# Patient Record
Sex: MALE | Race: WHITE | NOT HISPANIC OR LATINO
[De-identification: names, ages, dates, MRNs, and addresses within clinical notes are randomized per-mention and may not be internally consistent; named-entity substitution may affect disease eponyms.]

---

## 2017-11-29 ENCOUNTER — NON-APPOINTMENT (OUTPATIENT)
Age: 57
End: 2017-11-29

## 2017-11-29 ENCOUNTER — LABORATORY RESULT (OUTPATIENT)
Age: 57
End: 2017-11-29

## 2017-11-29 ENCOUNTER — APPOINTMENT (OUTPATIENT)
Dept: FAMILY MEDICINE | Facility: CLINIC | Age: 57
End: 2017-11-29
Payer: COMMERCIAL

## 2017-11-29 VITALS
TEMPERATURE: 98.2 F | HEART RATE: 71 BPM | RESPIRATION RATE: 12 BRPM | WEIGHT: 210 LBS | BODY MASS INDEX: 29.4 KG/M2 | SYSTOLIC BLOOD PRESSURE: 118 MMHG | DIASTOLIC BLOOD PRESSURE: 76 MMHG | OXYGEN SATURATION: 98 % | HEIGHT: 71 IN

## 2017-11-29 DIAGNOSIS — Z78.9 OTHER SPECIFIED HEALTH STATUS: ICD-10-CM

## 2017-11-29 PROCEDURE — 36415 COLL VENOUS BLD VENIPUNCTURE: CPT

## 2017-11-29 PROCEDURE — 93000 ELECTROCARDIOGRAM COMPLETE: CPT | Mod: 59

## 2017-11-29 PROCEDURE — 99396 PREV VISIT EST AGE 40-64: CPT | Mod: 25

## 2017-11-30 LAB
APPEARANCE: CLEAR
BILIRUBIN URINE: NEGATIVE
BLOOD URINE: NEGATIVE
CHOLEST SERPL-MCNC: 188 MG/DL
CHOLEST/HDLC SERPL: 3.3 RATIO
COLOR: YELLOW
ERYTHROCYTE [SEDIMENTATION RATE] IN BLOOD BY WESTERGREN METHOD: 10 MM/HR
GLUCOSE QUALITATIVE U: NEGATIVE MG/DL
HCV AB SER QL: REACTIVE
HCV S/CO RATIO: 16.6 S/CO
HDLC SERPL-MCNC: 57 MG/DL
KETONES URINE: NEGATIVE
LDLC SERPL CALC-MCNC: 119 MG/DL
LEUKOCYTE ESTERASE URINE: NEGATIVE
NITRITE URINE: NEGATIVE
PH URINE: 7
PROTEIN URINE: NEGATIVE MG/DL
SPECIFIC GRAVITY URINE: 1.01
TRIGL SERPL-MCNC: 60 MG/DL
TSH SERPL-ACNC: 2.31 UIU/ML
UROBILINOGEN URINE: NEGATIVE MG/DL

## 2017-12-13 LAB
ALBUMIN SERPL ELPH-MCNC: 4.2 G/DL
ALP BLD-CCNC: 43 U/L
ALT SERPL-CCNC: 15 U/L
ANION GAP SERPL CALC-SCNC: 14 MMOL/L
AST SERPL-CCNC: 18 U/L
BASOPHILS # BLD AUTO: 0.03 K/UL
BASOPHILS NFR BLD AUTO: 0.4 %
BILIRUB SERPL-MCNC: 0.6 MG/DL
BUN SERPL-MCNC: 19 MG/DL
CALCIUM SERPL-MCNC: 9.3 MG/DL
CHLORIDE SERPL-SCNC: 101 MMOL/L
CO2 SERPL-SCNC: 24 MMOL/L
CREAT SERPL-MCNC: 0.85 MG/DL
EOSINOPHIL # BLD AUTO: 0.1 K/UL
EOSINOPHIL NFR BLD AUTO: 1.5 %
GLUCOSE SERPL-MCNC: 82 MG/DL
HCT VFR BLD CALC: 36.6 %
HGB BLD-MCNC: 11.7 G/DL
IMM GRANULOCYTES NFR BLD AUTO: 0 %
LYMPHOCYTES # BLD AUTO: 3.09 K/UL
LYMPHOCYTES NFR BLD AUTO: 46 %
MAN DIFF?: NORMAL
MCHC RBC-ENTMCNC: 23.1 PG
MCHC RBC-ENTMCNC: 32 GM/DL
MCV RBC AUTO: 72.2 FL
MONOCYTES # BLD AUTO: 0.59 K/UL
MONOCYTES NFR BLD AUTO: 8.8 %
NEUTROPHILS # BLD AUTO: 2.91 K/UL
NEUTROPHILS NFR BLD AUTO: 43.3 %
PLATELET # BLD AUTO: 194 K/UL
POTASSIUM SERPL-SCNC: 4.9 MMOL/L
PROT SERPL-MCNC: 6.7 G/DL
RBC # BLD: 5.07 M/UL
RBC # FLD: 16.4 %
SODIUM SERPL-SCNC: 139 MMOL/L
WBC # FLD AUTO: 6.72 K/UL

## 2019-01-27 ENCOUNTER — LABORATORY RESULT (OUTPATIENT)
Age: 59
End: 2019-01-27

## 2019-01-28 ENCOUNTER — LABORATORY RESULT (OUTPATIENT)
Age: 59
End: 2019-01-28

## 2019-01-28 ENCOUNTER — APPOINTMENT (OUTPATIENT)
Dept: FAMILY MEDICINE | Facility: CLINIC | Age: 59
End: 2019-01-28
Payer: MEDICARE

## 2019-01-28 VITALS
OXYGEN SATURATION: 96 % | TEMPERATURE: 97.7 F | WEIGHT: 205 LBS | HEART RATE: 74 BPM | HEIGHT: 71 IN | RESPIRATION RATE: 13 BRPM | DIASTOLIC BLOOD PRESSURE: 70 MMHG | BODY MASS INDEX: 28.7 KG/M2 | SYSTOLIC BLOOD PRESSURE: 110 MMHG

## 2019-01-28 DIAGNOSIS — R31.9 HEMATURIA, UNSPECIFIED: ICD-10-CM

## 2019-01-28 PROCEDURE — 36415 COLL VENOUS BLD VENIPUNCTURE: CPT

## 2019-01-28 PROCEDURE — 99396 PREV VISIT EST AGE 40-64: CPT | Mod: 25

## 2019-01-28 NOTE — HISTORY OF PRESENT ILLNESS
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

## 2019-01-28 NOTE — PLAN
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 RTC 3 month

## 2019-01-28 NOTE — HEALTH RISK ASSESSMENT
[Good] : ~his/her~  mood as  good [No falls in past year] : Patient reported no falls in the past year [0] : 2) Feeling down, depressed, or hopeless: Not at all (0) [Patient declined bone density test] : Patient declined bone density test [Patient reported colonoscopy was normal] : Patient reported colonoscopy was normal [HIV Test offered] : HIV Test offered [Graduate School] : graduate school [] :  [Fully functional (bathing, dressing, toileting, transferring, walking, feeding)] : Fully functional (bathing, dressing, toileting, transferring, walking, feeding) [Fully functional (using the telephone, shopping, preparing meals, housekeeping, doing laundry, using] : Fully functional and needs no help or supervision to perform IADLs (using the telephone, shopping, preparing meals, housekeeping, doing laundry, using transportation, managing medications and managing finances) [Discussed at today's visit] : Advance Directives Discussed at today's visit [Aggressive treatment] : aggressive treatment [] : No [[id number]] : 0 [ColonoscopyDate] : 01/19 [ColonoscopyComments] : fit

## 2019-01-28 NOTE — REVIEW OF SYSTEMS
[Lower Ext Edema] : lower extremity edema [Poor Libido] : poor libido [Fever] : no fever [Chills] : no chills [Fatigue] : no fatigue [Hot Flashes] : no hot flashes [Night Sweats] : no night sweats [Discharge] : no discharge [Pain] : no pain [Redness] : no redness [Dryness] : no dryness [Earache] : no earache [Hearing Loss] : no hearing loss [Nosebleeds] : no nosebleeds [Postnasal Drip] : no postnasal drip [Chest Pain] : no chest pain [Palpitations] : no palpitations [Claudication] : no  leg claudication [Shortness Of Breath] : no shortness of breath [Wheezing] : no wheezing [Abdominal Pain] : no abdominal pain [Nausea] : no nausea [Constipation] : no constipation [Diarrhea] : no diarrhea [Vomiting] : no vomiting [Heartburn] : no heartburn [Melena] : no melena [Dysuria] : no dysuria [Incontinence] : no incontinence [Hesitancy] : no hesitancy [Nocturia] : no nocturia [Hematuria] : no hematuria [Frequency] : no frequency [Impotence] : no impotency [Joint Pain] : no joint pain [Joint Stiffness] : no joint stiffness [Muscle Pain] : no muscle pain [Muscle Weakness] : no muscle weakness

## 2019-01-29 PROBLEM — R31.9 HEMATURIA: Status: ACTIVE | Noted: 2019-01-29

## 2019-01-29 LAB
24R-OH-CALCIDIOL SERPL-MCNC: 48.6 PG/ML
ALBUMIN SERPL ELPH-MCNC: 4 G/DL
ALP BLD-CCNC: 50 U/L
ALT SERPL-CCNC: 13 U/L
ANION GAP SERPL CALC-SCNC: 12 MMOL/L
APPEARANCE: CLEAR
AST SERPL-CCNC: 14 U/L
BASOPHILS # BLD AUTO: 0.03 K/UL
BASOPHILS NFR BLD AUTO: 0.6 %
BILIRUB SERPL-MCNC: 0.7 MG/DL
BILIRUBIN URINE: NEGATIVE
BLOOD URINE: ABNORMAL
BUN SERPL-MCNC: 13 MG/DL
CALCIUM SERPL-MCNC: 8.8 MG/DL
CHLORIDE SERPL-SCNC: 104 MMOL/L
CHOLEST SERPL-MCNC: 152 MG/DL
CHOLEST/HDLC SERPL: 3.2 RATIO
CO2 SERPL-SCNC: 25 MMOL/L
COLOR: ABNORMAL
CREAT SERPL-MCNC: 0.9 MG/DL
EOSINOPHIL # BLD AUTO: 0.1 K/UL
EOSINOPHIL NFR BLD AUTO: 1.9 %
ERYTHROCYTE [SEDIMENTATION RATE] IN BLOOD BY WESTERGREN METHOD: 8 MM/HR
GLUCOSE QUALITATIVE U: NEGATIVE MG/DL
GLUCOSE SERPL-MCNC: 80 MG/DL
HBA1C MFR BLD HPLC: 5 %
HCT VFR BLD CALC: 36.7 %
HDLC SERPL-MCNC: 48 MG/DL
HGB BLD-MCNC: 11.2 G/DL
IMM GRANULOCYTES NFR BLD AUTO: 0 %
KETONES URINE: NEGATIVE
LDLC SERPL CALC-MCNC: 91 MG/DL
LEUKOCYTE ESTERASE URINE: NEGATIVE
LYMPHOCYTES # BLD AUTO: 2.61 K/UL
LYMPHOCYTES NFR BLD AUTO: 48.5 %
MAN DIFF?: NORMAL
MCHC RBC-ENTMCNC: 22.6 PG
MCHC RBC-ENTMCNC: 30.5 GM/DL
MCV RBC AUTO: 74 FL
MONOCYTES # BLD AUTO: 0.39 K/UL
MONOCYTES NFR BLD AUTO: 7.2 %
NEUTROPHILS # BLD AUTO: 2.25 K/UL
NEUTROPHILS NFR BLD AUTO: 41.8 %
NITRITE URINE: NEGATIVE
PH URINE: 7
PLATELET # BLD AUTO: 197 K/UL
POTASSIUM SERPL-SCNC: 4.7 MMOL/L
PROT SERPL-MCNC: 6.2 G/DL
PROTEIN URINE: NEGATIVE MG/DL
PSA SERPL-MCNC: 0.32 NG/ML
RBC # BLD: 4.96 M/UL
RBC # FLD: 16.8 %
RHEUMATOID FACT SER QL: <10 IU/ML
SODIUM SERPL-SCNC: 141 MMOL/L
SPECIFIC GRAVITY URINE: 1.01
TESTOST SERPL-MCNC: 642.4 NG/DL
TRIGL SERPL-MCNC: 63 MG/DL
TSH SERPL-ACNC: 2.94 UIU/ML
URATE SERPL-MCNC: 5.3 MG/DL
UROBILINOGEN URINE: NEGATIVE MG/DL
WBC # FLD AUTO: 5.38 K/UL

## 2019-01-30 LAB
ANA SER IF-ACNC: NEGATIVE
CCP AB SER IA-ACNC: <8 UNITS
ENA SS-A AB SER IA-ACNC: <0.2 AL
ENA SS-B AB SER IA-ACNC: <0.2 AL
ERYTHROCYTE [SEDIMENTATION RATE] IN BLOOD BY WESTERGREN METHOD: 3 MM/HR
RF+CCP IGG SER-IMP: NEGATIVE

## 2019-02-01 LAB
B BURGDOR AB SER-IMP: NEGATIVE
B BURGDOR IGM PATRN SER IB-IMP: NEGATIVE
B BURGDOR18/20KD IGM SER QL IB: NORMAL
B BURGDOR18KD IGG SER QL IB: NORMAL
B BURGDOR23KD IGG SER QL IB: NORMAL
B BURGDOR23KD IGM SER QL IB: NORMAL
B BURGDOR28KD AB SER QL IB: NORMAL
B BURGDOR28KD IGG SER QL IB: NORMAL
B BURGDOR30KD AB SER QL IB: NORMAL
B BURGDOR30KD IGG SER QL IB: NORMAL
B BURGDOR31KD IGG SER QL IB: NORMAL
B BURGDOR31KD IGM SER QL IB: NORMAL
B BURGDOR39KD IGG SER QL IB: NORMAL
B BURGDOR39KD IGM SER QL IB: PRESENT
B BURGDOR41KD IGG SER QL IB: NORMAL
B BURGDOR41KD IGM SER QL IB: NORMAL
B BURGDOR45KD AB SER QL IB: NORMAL
B BURGDOR45KD IGG SER QL IB: NORMAL
B BURGDOR58KD AB SER QL IB: NORMAL
B BURGDOR58KD IGG SER QL IB: NORMAL
B BURGDOR66KD IGG SER QL IB: NORMAL
B BURGDOR66KD IGM SER QL IB: NORMAL
B BURGDOR93KD IGG SER QL IB: NORMAL
B BURGDOR93KD IGM SER QL IB: NORMAL

## 2019-05-15 ENCOUNTER — TRANSCRIPTION ENCOUNTER (OUTPATIENT)
Age: 59
End: 2019-05-15

## 2019-11-05 ENCOUNTER — OUTPATIENT (OUTPATIENT)
Dept: OUTPATIENT SERVICES | Facility: HOSPITAL | Age: 59
LOS: 1 days | Discharge: HOME | End: 2019-11-05
Payer: COMMERCIAL

## 2019-11-05 DIAGNOSIS — E85.9 AMYLOIDOSIS, UNSPECIFIED: ICD-10-CM

## 2019-11-07 ENCOUNTER — LABORATORY RESULT (OUTPATIENT)
Age: 59
End: 2019-11-07

## 2019-11-07 ENCOUNTER — APPOINTMENT (OUTPATIENT)
Dept: HEMATOLOGY ONCOLOGY | Facility: CLINIC | Age: 59
End: 2019-11-07
Payer: COMMERCIAL

## 2019-11-07 ENCOUNTER — RESULT REVIEW (OUTPATIENT)
Age: 59
End: 2019-11-07

## 2019-11-07 LAB
ALBUMIN SERPL ELPH-MCNC: 3.9 G/DL
ALP BLD-CCNC: 50 U/L
ALT SERPL-CCNC: 9 U/L
ANION GAP SERPL CALC-SCNC: 11 MMOL/L
AST SERPL-CCNC: 11 U/L
BASOPHILS # BLD AUTO: 0.1 K/UL — SIGNIFICANT CHANGE UP (ref 0–0.2)
BASOPHILS NFR BLD AUTO: 1.4 % — SIGNIFICANT CHANGE UP (ref 0–2)
BILIRUB SERPL-MCNC: 0.3 MG/DL
BUN SERPL-MCNC: 10 MG/DL
CALCIUM SERPL-MCNC: 8.8 MG/DL
CHLORIDE SERPL-SCNC: 106 MMOL/L
CO2 SERPL-SCNC: 25 MMOL/L
CREAT SERPL-MCNC: 0.86 MG/DL
EOSINOPHIL # BLD AUTO: 0.1 K/UL — SIGNIFICANT CHANGE UP (ref 0–0.5)
EOSINOPHIL NFR BLD AUTO: 1.6 % — SIGNIFICANT CHANGE UP (ref 0–6)
GLUCOSE SERPL-MCNC: 95 MG/DL
HCT VFR BLD CALC: 34.4 % — LOW (ref 39–50)
HGB BLD-MCNC: 10.3 G/DL — LOW (ref 13–17)
LYMPHOCYTES # BLD AUTO: 2.6 K/UL — SIGNIFICANT CHANGE UP (ref 1–3.3)
LYMPHOCYTES # BLD AUTO: 50.4 % — HIGH (ref 13–44)
MCHC RBC-ENTMCNC: 21.5 PG — LOW (ref 27–34)
MCHC RBC-ENTMCNC: 30 G/DL — LOW (ref 32–36)
MCV RBC AUTO: 71.7 FL — LOW (ref 80–100)
MONOCYTES # BLD AUTO: 0.4 K/UL — SIGNIFICANT CHANGE UP (ref 0–0.9)
MONOCYTES NFR BLD AUTO: 8.2 % — SIGNIFICANT CHANGE UP (ref 2–14)
NEUTROPHILS # BLD AUTO: 1.9 K/UL — SIGNIFICANT CHANGE UP (ref 1.8–7.4)
NEUTROPHILS NFR BLD AUTO: 38.4 % — LOW (ref 43–77)
PLATELET # BLD AUTO: 185 K/UL — SIGNIFICANT CHANGE UP (ref 150–400)
POTASSIUM SERPL-SCNC: 4.3 MMOL/L
PROT SERPL-MCNC: 5.9 G/DL
RBC # BLD: 4.79 M/UL — SIGNIFICANT CHANGE UP (ref 4.2–5.8)
RBC # FLD: 13 % — SIGNIFICANT CHANGE UP (ref 10.3–14.5)
SODIUM SERPL-SCNC: 142 MMOL/L
URATE SERPL-MCNC: 3.7 MG/DL
WBC # BLD: 5.1 K/UL — SIGNIFICANT CHANGE UP (ref 3.8–10.5)
WBC # FLD AUTO: 5.1 K/UL — SIGNIFICANT CHANGE UP (ref 3.8–10.5)

## 2019-11-07 PROCEDURE — 99203 OFFICE O/P NEW LOW 30 MIN: CPT

## 2019-11-07 NOTE — PHYSICAL EXAM
[Fully active, able to carry on all pre-disease performance without restriction] : Status 0 - Fully active, able to carry on all pre-disease performance without restriction [Normal] : pharynx is unremarkable, moist mucus membrane, no oral lesions [de-identified] : recent ulceration shave healed [de-identified] : 1= bipedal edema to thigh [de-identified] : faded LE telangiectasias

## 2019-11-07 NOTE — HISTORY OF PRESENT ILLNESS
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 Started getting joint pain 5 years ago, believed it to be psoriatic arthritis due to his fhx (father). Managed sx with advil. In 2016 his LFT was elevated, dx with Hepatitis C. Managed with 3 months of Harvoni, now undetectable. States his LFT is now normal. In Nov 2018, started exercising 6 days a week, began to notice intermittent hematuria, CT Urogram was normal. States he gets the hematuria q 5 weeks, usually after physical activity. Up to date with his cardiologist, Dr. Miguel Diehl, follows up regularly. Denies any heart issues. Around June 30th, noticed crampy abdominal pain and blood in the stool. Estimates to have lost about a pint of blood. GI sent for CT which showed colitis; ulcerations in descending colon, was believed to Crohn’s. The path report from biopsies of the sigmoid colon decribed moderate active chronic colitis and erosion, with granulation tissue and necroinflammatory exudate consistent with ulcer edge.\par  Was put on steroids and Mesalamine and his sx improved. Was having 6 diarrhea movements a day, now improved. Colonoscopy showed colitis only in descending colon. Started on Humira in mid-August. 3 days after first dose, left knee swelled up, improved with Advil. 3 days after second dose, ankles swelled up, managed with 4 days of steroids. States his joints were red/hot. Has not happened with recent doses. In September, he noticed early satiety and dyspepsia. Started on famotidine and cut back on the Mesalamine. Also started on oral iron, but he developed gastritis so he stopped.\par  His stool is now soft and formed, denies any blood. States his renal function is normal, denies protein in the urine. Has had a weight loss of 25 lbs since June. Reports his BP is low; gets lightheaded when getting up too fast. Noticed pitting edema in his legs, voice hoarseness, and accelerated hair loss. Has also has 2 episodes of mouth ulcers. Denies any adenopathy. \par Hx of cutaneous mastocytosis, dx 4 years ago, lesions on both thighs and flanks, have gone away since starting on steroids.  \par Has hx of Raynaud’s phenomenon. Denies any hx of thyroid disease.\par \par Endoscopy 10/30/19:\par -Small hiatal hernia\par -Friable gastric mucosa. Biopsied.\par -Erythematous duodenopathy, Biopsied. \par \par Pathology 10/30/19:\par A. Duodenum, second part, biopsy:\par -Duodenal mucosa with amyloidosis \par B. Stomach, Antrum, rule out maltoma, biopsy:\par -Superficial fragments of gastric antral mucosa with amyloidosis and active chronic gastritis with surface erosions.\par C. Stomach, body, rule out maltoma, biopsy:\par -Superficial fragments of gastric mucosa with amyloidosis, foveolar hyperplasia and increased chronic inflammation. \par D. Stomach, fundus, rule out maltoma, biopsy:\par -Superficial fragments of gastric mucosa with amyloidosis, foveolar hyperplasia and increased chronic inflammation. \par \par Pathology 7/17/19:\par A. Colon, sigmoid, biopsy:\par -Moderate active chronic colitis and erosion.\par -granulation tissue and necroinflammatory exudate consistent with an ulcer edge/bed.\par B. Colon, rectosigmoid, biopsy:\par -Colonic mucosa within normal limits \par \par The progression in dyspepsia led to an upper endoscopy with Dr. Parks on 10/30/19. In the second portion of the duodenum, biopsy revealed amyloidosis, confirmed with Congo red stain.Superficial fragments of gastric antral mucosa showed the same findings. IgM immunostain is positive in areas of amyloid deposition, with kappa LC predominance. The stomach was noted to have impaired distensibility on endoscopy.\par Renal function has been normal, with BUN 9/creatinine 0.9.\par Cystoscopy was performed which showed prominent blood vessels. In retrospect this finding may also be consistent with bladder involvement with amyloid.\par

## 2019-11-07 NOTE — CONSULT LETTER
[Dear  ___] : Dear  [unfilled], [Consult Letter:] : I had the pleasure of evaluating your patient, [unfilled]. [Please see my note below.] : Please see my note below. [Consult Closing:] : Thank you very much for allowing me to participate in the care of this patient.  If you have any questions, please do not hesitate to contact me. [Sincerely,] : Sincerely, [DrLovely  ___] : Dr. SHAFFER [DrLovely ___] : Dr. SHAFFER

## 2019-11-07 NOTE — ASSESSMENT
[FreeTextEntry1] : 58 y/o male presents with GI symptoms including dyspepsia, diarrhea, rectal bleeding and weight loss, evidence of colitis, gastric involvement with mucosal infiltration and impaired distensibility attributable to biopsy-confirmed amyloidosis. The gastric and duodenal mucosa are + for amyloidosis, and re-evaluation of the areas of colitis in the sigmoid colon show the same finding.\par Renal function normal, no evidence of hepatosplenomegaly.\par Echocardiogram in 4/19 showed abnormal LV relaxation. The intraventricular septum was moderately hypertrophied.\par \par We plan to complete the staging for systemic light chain amyloidosis with skeletal survey, uric acid, serum quantitative immunoglobulins free light chain assay, and serum immunofixation.\par Bone marrow aspiration/biopsy with plasma cell FISH is planned for next week.\par Cardiac MRI, pro-BNP, troponin ordered.\par A 24-hour urine for total protein, UPEP and urine immunofixation is ordered.\par \par Jose is considered a candidate for stem cell transplant and consult will be arranged with Dr. Roman from our Transplant service.\par Preferred chemotherapy in this setting is a combination of Cytoxan/Bortezomib (Velcade)/ and dexamethasone.\par The timing of transplant in relation to induction chemotherapy will be discussed with Dr. Roman.

## 2019-11-07 NOTE — REVIEW OF SYSTEMS
[Negative] : Heme/Lymph [Fever] : no fever [Night Sweats] : no night sweats [Fatigue] : fatigue [Recent Change In Weight] : ~T recent weight change [Lower Ext Edema] : lower extremity edema [Joint Pain] : joint pain [FreeTextEntry2] : Mild fatigue, still working full schedule. 25 pound weight loss. [FreeTextEntry7] : As above [FreeTextEntry8] : Intermittent hematuria [de-identified] : orthostatic hypotension

## 2019-11-08 LAB
ALBUMIN MFR SERPL ELPH: 60.7 %
ALBUMIN SERPL-MCNC: 3.6 G/DL
ALBUMIN/GLOB SERPL: 1.6 RATIO
ALPHA1 GLOB MFR SERPL ELPH: 4.4 %
ALPHA1 GLOB SERPL ELPH-MCNC: 0.3 G/DL
ALPHA2 GLOB MFR SERPL ELPH: 11 %
ALPHA2 GLOB SERPL ELPH-MCNC: 0.6 G/DL
APPEARANCE: CLEAR
B-GLOBULIN MFR SERPL ELPH: 9.8 %
B-GLOBULIN SERPL ELPH-MCNC: 0.6 G/DL
B2 MICROGLOB SERPL-MCNC: 2.5 MG/L
BACTERIA: NEGATIVE
BILIRUBIN URINE: NEGATIVE
BLOOD URINE: NEGATIVE
COLOR: NORMAL
DEPRECATED KAPPA LC FREE/LAMBDA SER: 55.08 RATIO
GAMMA GLOB FLD ELPH-MCNC: 0.8 G/DL
GAMMA GLOB MFR SERPL ELPH: 14.1 %
GLUCOSE QUALITATIVE U: NEGATIVE
HYALINE CASTS: 0 /LPF
IGA SER QL IEP: 125 MG/DL
IGG SER QL IEP: 998 MG/DL
IGM SER QL IEP: 73 MG/DL
INTERPRETATION SERPL IEP-IMP: NORMAL
KAPPA LC CSF-MCNC: 0.93 MG/DL
KAPPA LC SERPL-MCNC: 51.22 MG/DL
KETONES URINE: NEGATIVE
LEUKOCYTE ESTERASE URINE: NEGATIVE
M PROTEIN MFR SERPL ELPH: 7.1 %
M PROTEIN SPEC IFE-MCNC: NORMAL
MICROSCOPIC-UA: NORMAL
MONOCLON BAND OBS SERPL: 0.4 G/DL
NITRITE URINE: NEGATIVE
NT-PROBNP SERPL-MCNC: 448 PG/ML
PH URINE: 6.5
PROT SERPL-MCNC: 5.9 G/DL
PROT SERPL-MCNC: 5.9 G/DL
PROTEIN URINE: NEGATIVE
RED BLOOD CELLS URINE: 2 /HPF
SPECIFIC GRAVITY URINE: 1.01
SQUAMOUS EPITHELIAL CELLS: 0 /HPF
TROPONIN I SERPL-MCNC: 0.01 NG/ML
UROBILINOGEN URINE: NORMAL
WHITE BLOOD CELLS URINE: 0 /HPF

## 2019-11-10 ENCOUNTER — TRANSCRIPTION ENCOUNTER (OUTPATIENT)
Age: 59
End: 2019-11-10

## 2019-11-10 LAB — IGA 24H UR QL IFE: NORMAL

## 2019-11-12 ENCOUNTER — FORM ENCOUNTER (OUTPATIENT)
Age: 59
End: 2019-11-12

## 2019-11-12 LAB
CREAT 24H UR-MCNC: 1.9 G/24 H
CREAT ?TM UR-MCNC: 103 MG/DL
PROT 24H UR-MRATE: 14 MG/DL
PROT ?TM UR-MCNC: 24 HR
PROT UR-MCNC: 262 MG/24 H
SPECIMEN VOL 24H UR: 1875 ML

## 2019-11-13 ENCOUNTER — LABORATORY RESULT (OUTPATIENT)
Age: 59
End: 2019-11-13

## 2019-11-13 ENCOUNTER — OUTPATIENT (OUTPATIENT)
Dept: OUTPATIENT SERVICES | Facility: HOSPITAL | Age: 59
LOS: 1 days | End: 2019-11-13

## 2019-11-13 ENCOUNTER — APPOINTMENT (OUTPATIENT)
Dept: CT IMAGING | Facility: CLINIC | Age: 59
End: 2019-11-13
Payer: COMMERCIAL

## 2019-11-13 DIAGNOSIS — E85.9 AMYLOIDOSIS, UNSPECIFIED: ICD-10-CM

## 2019-11-13 PROCEDURE — 77012 CT SCAN FOR NEEDLE BIOPSY: CPT | Mod: 26

## 2019-11-13 PROCEDURE — 77075 RADEX OSSEOUS SURVEY COMPL: CPT | Mod: 26

## 2019-11-13 PROCEDURE — 38222 DX BONE MARROW BX & ASPIR: CPT | Mod: LT

## 2019-11-14 ENCOUNTER — RX RENEWAL (OUTPATIENT)
Age: 59
End: 2019-11-14

## 2019-11-14 ENCOUNTER — LABORATORY RESULT (OUTPATIENT)
Age: 59
End: 2019-11-14

## 2019-11-22 ENCOUNTER — MOBILE ON CALL (OUTPATIENT)
Age: 59
End: 2019-11-22

## 2019-11-22 ENCOUNTER — RESULT REVIEW (OUTPATIENT)
Age: 59
End: 2019-11-22

## 2019-11-22 PROCEDURE — 88321 CONSLTJ&REPRT SLD PREP ELSWR: CPT

## 2019-11-25 ENCOUNTER — APPOINTMENT (OUTPATIENT)
Dept: HEMATOLOGY ONCOLOGY | Facility: CLINIC | Age: 59
End: 2019-11-25

## 2019-11-25 ENCOUNTER — RESULT REVIEW (OUTPATIENT)
Age: 59
End: 2019-11-25

## 2019-11-25 LAB
BASOPHILS # BLD AUTO: 0.1 K/UL — SIGNIFICANT CHANGE UP (ref 0–0.2)
BASOPHILS NFR BLD AUTO: 1.4 % — SIGNIFICANT CHANGE UP (ref 0–2)
EOSINOPHIL # BLD AUTO: 0.1 K/UL — SIGNIFICANT CHANGE UP (ref 0–0.5)
EOSINOPHIL NFR BLD AUTO: 2.2 % — SIGNIFICANT CHANGE UP (ref 0–6)
HCT VFR BLD CALC: 31.9 % — LOW (ref 39–50)
HGB BLD-MCNC: 10.1 G/DL — LOW (ref 13–17)
KAPPA LC 24H UR-MCNC: 5 MG/DL
KAPPA LC 24H UR-MRATE: 93.75 MG/24 H
LAMBDA LC 24H UR-MCNC: <0.4 MG/DL
LAMBDA LC 24H UR-MRATE: NORMAL
LYMPHOCYTES # BLD AUTO: 2.4 K/UL — SIGNIFICANT CHANGE UP (ref 1–3.3)
LYMPHOCYTES # BLD AUTO: 42.3 % — SIGNIFICANT CHANGE UP (ref 13–44)
MCHC RBC-ENTMCNC: 22.2 PG — LOW (ref 27–34)
MCHC RBC-ENTMCNC: 31.8 G/DL — LOW (ref 32–36)
MCV RBC AUTO: 70 FL — LOW (ref 80–100)
MONOCYTES # BLD AUTO: 0.5 K/UL — SIGNIFICANT CHANGE UP (ref 0–0.9)
MONOCYTES NFR BLD AUTO: 9 % — SIGNIFICANT CHANGE UP (ref 2–14)
NEUTROPHILS # BLD AUTO: 2.6 K/UL — SIGNIFICANT CHANGE UP (ref 1.8–7.4)
NEUTROPHILS NFR BLD AUTO: 45 % — SIGNIFICANT CHANGE UP (ref 43–77)
PLATELET # BLD AUTO: 190 K/UL — SIGNIFICANT CHANGE UP (ref 150–400)
RBC # BLD: 4.56 M/UL — SIGNIFICANT CHANGE UP (ref 4.2–5.8)
RBC # FLD: 12.8 % — SIGNIFICANT CHANGE UP (ref 10.3–14.5)
SPECIMEN VOL 24H UR: 1875 ML/24 H
SURGICAL PATHOLOGY STUDY: SIGNIFICANT CHANGE UP
WBC # BLD: 5.7 K/UL — SIGNIFICANT CHANGE UP (ref 3.8–10.5)
WBC # FLD AUTO: 5.7 K/UL — SIGNIFICANT CHANGE UP (ref 3.8–10.5)

## 2019-11-26 ENCOUNTER — APPOINTMENT (OUTPATIENT)
Age: 59
End: 2019-11-26

## 2019-11-26 PROCEDURE — 93010 ELECTROCARDIOGRAM REPORT: CPT

## 2019-11-27 DIAGNOSIS — Z51.11 ENCOUNTER FOR ANTINEOPLASTIC CHEMOTHERAPY: ICD-10-CM

## 2019-11-29 ENCOUNTER — APPOINTMENT (OUTPATIENT)
Age: 59
End: 2019-11-29

## 2019-12-02 ENCOUNTER — APPOINTMENT (OUTPATIENT)
Age: 59
End: 2019-12-02

## 2019-12-02 ENCOUNTER — RESULT REVIEW (OUTPATIENT)
Age: 59
End: 2019-12-02

## 2019-12-02 ENCOUNTER — OUTPATIENT (OUTPATIENT)
Dept: OUTPATIENT SERVICES | Facility: HOSPITAL | Age: 59
LOS: 1 days | End: 2019-12-02
Payer: COMMERCIAL

## 2019-12-02 DIAGNOSIS — E85.9 AMYLOIDOSIS, UNSPECIFIED: ICD-10-CM

## 2019-12-02 LAB
BASOPHILS # BLD AUTO: 0.1 K/UL — SIGNIFICANT CHANGE UP (ref 0–0.2)
BASOPHILS NFR BLD AUTO: 0.8 % — SIGNIFICANT CHANGE UP (ref 0–2)
EOSINOPHIL # BLD AUTO: 0.2 K/UL — SIGNIFICANT CHANGE UP (ref 0–0.5)
EOSINOPHIL NFR BLD AUTO: 2.9 % — SIGNIFICANT CHANGE UP (ref 0–6)
HCT VFR BLD CALC: 35.4 % — LOW (ref 39–50)
HGB BLD-MCNC: 11.4 G/DL — LOW (ref 13–17)
LYMPHOCYTES # BLD AUTO: 2.9 K/UL — SIGNIFICANT CHANGE UP (ref 1–3.3)
LYMPHOCYTES # BLD AUTO: 40 % — SIGNIFICANT CHANGE UP (ref 13–44)
MCHC RBC-ENTMCNC: 22.2 PG — LOW (ref 27–34)
MCHC RBC-ENTMCNC: 32.1 G/DL — SIGNIFICANT CHANGE UP (ref 32–36)
MCV RBC AUTO: 69.3 FL — LOW (ref 80–100)
MONOCYTES # BLD AUTO: 0.7 K/UL — SIGNIFICANT CHANGE UP (ref 0–0.9)
MONOCYTES NFR BLD AUTO: 9.9 % — SIGNIFICANT CHANGE UP (ref 2–14)
NEUTROPHILS # BLD AUTO: 3.4 K/UL — SIGNIFICANT CHANGE UP (ref 1.8–7.4)
NEUTROPHILS NFR BLD AUTO: 46.4 % — SIGNIFICANT CHANGE UP (ref 43–77)
PLATELET # BLD AUTO: 198 K/UL — SIGNIFICANT CHANGE UP (ref 150–400)
RBC # BLD: 5.1 M/UL — SIGNIFICANT CHANGE UP (ref 4.2–5.8)
RBC # FLD: 13.4 % — SIGNIFICANT CHANGE UP (ref 10.3–14.5)
WBC # BLD: 7.2 K/UL — SIGNIFICANT CHANGE UP (ref 3.8–10.5)
WBC # FLD AUTO: 7.2 K/UL — SIGNIFICANT CHANGE UP (ref 3.8–10.5)

## 2019-12-02 PROCEDURE — 86900 BLOOD TYPING SEROLOGIC ABO: CPT

## 2019-12-02 PROCEDURE — 36415 COLL VENOUS BLD VENIPUNCTURE: CPT

## 2019-12-02 PROCEDURE — 86850 RBC ANTIBODY SCREEN: CPT

## 2019-12-03 ENCOUNTER — APPOINTMENT (OUTPATIENT)
Age: 59
End: 2019-12-03

## 2019-12-03 DIAGNOSIS — R11.2 NAUSEA WITH VOMITING, UNSPECIFIED: ICD-10-CM

## 2019-12-04 ENCOUNTER — OUTPATIENT (OUTPATIENT)
Dept: OUTPATIENT SERVICES | Facility: HOSPITAL | Age: 59
LOS: 1 days | Discharge: HOME | End: 2019-12-04

## 2019-12-04 DIAGNOSIS — E85.9 AMYLOIDOSIS, UNSPECIFIED: ICD-10-CM

## 2019-12-05 ENCOUNTER — OUTPATIENT (OUTPATIENT)
Dept: OUTPATIENT SERVICES | Facility: HOSPITAL | Age: 59
LOS: 1 days | Discharge: HOME | End: 2019-12-05

## 2019-12-05 DIAGNOSIS — E85.9 AMYLOIDOSIS, UNSPECIFIED: ICD-10-CM

## 2019-12-09 ENCOUNTER — APPOINTMENT (OUTPATIENT)
Age: 59
End: 2019-12-09

## 2019-12-10 ENCOUNTER — APPOINTMENT (OUTPATIENT)
Age: 59
End: 2019-12-10

## 2019-12-10 ENCOUNTER — RESULT REVIEW (OUTPATIENT)
Age: 59
End: 2019-12-10

## 2019-12-10 LAB
BASOPHILS # BLD AUTO: 0.1 K/UL — SIGNIFICANT CHANGE UP (ref 0–0.2)
BASOPHILS NFR BLD AUTO: 0.8 % — SIGNIFICANT CHANGE UP (ref 0–2)
EOSINOPHIL # BLD AUTO: 0.1 K/UL — SIGNIFICANT CHANGE UP (ref 0–0.5)
EOSINOPHIL NFR BLD AUTO: 1.8 % — SIGNIFICANT CHANGE UP (ref 0–6)
HCT VFR BLD CALC: 34.4 % — LOW (ref 39–50)
HGB BLD-MCNC: 10.7 G/DL — LOW (ref 13–17)
LYMPHOCYTES # BLD AUTO: 1.2 K/UL — SIGNIFICANT CHANGE UP (ref 1–3.3)
LYMPHOCYTES # BLD AUTO: 19.2 % — SIGNIFICANT CHANGE UP (ref 13–44)
MCHC RBC-ENTMCNC: 21.9 PG — LOW (ref 27–34)
MCHC RBC-ENTMCNC: 31.2 G/DL — LOW (ref 32–36)
MCV RBC AUTO: 70.2 FL — LOW (ref 80–100)
MONOCYTES # BLD AUTO: 0.7 K/UL — SIGNIFICANT CHANGE UP (ref 0–0.9)
MONOCYTES NFR BLD AUTO: 11.4 % — SIGNIFICANT CHANGE UP (ref 2–14)
NEUTROPHILS # BLD AUTO: 4.4 K/UL — SIGNIFICANT CHANGE UP (ref 1.8–7.4)
NEUTROPHILS NFR BLD AUTO: 66.8 % — SIGNIFICANT CHANGE UP (ref 43–77)
PLATELET # BLD AUTO: 173 K/UL — SIGNIFICANT CHANGE UP (ref 150–400)
RBC # BLD: 4.9 M/UL — SIGNIFICANT CHANGE UP (ref 4.2–5.8)
RBC # FLD: 14 % — SIGNIFICANT CHANGE UP (ref 10.3–14.5)
WBC # BLD: 6.5 K/UL — SIGNIFICANT CHANGE UP (ref 3.8–10.5)
WBC # FLD AUTO: 6.5 K/UL — SIGNIFICANT CHANGE UP (ref 3.8–10.5)

## 2019-12-11 ENCOUNTER — RESULT REVIEW (OUTPATIENT)
Age: 59
End: 2019-12-11

## 2019-12-11 ENCOUNTER — APPOINTMENT (OUTPATIENT)
Dept: HEMATOLOGY ONCOLOGY | Facility: CLINIC | Age: 59
End: 2019-12-11
Payer: COMMERCIAL

## 2019-12-11 VITALS
WEIGHT: 173.94 LBS | RESPIRATION RATE: 16 BRPM | TEMPERATURE: 98 F | DIASTOLIC BLOOD PRESSURE: 66 MMHG | BODY MASS INDEX: 24.26 KG/M2 | SYSTOLIC BLOOD PRESSURE: 100 MMHG | HEART RATE: 72 BPM | OXYGEN SATURATION: 98 %

## 2019-12-11 DIAGNOSIS — Z51.11 ENCOUNTER FOR ANTINEOPLASTIC CHEMOTHERAPY: ICD-10-CM

## 2019-12-11 DIAGNOSIS — Z82.61 FAMILY HISTORY OF ARTHRITIS: ICD-10-CM

## 2019-12-11 LAB
BASOPHILS # BLD AUTO: 0 K/UL — SIGNIFICANT CHANGE UP (ref 0–0.2)
BASOPHILS NFR BLD AUTO: 0.8 % — SIGNIFICANT CHANGE UP (ref 0–2)
EOSINOPHIL # BLD AUTO: 0.1 K/UL — SIGNIFICANT CHANGE UP (ref 0–0.5)
EOSINOPHIL NFR BLD AUTO: 2.8 % — SIGNIFICANT CHANGE UP (ref 0–6)
HCT VFR BLD CALC: 31.9 % — LOW (ref 39–50)
HGB BLD-MCNC: 10.7 G/DL — LOW (ref 13–17)
LYMPHOCYTES # BLD AUTO: 1.2 K/UL — SIGNIFICANT CHANGE UP (ref 1–3.3)
LYMPHOCYTES # BLD AUTO: 25.4 % — SIGNIFICANT CHANGE UP (ref 13–44)
MCHC RBC-ENTMCNC: 23.7 PG — LOW (ref 27–34)
MCHC RBC-ENTMCNC: 33.5 G/DL — SIGNIFICANT CHANGE UP (ref 32–36)
MCV RBC AUTO: 70.6 FL — LOW (ref 80–100)
MONOCYTES # BLD AUTO: 0.8 K/UL — SIGNIFICANT CHANGE UP (ref 0–0.9)
MONOCYTES NFR BLD AUTO: 15.7 % — HIGH (ref 2–14)
NEUTROPHILS # BLD AUTO: 2.6 K/UL — SIGNIFICANT CHANGE UP (ref 1.8–7.4)
NEUTROPHILS NFR BLD AUTO: 55.3 % — SIGNIFICANT CHANGE UP (ref 43–77)
PLATELET # BLD AUTO: 145 K/UL — LOW (ref 150–400)
RBC # BLD: 4.52 M/UL — SIGNIFICANT CHANGE UP (ref 4.2–5.8)
RBC # FLD: 14.4 % — SIGNIFICANT CHANGE UP (ref 10.3–14.5)
WBC # BLD: 4.8 K/UL — SIGNIFICANT CHANGE UP (ref 3.8–10.5)
WBC # FLD AUTO: 4.8 K/UL — SIGNIFICANT CHANGE UP (ref 3.8–10.5)

## 2019-12-11 PROCEDURE — 99215 OFFICE O/P EST HI 40 MIN: CPT

## 2019-12-16 ENCOUNTER — RESULT REVIEW (OUTPATIENT)
Age: 59
End: 2019-12-16

## 2019-12-16 ENCOUNTER — APPOINTMENT (OUTPATIENT)
Age: 59
End: 2019-12-16

## 2019-12-16 PROBLEM — Z82.61 FAMILY HISTORY OF ARTHRITIS: Status: ACTIVE | Noted: 2017-11-29

## 2019-12-16 LAB
ALBUMIN SERPL ELPH-MCNC: 3.7 G/DL
ALP BLD-CCNC: 52 U/L
ALT SERPL-CCNC: 9 U/L
ANION GAP SERPL CALC-SCNC: 10 MMOL/L
AST SERPL-CCNC: 7 U/L
BASOPHILS # BLD AUTO: 0 K/UL — SIGNIFICANT CHANGE UP (ref 0–0.2)
BASOPHILS NFR BLD AUTO: 0.4 % — SIGNIFICANT CHANGE UP (ref 0–2)
BILIRUB SERPL-MCNC: 0.2 MG/DL
BUN SERPL-MCNC: 19 MG/DL
CALCIUM SERPL-MCNC: 8.8 MG/DL
CHLORIDE SERPL-SCNC: 106 MMOL/L
CO2 SERPL-SCNC: 27 MMOL/L
CREAT SERPL-MCNC: 0.79 MG/DL
EOSINOPHIL # BLD AUTO: 0 K/UL — SIGNIFICANT CHANGE UP (ref 0–0.5)
EOSINOPHIL NFR BLD AUTO: 0 % — SIGNIFICANT CHANGE UP (ref 0–6)
GLUCOSE SERPL-MCNC: 100 MG/DL
HCT VFR BLD CALC: 32.9 % — LOW (ref 39–50)
HGB BLD-MCNC: 10.3 G/DL — LOW (ref 13–17)
LYMPHOCYTES # BLD AUTO: 1.3 K/UL — SIGNIFICANT CHANGE UP (ref 1–3.3)
LYMPHOCYTES # BLD AUTO: 18 % — SIGNIFICANT CHANGE UP (ref 13–44)
MCHC RBC-ENTMCNC: 22.3 PG — LOW (ref 27–34)
MCHC RBC-ENTMCNC: 31.4 G/DL — LOW (ref 32–36)
MCV RBC AUTO: 71.1 FL — LOW (ref 80–100)
MONOCYTES # BLD AUTO: 0.8 K/UL — SIGNIFICANT CHANGE UP (ref 0–0.9)
MONOCYTES NFR BLD AUTO: 10.9 % — SIGNIFICANT CHANGE UP (ref 2–14)
NEUTROPHILS # BLD AUTO: 5 K/UL — SIGNIFICANT CHANGE UP (ref 1.8–7.4)
NEUTROPHILS NFR BLD AUTO: 70.7 % — SIGNIFICANT CHANGE UP (ref 43–77)
PLATELET # BLD AUTO: 160 K/UL — SIGNIFICANT CHANGE UP (ref 150–400)
POTASSIUM SERPL-SCNC: 4.5 MMOL/L
PROT SERPL-MCNC: 5.4 G/DL
RBC # BLD: 4.62 M/UL — SIGNIFICANT CHANGE UP (ref 4.2–5.8)
RBC # FLD: 13.8 % — SIGNIFICANT CHANGE UP (ref 10.3–14.5)
SODIUM SERPL-SCNC: 143 MMOL/L
WBC # BLD: 7.1 K/UL — SIGNIFICANT CHANGE UP (ref 3.8–10.5)
WBC # FLD AUTO: 7.1 K/UL — SIGNIFICANT CHANGE UP (ref 3.8–10.5)

## 2019-12-16 NOTE — HISTORY OF PRESENT ILLNESS
[de-identified] : 58 y/o male with a hx of Amyloidosis,  Hepatitis C,  dyspepsia, hematuria, rectal bleeding, and Raynaud's phenomenon, cutaneous mastocytosis, presents for an initial Auto PSCT consultation. He is referred by Dr. Joel Wells. \par \par He initially experienced joint pain in 2014 and managed sx with Advil. In 2016, LFT was elevated and was dx with Hepatitis C which was treated with Harvoni. Patient has a hx of hematuria and bloody stool. Biopsies of the colon revealed chronic colitis and erosion. He was pleased on steroids and Mesalamine and started Humira in Mid-August. After first dose of Humira, left knee swelled up ---alleviated by Advil. After second dose of Humira, ankles swelled up and steroids were prescribed. Patient was also placed on Famotidine for early satiety and dyspepsia. His renal function is now normal and no longer experiences bloody stools........Progression in dyspepsia led to an upper endoscopy with Dr Parks on 10/30/19. Biopsy of the second portion of the duodenum revealed amyloidosis confirmed with Congo red statin. IgM immunostain is positive in areas of amyloid deposition, with kappa LC predominance. The stomach was noted to have impaired distensibility on endoscopy.Renal function has been normal, with BUN 9/creatinine 0.9.Cystoscopy was performed which showed prominent blood vessels.  [de-identified] : 60 y/o male presents today for an initial Auto PSCT consultation. He is accompanied by his wife. He has two children: one daughter and one son. 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 He states he consistently experiences GI sx, stomach discomfort,  and c/o of early satiety. As a result of this early satiety, he is losing weight. He also experiences intermittent diarrhea and constipation ...... He is currently on Cybor D...His insurance denied the addition of darzalex...Decadron 40mg ...he consumes protein supplement shakes occasionally.......He recently had an MRI performed which revealed recommendation to follow up with a cardiologist in [facility].....He is complaint with all medications...... Denies fever/chills, night sweats, mouth sores, eye dryness, blurred vision, nausea, vomiting. No CP, SOB or LE edema. The patient had a cardiac MRI..he will see a amyloid cardiologist\par \par \par \par

## 2019-12-16 NOTE — ADDENDUM
[FreeTextEntry1] : Documented by Yasmani Nam acting as a scribe for Dr. Lila Roman on 12/11/2019 \par All medical record entries made by the Scribe were at my, Dr. Lila Roman, direction and personally dictated by me on 12/11/2019. I have reviewed the chart and agree that the record accurately reflects my personal performance of the history, physical exam, assessment and plan. I have also personally directed, reviewed, and agree with the discharge instructions.\par \par \par

## 2019-12-16 NOTE — ASSESSMENT
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 He has involvement of his GI tract and probable cardiac involvement.. He is currently being treated with cybor D. He will see a amyloid cardiologist. I have had a long discussion with the patient regarding the risks, benefits, alternatives and logistics to autologous stem cell transplantation. I have reviewed with the patient the success rates with various treatments including chemotherapy only as well as chemotherapy and autologous stem cell transplant. \par \par \par I have also reviewed the pre-transplant testing for vital organ testing including, but not limited to echocardiogram, MUGA, PFTs, urine collection, bone marrow biopsy, sinus x-rays, dental evaluation and liver function.\par \par \par I have reviewed with the patient the role of Neupogen/Mozobil for 5 days vs chemo mobilization prior to collecting stem cells. I have also discussed the process of apheresis as a way to collect the peripheral stem cells. Anticoagulation with Citrate during apheresis was discussed, along with side effects including but not limited to hypocalcemia mild dysesthesias (most common) to tetany, seizures and cardiac arrhythmias. Treatment with oral or intravenous calcium supplementation in the setting of hypocalcemia was also discussed. \par \par \par I have discussed with the patient the pre-administration of Kepivance IV x 3 days, as a GI prophylaxis with the aim to lessen swelling, irritation, sores, and ulcers in the GI tract caused by high dose chemotherapy. Side effects including but not limited to flushing, itching from Kepivance were also discussed. \par \par \par I reviewed with the patient the process of autologous stem cell transplant. I have reviewed the Three to  four week admission in the hospital including initial chemotherapy, lasting for 2 days (anca 200), followed by a 1-2 day rest period, followed by the transplant via a triple lumen catheter. I reviewed post-transplant expectations and possible need for supportive care in the post transplant state as well as post-transplant chemotherapy. I instructed the patient, he may require  transfusions including, , PRBCs, platelets, and  fluids, electrolytes, etc. For patients with amyloid we use a higher cutoff for plt transfusion b/c of increased risk of bleeding with amyloid deposition in the blood vessels. \par \par \par I have also discussed the need for antifungal, antiviral, antibiotics, and other therapies to keep the patient comfortable in the 6-9 months post transplant and discharge. Possible risks were discussed including infection, bleeding, inflammation in bowel, inflammation in the kidneys and liver. \par \par \par Post-discharge instructions were reviewed including diet control, crowd controls for ideally 6 weeks post transplant. I also discussed common complaints in the post-discharge state including fatigue, weakness, and supportive care. The patient has a supportive environment at home. I have also reviewed treatment options if the transplant is not effective. \par \par \par All questions were answered, patient has verbalized understanding. Emotional support provided. Literature and consents were provided for the patient for their review. Patient to consider options.\par \par \par RTC in 6 weeks for f/u appointment with Dr. Roman \melo Will schedule vital organ pre-testing and orientation at that time

## 2019-12-23 ENCOUNTER — APPOINTMENT (OUTPATIENT)
Dept: HEMATOLOGY ONCOLOGY | Facility: CLINIC | Age: 59
End: 2019-12-23
Payer: COMMERCIAL

## 2019-12-23 ENCOUNTER — RESULT REVIEW (OUTPATIENT)
Age: 59
End: 2019-12-23

## 2019-12-23 ENCOUNTER — APPOINTMENT (OUTPATIENT)
Age: 59
End: 2019-12-23

## 2019-12-23 VITALS
SYSTOLIC BLOOD PRESSURE: 94 MMHG | WEIGHT: 178 LBS | OXYGEN SATURATION: 97 % | TEMPERATURE: 97.7 F | DIASTOLIC BLOOD PRESSURE: 61 MMHG | HEIGHT: 71 IN | BODY MASS INDEX: 24.92 KG/M2 | HEART RATE: 82 BPM

## 2019-12-23 LAB
ALBUMIN SERPL ELPH-MCNC: 3.3 G/DL
ALP BLD-CCNC: 54 U/L
ALT SERPL-CCNC: 6 U/L
ANION GAP SERPL CALC-SCNC: 9 MMOL/L
AST SERPL-CCNC: 10 U/L
BASOPHILS # BLD AUTO: 0.1 K/UL — SIGNIFICANT CHANGE UP (ref 0–0.2)
BASOPHILS NFR BLD AUTO: 1.1 % — SIGNIFICANT CHANGE UP (ref 0–2)
BILIRUB SERPL-MCNC: 0.2 MG/DL
BUN SERPL-MCNC: 13 MG/DL
CALCIUM SERPL-MCNC: 8.4 MG/DL
CHLORIDE SERPL-SCNC: 107 MMOL/L
CO2 SERPL-SCNC: 26 MMOL/L
CREAT SERPL-MCNC: 0.89 MG/DL
EOSINOPHIL # BLD AUTO: 0.1 K/UL — SIGNIFICANT CHANGE UP (ref 0–0.5)
EOSINOPHIL NFR BLD AUTO: 1.9 % — SIGNIFICANT CHANGE UP (ref 0–6)
GLUCOSE SERPL-MCNC: 83 MG/DL
HCT VFR BLD CALC: 32.1 % — LOW (ref 39–50)
HGB BLD-MCNC: 10.2 G/DL — LOW (ref 13–17)
LYMPHOCYTES # BLD AUTO: 1.4 K/UL — SIGNIFICANT CHANGE UP (ref 1–3.3)
LYMPHOCYTES # BLD AUTO: 25.8 % — SIGNIFICANT CHANGE UP (ref 13–44)
MCHC RBC-ENTMCNC: 22.3 PG — LOW (ref 27–34)
MCHC RBC-ENTMCNC: 31.8 G/DL — LOW (ref 32–36)
MCV RBC AUTO: 70.1 FL — LOW (ref 80–100)
MONOCYTES # BLD AUTO: 0.8 K/UL — SIGNIFICANT CHANGE UP (ref 0–0.9)
MONOCYTES NFR BLD AUTO: 14 % — SIGNIFICANT CHANGE UP (ref 2–14)
NEUTROPHILS # BLD AUTO: 3.1 K/UL — SIGNIFICANT CHANGE UP (ref 1.8–7.4)
NEUTROPHILS NFR BLD AUTO: 57.2 % — SIGNIFICANT CHANGE UP (ref 43–77)
PLATELET # BLD AUTO: 205 K/UL — SIGNIFICANT CHANGE UP (ref 150–400)
POTASSIUM SERPL-SCNC: 4.7 MMOL/L
PROT SERPL-MCNC: 5.1 G/DL
RBC # BLD: 4.58 M/UL — SIGNIFICANT CHANGE UP (ref 4.2–5.8)
RBC # FLD: 14.7 % — HIGH (ref 10.3–14.5)
SODIUM SERPL-SCNC: 142 MMOL/L
WBC # BLD: 5.5 K/UL — SIGNIFICANT CHANGE UP (ref 3.8–10.5)
WBC # FLD AUTO: 5.5 K/UL — SIGNIFICANT CHANGE UP (ref 3.8–10.5)

## 2019-12-23 PROCEDURE — 99213 OFFICE O/P EST LOW 20 MIN: CPT

## 2019-12-23 NOTE — HISTORY OF PRESENT ILLNESS
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 Started getting joint pain 5 years ago, believed it to be psoriatic arthritis due to his fhx (father). Managed sx with advil. In 2016 his LFT was elevated, dx with Hepatitis C. Managed with 3 months of Harvoni, now undetectable. States his LFT is now normal. In Nov 2018, started exercising 6 days a week, began to notice intermittent hematuria, CT Urogram was normal. States he gets the hematuria q 5 weeks, usually after physical activity. Up to date with his cardiologist, Dr. Miguel Diehl, follows up regularly. Denies any heart issues. Around June 30th, noticed crampy abdominal pain and blood in the stool. Estimates to have lost about a pint of blood. GI sent for CT which showed colitis; ulcerations in descending colon, was believed to Crohn’s. The path report from biopsies of the sigmoid colon described moderate active chronic colitis and erosion, with granulation tissue and necroinflammatory exudate consistent with ulcer edge. \par  Was put on steroids and Mesalamine and his sx improved. Was having 6 diarrhea movements a day, now improved. Colonoscopy showed colitis only in descending colon. Started on Humira in mid-August. 3 days after first dose, left knee swelled up, improved with Advil. 3 days after second dose, ankles swelled up, managed with 4 days of steroids. States his joints were red/hot. Has not happened with recent doses. In September, he noticed early satiety and dyspepsia. Started on famotidine and cut back on the Mesalamine. Also started on oral iron, but he developed gastritis so he stopped.\par  His stool is now soft and formed, denies any blood. States his renal function is normal, denies protein in the urine. Has had a weight loss of 25 lbs since June. Reports his BP is low; gets lightheaded when getting up too fast. Noticed pitting edema in his legs, voice hoarseness, and accelerated hair loss. Has also has 2 episodes of mouth ulcers. Denies any adenopathy. \par Hx of cutaneous mastocytosis, dx 4 years ago, lesions on both thighs and flanks, have gone away since starting on steroids.  \par Has hx of Raynaud’s phenomenon. Denies any hx of thyroid disease.\par \par Endoscopy 10/30/19:\par -Small hiatal hernia\par -Friable gastric mucosa. Biopsied.\par -Erythematous duodenopathy, Biopsied. \par \par Pathology 10/30/19:\par A. Duodenum, second part, biopsy:\par -Duodenal mucosa with amyloidosis \par B. Stomach, Antrum, rule out maltoma, biopsy:\par -Superficial fragments of gastric antral mucosa with amyloidosis and active chronic gastritis with surface erosions.\par C. Stomach, body, rule out maltoma, biopsy:\par -Superficial fragments of gastric mucosa with amyloidosis, foveolar hyperplasia and increased chronic inflammation. \par D. Stomach, fundus, rule out maltoma, biopsy:\par -Superficial fragments of gastric mucosa with amyloidosis, foveolar hyperplasia and increased chronic inflammation. \par \par Pathology 7/17/19:\par A. Colon, sigmoid, biopsy:\par -Moderate active chronic colitis and erosion.\par -granulation tissue and necroinflammatory exudate consistent with an ulcer edge/bed.\par B. Colon, rectosigmoid, biopsy:\par -Colonic mucosa within normal limits \par \par The progression in dyspepsia led to an upper endoscopy with Dr. Parks on 10/30/19. In the second portion of the duodenum, biopsy revealed amyloidosis, confirmed with Congo red stain.Superficial fragments of gastric antral mucosa showed the same findings. IgM immunostain is positive in areas of amyloid deposition, with kappa LC predominance. The stomach was noted to have impaired distensibility on endoscopy.\par Renal function has been normal, with BUN 9/creatinine 0.9.\par Cystoscopy was performed which showed prominent blood vessels. In retrospect this finding may also be consistent with bladder involvement with amyloid.\par  [de-identified] : Appetite improving, gained 8 lbs in the last week. \par Stomach pains have improved. \par C/o neuropathy in both feet, constant numbness in plantar feet. \par Hand dexterity is unchanged. \par Sleep is normal with manageable nocturia. \melo Has met with Dr. Roman. \par Waiting to see Cardiac Amyloid specialists at [facility].

## 2019-12-23 NOTE — PHYSICAL EXAM
[Fully active, able to carry on all pre-disease performance without restriction] : Status 0 - Fully active, able to carry on all pre-disease performance without restriction [Normal] : affect appropriate [de-identified] : Full surgical schedule. Looks stronger. [de-identified] : Mild hoarseness [de-identified] : BP 94/60 [de-identified] : mild numbness plantar feet

## 2019-12-23 NOTE — ASSESSMENT
[FreeTextEntry1] : 60 y/o male presents with GI symptoms including dyspepsia, diarrhea, rectal bleeding and weight loss, evidence of colitis, gastric involvement with mucosal infiltration and impaired distensibility attributable to biopsy-confirmed amyloidosis. The gastric and duodenal mucosa are + for amyloidosis, and re-evaluation of the areas of colitis in the sigmoid colon show the same finding.\par Renal function normal, no evidence of hepatosplenomegaly.\par Echocardiogram in 4/19 showed abnormal LV relaxation. The intraventricular septum was moderately hypertrophied. Cardiac MRI confirms cardiac amyloidosis.\par \par \par \par \par Jose is showing improvement in his original GI symptoms roughly 4 weeks into therapy with Cytoxan/Bortezomib (Velcade)/ and dexamethasone.\par The timing of transplant in relation to induction chemotherapy has  been discussed with Dr. Roman, roughly 6 months following initiation of CyBorD.

## 2019-12-23 NOTE — REVIEW OF SYSTEMS
[Recent Change In Weight] : ~T recent weight change [Negative] : Musculoskeletal [Fever] : no fever [Night Sweats] : no night sweats [FreeTextEntry2] : 8 lb weight gain [FreeTextEntry8] : nocturia  [de-identified] : neuropathy in feet

## 2019-12-24 LAB
DEPRECATED KAPPA LC FREE/LAMBDA SER: 68.73 RATIO
IGA SER QL IEP: 86 MG/DL
IGG SER QL IEP: 529 MG/DL
IGM SER QL IEP: 48 MG/DL
KAPPA LC CSF-MCNC: 0.26 MG/DL
KAPPA LC SERPL-MCNC: 17.87 MG/DL

## 2019-12-27 ENCOUNTER — APPOINTMENT (OUTPATIENT)
Age: 59
End: 2019-12-27

## 2019-12-30 ENCOUNTER — APPOINTMENT (OUTPATIENT)
Age: 59
End: 2019-12-30

## 2019-12-30 ENCOUNTER — RESULT REVIEW (OUTPATIENT)
Age: 59
End: 2019-12-30

## 2019-12-30 LAB
BASOPHILS # BLD AUTO: 0 K/UL — SIGNIFICANT CHANGE UP (ref 0–0.2)
BASOPHILS NFR BLD AUTO: 0.9 % — SIGNIFICANT CHANGE UP (ref 0–2)
EOSINOPHIL # BLD AUTO: 0.2 K/UL — SIGNIFICANT CHANGE UP (ref 0–0.5)
EOSINOPHIL NFR BLD AUTO: 3.1 % — SIGNIFICANT CHANGE UP (ref 0–6)
HCT VFR BLD CALC: 32.7 % — LOW (ref 39–50)
HGB BLD-MCNC: 10.1 G/DL — LOW (ref 13–17)
LYMPHOCYTES # BLD AUTO: 1.2 K/UL — SIGNIFICANT CHANGE UP (ref 1–3.3)
LYMPHOCYTES # BLD AUTO: 23.6 % — SIGNIFICANT CHANGE UP (ref 13–44)
MCHC RBC-ENTMCNC: 21.9 PG — LOW (ref 27–34)
MCHC RBC-ENTMCNC: 31 G/DL — LOW (ref 32–36)
MCV RBC AUTO: 70.7 FL — LOW (ref 80–100)
MONOCYTES # BLD AUTO: 0.4 K/UL — SIGNIFICANT CHANGE UP (ref 0–0.9)
MONOCYTES NFR BLD AUTO: 8.2 % — SIGNIFICANT CHANGE UP (ref 2–14)
NEUTROPHILS # BLD AUTO: 3.3 K/UL — SIGNIFICANT CHANGE UP (ref 1.8–7.4)
NEUTROPHILS NFR BLD AUTO: 64.1 % — SIGNIFICANT CHANGE UP (ref 43–77)
PLATELET # BLD AUTO: 216 K/UL — SIGNIFICANT CHANGE UP (ref 150–400)
RBC # BLD: 4.63 M/UL — SIGNIFICANT CHANGE UP (ref 4.2–5.8)
RBC # FLD: 15.1 % — HIGH (ref 10.3–14.5)
WBC # BLD: 5.2 K/UL — SIGNIFICANT CHANGE UP (ref 3.8–10.5)
WBC # FLD AUTO: 5.2 K/UL — SIGNIFICANT CHANGE UP (ref 3.8–10.5)

## 2020-01-02 ENCOUNTER — APPOINTMENT (OUTPATIENT)
Age: 60
End: 2020-01-02

## 2020-01-02 ENCOUNTER — OUTPATIENT (OUTPATIENT)
Dept: OUTPATIENT SERVICES | Facility: HOSPITAL | Age: 60
LOS: 1 days | Discharge: HOME | End: 2020-01-02

## 2020-01-02 DIAGNOSIS — E85.9 AMYLOIDOSIS, UNSPECIFIED: ICD-10-CM

## 2020-01-06 ENCOUNTER — APPOINTMENT (OUTPATIENT)
Age: 60
End: 2020-01-06

## 2020-01-06 ENCOUNTER — RESULT REVIEW (OUTPATIENT)
Age: 60
End: 2020-01-06

## 2020-01-06 LAB
BASOPHILS # BLD AUTO: 0.1 K/UL — SIGNIFICANT CHANGE UP (ref 0–0.2)
BASOPHILS NFR BLD AUTO: 0.9 % — SIGNIFICANT CHANGE UP (ref 0–2)
EOSINOPHIL # BLD AUTO: 0.2 K/UL — SIGNIFICANT CHANGE UP (ref 0–0.5)
EOSINOPHIL NFR BLD AUTO: 3.8 % — SIGNIFICANT CHANGE UP (ref 0–6)
HCT VFR BLD CALC: 34.3 % — LOW (ref 39–50)
HGB BLD-MCNC: 10.7 G/DL — LOW (ref 13–17)
LYMPHOCYTES # BLD AUTO: 1.1 K/UL — SIGNIFICANT CHANGE UP (ref 1–3.3)
LYMPHOCYTES # BLD AUTO: 17.8 % — SIGNIFICANT CHANGE UP (ref 13–44)
MCHC RBC-ENTMCNC: 22.1 PG — LOW (ref 27–34)
MCHC RBC-ENTMCNC: 31.2 G/DL — LOW (ref 32–36)
MCV RBC AUTO: 71.1 FL — LOW (ref 80–100)
MONOCYTES # BLD AUTO: 0.6 K/UL — SIGNIFICANT CHANGE UP (ref 0–0.9)
MONOCYTES NFR BLD AUTO: 10.5 % — SIGNIFICANT CHANGE UP (ref 2–14)
NEUTROPHILS # BLD AUTO: 4 K/UL — SIGNIFICANT CHANGE UP (ref 1.8–7.4)
NEUTROPHILS NFR BLD AUTO: 67 % — SIGNIFICANT CHANGE UP (ref 43–77)
PLATELET # BLD AUTO: 191 K/UL — SIGNIFICANT CHANGE UP (ref 150–400)
RBC # BLD: 4.82 M/UL — SIGNIFICANT CHANGE UP (ref 4.2–5.8)
RBC # FLD: 15.5 % — HIGH (ref 10.3–14.5)
WBC # BLD: 6 K/UL — SIGNIFICANT CHANGE UP (ref 3.8–10.5)
WBC # FLD AUTO: 6 K/UL — SIGNIFICANT CHANGE UP (ref 3.8–10.5)

## 2020-01-07 DIAGNOSIS — Z51.11 ENCOUNTER FOR ANTINEOPLASTIC CHEMOTHERAPY: ICD-10-CM

## 2020-01-13 ENCOUNTER — RESULT REVIEW (OUTPATIENT)
Age: 60
End: 2020-01-13

## 2020-01-13 ENCOUNTER — APPOINTMENT (OUTPATIENT)
Dept: HEMATOLOGY ONCOLOGY | Facility: CLINIC | Age: 60
End: 2020-01-13
Payer: COMMERCIAL

## 2020-01-13 ENCOUNTER — APPOINTMENT (OUTPATIENT)
Age: 60
End: 2020-01-13

## 2020-01-13 VITALS
HEIGHT: 71 IN | HEART RATE: 66 BPM | SYSTOLIC BLOOD PRESSURE: 89 MMHG | WEIGHT: 180.04 LBS | TEMPERATURE: 97.9 F | BODY MASS INDEX: 25.2 KG/M2 | OXYGEN SATURATION: 97 % | DIASTOLIC BLOOD PRESSURE: 60 MMHG

## 2020-01-13 LAB
BASOPHILS # BLD AUTO: 0 K/UL — SIGNIFICANT CHANGE UP (ref 0–0.2)
BASOPHILS NFR BLD AUTO: 0.6 % — SIGNIFICANT CHANGE UP (ref 0–2)
EOSINOPHIL # BLD AUTO: 0.1 K/UL — SIGNIFICANT CHANGE UP (ref 0–0.5)
EOSINOPHIL NFR BLD AUTO: 2 % — SIGNIFICANT CHANGE UP (ref 0–6)
HCT VFR BLD CALC: 35.7 % — LOW (ref 39–50)
HGB BLD-MCNC: 11.4 G/DL — LOW (ref 13–17)
LYMPHOCYTES # BLD AUTO: 1.3 K/UL — SIGNIFICANT CHANGE UP (ref 1–3.3)
LYMPHOCYTES # BLD AUTO: 19.3 % — SIGNIFICANT CHANGE UP (ref 13–44)
MCHC RBC-ENTMCNC: 23 PG — LOW (ref 27–34)
MCHC RBC-ENTMCNC: 32.1 G/DL — SIGNIFICANT CHANGE UP (ref 32–36)
MCV RBC AUTO: 71.7 FL — LOW (ref 80–100)
MONOCYTES # BLD AUTO: 0.6 K/UL — SIGNIFICANT CHANGE UP (ref 0–0.9)
MONOCYTES NFR BLD AUTO: 9 % — SIGNIFICANT CHANGE UP (ref 2–14)
NEUTROPHILS # BLD AUTO: 4.8 K/UL — SIGNIFICANT CHANGE UP (ref 1.8–7.4)
NEUTROPHILS NFR BLD AUTO: 69.1 % — SIGNIFICANT CHANGE UP (ref 43–77)
PLATELET # BLD AUTO: 190 K/UL — SIGNIFICANT CHANGE UP (ref 150–400)
RBC # BLD: 4.98 M/UL — SIGNIFICANT CHANGE UP (ref 4.2–5.8)
RBC # FLD: 16.2 % — HIGH (ref 10.3–14.5)
WBC # BLD: 6.9 K/UL — SIGNIFICANT CHANGE UP (ref 3.8–10.5)
WBC # FLD AUTO: 6.9 K/UL — SIGNIFICANT CHANGE UP (ref 3.8–10.5)

## 2020-01-13 PROCEDURE — 99213 OFFICE O/P EST LOW 20 MIN: CPT

## 2020-01-14 NOTE — HISTORY OF PRESENT ILLNESS
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 Started getting joint pain 5 years ago, believed it to be psoriatic arthritis due to his fhx (father). Managed sx with advil. In 2016 his LFT was elevated, dx with Hepatitis C. Managed with 3 months of Harvoni, now undetectable. States his LFT is now normal. In Nov 2018, started exercising 6 days a week, began to notice intermittent hematuria, CT Urogram was normal. States he gets the hematuria q 5 weeks, usually after physical activity. Up to date with his cardiologist, Dr. Miguel Diehl, follows up regularly. Denies any heart issues. Around June 30th, noticed crampy abdominal pain and blood in the stool. Estimates to have lost about a pint of blood. GI sent for CT which showed colitis; ulcerations in descending colon, was believed to Crohn’s. The path report from biopsies of the sigmoid colon described moderate active chronic colitis and erosion, with granulation tissue and necroinflammatory exudate consistent with ulcer edge. \par  Was put on steroids and Mesalamine and his sx improved. Was having 6 diarrhea movements a day, now improved. Colonoscopy showed colitis only in descending colon. Started on Humira in mid-August. 3 days after first dose, left knee swelled up, improved with Advil. 3 days after second dose, ankles swelled up, managed with 4 days of steroids. States his joints were red/hot. Has not happened with recent doses. In September, he noticed early satiety and dyspepsia. Started on famotidine and cut back on the Mesalamine. Also started on oral iron, but he developed gastritis so he stopped.\par  His stool is now soft and formed, denies any blood. States his renal function is normal, denies protein in the urine. Has had a weight loss of 25 lbs since June. Reports his BP is low; gets lightheaded when getting up too fast. Noticed pitting edema in his legs, voice hoarseness, and accelerated hair loss. Has also has 2 episodes of mouth ulcers. Denies any adenopathy. \par Hx of cutaneous mastocytosis, dx 4 years ago, lesions on both thighs and flanks, have gone away since starting on steroids.  \par Has hx of Raynaud’s phenomenon. Denies any hx of thyroid disease.\par \par Endoscopy 10/30/19:\par -Small hiatal hernia\par -Friable gastric mucosa. Biopsied.\par -Erythematous duodenopathy, Biopsied. \par \par Pathology 10/30/19:\par A. Duodenum, second part, biopsy:\par -Duodenal mucosa with amyloidosis \par B. Stomach, Antrum, rule out maltoma, biopsy:\par -Superficial fragments of gastric antral mucosa with amyloidosis and active chronic gastritis with surface erosions.\par C. Stomach, body, rule out maltoma, biopsy:\par -Superficial fragments of gastric mucosa with amyloidosis, foveolar hyperplasia and increased chronic inflammation. \par D. Stomach, fundus, rule out maltoma, biopsy:\par -Superficial fragments of gastric mucosa with amyloidosis, foveolar hyperplasia and increased chronic inflammation. \par \par Pathology 7/17/19:\par A. Colon, sigmoid, biopsy:\par -Moderate active chronic colitis and erosion.\par -granulation tissue and necroinflammatory exudate consistent with an ulcer edge/bed.\par B. Colon, rectosigmoid, biopsy:\par -Colonic mucosa within normal limits \par \par The progression in dyspepsia led to an upper endoscopy with Dr. Parks on 10/30/19. In the second portion of the duodenum, biopsy revealed amyloidosis, confirmed with Congo red stain.Superficial fragments of gastric antral mucosa showed the same findings. IgM immunostain is positive in areas of amyloid deposition, with kappa LC predominance. The stomach was noted to have impaired distensibility on endoscopy.\par Renal function has been normal, with BUN 9/creatinine 0.9.\par Cystoscopy was performed which showed prominent blood vessels. In retrospect this finding may also be consistent with bladder involvement with amyloid.\par  [de-identified] : Feeling better, able to maintain weight.\melo GI sx continue to improve.\melo Has some neuropathy (numbness) in both feet, manageable. \melo Denies neuropathy in his hand, still operating with no difficulty. \melo Will be following up with Dr. Roman on Feb. 5th. \melo Saw cardiac amyloid team at [facility], ordered an echo and stress test. \melo Last Monday, felt burning sensation in his bladder, was up every hour through the night urinating. Resolved on it's own, related to cytoxan.

## 2020-01-14 NOTE — PHYSICAL EXAM
[Fully active, able to carry on all pre-disease performance without restriction] : Status 0 - Fully active, able to carry on all pre-disease performance without restriction [Normal] : affect appropriate [de-identified] : Full surgical schedule. Looks stronger. [de-identified] : mild numbness plantar feet [de-identified] : BP 89/60

## 2020-01-14 NOTE — ASSESSMENT
[FreeTextEntry1] : 58 y/o male presents with GI symptoms including dyspepsia, diarrhea, rectal bleeding and weight loss, evidence of colitis, gastric involvement with mucosal infiltration and impaired distensibility attributable to biopsy-confirmed amyloidosis. The gastric and duodenal mucosa are + for amyloidosis, and re-evaluation of the areas of colitis in the sigmoid colon show the same finding.\par Renal function normal, no evidence of hepatosplenomegaly.\par Echocardiogram in 4/19 showed abnormal LV relaxation. The intraventricular septum was moderately hypertrophied. Cardiac MRI confirms cardiac amyloidosis.\par \par \par Jose is showing improvement in his original GI symptoms roughly 7 weeks into therapy with Cytoxan/Bortezomib (Velcade)/ and dexamethasone, and free kappa light chains on 12/24/19 were down to 17.8.\par The timing of transplant in relation to induction chemotherapy has  been discussed with Dr. Roman, roughly 6 months following initiation of CyBorD.

## 2020-01-21 ENCOUNTER — APPOINTMENT (OUTPATIENT)
Dept: HEMATOLOGY ONCOLOGY | Facility: CLINIC | Age: 60
End: 2020-01-21

## 2020-01-21 ENCOUNTER — APPOINTMENT (OUTPATIENT)
Age: 60
End: 2020-01-21

## 2020-01-21 ENCOUNTER — RESULT REVIEW (OUTPATIENT)
Age: 60
End: 2020-01-21

## 2020-01-21 LAB
BASOPHILS # BLD AUTO: 0.1 K/UL — SIGNIFICANT CHANGE UP (ref 0–0.2)
BASOPHILS NFR BLD AUTO: 1.1 % — SIGNIFICANT CHANGE UP (ref 0–2)
EOSINOPHIL # BLD AUTO: 0.2 K/UL — SIGNIFICANT CHANGE UP (ref 0–0.5)
EOSINOPHIL NFR BLD AUTO: 3.9 % — SIGNIFICANT CHANGE UP (ref 0–6)
HCT VFR BLD CALC: 32.4 % — LOW (ref 39–50)
HGB BLD-MCNC: 10 G/DL — LOW (ref 13–17)
LYMPHOCYTES # BLD AUTO: 0.9 K/UL — LOW (ref 1–3.3)
LYMPHOCYTES # BLD AUTO: 15.5 % — SIGNIFICANT CHANGE UP (ref 13–44)
MCHC RBC-ENTMCNC: 22.1 PG — LOW (ref 27–34)
MCHC RBC-ENTMCNC: 30.9 G/DL — LOW (ref 32–36)
MCV RBC AUTO: 71.5 FL — LOW (ref 80–100)
MONOCYTES # BLD AUTO: 0.7 K/UL — SIGNIFICANT CHANGE UP (ref 0–0.9)
MONOCYTES NFR BLD AUTO: 12.1 % — SIGNIFICANT CHANGE UP (ref 2–14)
NEUTROPHILS # BLD AUTO: 3.9 K/UL — SIGNIFICANT CHANGE UP (ref 1.8–7.4)
NEUTROPHILS NFR BLD AUTO: 67.3 % — SIGNIFICANT CHANGE UP (ref 43–77)
PLATELET # BLD AUTO: 165 K/UL — SIGNIFICANT CHANGE UP (ref 150–400)
RBC # BLD: 4.52 M/UL — SIGNIFICANT CHANGE UP (ref 4.2–5.8)
RBC # FLD: 16.3 % — HIGH (ref 10.3–14.5)
WBC # BLD: 5.8 K/UL — SIGNIFICANT CHANGE UP (ref 3.8–10.5)
WBC # FLD AUTO: 5.8 K/UL — SIGNIFICANT CHANGE UP (ref 3.8–10.5)

## 2020-01-22 LAB
ALBUMIN SERPL ELPH-MCNC: 3.7 G/DL
ALP BLD-CCNC: 57 U/L
ALT SERPL-CCNC: 9 U/L
ANION GAP SERPL CALC-SCNC: 11 MMOL/L
AST SERPL-CCNC: 11 U/L
BILIRUB SERPL-MCNC: 0.2 MG/DL
BUN SERPL-MCNC: 16 MG/DL
CALCIUM SERPL-MCNC: 8.4 MG/DL
CHLORIDE SERPL-SCNC: 108 MMOL/L
CO2 SERPL-SCNC: 24 MMOL/L
CREAT SERPL-MCNC: 0.96 MG/DL
DEPRECATED KAPPA LC FREE/LAMBDA SER: 18.79 RATIO
GLUCOSE SERPL-MCNC: 74 MG/DL
IGA SER QL IEP: 89 MG/DL
IGG SER QL IEP: 453 MG/DL
IGM SER QL IEP: 47 MG/DL
KAPPA LC CSF-MCNC: 0.67 MG/DL
KAPPA LC SERPL-MCNC: 12.59 MG/DL
POTASSIUM SERPL-SCNC: 4.4 MMOL/L
PROT SERPL-MCNC: 5.5 G/DL
SODIUM SERPL-SCNC: 143 MMOL/L

## 2020-01-27 ENCOUNTER — APPOINTMENT (OUTPATIENT)
Age: 60
End: 2020-01-27

## 2020-01-27 ENCOUNTER — APPOINTMENT (OUTPATIENT)
Dept: HEMATOLOGY ONCOLOGY | Facility: CLINIC | Age: 60
End: 2020-01-27

## 2020-01-27 ENCOUNTER — RESULT REVIEW (OUTPATIENT)
Age: 60
End: 2020-01-27

## 2020-01-27 LAB
BASOPHILS # BLD AUTO: 0 K/UL — SIGNIFICANT CHANGE UP (ref 0–0.2)
BASOPHILS NFR BLD AUTO: 1 % — SIGNIFICANT CHANGE UP (ref 0–2)
EOSINOPHIL # BLD AUTO: 0.2 K/UL — SIGNIFICANT CHANGE UP (ref 0–0.5)
EOSINOPHIL NFR BLD AUTO: 4.5 % — SIGNIFICANT CHANGE UP (ref 0–6)
HCT VFR BLD CALC: 32.3 % — LOW (ref 39–50)
HGB BLD-MCNC: 10 G/DL — LOW (ref 13–17)
LYMPHOCYTES # BLD AUTO: 1.2 K/UL — SIGNIFICANT CHANGE UP (ref 1–3.3)
LYMPHOCYTES # BLD AUTO: 28.3 % — SIGNIFICANT CHANGE UP (ref 13–44)
MCHC RBC-ENTMCNC: 22.2 PG — LOW (ref 27–34)
MCHC RBC-ENTMCNC: 30.8 G/DL — LOW (ref 32–36)
MCV RBC AUTO: 72.1 FL — LOW (ref 80–100)
MONOCYTES # BLD AUTO: 0.5 K/UL — SIGNIFICANT CHANGE UP (ref 0–0.9)
MONOCYTES NFR BLD AUTO: 12.2 % — SIGNIFICANT CHANGE UP (ref 2–14)
NEUTROPHILS # BLD AUTO: 2.3 K/UL — SIGNIFICANT CHANGE UP (ref 1.8–7.4)
NEUTROPHILS NFR BLD AUTO: 54.1 % — SIGNIFICANT CHANGE UP (ref 43–77)
PLATELET # BLD AUTO: 195 K/UL — SIGNIFICANT CHANGE UP (ref 150–400)
RBC # BLD: 4.48 M/UL — SIGNIFICANT CHANGE UP (ref 4.2–5.8)
RBC # FLD: 16.4 % — HIGH (ref 10.3–14.5)
WBC # BLD: 4.3 K/UL — SIGNIFICANT CHANGE UP (ref 3.8–10.5)
WBC # FLD AUTO: 4.3 K/UL — SIGNIFICANT CHANGE UP (ref 3.8–10.5)

## 2020-01-30 ENCOUNTER — OUTPATIENT (OUTPATIENT)
Dept: OUTPATIENT SERVICES | Facility: HOSPITAL | Age: 60
LOS: 1 days | Discharge: HOME | End: 2020-01-30

## 2020-01-30 DIAGNOSIS — E85.9 AMYLOIDOSIS, UNSPECIFIED: ICD-10-CM

## 2020-02-03 ENCOUNTER — APPOINTMENT (OUTPATIENT)
Dept: FAMILY MEDICINE | Facility: CLINIC | Age: 60
End: 2020-02-03
Payer: COMMERCIAL

## 2020-02-03 ENCOUNTER — RESULT REVIEW (OUTPATIENT)
Age: 60
End: 2020-02-03

## 2020-02-03 ENCOUNTER — APPOINTMENT (OUTPATIENT)
Age: 60
End: 2020-02-03

## 2020-02-03 ENCOUNTER — APPOINTMENT (OUTPATIENT)
Dept: HEMATOLOGY ONCOLOGY | Facility: CLINIC | Age: 60
End: 2020-02-03

## 2020-02-03 VITALS
SYSTOLIC BLOOD PRESSURE: 110 MMHG | RESPIRATION RATE: 14 BRPM | WEIGHT: 181 LBS | DIASTOLIC BLOOD PRESSURE: 60 MMHG | OXYGEN SATURATION: 95 % | BODY MASS INDEX: 25.34 KG/M2 | HEIGHT: 71 IN | HEART RATE: 95 BPM

## 2020-02-03 LAB
BASOPHILS # BLD AUTO: 0 K/UL — SIGNIFICANT CHANGE UP (ref 0–0.2)
BASOPHILS NFR BLD AUTO: 0.8 % — SIGNIFICANT CHANGE UP (ref 0–2)
EOSINOPHIL # BLD AUTO: 0.1 K/UL — SIGNIFICANT CHANGE UP (ref 0–0.5)
EOSINOPHIL NFR BLD AUTO: 1.3 % — SIGNIFICANT CHANGE UP (ref 0–6)
HCT VFR BLD CALC: 31.8 % — LOW (ref 39–50)
HGB BLD-MCNC: 9.8 G/DL — LOW (ref 13–17)
LYMPHOCYTES # BLD AUTO: 0.7 K/UL — LOW (ref 1–3.3)
LYMPHOCYTES # BLD AUTO: 10.2 % — LOW (ref 13–44)
MCHC RBC-ENTMCNC: 22.1 PG — LOW (ref 27–34)
MCHC RBC-ENTMCNC: 30.8 G/DL — LOW (ref 32–36)
MCV RBC AUTO: 71.7 FL — LOW (ref 80–100)
MONOCYTES # BLD AUTO: 0.2 K/UL — SIGNIFICANT CHANGE UP (ref 0–0.9)
MONOCYTES NFR BLD AUTO: 3.3 % — SIGNIFICANT CHANGE UP (ref 2–14)
NEUTROPHILS # BLD AUTO: 5.5 K/UL — SIGNIFICANT CHANGE UP (ref 1.8–7.4)
NEUTROPHILS NFR BLD AUTO: 84.5 % — HIGH (ref 43–77)
PLATELET # BLD AUTO: 201 K/UL — SIGNIFICANT CHANGE UP (ref 150–400)
RBC # BLD: 4.44 M/UL — SIGNIFICANT CHANGE UP (ref 4.2–5.8)
RBC # FLD: 17 % — HIGH (ref 10.3–14.5)
WBC # BLD: 6.5 K/UL — SIGNIFICANT CHANGE UP (ref 3.8–10.5)
WBC # FLD AUTO: 6.5 K/UL — SIGNIFICANT CHANGE UP (ref 3.8–10.5)

## 2020-02-03 PROCEDURE — 99214 OFFICE O/P EST MOD 30 MIN: CPT | Mod: 25

## 2020-02-03 PROCEDURE — 99396 PREV VISIT EST AGE 40-64: CPT | Mod: 25

## 2020-02-03 NOTE — REVIEW OF SYSTEMS
[Lower Ext Edema] : lower extremity edema [Poor Libido] : poor libido [Chills] : no chills [Fever] : no fever [Fatigue] : no fatigue [Night Sweats] : no night sweats [Hot Flashes] : no hot flashes [Pain] : no pain [Redness] : no redness [Discharge] : no discharge [Dryness] : no dryness [Earache] : no earache [Hearing Loss] : no hearing loss [Nosebleeds] : no nosebleeds [Postnasal Drip] : no postnasal drip [Chest Pain] : no chest pain [Palpitations] : no palpitations [Claudication] : no  leg claudication [Shortness Of Breath] : no shortness of breath [Wheezing] : no wheezing [Abdominal Pain] : no abdominal pain [Nausea] : no nausea [Constipation] : no constipation [Diarrhea] : no diarrhea [Vomiting] : no vomiting [Heartburn] : no heartburn [Melena] : no melena [Incontinence] : no incontinence [Dysuria] : no dysuria [Hesitancy] : no hesitancy [Nocturia] : no nocturia [Frequency] : no frequency [Hematuria] : no hematuria [Impotence] : no impotency [Joint Pain] : no joint pain [Joint Stiffness] : no joint stiffness [Muscle Weakness] : no muscle weakness [Muscle Pain] : no muscle pain

## 2020-02-03 NOTE — HISTORY OF PRESENT ILLNESS
[de-identified] : Patient here for CPE and review of chronic hepatitis C cutaneous mastocystosis, idiopathic hematuria, polyradiculopathy, thalassemia trait, amyloidosis. Patient states he has been feeling well. Patient had chemotherapy done today.

## 2020-02-03 NOTE — COUNSELING
[Fall prevention counseling provided] : Fall prevention counseling provided [No throw rugs] : No throw rugs [Adequate lighting] : Adequate lighting [Use proper foot wear] : Use proper foot wear [Sleep ___ hours/day] : Sleep [unfilled] hours/day [Behavioral health counseling provided] : Behavioral health counseling provided [Engage in a relaxing activity] : Engage in a relaxing activity [Plan in advance] : Plan in advance [AUDIT-C Screening administered and reviewed] : AUDIT-C Screening administered and reviewed [Structured Weight Management Program suggested:] : Structured weight management program suggested [Benefits of weight loss discussed] : Benefits of weight loss discussed [Potential consequences of obesity discussed] : Potential consequences of obesity discussed [Encouraged to increase physical activity] : Encouraged to increase physical activity [Weigh Self Weekly] : weigh self weekly [Decrease Portions] : decrease portions [None] : None [Good understanding] : Patient has a good understanding of lifestyle changes and steps needed to achieve self management goal [FreeTextEntry2] : not smoking

## 2020-02-03 NOTE — ASSESSMENT
[FreeTextEntry1] : Patient here for CPE and review of PMH chronic hepatitis C cutaneous mastocystosis, idiopathic hematuria, polyradiculopathy, thalassemia trait, amyloidosis. Patient states he has been feeling well. Patient started 11th week of chemotherapy today. [facility] and is hopefully planning for bone marrow transplant. Had an endoscopy in the fall and the biopsy came back as Kappa amyloid. Amyloid was also seen on colonoscopy in August 2019. He had a repeat echocardiogram 2 weeks ago, EF over 60%, and thickened basal septum. He also had a stress test recently which came back normal. He was diagnosed in the past with cardiac amyloidosis. Patient was having autonomic issue with heart and was unable to hold a systolic BP over 90. Today however his systolic BP was higher than normal. \par \par PMH chronic hepatitis C cutaneous mastocystosis, idiopathic hematuria, polyradiculopathy, thalassemia trait, amyloidosis - labs and EKG done recently outside office. Continue current medication regimen. Care plan reviewed. Lab rx given to patient to be done in next 2 weeks with oncologist.\par \par Amyloidosis - Suggest working on mental health while going through the stress of chemotherapy and dealing with illness. Patient is planning on working on playing guitar and learning Egyptian.\par \par RTC in 3 months

## 2020-02-03 NOTE — PHYSICAL EXAM
[No Acute Distress] : no acute distress [Well Developed] : well developed [Well Nourished] : well nourished [Well-Appearing] : well-appearing [Normal Sclera/Conjunctiva] : normal sclera/conjunctiva [EOMI] : extraocular movements intact [PERRL] : pupils equal round and reactive to light [Normal Outer Ear/Nose] : the outer ears and nose were normal in appearance [Normal Oropharynx] : the oropharynx was normal [No JVD] : no jugular venous distention [Supple] : supple [No Lymphadenopathy] : no lymphadenopathy [No Respiratory Distress] : no respiratory distress  [No Accessory Muscle Use] : no accessory muscle use [Thyroid Normal, No Nodules] : the thyroid was normal and there were no nodules present [Normal Rate] : normal rate  [Clear to Auscultation] : lungs were clear to auscultation bilaterally [No Murmur] : no murmur heard [Regular Rhythm] : with a regular rhythm [Normal S1, S2] : normal S1 and S2 [No Abdominal Bruit] : a ~M bruit was not heard ~T in the abdomen [No Carotid Bruits] : no carotid bruits [Pedal Pulses Present] : the pedal pulses are present [No Varicosities] : no varicosities [No Palpable Aorta] : no palpable aorta [No Edema] : there was no peripheral edema [Soft] : abdomen soft [No Extremity Clubbing/Cyanosis] : no extremity clubbing/cyanosis [Non Tender] : non-tender [Non-distended] : non-distended [No Masses] : no abdominal mass palpated [No HSM] : no HSM [Normal Bowel Sounds] : normal bowel sounds [Normal Anterior Cervical Nodes] : no anterior cervical lymphadenopathy [Normal Posterior Cervical Nodes] : no posterior cervical lymphadenopathy [No CVA Tenderness] : no CVA  tenderness [No Spinal Tenderness] : no spinal tenderness [No Joint Swelling] : no joint swelling [Grossly Normal Strength/Tone] : grossly normal strength/tone [Coordination Grossly Intact] : coordination grossly intact [No Rash] : no rash [No Focal Deficits] : no focal deficits [Normal Gait] : normal gait [Normal Affect] : the affect was normal [Deep Tendon Reflexes (DTR)] : deep tendon reflexes were 2+ and symmetric [Normal Insight/Judgement] : insight and judgment were intact

## 2020-02-03 NOTE — HEALTH RISK ASSESSMENT
[Good] : ~his/her~  mood as  good [No falls in past year] : Patient reported no falls in the past year [0] : 2) Feeling down, depressed, or hopeless: Not at all (0) [Patient declined bone density test] : Patient declined bone density test [Patient reported colonoscopy was normal] : Patient reported colonoscopy was normal [HIV Test offered] : HIV Test offered [Graduate School] : graduate school [] :  [Fully functional (bathing, dressing, toileting, transferring, walking, feeding)] : Fully functional (bathing, dressing, toileting, transferring, walking, feeding) [Fully functional (using the telephone, shopping, preparing meals, housekeeping, doing laundry, using] : Fully functional and needs no help or supervision to perform IADLs (using the telephone, shopping, preparing meals, housekeeping, doing laundry, using transportation, managing medications and managing finances) [No] : No [With Family] : lives with family [None] : None [Employed] : employed [Sexually Active] : sexually active [Reports normal functional visual acuity (ie: able to read med bottle)] : Reports normal functional visual acuity [Reviewed no changes] : Reviewed no changes [] : No [[id number]] : 0 [Change in mental status noted] : No change in mental status noted [Reports changes in hearing] : Reports no changes in hearing [Reports changes in vision] : Reports no changes in vision [High Risk Behavior] : no high risk behavior [Reports changes in dental health] : Reports no changes in dental health [ColonoscopyDate] : 08/19 [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

## 2020-02-04 ENCOUNTER — OUTPATIENT (OUTPATIENT)
Dept: OUTPATIENT SERVICES | Facility: HOSPITAL | Age: 60
LOS: 1 days | Discharge: HOME | End: 2020-02-04

## 2020-02-04 DIAGNOSIS — Z51.11 ENCOUNTER FOR ANTINEOPLASTIC CHEMOTHERAPY: ICD-10-CM

## 2020-02-04 DIAGNOSIS — E85.9 AMYLOIDOSIS, UNSPECIFIED: ICD-10-CM

## 2020-02-05 ENCOUNTER — APPOINTMENT (OUTPATIENT)
Dept: HEMATOLOGY ONCOLOGY | Facility: CLINIC | Age: 60
End: 2020-02-05
Payer: COMMERCIAL

## 2020-02-05 ENCOUNTER — RESULT REVIEW (OUTPATIENT)
Age: 60
End: 2020-02-05

## 2020-02-05 VITALS
WEIGHT: 180.78 LBS | HEART RATE: 61 BPM | DIASTOLIC BLOOD PRESSURE: 64 MMHG | OXYGEN SATURATION: 99 % | TEMPERATURE: 98.2 F | BODY MASS INDEX: 25.21 KG/M2 | RESPIRATION RATE: 16 BRPM | SYSTOLIC BLOOD PRESSURE: 102 MMHG

## 2020-02-05 LAB
BASOPHILS # BLD AUTO: 0 K/UL — SIGNIFICANT CHANGE UP (ref 0–0.2)
BASOPHILS NFR BLD AUTO: 0.6 % — SIGNIFICANT CHANGE UP (ref 0–2)
EOSINOPHIL # BLD AUTO: 0.1 K/UL — SIGNIFICANT CHANGE UP (ref 0–0.5)
EOSINOPHIL NFR BLD AUTO: 2 % — SIGNIFICANT CHANGE UP (ref 0–6)
HCT VFR BLD CALC: 29.8 % — LOW (ref 39–50)
HGB BLD-MCNC: 9.5 G/DL — LOW (ref 13–17)
LYMPHOCYTES # BLD AUTO: 1.4 K/UL — SIGNIFICANT CHANGE UP (ref 1–3.3)
LYMPHOCYTES # BLD AUTO: 23.2 % — SIGNIFICANT CHANGE UP (ref 13–44)
MCHC RBC-ENTMCNC: 23.4 PG — LOW (ref 27–34)
MCHC RBC-ENTMCNC: 32 G/DL — SIGNIFICANT CHANGE UP (ref 32–36)
MCV RBC AUTO: 73 FL — LOW (ref 80–100)
MONOCYTES # BLD AUTO: 0.8 K/UL — SIGNIFICANT CHANGE UP (ref 0–0.9)
MONOCYTES NFR BLD AUTO: 12.1 % — SIGNIFICANT CHANGE UP (ref 2–14)
NEUTROPHILS # BLD AUTO: 3.8 K/UL — SIGNIFICANT CHANGE UP (ref 1.8–7.4)
NEUTROPHILS NFR BLD AUTO: 62 % — SIGNIFICANT CHANGE UP (ref 43–77)
PLATELET # BLD AUTO: 185 K/UL — SIGNIFICANT CHANGE UP (ref 150–400)
RBC # BLD: 4.08 M/UL — LOW (ref 4.2–5.8)
RBC # FLD: 17.3 % — HIGH (ref 10.3–14.5)
WBC # BLD: 6.2 K/UL — SIGNIFICANT CHANGE UP (ref 3.8–10.5)
WBC # FLD AUTO: 6.2 K/UL — SIGNIFICANT CHANGE UP (ref 3.8–10.5)

## 2020-02-05 PROCEDURE — 99215 OFFICE O/P EST HI 40 MIN: CPT

## 2020-02-10 ENCOUNTER — APPOINTMENT (OUTPATIENT)
Age: 60
End: 2020-02-10

## 2020-02-10 ENCOUNTER — RESULT REVIEW (OUTPATIENT)
Age: 60
End: 2020-02-10

## 2020-02-10 ENCOUNTER — APPOINTMENT (OUTPATIENT)
Dept: HEMATOLOGY ONCOLOGY | Facility: CLINIC | Age: 60
End: 2020-02-10
Payer: COMMERCIAL

## 2020-02-10 VITALS
DIASTOLIC BLOOD PRESSURE: 70 MMHG | HEART RATE: 80 BPM | HEIGHT: 71 IN | OXYGEN SATURATION: 99 % | SYSTOLIC BLOOD PRESSURE: 106 MMHG | BODY MASS INDEX: 25.48 KG/M2 | TEMPERATURE: 98 F | WEIGHT: 182 LBS

## 2020-02-10 LAB
BASOPHILS # BLD AUTO: 0.1 K/UL — SIGNIFICANT CHANGE UP (ref 0–0.2)
BASOPHILS NFR BLD AUTO: 1 % — SIGNIFICANT CHANGE UP (ref 0–2)
EOSINOPHIL # BLD AUTO: 0.2 K/UL — SIGNIFICANT CHANGE UP (ref 0–0.5)
EOSINOPHIL NFR BLD AUTO: 2.9 % — SIGNIFICANT CHANGE UP (ref 0–6)
HCT VFR BLD CALC: 32.6 % — LOW (ref 39–50)
HGB BLD-MCNC: 10.3 G/DL — LOW (ref 13–17)
LYMPHOCYTES # BLD AUTO: 1.1 K/UL — SIGNIFICANT CHANGE UP (ref 1–3.3)
LYMPHOCYTES # BLD AUTO: 18.7 % — SIGNIFICANT CHANGE UP (ref 13–44)
MCHC RBC-ENTMCNC: 23 PG — LOW (ref 27–34)
MCHC RBC-ENTMCNC: 31.5 G/DL — LOW (ref 32–36)
MCV RBC AUTO: 73 FL — LOW (ref 80–100)
MONOCYTES # BLD AUTO: 0.4 K/UL — SIGNIFICANT CHANGE UP (ref 0–0.9)
MONOCYTES NFR BLD AUTO: 7.3 % — SIGNIFICANT CHANGE UP (ref 2–14)
NEUTROPHILS # BLD AUTO: 4 K/UL — SIGNIFICANT CHANGE UP (ref 1.8–7.4)
NEUTROPHILS NFR BLD AUTO: 70 % — SIGNIFICANT CHANGE UP (ref 43–77)
PLATELET # BLD AUTO: 173 K/UL — SIGNIFICANT CHANGE UP (ref 150–400)
RBC # BLD: 4.47 M/UL — SIGNIFICANT CHANGE UP (ref 4.2–5.8)
RBC # FLD: 17.6 % — HIGH (ref 10.3–14.5)
WBC # BLD: 5.7 K/UL — SIGNIFICANT CHANGE UP (ref 3.8–10.5)
WBC # FLD AUTO: 5.7 K/UL — SIGNIFICANT CHANGE UP (ref 3.8–10.5)

## 2020-02-10 PROCEDURE — 99213 OFFICE O/P EST LOW 20 MIN: CPT

## 2020-02-10 NOTE — ADDENDUM
[FreeTextEntry1] : Documented by Yasmani Nam acting as a scribe for Dr. Lila Roman on 02/05/2020 \par All medical record entries made by the Scribe were at my, Dr. Lila Roman, direction and personally dictated by me on 02/05/2020. I have reviewed the chart and agree that the record accurately reflects my personal performance of the history, physical exam, assessment and plan. I have also personally directed, reviewed, and agree with the discharge instructions.\par \par \par

## 2020-02-10 NOTE — HISTORY OF PRESENT ILLNESS
[de-identified] : 58 y/o male presents today for a follow-up Auto PSCT consultation. He is accompanied by his wife. He is doing well overall. He is able to continue performing surgeries and maintaining an active lifestyle...He is currently on Cybor D and tolerating it well;  By next week, he will complete 3 cycles of CyborD. Patient c/o of neuropathy in his feet and other side effects of the Velcade medication. Patient expresses desire to move forward with the transplant ASAP....Patient regularly follows up with Dr. Wells as well as his cardiologist at [facility]... He states that he will return to exercising soon.....He is currently on the prophylactic Acyclovir and ASA.  Denies fever/chills, night sweats, mouth sores, eye dryness, blurred vision, nausea, vomiting, diarrhea. No CP, SOB or LE edema.\par \par \par \par \par \par  [de-identified] : 58 y/o male with a hx of Amyloidosis,  Hepatitis C,  dyspepsia, hematuria, rectal bleeding, and Raynaud's phenomenon, cutaneous mastocytosis, presents for an initial Auto PSCT consultation. He is referred by Dr. Joel Wells. \par \par He initially experienced joint pain in 2014 and managed sx with Advil. In 2016, LFT was elevated and was dx with Hepatitis C which was treated with Harvoni. Patient has a hx of hematuria and bloody stool. Biopsies of the colon revealed chronic colitis and erosion. He was pleased on steroids and Mesalamine and started Humira in Mid-August. After first dose of Humira, left knee swelled up ---alleviated by Advil. After second dose of Humira, ankles swelled up and steroids were prescribed. Patient was also placed on Famotidine for early satiety and dyspepsia. His renal function is now normal and no longer experiences bloody stools........Progression in dyspepsia led to an upper endoscopy with Dr Parks on 10/30/19. Biopsy of the second portion of the duodenum revealed amyloidosis confirmed with Congo red statin. IgM immunostain is positive in areas of amyloid deposition, with kappa LC predominance. The stomach was noted to have impaired distensibility on endoscopy.Renal function has been normal, with BUN 9/creatinine 0.9.Cystoscopy was performed which showed prominent blood vessels.

## 2020-02-10 NOTE — REVIEW OF SYSTEMS
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

## 2020-02-10 NOTE — ASSESSMENT
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 He has involvement of his GI tract and probable cardiac involvement.. He is currently being treated with Cybor D. He regularly follows-up with an amyloid cardiologist.\par \par - Informed patient the process of cell mobilization using growth factor alone \par - After 4-6 months with the Cybor tx, will then begin mobilization of the cells. \par - Informed patient of the usage of Zarxio as well as Plerixafor for mobilization\par - Continue to obtain bloodwork monthly\par - Continue f/u appt with Dr. Wells\par - Patient expresses desire to move ahead with the transplant as quickly as possible....I will discuss with Dr. Wells how to move ahead and decide the # of chemotherapy & growth factor tx before collection of cells. \par - All questions and concerns answered. Emotional support provided. \par - Patient has been advised to contact clinic if he has any concerns.\par - I would like to do a full body scan prior to admit....consider f/u egd\par \par I have had a long discussion with the patient regarding the risks, benefits, alternatives and logistics to autologous stem cell transplantation. I have reviewed with the patient the success rates with various treatments including chemotherapy only as well as chemotherapy and autologous stem cell transplant. \par \par \par I have also reviewed the pre-transplant testing for vital organ testing including, but not limited to echocardiogram, MUGA, PFTs, urine collection, bone marrow biopsy, sinus x-rays, dental evaluation and liver function.\par \par \par I have reviewed with the patient the role of Neupogen/Mozobil for 5 days vs chemo mobilization prior to collecting stem cells. I have also discussed the process of apheresis as a way to collect the peripheral stem cells. Anticoagulation with Citrate during apheresis was discussed, along with side effects including but not limited to hypocalcemia mild dysesthesias (most common) to tetany, seizures and cardiac arrhythmias. Treatment with oral or intravenous calcium supplementation in the setting of hypocalcemia was also discussed. \par \par \par I have discussed with the patient the pre-administration of Kepivance IV x 3 days, as a GI prophylaxis with the aim to lessen swelling, irritation, sores, and ulcers in the GI tract caused by high dose chemotherapy. Side effects including but not limited to flushing, itching from Kepivance were also discussed. \par \par \par I reviewed with the patient the process of autologous stem cell transplant. I have reviewed the Three to  four week admission in the hospital including initial chemotherapy, lasting for 2 days (anca 200), followed by a 1-2 day rest period, followed by the transplant via a triple lumen catheter. I reviewed post-transplant expectations and possible need for supportive care in the post transplant state as well as post-transplant chemotherapy. I instructed the patient, he may require  transfusions including, , PRBCs, platelets, and  fluids, electrolytes, etc. For patients with amyloid we use a higher cutoff for plt transfusion b/c of increased risk of bleeding with amyloid deposition in the blood vessels. \par \par \par I have also discussed the need for antifungal, antiviral, antibiotics  post transplant and discharge. Possible risks were discussed including infection, bleeding, inflammation in bowel, inflammation in the kidneys and liver. \par \par \par Post-discharge instructions were reviewed including diet control, crowd controls for ideally 6 weeks post transplant. I also discussed common complaints in the post-discharge state including fatigue, weakness, and supportive care. The patient has a supportive environment at home. I have also reviewed treatment options if the transplant is not effective. \par \par \par All questions were answered, patient has verbalized understanding. Emotional support provided. Literature and consents were provided for the patient for their review. Patient to consider options.\par \par RTC for f/u appt with Dr. Roman in Mid-March \par \par Patient's Cellphone #: [phone]

## 2020-02-10 NOTE — HISTORY OF PRESENT ILLNESS
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 Started getting joint pain 5 years ago, believed it to be psoriatic arthritis due to his fhx (father). Managed sx with advil. In 2016 his LFT was elevated, dx with Hepatitis C. Managed with 3 months of Harvoni, now undetectable. States his LFT is now normal. In Nov 2018, started exercising 6 days a week, began to notice intermittent hematuria, CT Urogram was normal. States he gets the hematuria q 5 weeks, usually after physical activity. Up to date with his cardiologist, Dr. Miguel Diehl, follows up regularly. Denies any heart issues. Around June 30th, noticed crampy abdominal pain and blood in the stool. Estimates to have lost about a pint of blood. GI sent for CT which showed colitis; ulcerations in descending colon, was believed to Crohn’s. The path report from biopsies of the sigmoid colon described moderate active chronic colitis and erosion, with granulation tissue and necroinflammatory exudate consistent with ulcer edge. \par  Was put on steroids and Mesalamine and his sx improved. Was having 6 diarrhea movements a day, now improved. Colonoscopy showed colitis only in descending colon. Started on Humira in mid-August. 3 days after first dose, left knee swelled up, improved with Advil. 3 days after second dose, ankles swelled up, managed with 4 days of steroids. States his joints were red/hot. Has not happened with recent doses. In September, he noticed early satiety and dyspepsia. Started on famotidine and cut back on the Mesalamine. Also started on oral iron, but he developed gastritis so he stopped.\par  His stool is now soft and formed, denies any blood. States his renal function is normal, denies protein in the urine. Has had a weight loss of 25 lbs since June. Reports his BP is low; gets lightheaded when getting up too fast. Noticed pitting edema in his legs, voice hoarseness, and accelerated hair loss. Has also has 2 episodes of mouth ulcers. Denies any adenopathy. \par Hx of cutaneous mastocytosis, dx 4 years ago, lesions on both thighs and flanks, have gone away since starting on steroids.  \par Has hx of Raynaud’s phenomenon. Denies any hx of thyroid disease.\par \par Endoscopy 10/30/19:\par -Small hiatal hernia\par -Friable gastric mucosa. Biopsied.\par -Erythematous duodenopathy, Biopsied. \par \par Pathology 10/30/19:\par A. Duodenum, second part, biopsy:\par -Duodenal mucosa with amyloidosis \par B. Stomach, Antrum, rule out maltoma, biopsy:\par -Superficial fragments of gastric antral mucosa with amyloidosis and active chronic gastritis with surface erosions.\par C. Stomach, body, rule out maltoma, biopsy:\par -Superficial fragments of gastric mucosa with amyloidosis, foveolar hyperplasia and increased chronic inflammation. \par D. Stomach, fundus, rule out maltoma, biopsy:\par -Superficial fragments of gastric mucosa with amyloidosis, foveolar hyperplasia and increased chronic inflammation. \par \par Pathology 7/17/19:\par A. Colon, sigmoid, biopsy:\par -Moderate active chronic colitis and erosion.\par -granulation tissue and necroinflammatory exudate consistent with an ulcer edge/bed.\par B. Colon, rectosigmoid, biopsy:\par -Colonic mucosa within normal limits \par \par The progression in dyspepsia led to an upper endoscopy with Dr. Parks on 10/30/19. In the second portion of the duodenum, biopsy revealed amyloidosis, confirmed with Congo red stain.Superficial fragments of gastric antral mucosa showed the same findings. IgM immunostain is positive in areas of amyloid deposition, with kappa LC predominance. The stomach was noted to have impaired distensibility on endoscopy.\par Renal function has been normal, with BUN 9/creatinine 0.9.\par Cystoscopy was performed which showed prominent blood vessels. In retrospect this finding may also be consistent with bladder involvement with amyloid.\par  [de-identified] : Feeling well today. \par Plantar neuropathy is stable. \par C/o blepharitis and fatigue.\par Has nausea around day 3-4 after treatments.\par States his echo and stress test were normal. \par BP is improving. \par Has gained 12 lbs. with definite improvement in GI symptoms since starting CyBorD regimen.\par Kappa FLC now down to 12.59 mg/dl.

## 2020-02-10 NOTE — ASSESSMENT
[FreeTextEntry1] : 60 y/o male originally presented with GI symptoms including dyspepsia, diarrhea, rectal bleeding and weight loss, evidence of colitis, gastric involvement with mucosal infiltration and impaired distensibility attributable to biopsy-confirmed amyloidosis. The gastric and duodenal mucosa are + for amyloidosis, and re-evaluation of the areas of colitis in the sigmoid colon show the same finding.\par Renal function normal, no evidence of hepatosplenomegaly.\par Echocardiogram in 4/19 showed abnormal LV relaxation. The intraventricular septum was moderately hypertrophied. Cardiac MRI confirms cardiac amyloidosis.\par \par Jose is showing improvement in his original symptoms roughly 3 months into therapy with Cytoxan/Bortezomib (Velcade)/ and dexamethasone, and free kappa light chains on 1/22/20 were down to 12.59.\par The timing of transplant in relation to induction chemotherapy has  been discussed with Dr. Roman, roughly 4 months following initiation of CyBorD. We are in full agreement.

## 2020-02-10 NOTE — PHYSICAL EXAM
[Fully active, able to carry on all pre-disease performance without restriction] : Status 0 - Fully active, able to carry on all pre-disease performance without restriction [Normal] : affect appropriate [de-identified] : Full surgical schedule. Looks stronger. [de-identified] : mild numbness plantar feet

## 2020-02-17 ENCOUNTER — APPOINTMENT (OUTPATIENT)
Dept: PULMONOLOGY | Facility: CLINIC | Age: 60
End: 2020-02-17
Payer: COMMERCIAL

## 2020-02-17 VITALS
HEART RATE: 74 BPM | SYSTOLIC BLOOD PRESSURE: 102 MMHG | OXYGEN SATURATION: 98 % | WEIGHT: 186 LBS | BODY MASS INDEX: 27.55 KG/M2 | DIASTOLIC BLOOD PRESSURE: 64 MMHG | HEIGHT: 69 IN

## 2020-02-17 PROCEDURE — 99204 OFFICE O/P NEW MOD 45 MIN: CPT | Mod: 25

## 2020-02-17 PROCEDURE — 94729 DIFFUSING CAPACITY: CPT

## 2020-02-17 PROCEDURE — 85018 HEMOGLOBIN: CPT | Mod: QW

## 2020-02-17 PROCEDURE — 94727 GAS DIL/WSHOT DETER LNG VOL: CPT

## 2020-02-17 PROCEDURE — 94010 BREATHING CAPACITY TEST: CPT

## 2020-02-17 RX ORDER — TOBRAMYCIN AND DEXAMETHASONE 3; 1 MG/ML; MG/ML
0.3-0.1 SUSPENSION/ DROPS OPHTHALMIC
Qty: 5 | Refills: 0 | Status: DISCONTINUED | COMMUNITY
Start: 2020-02-07

## 2020-02-17 RX ORDER — MESALAMINE 375 MG/1
0.38 CAPSULE, EXTENDED RELEASE ORAL
Qty: 120 | Refills: 0 | Status: DISCONTINUED | COMMUNITY
Start: 2019-07-26

## 2020-02-17 RX ORDER — PREDNISONE 10 MG/1
10 TABLET ORAL
Qty: 30 | Refills: 0 | Status: DISCONTINUED | COMMUNITY
Start: 2019-08-05

## 2020-02-17 NOTE — PHYSICAL EXAM
[Well Nourished] : well nourished [No Acute Distress] : no acute distress [Normal Oropharynx] : normal oropharynx [II] : Mallampati Class: II [No Deformities] : no deformities [Normal Appearance] : normal appearance [Supple] : supple [No Neck Mass] : no neck mass [Normal PMI] : normal pmi [Normal Rate/Rhythm] : normal rate/rhythm [Normal S1, S2] : normal s1, s2 [No Murmurs] : no murmurs [No Resp Distress] : no resp distress [No Acc Muscle Use] : no acc muscle use [Normal Rhythm and Effort] : normal rhythm and effort [Clear to Auscultation Bilaterally] : clear to auscultation bilaterally [Normal to Percussion] : normal to percussion [No Abnormalities] : no abnormalities [Benign] : benign [No Cyanosis] : no cyanosis [Normal Gait] : normal gait [No Clubbing] : no clubbing [Normal Color/ Pigmentation] : normal color/ pigmentation [No Edema] : no edema [No Focal Deficits] : no focal deficits [Oriented x3] : oriented x3 [Normal Affect] : normal affect

## 2020-02-17 NOTE — HISTORY OF PRESENT ILLNESS
[TextBox_4] : never smoker\par here for preoperative evaluation for stem cell transplant\par doing well on Cytoxan/Bortezomib/decadron\par free light chains have sig decreased\par no fever, chill, chest pain\par no sig sputum\par followed by Cardiology for Cardiac amyloid

## 2020-02-17 NOTE — DISCUSSION/SUMMARY
[FreeTextEntry1] : Amyloidosis, Kappa light chains decreasing on chemotherapy\par Cardiac MRI noted, IgGs decreased- discussed with pt, no active infectious process\par No Pulmonary complaints, Never smoker, no hx asthma or lung disease\par Lung exam is normal, normal sp02\par PFts are essentially normal\par Anemia being followed closely by Oncology\par No recent chest imaging, will defer to Oncology team given lack of symptoms\par No pulmonary contraindications to transplant\par incentive spirometry, DVT prophylaxis\par Will follow on Prn basis\par \par

## 2020-02-17 NOTE — CONSULT LETTER
[Dear  ___] : Dear  [unfilled], [Consult Letter:] : I had the pleasure of evaluating your patient, [unfilled]. [FreeTextEntry3] : Len Davidson DO [facility]\par Pulmonary Critical Care\par Director Pulmonary Division\par Medical Director Respiratory Therapy\par [facility]\par \par  [Sincerely,] : Sincerely, [Please see my note below.] : Please see my note below. [DrLovely  ___] : Dr. SHAFFER

## 2020-02-17 NOTE — REVIEW OF SYSTEMS
[Anemia] : anemia [Negative] : Endocrine [TextBox_14] : blepharitis [TextBox_44] : see HPI [TextBox_69] : see HPI [TextBox_57] : immunoglobulins noted

## 2020-02-18 ENCOUNTER — RESULT REVIEW (OUTPATIENT)
Age: 60
End: 2020-02-18

## 2020-02-18 ENCOUNTER — APPOINTMENT (OUTPATIENT)
Dept: HEMATOLOGY ONCOLOGY | Facility: CLINIC | Age: 60
End: 2020-02-18

## 2020-02-18 ENCOUNTER — APPOINTMENT (OUTPATIENT)
Age: 60
End: 2020-02-18

## 2020-02-18 LAB
BASOPHILS # BLD AUTO: 0.1 K/UL — SIGNIFICANT CHANGE UP (ref 0–0.2)
BASOPHILS NFR BLD AUTO: 1.3 % — SIGNIFICANT CHANGE UP (ref 0–2)
EOSINOPHIL # BLD AUTO: 0.2 K/UL — SIGNIFICANT CHANGE UP (ref 0–0.5)
EOSINOPHIL NFR BLD AUTO: 3.4 % — SIGNIFICANT CHANGE UP (ref 0–6)
ERYTHROCYTE [SEDIMENTATION RATE] IN BLOOD: 7 MM/HR — SIGNIFICANT CHANGE UP (ref 0–20)
HCT VFR BLD CALC: 31.2 % — LOW (ref 39–50)
HGB BLD-MCNC: 9.4 G/DL — LOW (ref 13–17)
LYMPHOCYTES # BLD AUTO: 1 K/UL — SIGNIFICANT CHANGE UP (ref 1–3.3)
LYMPHOCYTES # BLD AUTO: 20.4 % — SIGNIFICANT CHANGE UP (ref 13–44)
MCHC RBC-ENTMCNC: 23 PG — LOW (ref 27–34)
MCHC RBC-ENTMCNC: 30.1 G/DL — LOW (ref 32–36)
MCV RBC AUTO: 76.5 FL — LOW (ref 80–100)
MONOCYTES # BLD AUTO: 0.4 K/UL — SIGNIFICANT CHANGE UP (ref 0–0.9)
MONOCYTES NFR BLD AUTO: 7.9 % — SIGNIFICANT CHANGE UP (ref 2–14)
NEUTROPHILS # BLD AUTO: 3.1 K/UL — SIGNIFICANT CHANGE UP (ref 1.8–7.4)
NEUTROPHILS NFR BLD AUTO: 66.9 % — SIGNIFICANT CHANGE UP (ref 43–77)
PLATELET # BLD AUTO: 169 K/UL — SIGNIFICANT CHANGE UP (ref 150–400)
RBC # BLD: 4.07 M/UL — LOW (ref 4.2–5.8)
RBC # FLD: 17 % — HIGH (ref 10.3–14.5)
WBC # BLD: 4.7 K/UL — SIGNIFICANT CHANGE UP (ref 3.8–10.5)
WBC # FLD AUTO: 4.7 K/UL — SIGNIFICANT CHANGE UP (ref 3.8–10.5)

## 2020-02-19 LAB
CHOLEST SERPL-MCNC: 164 MG/DL
CHOLEST/HDLC SERPL: 3.7 RATIO
HDLC SERPL-MCNC: 44 MG/DL
LDLC SERPL CALC-MCNC: 102 MG/DL
NT-PROBNP SERPL-MCNC: 541 PG/ML
PSA SERPL-MCNC: 0.42 NG/ML
TESTOST SERPL-MCNC: 590 NG/DL
TRIGL SERPL-MCNC: 88 MG/DL

## 2020-02-24 ENCOUNTER — RESULT REVIEW (OUTPATIENT)
Age: 60
End: 2020-02-24

## 2020-02-24 ENCOUNTER — OUTPATIENT (OUTPATIENT)
Dept: OUTPATIENT SERVICES | Facility: HOSPITAL | Age: 60
LOS: 1 days | End: 2020-02-24
Payer: COMMERCIAL

## 2020-02-24 ENCOUNTER — APPOINTMENT (OUTPATIENT)
Age: 60
End: 2020-02-24

## 2020-02-24 ENCOUNTER — APPOINTMENT (OUTPATIENT)
Dept: HEMATOLOGY ONCOLOGY | Facility: CLINIC | Age: 60
End: 2020-02-24

## 2020-02-24 DIAGNOSIS — E85.9 AMYLOIDOSIS, UNSPECIFIED: ICD-10-CM

## 2020-02-24 LAB
ALBUMIN SERPL ELPH-MCNC: 3.7 G/DL
ALP BLD-CCNC: 46 U/L
ALT SERPL-CCNC: 10 U/L
ANION GAP SERPL CALC-SCNC: 11 MMOL/L
AST SERPL-CCNC: 11 U/L
BASOPHILS # BLD AUTO: 0.1 K/UL — SIGNIFICANT CHANGE UP (ref 0–0.2)
BASOPHILS NFR BLD AUTO: 1 % — SIGNIFICANT CHANGE UP (ref 0–2)
BILIRUB SERPL-MCNC: 0.2 MG/DL
BUN SERPL-MCNC: 14 MG/DL
CALCIUM SERPL-MCNC: 8.5 MG/DL
CHLORIDE SERPL-SCNC: 108 MMOL/L
CO2 SERPL-SCNC: 24 MMOL/L
CREAT SERPL-MCNC: 0.82 MG/DL
EOSINOPHIL # BLD AUTO: 0.1 K/UL — SIGNIFICANT CHANGE UP (ref 0–0.5)
EOSINOPHIL NFR BLD AUTO: 2.5 % — SIGNIFICANT CHANGE UP (ref 0–6)
GLUCOSE SERPL-MCNC: 83 MG/DL
HCT VFR BLD CALC: 31.8 % — LOW (ref 39–50)
HGB BLD-MCNC: 9.9 G/DL — LOW (ref 13–17)
LYMPHOCYTES # BLD AUTO: 1 K/UL — SIGNIFICANT CHANGE UP (ref 1–3.3)
LYMPHOCYTES # BLD AUTO: 18.5 % — SIGNIFICANT CHANGE UP (ref 13–44)
MCHC RBC-ENTMCNC: 23.6 PG — LOW (ref 27–34)
MCHC RBC-ENTMCNC: 31 G/DL — LOW (ref 32–36)
MCV RBC AUTO: 76.3 FL — LOW (ref 80–100)
MONOCYTES # BLD AUTO: 0.3 K/UL — SIGNIFICANT CHANGE UP (ref 0–0.9)
MONOCYTES NFR BLD AUTO: 6.2 % — SIGNIFICANT CHANGE UP (ref 2–14)
NEUTROPHILS # BLD AUTO: 3.9 K/UL — SIGNIFICANT CHANGE UP (ref 1.8–7.4)
NEUTROPHILS NFR BLD AUTO: 71.8 % — SIGNIFICANT CHANGE UP (ref 43–77)
PLATELET # BLD AUTO: 162 K/UL — SIGNIFICANT CHANGE UP (ref 150–400)
POTASSIUM SERPL-SCNC: 4.6 MMOL/L
PROT SERPL-MCNC: 5.3 G/DL
RBC # BLD: 4.17 M/UL — LOW (ref 4.2–5.8)
RBC # FLD: 16.6 % — HIGH (ref 10.3–14.5)
SODIUM SERPL-SCNC: 143 MMOL/L
WBC # BLD: 5.5 K/UL — SIGNIFICANT CHANGE UP (ref 3.8–10.5)
WBC # FLD AUTO: 5.5 K/UL — SIGNIFICANT CHANGE UP (ref 3.8–10.5)

## 2020-02-24 PROCEDURE — 70220 X-RAY EXAM OF SINUSES: CPT | Mod: 26

## 2020-02-25 ENCOUNTER — OUTPATIENT (OUTPATIENT)
Dept: OUTPATIENT SERVICES | Facility: HOSPITAL | Age: 60
LOS: 1 days | Discharge: HOME | End: 2020-02-25

## 2020-02-25 DIAGNOSIS — E85.9 AMYLOIDOSIS, UNSPECIFIED: ICD-10-CM

## 2020-02-25 DIAGNOSIS — Z51.11 ENCOUNTER FOR ANTINEOPLASTIC CHEMOTHERAPY: ICD-10-CM

## 2020-02-25 LAB
DEPRECATED KAPPA LC FREE/LAMBDA SER: 25.16 RATIO
IGA SER QL IEP: 70 MG/DL
IGG SER QL IEP: 430 MG/DL
IGM SER QL IEP: 48 MG/DL
KAPPA LC CSF-MCNC: 0.37 MG/DL
KAPPA LC SERPL-MCNC: 9.31 MG/DL

## 2020-03-02 ENCOUNTER — APPOINTMENT (OUTPATIENT)
Age: 60
End: 2020-03-02

## 2020-03-02 ENCOUNTER — APPOINTMENT (OUTPATIENT)
Dept: HEMATOLOGY ONCOLOGY | Facility: CLINIC | Age: 60
End: 2020-03-02

## 2020-03-02 ENCOUNTER — RESULT REVIEW (OUTPATIENT)
Age: 60
End: 2020-03-02

## 2020-03-02 DIAGNOSIS — Z51.11 ENCOUNTER FOR ANTINEOPLASTIC CHEMOTHERAPY: ICD-10-CM

## 2020-03-02 LAB
BASOPHILS # BLD AUTO: 0 K/UL — SIGNIFICANT CHANGE UP (ref 0–0.2)
BASOPHILS NFR BLD AUTO: 0.2 % — SIGNIFICANT CHANGE UP (ref 0–2)
EOSINOPHIL # BLD AUTO: 0 K/UL — SIGNIFICANT CHANGE UP (ref 0–0.5)
EOSINOPHIL NFR BLD AUTO: 0.1 % — SIGNIFICANT CHANGE UP (ref 0–6)
HCT VFR BLD CALC: 31.7 % — LOW (ref 39–50)
HGB BLD-MCNC: 9.7 G/DL — LOW (ref 13–17)
LYMPHOCYTES # BLD AUTO: 0.4 K/UL — LOW (ref 1–3.3)
LYMPHOCYTES # BLD AUTO: 5.8 % — LOW (ref 13–44)
MCHC RBC-ENTMCNC: 23.2 PG — LOW (ref 27–34)
MCHC RBC-ENTMCNC: 30.5 G/DL — LOW (ref 32–36)
MCV RBC AUTO: 76 FL — LOW (ref 80–100)
MONOCYTES # BLD AUTO: 0.1 K/UL — SIGNIFICANT CHANGE UP (ref 0–0.9)
MONOCYTES NFR BLD AUTO: 0.8 % — LOW (ref 2–14)
NEUTROPHILS # BLD AUTO: 6.3 K/UL — SIGNIFICANT CHANGE UP (ref 1.8–7.4)
NEUTROPHILS NFR BLD AUTO: 93.1 % — HIGH (ref 43–77)
PLATELET # BLD AUTO: 167 K/UL — SIGNIFICANT CHANGE UP (ref 150–400)
RBC # BLD: 4.18 M/UL — LOW (ref 4.2–5.8)
RBC # FLD: 16.3 % — HIGH (ref 10.3–14.5)
WBC # BLD: 6.8 K/UL — SIGNIFICANT CHANGE UP (ref 3.8–10.5)
WBC # FLD AUTO: 6.8 K/UL — SIGNIFICANT CHANGE UP (ref 3.8–10.5)

## 2020-03-09 ENCOUNTER — LABORATORY RESULT (OUTPATIENT)
Age: 60
End: 2020-03-09

## 2020-03-09 ENCOUNTER — RESULT REVIEW (OUTPATIENT)
Age: 60
End: 2020-03-09

## 2020-03-09 ENCOUNTER — OUTPATIENT (OUTPATIENT)
Dept: OUTPATIENT SERVICES | Facility: HOSPITAL | Age: 60
LOS: 1 days | Discharge: HOME | End: 2020-03-09

## 2020-03-09 ENCOUNTER — APPOINTMENT (OUTPATIENT)
Age: 60
End: 2020-03-09

## 2020-03-09 ENCOUNTER — APPOINTMENT (OUTPATIENT)
Dept: HEMATOLOGY ONCOLOGY | Facility: CLINIC | Age: 60
End: 2020-03-09

## 2020-03-09 DIAGNOSIS — E85.9 AMYLOIDOSIS, UNSPECIFIED: ICD-10-CM

## 2020-03-09 LAB
ALBUMIN MFR SERPL ELPH: 67.7 %
ALBUMIN SERPL ELPH-MCNC: 3.9 G/DL
ALBUMIN SERPL-MCNC: 3.5 G/DL
ALBUMIN/GLOB SERPL: 2.1 RATIO
ALP BLD-CCNC: 47 U/L
ALPHA1 GLOB MFR SERPL ELPH: 4.5 %
ALPHA1 GLOB SERPL ELPH-MCNC: 0.2 G/DL
ALPHA2 GLOB MFR SERPL ELPH: 10.2 %
ALPHA2 GLOB SERPL ELPH-MCNC: 0.5 G/DL
ALT SERPL-CCNC: 8 U/L
ANION GAP SERPL CALC-SCNC: 10 MMOL/L
APTT BLD: 30.1 SEC
AST SERPL-CCNC: 12 U/L
B-GLOBULIN MFR SERPL ELPH: 10 %
B-GLOBULIN SERPL ELPH-MCNC: 0.5 G/DL
BASOPHILS # BLD AUTO: 0.1 K/UL — SIGNIFICANT CHANGE UP (ref 0–0.2)
BASOPHILS NFR BLD AUTO: 1.4 % — SIGNIFICANT CHANGE UP (ref 0–2)
BILIRUB SERPL-MCNC: 0.3 MG/DL
BUN SERPL-MCNC: 12 MG/DL
CALCIUM SERPL-MCNC: 8.5 MG/DL
CHLORIDE SERPL-SCNC: 106 MMOL/L
CO2 SERPL-SCNC: 25 MMOL/L
CREAT SERPL-MCNC: 0.8 MG/DL
DEPRECATED KAPPA LC FREE/LAMBDA SER: 26.87 RATIO
EOSINOPHIL # BLD AUTO: 0.1 K/UL — SIGNIFICANT CHANGE UP (ref 0–0.5)
EOSINOPHIL NFR BLD AUTO: 3.2 % — SIGNIFICANT CHANGE UP (ref 0–6)
GAMMA GLOB FLD ELPH-MCNC: 0.4 G/DL
GAMMA GLOB MFR SERPL ELPH: 7.6 %
GLUCOSE SERPL-MCNC: 81 MG/DL
HCT VFR BLD CALC: 31.3 % — LOW (ref 39–50)
HGB BLD-MCNC: 9.6 G/DL — LOW (ref 13–17)
IGA SER QL IEP: 71 MG/DL
IGG SER QL IEP: 438 MG/DL
IGM SER QL IEP: 47 MG/DL
INR PPP: 1.08 RATIO
INTERPRETATION SERPL IEP-IMP: NORMAL
KAPPA LC CSF-MCNC: 0.31 MG/DL
KAPPA LC SERPL-MCNC: 8.33 MG/DL
LDH SERPL-CCNC: 178 U/L
LYMPHOCYTES # BLD AUTO: 0.8 K/UL — LOW (ref 1–3.3)
LYMPHOCYTES # BLD AUTO: 20.2 % — SIGNIFICANT CHANGE UP (ref 13–44)
M PROTEIN MFR SERPL ELPH: 1.5 %
M PROTEIN SPEC IFE-MCNC: NORMAL
MCHC RBC-ENTMCNC: 23.6 PG — LOW (ref 27–34)
MCHC RBC-ENTMCNC: 30.6 G/DL — LOW (ref 32–36)
MCV RBC AUTO: 77.1 FL — LOW (ref 80–100)
MONOCLON BAND OBS SERPL: 0.1 G/DL
MONOCYTES # BLD AUTO: 0.4 K/UL — SIGNIFICANT CHANGE UP (ref 0–0.9)
MONOCYTES NFR BLD AUTO: 9.5 % — SIGNIFICANT CHANGE UP (ref 2–14)
NEUTROPHILS # BLD AUTO: 2.6 K/UL — SIGNIFICANT CHANGE UP (ref 1.8–7.4)
NEUTROPHILS NFR BLD AUTO: 65.7 % — SIGNIFICANT CHANGE UP (ref 43–77)
PLATELET # BLD AUTO: 154 K/UL — SIGNIFICANT CHANGE UP (ref 150–400)
POTASSIUM SERPL-SCNC: 4.5 MMOL/L
PROT SERPL-MCNC: 5.2 G/DL
PT BLD: 12.3 SEC
RBC # BLD: 4.06 M/UL — LOW (ref 4.2–5.8)
RBC # FLD: 16.2 % — HIGH (ref 10.3–14.5)
SODIUM SERPL-SCNC: 141 MMOL/L
WBC # BLD: 4 K/UL — SIGNIFICANT CHANGE UP (ref 3.8–10.5)
WBC # FLD AUTO: 4 K/UL — SIGNIFICANT CHANGE UP (ref 3.8–10.5)

## 2020-03-10 LAB
ALBUPE: 15.6 %
ALPHA1UPE: 27.4 %
ALPHA2UPE: 20.1 %
BENCE JONES EXCRETION: 0 MG/24HR
BETAUPE: 21.9 %
CREAT 24H UR-MCNC: 1 G/24 H
CREATININE UR (MAYO): 35 MG/DL
GAMMAUPE: 15 %
IGA 24H UR QL IFE: NORMAL
KAPPA LC 24H UR QL: 0 MG/DL
M PROTEIN 24H MFR UR ELPH: 0 %
PROT ?TM UR-MCNC: 24 HR
PROT PATTERN 24H UR ELPH-IMP: NORMAL
PROT UR-MCNC: 8 MG/DL
PROT UR-MCNC: 8 MG/DL
SPECIMEN VOL 24H UR: 2900 ML
U PROTEIN QNT CALCULATION: 232 MG/24 H

## 2020-03-11 ENCOUNTER — OUTPATIENT (OUTPATIENT)
Dept: OUTPATIENT SERVICES | Facility: HOSPITAL | Age: 60
LOS: 1 days | End: 2020-03-11

## 2020-03-11 DIAGNOSIS — E85.9 AMYLOIDOSIS, UNSPECIFIED: ICD-10-CM

## 2020-03-12 ENCOUNTER — LABORATORY RESULT (OUTPATIENT)
Age: 60
End: 2020-03-12

## 2020-03-12 ENCOUNTER — RESULT REVIEW (OUTPATIENT)
Age: 60
End: 2020-03-12

## 2020-03-12 ENCOUNTER — APPOINTMENT (OUTPATIENT)
Dept: HEMATOLOGY ONCOLOGY | Facility: CLINIC | Age: 60
End: 2020-03-12
Payer: COMMERCIAL

## 2020-03-12 VITALS
DIASTOLIC BLOOD PRESSURE: 71 MMHG | BODY MASS INDEX: 27.15 KG/M2 | TEMPERATURE: 98.2 F | OXYGEN SATURATION: 100 % | SYSTOLIC BLOOD PRESSURE: 108 MMHG | WEIGHT: 183.84 LBS | HEART RATE: 74 BPM | RESPIRATION RATE: 16 BRPM

## 2020-03-12 LAB
BASOPHILS # BLD AUTO: 0.02 K/UL — SIGNIFICANT CHANGE UP (ref 0–0.2)
BASOPHILS NFR BLD AUTO: 0.5 % — SIGNIFICANT CHANGE UP (ref 0–2)
EOSINOPHIL # BLD AUTO: 0.07 K/UL — SIGNIFICANT CHANGE UP (ref 0–0.5)
EOSINOPHIL NFR BLD AUTO: 1.6 % — SIGNIFICANT CHANGE UP (ref 0–6)
HCT VFR BLD CALC: 30 % — LOW (ref 39–50)
HGB BLD-MCNC: 9.5 G/DL — LOW (ref 13–17)
IMM GRANULOCYTES NFR BLD AUTO: 0.5 % — SIGNIFICANT CHANGE UP (ref 0–1.5)
LYMPHOCYTES # BLD AUTO: 1.03 K/UL — SIGNIFICANT CHANGE UP (ref 1–3.3)
LYMPHOCYTES # BLD AUTO: 23.8 % — SIGNIFICANT CHANGE UP (ref 13–44)
MCHC RBC-ENTMCNC: 24.2 PG — LOW (ref 27–34)
MCHC RBC-ENTMCNC: 31.7 GM/DL — LOW (ref 32–36)
MCV RBC AUTO: 76.3 FL — LOW (ref 80–100)
MONOCYTES # BLD AUTO: 0.7 K/UL — SIGNIFICANT CHANGE UP (ref 0–0.9)
MONOCYTES NFR BLD AUTO: 16.2 % — HIGH (ref 2–14)
NEUTROPHILS # BLD AUTO: 2.48 K/UL — SIGNIFICANT CHANGE UP (ref 1.8–7.4)
NEUTROPHILS NFR BLD AUTO: 57.4 % — SIGNIFICANT CHANGE UP (ref 43–77)
NRBC # BLD: 0 /100 WBCS — SIGNIFICANT CHANGE UP (ref 0–0)
PLATELET # BLD AUTO: 141 K/UL — LOW (ref 150–400)
RBC # BLD: 3.93 M/UL — LOW (ref 4.2–5.8)
RBC # FLD: 17.2 % — HIGH (ref 10.3–14.5)
WBC # BLD: 4.32 K/UL — SIGNIFICANT CHANGE UP (ref 3.8–10.5)
WBC # FLD AUTO: 4.32 K/UL — SIGNIFICANT CHANGE UP (ref 3.8–10.5)

## 2020-03-12 PROCEDURE — 99215 OFFICE O/P EST HI 40 MIN: CPT

## 2020-03-13 NOTE — ADDENDUM
[FreeTextEntry1] : Documented by Alexa Garcia acting as a scribe for Dr. Lila Roman on 03/12/2020.\par \par All medical record entries made by the Scribe were at my, Dr. Lila Roman, direction and personally dictated by me on 03/12/2020. I have reviewed the chart and agree that  the record accurately reflects my personal performance of the history, physical exam, assessment and plan. I have also personally directed, reviewed, and agree with the discharge instructions. \par

## 2020-03-13 NOTE — REVIEW OF SYSTEMS
[Negative] : Allergic/Immunologic [Recent Change In Weight] : ~T recent weight change [FreeTextEntry2] : 16lb weight gain

## 2020-03-13 NOTE — HISTORY OF PRESENT ILLNESS
[de-identified] : 58 y/o male with a hx of Amyloidosis,  Hepatitis C,  dyspepsia, hematuria, rectal bleeding, and Raynaud's phenomenon, cutaneous mastocytosis, presents for an initial Auto PSCT consultation. He is referred by Dr. Joel Wells. \par \par He initially experienced joint pain in 2014 and managed sx with Advil. In 2016, LFT was elevated and was dx with Hepatitis C which was treated with Harvoni. Patient has a hx of hematuria and bloody stool. Biopsies of the colon revealed chronic colitis and erosion. He was pleased on steroids and Mesalamine and started Humira in Mid-August. After first dose of Humira, left knee swelled up ---alleviated by Advil. After second dose of Humira, ankles swelled up and steroids were prescribed. Patient was also placed on Famotidine for early satiety and dyspepsia. His renal function is now normal and no longer experiences bloody stools........Progression in dyspepsia led to an upper endoscopy with Dr Parks on 10/30/19. Biopsy of the second portion of the duodenum revealed amyloidosis confirmed with Congo red statin. IgM immunostain is positive in areas of amyloid deposition, with kappa LC predominance. The stomach was noted to have impaired distensibility on endoscopy.Renal function has been normal, with BUN 9/creatinine 0.9.Cystoscopy was performed which showed prominent blood vessels.  [de-identified] : 60 y/o male presents today for a follow-up Auto-PSCT consultation. He is accompanied by his wife. He notes he is feeling well overall. He offers no complaints today. He underwent PFTs recently. He finished his last dose of Velcade on 3/9/2020. He has been tolerating Cybor D well. Patient notes that he has gained 16lbs and is closer to his baseline weight, although still not quite at his baseline. He states he exercises frequently, using the Elliptical for about 20 minutes. He notes that he wears compression socks daily. Patient states he has not travelled anywhere domestically or internationally in the past month. He denies having come in contact with another person positive for coronavirus or caring for a person who is positive for coronavirus. He denies any symptoms associated with coronavirus. He denies having been tested positive for coronavirus. Denies fever/chills, night sweats, mouth sores, eye dryness, blurred vision, nausea, vomiting, diarrhea. No CP, SOB or LE edema.

## 2020-03-13 NOTE — ASSESSMENT
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 He has involvement of his GI tract and probable cardiac involvement.. He is currently being treated with Cybor D by Dr. Wells. He regularly follows-up with an amyloid cardiologist. Clinically he is responding. Free light chains are dropping.\par \par I have had a long discussion with the patient regarding the risks, benefits, alternatives and logistics to autologous stem cell transplantation. Increase potential risk with corona virus outbreak also discussed.  I have reviewed with the patient the success rates with various treatments including chemotherapy only as well as chemotherapy and autologous stem cell transplant. \par \par I have also reviewed the pre-transplant testing for vital organ testing including, but not limited to echocardiogram, MUGA, PFTs, urine collection, bone marrow biopsy, sinus x-rays, dental evaluation and liver function.\par \par I have reviewed with the patient the role of Neupogen/Mozobil for 5 days vs chemo mobilization prior to collecting stem cells. Will proceed with growth factor only mobilization.  I have also discussed the process of apheresis as a way to collect the peripheral stem cells. Anticoagulation with Citrate during apheresis was discussed, along with side effects including but not limited to hypocalcemia mild dysesthesias (most common) to tetany, seizures and cardiac arrhythmias. Treatment with oral or intravenous calcium supplementation in the setting of hypocalcemia was also discussed. \par \par I have discussed with the patient the pre-administration of Kepivance IV x 3 days, as a GI prophylaxis with the aim to lessen swelling, irritation, sores, and ulcers in the GI tract caused by high dose chemotherapy. Side effects including but not limited to flushing, itching from Kepivance were also discussed. \par \par I reviewed with the patient the process of autologous stem cell transplant. I have reviewed the three to four week admission in the hospital including initial chemotherapy, lasting for 2 days (anca 200), followed by a 1-2 day rest period, followed by the transplant via a triple lumen catheter. I reviewed post-transplant expectations and possible need for supportive care in the post transplant state as well as post-transplant chemotherapy. I instructed the patient, he may require  transfusions including, , PRBCs, platelets, and  fluids, electrolytes, etc. For patients with amyloid we use a higher cutoff for plt transfusion (about 40) because of increased risk of bleeding with amyloid deposition in the blood vessels. \par \par I have also discussed the need for antifungal, antiviral, antibiotics  post transplant and discharge. Possible risks were discussed including infection, bleeding, inflammation in bowel, inflammation in the kidneys and liver. \par \par Post-discharge instructions were reviewed including diet control, crowd controls for ideally 6 weeks post transplant. I also discussed common complaints in the post-discharge state including fatigue, weakness, and supportive care. The patient has a supportive environment at home. I have also reviewed treatment options if the transplant is not effective. \par \par All questions were answered, patient has verbalized understanding. Emotional support provided. Literature and consents were provided for the patient for their review. Patient is in agreement and desires proceeding with the procedure. \par \par Treatment plan: \par \par -Growth factor will be given starting 3/19 for 5 days. Discussed with patient that it may cause fevers, body aches, etc.\par -Catheter placement and first day of collection planned for 3/23; discussed with patient that multiple days of collection may be necessary. No chemotherapy to be given during stem cell mobilization.\par -Explained to patient that he may receive Mozobil on 3/23. Discussed that it can cause diarrhea and, therefore, Imodium will also be given to patient.\par -Kepivance appointments scheduled for 3/30, 3/31, and 4/1.\par -Possible admission on 4/2/2020.\par -Possible transplant on 4/6/2020.\par -Stressed again that for patients with amyloid we use a higher cutoff for platelet transfusion (about 40) because of increased risk of bleeding with amyloid deposition in the blood vessels. \par -Updated screening for coronavirus performed today and advised patient and designated caretaker (his wife)  to try to self isolate starting now in preparation for collection and admission. \par -All questions and concerns answered. Emotional support provided. \par -Patient has been advised to contact clinic if he has any concerns.\par \par Patient's Cellphone #: [phone] Ears: no ear pain and no hearing problems.Nose: no nasal congestion and no nasal drainage.Mouth/Throat: no dysphagia, no hoarseness and no throat pain.Neck: no lumps, no pain, no stiffness and no swollen glands.

## 2020-03-16 ENCOUNTER — APPOINTMENT (OUTPATIENT)
Dept: HEMATOLOGY ONCOLOGY | Facility: CLINIC | Age: 60
End: 2020-03-16

## 2020-03-16 ENCOUNTER — APPOINTMENT (OUTPATIENT)
Age: 60
End: 2020-03-16

## 2020-03-23 ENCOUNTER — OUTPATIENT (OUTPATIENT)
Dept: OUTPATIENT SERVICES | Facility: HOSPITAL | Age: 60
LOS: 1 days | Discharge: HOME | End: 2020-03-23

## 2020-03-23 ENCOUNTER — RESULT REVIEW (OUTPATIENT)
Age: 60
End: 2020-03-23

## 2020-03-23 ENCOUNTER — OUTPATIENT (OUTPATIENT)
Dept: OUTPATIENT SERVICES | Facility: HOSPITAL | Age: 60
LOS: 1 days | End: 2020-03-23
Payer: COMMERCIAL

## 2020-03-23 DIAGNOSIS — E85.9 AMYLOIDOSIS, UNSPECIFIED: ICD-10-CM

## 2020-03-23 LAB
ANISOCYTOSIS BLD QL: SLIGHT — SIGNIFICANT CHANGE UP
BLD GP AB SCN SERPL QL: NEGATIVE — SIGNIFICANT CHANGE UP
CA-I BLD-SCNC: 1.17 MMOL/L — SIGNIFICANT CHANGE UP (ref 1.12–1.3)
CD34 CELLS # FLD: 72 /UL — SIGNIFICANT CHANGE UP
EOSINOPHIL NFR BLD AUTO: 1 — SIGNIFICANT CHANGE UP
HCT VFR BLD CALC: 28.3 % — LOW (ref 39–50)
HGB BLD-MCNC: 8.9 G/DL — LOW (ref 13–17)
LYMPHOCYTES # BLD AUTO: 4 % — LOW (ref 13–44)
MCHC RBC-ENTMCNC: 23.7 PG — LOW (ref 27–34)
MCHC RBC-ENTMCNC: 31.4 GM/DL — LOW (ref 32–36)
MCV RBC AUTO: 75.5 FL — LOW (ref 80–100)
METAMYELOCYTES # FLD: 2 % — HIGH (ref 0–0)
MICROCYTES BLD QL: SLIGHT — SIGNIFICANT CHANGE UP
MONOCYTES NFR BLD AUTO: 6 % — SIGNIFICANT CHANGE UP (ref 2–14)
MYELOCYTES NFR BLD: 2 % — HIGH (ref 0–0)
NEUTROPHILS NFR BLD AUTO: 73 % — SIGNIFICANT CHANGE UP (ref 43–77)
NEUTS BAND # BLD: 11 % — HIGH (ref 0–8)
NRBC # BLD: 0 /100 WBCS — SIGNIFICANT CHANGE UP (ref 0–0)
NRBC # BLD: 1 /100 — HIGH (ref 0–0)
PLAT MORPH BLD: NORMAL — SIGNIFICANT CHANGE UP
PLATELET # BLD AUTO: 154 K/UL — SIGNIFICANT CHANGE UP (ref 150–400)
POIKILOCYTOSIS BLD QL AUTO: SLIGHT — SIGNIFICANT CHANGE UP
PROMYELOCYTES # FLD: 1 % — HIGH (ref 0–0)
RBC # BLD: 3.75 M/UL — LOW (ref 4.2–5.8)
RBC # FLD: 16.6 % — HIGH (ref 10.3–14.5)
RBC BLD AUTO: ABNORMAL
RH IG SCN BLD-IMP: POSITIVE — SIGNIFICANT CHANGE UP
SCHISTOCYTES BLD QL AUTO: SLIGHT — SIGNIFICANT CHANGE UP
WBC # BLD: 39.71 K/UL — HIGH (ref 3.8–10.5)
WBC # FLD AUTO: 39.71 K/UL — HIGH (ref 3.8–10.5)

## 2020-03-23 PROCEDURE — 76937 US GUIDE VASCULAR ACCESS: CPT

## 2020-03-23 PROCEDURE — 99204 OFFICE O/P NEW MOD 45 MIN: CPT | Mod: 25

## 2020-03-23 PROCEDURE — 38206 HARVEST AUTO STEM CELLS: CPT

## 2020-03-23 PROCEDURE — 77001 FLUOROGUIDE FOR VEIN DEVICE: CPT

## 2020-03-23 PROCEDURE — 86367 STEM CELLS TOTAL COUNT: CPT

## 2020-03-23 PROCEDURE — 86901 BLOOD TYPING SEROLOGIC RH(D): CPT

## 2020-03-23 PROCEDURE — 76937 US GUIDE VASCULAR ACCESS: CPT | Mod: 26

## 2020-03-23 PROCEDURE — 82330 ASSAY OF CALCIUM: CPT

## 2020-03-23 PROCEDURE — C1894: CPT

## 2020-03-23 PROCEDURE — 36556 INSERT NON-TUNNEL CV CATH: CPT

## 2020-03-23 PROCEDURE — C1752: CPT

## 2020-03-23 PROCEDURE — C1769: CPT

## 2020-03-23 PROCEDURE — 77001 FLUOROGUIDE FOR VEIN DEVICE: CPT | Mod: 26

## 2020-03-23 PROCEDURE — 85027 COMPLETE CBC AUTOMATED: CPT

## 2020-03-23 PROCEDURE — 86900 BLOOD TYPING SEROLOGIC ABO: CPT

## 2020-03-23 PROCEDURE — 86850 RBC ANTIBODY SCREEN: CPT

## 2020-03-30 ENCOUNTER — LABORATORY RESULT (OUTPATIENT)
Age: 60
End: 2020-03-30

## 2020-03-30 ENCOUNTER — RESULT REVIEW (OUTPATIENT)
Age: 60
End: 2020-03-30

## 2020-03-30 ENCOUNTER — APPOINTMENT (OUTPATIENT)
Age: 60
End: 2020-03-30

## 2020-03-30 LAB
BASOPHILS # BLD AUTO: 0.1 K/UL — SIGNIFICANT CHANGE UP (ref 0–0.2)
BASOPHILS NFR BLD AUTO: 1.3 % — SIGNIFICANT CHANGE UP (ref 0–2)
EOSINOPHIL # BLD AUTO: 0.1 K/UL — SIGNIFICANT CHANGE UP (ref 0–0.5)
EOSINOPHIL NFR BLD AUTO: 2 % — SIGNIFICANT CHANGE UP (ref 0–6)
HCT VFR BLD CALC: 32 % — LOW (ref 39–50)
HGB BLD-MCNC: 9.6 G/DL — LOW (ref 13–17)
LYMPHOCYTES # BLD AUTO: 1.1 K/UL — SIGNIFICANT CHANGE UP (ref 1–3.3)
LYMPHOCYTES # BLD AUTO: 20.2 % — SIGNIFICANT CHANGE UP (ref 13–44)
MCHC RBC-ENTMCNC: 23.1 PG — LOW (ref 27–34)
MCHC RBC-ENTMCNC: 30 G/DL — LOW (ref 32–36)
MCV RBC AUTO: 76.8 FL — LOW (ref 80–100)
MONOCYTES # BLD AUTO: 0.5 K/UL — SIGNIFICANT CHANGE UP (ref 0–0.9)
MONOCYTES NFR BLD AUTO: 9.2 % — SIGNIFICANT CHANGE UP (ref 2–14)
NEUTROPHILS # BLD AUTO: 3.8 K/UL — SIGNIFICANT CHANGE UP (ref 1.8–7.4)
NEUTROPHILS NFR BLD AUTO: 67.2 % — SIGNIFICANT CHANGE UP (ref 43–77)
PLATELET # BLD AUTO: 92 K/UL — LOW (ref 150–400)
RBC # BLD: 4.16 M/UL — LOW (ref 4.2–5.8)
RBC # FLD: 15 % — HIGH (ref 10.3–14.5)
WBC # BLD: 5.7 K/UL — SIGNIFICANT CHANGE UP (ref 3.8–10.5)
WBC # FLD AUTO: 5.7 K/UL — SIGNIFICANT CHANGE UP (ref 3.8–10.5)

## 2020-03-31 ENCOUNTER — APPOINTMENT (OUTPATIENT)
Age: 60
End: 2020-03-31

## 2020-03-31 DIAGNOSIS — Z51.11 ENCOUNTER FOR ANTINEOPLASTIC CHEMOTHERAPY: ICD-10-CM

## 2020-03-31 DIAGNOSIS — E85.9 AMYLOIDOSIS, UNSPECIFIED: ICD-10-CM

## 2020-03-31 DIAGNOSIS — Z49.01 ENCOUNTER FOR FITTING AND ADJUSTMENT OF EXTRACORPOREAL DIALYSIS CATHETER: ICD-10-CM

## 2020-03-31 LAB
ALBUMIN MFR SERPL ELPH: 64.5 %
ALBUMIN SERPL-MCNC: 3.7 G/DL
ALBUMIN/GLOB SERPL: 1.8 RATIO
ALPHA1 GLOB MFR SERPL ELPH: 5.5 %
ALPHA1 GLOB SERPL ELPH-MCNC: 0.3 G/DL
ALPHA2 GLOB MFR SERPL ELPH: 12.5 %
ALPHA2 GLOB SERPL ELPH-MCNC: 0.7 G/DL
B-GLOBULIN MFR SERPL ELPH: 10.2 %
B-GLOBULIN SERPL ELPH-MCNC: 0.6 G/DL
GAMMA GLOB FLD ELPH-MCNC: 0.4 G/DL
GAMMA GLOB MFR SERPL ELPH: 7.3 %
INR PPP: 1.11 RATIO
INTERPRETATION SERPL IEP-IMP: NORMAL
LDH SERPL-CCNC: 222 U/L
M PROTEIN MFR SERPL ELPH: 1.3 %
M PROTEIN SPEC IFE-MCNC: NORMAL
MAGNESIUM SERPL-MCNC: 2 MG/DL
MONOCLON BAND OBS SERPL: 0.1 G/DL
PROT SERPL-MCNC: 5.7 G/DL
PROT SERPL-MCNC: 5.7 G/DL
PT BLD: 12.6 SEC

## 2020-04-01 ENCOUNTER — APPOINTMENT (OUTPATIENT)
Age: 60
End: 2020-04-01

## 2020-04-01 LAB
DEPRECATED KAPPA LC FREE/LAMBDA SER: 20.38 RATIO
IGA SER QL IEP: 82 MG/DL
IGG SER QL IEP: 431 MG/DL
IGM SER QL IEP: 47 MG/DL
KAPPA LC CSF-MCNC: 0.53 MG/DL
KAPPA LC SERPL-MCNC: 10.8 MG/DL

## 2020-04-06 ENCOUNTER — RESULT REVIEW (OUTPATIENT)
Age: 60
End: 2020-04-06

## 2020-04-06 ENCOUNTER — APPOINTMENT (OUTPATIENT)
Age: 60
End: 2020-04-06

## 2020-04-06 LAB
ANISOCYTOSIS BLD QL: SLIGHT — SIGNIFICANT CHANGE UP
BASOPHILS # BLD AUTO: 0.1 K/UL — SIGNIFICANT CHANGE UP (ref 0–0.2)
DACRYOCYTES BLD QL SMEAR: SLIGHT — SIGNIFICANT CHANGE UP
ELLIPTOCYTES BLD QL SMEAR: SLIGHT — SIGNIFICANT CHANGE UP
EOSINOPHIL # BLD AUTO: 0 K/UL — SIGNIFICANT CHANGE UP (ref 0–0.5)
EOSINOPHIL NFR BLD AUTO: 1 % — SIGNIFICANT CHANGE UP (ref 0–6)
HCT VFR BLD CALC: 31.9 % — LOW (ref 39–50)
HGB BLD-MCNC: 10.1 G/DL — LOW (ref 13–17)
LG PLATELETS BLD QL AUTO: SLIGHT — SIGNIFICANT CHANGE UP
LYMPHOCYTES # BLD AUTO: 0.3 K/UL — LOW (ref 1–3.3)
LYMPHOCYTES # BLD AUTO: 6 % — LOW (ref 13–44)
MACROCYTES BLD QL: SLIGHT — SIGNIFICANT CHANGE UP
MCHC RBC-ENTMCNC: 24.1 PG — LOW (ref 27–34)
MCHC RBC-ENTMCNC: 31.7 G/DL — LOW (ref 32–36)
MCV RBC AUTO: 76.1 FL — LOW (ref 80–100)
MICROCYTES BLD QL: SLIGHT — SIGNIFICANT CHANGE UP
MONOCYTES # BLD AUTO: 0.1 K/UL — SIGNIFICANT CHANGE UP (ref 0–0.9)
NEUTROPHILS # BLD AUTO: 4.5 K/UL — SIGNIFICANT CHANGE UP (ref 1.8–7.4)
NEUTROPHILS NFR BLD AUTO: 93 % — HIGH (ref 43–77)
PLAT MORPH BLD: NORMAL — SIGNIFICANT CHANGE UP
PLATELET # BLD AUTO: 244 K/UL — SIGNIFICANT CHANGE UP (ref 150–400)
POIKILOCYTOSIS BLD QL AUTO: SLIGHT — SIGNIFICANT CHANGE UP
RBC # BLD: 4.19 M/UL — LOW (ref 4.2–5.8)
RBC # FLD: 14.7 % — HIGH (ref 10.3–14.5)
RBC BLD AUTO: ABNORMAL
SCHISTOCYTES BLD QL AUTO: SLIGHT — SIGNIFICANT CHANGE UP
WBC # BLD: 5.1 K/UL — SIGNIFICANT CHANGE UP (ref 3.8–10.5)
WBC # FLD AUTO: 5.1 K/UL — SIGNIFICANT CHANGE UP (ref 3.8–10.5)

## 2020-04-07 LAB
CULTURE RESULTS: SIGNIFICANT CHANGE UP
SPECIMEN SOURCE: SIGNIFICANT CHANGE UP

## 2020-04-13 ENCOUNTER — RESULT REVIEW (OUTPATIENT)
Age: 60
End: 2020-04-13

## 2020-04-13 ENCOUNTER — APPOINTMENT (OUTPATIENT)
Age: 60
End: 2020-04-13

## 2020-04-13 LAB
BASOPHILS # BLD AUTO: 0 K/UL — SIGNIFICANT CHANGE UP (ref 0–0.2)
BASOPHILS NFR BLD AUTO: 0.4 % — SIGNIFICANT CHANGE UP (ref 0–2)
EOSINOPHIL # BLD AUTO: 0 K/UL — SIGNIFICANT CHANGE UP (ref 0–0.5)
EOSINOPHIL NFR BLD AUTO: 0.4 % — SIGNIFICANT CHANGE UP (ref 0–6)
HCT VFR BLD CALC: 32.6 % — LOW (ref 39–50)
HGB BLD-MCNC: 10.2 G/DL — LOW (ref 13–17)
LYMPHOCYTES # BLD AUTO: 0.4 K/UL — LOW (ref 1–3.3)
LYMPHOCYTES # BLD AUTO: 7.6 % — LOW (ref 13–44)
MCHC RBC-ENTMCNC: 24 PG — LOW (ref 27–34)
MCHC RBC-ENTMCNC: 31.2 G/DL — LOW (ref 32–36)
MCV RBC AUTO: 76.9 FL — LOW (ref 80–100)
MONOCYTES # BLD AUTO: 0.1 K/UL — SIGNIFICANT CHANGE UP (ref 0–0.9)
MONOCYTES NFR BLD AUTO: 1.3 % — LOW (ref 2–14)
NEUTROPHILS # BLD AUTO: 4.9 K/UL — SIGNIFICANT CHANGE UP (ref 1.8–7.4)
NEUTROPHILS NFR BLD AUTO: 90.4 % — HIGH (ref 43–77)
PLATELET # BLD AUTO: 169 K/UL — SIGNIFICANT CHANGE UP (ref 150–400)
RBC # BLD: 4.23 M/UL — SIGNIFICANT CHANGE UP (ref 4.2–5.8)
RBC # FLD: 14.6 % — HIGH (ref 10.3–14.5)
WBC # BLD: 5.4 K/UL — SIGNIFICANT CHANGE UP (ref 3.8–10.5)
WBC # FLD AUTO: 5.4 K/UL — SIGNIFICANT CHANGE UP (ref 3.8–10.5)

## 2020-04-20 ENCOUNTER — RESULT REVIEW (OUTPATIENT)
Age: 60
End: 2020-04-20

## 2020-04-20 ENCOUNTER — APPOINTMENT (OUTPATIENT)
Age: 60
End: 2020-04-20

## 2020-04-20 LAB
BASOPHILS # BLD AUTO: 0 K/UL — SIGNIFICANT CHANGE UP (ref 0–0.2)
BASOPHILS NFR BLD AUTO: 0.3 % — SIGNIFICANT CHANGE UP (ref 0–2)
EOSINOPHIL # BLD AUTO: 0 K/UL — SIGNIFICANT CHANGE UP (ref 0–0.5)
EOSINOPHIL NFR BLD AUTO: 0.6 % — SIGNIFICANT CHANGE UP (ref 0–6)
HCT VFR BLD CALC: 33.2 % — LOW (ref 39–50)
HGB BLD-MCNC: 10.3 G/DL — LOW (ref 13–17)
LYMPHOCYTES # BLD AUTO: 0.2 K/UL — LOW (ref 1–3.3)
LYMPHOCYTES # BLD AUTO: 3.9 % — LOW (ref 13–44)
MCHC RBC-ENTMCNC: 24.1 PG — LOW (ref 27–34)
MCHC RBC-ENTMCNC: 30.9 G/DL — LOW (ref 32–36)
MCV RBC AUTO: 77.8 FL — LOW (ref 80–100)
MONOCYTES # BLD AUTO: 0 K/UL — SIGNIFICANT CHANGE UP (ref 0–0.9)
MONOCYTES NFR BLD AUTO: 0.7 % — LOW (ref 2–14)
NEUTROPHILS # BLD AUTO: 5.2 K/UL — SIGNIFICANT CHANGE UP (ref 1.8–7.4)
NEUTROPHILS NFR BLD AUTO: 94.6 % — HIGH (ref 43–77)
PLATELET # BLD AUTO: 159 K/UL — SIGNIFICANT CHANGE UP (ref 150–400)
RBC # BLD: 4.26 M/UL — SIGNIFICANT CHANGE UP (ref 4.2–5.8)
RBC # FLD: 14.8 % — HIGH (ref 10.3–14.5)
WBC # BLD: 5.6 K/UL — SIGNIFICANT CHANGE UP (ref 3.8–10.5)
WBC # FLD AUTO: 5.6 K/UL — SIGNIFICANT CHANGE UP (ref 3.8–10.5)

## 2020-04-22 ENCOUNTER — OUTPATIENT (OUTPATIENT)
Dept: OUTPATIENT SERVICES | Facility: HOSPITAL | Age: 60
LOS: 1 days | Discharge: HOME | End: 2020-04-22

## 2020-04-22 DIAGNOSIS — E85.9 AMYLOIDOSIS, UNSPECIFIED: ICD-10-CM

## 2020-04-27 ENCOUNTER — RESULT REVIEW (OUTPATIENT)
Age: 60
End: 2020-04-27

## 2020-04-27 ENCOUNTER — APPOINTMENT (OUTPATIENT)
Age: 60
End: 2020-04-27

## 2020-04-27 LAB
BASOPHILS # BLD AUTO: 0 K/UL — SIGNIFICANT CHANGE UP (ref 0–0.2)
BASOPHILS NFR BLD AUTO: 0.1 % — SIGNIFICANT CHANGE UP (ref 0–2)
EOSINOPHIL # BLD AUTO: 0 K/UL — SIGNIFICANT CHANGE UP (ref 0–0.5)
EOSINOPHIL NFR BLD AUTO: 0.1 % — SIGNIFICANT CHANGE UP (ref 0–6)
HCT VFR BLD CALC: 32.6 % — LOW (ref 39–50)
HGB BLD-MCNC: 10.2 G/DL — LOW (ref 13–17)
LYMPHOCYTES # BLD AUTO: 0.3 K/UL — LOW (ref 1–3.3)
LYMPHOCYTES # BLD AUTO: 5.7 % — LOW (ref 13–44)
MCHC RBC-ENTMCNC: 23.8 PG — LOW (ref 27–34)
MCHC RBC-ENTMCNC: 31.2 G/DL — LOW (ref 32–36)
MCV RBC AUTO: 76.2 FL — LOW (ref 80–100)
MONOCYTES # BLD AUTO: 0 K/UL — SIGNIFICANT CHANGE UP (ref 0–0.9)
MONOCYTES NFR BLD AUTO: 0.5 % — LOW (ref 2–14)
NEUTROPHILS # BLD AUTO: 4.8 K/UL — SIGNIFICANT CHANGE UP (ref 1.8–7.4)
NEUTROPHILS NFR BLD AUTO: 93.6 % — HIGH (ref 43–77)
PLATELET # BLD AUTO: 156 K/UL — SIGNIFICANT CHANGE UP (ref 150–400)
RBC # BLD: 4.28 M/UL — SIGNIFICANT CHANGE UP (ref 4.2–5.8)
RBC # FLD: 15.1 % — HIGH (ref 10.3–14.5)
WBC # BLD: 5.2 K/UL — SIGNIFICANT CHANGE UP (ref 3.8–10.5)
WBC # FLD AUTO: 5.2 K/UL — SIGNIFICANT CHANGE UP (ref 3.8–10.5)

## 2020-04-28 DIAGNOSIS — Z51.11 ENCOUNTER FOR ANTINEOPLASTIC CHEMOTHERAPY: ICD-10-CM

## 2020-04-28 DIAGNOSIS — R11.2 NAUSEA WITH VOMITING, UNSPECIFIED: ICD-10-CM

## 2020-04-28 LAB
ALBUMIN SERPL ELPH-MCNC: 4.2 G/DL
ALP BLD-CCNC: 66 U/L
ALT SERPL-CCNC: 9 U/L
ANION GAP SERPL CALC-SCNC: 13 MMOL/L
AST SERPL-CCNC: 12 U/L
BILIRUB SERPL-MCNC: 0.3 MG/DL
BUN SERPL-MCNC: 16 MG/DL
CALCIUM SERPL-MCNC: 8.8 MG/DL
CHLORIDE SERPL-SCNC: 106 MMOL/L
CO2 SERPL-SCNC: 23 MMOL/L
CREAT SERPL-MCNC: 0.8 MG/DL
DEPRECATED KAPPA LC FREE/LAMBDA SER: 45.1 RATIO
GLUCOSE SERPL-MCNC: 148 MG/DL
IGA SER QL IEP: 71 MG/DL
IGG SER QL IEP: 428 MG/DL
IGM SER QL IEP: 52 MG/DL
KAPPA LC CSF-MCNC: 0.2 MG/DL
KAPPA LC SERPL-MCNC: 9.02 MG/DL
M PROTEIN SPEC IFE-MCNC: NORMAL
NT-PROBNP SERPL-MCNC: 409 PG/ML
POTASSIUM SERPL-SCNC: 4.3 MMOL/L
PROT SERPL-MCNC: 5.8 G/DL
SODIUM SERPL-SCNC: 142 MMOL/L

## 2020-04-29 ENCOUNTER — APPOINTMENT (OUTPATIENT)
Dept: HEMATOLOGY ONCOLOGY | Facility: CLINIC | Age: 60
End: 2020-04-29
Payer: COMMERCIAL

## 2020-04-29 PROCEDURE — 99212 OFFICE O/P EST SF 10 MIN: CPT | Mod: 95

## 2020-04-30 NOTE — ASSESSMENT
[FreeTextEntry1] : Amyloidosis, with good response to CyBorD, now on maintenance velcade while we await PBSCT at [facility].\par 10 minutes spent reviewing current clinical status and future Rx plan.

## 2020-04-30 NOTE — HISTORY OF PRESENT ILLNESS
[Home] : at home, [unfilled] , at the time of the visit. [Spouse] : spouse [Other Location: e.g. Home (Enter Location, City,State)___] : at [unfilled] [Patient] : the patient [de-identified] : Jose is experiencing blepharitis and stable sensory neuropathy in his feet on Velcade, but overall is doing well as he awaits re-institution of Transplant services at [facility] for his upcoming amyloidosis transplant.\par General strength is good.\par GI symptoms have resolved, and Juan has gained 17 pounds from his km weight.\par  [Self] : self [de-identified] : Feeling well today. \par Plantar neuropathy is stable. \par C/o blepharitis and fatigue.\par Has nausea around day 3-4 after treatments.\par States his echo and stress test were normal. \par BP is improving. \par Has gained 12 lbs. with definite improvement in GI symptoms since starting CyBorD regimen.\par Kappa FLC now down to 12.59 mg/dl.

## 2020-04-30 NOTE — PHYSICAL EXAM
[Restricted in physically strenuous activity but ambulatory and able to carry out work of a light or sedentary nature] : Status 1- Restricted in physically strenuous activity but ambulatory and able to carry out work of a light or sedentary nature, e.g., light house work, office work [de-identified] : Grade 1 blepharitis [Normal] : well developed, well nourished, in no acute distress

## 2020-05-04 ENCOUNTER — APPOINTMENT (OUTPATIENT)
Age: 60
End: 2020-05-04

## 2020-05-04 ENCOUNTER — RESULT REVIEW (OUTPATIENT)
Age: 60
End: 2020-05-04

## 2020-05-04 LAB
BASOPHILS # BLD AUTO: 0 K/UL — SIGNIFICANT CHANGE UP (ref 0–0.2)
BASOPHILS NFR BLD AUTO: 0.3 % — SIGNIFICANT CHANGE UP (ref 0–2)
EOSINOPHIL # BLD AUTO: 0 K/UL — SIGNIFICANT CHANGE UP (ref 0–0.5)
EOSINOPHIL NFR BLD AUTO: 0.4 % — SIGNIFICANT CHANGE UP (ref 0–6)
HCT VFR BLD CALC: 35.9 % — LOW (ref 39–50)
HGB BLD-MCNC: 10.7 G/DL — LOW (ref 13–17)
LYMPHOCYTES # BLD AUTO: 0.3 K/UL — LOW (ref 1–3.3)
LYMPHOCYTES # BLD AUTO: 6.5 % — LOW (ref 13–44)
MCHC RBC-ENTMCNC: 23.2 PG — LOW (ref 27–34)
MCHC RBC-ENTMCNC: 29.9 G/DL — LOW (ref 32–36)
MCV RBC AUTO: 77.6 FL — LOW (ref 80–100)
MONOCYTES # BLD AUTO: 0.1 K/UL — SIGNIFICANT CHANGE UP (ref 0–0.9)
MONOCYTES NFR BLD AUTO: 1.2 % — LOW (ref 2–14)
NEUTROPHILS # BLD AUTO: 3.9 K/UL — SIGNIFICANT CHANGE UP (ref 1.8–7.4)
NEUTROPHILS NFR BLD AUTO: 91.5 % — HIGH (ref 43–77)
PLATELET # BLD AUTO: 161 K/UL — SIGNIFICANT CHANGE UP (ref 150–400)
RBC # BLD: 4.62 M/UL — SIGNIFICANT CHANGE UP (ref 4.2–5.8)
RBC # FLD: 14.1 % — SIGNIFICANT CHANGE UP (ref 10.3–14.5)
WBC # BLD: 4.2 K/UL — SIGNIFICANT CHANGE UP (ref 3.8–10.5)
WBC # FLD AUTO: 4.2 K/UL — SIGNIFICANT CHANGE UP (ref 3.8–10.5)

## 2020-05-11 ENCOUNTER — APPOINTMENT (OUTPATIENT)
Age: 60
End: 2020-05-11

## 2020-05-11 ENCOUNTER — RESULT REVIEW (OUTPATIENT)
Age: 60
End: 2020-05-11

## 2020-05-11 LAB
BASOPHILS # BLD AUTO: 0 K/UL — SIGNIFICANT CHANGE UP (ref 0–0.2)
BASOPHILS NFR BLD AUTO: 0.2 % — SIGNIFICANT CHANGE UP (ref 0–2)
EOSINOPHIL # BLD AUTO: 0 K/UL — SIGNIFICANT CHANGE UP (ref 0–0.5)
EOSINOPHIL NFR BLD AUTO: 0.4 % — SIGNIFICANT CHANGE UP (ref 0–6)
HCT VFR BLD CALC: 33.8 % — LOW (ref 39–50)
HGB BLD-MCNC: 11.1 G/DL — LOW (ref 13–17)
LYMPHOCYTES # BLD AUTO: 0.5 K/UL — LOW (ref 1–3.3)
LYMPHOCYTES # BLD AUTO: 6.6 % — LOW (ref 13–44)
MCHC RBC-ENTMCNC: 24.5 PG — LOW (ref 27–34)
MCHC RBC-ENTMCNC: 32.9 G/DL — SIGNIFICANT CHANGE UP (ref 32–36)
MCV RBC AUTO: 74.7 FL — LOW (ref 80–100)
MONOCYTES # BLD AUTO: 0.1 K/UL — SIGNIFICANT CHANGE UP (ref 0–0.9)
MONOCYTES NFR BLD AUTO: 0.9 % — LOW (ref 2–14)
NEUTROPHILS # BLD AUTO: 6.6 K/UL — SIGNIFICANT CHANGE UP (ref 1.8–7.4)
NEUTROPHILS NFR BLD AUTO: 91.8 % — HIGH (ref 43–77)
PLATELET # BLD AUTO: 153 K/UL — SIGNIFICANT CHANGE UP (ref 150–400)
RBC # BLD: 4.52 M/UL — SIGNIFICANT CHANGE UP (ref 4.2–5.8)
RBC # FLD: 14.4 % — SIGNIFICANT CHANGE UP (ref 10.3–14.5)
WBC # BLD: 7.2 K/UL — SIGNIFICANT CHANGE UP (ref 3.8–10.5)
WBC # FLD AUTO: 7.2 K/UL — SIGNIFICANT CHANGE UP (ref 3.8–10.5)

## 2020-05-18 ENCOUNTER — RESULT REVIEW (OUTPATIENT)
Age: 60
End: 2020-05-18

## 2020-05-18 ENCOUNTER — APPOINTMENT (OUTPATIENT)
Age: 60
End: 2020-05-18

## 2020-05-18 LAB
ANISOCYTOSIS BLD QL: SLIGHT — SIGNIFICANT CHANGE UP
BASO STIPL BLD QL SMEAR: PRESENT — SIGNIFICANT CHANGE UP
BASOPHILS # BLD AUTO: 0 K/UL — SIGNIFICANT CHANGE UP (ref 0–0.2)
BURR CELLS BLD QL SMEAR: PRESENT — SIGNIFICANT CHANGE UP
ELLIPTOCYTES BLD QL SMEAR: SLIGHT — SIGNIFICANT CHANGE UP
EOSINOPHIL # BLD AUTO: 0.2 K/UL — SIGNIFICANT CHANGE UP (ref 0–0.5)
HCT VFR BLD CALC: 34.2 % — LOW (ref 39–50)
HGB BLD-MCNC: 11.1 G/DL — LOW (ref 13–17)
LG PLATELETS BLD QL AUTO: SLIGHT — SIGNIFICANT CHANGE UP
LYMPHOCYTES # BLD AUTO: 0.5 K/UL — LOW (ref 1–3.3)
LYMPHOCYTES # BLD AUTO: 3 % — LOW (ref 13–44)
MACROCYTES BLD QL: SLIGHT — SIGNIFICANT CHANGE UP
MCHC RBC-ENTMCNC: 24.2 PG — LOW (ref 27–34)
MCHC RBC-ENTMCNC: 32.5 G/DL — SIGNIFICANT CHANGE UP (ref 32–36)
MCV RBC AUTO: 74.4 FL — LOW (ref 80–100)
MICROCYTES BLD QL: SLIGHT — SIGNIFICANT CHANGE UP
MONOCYTES # BLD AUTO: 0.1 K/UL — SIGNIFICANT CHANGE UP (ref 0–0.9)
MONOCYTES NFR BLD AUTO: 3 % — SIGNIFICANT CHANGE UP (ref 2–14)
NEUTROPHILS # BLD AUTO: 5.6 K/UL — SIGNIFICANT CHANGE UP (ref 1.8–7.4)
NEUTROPHILS NFR BLD AUTO: 94 % — HIGH (ref 43–77)
PLAT MORPH BLD: NORMAL — SIGNIFICANT CHANGE UP
PLATELET # BLD AUTO: 166 K/UL — SIGNIFICANT CHANGE UP (ref 150–400)
POIKILOCYTOSIS BLD QL AUTO: SIGNIFICANT CHANGE UP
POLYCHROMASIA BLD QL SMEAR: SLIGHT — SIGNIFICANT CHANGE UP
RBC # BLD: 4.59 M/UL — SIGNIFICANT CHANGE UP (ref 4.2–5.8)
RBC # FLD: 14.2 % — SIGNIFICANT CHANGE UP (ref 10.3–14.5)
RBC BLD AUTO: ABNORMAL
SCHISTOCYTES BLD QL AUTO: SLIGHT — SIGNIFICANT CHANGE UP
WBC # BLD: 6.5 K/UL — SIGNIFICANT CHANGE UP (ref 3.8–10.5)
WBC # FLD AUTO: 6.5 K/UL — SIGNIFICANT CHANGE UP (ref 3.8–10.5)

## 2020-05-20 ENCOUNTER — OUTPATIENT (OUTPATIENT)
Dept: OUTPATIENT SERVICES | Facility: HOSPITAL | Age: 60
LOS: 1 days | Discharge: HOME | End: 2020-05-20

## 2020-05-20 DIAGNOSIS — E85.9 AMYLOIDOSIS, UNSPECIFIED: ICD-10-CM

## 2020-05-27 ENCOUNTER — RESULT REVIEW (OUTPATIENT)
Age: 60
End: 2020-05-27

## 2020-05-27 ENCOUNTER — APPOINTMENT (OUTPATIENT)
Age: 60
End: 2020-05-27

## 2020-05-27 LAB
BASOPHILS # BLD AUTO: 0 K/UL — SIGNIFICANT CHANGE UP (ref 0–0.2)
BASOPHILS NFR BLD AUTO: 0.3 % — SIGNIFICANT CHANGE UP (ref 0–2)
EOSINOPHIL # BLD AUTO: 0 K/UL — SIGNIFICANT CHANGE UP (ref 0–0.5)
EOSINOPHIL NFR BLD AUTO: 0.2 % — SIGNIFICANT CHANGE UP (ref 0–6)
HCT VFR BLD CALC: 31.8 % — LOW (ref 39–50)
HGB BLD-MCNC: 10.1 G/DL — LOW (ref 13–17)
LYMPHOCYTES # BLD AUTO: 0.2 K/UL — LOW (ref 1–3.3)
LYMPHOCYTES # BLD AUTO: 4.2 % — LOW (ref 13–44)
MCHC RBC-ENTMCNC: 23.8 PG — LOW (ref 27–34)
MCHC RBC-ENTMCNC: 31.6 G/DL — LOW (ref 32–36)
MCV RBC AUTO: 75.2 FL — LOW (ref 80–100)
MONOCYTES # BLD AUTO: 0.1 K/UL — SIGNIFICANT CHANGE UP (ref 0–0.9)
MONOCYTES NFR BLD AUTO: 1.7 % — LOW (ref 2–14)
NEUTROPHILS # BLD AUTO: 5.1 K/UL — SIGNIFICANT CHANGE UP (ref 1.8–7.4)
NEUTROPHILS NFR BLD AUTO: 93.7 % — HIGH (ref 43–77)
PLATELET # BLD AUTO: 132 K/UL — LOW (ref 150–400)
RBC # BLD: 4.24 M/UL — SIGNIFICANT CHANGE UP (ref 4.2–5.8)
RBC # FLD: 14.3 % — SIGNIFICANT CHANGE UP (ref 10.3–14.5)
WBC # BLD: 5.5 K/UL — SIGNIFICANT CHANGE UP (ref 3.8–10.5)
WBC # FLD AUTO: 5.5 K/UL — SIGNIFICANT CHANGE UP (ref 3.8–10.5)

## 2020-05-28 ENCOUNTER — APPOINTMENT (OUTPATIENT)
Dept: HEMATOLOGY ONCOLOGY | Facility: CLINIC | Age: 60
End: 2020-05-28
Payer: COMMERCIAL

## 2020-05-28 DIAGNOSIS — Z51.11 ENCOUNTER FOR ANTINEOPLASTIC CHEMOTHERAPY: ICD-10-CM

## 2020-05-28 LAB
ALBUMIN SERPL ELPH-MCNC: 4.3 G/DL
ALP BLD-CCNC: 62 U/L
ALT SERPL-CCNC: 10 U/L
ANION GAP SERPL CALC-SCNC: 10 MMOL/L
AST SERPL-CCNC: 16 U/L
BILIRUB SERPL-MCNC: 0.3 MG/DL
BUN SERPL-MCNC: 15 MG/DL
CALCIUM SERPL-MCNC: 8.7 MG/DL
CHLORIDE SERPL-SCNC: 108 MMOL/L
CO2 SERPL-SCNC: 24 MMOL/L
CREAT SERPL-MCNC: 0.79 MG/DL
DEPRECATED KAPPA LC FREE/LAMBDA SER: 33.95 RATIO
GLUCOSE SERPL-MCNC: 144 MG/DL
IGA SER QL IEP: 73 MG/DL
IGG SER QL IEP: 408 MG/DL
IGM SER QL IEP: 53 MG/DL
KAPPA LC CSF-MCNC: 0.21 MG/DL
KAPPA LC SERPL-MCNC: 7.13 MG/DL
POTASSIUM SERPL-SCNC: 4.9 MMOL/L
PROT SERPL-MCNC: 5.9 G/DL
SODIUM SERPL-SCNC: 142 MMOL/L

## 2020-05-28 PROCEDURE — 99213 OFFICE O/P EST LOW 20 MIN: CPT | Mod: 95

## 2020-05-28 NOTE — PHYSICAL EXAM
[Fully active, able to carry on all pre-disease performance without restriction] : Status 0 - Fully active, able to carry on all pre-disease performance without restriction [de-identified] : Looks great.

## 2020-05-28 NOTE — HISTORY OF PRESENT ILLNESS
[Medical Office: ([facility])___] : at the medical office located in  [Home] : at home, [unfilled] , at the time of the visit. 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 Started getting joint pain 5 years ago, believed it to be psoriatic arthritis due to his fhx (father). Managed sx with advil. In 2016 his LFT was elevated, dx with Hepatitis C. Managed with 3 months of Harvoni, now undetectable. States his LFT is now normal. In Nov 2018, started exercising 6 days a week, began to notice intermittent hematuria, CT Urogram was normal. States he gets the hematuria q 5 weeks, usually after physical activity. Up to date with his cardiologist, Dr. Miguel Diehl, follows up regularly. Denies any heart issues. Around June 30th, noticed crampy abdominal pain and blood in the stool. Estimates to have lost about a pint of blood. GI sent for CT which showed colitis; ulcerations in descending colon, was believed to Crohn’s. The path report from biopsies of the sigmoid colon described moderate active chronic colitis and erosion, with granulation tissue and necroinflammatory exudate consistent with ulcer edge. \par  Was put on steroids and Mesalamine and his sx improved. Was having 6 diarrhea movements a day, now improved. Colonoscopy showed colitis only in descending colon. Started on Humira in mid-August. 3 days after first dose, left knee swelled up, improved with Advil. 3 days after second dose, ankles swelled up, managed with 4 days of steroids. States his joints were red/hot. Has not happened with recent doses. In September, he noticed early satiety and dyspepsia. Started on famotidine and cut back on the Mesalamine. Also started on oral iron, but he developed gastritis so he stopped.\par  His stool is now soft and formed, denies any blood. States his renal function is normal, denies protein in the urine. Has had a weight loss of 25 lbs since June. Reports his BP is low; gets lightheaded when getting up too fast. Noticed pitting edema in his legs, voice hoarseness, and accelerated hair loss. Has also has 2 episodes of mouth ulcers. Denies any adenopathy. \par Hx of cutaneous mastocytosis, dx 4 years ago, lesions on both thighs and flanks, have gone away since starting on steroids. \par Has hx of Raynaud’s phenomenon. Denies any hx of thyroid disease.\par \par Endoscopy 10/30/19:\par -Small hiatal hernia\par -Friable gastric mucosa. Biopsied.\par -Erythematous duodenopathy, Biopsied. \par \par Pathology 10/30/19:\par A. Duodenum, second part, biopsy:\par -Duodenal mucosa with amyloidosis \par B. Stomach, Antrum, rule out maltoma, biopsy:\par -Superficial fragments of gastric antral mucosa with amyloidosis and active chronic gastritis with surface erosions.\par C. Stomach, body, rule out maltoma, biopsy:\par -Superficial fragments of gastric mucosa with amyloidosis, foveolar hyperplasia and increased chronic inflammation. \par D. Stomach, fundus, rule out maltoma, biopsy:\par -Superficial fragments of gastric mucosa with amyloidosis, foveolar hyperplasia and increased chronic inflammation. \par \par Pathology 7/17/19:\par A. Colon, sigmoid, biopsy:\par -Moderate active chronic colitis and erosion.\par -granulation tissue and necroinflammatory exudate consistent with an ulcer edge/bed.\par B. Colon, rectosigmoid, biopsy:\par -Colonic mucosa within normal limits \par \par The progression in dyspepsia led to an upper endoscopy with Dr. Parks on 10/30/19. In the second portion of the duodenum, biopsy revealed amyloidosis, confirmed with Congo red stain.Superficial fragments of gastric antral mucosa showed the same findings. IgM immunostain is positive in areas of amyloid deposition, with kappa LC predominance. The stomach was noted to have impaired distensibility on endoscopy.\par Renal function has been normal, with BUN 9/creatinine 0.9.\par Cystoscopy was performed which showed prominent blood vessels. In retrospect this finding may also be consistent with bladder involvement with amyloid.\par  \par \par \par \par Interval History: Feeling well today. \par Plantar neuropathy is stable. \par C/o blepharitis and fatigue.\par Has nausea around day 3-4 after treatments.\par States his echo and stress test were normal. \par BP is improving. \par Has gained 12 lbs. with definite improvement in GI symptoms since starting CyBorD regimen.\par Kappa FLC now down to 7.13 mg/dl. \par \par  \par  [de-identified] : Jose looks great, and has gained muscle mass with exercise.\par His kappa light. chains continue to decline.\par We are now planning to finish the seventh cycle of CyBorD, with transplant tentatively scheduled for June 29.

## 2020-05-28 NOTE — ASSESSMENT
[FreeTextEntry1] : Systemic amyloidosis, with excellent response to CyBorD, now on cycle 7 - stem cell transplant scheduled for 6/29/20.\par \par 15 minutes were spent reviewing past history, discussing current clinical status, and planning future care.

## 2020-06-02 ENCOUNTER — RESULT REVIEW (OUTPATIENT)
Age: 60
End: 2020-06-02

## 2020-06-02 ENCOUNTER — APPOINTMENT (OUTPATIENT)
Age: 60
End: 2020-06-02

## 2020-06-02 LAB
BASOPHILS # BLD AUTO: 0 K/UL — SIGNIFICANT CHANGE UP (ref 0–0.2)
BASOPHILS NFR BLD AUTO: 0.3 % — SIGNIFICANT CHANGE UP (ref 0–2)
EOSINOPHIL # BLD AUTO: 0 K/UL — SIGNIFICANT CHANGE UP (ref 0–0.5)
EOSINOPHIL NFR BLD AUTO: 0.2 % — SIGNIFICANT CHANGE UP (ref 0–6)
HCT VFR BLD CALC: 33 % — LOW (ref 39–50)
HGB BLD-MCNC: 10.2 G/DL — LOW (ref 13–17)
LYMPHOCYTES # BLD AUTO: 0.3 K/UL — LOW (ref 1–3.3)
LYMPHOCYTES # BLD AUTO: 3.4 % — LOW (ref 13–44)
MCHC RBC-ENTMCNC: 23.6 PG — LOW (ref 27–34)
MCHC RBC-ENTMCNC: 31 G/DL — LOW (ref 32–36)
MCV RBC AUTO: 76 FL — LOW (ref 80–100)
MONOCYTES # BLD AUTO: 0.1 K/UL — SIGNIFICANT CHANGE UP (ref 0–0.9)
MONOCYTES NFR BLD AUTO: 0.8 % — LOW (ref 2–14)
NEUTROPHILS # BLD AUTO: 7.9 K/UL — HIGH (ref 1.8–7.4)
NEUTROPHILS NFR BLD AUTO: 95.3 % — HIGH (ref 43–77)
PLATELET # BLD AUTO: 146 K/UL — LOW (ref 150–400)
RBC # BLD: 4.35 M/UL — SIGNIFICANT CHANGE UP (ref 4.2–5.8)
RBC # FLD: 13.8 % — SIGNIFICANT CHANGE UP (ref 10.3–14.5)
WBC # BLD: 8.3 K/UL — SIGNIFICANT CHANGE UP (ref 3.8–10.5)
WBC # FLD AUTO: 8.3 K/UL — SIGNIFICANT CHANGE UP (ref 3.8–10.5)

## 2020-06-08 ENCOUNTER — RESULT REVIEW (OUTPATIENT)
Age: 60
End: 2020-06-08

## 2020-06-08 ENCOUNTER — APPOINTMENT (OUTPATIENT)
Age: 60
End: 2020-06-08

## 2020-06-08 LAB
BASOPHILS # BLD AUTO: 0.1 K/UL — SIGNIFICANT CHANGE UP (ref 0–0.2)
BASOPHILS NFR BLD AUTO: 1.3 % — SIGNIFICANT CHANGE UP (ref 0–2)
EOSINOPHIL # BLD AUTO: 0.2 K/UL — SIGNIFICANT CHANGE UP (ref 0–0.5)
EOSINOPHIL NFR BLD AUTO: 4.1 % — SIGNIFICANT CHANGE UP (ref 0–6)
HCT VFR BLD CALC: 29.8 % — LOW (ref 39–50)
HGB BLD-MCNC: 9.8 G/DL — LOW (ref 13–17)
LYMPHOCYTES # BLD AUTO: 0.7 K/UL — LOW (ref 1–3.3)
LYMPHOCYTES # BLD AUTO: 15.9 % — SIGNIFICANT CHANGE UP (ref 13–44)
MCHC RBC-ENTMCNC: 24.2 PG — LOW (ref 27–34)
MCHC RBC-ENTMCNC: 32.8 G/DL — SIGNIFICANT CHANGE UP (ref 32–36)
MCV RBC AUTO: 73.6 FL — LOW (ref 80–100)
MONOCYTES # BLD AUTO: 0.4 K/UL — SIGNIFICANT CHANGE UP (ref 0–0.9)
MONOCYTES NFR BLD AUTO: 8.8 % — SIGNIFICANT CHANGE UP (ref 2–14)
NEUTROPHILS # BLD AUTO: 2.9 K/UL — SIGNIFICANT CHANGE UP (ref 1.8–7.4)
NEUTROPHILS NFR BLD AUTO: 70 % — SIGNIFICANT CHANGE UP (ref 43–77)
PLATELET # BLD AUTO: 128 K/UL — LOW (ref 150–400)
RBC # BLD: 4.06 M/UL — LOW (ref 4.2–5.8)
RBC # FLD: 14.2 % — SIGNIFICANT CHANGE UP (ref 10.3–14.5)
WBC # BLD: 4.1 K/UL — SIGNIFICANT CHANGE UP (ref 3.8–10.5)
WBC # FLD AUTO: 4.1 K/UL — SIGNIFICANT CHANGE UP (ref 3.8–10.5)

## 2020-06-15 ENCOUNTER — APPOINTMENT (OUTPATIENT)
Age: 60
End: 2020-06-15

## 2020-06-15 ENCOUNTER — RESULT REVIEW (OUTPATIENT)
Age: 60
End: 2020-06-15

## 2020-06-15 LAB
BASOPHILS # BLD AUTO: 0 K/UL — SIGNIFICANT CHANGE UP (ref 0–0.2)
BASOPHILS NFR BLD AUTO: 0.5 % — SIGNIFICANT CHANGE UP (ref 0–2)
EOSINOPHIL # BLD AUTO: 0.1 K/UL — SIGNIFICANT CHANGE UP (ref 0–0.5)
EOSINOPHIL NFR BLD AUTO: 1.2 % — SIGNIFICANT CHANGE UP (ref 0–6)
HCT VFR BLD CALC: 33.6 % — LOW (ref 39–50)
HGB BLD-MCNC: 10.6 G/DL — LOW (ref 13–17)
LYMPHOCYTES # BLD AUTO: 0.5 K/UL — LOW (ref 1–3.3)
LYMPHOCYTES # BLD AUTO: 6.8 % — LOW (ref 13–44)
MCHC RBC-ENTMCNC: 23.6 PG — LOW (ref 27–34)
MCHC RBC-ENTMCNC: 31.3 G/DL — LOW (ref 32–36)
MCV RBC AUTO: 75.2 FL — LOW (ref 80–100)
MONOCYTES # BLD AUTO: 0.2 K/UL — SIGNIFICANT CHANGE UP (ref 0–0.9)
MONOCYTES NFR BLD AUTO: 2.9 % — SIGNIFICANT CHANGE UP (ref 2–14)
NEUTROPHILS # BLD AUTO: 6.2 K/UL — SIGNIFICANT CHANGE UP (ref 1.8–7.4)
NEUTROPHILS NFR BLD AUTO: 88.5 % — HIGH (ref 43–77)
PLATELET # BLD AUTO: 132 K/UL — LOW (ref 150–400)
RBC # BLD: 4.47 M/UL — SIGNIFICANT CHANGE UP (ref 4.2–5.8)
RBC # FLD: 14.5 % — SIGNIFICANT CHANGE UP (ref 10.3–14.5)
WBC # BLD: 7 K/UL — SIGNIFICANT CHANGE UP (ref 3.8–10.5)
WBC # FLD AUTO: 7 K/UL — SIGNIFICANT CHANGE UP (ref 3.8–10.5)

## 2020-06-21 ENCOUNTER — OUTPATIENT (OUTPATIENT)
Dept: OUTPATIENT SERVICES | Facility: HOSPITAL | Age: 60
LOS: 1 days | Discharge: HOME | End: 2020-06-21

## 2020-06-21 DIAGNOSIS — E85.9 AMYLOIDOSIS, UNSPECIFIED: ICD-10-CM

## 2020-06-22 ENCOUNTER — APPOINTMENT (OUTPATIENT)
Age: 60
End: 2020-06-22

## 2020-06-22 ENCOUNTER — APPOINTMENT (OUTPATIENT)
Dept: HEMATOLOGY ONCOLOGY | Facility: CLINIC | Age: 60
End: 2020-06-22
Payer: COMMERCIAL

## 2020-06-22 ENCOUNTER — LABORATORY RESULT (OUTPATIENT)
Age: 60
End: 2020-06-22

## 2020-06-22 PROCEDURE — 99499A: CUSTOM | Mod: NC

## 2020-06-25 ENCOUNTER — LABORATORY RESULT (OUTPATIENT)
Age: 60
End: 2020-06-25

## 2020-06-25 ENCOUNTER — RESULT REVIEW (OUTPATIENT)
Age: 60
End: 2020-06-25

## 2020-06-25 ENCOUNTER — APPOINTMENT (OUTPATIENT)
Age: 60
End: 2020-06-25

## 2020-06-25 LAB
ANISOCYTOSIS BLD QL: SLIGHT — SIGNIFICANT CHANGE UP
BASO STIPL BLD QL SMEAR: PRESENT — SIGNIFICANT CHANGE UP
BASOPHILS # BLD AUTO: 0 K/UL — SIGNIFICANT CHANGE UP (ref 0–0.2)
BURR CELLS BLD QL SMEAR: PRESENT — SIGNIFICANT CHANGE UP
EOSINOPHIL # BLD AUTO: 0.1 K/UL — SIGNIFICANT CHANGE UP (ref 0–0.5)
EOSINOPHIL NFR BLD AUTO: 6 % — SIGNIFICANT CHANGE UP (ref 0–6)
HCT VFR BLD CALC: 29.9 % — LOW (ref 39–50)
HGB BLD-MCNC: 9.4 G/DL — LOW (ref 13–17)
HYPOCHROMIA BLD QL: SLIGHT — SIGNIFICANT CHANGE UP
LG PLATELETS BLD QL AUTO: SLIGHT — SIGNIFICANT CHANGE UP
LYMPHOCYTES # BLD AUTO: 0.6 K/UL — LOW (ref 1–3.3)
LYMPHOCYTES # BLD AUTO: 18 % — SIGNIFICANT CHANGE UP (ref 13–44)
MACROCYTES BLD QL: SLIGHT — SIGNIFICANT CHANGE UP
MCHC RBC-ENTMCNC: 23.6 PG — LOW (ref 27–34)
MCHC RBC-ENTMCNC: 31.5 G/DL — LOW (ref 32–36)
MCV RBC AUTO: 74.9 FL — LOW (ref 80–100)
METAMYELOCYTES # FLD: 1 % — HIGH (ref 0–0)
MICROCYTES BLD QL: SLIGHT — SIGNIFICANT CHANGE UP
MONOCYTES # BLD AUTO: 0.7 K/UL — SIGNIFICANT CHANGE UP (ref 0–0.9)
MONOCYTES NFR BLD AUTO: 10 % — SIGNIFICANT CHANGE UP (ref 2–14)
NEUTROPHILS # BLD AUTO: 2 K/UL — SIGNIFICANT CHANGE UP (ref 1.8–7.4)
NEUTROPHILS NFR BLD AUTO: 64 % — SIGNIFICANT CHANGE UP (ref 43–77)
NEUTS BAND # BLD: 1 % — SIGNIFICANT CHANGE UP (ref 0–8)
PLAT MORPH BLD: NORMAL — SIGNIFICANT CHANGE UP
PLATELET # BLD AUTO: 132 K/UL — LOW (ref 150–400)
POIKILOCYTOSIS BLD QL AUTO: SLIGHT — SIGNIFICANT CHANGE UP
POLYCHROMASIA BLD QL SMEAR: SLIGHT — SIGNIFICANT CHANGE UP
RBC # BLD: 4 M/UL — LOW (ref 4.2–5.8)
RBC # FLD: 14.7 % — HIGH (ref 10.3–14.5)
RBC BLD AUTO: ABNORMAL
SCHISTOCYTES BLD QL AUTO: SLIGHT — SIGNIFICANT CHANGE UP
WBC # BLD: 3.5 K/UL — LOW (ref 3.8–10.5)
WBC # FLD AUTO: 3.5 K/UL — LOW (ref 3.8–10.5)

## 2020-06-26 ENCOUNTER — LABORATORY RESULT (OUTPATIENT)
Age: 60
End: 2020-06-26

## 2020-06-26 ENCOUNTER — APPOINTMENT (OUTPATIENT)
Age: 60
End: 2020-06-26

## 2020-06-26 ENCOUNTER — APPOINTMENT (OUTPATIENT)
Dept: HEMATOLOGY ONCOLOGY | Facility: CLINIC | Age: 60
End: 2020-06-26
Payer: COMMERCIAL

## 2020-06-26 DIAGNOSIS — Z51.11 ENCOUNTER FOR ANTINEOPLASTIC CHEMOTHERAPY: ICD-10-CM

## 2020-06-26 LAB
ALBUMIN MFR SERPL ELPH: 67.5 %
ALBUMIN SERPL-MCNC: 3.5 G/DL
ALBUMIN/GLOB SERPL: 2.1 RATIO
ALPHA1 GLOB MFR SERPL ELPH: 4.9 %
ALPHA1 GLOB SERPL ELPH-MCNC: 0.3 G/DL
ALPHA2 GLOB MFR SERPL ELPH: 10.3 %
ALPHA2 GLOB SERPL ELPH-MCNC: 0.5 G/DL
B-GLOBULIN MFR SERPL ELPH: 10 %
B-GLOBULIN SERPL ELPH-MCNC: 0.5 G/DL
DEPRECATED KAPPA LC FREE/LAMBDA SER: 25.88 RATIO
GAMMA GLOB FLD ELPH-MCNC: 0.4 G/DL
GAMMA GLOB MFR SERPL ELPH: 7.3 %
IGA SER QL IEP: 70 MG/DL
IGG SER QL IEP: 357 MG/DL
IGM SER QL IEP: 44 MG/DL
INTERPRETATION SERPL IEP-IMP: NORMAL
KAPPA LC CSF-MCNC: 0.26 MG/DL
KAPPA LC SERPL-MCNC: 6.73 MG/DL
M PROTEIN MFR SERPL ELPH: NORMAL
M PROTEIN SPEC IFE-MCNC: NORMAL
MONOCLON BAND OBS SERPL: NORMAL
PROT SERPL-MCNC: 5.2 G/DL
PROT SERPL-MCNC: 5.2 G/DL

## 2020-06-26 PROCEDURE — 99499A: CUSTOM | Mod: NC

## 2020-06-29 ENCOUNTER — INPATIENT (INPATIENT)
Facility: HOSPITAL | Age: 60
LOS: 20 days | Discharge: HOME | DRG: 16 | End: 2020-07-20
Attending: INTERNAL MEDICINE | Admitting: INTERNAL MEDICINE
Payer: COMMERCIAL

## 2020-06-29 ENCOUNTER — APPOINTMENT (OUTPATIENT)
Age: 60
End: 2020-06-29

## 2020-06-29 VITALS
WEIGHT: 188.94 LBS | OXYGEN SATURATION: 99 % | SYSTOLIC BLOOD PRESSURE: 130 MMHG | RESPIRATION RATE: 18 BRPM | HEIGHT: 68.9 IN | HEART RATE: 75 BPM | DIASTOLIC BLOOD PRESSURE: 76 MMHG | TEMPERATURE: 99 F

## 2020-06-29 DIAGNOSIS — Z01.818 ENCOUNTER FOR OTHER PREPROCEDURAL EXAMINATION: ICD-10-CM

## 2020-06-29 DIAGNOSIS — E85.9 AMYLOIDOSIS, UNSPECIFIED: ICD-10-CM

## 2020-06-29 DIAGNOSIS — Z29.9 ENCOUNTER FOR PROPHYLACTIC MEASURES, UNSPECIFIED: ICD-10-CM

## 2020-06-29 DIAGNOSIS — Z76.82 AWAITING ORGAN TRANSPLANT STATUS: ICD-10-CM

## 2020-06-29 LAB
ACANTHOCYTES BLD QL SMEAR: SLIGHT — SIGNIFICANT CHANGE UP
ALBUMIN SERPL ELPH-MCNC: 3.8 G/DL — SIGNIFICANT CHANGE UP (ref 3.3–5)
ALP SERPL-CCNC: 51 U/L — SIGNIFICANT CHANGE UP (ref 40–120)
ALT FLD-CCNC: 11 U/L — SIGNIFICANT CHANGE UP (ref 10–45)
ANION GAP SERPL CALC-SCNC: 7 MMOL/L — SIGNIFICANT CHANGE UP (ref 5–17)
ANISOCYTOSIS BLD QL: SLIGHT — SIGNIFICANT CHANGE UP
APPEARANCE UR: CLEAR — SIGNIFICANT CHANGE UP
APTT BLD: 29.7 SEC — SIGNIFICANT CHANGE UP (ref 27.5–35.5)
AST SERPL-CCNC: 15 U/L — SIGNIFICANT CHANGE UP (ref 10–40)
BASOPHILS # BLD AUTO: 0.06 K/UL — SIGNIFICANT CHANGE UP (ref 0–0.2)
BASOPHILS NFR BLD AUTO: 1.8 % — SIGNIFICANT CHANGE UP (ref 0–2)
BILIRUB DIRECT SERPL-MCNC: 0.1 MG/DL — SIGNIFICANT CHANGE UP (ref 0–0.2)
BILIRUB INDIRECT FLD-MCNC: 0.5 MG/DL — SIGNIFICANT CHANGE UP (ref 0.2–1)
BILIRUB SERPL-MCNC: 0.6 MG/DL — SIGNIFICANT CHANGE UP (ref 0.2–1.2)
BILIRUB UR-MCNC: NEGATIVE — SIGNIFICANT CHANGE UP
BLD GP AB SCN SERPL QL: NEGATIVE — SIGNIFICANT CHANGE UP
BUN SERPL-MCNC: 12 MG/DL — SIGNIFICANT CHANGE UP (ref 7–23)
CALCIUM SERPL-MCNC: 8.2 MG/DL — LOW (ref 8.4–10.5)
CHLORIDE SERPL-SCNC: 108 MMOL/L — SIGNIFICANT CHANGE UP (ref 96–108)
CO2 SERPL-SCNC: 27 MMOL/L — SIGNIFICANT CHANGE UP (ref 22–31)
COLOR SPEC: SIGNIFICANT CHANGE UP
CREAT SERPL-MCNC: 0.78 MG/DL — SIGNIFICANT CHANGE UP (ref 0.5–1.3)
DACRYOCYTES BLD QL SMEAR: SLIGHT — SIGNIFICANT CHANGE UP
DIFF PNL FLD: NEGATIVE — SIGNIFICANT CHANGE UP
ELLIPTOCYTES BLD QL SMEAR: SLIGHT — SIGNIFICANT CHANGE UP
EOSINOPHIL # BLD AUTO: 0.12 K/UL — SIGNIFICANT CHANGE UP (ref 0–0.5)
EOSINOPHIL NFR BLD AUTO: 3.6 % — SIGNIFICANT CHANGE UP (ref 0–6)
FIBRINOGEN PPP-MCNC: 365 MG/DL — SIGNIFICANT CHANGE UP (ref 350–510)
GIANT PLATELETS BLD QL SMEAR: PRESENT — SIGNIFICANT CHANGE UP
GLUCOSE SERPL-MCNC: 80 MG/DL — SIGNIFICANT CHANGE UP (ref 70–99)
GLUCOSE UR QL: NEGATIVE — SIGNIFICANT CHANGE UP
HCT VFR BLD CALC: 27.3 % — LOW (ref 39–50)
HGB BLD-MCNC: 8.7 G/DL — LOW (ref 13–17)
INR BLD: 1.14 RATIO — SIGNIFICANT CHANGE UP (ref 0.88–1.16)
KETONES UR-MCNC: NEGATIVE — SIGNIFICANT CHANGE UP
LDH SERPL L TO P-CCNC: 189 U/L — SIGNIFICANT CHANGE UP (ref 50–242)
LEUKOCYTE ESTERASE UR-ACNC: NEGATIVE — SIGNIFICANT CHANGE UP
LYMPHOCYTES # BLD AUTO: 0.58 K/UL — LOW (ref 1–3.3)
LYMPHOCYTES # BLD AUTO: 17.8 % — SIGNIFICANT CHANGE UP (ref 13–44)
MAGNESIUM SERPL-MCNC: 2.1 MG/DL — SIGNIFICANT CHANGE UP (ref 1.6–2.6)
MANUAL SMEAR VERIFICATION: SIGNIFICANT CHANGE UP
MCHC RBC-ENTMCNC: 23.9 PG — LOW (ref 27–34)
MCHC RBC-ENTMCNC: 31.9 GM/DL — LOW (ref 32–36)
MCV RBC AUTO: 75 FL — LOW (ref 80–100)
MICROCYTES BLD QL: SLIGHT — SIGNIFICANT CHANGE UP
MONOCYTES # BLD AUTO: 0.32 K/UL — SIGNIFICANT CHANGE UP (ref 0–0.9)
MONOCYTES NFR BLD AUTO: 9.8 % — SIGNIFICANT CHANGE UP (ref 2–14)
NEUTROPHILS # BLD AUTO: 2.16 K/UL — SIGNIFICANT CHANGE UP (ref 1.8–7.4)
NEUTROPHILS NFR BLD AUTO: 66.1 % — SIGNIFICANT CHANGE UP (ref 43–77)
NITRITE UR-MCNC: NEGATIVE — SIGNIFICANT CHANGE UP
PH UR: 7 — SIGNIFICANT CHANGE UP (ref 5–8)
PHOSPHATE SERPL-MCNC: 3.7 MG/DL — SIGNIFICANT CHANGE UP (ref 2.5–4.5)
PLAT MORPH BLD: NORMAL — SIGNIFICANT CHANGE UP
PLATELET # BLD AUTO: 151 K/UL — SIGNIFICANT CHANGE UP (ref 150–400)
POIKILOCYTOSIS BLD QL AUTO: SLIGHT — SIGNIFICANT CHANGE UP
POLYCHROMASIA BLD QL SMEAR: SLIGHT — SIGNIFICANT CHANGE UP
POTASSIUM SERPL-MCNC: 3.8 MMOL/L — SIGNIFICANT CHANGE UP (ref 3.5–5.3)
POTASSIUM SERPL-SCNC: 3.8 MMOL/L — SIGNIFICANT CHANGE UP (ref 3.5–5.3)
PROT SERPL-MCNC: 5.5 G/DL — LOW (ref 6–8.3)
PROT UR-MCNC: NEGATIVE — SIGNIFICANT CHANGE UP
PROTHROM AB SERPL-ACNC: 12.9 SEC — SIGNIFICANT CHANGE UP (ref 10.6–13.6)
RBC # BLD: 3.64 M/UL — LOW (ref 4.2–5.8)
RBC # BLD: 3.64 M/UL — LOW (ref 4.2–5.8)
RBC # FLD: 15.9 % — HIGH (ref 10.3–14.5)
RBC BLD AUTO: ABNORMAL
RETICS #: 58.2 K/UL — SIGNIFICANT CHANGE UP (ref 25–125)
RETICS/RBC NFR: 1.6 % — SIGNIFICANT CHANGE UP (ref 0.5–2.5)
RH IG SCN BLD-IMP: POSITIVE — SIGNIFICANT CHANGE UP
SCHISTOCYTES BLD QL AUTO: SLIGHT — SIGNIFICANT CHANGE UP
SODIUM SERPL-SCNC: 142 MMOL/L — SIGNIFICANT CHANGE UP (ref 135–145)
SP GR SPEC: 1.01 — SIGNIFICANT CHANGE UP (ref 1.01–1.02)
UROBILINOGEN FLD QL: NEGATIVE — SIGNIFICANT CHANGE UP
VARIANT LYMPHS # BLD: 0.9 % — SIGNIFICANT CHANGE UP (ref 0–6)
WBC # BLD: 3.27 K/UL — LOW (ref 3.8–10.5)
WBC # FLD AUTO: 3.27 K/UL — LOW (ref 3.8–10.5)

## 2020-06-29 PROCEDURE — 76937 US GUIDE VASCULAR ACCESS: CPT | Mod: 26

## 2020-06-29 PROCEDURE — 99291 CRITICAL CARE FIRST HOUR: CPT

## 2020-06-29 PROCEDURE — 36556 INSERT NON-TUNNEL CV CATH: CPT

## 2020-06-29 PROCEDURE — 77001 FLUOROGUIDE FOR VEIN DEVICE: CPT | Mod: 26

## 2020-06-29 PROCEDURE — 93010 ELECTROCARDIOGRAM REPORT: CPT

## 2020-06-29 RX ORDER — SODIUM CHLORIDE 9 MG/ML
10 INJECTION INTRAMUSCULAR; INTRAVENOUS; SUBCUTANEOUS
Refills: 0 | Status: DISCONTINUED | OUTPATIENT
Start: 2020-06-29 | End: 2020-07-20

## 2020-06-29 RX ORDER — LIDOCAINE AND PRILOCAINE CREAM 25; 25 MG/G; MG/G
1 CREAM TOPICAL DAILY
Refills: 0 | Status: DISCONTINUED | OUTPATIENT
Start: 2020-07-02 | End: 2020-07-20

## 2020-06-29 RX ORDER — FOLIC ACID 0.8 MG
1 TABLET ORAL DAILY
Refills: 0 | Status: DISCONTINUED | OUTPATIENT
Start: 2020-06-29 | End: 2020-07-20

## 2020-06-29 RX ORDER — ACETAMINOPHEN 500 MG
650 TABLET ORAL EVERY 6 HOURS
Refills: 0 | Status: DISCONTINUED | OUTPATIENT
Start: 2020-06-29 | End: 2020-07-20

## 2020-06-29 RX ORDER — MELPHALAN HYDROCHLORIDE 50 MG
190 KIT INTRAVENOUS ONCE
Refills: 0 | Status: COMPLETED | OUTPATIENT
Start: 2020-06-29 | End: 2020-06-29

## 2020-06-29 RX ORDER — DIPHENHYDRAMINE HCL 50 MG
25 CAPSULE ORAL EVERY 4 HOURS
Refills: 0 | Status: DISCONTINUED | OUTPATIENT
Start: 2020-06-29 | End: 2020-07-15

## 2020-06-29 RX ORDER — APREPITANT 80 MG/1
125 CAPSULE ORAL ONCE
Refills: 0 | Status: COMPLETED | OUTPATIENT
Start: 2020-06-29 | End: 2020-06-29

## 2020-06-29 RX ORDER — SODIUM CHLORIDE 9 MG/ML
1000 INJECTION, SOLUTION INTRAVENOUS
Refills: 0 | Status: DISCONTINUED | OUTPATIENT
Start: 2020-06-29 | End: 2020-07-03

## 2020-06-29 RX ORDER — FILGRASTIM 480MCG/1.6
480 VIAL (ML) INJECTION EVERY 24 HOURS
Refills: 0 | Status: DISCONTINUED | OUTPATIENT
Start: 2020-07-02 | End: 2020-07-15

## 2020-06-29 RX ORDER — FUROSEMIDE 40 MG
20 TABLET ORAL EVERY 24 HOURS
Refills: 0 | Status: DISCONTINUED | OUTPATIENT
Start: 2020-06-29 | End: 2020-07-20

## 2020-06-29 RX ORDER — SENNA PLUS 8.6 MG/1
1 TABLET ORAL AT BEDTIME
Refills: 0 | Status: DISCONTINUED | OUTPATIENT
Start: 2020-06-29 | End: 2020-07-08

## 2020-06-29 RX ORDER — FLUCONAZOLE 150 MG/1
400 TABLET ORAL DAILY
Refills: 0 | Status: DISCONTINUED | OUTPATIENT
Start: 2020-06-29 | End: 2020-07-20

## 2020-06-29 RX ORDER — HYDROCORTISONE 20 MG
50 TABLET ORAL ONCE
Refills: 0 | Status: COMPLETED | OUTPATIENT
Start: 2020-07-02 | End: 2020-07-02

## 2020-06-29 RX ORDER — ACETAMINOPHEN 500 MG
650 TABLET ORAL ONCE
Refills: 0 | Status: COMPLETED | OUTPATIENT
Start: 2020-07-02 | End: 2020-07-02

## 2020-06-29 RX ORDER — ONDANSETRON 8 MG/1
8 TABLET, FILM COATED ORAL ONCE
Refills: 0 | Status: COMPLETED | OUTPATIENT
Start: 2020-06-29 | End: 2020-06-29

## 2020-06-29 RX ORDER — HEPARIN SODIUM 5000 [USP'U]/ML
357 INJECTION INTRAVENOUS; SUBCUTANEOUS
Qty: 25000 | Refills: 0 | Status: DISCONTINUED | OUTPATIENT
Start: 2020-06-29 | End: 2020-07-20

## 2020-06-29 RX ORDER — SODIUM BICARBONATE 1 MEQ/ML
10 SYRINGE (ML) INTRAVENOUS
Refills: 0 | Status: DISCONTINUED | OUTPATIENT
Start: 2020-06-29 | End: 2020-07-20

## 2020-06-29 RX ORDER — DIPHENHYDRAMINE HCL 50 MG
25 CAPSULE ORAL ONCE
Refills: 0 | Status: COMPLETED | OUTPATIENT
Start: 2020-07-02 | End: 2020-07-02

## 2020-06-29 RX ORDER — MELPHALAN HYDROCHLORIDE 50 MG
190 KIT INTRAVENOUS ONCE
Refills: 0 | Status: COMPLETED | OUTPATIENT
Start: 2020-06-30 | End: 2020-06-30

## 2020-06-29 RX ORDER — PALIFERMIN 6.25 MG
5100 VIAL (EA) INTRAVENOUS EVERY 24 HOURS
Refills: 0 | Status: COMPLETED | OUTPATIENT
Start: 2020-07-02 | End: 2020-07-04

## 2020-06-29 RX ORDER — SODIUM CHLORIDE 9 MG/ML
1000 INJECTION INTRAMUSCULAR; INTRAVENOUS; SUBCUTANEOUS
Refills: 0 | Status: DISCONTINUED | OUTPATIENT
Start: 2020-06-29 | End: 2020-07-09

## 2020-06-29 RX ORDER — MELPHALAN HYDROCHLORIDE 50 MG
190 KIT INTRAVENOUS DAILY
Refills: 0 | Status: DISCONTINUED | OUTPATIENT
Start: 2020-06-29 | End: 2020-06-29

## 2020-06-29 RX ORDER — DEXAMETHASONE 0.5 MG/5ML
20 ELIXIR ORAL EVERY 24 HOURS
Refills: 0 | Status: COMPLETED | OUTPATIENT
Start: 2020-06-29 | End: 2020-06-30

## 2020-06-29 RX ORDER — ONDANSETRON 8 MG/1
8 TABLET, FILM COATED ORAL EVERY 8 HOURS
Refills: 0 | Status: COMPLETED | OUTPATIENT
Start: 2020-06-29 | End: 2020-07-01

## 2020-06-29 RX ORDER — METOCLOPRAMIDE HCL 10 MG
10 TABLET ORAL EVERY 6 HOURS
Refills: 0 | Status: DISCONTINUED | OUTPATIENT
Start: 2020-06-29 | End: 2020-07-20

## 2020-06-29 RX ORDER — ACYCLOVIR SODIUM 500 MG
400 VIAL (EA) INTRAVENOUS EVERY 8 HOURS
Refills: 0 | Status: DISCONTINUED | OUTPATIENT
Start: 2020-06-29 | End: 2020-07-20

## 2020-06-29 RX ORDER — URSODIOL 250 MG/1
300 TABLET, FILM COATED ORAL
Refills: 0 | Status: DISCONTINUED | OUTPATIENT
Start: 2020-06-29 | End: 2020-07-20

## 2020-06-29 RX ORDER — CHLORHEXIDINE GLUCONATE 213 G/1000ML
1 SOLUTION TOPICAL
Refills: 0 | Status: DISCONTINUED | OUTPATIENT
Start: 2020-06-29 | End: 2020-07-20

## 2020-06-29 RX ORDER — SODIUM CHLORIDE 9 MG/ML
1000 INJECTION, SOLUTION INTRAVENOUS
Refills: 0 | Status: DISCONTINUED | OUTPATIENT
Start: 2020-07-01 | End: 2020-07-03

## 2020-06-29 RX ORDER — PANTOPRAZOLE SODIUM 20 MG/1
40 TABLET, DELAYED RELEASE ORAL
Refills: 0 | Status: DISCONTINUED | OUTPATIENT
Start: 2020-06-29 | End: 2020-07-20

## 2020-06-29 RX ORDER — APREPITANT 80 MG/1
80 CAPSULE ORAL EVERY 24 HOURS
Refills: 0 | Status: COMPLETED | OUTPATIENT
Start: 2020-06-30 | End: 2020-07-02

## 2020-06-29 RX ORDER — CLOTRIMAZOLE 10 MG
1 TROCHE MUCOUS MEMBRANE
Refills: 0 | Status: DISCONTINUED | OUTPATIENT
Start: 2020-06-29 | End: 2020-07-20

## 2020-06-29 RX ADMIN — Medication 110 MILLIGRAM(S): at 19:01

## 2020-06-29 RX ADMIN — FLUCONAZOLE 400 MILLIGRAM(S): 150 TABLET ORAL at 15:25

## 2020-06-29 RX ADMIN — Medication 1 LOZENGE: at 19:58

## 2020-06-29 RX ADMIN — Medication 1 TABLET(S): at 17:23

## 2020-06-29 RX ADMIN — URSODIOL 300 MILLIGRAM(S): 250 TABLET, FILM COATED ORAL at 17:23

## 2020-06-29 RX ADMIN — Medication 400 MILLIGRAM(S): at 21:31

## 2020-06-29 RX ADMIN — Medication 10 MILLILITER(S): at 15:30

## 2020-06-29 RX ADMIN — ONDANSETRON 8 MILLIGRAM(S): 8 TABLET, FILM COATED ORAL at 17:06

## 2020-06-29 RX ADMIN — Medication 1 LOZENGE: at 15:30

## 2020-06-29 RX ADMIN — Medication 1 MILLIGRAM(S): at 15:24

## 2020-06-29 RX ADMIN — ONDANSETRON 8 MILLIGRAM(S): 8 TABLET, FILM COATED ORAL at 21:31

## 2020-06-29 RX ADMIN — Medication 10 MILLILITER(S): at 19:58

## 2020-06-29 RX ADMIN — Medication 1 TABLET(S): at 15:25

## 2020-06-29 RX ADMIN — MELPHALAN HYDROCHLORIDE 1076 MILLIGRAM(S): KIT INTRAVENOUS at 19:58

## 2020-06-29 RX ADMIN — APREPITANT 125 MILLIGRAM(S): 80 CAPSULE ORAL at 15:26

## 2020-06-29 NOTE — H&P ADULT - ASSESSMENT
59 year old male with amyloidosis, admitted for autologous pbsct with high dose Melphalan prep regimen

## 2020-06-29 NOTE — PROCEDURE NOTE - ADDITIONAL PROCEDURE DETAILS
S/P RIGHT IJ TLC CV CATHETER PLACEMENT.  PT TOLERATED THE PROCEDURE WELL. HEMOSTASIS SECURE AND VSS.  DRESSING CLEAN, DRY, INTACT.  CATHETER TIP CONFIRMED IN THE SVC UNDER FLUOROSCOPY.  OK TO USE RIJ TLC CV CATHETER IMMEDIATELY.

## 2020-06-29 NOTE — H&P ADULT - PROBLEM SELECTOR PLAN 1
1. Admit to BMTU   2. 3 hours prior to Melphalan start hydration: D5NS at 200ml /hr and continue 24 hours post Melphalan infusion  3. Day – 3 & day -2 - Melphalan 100mg/m2  IV   4. Strict I&O, daily weights, prn diuresis   5. Day -1 rest day. At 2200 on day – 1, start transplant hydration 1/2NS + 50mEq NaHCO3- (may substitute [id number] if bicarb not available)  6. Day 0 – infuse HPC product. 4 hours after infusion of cells start Zarxio 5 micrograms / kg (actual weight) . Continue through engraftment.   7. On day 0, + 1, + 2-give Kepivance 60micrograms / kg (actual weight) 1. Admit to BMTU   2. -3 hours prior to Melphalan start hydration: D5NS at 200ml /hr and continue 24 hours post Melphalan infusion  3. Day – 3 & day -2 - Melphalan 100mg/m2  IV   4. Strict I&O, daily weights, prn diuresis   5. Day -1 rest day. At 2200 on day – 1, start transplant hydration 1/2NS + 50mEq NaHCO3- (may substitute [id number] if bicarb not available)  6. Day 0 – infuse HPC product. 4 hours after infusion of cells start Zarxio 5 micrograms / kg (actual weight) . Continue through engraftment.   7. On day 0, + 1, + 2-give Kepivance 60micrograms / kg (actual weight)

## 2020-06-29 NOTE — PROGRESS NOTE ADULT - SUBJECTIVE AND OBJECTIVE BOX
Interventional Radiology     59y Male with PMH as below with amyloidosis planned for PBSCT for treatment.    PAST MEDICAL & SURGICAL HISTORY:  Cutaneous mastocytosis  Hepatitis C  Amyloidosis    Allergies  No Known Allergies    Labs:    CBC for Oncology (06.25.20 @ 09:37)    WBC Count: 3.5 K/uL    RBC Count: 4.00 M/uL    Hemoglobin: 9.4 g/dL    Hematocrit: 29.9 %    Mean Cell Volume: 74.9 fl    Mean Cell Hemoglobin: 23.6 pg    Mean Cell Hemoglobin Conc: 31.5 g/dL    Red Cell Distrib Width: 14.7 %    Platelet Count - Automated: 132 K/uL    6/25/20  INR 0.99, PT 11.3, PTT 29.1    6/25/20  NA+143, K+ 4.4, Chlor 107, CO2 26, BUN 14, Creat 0.90, Gluc 97 Interventional Radiology     59y Male with PMH as below with amyloidosis planned for PBSCT for treatment.  Pt previously had temporary CV catheter placement done on [facility] on 3/23/20.    PAST MEDICAL & SURGICAL HISTORY:  Cutaneous mastocytosis  Hepatitis C  Amyloidosis    Allergies  No Known Allergies    Labs:    CBC for Oncology (06.25.20 @ 09:37)    WBC Count: 3.5 K/uL    RBC Count: 4.00 M/uL    Hemoglobin: 9.4 g/dL    Hematocrit: 29.9 %    Mean Cell Volume: 74.9 fl    Mean Cell Hemoglobin: 23.6 pg    Mean Cell Hemoglobin Conc: 31.5 g/dL    Red Cell Distrib Width: 14.7 %    Platelet Count - Automated: 132 K/uL    6/25/20  INR 0.99, PT 11.3, PTT 29.1    6/25/20  NA+143, K+ 4.4, Chlor 107, CO2 26, BUN 14, Creat 0.90, Gluc 97

## 2020-06-29 NOTE — H&P ADULT - PROBLEM SELECTOR PLAN 3
1. VOD prophylaxis - low dose heparin gtt (dosed at 100 units / kg / day), glutamine supplementation, Actigall BID   2. PCP prophylaxis - Bactrim DS through day -2    3. Antiviral prophylaxis - Acyclovir 400mg po TID to start day -1   4. Antifungal prophylaxis- Diflucan 400 mg po daily.  5.. GI prophylaxis - Protonix po QD   6. Antibacterial prophylaxis - when ANC < 1000, start Cipro 500mg po BID. If becomes febrile, pan cx, CXR and change Cipro to Cefepime 2g IV q 8 hours. Continue until count recovery  7. Kepivance for prevention of mucositis- days 0, +1, +2  8. Aggressive mouth care and skin care as per protocol 1. VOD prophylaxis - low dose heparin gtt (dosed at 100 units / kg / day), glutamine supplementation, Actigall BID   2. PCP prophylaxis - Bactrim DS through day -2    3. Antiviral prophylaxis - Acyclovir 400mg po TID    4. Antifungal prophylaxis- Diflucan 400 mg po daily.  5.. GI prophylaxis - Protonix po QD   6. Antibacterial prophylaxis - when ANC < 1000, start Cipro 500mg po BID. If becomes febrile, pan cx, CXR and change Cipro to Cefepime 2g IV q 8 hours. Continue until count recovery  7. Kepivance for prevention of mucositis- days 0, +1, +2  8. Aggressive mouth care and skin care as per protocol

## 2020-06-29 NOTE — H&P ADULT - HISTORY OF PRESENT ILLNESS
This is a 59 year old male with a medical history of amyloidosis, hepatitis C, dyspepsia, hematuria, colitis, Raynaud's phenomenon, and cutaneous mastocytosis who is admitted for an autologous peripheral blood stem cell transplant with high dose melphalan preparative regimen. He was initially referred by Dr. Joel Wells. The amyloid history is as follows: he initially experienced joint pain in 2014, and his symptoms were managed with Advil. In 2016, his LFTs were elevated and he was diagnosed with hepatitis C which was treated with Harvoni. He also experienced hematuria and bloody stool. A biopsy of his colon showed chronic colitis with erosions. He was treated with steroids and Mesalamine, and was later started on Humira in mid August 2016. After his first dose of Humira, he experienced left knee swelling, alleviated with Advil. After the second dose of Humira, his ankles swelled, and he was placed on steroids. He also was started on Famotidine for early satiety and dyspepsia. The blood stool subsided. The dyspepsia progressed, and an upper endoscopy was performed by Dr. Parks on 10/30/19. A biopsy of the second portion of the duodenum showed amyloidosis confirmed with congo red stain, IgM immunostain was positive in areas of amyloid deposition, with kappa LC predominance. The stomach was noted to have impaired distensibility on endoscopy. His renal function remained normal. A cystoscopy performed for hematuria showed prominent blood vessels. The amyloid was treated with Velcade and Cybor D. He has no complaints on admission. This is a 59 year old male with a medical history of amyloidosis, hepatitis C, dyspepsia, hematuria, colitis, Raynaud's phenomenon, and cutaneous mastocytosis who is admitted for an autologous peripheral blood stem cell transplant with high dose melphalan preparative regimen. He was initially referred by Dr. Joel Wells. The amyloid history is as follows: he initially experienced joint pain in 2014, and his symptoms were managed with Advil. In 2016, his LFTs were elevated and he was diagnosed with hepatitis C which was treated with Harvoni. He also experienced hematuria and bloody stool. A biopsy of his colon showed chronic colitis with erosions. He was treated with steroids and Mesalamine, and was later started on Humira in mid August 2016. After his first dose of Humira, he experienced left knee swelling, alleviated with Advil. After the second dose of Humira, his ankles swelled, and he was placed on steroids. He also was started on Famotidine for early satiety and dyspepsia. The blood stool subsided. The dyspepsia progressed, and an upper endoscopy was performed by Dr. Parks on 10/30/19. A biopsy of the second portion of the duodenum showed amyloidosis confirmed with congo red stain, IgM immunostain was positive in areas of amyloid deposition, with kappa LC predominance. The stomach was noted to have impaired distensibility on endoscopy. His renal function remained normal. IN 02/20 cystoscopy was performed for hematuria and showed prominent blood vessels. The amyloid was treated with Velcade and Cybor D. He has no complaints on admission. This is a 59 year old male with a medical history of amyloidosis, hepatitis C, dyspepsia, hematuria, colitis, Raynaud's phenomenon, and cutaneous mastocytosis who is admitted for an autologous peripheral blood stem cell transplant with high dose melphalan preparative regimen. He was initially referred by Dr. Joel Wells. He initially experienced joint pain in 2014, and his symptoms were managed with Advil. In 2016, his LFTs were elevated and he was diagnosed with hepatitis C which was treated with Harvoni. He also experienced hematuria and bloody stool. A biopsy of his colon showed chronic colitis with erosions. He was treated with steroids and Mesalamine, and was later started on Humira in mid August 2016. After his first dose of Humira, he experienced left knee swelling, alleviated with Advil. After the second dose of Humira, his ankles swelled, and he was placed on steroids. He also was started on Famotidine for early satiety and dyspepsia. The blood stool subsided. The dyspepsia progressed, and an upper endoscopy was performed by Dr. Parks on 10/30/19. A biopsy of the second portion of the duodenum showed amyloidosis confirmed with congo red stain, IgM immunostain was positive in areas of amyloid deposition, with kappa LC predominance. The stomach was noted to have impaired distensibility on endoscopy. His renal function remained normal. In 02/20 cystoscopy was performed for hematuria and showed prominent blood vessels. The amyloidosis was treated with CyBorD x 7 cycles, completed 2 weeks ago. He has no complaints on admission.

## 2020-06-29 NOTE — PROCEDURE NOTE - NSPROCDETAILS_GEN_ALL_CORE
sterile dressing applied/ultrasound guidance/guidewire recovered/sterile technique, catheter placed/lumen(s) aspirated and flushed

## 2020-06-30 DIAGNOSIS — Z51.5 ENCOUNTER FOR PALLIATIVE CARE: ICD-10-CM

## 2020-06-30 DIAGNOSIS — E85.9 AMYLOIDOSIS, UNSPECIFIED: ICD-10-CM

## 2020-06-30 LAB
ALBUMIN SERPL ELPH-MCNC: 3.7 G/DL — SIGNIFICANT CHANGE UP (ref 3.3–5)
ALP SERPL-CCNC: 51 U/L — SIGNIFICANT CHANGE UP (ref 40–120)
ALT FLD-CCNC: 8 U/L — LOW (ref 10–45)
ANION GAP SERPL CALC-SCNC: 9 MMOL/L — SIGNIFICANT CHANGE UP (ref 5–17)
AST SERPL-CCNC: 15 U/L — SIGNIFICANT CHANGE UP (ref 10–40)
BASOPHILS # BLD AUTO: 0 K/UL — SIGNIFICANT CHANGE UP (ref 0–0.2)
BASOPHILS NFR BLD AUTO: 0 % — SIGNIFICANT CHANGE UP (ref 0–2)
BILIRUB SERPL-MCNC: 0.4 MG/DL — SIGNIFICANT CHANGE UP (ref 0.2–1.2)
BUN SERPL-MCNC: 9 MG/DL — SIGNIFICANT CHANGE UP (ref 7–23)
CALCIUM SERPL-MCNC: 8.3 MG/DL — LOW (ref 8.4–10.5)
CHLORIDE SERPL-SCNC: 111 MMOL/L — HIGH (ref 96–108)
CO2 SERPL-SCNC: 24 MMOL/L — SIGNIFICANT CHANGE UP (ref 22–31)
CREAT SERPL-MCNC: 0.75 MG/DL — SIGNIFICANT CHANGE UP (ref 0.5–1.3)
EOSINOPHIL # BLD AUTO: 0 K/UL — SIGNIFICANT CHANGE UP (ref 0–0.5)
EOSINOPHIL NFR BLD AUTO: 0 % — SIGNIFICANT CHANGE UP (ref 0–6)
GIANT PLATELETS BLD QL SMEAR: PRESENT — SIGNIFICANT CHANGE UP
GLUCOSE SERPL-MCNC: 197 MG/DL — HIGH (ref 70–99)
HCT VFR BLD CALC: 28.1 % — LOW (ref 39–50)
HCV AB S/CO SERPL IA: 10.47 S/CO — HIGH (ref 0–0.99)
HCV AB SERPL-IMP: REACTIVE
HGB BLD-MCNC: 9.1 G/DL — LOW (ref 13–17)
IGA FLD-MCNC: 73 MG/DL — LOW (ref 84–499)
IGD SER-MCNC: <1 MG/DL — SIGNIFICANT CHANGE UP
IGE SERPL-ACNC: 2 KU/L — SIGNIFICANT CHANGE UP
IGG FLD-MCNC: 359 MG/DL — LOW (ref 610–1660)
IGM SERPL-MCNC: 41 MG/DL — SIGNIFICANT CHANGE UP (ref 35–242)
KAPPA LC SER QL IFE: 7.1 MG/DL — HIGH (ref 0.33–1.94)
KAPPA/LAMBDA FREE LIGHT CHAIN RATIO, SERUM: 28.4 RATIO — HIGH (ref 0.26–1.65)
LAMBDA LC SER QL IFE: 0.25 MG/DL — LOW (ref 0.57–2.63)
LDH SERPL L TO P-CCNC: 170 U/L — SIGNIFICANT CHANGE UP (ref 50–242)
LYMPHOCYTES # BLD AUTO: 0.14 K/UL — LOW (ref 1–3.3)
LYMPHOCYTES # BLD AUTO: 6.1 % — LOW (ref 13–44)
MAGNESIUM SERPL-MCNC: 2 MG/DL — SIGNIFICANT CHANGE UP (ref 1.6–2.6)
MANUAL SMEAR VERIFICATION: SIGNIFICANT CHANGE UP
MCHC RBC-ENTMCNC: 24.4 PG — LOW (ref 27–34)
MCHC RBC-ENTMCNC: 32.4 GM/DL — SIGNIFICANT CHANGE UP (ref 32–36)
MCV RBC AUTO: 75.3 FL — LOW (ref 80–100)
MONOCYTES # BLD AUTO: 0.02 K/UL — SIGNIFICANT CHANGE UP (ref 0–0.9)
MONOCYTES NFR BLD AUTO: 0.9 % — LOW (ref 2–14)
NEUTROPHILS # BLD AUTO: 2.19 K/UL — SIGNIFICANT CHANGE UP (ref 1.8–7.4)
NEUTROPHILS NFR BLD AUTO: 93 % — HIGH (ref 43–77)
PHOSPHATE SERPL-MCNC: 3.2 MG/DL — SIGNIFICANT CHANGE UP (ref 2.5–4.5)
PLAT MORPH BLD: ABNORMAL
PLATELET # BLD AUTO: 151 K/UL — SIGNIFICANT CHANGE UP (ref 150–400)
POTASSIUM SERPL-MCNC: 4.3 MMOL/L — SIGNIFICANT CHANGE UP (ref 3.5–5.3)
POTASSIUM SERPL-SCNC: 4.3 MMOL/L — SIGNIFICANT CHANGE UP (ref 3.5–5.3)
PROT SERPL-MCNC: 5 G/DL — LOW (ref 6–8.3)
RBC # BLD: 3.73 M/UL — LOW (ref 4.2–5.8)
RBC # FLD: 16.1 % — HIGH (ref 10.3–14.5)
RBC BLD AUTO: SIGNIFICANT CHANGE UP
SARS-COV-2 IGG SERPL QL IA: NEGATIVE — SIGNIFICANT CHANGE UP
SARS-COV-2 IGM SERPL IA-ACNC: 0.12 INDEX — SIGNIFICANT CHANGE UP
SODIUM SERPL-SCNC: 144 MMOL/L — SIGNIFICANT CHANGE UP (ref 135–145)
WBC # BLD: 2.36 K/UL — LOW (ref 3.8–10.5)
WBC # FLD AUTO: 2.36 K/UL — LOW (ref 3.8–10.5)

## 2020-06-30 PROCEDURE — 99223 1ST HOSP IP/OBS HIGH 75: CPT

## 2020-06-30 PROCEDURE — 99291 CRITICAL CARE FIRST HOUR: CPT

## 2020-06-30 RX ORDER — FUROSEMIDE 40 MG
40 TABLET ORAL ONCE
Refills: 0 | Status: COMPLETED | OUTPATIENT
Start: 2020-06-30 | End: 2020-06-30

## 2020-06-30 RX ORDER — POLYETHYLENE GLYCOL 3350 17 G/17G
17 POWDER, FOR SOLUTION ORAL DAILY
Refills: 0 | Status: DISCONTINUED | OUTPATIENT
Start: 2020-06-30 | End: 2020-07-08

## 2020-06-30 RX ORDER — SALIVA SUBSTITUTE COMB NO.11 351 MG
5 POWDER IN PACKET (EA) MUCOUS MEMBRANE
Refills: 0 | Status: DISCONTINUED | OUTPATIENT
Start: 2020-06-30 | End: 2020-07-20

## 2020-06-30 RX ADMIN — MELPHALAN HYDROCHLORIDE 1076 MILLIGRAM(S): KIT INTRAVENOUS at 18:26

## 2020-06-30 RX ADMIN — Medication 40 MILLIGRAM(S): at 15:41

## 2020-06-30 RX ADMIN — Medication 400 MILLIGRAM(S): at 21:14

## 2020-06-30 RX ADMIN — Medication 1 LOZENGE: at 07:18

## 2020-06-30 RX ADMIN — Medication 1 MILLIGRAM(S): at 12:36

## 2020-06-30 RX ADMIN — Medication 1 LOZENGE: at 16:04

## 2020-06-30 RX ADMIN — Medication 400 MILLIGRAM(S): at 14:52

## 2020-06-30 RX ADMIN — ONDANSETRON 8 MILLIGRAM(S): 8 TABLET, FILM COATED ORAL at 14:52

## 2020-06-30 RX ADMIN — Medication 1 LOZENGE: at 12:42

## 2020-06-30 RX ADMIN — FLUCONAZOLE 400 MILLIGRAM(S): 150 TABLET ORAL at 12:35

## 2020-06-30 RX ADMIN — Medication 110 MILLIGRAM(S): at 18:01

## 2020-06-30 RX ADMIN — Medication 10 MILLILITER(S): at 12:42

## 2020-06-30 RX ADMIN — CHLORHEXIDINE GLUCONATE 1 APPLICATION(S): 213 SOLUTION TOPICAL at 06:42

## 2020-06-30 RX ADMIN — Medication 1 LOZENGE: at 20:40

## 2020-06-30 RX ADMIN — Medication 40 MILLIGRAM(S): at 08:37

## 2020-06-30 RX ADMIN — Medication 10 MILLILITER(S): at 07:05

## 2020-06-30 RX ADMIN — Medication 400 MILLIGRAM(S): at 06:41

## 2020-06-30 RX ADMIN — ONDANSETRON 8 MILLIGRAM(S): 8 TABLET, FILM COATED ORAL at 21:14

## 2020-06-30 RX ADMIN — POLYETHYLENE GLYCOL 3350 17 GRAM(S): 17 POWDER, FOR SOLUTION ORAL at 14:53

## 2020-06-30 RX ADMIN — Medication 1 TABLET(S): at 12:35

## 2020-06-30 RX ADMIN — Medication 1 LOZENGE: at 00:32

## 2020-06-30 RX ADMIN — PANTOPRAZOLE SODIUM 40 MILLIGRAM(S): 20 TABLET, DELAYED RELEASE ORAL at 06:41

## 2020-06-30 RX ADMIN — Medication 1 TABLET(S): at 18:02

## 2020-06-30 RX ADMIN — Medication 5 MILLILITER(S): at 20:40

## 2020-06-30 RX ADMIN — ONDANSETRON 8 MILLIGRAM(S): 8 TABLET, FILM COATED ORAL at 06:41

## 2020-06-30 RX ADMIN — Medication 10 MILLILITER(S): at 00:33

## 2020-06-30 RX ADMIN — URSODIOL 300 MILLIGRAM(S): 250 TABLET, FILM COATED ORAL at 18:02

## 2020-06-30 RX ADMIN — URSODIOL 300 MILLIGRAM(S): 250 TABLET, FILM COATED ORAL at 07:04

## 2020-06-30 RX ADMIN — Medication 10 MILLILITER(S): at 20:40

## 2020-06-30 RX ADMIN — Medication 10 MILLILITER(S): at 16:04

## 2020-06-30 RX ADMIN — APREPITANT 80 MILLIGRAM(S): 80 CAPSULE ORAL at 12:35

## 2020-06-30 RX ADMIN — Medication 1 TABLET(S): at 06:41

## 2020-06-30 NOTE — CONSULT NOTE ADULT - ASSESSMENT
59M with PMH of HCV, amyloidosis, dyspepsia, hematuria, colitis, Raynaud's phenomenon, and cutaneous mastocytosis here for an autologous peripheral blood stem cell transplant with high dose melphalan preparative regimen. Diagnosed in 2016 with HCV, and subsequently in 2019 found to have amyloidosis. Was treated with CyBorD x 7 cycles. Palliative following for symptom management post-transplant.

## 2020-06-30 NOTE — DIETITIAN INITIAL EVALUATION ADULT. - PHYSICAL APPEARANCE
well nourished/other (specify) Skin: no pressure injuries   Edema: +1 christina. ankle     ht: 68.9", wt: 188.9pounds, BMI: 28 kg/m2, IBW: 159 pounds +/- 10%

## 2020-06-30 NOTE — PROGRESS NOTE ADULT - SUBJECTIVE AND OBJECTIVE BOX
[facility] Trans[facility] Team                                                      Critical / Counseling Time Provided: 30 minutes                                                                                                                                                        Chief Complaint: Autologous pbsct with high dose melphalan prep regimen for treatment of amyloidosis    S: Patient seen and examined with [facility]PC Transplant Team:     Denies mouth / tongue / throat pain, dyspnea, cough, nausea, vomiting, diarrhea, abdominal pain     O: Vitals:   Vital Signs Last 24 Hrs  T(C): 36.6 (30 Jun 2020 06:34), Max: 37.1 (29 Jun 2020 10:46)  T(F): 97.9 (30 Jun 2020 06:34), Max: 98.8 (29 Jun 2020 10:46)  HR: 68 (30 Jun 2020 06:34) (62 - 75)  BP: 120/76 (30 Jun 2020 06:34) (103/746 - 131/78)  BP(mean): --  RR: 18 (30 Jun 2020 06:34) (18 - 18)  SpO2: 98% (30 Jun 2020 06:34) (96% - 100%)    Admit weight:   Daily Height in cm: 175 (29 Jun 2020 10:46)    Daily     Intake / Output:   06-29 @ 07:01  -  06-30 @ 07:00  --------------------------------------------------------  IN: 5723.8 mL / OUT: 5450 mL / NET: 273.8 mL    06-30 @ 07:01 - 06-30 @ 08:47  --------------------------------------------------------  IN: 131.1 mL / OUT: 500 mL / NET: -368.9 mL      PE:   Oropharynx: no erythema or ulcerations   Oral Mucositis:               -                                         Grade: n/a  CVS: S1, S2 RRR   Lungs: CTA throughout bilaterally   Abdomen: + BS x 4, soft, NT, ND   Extremities: no edema  Gastric Mucositis:      -                                            Grade: n/a  Intestinal Mucositis:    -                                          Grade: n/a  Skin: no rash  TLC: CDI  Neuro: A&O x 3  Pain: denies     Labs:   CBC Full  -  ( 30 Jun 2020 07:06 )  WBC Count : 2.36 K/uL  Hemoglobin : 9.1 g/dL  Hematocrit : 28.1 %  Platelet Count - Automated : 151 K/uL  Mean Cell Volume : 75.3 fl  Mean Cell Hemoglobin : 24.4 pg  Mean Cell Hemoglobin Concentration : 32.4 gm/dL  Auto Neutrophil # : 2.19 K/uL  Auto Lymphocyte # : 0.14 K/uL  Auto Monocyte # : 0.02 K/uL  Auto Eosinophil # : 0.00 K/uL  Auto Basophil # : 0.00 K/uL  Auto Neutrophil % : 93.0 %  Auto Lymphocyte % : 6.1 %  Auto Monocyte % : 0.9 %  Auto Eosinophil % : 0.0 %  Auto Basophil % : 0.0 %                          9.1    2.36  )-----------( 151      ( 30 Jun 2020 07:06 )             28.1     06-30    144  |  111<H>  |  9   ----------------------------<  197<H>  4.3   |  24  |  0.75    Ca    8.3<L>      30 Jun 2020 07:06  Phos  3.2     06-30  Mg     2.0     06-30    TPro  5.0<L>  /  Alb  3.7  /  TBili  0.4  /  DBili  x   /  AST  15  /  ALT  8<L>  /  AlkPhos  51  06-30    PT/INR - ( 29 Jun 2020 15:26 )   PT: 12.9 sec;   INR: 1.14 ratio         PTT - ( 29 Jun 2020 15:26 )  PTT:29.7 sec  LIVER FUNCTIONS - ( 30 Jun 2020 07:06 )  Alb: 3.7 g/dL / Pro: 5.0 g/dL / ALK PHOS: 51 U/L / ALT: 8 U/L / AST: 15 U/L / GGT: x           Lactate Dehydrogenase, Serum: 170 U/L (06-30 @ 07:06)  Lactate Dehydrogenase, Serum: 189 U/L (06-29 @ 15:26)      Meds:   Antimicrobials:   acyclovir   Oral Tab/Cap 400 milliGRAM(s) Oral every 8 hours  clotrimazole Lozenge 1 Lozenge Oral five times a day  fluconAZOLE   Tablet 400 milliGRAM(s) Oral daily  trimethoprim  160 mG/sulfamethoxazole 800 mG 1 Tablet(s) Oral every 12 hours      Heme / Onc:   heparin  Infusion 357 Unit(s)/Hr IV Continuous <Continuous>  melphalan IVPB (eMAR) 190 milliGRAM(s) IV Intermittent once      GI:  pantoprazole    Tablet 40 milliGRAM(s) Oral before breakfast  senna 1 Tablet(s) Oral at bedtime PRN  sodium bicarbonate Mouth Rinse 10 milliLiter(s) Swish and Spit five times a day  ursodiol Capsule 300 milliGRAM(s) Oral two times a day with meals      Cardiovascular:   furosemide   Injectable 20 milliGRAM(s) IV Push every 24 hours PRN      Immunologic:       Other medications:   aprepitant 80 milliGRAM(s) Oral every 24 hours  chlorhexidine 4% Liquid 1 Application(s) Topical <User Schedule>  dexAMETHasone  IVPB 20 milliGRAM(s) IV Intermittent every 24 hours  dextrose 5% + sodium chloride 0.9%. 1000 milliLiter(s) IV Continuous <Continuous>  folic acid 1 milliGRAM(s) Oral daily  LORazepam   Injectable 1 milliGRAM(s) IV Push every 24 hours  multivitamin 1 Tablet(s) Oral daily  ondansetron Injectable 8 milliGRAM(s) IV Push every 8 hours  sodium chloride 0.9%. 1000 milliLiter(s) IV Continuous <Continuous>      PRN:   acetaminophen   Tablet .. 650 milliGRAM(s) Oral every 6 hours PRN  acetaminophen   Tablet .. 650 milliGRAM(s) Oral every 6 hours PRN  diphenhydrAMINE   Injectable 25 milliGRAM(s) IV Push every 4 hours PRN  furosemide   Injectable 20 milliGRAM(s) IV Push every 24 hours PRN  metoclopramide Injectable 10 milliGRAM(s) IV Push every 6 hours PRN  senna 1 Tablet(s) Oral at bedtime PRN  sodium chloride 0.9% lock flush 10 milliLiter(s) IV Push every 1 hour PRN      A/P:   59 year old male with a history of amyloidosis  Pre :  Autologous PBSCT day - 2  Continue Melphalan hydration for 24 hours post infusion of last dose   Strict I&O, prn diuresis, daily weights - 6/30 Lasix 40mg IV x 1   Keep platelets =/> 40K for history of amyloidosis     1. Infectious Disease:   acyclovir   Oral Tab/Cap 400 milliGRAM(s) Oral every 8 hours  clotrimazole Lozenge 1 Lozenge Oral five times a day  fluconAZOLE   Tablet 400 milliGRAM(s) Oral daily  trimethoprim  160 mG/sulfamethoxazole 800 mG 1 Tablet(s) Oral every 12 hours    2. VOD Prophylaxis: Actigall, Glutamine, Heparin (dosed at 100 units / kg / day)     3. GI Prophylaxis: pantoprazole    Tablet 40 milliGRAM(s) Oral before breakfast    4. Mouthcare - NS / NaHCO3 rinses, Mycelex, Biotene; Skin care     5. GVHD prophylaxis - n/a     6. Transfuse & replete electrolytes prn     7. IV hydration, daily weights, strict I&O, prn diuresis   Lasix 40mg IV x 1   Continue melphalan hydration for 24 post infusion of last dose     8. PO intake as tolerated, nutrition follow up as needed, MVI, folic acid     9. Antiemetics, anti-diarrhea medications:   metoclopramide Injectable 10 milliGRAM(s) IV Push every 6 hours PRN  ondansetron Injectable 8 milliGRAM(s) IV Push every 8 hours  LORazepam   Injectable 1 milliGRAM(s) IV Push every 24 hours  aprepitant 80 milliGRAM(s) Oral every 24 hours    10. OOB as tolerated, physical therapy consult if needed     11. Monitor coags / fibrinogen 2x week, vitamin K as needed     12. Monitor closely for clinical changes, monitor for fevers     13. Emotional support provided, plan of care discussed and questions addressed     14. Patient education done regarding chemotherapy prep, plan of care, restrictions and discharge planning     15. Continue regular social work input     I have written the above note for Dr. Loja who performed service with me in the room.   Brinda Garrett NP-C ([phone])    I have seen and examined patient with NP, I agree with above note as scribed. [facility] Team                                                      Critical / Counseling Time Provided: 30 minutes                                                                                                                                                        Chief Complaint: Autologous pbsct with high dose melphalan prep regimen for treatment of amyloidosis    S: Patient seen and examined with [facility] Transplant Team:     Denies mouth / tongue / throat pain, dyspnea, cough, nausea, vomiting, diarrhea, abdominal pain     O: Vitals:   Vital Signs Last 24 Hrs  T(C): 36.6 (30 Jun 2020 06:34), Max: 37.1 (29 Jun 2020 10:46)  T(F): 97.9 (30 Jun 2020 06:34), Max: 98.8 (29 Jun 2020 10:46)  HR: 68 (30 Jun 2020 06:34) (62 - 75)  BP: 120/76 (30 Jun 2020 06:34) (103/746 - 131/78)  BP(mean): --  RR: 18 (30 Jun 2020 06:34) (18 - 18)  SpO2: 98% (30 Jun 2020 06:34) (96% - 100%)    Admit weight: 85.7 kg   Daily Height in cm: 175 (29 Jun 2020 10:46)    Today' s weight:87.1 kg     Intake / Output:   06-29 @ 07:01  -  06-30 @ 07:00  --------------------------------------------------------  IN: 5723.8 mL / OUT: 5450 mL / NET: 273.8 mL    06-30 @ 07:01 - 06-30 @ 08:47  --------------------------------------------------------  IN: 131.1 mL / OUT: 500 mL / NET: -368.9 mL      PE:   Oropharynx: no erythema or ulcerations   Oral Mucositis:               -                                         Grade: n/a  CVS: S1, S2 RRR   Lungs: CTA throughout bilaterally   Abdomen: + BS x 4, soft, NT, ND   Extremities: no edema  Gastric Mucositis:      -                                            Grade: n/a  Intestinal Mucositis:    -                                          Grade: n/a  Skin: no rash  TLC: CDI  Neuro: A&O x 3  Pain: denies     Labs:   CBC Full  -  ( 30 Jun 2020 07:06 )  WBC Count : 2.36 K/uL  Hemoglobin : 9.1 g/dL  Hematocrit : 28.1 %  Platelet Count - Automated : 151 K/uL  Mean Cell Volume : 75.3 fl  Mean Cell Hemoglobin : 24.4 pg  Mean Cell Hemoglobin Concentration : 32.4 gm/dL  Auto Neutrophil # : 2.19 K/uL  Auto Lymphocyte # : 0.14 K/uL  Auto Monocyte # : 0.02 K/uL  Auto Eosinophil # : 0.00 K/uL  Auto Basophil # : 0.00 K/uL  Auto Neutrophil % : 93.0 %  Auto Lymphocyte % : 6.1 %  Auto Monocyte % : 0.9 %  Auto Eosinophil % : 0.0 %  Auto Basophil % : 0.0 %                          9.1    2.36  )-----------( 151      ( 30 Jun 2020 07:06 )             28.1     06-30    144  |  111<H>  |  9   ----------------------------<  197<H>  4.3   |  24  |  0.75    Ca    8.3<L>      30 Jun 2020 07:06  Phos  3.2     06-30  Mg     2.0     06-30    TPro  5.0<L>  /  Alb  3.7  /  TBili  0.4  /  DBili  x   /  AST  15  /  ALT  8<L>  /  AlkPhos  51  06-30    PT/INR - ( 29 Jun 2020 15:26 )   PT: 12.9 sec;   INR: 1.14 ratio         PTT - ( 29 Jun 2020 15:26 )  PTT:29.7 sec  LIVER FUNCTIONS - ( 30 Jun 2020 07:06 )  Alb: 3.7 g/dL / Pro: 5.0 g/dL / ALK PHOS: 51 U/L / ALT: 8 U/L / AST: 15 U/L / GGT: x           Lactate Dehydrogenase, Serum: 170 U/L (06-30 @ 07:06)  Lactate Dehydrogenase, Serum: 189 U/L (06-29 @ 15:26)      Meds:   Antimicrobials:   acyclovir   Oral Tab/Cap 400 milliGRAM(s) Oral every 8 hours  clotrimazole Lozenge 1 Lozenge Oral five times a day  fluconAZOLE   Tablet 400 milliGRAM(s) Oral daily  trimethoprim  160 mG/sulfamethoxazole 800 mG 1 Tablet(s) Oral every 12 hours      Heme / Onc:   heparin  Infusion 357 Unit(s)/Hr IV Continuous <Continuous>  melphalan IVPB (eMAR) 190 milliGRAM(s) IV Intermittent once      GI:  pantoprazole    Tablet 40 milliGRAM(s) Oral before breakfast  senna 1 Tablet(s) Oral at bedtime PRN  sodium bicarbonate Mouth Rinse 10 milliLiter(s) Swish and Spit five times a day  ursodiol Capsule 300 milliGRAM(s) Oral two times a day with meals      Cardiovascular:   furosemide   Injectable 20 milliGRAM(s) IV Push every 24 hours PRN      Immunologic:       Other medications:   aprepitant 80 milliGRAM(s) Oral every 24 hours  chlorhexidine 4% Liquid 1 Application(s) Topical <User Schedule>  dexAMETHasone  IVPB 20 milliGRAM(s) IV Intermittent every 24 hours  dextrose 5% + sodium chloride 0.9%. 1000 milliLiter(s) IV Continuous <Continuous>  folic acid 1 milliGRAM(s) Oral daily  LORazepam   Injectable 1 milliGRAM(s) IV Push every 24 hours  multivitamin 1 Tablet(s) Oral daily  ondansetron Injectable 8 milliGRAM(s) IV Push every 8 hours  sodium chloride 0.9%. 1000 milliLiter(s) IV Continuous <Continuous>      PRN:   acetaminophen   Tablet .. 650 milliGRAM(s) Oral every 6 hours PRN  acetaminophen   Tablet .. 650 milliGRAM(s) Oral every 6 hours PRN  diphenhydrAMINE   Injectable 25 milliGRAM(s) IV Push every 4 hours PRN  furosemide   Injectable 20 milliGRAM(s) IV Push every 24 hours PRN  metoclopramide Injectable 10 milliGRAM(s) IV Push every 6 hours PRN  senna 1 Tablet(s) Oral at bedtime PRN  sodium chloride 0.9% lock flush 10 milliLiter(s) IV Push every 1 hour PRN      A/P:   59 year old male with a history of amyloidosis  Pre :  Autologous PBSCT day - 2  Continue Melphalan hydration for 24 hours post infusion of last dose   Strict I&O, prn diuresis, daily weights - 6/30 Lasix 40mg IV x 1   Keep platelets =/> 40K for history of amyloidosis     1. Infectious Disease:   acyclovir   Oral Tab/Cap 400 milliGRAM(s) Oral every 8 hours  clotrimazole Lozenge 1 Lozenge Oral five times a day  fluconAZOLE   Tablet 400 milliGRAM(s) Oral daily  trimethoprim  160 mG/sulfamethoxazole 800 mG 1 Tablet(s) Oral every 12 hours    2. VOD Prophylaxis: Actigall, Glutamine, Heparin (dosed at 100 units / kg / day)     3. GI Prophylaxis: pantoprazole    Tablet 40 milliGRAM(s) Oral before breakfast    4. Mouthcare - NS / NaHCO3 rinses, Mycelex, Biotene; Skin care     5. GVHD prophylaxis - n/a     6. Transfuse & replete electrolytes prn     7. IV hydration, daily weights, strict I&O, prn diuresis   Lasix 40mg IV x 1   Continue melphalan hydration for 24 post infusion of last dose     8. PO intake as tolerated, nutrition follow up as needed, MVI, folic acid     9. Antiemetics, anti-diarrhea medications:   metoclopramide Injectable 10 milliGRAM(s) IV Push every 6 hours PRN  ondansetron Injectable 8 milliGRAM(s) IV Push every 8 hours  LORazepam   Injectable 1 milliGRAM(s) IV Push every 24 hours  aprepitant 80 milliGRAM(s) Oral every 24 hours    10. OOB as tolerated, physical therapy consult if needed     11. Monitor coags / fibrinogen 2x week, vitamin K as needed     12. Monitor closely for clinical changes, monitor for fevers     13. Emotional support provided, plan of care discussed and questions addressed     14. Patient education done regarding chemotherapy prep, plan of care, restrictions and discharge planning     15. Continue regular social work input     I have written the above note for Dr. Loja who performed service with me in the room.   Brinda Garrett NP-C ([phone])    I have seen and examined patient with NP, I agree with above note as scribed. [facility] Team                                                      Critical / Counseling Time Provided: 30 minutes                                                                                                                                                        Chief Complaint: Autologous pbsct with high dose melphalan prep regimen for treatment of amyloidosis    S: Patient seen and examined with [facility] Transplant Team:   + intermittent  nausea   + constipation     Denies mouth / tongue / throat pain, dyspnea, cough, nausea, vomiting, diarrhea, abdominal pain     O: Vitals:   Vital Signs Last 24 Hrs  T(C): 36.6 (30 Jun 2020 06:34), Max: 37.1 (29 Jun 2020 10:46)  T(F): 97.9 (30 Jun 2020 06:34), Max: 98.8 (29 Jun 2020 10:46)  HR: 68 (30 Jun 2020 06:34) (62 - 75)  BP: 120/76 (30 Jun 2020 06:34) (103/746 - 131/78)  BP(mean): --  RR: 18 (30 Jun 2020 06:34) (18 - 18)  SpO2: 98% (30 Jun 2020 06:34) (96% - 100%)    Admit weight: 85.7 kg   Daily Height in cm: 175 (29 Jun 2020 10:46)    Today' s weight:87.1 kg     Intake / Output:   06-29 @ 07:01 - 06-30 @ 07:00  --------------------------------------------------------  IN: 5723.8 mL / OUT: 5450 mL / NET: 273.8 mL    06-30 @ 07:01 - 06-30 @ 08:47  --------------------------------------------------------  IN: 131.1 mL / OUT: 500 mL / NET: -368.9 mL      PE:   Oropharynx: no erythema or ulcerations   Oral Mucositis:               -                                         Grade: n/a  CVS: S1, S2 RRR   Lungs: CTA throughout bilaterally   Abdomen: + BS x 4, soft, NT, ND   Extremities: + mild chronic LE edema  Gastric Mucositis:      -                                            Grade: n/a  Intestinal Mucositis:    -                                          Grade: n/a  Skin: no rash  TLC: CDI  Neuro: A&O x 3  Pain: denies     Labs:   CBC Full  -  ( 30 Jun 2020 07:06 )  WBC Count : 2.36 K/uL  Hemoglobin : 9.1 g/dL  Hematocrit : 28.1 %  Platelet Count - Automated : 151 K/uL  Mean Cell Volume : 75.3 fl  Mean Cell Hemoglobin : 24.4 pg  Mean Cell Hemoglobin Concentration : 32.4 gm/dL  Auto Neutrophil # : 2.19 K/uL  Auto Lymphocyte # : 0.14 K/uL  Auto Monocyte # : 0.02 K/uL  Auto Eosinophil # : 0.00 K/uL  Auto Basophil # : 0.00 K/uL  Auto Neutrophil % : 93.0 %  Auto Lymphocyte % : 6.1 %  Auto Monocyte % : 0.9 %  Auto Eosinophil % : 0.0 %  Auto Basophil % : 0.0 %                          9.1    2.36  )-----------( 151      ( 30 Jun 2020 07:06 )             28.1     06-30    144  |  111<H>  |  9   ----------------------------<  197<H>  4.3   |  24  |  0.75    Ca    8.3<L>      30 Jun 2020 07:06  Phos  3.2     06-30  Mg     2.0     06-30    TPro  5.0<L>  /  Alb  3.7  /  TBili  0.4  /  DBili  x   /  AST  15  /  ALT  8<L>  /  AlkPhos  51  06-30    PT/INR - ( 29 Jun 2020 15:26 )   PT: 12.9 sec;   INR: 1.14 ratio         PTT - ( 29 Jun 2020 15:26 )  PTT:29.7 sec  LIVER FUNCTIONS - ( 30 Jun 2020 07:06 )  Alb: 3.7 g/dL / Pro: 5.0 g/dL / ALK PHOS: 51 U/L / ALT: 8 U/L / AST: 15 U/L / GGT: x           Lactate Dehydrogenase, Serum: 170 U/L (06-30 @ 07:06)  Lactate Dehydrogenase, Serum: 189 U/L (06-29 @ 15:26)      Meds:   Antimicrobials:   acyclovir   Oral Tab/Cap 400 milliGRAM(s) Oral every 8 hours  clotrimazole Lozenge 1 Lozenge Oral five times a day  fluconAZOLE   Tablet 400 milliGRAM(s) Oral daily  trimethoprim  160 mG/sulfamethoxazole 800 mG 1 Tablet(s) Oral every 12 hours      Heme / Onc:   heparin  Infusion 357 Unit(s)/Hr IV Continuous <Continuous>  melphalan IVPB (eMAR) 190 milliGRAM(s) IV Intermittent once      GI:  pantoprazole    Tablet 40 milliGRAM(s) Oral before breakfast  senna 1 Tablet(s) Oral at bedtime PRN  sodium bicarbonate Mouth Rinse 10 milliLiter(s) Swish and Spit five times a day  ursodiol Capsule 300 milliGRAM(s) Oral two times a day with meals      Cardiovascular:   furosemide   Injectable 20 milliGRAM(s) IV Push every 24 hours PRN      Immunologic:       Other medications:   aprepitant 80 milliGRAM(s) Oral every 24 hours  chlorhexidine 4% Liquid 1 Application(s) Topical <User Schedule>  dexAMETHasone  IVPB 20 milliGRAM(s) IV Intermittent every 24 hours  dextrose 5% + sodium chloride 0.9%. 1000 milliLiter(s) IV Continuous <Continuous>  folic acid 1 milliGRAM(s) Oral daily  LORazepam   Injectable 1 milliGRAM(s) IV Push every 24 hours  multivitamin 1 Tablet(s) Oral daily  ondansetron Injectable 8 milliGRAM(s) IV Push every 8 hours  sodium chloride 0.9%. 1000 milliLiter(s) IV Continuous <Continuous>      PRN:   acetaminophen   Tablet .. 650 milliGRAM(s) Oral every 6 hours PRN  acetaminophen   Tablet .. 650 milliGRAM(s) Oral every 6 hours PRN  diphenhydrAMINE   Injectable 25 milliGRAM(s) IV Push every 4 hours PRN  furosemide   Injectable 20 milliGRAM(s) IV Push every 24 hours PRN  metoclopramide Injectable 10 milliGRAM(s) IV Push every 6 hours PRN  senna 1 Tablet(s) Oral at bedtime PRN  sodium chloride 0.9% lock flush 10 milliLiter(s) IV Push every 1 hour PRN      A/P:   59 year old male with a history of amyloidosis  Pre :  Autologous PBSCT day - 2  Continue Melphalan hydration for 24 hours post infusion of last dose   Strict I&O, prn diuresis, daily weights - 6/30 Lasix 40mg IV x 1   Keep platelets =/> 40K for history of amyloidosis     1. Infectious Disease:   acyclovir   Oral Tab/Cap 400 milliGRAM(s) Oral every 8 hours  clotrimazole Lozenge 1 Lozenge Oral five times a day  fluconAZOLE   Tablet 400 milliGRAM(s) Oral daily  trimethoprim  160 mG/sulfamethoxazole 800 mG 1 Tablet(s) Oral every 12 hours    2. VOD Prophylaxis: Actigall, Glutamine, Heparin (dosed at 100 units / kg / day)     3. GI Prophylaxis: pantoprazole    Tablet 40 milliGRAM(s) Oral before breakfast    4. Mouthcare - NS / NaHCO3 rinses, Mycelex, Biotene; Skin care     5. GVHD prophylaxis - n/a     6. Transfuse & replete electrolytes prn     7. IV hydration, daily weights, strict I&O, prn diuresis   Lasix 40mg IV x 1   Continue melphalan hydration for 24 post infusion of last dose     8. PO intake as tolerated, nutrition follow up as needed, MVI, folic acid     9. Antiemetics, anti-diarrhea medications:   metoclopramide Injectable 10 milliGRAM(s) IV Push every 6 hours PRN  ondansetron Injectable 8 milliGRAM(s) IV Push every 8 hours  LORazepam   Injectable 1 milliGRAM(s) IV Push every 24 hours  aprepitant 80 milliGRAM(s) Oral every 24 hours    10. OOB as tolerated, physical therapy consult if needed     11. Monitor coags / fibrinogen 2x week, vitamin K as needed     12. Monitor closely for clinical changes, monitor for fevers     13. Emotional support provided, plan of care discussed and questions addressed     14. Patient education done regarding chemotherapy prep, plan of care, restrictions and discharge planning     15. Continue regular social work input     I have written the above note for Dr. Loja who performed service with me in the room.   Brinda Garrtet NP-C ([phone])    I have seen and examined patient with NP, I agree with above note as scribed. [facility] Transplant Team                                                      Critical / Counseling Time Provided: 30 minutes                                                                                                                                                        Chief Complaint: Autologous pbsct with high dose melphalan prep regimen for treatment of amyloidosis    S: Patient seen and examined with [facility] Transplant Team:   + intermittent  nausea   + constipation     Denies mouth / tongue / throat pain, dyspnea, cough, nausea, vomiting, diarrhea, abdominal pain     O: Vitals:   Vital Signs Last 24 Hrs  T(C): 36.6 (30 Jun 2020 06:34), Max: 37.1 (29 Jun 2020 10:46)  T(F): 97.9 (30 Jun 2020 06:34), Max: 98.8 (29 Jun 2020 10:46)  HR: 68 (30 Jun 2020 06:34) (62 - 75)  BP: 120/76 (30 Jun 2020 06:34) (103/746 - 131/78)  BP(mean): --  RR: 18 (30 Jun 2020 06:34) (18 - 18)  SpO2: 98% (30 Jun 2020 06:34) (96% - 100%)    Admit weight: 85.7 kg   Daily Height in cm: 175 (29 Jun 2020 10:46)    Today' s weight:87.1 kg     Intake / Output:   06-29 @ 07:01 - 06-30 @ 07:00  --------------------------------------------------------  IN: 5723.8 mL / OUT: 5450 mL / NET: 273.8 mL    06-30 @ 07:01 - 06-30 @ 08:47  --------------------------------------------------------  IN: 131.1 mL / OUT: 500 mL / NET: -368.9 mL      PE:   Oropharynx: no erythema or ulcerations   Oral Mucositis:               -                                         Grade: n/a  CVS: S1, S2 RRR   Lungs: CTA throughout bilaterally   Abdomen: + BS x 4, soft, NT, ND   Extremities: + mild chronic LE edema  Gastric Mucositis:      -                                            Grade: n/a  Intestinal Mucositis:    -                                          Grade: n/a  Skin: no rash  TLC: CDI  Neuro: A&O x 3  Pain: denies     Labs:   CBC Full  -  ( 30 Jun 2020 07:06 )  WBC Count : 2.36 K/uL  Hemoglobin : 9.1 g/dL  Hematocrit : 28.1 %  Platelet Count - Automated : 151 K/uL  Mean Cell Volume : 75.3 fl  Mean Cell Hemoglobin : 24.4 pg  Mean Cell Hemoglobin Concentration : 32.4 gm/dL  Auto Neutrophil # : 2.19 K/uL  Auto Lymphocyte # : 0.14 K/uL  Auto Monocyte # : 0.02 K/uL  Auto Eosinophil # : 0.00 K/uL  Auto Basophil # : 0.00 K/uL  Auto Neutrophil % : 93.0 %  Auto Lymphocyte % : 6.1 %  Auto Monocyte % : 0.9 %  Auto Eosinophil % : 0.0 %  Auto Basophil % : 0.0 %                          9.1    2.36  )-----------( 151      ( 30 Jun 2020 07:06 )             28.1     06-30    144  |  111<H>  |  9   ----------------------------<  197<H>  4.3   |  24  |  0.75    Ca    8.3<L>      30 Jun 2020 07:06  Phos  3.2     06-30  Mg     2.0     06-30    TPro  5.0<L>  /  Alb  3.7  /  TBili  0.4  /  DBili  x   /  AST  15  /  ALT  8<L>  /  AlkPhos  51  06-30    PT/INR - ( 29 Jun 2020 15:26 )   PT: 12.9 sec;   INR: 1.14 ratio         PTT - ( 29 Jun 2020 15:26 )  PTT:29.7 sec  LIVER FUNCTIONS - ( 30 Jun 2020 07:06 )  Alb: 3.7 g/dL / Pro: 5.0 g/dL / ALK PHOS: 51 U/L / ALT: 8 U/L / AST: 15 U/L / GGT: x           Lactate Dehydrogenase, Serum: 170 U/L (06-30 @ 07:06)  Lactate Dehydrogenase, Serum: 189 U/L (06-29 @ 15:26)      Meds:   Antimicrobials:   acyclovir   Oral Tab/Cap 400 milliGRAM(s) Oral every 8 hours  clotrimazole Lozenge 1 Lozenge Oral five times a day  fluconAZOLE   Tablet 400 milliGRAM(s) Oral daily  trimethoprim  160 mG/sulfamethoxazole 800 mG 1 Tablet(s) Oral every 12 hours      Heme / Onc:   heparin  Infusion 357 Unit(s)/Hr IV Continuous <Continuous>  melphalan IVPB (eMAR) 190 milliGRAM(s) IV Intermittent once      GI:  pantoprazole    Tablet 40 milliGRAM(s) Oral before breakfast  senna 1 Tablet(s) Oral at bedtime PRN  sodium bicarbonate Mouth Rinse 10 milliLiter(s) Swish and Spit five times a day  ursodiol Capsule 300 milliGRAM(s) Oral two times a day with meals      Cardiovascular:   furosemide   Injectable 20 milliGRAM(s) IV Push every 24 hours PRN      Immunologic:       Other medications:   aprepitant 80 milliGRAM(s) Oral every 24 hours  chlorhexidine 4% Liquid 1 Application(s) Topical <User Schedule>  dexAMETHasone  IVPB 20 milliGRAM(s) IV Intermittent every 24 hours  dextrose 5% + sodium chloride 0.9%. 1000 milliLiter(s) IV Continuous <Continuous>  folic acid 1 milliGRAM(s) Oral daily  LORazepam   Injectable 1 milliGRAM(s) IV Push every 24 hours  multivitamin 1 Tablet(s) Oral daily  ondansetron Injectable 8 milliGRAM(s) IV Push every 8 hours  sodium chloride 0.9%. 1000 milliLiter(s) IV Continuous <Continuous>      PRN:   acetaminophen   Tablet .. 650 milliGRAM(s) Oral every 6 hours PRN  acetaminophen   Tablet .. 650 milliGRAM(s) Oral every 6 hours PRN  diphenhydrAMINE   Injectable 25 milliGRAM(s) IV Push every 4 hours PRN  furosemide   Injectable 20 milliGRAM(s) IV Push every 24 hours PRN  metoclopramide Injectable 10 milliGRAM(s) IV Push every 6 hours PRN  senna 1 Tablet(s) Oral at bedtime PRN  sodium chloride 0.9% lock flush 10 milliLiter(s) IV Push every 1 hour PRN      A/P:   59 year old male with a history of amyloidosis  Pre :  Autologous PBSCT day - 2  Continue Melphalan hydration for 24 hours post infusion of last dose   Strict I&O, prn diuresis, daily weights - 6/30 Lasix 40mg IV x 1   Keep platelets =/> 40K for history of amyloidosis   6/30Surveillance COVID nasopharyngeal swab preformed awaiting results     1. Infectious Disease:   acyclovir   Oral Tab/Cap 400 milliGRAM(s) Oral every 8 hours  clotrimazole Lozenge 1 Lozenge Oral five times a day  fluconAZOLE   Tablet 400 milliGRAM(s) Oral daily  trimethoprim  160 mG/sulfamethoxazole 800 mG 1 Tablet(s) Oral every 12 hours    2. VOD Prophylaxis: Actigall, Glutamine, Heparin (dosed at 100 units / kg / day)     3. GI Prophylaxis: pantoprazole    Tablet 40 milliGRAM(s) Oral before breakfast    4. Mouthcare - NS / NaHCO3 rinses, Mycelex, Biotene; Skin care     5. GVHD prophylaxis - n/a     6. Transfuse & replete electrolytes prn     7. IV hydration, daily weights, strict I&O, prn diuresis   Lasix 40mg IV x 1   Continue melphalan hydration for 24 post infusion of last dose     8. PO intake as tolerated, nutrition follow up as needed, MVI, folic acid     9. Antiemetics, anti-diarrhea medications:   metoclopramide Injectable 10 milliGRAM(s) IV Push every 6 hours PRN  ondansetron Injectable 8 milliGRAM(s) IV Push every 8 hours  LORazepam   Injectable 1 milliGRAM(s) IV Push every 24 hours  aprepitant 80 milliGRAM(s) Oral every 24 hours    10. OOB as tolerated, physical therapy consult if needed     11. Monitor coags / fibrinogen 2x week, vitamin K as needed     12. Monitor closely for clinical changes, monitor for fevers     13. Emotional support provided, plan of care discussed and questions addressed     14. Patient education done regarding chemotherapy prep, plan of care, restrictions and discharge planning     15. Continue regular social work input     I have written the above note for Dr. Loja who performed service with me in the room.   Brinda Garrett NP-C  Kandice Woodward PABRANDAN   ([phone])    I have seen and examined patient with NP/PA, I agree with above note as scribed. [facility] Team                                                      Critical / Counseling Time Provided: 30 minutes                                                                                                                                                        Chief Complaint: Autologous pbsct with high dose melphalan prep regimen for treatment of amyloidosis    S: Patient seen and examined with [facility] Transplant Team:   + intermittent  nausea   + constipation     Denies mouth / tongue / throat pain, dyspnea, cough, nausea, vomiting, diarrhea, abdominal pain     O: Vitals:   Vital Signs Last 24 Hrs  T(C): 36.6 (30 Jun 2020 06:34), Max: 37.1 (29 Jun 2020 10:46)  T(F): 97.9 (30 Jun 2020 06:34), Max: 98.8 (29 Jun 2020 10:46)  HR: 68 (30 Jun 2020 06:34) (62 - 75)  BP: 120/76 (30 Jun 2020 06:34) (103/746 - 131/78)  BP(mean): --  RR: 18 (30 Jun 2020 06:34) (18 - 18)  SpO2: 98% (30 Jun 2020 06:34) (96% - 100%)    Admit weight: 85.7 kg   Daily Height in cm: 175 (29 Jun 2020 10:46)    Today' s weight:87.1 kg     Intake / Output:   06-29 @ 07:01 - 06-30 @ 07:00  --------------------------------------------------------  IN: 5723.8 mL / OUT: 5450 mL / NET: 273.8 mL    06-30 @ 07:01 - 06-30 @ 08:47  --------------------------------------------------------  IN: 131.1 mL / OUT: 500 mL / NET: -368.9 mL      PE:   Oropharynx: no erythema or ulcerations   Oral Mucositis:               -                                         Grade: n/a  CVS: S1, S2 RRR   Lungs: CTA throughout bilaterally   Abdomen: + BS x 4, soft, NT, ND   Extremities: + mild chronic LE edema  Gastric Mucositis:      -                                            Grade: n/a  Intestinal Mucositis:    -                                          Grade: n/a  Skin: no rash  TLC: CDI  Neuro: A&O x 3  Pain: denies     Labs:   CBC Full  -  ( 30 Jun 2020 07:06 )  WBC Count : 2.36 K/uL  Hemoglobin : 9.1 g/dL  Hematocrit : 28.1 %  Platelet Count - Automated : 151 K/uL  Mean Cell Volume : 75.3 fl  Mean Cell Hemoglobin : 24.4 pg  Mean Cell Hemoglobin Concentration : 32.4 gm/dL  Auto Neutrophil # : 2.19 K/uL  Auto Lymphocyte # : 0.14 K/uL  Auto Monocyte # : 0.02 K/uL  Auto Eosinophil # : 0.00 K/uL  Auto Basophil # : 0.00 K/uL  Auto Neutrophil % : 93.0 %  Auto Lymphocyte % : 6.1 %  Auto Monocyte % : 0.9 %  Auto Eosinophil % : 0.0 %  Auto Basophil % : 0.0 %                          9.1    2.36  )-----------( 151      ( 30 Jun 2020 07:06 )             28.1     06-30    144  |  111<H>  |  9   ----------------------------<  197<H>  4.3   |  24  |  0.75    Ca    8.3<L>      30 Jun 2020 07:06  Phos  3.2     06-30  Mg     2.0     06-30    TPro  5.0<L>  /  Alb  3.7  /  TBili  0.4  /  DBili  x   /  AST  15  /  ALT  8<L>  /  AlkPhos  51  06-30    PT/INR - ( 29 Jun 2020 15:26 )   PT: 12.9 sec;   INR: 1.14 ratio         PTT - ( 29 Jun 2020 15:26 )  PTT:29.7 sec  LIVER FUNCTIONS - ( 30 Jun 2020 07:06 )  Alb: 3.7 g/dL / Pro: 5.0 g/dL / ALK PHOS: 51 U/L / ALT: 8 U/L / AST: 15 U/L / GGT: x           Lactate Dehydrogenase, Serum: 170 U/L (06-30 @ 07:06)  Lactate Dehydrogenase, Serum: 189 U/L (06-29 @ 15:26)      Meds:   Antimicrobials:   acyclovir   Oral Tab/Cap 400 milliGRAM(s) Oral every 8 hours  clotrimazole Lozenge 1 Lozenge Oral five times a day  fluconAZOLE   Tablet 400 milliGRAM(s) Oral daily  trimethoprim  160 mG/sulfamethoxazole 800 mG 1 Tablet(s) Oral every 12 hours      Heme / Onc:   heparin  Infusion 357 Unit(s)/Hr IV Continuous <Continuous>  melphalan IVPB (eMAR) 190 milliGRAM(s) IV Intermittent once      GI:  pantoprazole    Tablet 40 milliGRAM(s) Oral before breakfast  senna 1 Tablet(s) Oral at bedtime PRN  sodium bicarbonate Mouth Rinse 10 milliLiter(s) Swish and Spit five times a day  ursodiol Capsule 300 milliGRAM(s) Oral two times a day with meals      Cardiovascular:   furosemide   Injectable 20 milliGRAM(s) IV Push every 24 hours PRN      Immunologic:       Other medications:   aprepitant 80 milliGRAM(s) Oral every 24 hours  chlorhexidine 4% Liquid 1 Application(s) Topical <User Schedule>  dexAMETHasone  IVPB 20 milliGRAM(s) IV Intermittent every 24 hours  dextrose 5% + sodium chloride 0.9%. 1000 milliLiter(s) IV Continuous <Continuous>  folic acid 1 milliGRAM(s) Oral daily  LORazepam   Injectable 1 milliGRAM(s) IV Push every 24 hours  multivitamin 1 Tablet(s) Oral daily  ondansetron Injectable 8 milliGRAM(s) IV Push every 8 hours  sodium chloride 0.9%. 1000 milliLiter(s) IV Continuous <Continuous>      PRN:   acetaminophen   Tablet .. 650 milliGRAM(s) Oral every 6 hours PRN  acetaminophen   Tablet .. 650 milliGRAM(s) Oral every 6 hours PRN  diphenhydrAMINE   Injectable 25 milliGRAM(s) IV Push every 4 hours PRN  furosemide   Injectable 20 milliGRAM(s) IV Push every 24 hours PRN  metoclopramide Injectable 10 milliGRAM(s) IV Push every 6 hours PRN  senna 1 Tablet(s) Oral at bedtime PRN  sodium chloride 0.9% lock flush 10 milliLiter(s) IV Push every 1 hour PRN      A/P:   59 year old male with a history of amyloidosis  Pre :  Autologous PBSCT day - 2  Continue Melphalan hydration for 24 hours post infusion of last dose   Strict I&O, prn diuresis, daily weights - 6/30 Lasix 40mg IV x 1   Keep platelets =/> 40K for history of amyloidosis       1. Infectious Disease:   acyclovir   Oral Tab/Cap 400 milliGRAM(s) Oral every 8 hours  clotrimazole Lozenge 1 Lozenge Oral five times a day  fluconAZOLE   Tablet 400 milliGRAM(s) Oral daily  trimethoprim  160 mG/sulfamethoxazole 800 mG 1 Tablet(s) Oral every 12 hours    2. VOD Prophylaxis: Actigall, Glutamine, Heparin (dosed at 100 units / kg / day)     3. GI Prophylaxis: pantoprazole    Tablet 40 milliGRAM(s) Oral before breakfast    4. Mouthcare - NS / NaHCO3 rinses, Mycelex, Biotene; Skin care     5. GVHD prophylaxis - n/a     6. Transfuse & replete electrolytes prn     7. IV hydration, daily weights, strict I&O, prn diuresis   Lasix 40mg IV x 1   Continue melphalan hydration for 24 post infusion of last dose     8. PO intake as tolerated, nutrition follow up as needed, MVI, folic acid     9. Antiemetics, anti-diarrhea medications:   metoclopramide Injectable 10 milliGRAM(s) IV Push every 6 hours PRN  ondansetron Injectable 8 milliGRAM(s) IV Push every 8 hours  LORazepam   Injectable 1 milliGRAM(s) IV Push every 24 hours  aprepitant 80 milliGRAM(s) Oral every 24 hours    10. OOB as tolerated, physical therapy consult if needed     11. Monitor coags / fibrinogen 2x week, vitamin K as needed     12. Monitor closely for clinical changes, monitor for fevers     13. Emotional support provided, plan of care discussed and questions addressed     14. Patient education done regarding chemotherapy prep, plan of care, restrictions and discharge planning     15. Continue regular social work input     I have written the above note for Dr. Loja who performed service with me in the room.   Brinda Garrett NP-C  Kandice Woodward PA-C   ([phone])    I have seen and examined patient with NP/PA, I agree with above note as scribed. [facility] Team                                                      Critical / Counseling Time Provided: 30 minutes                                                                                                                                                        Chief Complaint: Autologous pbsct with high dose melphalan prep regimen for treatment of amyloidosis    S: Patient seen and examined with [facility] Transplant Team:   + intermittent  nausea   + constipation     Denies mouth / tongue / throat pain, dyspnea, cough, nausea, vomiting, diarrhea, abdominal pain     O: Vitals:   Vital Signs Last 24 Hrs  T(C): 36.6 (30 Jun 2020 06:34), Max: 37.1 (29 Jun 2020 10:46)  T(F): 97.9 (30 Jun 2020 06:34), Max: 98.8 (29 Jun 2020 10:46)  HR: 68 (30 Jun 2020 06:34) (62 - 75)  BP: 120/76 (30 Jun 2020 06:34) (103/746 - 131/78)  BP(mean): --  RR: 18 (30 Jun 2020 06:34) (18 - 18)  SpO2: 98% (30 Jun 2020 06:34) (96% - 100%)    Admit weight: 85.7 kg   Daily Height in cm: 175 (29 Jun 2020 10:46)    Today' s weight:87.1 kg     Intake / Output:   06-29 @ 07:01 - 06-30 @ 07:00  --------------------------------------------------------  IN: 5723.8 mL / OUT: 5450 mL / NET: 273.8 mL    06-30 @ 07:01 - 06-30 @ 08:47  --------------------------------------------------------  IN: 131.1 mL / OUT: 500 mL / NET: -368.9 mL      PE:   Oropharynx: no erythema or ulcerations   Oral Mucositis:               -                                         Grade: n/a  CVS: S1, S2 RRR   Lungs: CTA throughout bilaterally   Abdomen: + BS x 4, soft, NT, ND   Extremities: + mild chronic LE edema  Gastric Mucositis:      -                                            Grade: n/a  Intestinal Mucositis:    -                                          Grade: n/a  Skin: no rash  TLC: CDI  Neuro: A&O x 3  Pain: denies     Labs:   CBC Full  -  ( 30 Jun 2020 07:06 )  WBC Count : 2.36 K/uL  Hemoglobin : 9.1 g/dL  Hematocrit : 28.1 %  Platelet Count - Automated : 151 K/uL  Mean Cell Volume : 75.3 fl  Mean Cell Hemoglobin : 24.4 pg  Mean Cell Hemoglobin Concentration : 32.4 gm/dL  Auto Neutrophil # : 2.19 K/uL  Auto Lymphocyte # : 0.14 K/uL  Auto Monocyte # : 0.02 K/uL  Auto Eosinophil # : 0.00 K/uL  Auto Basophil # : 0.00 K/uL  Auto Neutrophil % : 93.0 %  Auto Lymphocyte % : 6.1 %  Auto Monocyte % : 0.9 %  Auto Eosinophil % : 0.0 %  Auto Basophil % : 0.0 %                          9.1    2.36  )-----------( 151      ( 30 Jun 2020 07:06 )             28.1     06-30    144  |  111<H>  |  9   ----------------------------<  197<H>  4.3   |  24  |  0.75    Ca    8.3<L>      30 Jun 2020 07:06  Phos  3.2     06-30  Mg     2.0     06-30    TPro  5.0<L>  /  Alb  3.7  /  TBili  0.4  /  DBili  x   /  AST  15  /  ALT  8<L>  /  AlkPhos  51  06-30    PT/INR - ( 29 Jun 2020 15:26 )   PT: 12.9 sec;   INR: 1.14 ratio         PTT - ( 29 Jun 2020 15:26 )  PTT:29.7 sec  LIVER FUNCTIONS - ( 30 Jun 2020 07:06 )  Alb: 3.7 g/dL / Pro: 5.0 g/dL / ALK PHOS: 51 U/L / ALT: 8 U/L / AST: 15 U/L / GGT: x           Lactate Dehydrogenase, Serum: 170 U/L (06-30 @ 07:06)  Lactate Dehydrogenase, Serum: 189 U/L (06-29 @ 15:26)      Meds:   Antimicrobials:   acyclovir   Oral Tab/Cap 400 milliGRAM(s) Oral every 8 hours  clotrimazole Lozenge 1 Lozenge Oral five times a day  fluconAZOLE   Tablet 400 milliGRAM(s) Oral daily  trimethoprim  160 mG/sulfamethoxazole 800 mG 1 Tablet(s) Oral every 12 hours      Heme / Onc:   heparin  Infusion 357 Unit(s)/Hr IV Continuous <Continuous>  melphalan IVPB (eMAR) 190 milliGRAM(s) IV Intermittent once      GI:  pantoprazole    Tablet 40 milliGRAM(s) Oral before breakfast  senna 1 Tablet(s) Oral at bedtime PRN  sodium bicarbonate Mouth Rinse 10 milliLiter(s) Swish and Spit five times a day  ursodiol Capsule 300 milliGRAM(s) Oral two times a day with meals      Cardiovascular:   furosemide   Injectable 20 milliGRAM(s) IV Push every 24 hours PRN      Immunologic:       Other medications:   aprepitant 80 milliGRAM(s) Oral every 24 hours  chlorhexidine 4% Liquid 1 Application(s) Topical <User Schedule>  dexAMETHasone  IVPB 20 milliGRAM(s) IV Intermittent every 24 hours  dextrose 5% + sodium chloride 0.9%. 1000 milliLiter(s) IV Continuous <Continuous>  folic acid 1 milliGRAM(s) Oral daily  LORazepam   Injectable 1 milliGRAM(s) IV Push every 24 hours  multivitamin 1 Tablet(s) Oral daily  ondansetron Injectable 8 milliGRAM(s) IV Push every 8 hours  sodium chloride 0.9%. 1000 milliLiter(s) IV Continuous <Continuous>      PRN:   acetaminophen   Tablet .. 650 milliGRAM(s) Oral every 6 hours PRN  acetaminophen   Tablet .. 650 milliGRAM(s) Oral every 6 hours PRN  diphenhydrAMINE   Injectable 25 milliGRAM(s) IV Push every 4 hours PRN  furosemide   Injectable 20 milliGRAM(s) IV Push every 24 hours PRN  metoclopramide Injectable 10 milliGRAM(s) IV Push every 6 hours PRN  senna 1 Tablet(s) Oral at bedtime PRN  sodium chloride 0.9% lock flush 10 milliLiter(s) IV Push every 1 hour PRN      A/P:   59 year old male with a history of amyloidosis  Pre :  Autologous PBSCT day - 2  Continue Melphalan hydration for 24 hours post infusion of last dose   Strict I&O, prn diuresis, daily weights - 6/30 Lasix 40mg IV x 2   Keep platelets =/> 40K for history of amyloidosis       1. Infectious Disease:   acyclovir   Oral Tab/Cap 400 milliGRAM(s) Oral every 8 hours  clotrimazole Lozenge 1 Lozenge Oral five times a day  fluconAZOLE   Tablet 400 milliGRAM(s) Oral daily  trimethoprim  160 mG/sulfamethoxazole 800 mG 1 Tablet(s) Oral every 12 hours    2. VOD Prophylaxis: Actigall, Glutamine, Heparin (dosed at 100 units / kg / day)     3. GI Prophylaxis: pantoprazole    Tablet 40 milliGRAM(s) Oral before breakfast    4. Mouthcare - NS / NaHCO3 rinses, Mycelex, Biotene; Skin care     5. GVHD prophylaxis - n/a     6. Transfuse & replete electrolytes prn     7. IV hydration, daily weights, strict I&O, prn diuresis   Lasix 40mg IV x 2  Continue melphalan hydration for 24 post infusion of last dose     8. PO intake as tolerated, nutrition follow up as needed, MVI, folic acid     9. Antiemetics, anti-diarrhea medications:   metoclopramide Injectable 10 milliGRAM(s) IV Push every 6 hours PRN  ondansetron Injectable 8 milliGRAM(s) IV Push every 8 hours  LORazepam   Injectable 1 milliGRAM(s) IV Push every 24 hours  aprepitant 80 milliGRAM(s) Oral every 24 hours    10. OOB as tolerated, physical therapy consult if needed     11. Monitor coags / fibrinogen 2x week, vitamin K as needed     12. Monitor closely for clinical changes, monitor for fevers     13. Emotional support provided, plan of care discussed and questions addressed     14. Patient education done regarding chemotherapy prep, plan of care, restrictions and discharge planning     15. Continue regular social work input     I have written the above note for Dr. Loja who performed service with me in the room.   Brinda Garrett NP-C  Kandice Woodward PA-C   ([phone])    I have seen and examined patient with NP/PA, I agree with above note as scribed. [facility] Team                                                      Critical / Counseling Time Provided: 30 minutes                                                                                                                                                        Chief Complaint: Autologous pbsct with high dose melphalan prep regimen for treatment of amyloidosis    S: Patient seen and examined with [facility] Transplant Team:   + intermittent  nausea   + constipation     Denies mouth / tongue / throat pain, dyspnea, cough, vomiting, diarrhea, abdominal pain     O: Vitals:   Vital Signs Last 24 Hrs  T(C): 36.6 (30 Jun 2020 06:34), Max: 37.1 (29 Jun 2020 10:46)  T(F): 97.9 (30 Jun 2020 06:34), Max: 98.8 (29 Jun 2020 10:46)  HR: 68 (30 Jun 2020 06:34) (62 - 75)  BP: 120/76 (30 Jun 2020 06:34) (103/746 - 131/78)  BP(mean): --  RR: 18 (30 Jun 2020 06:34) (18 - 18)  SpO2: 98% (30 Jun 2020 06:34) (96% - 100%)    Admit weight: 85.7 kg   Daily Height in cm: 175 (29 Jun 2020 10:46)    Today' s weight:87.1 kg     Intake / Output:   06-29 @ 07:01 - 06-30 @ 07:00  --------------------------------------------------------  IN: 5723.8 mL / OUT: 5450 mL / NET: 273.8 mL    06-30 @ 07:01 - 06-30 @ 08:47  --------------------------------------------------------  IN: 131.1 mL / OUT: 500 mL / NET: -368.9 mL      PE:   Oropharynx: no erythema or ulcerations   Oral Mucositis:               -                                         Grade: n/a  CVS: S1, S2 RRR   Lungs: CTA throughout bilaterally   Abdomen: + BS x 4, soft, NT, ND   Extremities: + mild chronic LE edema  Gastric Mucositis:      -                                            Grade: n/a  Intestinal Mucositis:    -                                          Grade: n/a  Skin: no rash  TLC: CDI  Neuro: A&O x 3  Pain: denies     Labs:   CBC Full  -  ( 30 Jun 2020 07:06 )  WBC Count : 2.36 K/uL  Hemoglobin : 9.1 g/dL  Hematocrit : 28.1 %  Platelet Count - Automated : 151 K/uL  Mean Cell Volume : 75.3 fl  Mean Cell Hemoglobin : 24.4 pg  Mean Cell Hemoglobin Concentration : 32.4 gm/dL  Auto Neutrophil # : 2.19 K/uL  Auto Lymphocyte # : 0.14 K/uL  Auto Monocyte # : 0.02 K/uL  Auto Eosinophil # : 0.00 K/uL  Auto Basophil # : 0.00 K/uL  Auto Neutrophil % : 93.0 %  Auto Lymphocyte % : 6.1 %  Auto Monocyte % : 0.9 %  Auto Eosinophil % : 0.0 %  Auto Basophil % : 0.0 %                          9.1    2.36  )-----------( 151      ( 30 Jun 2020 07:06 )             28.1     06-30    144  |  111<H>  |  9   ----------------------------<  197<H>  4.3   |  24  |  0.75    Ca    8.3<L>      30 Jun 2020 07:06  Phos  3.2     06-30  Mg     2.0     06-30    TPro  5.0<L>  /  Alb  3.7  /  TBili  0.4  /  DBili  x   /  AST  15  /  ALT  8<L>  /  AlkPhos  51  06-30    PT/INR - ( 29 Jun 2020 15:26 )   PT: 12.9 sec;   INR: 1.14 ratio         PTT - ( 29 Jun 2020 15:26 )  PTT:29.7 sec  LIVER FUNCTIONS - ( 30 Jun 2020 07:06 )  Alb: 3.7 g/dL / Pro: 5.0 g/dL / ALK PHOS: 51 U/L / ALT: 8 U/L / AST: 15 U/L / GGT: x           Lactate Dehydrogenase, Serum: 170 U/L (06-30 @ 07:06)  Lactate Dehydrogenase, Serum: 189 U/L (06-29 @ 15:26)      Meds:   Antimicrobials:   acyclovir   Oral Tab/Cap 400 milliGRAM(s) Oral every 8 hours  clotrimazole Lozenge 1 Lozenge Oral five times a day  fluconAZOLE   Tablet 400 milliGRAM(s) Oral daily  trimethoprim  160 mG/sulfamethoxazole 800 mG 1 Tablet(s) Oral every 12 hours      Heme / Onc:   heparin  Infusion 357 Unit(s)/Hr IV Continuous <Continuous>  melphalan IVPB (eMAR) 190 milliGRAM(s) IV Intermittent once      GI:  pantoprazole    Tablet 40 milliGRAM(s) Oral before breakfast  senna 1 Tablet(s) Oral at bedtime PRN  sodium bicarbonate Mouth Rinse 10 milliLiter(s) Swish and Spit five times a day  ursodiol Capsule 300 milliGRAM(s) Oral two times a day with meals      Cardiovascular:   furosemide   Injectable 20 milliGRAM(s) IV Push every 24 hours PRN      Immunologic:       Other medications:   aprepitant 80 milliGRAM(s) Oral every 24 hours  chlorhexidine 4% Liquid 1 Application(s) Topical <User Schedule>  dexAMETHasone  IVPB 20 milliGRAM(s) IV Intermittent every 24 hours  dextrose 5% + sodium chloride 0.9%. 1000 milliLiter(s) IV Continuous <Continuous>  folic acid 1 milliGRAM(s) Oral daily  LORazepam   Injectable 1 milliGRAM(s) IV Push every 24 hours  multivitamin 1 Tablet(s) Oral daily  ondansetron Injectable 8 milliGRAM(s) IV Push every 8 hours  sodium chloride 0.9%. 1000 milliLiter(s) IV Continuous <Continuous>      PRN:   acetaminophen   Tablet .. 650 milliGRAM(s) Oral every 6 hours PRN  acetaminophen   Tablet .. 650 milliGRAM(s) Oral every 6 hours PRN  diphenhydrAMINE   Injectable 25 milliGRAM(s) IV Push every 4 hours PRN  furosemide   Injectable 20 milliGRAM(s) IV Push every 24 hours PRN  metoclopramide Injectable 10 milliGRAM(s) IV Push every 6 hours PRN  senna 1 Tablet(s) Oral at bedtime PRN  sodium chloride 0.9% lock flush 10 milliLiter(s) IV Push every 1 hour PRN      A/P:   59 year old male with a history of amyloidosis  Pre :  Autologous PBSCT day - 2  Continue Melphalan hydration for 24 hours post infusion of last dose   Strict I&O, prn diuresis, daily weights - 6/30 Lasix 40mg IV x 2   Keep platelets =/> 40K for history of amyloidosis       1. Infectious Disease:   acyclovir   Oral Tab/Cap 400 milliGRAM(s) Oral every 8 hours  clotrimazole Lozenge 1 Lozenge Oral five times a day  fluconAZOLE   Tablet 400 milliGRAM(s) Oral daily  trimethoprim  160 mG/sulfamethoxazole 800 mG 1 Tablet(s) Oral every 12 hours    2. VOD Prophylaxis: Actigall, Glutamine, Heparin (dosed at 100 units / kg / day)     3. GI Prophylaxis: pantoprazole    Tablet 40 milliGRAM(s) Oral before breakfast    4. Mouthcare - NS / NaHCO3 rinses, Mycelex, Biotene; Skin care     5. GVHD prophylaxis - n/a     6. Transfuse & replete electrolytes prn     7. IV hydration, daily weights, strict I&O, prn diuresis   Lasix 40mg IV x 2  Continue melphalan hydration for 24 post infusion of last dose     8. PO intake as tolerated, nutrition follow up as needed, MVI, folic acid     9. Antiemetics, anti-diarrhea medications:   metoclopramide Injectable 10 milliGRAM(s) IV Push every 6 hours PRN  ondansetron Injectable 8 milliGRAM(s) IV Push every 8 hours  LORazepam   Injectable 1 milliGRAM(s) IV Push every 24 hours  aprepitant 80 milliGRAM(s) Oral every 24 hours    10. OOB as tolerated, physical therapy consult if needed     11. Monitor coags / fibrinogen 2x week, vitamin K as needed     12. Monitor closely for clinical changes, monitor for fevers     13. Emotional support provided, plan of care discussed and questions addressed     14. Patient education done regarding chemotherapy prep, plan of care, restrictions and discharge planning     15. Continue regular social work input     I have written the above note for Dr. Loja who performed service with me in the room.   Brinda Garrett NP-C  Kandice Woodward PABRANDAN   ([phone])    I have seen and examined patient with NP/PA, I agree with above note as scribed.

## 2020-06-30 NOTE — DIETITIAN INITIAL EVALUATION ADULT. - FACTORS AFF FOOD INTAKE
pt reports good appetite and intake thus far; denies any chewing/swallowing issues; reports he usually has a BM 5 out of 7 days of the week, last BM was 2 days ago, did take some prunes and prune juice this morning

## 2020-06-30 NOTE — DIETITIAN INITIAL EVALUATION ADULT. - REASON INDICATOR FOR ASSESSMENT
Pt seen for LOS for PBSCT admission.   Source: pt     Pt c amyloidosis, admitted c autologous PBSCT, day -2.

## 2020-06-30 NOTE — DIETITIAN INITIAL EVALUATION ADULT. - ADD RECOMMEND
1. Discussed importance and how to take Glutasolve.2. Discussed RD remains available as needed to address GI symptoms and work c patient as needed. Discussed importance of adequate PO intake at this time. Encouraged PO intake-small, frequent meals and nutrient dense snacks. In house, encouraged pt to order nutrient dense snacks c meals to consume in between meals to mimic small, frequent meals.

## 2020-06-30 NOTE — DIETITIAN INITIAL EVALUATION ADULT. - OTHER INFO
Pt reports he was eating very well at home the last month or two. Reports he has been eating a variety of foods at home, no specific restrictions.     Pt reports NKFA. States no micronutrient coverage at home.     Pt reports he usually weighed about 194 pounds prior to amyloidosis and was down to about 160 pounds and over the last few months he has been able to gain back his wt, noted admit wt of 188.9 pounds and wt today of about 192 pounds, likely elevated due to fluids, ordered for Lasix. Pt reports he has lost much wt due to colitis and having many GI side effects from amyloidosis.    Pt agreeable to nutrition focused physical exam, pt well nourished, no significant muscle or fat loss at this time.

## 2020-06-30 NOTE — DIETITIAN INITIAL EVALUATION ADULT. - PERTINENT MEDS FT
MEDICATIONS  (STANDING):  acyclovir   Oral Tab/Cap 400 milliGRAM(s) Oral every 8 hours  aprepitant 80 milliGRAM(s) Oral every 24 hours  chlorhexidine 4% Liquid 1 Application(s) Topical <User Schedule>  clotrimazole Lozenge 1 Lozenge Oral five times a day  dexAMETHasone  IVPB 20 milliGRAM(s) IV Intermittent every 24 hours  dextrose 5% + sodium chloride 0.9%. 1000 milliLiter(s) (200 mL/Hr) IV Continuous <Continuous>  fluconAZOLE   Tablet 400 milliGRAM(s) Oral daily  folic acid 1 milliGRAM(s) Oral daily  heparin  Infusion 357 Unit(s)/Hr (3.57 mL/Hr) IV Continuous <Continuous>  LORazepam   Injectable 1 milliGRAM(s) IV Push every 24 hours  melphalan IVPB (eMAR) 190 milliGRAM(s) IV Intermittent once  multivitamin 1 Tablet(s) Oral daily  ondansetron Injectable 8 milliGRAM(s) IV Push every 8 hours  pantoprazole    Tablet 40 milliGRAM(s) Oral before breakfast  sodium bicarbonate Mouth Rinse 10 milliLiter(s) Swish and Spit five times a day  sodium chloride 0.9%. 1000 milliLiter(s) (20 mL/Hr) IV Continuous <Continuous>  trimethoprim  160 mG/sulfamethoxazole 800 mG 1 Tablet(s) Oral every 12 hours  ursodiol Capsule 300 milliGRAM(s) Oral two times a day with meals    MEDICATIONS  (PRN):  acetaminophen   Tablet .. 650 milliGRAM(s) Oral every 6 hours PRN Temp greater or equal to 38C (100.4F), Mild Pain (1 - 3)  acetaminophen   Tablet .. 650 milliGRAM(s) Oral every 6 hours PRN Temp greater or equal to 38C (100.4F), Mild Pain (1 - 3)  diphenhydrAMINE   Injectable 25 milliGRAM(s) IV Push every 4 hours PRN Allergy symptoms  furosemide   Injectable 20 milliGRAM(s) IV Push every 24 hours PRN If urine output is <100mL/hr for 2 hours  metoclopramide Injectable 10 milliGRAM(s) IV Push every 6 hours PRN Nausea and/or Vomiting  senna 1 Tablet(s) Oral at bedtime PRN Constipation  sodium chloride 0.9% lock flush 10 milliLiter(s) IV Push every 1 hour PRN Pre/post blood products, medications, blood draw, and to maintain line patency

## 2020-06-30 NOTE — DIETITIAN INITIAL EVALUATION ADULT. - PROBLEM SELECTOR PLAN 1
1. Admit to BMTU   2. -3 hours prior to Melphalan start hydration: D5NS at 200ml /hr and continue 24 hours post Melphalan infusion  3. Day – 3 & day -2 - Melphalan 100mg/m2  IV   4. Strict I&O, daily weights, prn diuresis   5. Day -1 rest day. At 2200 on day – 1, start transplant hydration 1/2NS + 50mEq NaHCO3- (may substitute [id number] if bicarb not available)  6. Day 0 – infuse HPC product. 4 hours after infusion of cells start Zarxio 5 micrograms / kg (actual weight) . Continue through engraftment.   7. On day 0, + 1, + 2-give Kepivance 60micrograms / kg (actual weight)

## 2020-06-30 NOTE — CONSULT NOTE ADULT - PROBLEM SELECTOR RECOMMENDATION 9
- pre-transplant regimen per heme  - no major symptoms currently  - high risk for side effects given chemo, continue to monitor

## 2020-06-30 NOTE — CONSULT NOTE ADULT - SUBJECTIVE AND OBJECTIVE BOX
HPI: 59M with PMH of HCV, amyloidosis, dyspepsia, hematuria, colitis, Raynaud's phenomenon, and cutaneous mastocytosis here for an autologous peripheral blood stem cell transplant with high dose melphalan preparative regimen. Diagnosed in 2016 with HCV, and subsequently in 2019 found to have amyloidosis. Was treated with CyBorD x 7 cycles. Palliative following for symptom management post-transplant.    PERTINENT PM/SXH:   Cutaneous mastocytosis  Hepatitis C  Amyloidosis      FAMILY HISTORY:    ITEMS NOT CHECKED ARE NOT PRESENT    SOCIAL HISTORY:   Significant other/partner[X ]  Children[X ]  Sabianism/Spirituality:  Substance hx:  [ ]   Tobacco hx:  [ ]   Alcohol hx: [ ]   Home Opioid hx:  [ ] I-Stop Reference No: [id number]  Living Situation: [X ]Home  [ ]Long term care  [ ]Rehab [ ]Other    ADVANCE DIRECTIVES:    DNR  MOLST  [ ]  Living Will  [ ]   DECISION MAKER(s):  [ ] Health Care Proxy(s)  [ ] Surrogate(s)  [ ] Guardian           Name(s): Phone Number(s): spouse [phone]    BASELINE (I)ADL(s) (prior to admission):  [place]: [X ]Total  [ ] Moderate [ ]Dependent    Allergies    No Known Allergies    Intolerances    MEDICATIONS  (STANDING):  acyclovir   Oral Tab/Cap 400 milliGRAM(s) Oral every 8 hours  aprepitant 80 milliGRAM(s) Oral every 24 hours  Biotene Dry Mouth Oral Rinse 5 milliLiter(s) Swish and Spit five times a day  chlorhexidine 4% Liquid 1 Application(s) Topical <User Schedule>  clotrimazole Lozenge 1 Lozenge Oral five times a day  dexAMETHasone  IVPB 20 milliGRAM(s) IV Intermittent every 24 hours  dextrose 5% + sodium chloride 0.9%. 1000 milliLiter(s) (200 mL/Hr) IV Continuous <Continuous>  fluconAZOLE   Tablet 400 milliGRAM(s) Oral daily  folic acid 1 milliGRAM(s) Oral daily  heparin  Infusion 357 Unit(s)/Hr (3.57 mL/Hr) IV Continuous <Continuous>  LORazepam   Injectable 1 milliGRAM(s) IV Push every 24 hours  melphalan IVPB (eMAR) 190 milliGRAM(s) IV Intermittent once  multivitamin 1 Tablet(s) Oral daily  ondansetron Injectable 8 milliGRAM(s) IV Push every 8 hours  pantoprazole    Tablet 40 milliGRAM(s) Oral before breakfast  sodium bicarbonate Mouth Rinse 10 milliLiter(s) Swish and Spit five times a day  sodium chloride 0.9%. 1000 milliLiter(s) (20 mL/Hr) IV Continuous <Continuous>  trimethoprim  160 mG/sulfamethoxazole 800 mG 1 Tablet(s) Oral every 12 hours  ursodiol Capsule 300 milliGRAM(s) Oral two times a day with meals    MEDICATIONS  (PRN):  acetaminophen   Tablet .. 650 milliGRAM(s) Oral every 6 hours PRN Temp greater or equal to 38C (100.4F), Mild Pain (1 - 3)  acetaminophen   Tablet .. 650 milliGRAM(s) Oral every 6 hours PRN Temp greater or equal to 38C (100.4F), Mild Pain (1 - 3)  diphenhydrAMINE   Injectable 25 milliGRAM(s) IV Push every 4 hours PRN Allergy symptoms  furosemide   Injectable 20 milliGRAM(s) IV Push every 24 hours PRN If urine output is <100mL/hr for 2 hours  metoclopramide Injectable 10 milliGRAM(s) IV Push every 6 hours PRN Nausea and/or Vomiting  polyethylene glycol 3350 17 Gram(s) Oral daily PRN Constipation  senna 1 Tablet(s) Oral at bedtime PRN Constipation  sodium chloride 0.9% lock flush 10 milliLiter(s) IV Push every 1 hour PRN Pre/post blood products, medications, blood draw, and to maintain line patency    PRESENT SYMPTOMS: [ ]Unable to obtain due to poor mentation   Source if other than patient:  [ ]Family   [ ]Team     Pain: [ ]yes [ X]no  QOL impact -   Location -                    Aggravating factors -  Quality -  Radiation -  Timing-  Severity (0-10 scale):  Minimal acceptable level (0-10 scale):     CPOT:    https://www.[facility].org/getattachment/wmg76l88-1h4d-4k8e-0i5p-8483i6201x1e/Critical-Care-Pain-Observation-Tool-(CPOT)      PAIN AD Score:     http://geriatrictoolkit.[email or web address]/cog/painad.pdf (press ctrl +  left click to view)    Dyspnea:                           [ ]Mild [ ]Moderate [ ]Severe  Anxiety:                             [ ]Mild [ ]Moderate [ ]Severe  Fatigue:                             [ ]Mild [ ]Moderate [ ]Severe  Nausea:                             [ ]Mild [ ]Moderate [ ]Severe  Loss of appetite:              [ ]Mild [ ]Moderate [ ]Severe  Constipation:                    [ ]Mild [ ]Moderate [ ]Severe    Other Symptoms:  [X ]All other review of systems negative     Palliative Performance Status Version 2:         %    http://[facility].org/files/news/palliative_performance_scale_ppsv2.pdf  PHYSICAL EXAM:  Vital Signs Last 24 Hrs  T(C): 36.7 (2020 14:40), Max: 36.9 (2020 21:08)  T(F): 98.1 (2020 14:40), Max: 98.4 (2020 21:08)  HR: 76 (2020 14:40) (64 - 76)  BP: 111/73 (2020 14:40) (103/746 - 120/76)  BP(mean): --  RR: 18 (2020 14:40) (18 - 18)  SpO2: 97% (2020 14:40) (96% - 99%) I&O's Summary    2020 07:01  -  2020 07:00  --------------------------------------------------------  IN: 5723.8 mL / OUT: 5450 mL / NET: 273.8 mL    2020 07:01  -  2020 16:29  --------------------------------------------------------  IN: 2626.5 mL / OUT: 4425 mL / NET: -1798.5 mL      GENERAL:  [X ]Alert  [ ]Oriented x   [ ]Lethargic  [ ]Cachexia  [ ]Unarousable  [ X]Verbal  [ ]Non-Verbal  Behavioral:   [ ] Anxiety  [ ] Delirium [ ] Agitation [ ] Other  HEENT:  [X ]Normal   [ ]Dry mouth   [ ]ET Tube/Trach  [ ]Oral lesions  PULMONARY:   [ X]Clear [ ]Tachypnea  [ ]Audible excessive secretions   [ ]Rhonchi        [ ]Right [ ]Left [ ]Bilateral  [ ]Crackles        [ ]Right [ ]Left [ ]Bilateral  [ ]Wheezing     [ ]Right [ ]Left [ ]Bilateral  [ ]Diminished breath sounds [ ]right [ ]left [ ]bilateral  CARDIOVASCULAR:    [ X]Regular [ ]Irregular [ ]Tachy  [ ]Rogelio [ ]Murmur [ ]Other  GASTROINTESTINAL:  [X ]Soft  [ ]Distended   [X ]+BS  [ X]Non tender [ ]Tender  [ ]PEG [ ]OGT/ NGT  Last BM:     GENITOURINARY:  [ ]Normal [ ] Incontinent   [ ]Oliguria/Anuria   [ ]Aguilar  MUSCULOSKELETAL:   [X ]Normal   [ ]Weakness  [ ]Bed/Wheelchair bound [ ]Edema  NEUROLOGIC:   [X ]No focal deficits  [ ]Cognitive impairment  [ ]Dysphagia [ ]Dysarthria [ ]Paresis [ ]Other   SKIN:   [ ]Normal    [ ]Rash  [ ]Pressure ulcer(s)       Present on admission [ ]y [ ]n    CRITICAL CARE:  [ ] Shock Present  [ ]Septic [ ]Cardiogenic [ ]Neurologic [ ]Hypovolemic  [ ]  Vasopressors [ ]  Inotropes   [ ]Respiratory failure present [ ]Mechanical ventilation [ ]Non-invasive ventilatory support [ ]High flow  [ ]Acute  [ ]Chronic [ ]Hypoxic  [ ]Hypercarbic [ ]Other  [ ]Other organ failure     LABS: reviewed                        9.1    2.36  )-----------( 151      ( 2020 07:06 )             28.1   06-30    144  |  111<H>  |  9   ----------------------------<  197<H>  4.3   |  24  |  0.75    Ca    8.3<L>      2020 07:06  Phos  3.2     06-30  Mg     2.0     06-30    TPro  5.0<L>  /  Alb  3.7  /  TBili  0.4  /  DBili  x   /  AST  15  /  ALT  8<L>  /  AlkPhos  51  06-30  PT/INR - ( 2020 15:26 )   PT: 12.9 sec;   INR: 1.14 ratio         PTT - ( 2020 15:26 )  PTT:29.7 sec    Urinalysis Basic - ( 2020 12:47 )    Color: Light Yellow / Appearance: Clear / S.012 / pH: x  Gluc: x / Ketone: Negative  / Bili: Negative / Urobili: Negative   Blood: x / Protein: Negative / Nitrite: Negative   Leuk Esterase: Negative / RBC: x / WBC x   Sq Epi: x / Non Sq Epi: x / Bacteria: x      RADIOLOGY & ADDITIONAL STUDIES:    PROTEIN CALORIE MALNUTRITION PRESENT: [ ]mild [ ]moderate [ ]severe [ ]underweight [ ]morbid obesity  https://www.andeal.org/vault/2440/web/files/ONC/Table_Clinical%20Characteristics%20to%20Document%20Malnutrition-White%20JV%20et%20al%206709.pdf    Height (cm): 175 (20 @ 10:46), 175 (20 @ 20:41), 180 (19 @ 16:59)  Weight (kg): 85.7 (20 @ 10:46), 83 (20 @ 20:41), 81 (19 @ 08:18)  BMI (kg/m2): 28 (20 @ 10:46), 27.1 (20 @ 20:41), 26.4 (20 @ 20:41)    [ ]PPSV2 < or = to 30% [ ]significant weight loss  [ ]poor nutritional intake  [ ]anasarca     Albumin, Serum: 3.7 g/dL (20 @ 07:06)   [ ]Artificial Nutrition      REFERRALS:   [ ]Chaplaincy  [ ]Hospice  [ ]Child Life  [ ]Social Work  [ ]Case management [ ]Holistic Therapy     Goals of Care Document:     ______________  Miguel Angel Albrecht MD   of Geriatric and Palliative Medicine  [facility]     Please page the following number for clinical matters between the hours of 9AM and 5PM   from Monday through Friday : [phone]    After 5PM and on weekends, please page: [phone]. The Geriatric and Palliative Medicine consult service has 24/ coverage for medical recommendations, including for symptom management needs.

## 2020-06-30 NOTE — PROGRESS NOTE ADULT - ATTENDING COMMENTS
59 year old male with history of amyloidosis treated with Velcade and Cybor D, hepatitis C treated with Harvoni admitted for autologous pbsct with melphalan preparative regimen   Day - 2 - melphalan 2/2, continue melphalan hydration for 24 hours post infusion of last dose   Strict I&O, daily weights, prn diuresis   Actigall, low dose heparin gtt & glutamine supplementation to prevent VOD while being aggressively hydration   Bactrim DS / Fluconazole / Acyclovir ppx   Mouth care, skin care 59 year old male with history of amyloidosis treated with Cybor D, hepatitis C treated with Harvoni admitted for autologous pbsct with melphalan preparative regimen   Day - 2 - melphalan 2/2, continue melphalan hydration for 24 hours post infusion of last dose   Strict I&O, daily weights, prn diuresis   Actigall, low dose heparin gtt & glutamine supplementation to prevent VOD while being aggressively hydration   Bactrim DS / Fluconazole / Acyclovir ppx   Mouth care, skin care 59 year old male with history of amyloidosis treated with CyBorD, now admitted for autologous pbsct with Melphalan preparative regimen   Day - 2 - Melphalan  day 2/2, continue Melphalan hydration for 24 hours post infusion of last dose   For HPC transplant on 7/2/20  Strict I&O, daily weight, Lasix to keep O > I  VOD prophylaxis- Actigall, low dose heparin gtt & glutamine supplementation  ID- Bactrim DS / Fluconazole / Acyclovir prophylaxis  Antiemetics  Bowel regimen  Mouth care, skin care  OOB/ambulate

## 2020-06-30 NOTE — DIETITIAN INITIAL EVALUATION ADULT. - PROBLEM SELECTOR PLAN 3
1. VOD prophylaxis - low dose heparin gtt (dosed at 100 units / kg / day), glutamine supplementation, Actigall BID   2. PCP prophylaxis - Bactrim DS through day -2    3. Antiviral prophylaxis - Acyclovir 400mg po TID    4. Antifungal prophylaxis- Diflucan 400 mg po daily.  5.. GI prophylaxis - Protonix po QD   6. Antibacterial prophylaxis - when ANC < 1000, start Cipro 500mg po BID. If becomes febrile, pan cx, CXR and change Cipro to Cefepime 2g IV q 8 hours. Continue until count recovery  7. Kepivance for prevention of mucositis- days 0, +1, +2  8. Aggressive mouth care and skin care as per protocol

## 2020-07-01 LAB
ALBUMIN SERPL ELPH-MCNC: 3.6 G/DL — SIGNIFICANT CHANGE UP (ref 3.3–5)
ALP SERPL-CCNC: 46 U/L — SIGNIFICANT CHANGE UP (ref 40–120)
ALT FLD-CCNC: 10 U/L — SIGNIFICANT CHANGE UP (ref 10–45)
ANION GAP SERPL CALC-SCNC: 5 MMOL/L — SIGNIFICANT CHANGE UP (ref 5–17)
AST SERPL-CCNC: 11 U/L — SIGNIFICANT CHANGE UP (ref 10–40)
BASOPHILS # BLD AUTO: 0 K/UL — SIGNIFICANT CHANGE UP (ref 0–0.2)
BASOPHILS NFR BLD AUTO: 0 % — SIGNIFICANT CHANGE UP (ref 0–2)
BILIRUB DIRECT SERPL-MCNC: 0.1 MG/DL — SIGNIFICANT CHANGE UP (ref 0–0.2)
BILIRUB INDIRECT FLD-MCNC: 0.3 MG/DL — SIGNIFICANT CHANGE UP (ref 0.2–1)
BILIRUB SERPL-MCNC: 0.4 MG/DL — SIGNIFICANT CHANGE UP (ref 0.2–1.2)
BLD GP AB SCN SERPL QL: NEGATIVE — SIGNIFICANT CHANGE UP
BUN SERPL-MCNC: 16 MG/DL — SIGNIFICANT CHANGE UP (ref 7–23)
CALCIUM SERPL-MCNC: 7.8 MG/DL — LOW (ref 8.4–10.5)
CHLORIDE SERPL-SCNC: 108 MMOL/L — SIGNIFICANT CHANGE UP (ref 96–108)
CO2 SERPL-SCNC: 29 MMOL/L — SIGNIFICANT CHANGE UP (ref 22–31)
CREAT SERPL-MCNC: 0.93 MG/DL — SIGNIFICANT CHANGE UP (ref 0.5–1.3)
EOSINOPHIL # BLD AUTO: 0 K/UL — SIGNIFICANT CHANGE UP (ref 0–0.5)
EOSINOPHIL NFR BLD AUTO: 0 % — SIGNIFICANT CHANGE UP (ref 0–6)
GLUCOSE SERPL-MCNC: 154 MG/DL — HIGH (ref 70–99)
HCT VFR BLD CALC: 26.9 % — LOW (ref 39–50)
HGB BLD-MCNC: 8.4 G/DL — LOW (ref 13–17)
IMM GRANULOCYTES NFR BLD AUTO: 0.9 % — SIGNIFICANT CHANGE UP (ref 0–1.5)
LDH SERPL L TO P-CCNC: 153 U/L — SIGNIFICANT CHANGE UP (ref 50–242)
LYMPHOCYTES # BLD AUTO: 0.13 K/UL — LOW (ref 1–3.3)
LYMPHOCYTES # BLD AUTO: 2.9 % — LOW (ref 13–44)
MAGNESIUM SERPL-MCNC: 2 MG/DL — SIGNIFICANT CHANGE UP (ref 1.6–2.6)
MCHC RBC-ENTMCNC: 23.3 PG — LOW (ref 27–34)
MCHC RBC-ENTMCNC: 31.2 GM/DL — LOW (ref 32–36)
MCV RBC AUTO: 74.7 FL — LOW (ref 80–100)
MONOCYTES # BLD AUTO: 0.19 K/UL — SIGNIFICANT CHANGE UP (ref 0–0.9)
MONOCYTES NFR BLD AUTO: 4.2 % — SIGNIFICANT CHANGE UP (ref 2–14)
NEUTROPHILS # BLD AUTO: 4.12 K/UL — SIGNIFICANT CHANGE UP (ref 1.8–7.4)
NEUTROPHILS NFR BLD AUTO: 92 % — HIGH (ref 43–77)
NRBC # BLD: 0 /100 WBCS — SIGNIFICANT CHANGE UP (ref 0–0)
PHOSPHATE SERPL-MCNC: 4.4 MG/DL — SIGNIFICANT CHANGE UP (ref 2.5–4.5)
PLATELET # BLD AUTO: 150 K/UL — SIGNIFICANT CHANGE UP (ref 150–400)
POTASSIUM SERPL-MCNC: 4 MMOL/L — SIGNIFICANT CHANGE UP (ref 3.5–5.3)
POTASSIUM SERPL-SCNC: 4 MMOL/L — SIGNIFICANT CHANGE UP (ref 3.5–5.3)
PROT SERPL-MCNC: 5.2 G/DL — LOW (ref 6–8.3)
RBC # BLD: 3.6 M/UL — LOW (ref 4.2–5.8)
RBC # FLD: 15.8 % — HIGH (ref 10.3–14.5)
RH IG SCN BLD-IMP: POSITIVE — SIGNIFICANT CHANGE UP
SODIUM SERPL-SCNC: 142 MMOL/L — SIGNIFICANT CHANGE UP (ref 135–145)
WBC # BLD: 4.48 K/UL — SIGNIFICANT CHANGE UP (ref 3.8–10.5)
WBC # FLD AUTO: 4.48 K/UL — SIGNIFICANT CHANGE UP (ref 3.8–10.5)

## 2020-07-01 PROCEDURE — 99291 CRITICAL CARE FIRST HOUR: CPT

## 2020-07-01 RX ORDER — LACTULOSE 10 G/15ML
20 SOLUTION ORAL EVERY 4 HOURS
Refills: 0 | Status: COMPLETED | OUTPATIENT
Start: 2020-07-01 | End: 2020-07-01

## 2020-07-01 RX ORDER — MAGNESIUM HYDROXIDE 400 MG/1
30 TABLET, CHEWABLE ORAL DAILY
Refills: 0 | Status: DISCONTINUED | OUTPATIENT
Start: 2020-07-01 | End: 2020-07-08

## 2020-07-01 RX ORDER — FUROSEMIDE 40 MG
40 TABLET ORAL ONCE
Refills: 0 | Status: COMPLETED | OUTPATIENT
Start: 2020-07-01 | End: 2020-07-01

## 2020-07-01 RX ORDER — MAGNESIUM HYDROXIDE 400 MG/1
30 TABLET, CHEWABLE ORAL ONCE
Refills: 0 | Status: DISCONTINUED | OUTPATIENT
Start: 2020-07-01 | End: 2020-07-01

## 2020-07-01 RX ADMIN — Medication 400 MILLIGRAM(S): at 13:03

## 2020-07-01 RX ADMIN — URSODIOL 300 MILLIGRAM(S): 250 TABLET, FILM COATED ORAL at 08:20

## 2020-07-01 RX ADMIN — Medication 10 MILLILITER(S): at 08:20

## 2020-07-01 RX ADMIN — URSODIOL 300 MILLIGRAM(S): 250 TABLET, FILM COATED ORAL at 17:01

## 2020-07-01 RX ADMIN — Medication 1 LOZENGE: at 11:47

## 2020-07-01 RX ADMIN — Medication 5 MILLILITER(S): at 08:21

## 2020-07-01 RX ADMIN — Medication 1 LOZENGE: at 00:00

## 2020-07-01 RX ADMIN — ONDANSETRON 8 MILLIGRAM(S): 8 TABLET, FILM COATED ORAL at 06:43

## 2020-07-01 RX ADMIN — FLUCONAZOLE 400 MILLIGRAM(S): 150 TABLET ORAL at 11:47

## 2020-07-01 RX ADMIN — Medication 10 MILLILITER(S): at 11:48

## 2020-07-01 RX ADMIN — Medication 400 MILLIGRAM(S): at 06:43

## 2020-07-01 RX ADMIN — Medication 400 MILLIGRAM(S): at 20:55

## 2020-07-01 RX ADMIN — LACTULOSE 20 GRAM(S): 10 SOLUTION ORAL at 16:53

## 2020-07-01 RX ADMIN — Medication 1 LOZENGE: at 23:12

## 2020-07-01 RX ADMIN — Medication 5 MILLILITER(S): at 11:48

## 2020-07-01 RX ADMIN — Medication 1 LOZENGE: at 15:57

## 2020-07-01 RX ADMIN — Medication 1 TABLET(S): at 11:47

## 2020-07-01 RX ADMIN — Medication 5 MILLILITER(S): at 15:57

## 2020-07-01 RX ADMIN — SODIUM CHLORIDE 150 MILLILITER(S): 9 INJECTION, SOLUTION INTRAVENOUS at 22:01

## 2020-07-01 RX ADMIN — Medication 10 MILLILITER(S): at 19:22

## 2020-07-01 RX ADMIN — ONDANSETRON 8 MILLIGRAM(S): 8 TABLET, FILM COATED ORAL at 13:03

## 2020-07-01 RX ADMIN — HEPARIN SODIUM 3.57 UNIT(S)/HR: 5000 INJECTION INTRAVENOUS; SUBCUTANEOUS at 19:25

## 2020-07-01 RX ADMIN — LACTULOSE 20 GRAM(S): 10 SOLUTION ORAL at 20:56

## 2020-07-01 RX ADMIN — Medication 10 MILLILITER(S): at 00:00

## 2020-07-01 RX ADMIN — Medication 1 LOZENGE: at 19:22

## 2020-07-01 RX ADMIN — SODIUM CHLORIDE 20 MILLILITER(S): 9 INJECTION INTRAMUSCULAR; INTRAVENOUS; SUBCUTANEOUS at 19:21

## 2020-07-01 RX ADMIN — PANTOPRAZOLE SODIUM 40 MILLIGRAM(S): 20 TABLET, DELAYED RELEASE ORAL at 06:44

## 2020-07-01 RX ADMIN — MAGNESIUM HYDROXIDE 30 MILLILITER(S): 400 TABLET, CHEWABLE ORAL at 08:56

## 2020-07-01 RX ADMIN — Medication 1 MILLIGRAM(S): at 11:47

## 2020-07-01 RX ADMIN — Medication 5 MILLILITER(S): at 19:22

## 2020-07-01 RX ADMIN — Medication 40 MILLIGRAM(S): at 08:35

## 2020-07-01 RX ADMIN — Medication 5 MILLILITER(S): at 00:00

## 2020-07-01 RX ADMIN — CHLORHEXIDINE GLUCONATE 1 APPLICATION(S): 213 SOLUTION TOPICAL at 06:44

## 2020-07-01 RX ADMIN — Medication 40 MILLIGRAM(S): at 17:01

## 2020-07-01 RX ADMIN — SODIUM CHLORIDE 200 MILLILITER(S): 9 INJECTION, SOLUTION INTRAVENOUS at 13:03

## 2020-07-01 RX ADMIN — Medication 10 MILLILITER(S): at 23:12

## 2020-07-01 RX ADMIN — Medication 10 MILLILITER(S): at 15:57

## 2020-07-01 RX ADMIN — APREPITANT 80 MILLIGRAM(S): 80 CAPSULE ORAL at 11:47

## 2020-07-01 RX ADMIN — Medication 5 MILLILITER(S): at 23:12

## 2020-07-01 RX ADMIN — Medication 1 LOZENGE: at 08:21

## 2020-07-01 NOTE — PROGRESS NOTE ADULT - ATTENDING COMMENTS
59 year old male with history of amyloidosis treated with CyBorD, now admitted for autologous pbsct with Melphalan preparative regimen   Day - 1 continue Melphalan hydration for 24 hours post infusion of last dose   For HPC transplant on 7/2/20  Strict I&O, daily weight, Lasix to keep O > I  VOD prophylaxis- Actigall, low dose heparin gtt & glutamine supplementation  ID- Bactrim DS / Fluconazole / Acyclovir prophylaxis  Antiemetics  Bowel regimen  Mouth care, skin care  OOB/ambulate 59 year old male with history of amyloidosis treated with CyBorD, now admitted for autologous pbsct with Melphalan preparative regimen   Day - 1(rest day), continue Melphalan hydration for 24 hours post infusion of last dose   For HPC transplant on 7/2/20  Strict I&O, daily weight, Lasix to keep O > I  VOD prophylaxis- Actigall, low dose heparin gtt & glutamine supplementation  ID- continue Fluconazole / Acyclovir prophylaxis  Antiemetics  Bowel regimen  Mouth care, skin care  Steroid induced hyperglycemia- monitor glucose  OOB/ambulate

## 2020-07-01 NOTE — PROGRESS NOTE ADULT - SUBJECTIVE AND OBJECTIVE BOX
[facility] Transplant Team                                                      Critical / Counseling Time Provided: 30 minutes                                                                                                                                                        Chief Complaint: Autologous pbsct with high dose melphalan prep regimen for treatment of amyloidosis    S: Patient seen and examined with [facility]PC Transplant Team:   + intermittent  nausea   + constipation     O: Vitals:   Vital Signs Last 24 Hrs  T(C): 37.8 (2020 08:31), Max: 37.8 (2020 08:31)  T(F): 100 (2020 08:31), Max: 100 (2020 08:31)  HR: 75 (2020 08:31) (70 - 76)  BP: 114/72 (2020 08:31) (101/60 - 117/66)  BP(mean): --  RR: 18 (2020 08:31) (18 - 18)  SpO2: 94% (2020 08:31) (94% - 98%)    Admit weight: 85.7 kg   Daily Weight in k.6 (2020 10:12)    Intake / Output:   30 @ 07:01  -  - @ 07:00  --------------------------------------------------------  IN: 7928.4 mL / OUT: 9125 mL / NET: -1196.6 mL      PE:   Oropharynx: no erythema or ulcerations   Oral Mucositis:               -                                         Grade: n/a  CVS: S1, S2 RRR   Lungs: CTA throughout bilaterally   Abdomen: + BS x 4, soft, NT, ND   Extremities: + mild chronic LE edema  Gastric Mucositis:      -                                            Grade: n/a  Intestinal Mucositis:    -                                          Grade: n/a  Skin: no rash  TLC: CDI  Neuro: A&O x 3  Pain: denies       Labs:   CBC Full  -  ( 2020 07:23 )  WBC Count : 4.48 K/uL  Hemoglobin : 8.4 g/dL  Hematocrit : 26.9 %  Platelet Count - Automated : 150 K/uL  Mean Cell Volume : 74.7 fl  Mean Cell Hemoglobin : 23.3 pg  Mean Cell Hemoglobin Concentration : 31.2 gm/dL  Auto Neutrophil # : 4.12 K/uL  Auto Lymphocyte # : 0.13 K/uL  Auto Monocyte # : 0.19 K/uL  Auto Eosinophil # : 0.00 K/uL  Auto Basophil # : 0.00 K/uL  Auto Neutrophil % : 92.0 %  Auto Lymphocyte % : 2.9 %  Auto Monocyte % : 4.2 %  Auto Eosinophil % : 0.0 %  Auto Basophil % : 0.0 %                          8.4    4.48  )-----------( 150      ( 2020 07:23 )             26.9     07-    142  |  108  |  16  ----------------------------<  154<H>  4.0   |  29  |  0.93    Ca    7.8<L>      2020 07:19  Phos  4.4       Mg     2.0         TPro  5.2<L>  /  Alb  3.6  /  TBili  0.4  /  DBili  0.1  /  AST  11  /  ALT  10  /  AlkPhos  46      PT/INR - ( 2020 15:26 )   PT: 12.9 sec;   INR: 1.14 ratio         PTT - ( 2020 15:26 )  PTT:29.7 sec  LIVER FUNCTIONS - ( 2020 07:19 )  Alb: 3.6 g/dL / Pro: 5.2 g/dL / ALK PHOS: 46 U/L / ALT: 10 U/L / AST: 11 U/L / GGT: x           Lactate Dehydrogenase, Serum: 153 U/L ( @ 07:19)    Meds:   Antimicrobials:   acyclovir   Oral Tab/Cap 400 milliGRAM(s) Oral every 8 hours  clotrimazole Lozenge 1 Lozenge Oral five times a day  fluconAZOLE   Tablet 400 milliGRAM(s) Oral daily      Heme / Onc:   heparin  Infusion 357 Unit(s)/Hr IV Continuous <Continuous>      GI:  magnesium hydroxide Suspension 30 milliLiter(s) Oral once PRN  pantoprazole    Tablet 40 milliGRAM(s) Oral before breakfast  polyethylene glycol 3350 17 Gram(s) Oral daily PRN  senna 1 Tablet(s) Oral at bedtime PRN  sodium bicarbonate Mouth Rinse 10 milliLiter(s) Swish and Spit five times a day  ursodiol Capsule 300 milliGRAM(s) Oral two times a day with meals      Cardiovascular:   furosemide   Injectable 20 milliGRAM(s) IV Push every 24 hours PRN      Immunologic:       Other medications:   aprepitant 80 milliGRAM(s) Oral every 24 hours  Biotene Dry Mouth Oral Rinse 5 milliLiter(s) Swish and Spit five times a day  chlorhexidine 4% Liquid 1 Application(s) Topical <User Schedule>  dextrose 5% + sodium chloride 0.9%. 1000 milliLiter(s) IV Continuous <Continuous>  folic acid 1 milliGRAM(s) Oral daily  LORazepam   Injectable 1 milliGRAM(s) IV Push every 24 hours  multivitamin 1 Tablet(s) Oral daily  ondansetron Injectable 8 milliGRAM(s) IV Push every 8 hours  sodium chloride 0.45% 1000 milliLiter(s) IV Continuous <Continuous>  sodium chloride 0.9%. 1000 milliLiter(s) IV Continuous <Continuous>      PRN:   acetaminophen   Tablet .. 650 milliGRAM(s) Oral every 6 hours PRN  acetaminophen   Tablet .. 650 milliGRAM(s) Oral every 6 hours PRN  diphenhydrAMINE   Injectable 25 milliGRAM(s) IV Push every 4 hours PRN  furosemide   Injectable 20 milliGRAM(s) IV Push every 24 hours PRN  metoclopramide Injectable 10 milliGRAM(s) IV Push every 6 hours PRN  polyethylene glycol 3350 17 Gram(s) Oral daily PRN  senna 1 Tablet(s) Oral at bedtime PRN  sodium chloride 0.9% lock flush 10 milliLiter(s) IV Push every 1 hour PRN    A/P:   59 year old male with a history of amyloidosis  Pre :  Autologous PBSCT day - 1  Continue Melphalan hydration for 24 hours post infusion of last dose   Strict I&O, prn diuresis, daily weights -  Lasix 40mg IV   Keep platelets =/> 40K for history of amyloidosis   Constipation - no BM x 4 days , continue prn bowel regimen     1. Infectious Disease:   acyclovir   Oral Tab/Cap 400 milliGRAM(s) Oral every 8 hours  clotrimazole Lozenge 1 Lozenge Oral five times a day  fluconAZOLE   Tablet 400 milliGRAM(s) Oral daily    2. VOD Prophylaxis: Actigall, Glutamine, Heparin (dosed at 100 units / kg / day)     3. GI Prophylaxis:    pantoprazole    Tablet 40 milliGRAM(s) Oral before breakfast    4. Mouthcare - NS / NaHCO3 rinses, Mycelex, Biotene; Skin care     5. GVHD prophylaxis - n/a    6. Transfuse & replete electrolytes prn     7. IV hydration, daily weights, strict I&O, prn diuresis     8. PO intake as tolerated, nutrition follow up as needed, MVI, folic acid     9. Antiemetics, anti-diarrhea medications:   metoclopramide Injectable 10 milliGRAM(s) IV Push every 6 hours PRN  ondansetron Injectable 8 milliGRAM(s) IV Push every 8 hours  LORazepam   Injectable 1 milliGRAM(s) IV Push every 24 hours  aprepitant 80 milliGRAM(s) Oral every 24 hours    10. OOB as tolerated, physical therapy consult if needed     11. Monitor coags / fibrinogen 2x week, vitamin K as needed     12. Monitor closely for clinical changes, monitor for fevers     13. Emotional support provided, plan of care discussed and questions addressed     14. Patient education done regarding plan of care, restrictions and discharge planning     15. Continue regular social work input     I have written the above note for Dr. Loja who performed service with me in the room.   Brinda Garrett NP-C ([phone])    I have seen and examined patient with NP, I agree with above note as scribed. [facility] Transplant Team                                                      Critical / Counseling Time Provided: 30 minutes                                                                                                                                                        Chief Complaint: Autologous pbsct with high dose melphalan prep regimen for treatment of amyloidosis    S: Patient seen and examined with [facility]PC Transplant Team:   + constipation   + mild intermittent nausea     O: Vitals:   Vital Signs Last 24 Hrs  T(C): 37.8 (2020 08:31), Max: 37.8 (2020 08:31)  T(F): 100 (2020 08:31), Max: 100 (2020 08:31)  HR: 75 (2020 08:31) (70 - 76)  BP: 114/72 (2020 08:31) (101/60 - 117/66)  BP(mean): --  RR: 18 (2020 08:31) (18 - 18)  SpO2: 94% (2020 08:31) (94% - 98%)    Admit weight: 85.7 kg   Daily Weight in k.6 (2020 10:12)  Today's weight: 86.6kg     Intake / Output:   -30 @ 07:01  -  - @ 07:00  --------------------------------------------------------  IN: 7928.4 mL / OUT: 9125 mL / NET: -1196.6 mL      PE:   Oropharynx: no erythema or ulcerations   Oral Mucositis:               -                                         Grade: n/a  CVS: S1, S2 RRR   Lungs: CTA throughout bilaterally   Abdomen: + BS x 4, soft, NT, ND   Extremities: + mild chronic LE edema  Gastric Mucositis:      -                                            Grade: n/a  Intestinal Mucositis:    -                                          Grade: n/a  Skin: no rash  TLC: CDI  Neuro: A&O x 3  Pain: denies       Labs:   CBC Full  -  ( 2020 07:23 )  WBC Count : 4.48 K/uL  Hemoglobin : 8.4 g/dL  Hematocrit : 26.9 %  Platelet Count - Automated : 150 K/uL  Mean Cell Volume : 74.7 fl  Mean Cell Hemoglobin : 23.3 pg  Mean Cell Hemoglobin Concentration : 31.2 gm/dL  Auto Neutrophil # : 4.12 K/uL  Auto Lymphocyte # : 0.13 K/uL  Auto Monocyte # : 0.19 K/uL  Auto Eosinophil # : 0.00 K/uL  Auto Basophil # : 0.00 K/uL  Auto Neutrophil % : 92.0 %  Auto Lymphocyte % : 2.9 %  Auto Monocyte % : 4.2 %  Auto Eosinophil % : 0.0 %  Auto Basophil % : 0.0 %                          8.4    4.48  )-----------( 150      ( 2020 07:23 )             26.9     07-    142  |  108  |  16  ----------------------------<  154<H>  4.0   |  29  |  0.93    Ca    7.8<L>      2020 07:19  Phos  4.4     07  Mg     2.0         TPro  5.2<L>  /  Alb  3.6  /  TBili  0.4  /  DBili  0.1  /  AST  11  /  ALT  10  /  AlkPhos  46      PT/INR - ( 2020 15:26 )   PT: 12.9 sec;   INR: 1.14 ratio         PTT - ( 2020 15:26 )  PTT:29.7 sec  LIVER FUNCTIONS - ( 2020 07:19 )  Alb: 3.6 g/dL / Pro: 5.2 g/dL / ALK PHOS: 46 U/L / ALT: 10 U/L / AST: 11 U/L / GGT: x           Lactate Dehydrogenase, Serum: 153 U/L ( @ 07:19)    Meds:   Antimicrobials:   acyclovir   Oral Tab/Cap 400 milliGRAM(s) Oral every 8 hours  clotrimazole Lozenge 1 Lozenge Oral five times a day  fluconAZOLE   Tablet 400 milliGRAM(s) Oral daily      Heme / Onc:   heparin  Infusion 357 Unit(s)/Hr IV Continuous <Continuous>      GI:  magnesium hydroxide Suspension 30 milliLiter(s) Oral once PRN  pantoprazole    Tablet 40 milliGRAM(s) Oral before breakfast  polyethylene glycol 3350 17 Gram(s) Oral daily PRN  senna 1 Tablet(s) Oral at bedtime PRN  sodium bicarbonate Mouth Rinse 10 milliLiter(s) Swish and Spit five times a day  ursodiol Capsule 300 milliGRAM(s) Oral two times a day with meals      Cardiovascular:   furosemide   Injectable 20 milliGRAM(s) IV Push every 24 hours PRN      Immunologic:       Other medications:   aprepitant 80 milliGRAM(s) Oral every 24 hours  Biotene Dry Mouth Oral Rinse 5 milliLiter(s) Swish and Spit five times a day  chlorhexidine 4% Liquid 1 Application(s) Topical <User Schedule>  dextrose 5% + sodium chloride 0.9%. 1000 milliLiter(s) IV Continuous <Continuous>  folic acid 1 milliGRAM(s) Oral daily  LORazepam   Injectable 1 milliGRAM(s) IV Push every 24 hours  multivitamin 1 Tablet(s) Oral daily  ondansetron Injectable 8 milliGRAM(s) IV Push every 8 hours  sodium chloride 0.45% 1000 milliLiter(s) IV Continuous <Continuous>  sodium chloride 0.9%. 1000 milliLiter(s) IV Continuous <Continuous>      PRN:   acetaminophen   Tablet .. 650 milliGRAM(s) Oral every 6 hours PRN  acetaminophen   Tablet .. 650 milliGRAM(s) Oral every 6 hours PRN  diphenhydrAMINE   Injectable 25 milliGRAM(s) IV Push every 4 hours PRN  furosemide   Injectable 20 milliGRAM(s) IV Push every 24 hours PRN  metoclopramide Injectable 10 milliGRAM(s) IV Push every 6 hours PRN  polyethylene glycol 3350 17 Gram(s) Oral daily PRN  senna 1 Tablet(s) Oral at bedtime PRN  sodium chloride 0.9% lock flush 10 milliLiter(s) IV Push every 1 hour PRN    A/P:   59 year old male with a history of amyloidosis  Pre :  Autologous PBSCT day - 1  Continue Melphalan hydration for 24 hours post infusion of last dose   Strict I&O, prn diuresis, daily weights -  Lasix 40mg IV   Keep platelets =/> 40K for history of amyloidosis   Constipation - no BM x 4 days , continue prn bowel regimen     1. Infectious Disease:   acyclovir   Oral Tab/Cap 400 milliGRAM(s) Oral every 8 hours  clotrimazole Lozenge 1 Lozenge Oral five times a day  fluconAZOLE   Tablet 400 milliGRAM(s) Oral daily    2. VOD Prophylaxis: Actigall, Glutamine, Heparin (dosed at 100 units / kg / day)     3. GI Prophylaxis:    pantoprazole    Tablet 40 milliGRAM(s) Oral before breakfast    4. Mouthcare - NS / NaHCO3 rinses, Mycelex, Biotene; Skin care     5. GVHD prophylaxis - n/a    6. Transfuse & replete electrolytes prn     7. IV hydration, daily weights, strict I&O, prn diuresis     8. PO intake as tolerated, nutrition follow up as needed, MVI, folic acid     9. Antiemetics, anti-diarrhea medications:   metoclopramide Injectable 10 milliGRAM(s) IV Push every 6 hours PRN  ondansetron Injectable 8 milliGRAM(s) IV Push every 8 hours  LORazepam   Injectable 1 milliGRAM(s) IV Push every 24 hours  aprepitant 80 milliGRAM(s) Oral every 24 hours    10. OOB as tolerated, physical therapy consult if needed     11. Monitor coags / fibrinogen 2x week, vitamin K as needed     12. Monitor closely for clinical changes, monitor for fevers     13. Emotional support provided, plan of care discussed and questions addressed     14. Patient education done regarding plan of care, restrictions and discharge planning     15. Continue regular social work input     I have written the above note for Dr. Loja who performed service with me in the room.   Brinda Garrett NP-C ([phone])    I have seen and examined patient with NP, I agree with above note as scribed.

## 2020-07-02 LAB
ALBUMIN SERPL ELPH-MCNC: 3.3 G/DL — SIGNIFICANT CHANGE UP (ref 3.3–5)
ALP SERPL-CCNC: 40 U/L — SIGNIFICANT CHANGE UP (ref 40–120)
ALT FLD-CCNC: 9 U/L — LOW (ref 10–45)
ANION GAP SERPL CALC-SCNC: 12 MMOL/L — SIGNIFICANT CHANGE UP (ref 5–17)
APTT BLD: 24.9 SEC — LOW (ref 27.5–35.5)
AST SERPL-CCNC: 10 U/L — SIGNIFICANT CHANGE UP (ref 10–40)
BASOPHILS # BLD AUTO: 0 K/UL — SIGNIFICANT CHANGE UP (ref 0–0.2)
BASOPHILS NFR BLD AUTO: 0 % — SIGNIFICANT CHANGE UP (ref 0–2)
BILIRUB SERPL-MCNC: 0.5 MG/DL — SIGNIFICANT CHANGE UP (ref 0.2–1.2)
BUN SERPL-MCNC: 21 MG/DL — SIGNIFICANT CHANGE UP (ref 7–23)
CALCIUM SERPL-MCNC: 7.6 MG/DL — LOW (ref 8.4–10.5)
CHLORIDE SERPL-SCNC: 104 MMOL/L — SIGNIFICANT CHANGE UP (ref 96–108)
CO2 SERPL-SCNC: 26 MMOL/L — SIGNIFICANT CHANGE UP (ref 22–31)
CREAT SERPL-MCNC: 0.84 MG/DL — SIGNIFICANT CHANGE UP (ref 0.5–1.3)
EOSINOPHIL # BLD AUTO: 0 K/UL — SIGNIFICANT CHANGE UP (ref 0–0.5)
EOSINOPHIL NFR BLD AUTO: 0 % — SIGNIFICANT CHANGE UP (ref 0–6)
FIBRINOGEN PPP-MCNC: 281 MG/DL — LOW (ref 350–510)
G6PD RBC-CCNC: 23.6 U/G HGB — HIGH (ref 7–20.5)
GIANT PLATELETS BLD QL SMEAR: PRESENT — SIGNIFICANT CHANGE UP
GLUCOSE SERPL-MCNC: 105 MG/DL — HIGH (ref 70–99)
HCT VFR BLD CALC: 25.1 % — LOW (ref 39–50)
HGB BLD-MCNC: 8 G/DL — LOW (ref 13–17)
INR BLD: 1.04 RATIO — SIGNIFICANT CHANGE UP (ref 0.88–1.16)
LDH SERPL L TO P-CCNC: 155 U/L — SIGNIFICANT CHANGE UP (ref 50–242)
LYMPHOCYTES # BLD AUTO: 0 % — LOW (ref 13–44)
LYMPHOCYTES # BLD AUTO: 0 K/UL — LOW (ref 1–3.3)
MAGNESIUM SERPL-MCNC: 2 MG/DL — SIGNIFICANT CHANGE UP (ref 1.6–2.6)
MANUAL SMEAR VERIFICATION: SIGNIFICANT CHANGE UP
MCHC RBC-ENTMCNC: 24 PG — LOW (ref 27–34)
MCHC RBC-ENTMCNC: 31.9 GM/DL — LOW (ref 32–36)
MCV RBC AUTO: 75.1 FL — LOW (ref 80–100)
MONOCYTES # BLD AUTO: 0.04 K/UL — SIGNIFICANT CHANGE UP (ref 0–0.9)
MONOCYTES NFR BLD AUTO: 0.9 % — LOW (ref 2–14)
NEUTROPHILS # BLD AUTO: 4.42 K/UL — SIGNIFICANT CHANGE UP (ref 1.8–7.4)
NEUTROPHILS NFR BLD AUTO: 99.1 % — HIGH (ref 43–77)
PHOSPHATE SERPL-MCNC: 4.3 MG/DL — SIGNIFICANT CHANGE UP (ref 2.5–4.5)
PLAT MORPH BLD: ABNORMAL
PLATELET # BLD AUTO: 147 K/UL — LOW (ref 150–400)
POTASSIUM SERPL-MCNC: 3.7 MMOL/L — SIGNIFICANT CHANGE UP (ref 3.5–5.3)
POTASSIUM SERPL-SCNC: 3.7 MMOL/L — SIGNIFICANT CHANGE UP (ref 3.5–5.3)
PROT SERPL-MCNC: 4.7 G/DL — LOW (ref 6–8.3)
PROTHROM AB SERPL-ACNC: 11.8 SEC — SIGNIFICANT CHANGE UP (ref 10.6–13.6)
RBC # BLD: 3.34 M/UL — LOW (ref 4.2–5.8)
RBC # FLD: 15.8 % — HIGH (ref 10.3–14.5)
RBC BLD AUTO: SIGNIFICANT CHANGE UP
SODIUM SERPL-SCNC: 142 MMOL/L — SIGNIFICANT CHANGE UP (ref 135–145)
WBC # BLD: 4.46 K/UL — SIGNIFICANT CHANGE UP (ref 3.8–10.5)
WBC # FLD AUTO: 4.46 K/UL — SIGNIFICANT CHANGE UP (ref 3.8–10.5)

## 2020-07-02 PROCEDURE — 99291 CRITICAL CARE FIRST HOUR: CPT

## 2020-07-02 PROCEDURE — 38241 TRANSPLT AUTOL HCT/DONOR: CPT

## 2020-07-02 PROCEDURE — 99231 SBSQ HOSP IP/OBS SF/LOW 25: CPT

## 2020-07-02 RX ORDER — FUROSEMIDE 40 MG
40 TABLET ORAL ONCE
Refills: 0 | Status: COMPLETED | OUTPATIENT
Start: 2020-07-02 | End: 2020-07-02

## 2020-07-02 RX ORDER — DIPHENHYDRAMINE HCL 50 MG
25 CAPSULE ORAL ONCE
Refills: 0 | Status: DISCONTINUED | OUTPATIENT
Start: 2020-07-02 | End: 2020-07-15

## 2020-07-02 RX ORDER — FUROSEMIDE 40 MG
40 TABLET ORAL ONCE
Refills: 0 | Status: DISCONTINUED | OUTPATIENT
Start: 2020-07-02 | End: 2020-07-02

## 2020-07-02 RX ADMIN — Medication 40 MILLIGRAM(S): at 09:41

## 2020-07-02 RX ADMIN — Medication 5 MILLILITER(S): at 07:39

## 2020-07-02 RX ADMIN — Medication 5 MILLILITER(S): at 16:14

## 2020-07-02 RX ADMIN — SODIUM CHLORIDE 150 MILLILITER(S): 9 INJECTION, SOLUTION INTRAVENOUS at 21:19

## 2020-07-02 RX ADMIN — CHLORHEXIDINE GLUCONATE 1 APPLICATION(S): 213 SOLUTION TOPICAL at 09:13

## 2020-07-02 RX ADMIN — Medication 40 MILLIGRAM(S): at 16:19

## 2020-07-02 RX ADMIN — PANTOPRAZOLE SODIUM 40 MILLIGRAM(S): 20 TABLET, DELAYED RELEASE ORAL at 05:00

## 2020-07-02 RX ADMIN — Medication 650 MILLIGRAM(S): at 10:49

## 2020-07-02 RX ADMIN — Medication 1 LOZENGE: at 16:15

## 2020-07-02 RX ADMIN — URSODIOL 300 MILLIGRAM(S): 250 TABLET, FILM COATED ORAL at 07:41

## 2020-07-02 RX ADMIN — SODIUM CHLORIDE 150 MILLILITER(S): 9 INJECTION, SOLUTION INTRAVENOUS at 16:15

## 2020-07-02 RX ADMIN — APREPITANT 80 MILLIGRAM(S): 80 CAPSULE ORAL at 13:59

## 2020-07-02 RX ADMIN — FLUCONAZOLE 400 MILLIGRAM(S): 150 TABLET ORAL at 13:02

## 2020-07-02 RX ADMIN — URSODIOL 300 MILLIGRAM(S): 250 TABLET, FILM COATED ORAL at 17:40

## 2020-07-02 RX ADMIN — Medication 5 MILLILITER(S): at 13:01

## 2020-07-02 RX ADMIN — Medication 10 MILLILITER(S): at 13:02

## 2020-07-02 RX ADMIN — Medication 50 MILLIGRAM(S): at 10:49

## 2020-07-02 RX ADMIN — Medication 25 MILLIGRAM(S): at 10:49

## 2020-07-02 RX ADMIN — Medication 400 MILLIGRAM(S): at 13:03

## 2020-07-02 RX ADMIN — Medication 1 MILLIGRAM(S): at 13:02

## 2020-07-02 RX ADMIN — Medication 5100 MICROGRAM(S): at 17:40

## 2020-07-02 RX ADMIN — Medication 1 TABLET(S): at 13:02

## 2020-07-02 RX ADMIN — Medication 480 MICROGRAM(S): at 17:40

## 2020-07-02 RX ADMIN — Medication 10 MILLILITER(S): at 21:19

## 2020-07-02 RX ADMIN — Medication 10 MILLILITER(S): at 07:41

## 2020-07-02 RX ADMIN — Medication 1 LOZENGE: at 21:19

## 2020-07-02 RX ADMIN — Medication 1 LOZENGE: at 07:39

## 2020-07-02 RX ADMIN — SODIUM CHLORIDE 150 MILLILITER(S): 9 INJECTION, SOLUTION INTRAVENOUS at 05:00

## 2020-07-02 RX ADMIN — Medication 10 MILLILITER(S): at 16:14

## 2020-07-02 RX ADMIN — Medication 5 MILLILITER(S): at 21:19

## 2020-07-02 RX ADMIN — Medication 400 MILLIGRAM(S): at 21:18

## 2020-07-02 RX ADMIN — Medication 1 LOZENGE: at 13:01

## 2020-07-02 RX ADMIN — HEPARIN SODIUM 3.57 UNIT(S)/HR: 5000 INJECTION INTRAVENOUS; SUBCUTANEOUS at 21:19

## 2020-07-02 RX ADMIN — Medication 400 MILLIGRAM(S): at 05:00

## 2020-07-02 NOTE — CHART NOTE - NSCHARTNOTEFT_GEN_A_CORE
After pre-medication Mr. Saba received 316 mL of:  Autologous, mobilized, plasma reduced, pooled, thawed, washed, HCP apheresis over approximately 1 hr. Cell counts as follows  Total MNC( x10^8/kg)=8.32  CD34+cells ( x10^6 kg)=9.96  Cell Viability (%)=65   Mr. Saba tolerated the infusion well with no adverse resections noted.    Kandice Woodward PA-C  BMTU  [phone]

## 2020-07-02 NOTE — PROGRESS NOTE ADULT - SUBJECTIVE AND OBJECTIVE BOX
[facility] Transplant Team                                                      Critical / Counseling Time Provided: 30 minutes                                                                                                                                                        Chief Complaint: Autologous pbsct with high dose melphalan prep regimen for treatment of amyloidosis    S: Patient seen and examined with [facility]PC Transplant Team:   + constipation   + mild intermittent nausea     O: Vitals:   Vital Signs Last 24 Hrs  T(C): 36.9 (2020 05:00), Max: 37.5 (2020 13:49)  T(F): 98.4 (2020 05:00), Max: 99.5 (2020 13:49)  HR: 74 (2020 05:00) (58 - 74)  BP: 112/63 (2020 05:00) (107/66 - 117/77)  BP(mean): --  RR: 18 (2020 05:00) (18 - 20)  SpO2: 97% (2020 05:00) (95% - 99%)    Admit weight: 85.7 kg  Daily Weight in k.5 (2020 10:40)  Today's weight:    Intake / Output:    @ 07: @ 07:00  --------------------------------------------------------  IN: 4665 mL / OUT: 5800 mL / NET: -1135 mL     @ 07:01  -  02 @ 08:38  --------------------------------------------------------  IN: 539.8 mL / OUT: 500 mL / NET: 39.8 mL    PE:   Oropharynx: no erythema or ulcerations   Oral Mucositis:               -                                         Grade: n/a  CVS: S1, S2 RRR   Lungs: CTA throughout bilaterally   Abdomen: + BS x 4, soft, NT, ND   Extremities: + mild chronic LE edema  Gastric Mucositis:      -                                            Grade: n/a  Intestinal Mucositis:    -                                          Grade: n/a  Skin: no rash  TLC: CDI  Neuro: A&O x 3  Pain: denies     Labs:   CBC Full  -  ( 2020 07:07 )  WBC Count : 4.46 K/uL  Hemoglobin : 8.0 g/dL  Hematocrit : 25.1 %  Platelet Count - Automated : 147 K/uL  Mean Cell Volume : 75.1 fl  Mean Cell Hemoglobin : 24.0 pg  Mean Cell Hemoglobin Concentration : 31.9 gm/dL  Auto Neutrophil # : x  Auto Lymphocyte # : x  Auto Monocyte # : x  Auto Eosinophil # : x  Auto Basophil # : x  Auto Neutrophil % : x  Auto Lymphocyte % : x  Auto Monocyte % : x  Auto Eosinophil % : x  Auto Basophil % : x                          8.0    4.46  )-----------( 147      ( 2020 07:07 )             25.1     07-02    142  |  104  |  21  ----------------------------<  105<H>  3.7   |  26  |  0.84    Ca    7.6<L>      2020 07:05  Phos  4.3     07-02  Mg     2.0     07-02    TPro  4.7<L>  /  Alb  3.3  /  TBili  0.5  /  DBili  x   /  AST  10  /  ALT  9<L>  /  AlkPhos  40  07-02    PT/INR - ( 2020 07:07 )   PT: 11.8 sec;   INR: 1.04 ratio         PTT - ( 2020 07:07 )  PTT:24.9 sec  LIVER FUNCTIONS - ( 2020 07:05 )  Alb: 3.3 g/dL / Pro: 4.7 g/dL / ALK PHOS: 40 U/L / ALT: 9 U/L / AST: 10 U/L / GGT: x           Lactate Dehydrogenase, Serum: 155 U/L ( @ 07:05)      Meds:   Antimicrobials:   acyclovir   Oral Tab/Cap 400 milliGRAM(s) Oral every 8 hours  clotrimazole Lozenge 1 Lozenge Oral five times a day  fluconAZOLE   Tablet 400 milliGRAM(s) Oral daily      Heme / Onc:   heparin  Infusion 357 Unit(s)/Hr IV Continuous <Continuous>      GI:  magnesium hydroxide Suspension 30 milliLiter(s) Oral daily PRN  pantoprazole    Tablet 40 milliGRAM(s) Oral before breakfast  polyethylene glycol 3350 17 Gram(s) Oral daily PRN  senna 1 Tablet(s) Oral at bedtime PRN  sodium bicarbonate Mouth Rinse 10 milliLiter(s) Swish and Spit five times a day  ursodiol Capsule 300 milliGRAM(s) Oral two times a day with meals      Cardiovascular:   furosemide   Injectable 20 milliGRAM(s) IV Push every 24 hours PRN      Immunologic:   filgrastim-sndz (ZARXIO) Injectable 480 MICROGram(s) SubCutaneous every 24 hours      Other medications:   acetaminophen   Tablet .. 650 milliGRAM(s) Oral once  aprepitant 80 milliGRAM(s) Oral every 24 hours  Biotene Dry Mouth Oral Rinse 5 milliLiter(s) Swish and Spit five times a day  chlorhexidine 4% Liquid 1 Application(s) Topical <User Schedule>  dextrose 5% + sodium chloride 0.9%. 1000 milliLiter(s) IV Continuous <Continuous>  diphenhydrAMINE   Injectable 25 milliGRAM(s) IV Push once  folic acid 1 milliGRAM(s) Oral daily  hydrocortisone sodium succinate Injectable 50 milliGRAM(s) IV Push once  lidocaine/prilocaine Cream 1 Application(s) Topical daily  LORazepam   Injectable 1 milliGRAM(s) IV Push every 24 hours  multivitamin 1 Tablet(s) Oral daily  palifermin Injectable  (eMAR) 5100 MICROGram(s) IV Push every 24 hours  sodium chloride 0.45% 1000 milliLiter(s) IV Continuous <Continuous>  sodium chloride 0.9%. 1000 milliLiter(s) IV Continuous <Continuous>      PRN:   acetaminophen   Tablet .. 650 milliGRAM(s) Oral every 6 hours PRN  acetaminophen   Tablet .. 650 milliGRAM(s) Oral every 6 hours PRN  diphenhydrAMINE   Injectable 25 milliGRAM(s) IV Push every 4 hours PRN  furosemide   Injectable 20 milliGRAM(s) IV Push every 24 hours PRN  magnesium hydroxide Suspension 30 milliLiter(s) Oral daily PRN  metoclopramide Injectable 10 milliGRAM(s) IV Push every 6 hours PRN  polyethylene glycol 3350 17 Gram(s) Oral daily PRN  senna 1 Tablet(s) Oral at bedtime PRN  sodium chloride 0.9% lock flush 10 milliLiter(s) IV Push every 1 hour PRN    A/P:   59 year old male with a history of amyloidosis  Pre :  Autologous PBSCT day 0  Continue ttransplant hydration for 24 hours post infusion of cells   Strict I&O, prn diuresis, daily weights   Keep platelets =/> 40K for history of amyloidosis   Constipation - continue prn bowel regimen     1. Infectious Disease:   acyclovir   Oral Tab/Cap 400 milliGRAM(s) Oral every 8 hours  clotrimazole Lozenge 1 Lozenge Oral five times a day  fluconAZOLE   Tablet 400 milliGRAM(s) Oral daily    2. VOD Prophylaxis: Actigall, Glutamine, Heparin (dosed at 100 units / kg / day)     3. GI Prophylaxis:   pantoprazole    Tablet 40 milliGRAM(s) Oral before breakfast    4. Mouthcare - NS / NaHCO3 rinses, Mycelex, Biotene; Skin care     5. GVHD prophylaxis - n/a     6. Transfuse & replete electrolytes prn     7. IV hydration, daily weights, strict I&O, prn diuresis     8. PO intake as tolerated, nutrition follow up as needed, MVI, folic acid     9. Antiemetics, anti-diarrhea medications:   metoclopramide Injectable 10 milliGRAM(s) IV Push every 6 hours PRN  LORazepam   Injectable 1 milliGRAM(s) IV Push every 24 hours  aprepitant 80 milliGRAM(s) Oral every 24 hours    10. OOB as tolerated, physical therapy consult if needed     11. Monitor coags / fibrinogen 2x week, vitamin K as needed     12. Monitor closely for clinical changes, monitor for fevers     13. Emotional support provided, plan of care discussed and questions addressed     14. Patient education done regarding plan of care, restrictions and discharge planning     15. Continue regular social work input     I have written the above note for Dr. Loja who performed service with me in the room.   Brinda Garrett NP-C ([phone])    I have seen and examined patient with NP, I agree with above note as scribed. [facility] Transplant Team                                                      Critical / Counseling Time Provided: 30 minutes                                                                                                                                                        Chief Complaint: Autologous pbsct with high dose melphalan prep regimen for treatment of amyloidosis    S: Patient seen and examined with [facility]PC Transplant Team:   + fatigue       O: Vitals:   Vital Signs Last 24 Hrs  T(C): 36.9 (2020 05:00), Max: 37.5 (2020 13:49)  T(F): 98.4 (2020 05:00), Max: 99.5 (2020 13:49)  HR: 74 (2020 05:00) (58 - 74)  BP: 112/63 (2020 05:00) (107/66 - 117/77)  BP(mean): --  RR: 18 (2020 05:00) (18 - 20)  SpO2: 97% (2020 05:00) (95% - 99%)    Admit weight: 85.7 kg  Daily Weight in k.5 (2020 10:40)  Today's weight:    Intake / Output:    @ : @ 07:00  --------------------------------------------------------  IN: 4665 mL / OUT: 5800 mL / NET: -1135 mL     @ 07:01  -  02 @ 08:38  --------------------------------------------------------  IN: 539.8 mL / OUT: 500 mL / NET: 39.8 mL    PE:   Oropharynx: no erythema or ulcerations   Oral Mucositis:               -                                         Grade: n/a  CVS: S1, S2 RRR   Lungs: CTA throughout bilaterally   Abdomen: + BS x 4, soft, NT, ND   Extremities: + mild chronic LE edema  Gastric Mucositis:      -                                            Grade: n/a  Intestinal Mucositis:    -                                          Grade: n/a  Skin: no rash  TLC: CDI  Neuro: A&O x 3  Pain: denies     Labs:   CBC Full  -  ( 2020 07:07 )  WBC Count : 4.46 K/uL  Hemoglobin : 8.0 g/dL  Hematocrit : 25.1 %  Platelet Count - Automated : 147 K/uL  Mean Cell Volume : 75.1 fl  Mean Cell Hemoglobin : 24.0 pg  Mean Cell Hemoglobin Concentration : 31.9 gm/dL  Auto Neutrophil # : x  Auto Lymphocyte # : x  Auto Monocyte # : x  Auto Eosinophil # : x  Auto Basophil # : x  Auto Neutrophil % : x  Auto Lymphocyte % : x  Auto Monocyte % : x  Auto Eosinophil % : x  Auto Basophil % : x                          8.0    4.46  )-----------( 147      ( 2020 07:07 )             25.1     07-02    142  |  104  |  21  ----------------------------<  105<H>  3.7   |  26  |  0.84    Ca    7.6<L>      2020 07:05  Phos  4.3     07-02  Mg     2.0     07-02    TPro  4.7<L>  /  Alb  3.3  /  TBili  0.5  /  DBili  x   /  AST  10  /  ALT  9<L>  /  AlkPhos  40  07-02    PT/INR - ( 2020 07:07 )   PT: 11.8 sec;   INR: 1.04 ratio         PTT - ( 2020 07:07 )  PTT:24.9 sec  LIVER FUNCTIONS - ( 2020 07:05 )  Alb: 3.3 g/dL / Pro: 4.7 g/dL / ALK PHOS: 40 U/L / ALT: 9 U/L / AST: 10 U/L / GGT: x           Lactate Dehydrogenase, Serum: 155 U/L ( @ 07:05)      Meds:   Antimicrobials:   acyclovir   Oral Tab/Cap 400 milliGRAM(s) Oral every 8 hours  clotrimazole Lozenge 1 Lozenge Oral five times a day  fluconAZOLE   Tablet 400 milliGRAM(s) Oral daily      Heme / Onc:   heparin  Infusion 357 Unit(s)/Hr IV Continuous <Continuous>      GI:  magnesium hydroxide Suspension 30 milliLiter(s) Oral daily PRN  pantoprazole    Tablet 40 milliGRAM(s) Oral before breakfast  polyethylene glycol 3350 17 Gram(s) Oral daily PRN  senna 1 Tablet(s) Oral at bedtime PRN  sodium bicarbonate Mouth Rinse 10 milliLiter(s) Swish and Spit five times a day  ursodiol Capsule 300 milliGRAM(s) Oral two times a day with meals      Cardiovascular:   furosemide   Injectable 20 milliGRAM(s) IV Push every 24 hours PRN      Immunologic:   filgrastim-sndz (ZARXIO) Injectable 480 MICROGram(s) SubCutaneous every 24 hours      Other medications:   acetaminophen   Tablet .. 650 milliGRAM(s) Oral once  aprepitant 80 milliGRAM(s) Oral every 24 hours  Biotene Dry Mouth Oral Rinse 5 milliLiter(s) Swish and Spit five times a day  chlorhexidine 4% Liquid 1 Application(s) Topical <User Schedule>  dextrose 5% + sodium chloride 0.9%. 1000 milliLiter(s) IV Continuous <Continuous>  diphenhydrAMINE   Injectable 25 milliGRAM(s) IV Push once  folic acid 1 milliGRAM(s) Oral daily  hydrocortisone sodium succinate Injectable 50 milliGRAM(s) IV Push once  lidocaine/prilocaine Cream 1 Application(s) Topical daily  LORazepam   Injectable 1 milliGRAM(s) IV Push every 24 hours  multivitamin 1 Tablet(s) Oral daily  palifermin Injectable  (eMAR) 5100 MICROGram(s) IV Push every 24 hours  sodium chloride 0.45% 1000 milliLiter(s) IV Continuous <Continuous>  sodium chloride 0.9%. 1000 milliLiter(s) IV Continuous <Continuous>      PRN:   acetaminophen   Tablet .. 650 milliGRAM(s) Oral every 6 hours PRN  acetaminophen   Tablet .. 650 milliGRAM(s) Oral every 6 hours PRN  diphenhydrAMINE   Injectable 25 milliGRAM(s) IV Push every 4 hours PRN  furosemide   Injectable 20 milliGRAM(s) IV Push every 24 hours PRN  magnesium hydroxide Suspension 30 milliLiter(s) Oral daily PRN  metoclopramide Injectable 10 milliGRAM(s) IV Push every 6 hours PRN  polyethylene glycol 3350 17 Gram(s) Oral daily PRN  senna 1 Tablet(s) Oral at bedtime PRN  sodium chloride 0.9% lock flush 10 milliLiter(s) IV Push every 1 hour PRN    A/P:   59 year old male with a history of amyloidosis  Pre :  Autologous PBSCT day 0  Continue ttransplant hydration for 24 hours post infusion of cells   Strict I&O, prn diuresis, daily weights   Keep platelets =/> 40K for history of amyloidosis   Constipation - continue prn bowel regimen     1. Infectious Disease:   acyclovir   Oral Tab/Cap 400 milliGRAM(s) Oral every 8 hours  clotrimazole Lozenge 1 Lozenge Oral five times a day  fluconAZOLE   Tablet 400 milliGRAM(s) Oral daily    2. VOD Prophylaxis: Actigall, Glutamine, Heparin (dosed at 100 units / kg / day)     3. GI Prophylaxis:   pantoprazole    Tablet 40 milliGRAM(s) Oral before breakfast    4. Mouthcare - NS / NaHCO3 rinses, Mycelex, Biotene; Skin care     5. GVHD prophylaxis - n/a     6. Transfuse & replete electrolytes prn     7. IV hydration, daily weights, strict I&O, prn diuresis     8. PO intake as tolerated, nutrition follow up as needed, MVI, folic acid     9. Antiemetics, anti-diarrhea medications:   metoclopramide Injectable 10 milliGRAM(s) IV Push every 6 hours PRN  LORazepam   Injectable 1 milliGRAM(s) IV Push every 24 hours  aprepitant 80 milliGRAM(s) Oral every 24 hours    10. OOB as tolerated, physical therapy consult if needed     11. Monitor coags / fibrinogen 2x week, vitamin K as needed     12. Monitor closely for clinical changes, monitor for fevers     13. Emotional support provided, plan of care discussed and questions addressed     14. Patient education done regarding plan of care, restrictions and discharge planning     15. Continue regular social work input     I have written the above note for Dr. Loja who performed service with me in the room.   Brinda Garrett NP-C ([phone])    I have seen and examined patient with NP, I agree with above note as scribed. [facility] Trans[facility] Team                                                      Critical / Counseling Time Provided: 30 minutes                                                                                                                                                        Chief Complaint: Autologous pbsct with high dose melphalan prep regimen for treatment of amyloidosis    S: Patient seen and examined with [facility]PC Transplant Team:   + fatigue     Denies mouth / tongue / throat pain, dyspnea, cough, vomiting, diarrhea, abdominal pain     O: Vitals:   Vital Signs Last 24 Hrs  T(C): 36.9 (2020 05:00), Max: 37.5 (2020 13:49)  T(F): 98.4 (2020 05:00), Max: 99.5 (2020 13:49)  HR: 74 (2020 05:00) (58 - 74)  BP: 112/63 (2020 05:00) (107/66 - 117/77)  BP(mean): --  RR: 18 (2020 05:00) (18 - 20)  SpO2: 97% (2020 05:00) (95% - 99%)    Admit weight: 85.7 kg  Daily Weight in k.5 (2020 10:40)  Today's weight: 87.2 kg     Intake / Output:    @ 07: @ 07:00  --------------------------------------------------------  IN: 4665 mL / OUT: 5800 mL / NET: -1135 mL     @ 07:02 @ 08:38  --------------------------------------------------------  IN: 539.8 mL / OUT: 500 mL / NET: 39.8 mL    PE:   Oropharynx: no erythema or ulcerations   Oral Mucositis:               -                                         Grade: n/a  CVS: S1, S2 RRR   Lungs: CTA throughout bilaterally   Abdomen: + BS x 4, soft, NT, ND   Extremities: + mild chronic LE edema  Gastric Mucositis:      -                                            Grade: n/a  Intestinal Mucositis:    -                                          Grade: n/a  Skin: no rash  TLC: CDI  Neuro: A&O x 3  Pain: denies     Labs:   CBC Full  -  ( 2020 07:07 )  WBC Count : 4.46 K/uL  Hemoglobin : 8.0 g/dL  Hematocrit : 25.1 %  Platelet Count - Automated : 147 K/uL  Mean Cell Volume : 75.1 fl  Mean Cell Hemoglobin : 24.0 pg  Mean Cell Hemoglobin Concentration : 31.9 gm/dL  Auto Neutrophil # : x  Auto Lymphocyte # : x  Auto Monocyte # : x  Auto Eosinophil # : x  Auto Basophil # : x  Auto Neutrophil % : x  Auto Lymphocyte % : x  Auto Monocyte % : x  Auto Eosinophil % : x  Auto Basophil % : x                          8.0    4.46  )-----------( 147      ( 2020 07:07 )             25.1     07-    142  |  104  |  21  ----------------------------<  105<H>  3.7   |  26  |  0.84    Ca    7.6<L>      2020 07:05  Phos  4.3     07-02  Mg     2.0     07-    TPro  4.7<L>  /  Alb  3.3  /  TBili  0.5  /  DBili  x   /  AST  10  /  ALT  9<L>  /  AlkPhos  40  07-02    PT/INR - ( 2020 07:07 )   PT: 11.8 sec;   INR: 1.04 ratio         PTT - ( 2020 07:07 )  PTT:24.9 sec  LIVER FUNCTIONS - ( 2020 07:05 )  Alb: 3.3 g/dL / Pro: 4.7 g/dL / ALK PHOS: 40 U/L / ALT: 9 U/L / AST: 10 U/L / GGT: x           Lactate Dehydrogenase, Serum: 155 U/L ( @ 07:05)      Meds:   Antimicrobials:   acyclovir   Oral Tab/Cap 400 milliGRAM(s) Oral every 8 hours  clotrimazole Lozenge 1 Lozenge Oral five times a day  fluconAZOLE   Tablet 400 milliGRAM(s) Oral daily      Heme / Onc:   heparin  Infusion 357 Unit(s)/Hr IV Continuous <Continuous>      GI:  magnesium hydroxide Suspension 30 milliLiter(s) Oral daily PRN  pantoprazole    Tablet 40 milliGRAM(s) Oral before breakfast  polyethylene glycol 3350 17 Gram(s) Oral daily PRN  senna 1 Tablet(s) Oral at bedtime PRN  sodium bicarbonate Mouth Rinse 10 milliLiter(s) Swish and Spit five times a day  ursodiol Capsule 300 milliGRAM(s) Oral two times a day with meals      Cardiovascular:   furosemide   Injectable 20 milliGRAM(s) IV Push every 24 hours PRN      Immunologic:   filgrastim-sndz (ZARXIO) Injectable 480 MICROGram(s) SubCutaneous every 24 hours      Other medications:   acetaminophen   Tablet .. 650 milliGRAM(s) Oral once  aprepitant 80 milliGRAM(s) Oral every 24 hours  Biotene Dry Mouth Oral Rinse 5 milliLiter(s) Swish and Spit five times a day  chlorhexidine 4% Liquid 1 Application(s) Topical <User Schedule>  dextrose 5% + sodium chloride 0.9%. 1000 milliLiter(s) IV Continuous <Continuous>  diphenhydrAMINE   Injectable 25 milliGRAM(s) IV Push once  folic acid 1 milliGRAM(s) Oral daily  hydrocortisone sodium succinate Injectable 50 milliGRAM(s) IV Push once  lidocaine/prilocaine Cream 1 Application(s) Topical daily  LORazepam   Injectable 1 milliGRAM(s) IV Push every 24 hours  multivitamin 1 Tablet(s) Oral daily  palifermin Injectable  (eMAR) 5100 MICROGram(s) IV Push every 24 hours  sodium chloride 0.45% 1000 milliLiter(s) IV Continuous <Continuous>  sodium chloride 0.9%. 1000 milliLiter(s) IV Continuous <Continuous>      PRN:   acetaminophen   Tablet .. 650 milliGRAM(s) Oral every 6 hours PRN  acetaminophen   Tablet .. 650 milliGRAM(s) Oral every 6 hours PRN  diphenhydrAMINE   Injectable 25 milliGRAM(s) IV Push every 4 hours PRN  furosemide   Injectable 20 milliGRAM(s) IV Push every 24 hours PRN  magnesium hydroxide Suspension 30 milliLiter(s) Oral daily PRN  metoclopramide Injectable 10 milliGRAM(s) IV Push every 6 hours PRN  polyethylene glycol 3350 17 Gram(s) Oral daily PRN  senna 1 Tablet(s) Oral at bedtime PRN  sodium chloride 0.9% lock flush 10 milliLiter(s) IV Push every 1 hour PRN    A/P:   59 year old male with a history of amyloidosis  Pre :  Autologous PBSCT day 0  Continue transplant hydration for 24 hours post infusion of cells   Strict I&O, prn diuresis, daily weights   Keep platelets =/> 40K for history of amyloidosis   Constipation - continue prn bowel regimen     1. Infectious Disease:   acyclovir   Oral Tab/Cap 400 milliGRAM(s) Oral every 8 hours  clotrimazole Lozenge 1 Lozenge Oral five times a day  fluconAZOLE   Tablet 400 milliGRAM(s) Oral daily    2. VOD Prophylaxis: Actigall, Glutamine, Heparin (dosed at 100 units / kg / day)     3. GI Prophylaxis:   pantoprazole    Tablet 40 milliGRAM(s) Oral before breakfast    4. Mouthcare - NS / NaHCO3 rinses, Mycelex, Biotene; Skin care     5. GVHD prophylaxis - n/a     6. Transfuse & replete electrolytes prn     7. IV hydration, daily weights, strict I&O, prn diuresis     8. PO intake as tolerated, nutrition follow up as needed, MVI, folic acid     9. Antiemetics, anti-diarrhea medications:   metoclopramide Injectable 10 milliGRAM(s) IV Push every 6 hours PRN  LORazepam   Injectable 1 milliGRAM(s) IV Push every 24 hours  aprepitant 80 milliGRAM(s) Oral every 24 hours    10. OOB as tolerated, physical therapy consult if needed     11. Monitor coags / fibrinogen 2x week, vitamin K as needed     12. Monitor closely for clinical changes, monitor for fevers     13. Emotional support provided, plan of care discussed and questions addressed     14. Patient education done regarding plan of care, restrictions and discharge planning     15. Continue regular social work input     I have written the above note for Dr. Loja who performed service with me in the room.   Brinda Garrett NP-C   Kandice Woodward PA-c ([phone])    I have seen and examined patient with NP/PA, I agree with above note as scribed. [facility] Transplant Team                                                      Critical / Counseling Time Provided: 30 minutes                                                                                                                                                        Chief Complaint: Autologous pbsct with high dose melphalan prep regimen for treatment of amyloidosis    S: Patient seen and examined with [facility]PC Transplant Team:   + fatigue     Denies mouth / tongue / throat pain, dyspnea, cough, vomiting, diarrhea, abdominal pain     O: Vitals:   Vital Signs Last 24 Hrs  T(C): 36.9 (2020 05:00), Max: 37.5 (2020 13:49)  T(F): 98.4 (2020 05:00), Max: 99.5 (2020 13:49)  HR: 74 (2020 05:00) (58 - 74)  BP: 112/63 (2020 05:00) (107/66 - 117/77)  BP(mean): --  RR: 18 (2020 05:00) (18 - 20)  SpO2: 97% (2020 05:00) (95% - 99%)    Admit weight: 85.7 kg  Daily Weight in k.5 (2020 10:40)  Today's weight: 87.2 kg     Intake / Output:    @ 07: @ 07:00  --------------------------------------------------------  IN: 4665 mL / OUT: 5800 mL / NET: -1135 mL     @ 07:02 @ 08:38  --------------------------------------------------------  IN: 539.8 mL / OUT: 500 mL / NET: 39.8 mL    PE:   Oropharynx: no erythema or ulcerations   Oral Mucositis:               -                                         Grade: n/a  CVS: S1, S2 RRR   Lungs: CTA throughout bilaterally   Abdomen: + BS x 4, soft, NT, ND   Extremities: + mild chronic LE edema  Gastric Mucositis:      -                                            Grade: n/a  Intestinal Mucositis:    -                                          Grade: n/a  Skin: no rash  TLC: CDI  Neuro: A&O x 3  Pain: denies     Labs:   CBC Full  -  ( 2020 07:07 )  WBC Count : 4.46 K/uL  Hemoglobin : 8.0 g/dL  Hematocrit : 25.1 %  Platelet Count - Automated : 147 K/uL  Mean Cell Volume : 75.1 fl  Mean Cell Hemoglobin : 24.0 pg  Mean Cell Hemoglobin Concentration : 31.9 gm/dL  Auto Neutrophil # : x  Auto Lymphocyte # : x  Auto Monocyte # : x  Auto Eosinophil # : x  Auto Basophil # : x  Auto Neutrophil % : x  Auto Lymphocyte % : x  Auto Monocyte % : x  Auto Eosinophil % : x  Auto Basophil % : x                          8.0    4.46  )-----------( 147      ( 2020 07:07 )             25.1     07-    142  |  104  |  21  ----------------------------<  105<H>  3.7   |  26  |  0.84    Ca    7.6<L>      2020 07:05  Phos  4.3     07-02  Mg     2.0     07-    TPro  4.7<L>  /  Alb  3.3  /  TBili  0.5  /  DBili  x   /  AST  10  /  ALT  9<L>  /  AlkPhos  40  07-02    PT/INR - ( 2020 07:07 )   PT: 11.8 sec;   INR: 1.04 ratio         PTT - ( 2020 07:07 )  PTT:24.9 sec  LIVER FUNCTIONS - ( 2020 07:05 )  Alb: 3.3 g/dL / Pro: 4.7 g/dL / ALK PHOS: 40 U/L / ALT: 9 U/L / AST: 10 U/L / GGT: x           Lactate Dehydrogenase, Serum: 155 U/L ( @ 07:05)      Meds:   Antimicrobials:   acyclovir   Oral Tab/Cap 400 milliGRAM(s) Oral every 8 hours  clotrimazole Lozenge 1 Lozenge Oral five times a day  fluconAZOLE   Tablet 400 milliGRAM(s) Oral daily      Heme / Onc:   heparin  Infusion 357 Unit(s)/Hr IV Continuous <Continuous>      GI:  magnesium hydroxide Suspension 30 milliLiter(s) Oral daily PRN  pantoprazole    Tablet 40 milliGRAM(s) Oral before breakfast  polyethylene glycol 3350 17 Gram(s) Oral daily PRN  senna 1 Tablet(s) Oral at bedtime PRN  sodium bicarbonate Mouth Rinse 10 milliLiter(s) Swish and Spit five times a day  ursodiol Capsule 300 milliGRAM(s) Oral two times a day with meals      Cardiovascular:   furosemide   Injectable 20 milliGRAM(s) IV Push every 24 hours PRN      Immunologic:   filgrastim-sndz (ZARXIO) Injectable 480 MICROGram(s) SubCutaneous every 24 hours      Other medications:   acetaminophen   Tablet .. 650 milliGRAM(s) Oral once  aprepitant 80 milliGRAM(s) Oral every 24 hours  Biotene Dry Mouth Oral Rinse 5 milliLiter(s) Swish and Spit five times a day  chlorhexidine 4% Liquid 1 Application(s) Topical <User Schedule>  dextrose 5% + sodium chloride 0.9%. 1000 milliLiter(s) IV Continuous <Continuous>  diphenhydrAMINE   Injectable 25 milliGRAM(s) IV Push once  folic acid 1 milliGRAM(s) Oral daily  hydrocortisone sodium succinate Injectable 50 milliGRAM(s) IV Push once  lidocaine/prilocaine Cream 1 Application(s) Topical daily  LORazepam   Injectable 1 milliGRAM(s) IV Push every 24 hours  multivitamin 1 Tablet(s) Oral daily  palifermin Injectable  (eMAR) 5100 MICROGram(s) IV Push every 24 hours  sodium chloride 0.45% 1000 milliLiter(s) IV Continuous <Continuous>  sodium chloride 0.9%. 1000 milliLiter(s) IV Continuous <Continuous>      PRN:   acetaminophen   Tablet .. 650 milliGRAM(s) Oral every 6 hours PRN  acetaminophen   Tablet .. 650 milliGRAM(s) Oral every 6 hours PRN  diphenhydrAMINE   Injectable 25 milliGRAM(s) IV Push every 4 hours PRN  furosemide   Injectable 20 milliGRAM(s) IV Push every 24 hours PRN  magnesium hydroxide Suspension 30 milliLiter(s) Oral daily PRN  metoclopramide Injectable 10 milliGRAM(s) IV Push every 6 hours PRN  polyethylene glycol 3350 17 Gram(s) Oral daily PRN  senna 1 Tablet(s) Oral at bedtime PRN  sodium chloride 0.9% lock flush 10 milliLiter(s) IV Push every 1 hour PRN    A/P:   59 year old male with a history of amyloidosis  Pre :  Autologous PBSCT day 0  Continue transplant hydration for 24 hours post infusion of cells   Strict I&O, prn diuresis, daily weights   Keep platelets =/> 40K for history of amyloidosis   Constipation - continue prn bowel regimen     1. Infectious Disease:   acyclovir   Oral Tab/Cap 400 milliGRAM(s) Oral every 8 hours  clotrimazole Lozenge 1 Lozenge Oral five times a day  fluconAZOLE   Tablet 400 milliGRAM(s) Oral daily    2. VOD Prophylaxis: Actigall, Glutamine, Heparin (dosed at 100 units / kg / day)     3. GI Prophylaxis:   pantoprazole    Tablet 40 milliGRAM(s) Oral before breakfast    4. Mouthcare - NS / NaHCO3 rinses, Mycelex, Biotene; Skin care     5. GVHD prophylaxis - n/a     6. Transfuse & replete electrolytes prn     7. IV hydration, daily weights, strict I&O, prn diuresis   Lasix 40 mg IV x2     8. PO intake as tolerated, nutrition follow up as needed, MVI, folic acid     9. Antiemetics, anti-diarrhea medications:   metoclopramide Injectable 10 milliGRAM(s) IV Push every 6 hours PRN  LORazepam   Injectable 1 milliGRAM(s) IV Push every 24 hours  aprepitant 80 milliGRAM(s) Oral every 24 hours    10. OOB as tolerated, physical therapy consult if needed     11. Monitor coags / fibrinogen 2x week, vitamin K as needed     12. Monitor closely for clinical changes, monitor for fevers     13. Emotional support provided, plan of care discussed and questions addressed     14. Patient education done regarding plan of care, restrictions and discharge planning     15. Continue regular social work input     I have written the above note for Dr. Loja who performed service with me in the room.   Brinda Garrett NP-C   Kandice Woodward PAbessie ([phone])    I have seen and examined patient with NP/PA, I agree with above note as scribed.

## 2020-07-02 NOTE — CHART NOTE - NSCHARTNOTEFT_GEN_A_CORE
Nutrition Follow Up Note  Patient seen for: LOS follow up     Interim events noted, chart reviewed. Pt undergoing autologous PBSCT today.     Source: pt sleeping at this time, RN     Diet : Diet, Regular:   GlutaSolve(Glutamine) 15gm pkg     Qty per Day:  1 (06-29-20 @ 10:54)    As per RN, pt did not eat well at dinner last night but overall has been eating well and was able to eat 100% of his caloric/nutrient dense breakfast this morning. Noted pt did have a BM earlier today.     Daily Weight: 6/29: 188.9->7/2: 192.2 pounds, would continue to monitor wt, likely elevated due to fluids  Pertinent Medications: MEDICATIONS  (STANDING):  acyclovir   Oral Tab/Cap 400 milliGRAM(s) Oral every 8 hours  aprepitant 80 milliGRAM(s) Oral every 24 hours  Biotene Dry Mouth Oral Rinse 5 milliLiter(s) Swish and Spit five times a day  chlorhexidine 4% Liquid 1 Application(s) Topical <User Schedule>  clotrimazole Lozenge 1 Lozenge Oral five times a day  dextrose 5% + sodium chloride 0.9%. 1000 milliLiter(s) (200 mL/Hr) IV Continuous <Continuous>  filgrastim-sndz (ZARXIO) Injectable 480 MICROGram(s) SubCutaneous every 24 hours  fluconAZOLE   Tablet 400 milliGRAM(s) Oral daily  folic acid 1 milliGRAM(s) Oral daily  furosemide   Injectable 40 milliGRAM(s) IV Push once  furosemide   Injectable 40 milliGRAM(s) IV Push once  heparin  Infusion 357 Unit(s)/Hr (3.57 mL/Hr) IV Continuous <Continuous>  lidocaine/prilocaine Cream 1 Application(s) Topical daily  LORazepam   Injectable 1 milliGRAM(s) IV Push every 24 hours  multivitamin 1 Tablet(s) Oral daily  palifermin Injectable  (eMAR) 5100 MICROGram(s) IV Push every 24 hours  pantoprazole    Tablet 40 milliGRAM(s) Oral before breakfast  sodium bicarbonate Mouth Rinse 10 milliLiter(s) Swish and Spit five times a day  sodium chloride 0.45% 1000 milliLiter(s) (150 mL/Hr) IV Continuous <Continuous>  sodium chloride 0.9%. 1000 milliLiter(s) (20 mL/Hr) IV Continuous <Continuous>  ursodiol Capsule 300 milliGRAM(s) Oral two times a day with meals    MEDICATIONS  (PRN):  acetaminophen   Tablet .. 650 milliGRAM(s) Oral every 6 hours PRN Temp greater or equal to 38C (100.4F), Mild Pain (1 - 3)  acetaminophen   Tablet .. 650 milliGRAM(s) Oral every 6 hours PRN Temp greater or equal to 38C (100.4F), Mild Pain (1 - 3)  diphenhydrAMINE   Injectable 25 milliGRAM(s) IV Push every 4 hours PRN Allergy symptoms  furosemide   Injectable 20 milliGRAM(s) IV Push every 24 hours PRN If urine output is <100mL/hr for 2 hours  magnesium hydroxide Suspension 30 milliLiter(s) Oral daily PRN Constipation  metoclopramide Injectable 10 milliGRAM(s) IV Push every 6 hours PRN Nausea and/or Vomiting  polyethylene glycol 3350 17 Gram(s) Oral daily PRN Constipation  senna 1 Tablet(s) Oral at bedtime PRN Constipation  sodium chloride 0.9% lock flush 10 milliLiter(s) IV Push every 1 hour PRN Pre/post blood products, medications, blood draw, and to maintain line patency    Pertinent Labs: 07-02 @ 07:05: Na 142, BUN 21, Cr 0.84, <H>, K+ 3.7, Phos 4.3, Mg 2.0, Alk Phos 40, ALT/SGPT 9<L>, AST/SGOT 10, HbA1c --    Finger Sticks: None pertinent to address at this time.       Skin per nursing documentation: no pressure injuries   Edema: none at this time     Estimated Needs:   [x] no change since previous assessment      Previous Nutrition Diagnosis: Predicted suboptimal energy intake   Nutrition Diagnosis continues at this time, addressed c good PO intake     New Nutrition Diagnosis: none at this time       Recommend  1) Continue c current diet.   2) Encourage continued good PO intake.     Monitoring and Evaluation:     Continue to monitor Nutritional intake, Tolerance to diet prescription, weights, labs, skin integrity    RD remains available upon request and will follow up per protocol  Darling Barth MS RD CDN [facility],  #[phone]

## 2020-07-02 NOTE — CHART NOTE - NSCHARTNOTEFT_GEN_A_CORE
Jayant Jose  59, M    CC: Rigors     Notified by RN patient with rigors . Seen and examined patient at bedside. Patient is alert, NAD. Denies HA, fever,  CP, SOB, cough, N/V, or abd pain. Reported of feeling rigors and chills which has now resolved. Pt states that he felt like having fever, but with temp of 98.9.     VITAL SIGNS:  T(C): 37.2 (07-02-20 @ 19:45), Max: 37.2 (07-02-20 @ 11:50)  HR: 67 (07-02-20 @ 19:21) (58 - 84)  BP: 111/71 (07-02-20 @ 19:21) (108/65 - 125/67)  RR: 18 (07-02-20 @ 17:41) (18 - 20)  SpO2: 98% (07-02-20 @ 19:21) (95% - 99%)  Wt(kg): --      LABORATORY:                          8.0    4.46  )-----------( 147      ( 02 Jul 2020 07:07 )             25.1       07-02    142  |  104  |  21  ----------------------------<  105<H>  3.7   |  26  |  0.84    Ca    7.6<L>      02 Jul 2020 07:05  Phos  4.3     07-02  Mg     2.0     07-02    TPro  4.7<L>  /  Alb  3.3  /  TBili  0.5  /  DBili  x   /  AST  10  /  ALT  9<L>  /  AlkPhos  40  07-02      PHYSICAL EXAM:    Constitutional: AOx3. NAD.    Respiratory: clear lungs bilaterally. No wheezing, rhonchi, or crackles.    Cardiovascular: S1 S2. No murmurs.    Gastrointestinal: BS X4 active. soft. nontender.    Extremities/Vascular: +2 pulses bilaterally. +  BLE edema.    TLC: Clean, dry and intact.       ASSESSMENT/PLAN:   59 year old male with history of amyloidosis treated with Velcade and Cybor D, hepatitis C treated with Harvoni admitted for autologous pbsct now s/p bone marrow transplant day 0 now being evaluated for rigors.    Rigors    - Now resolved up on evaluation  - Could be related to post transplant, no other reaction noted, no rash, hives or itching. Denies any difficulty breathing.   - Remains afebrile  - Will continue to monitor  - Will give Benadryl 25 po X 1  - If rigors re occurs will treat with Ativan.   D/W heme fellow Dr. Abelardo Espino NP  [phone]

## 2020-07-02 NOTE — PROGRESS NOTE ADULT - ATTENDING COMMENTS
59 year old male with history of amyloidosis treated with CyBorD, now admitted for autologous pbsct with Melphalan preparative regimen   Day - 0, continue transplant hydration for 24 hours post infusion of cells   For [facility]PC transplant on 7/2/20  Strict I&O, daily weight, Lasix to keep O > I  VOD prophylaxis- Actigall, low dose heparin gtt & glutamine supplementation  ID- continue Fluconazole / Acyclovir prophylaxis  Antiemetics  Bowel regimen  Mouth care, skin care  Steroid induced hyperglycemia- monitor glucose  OOB/ambulate 59 year old male with history of amyloidosis treated with CyBorD, now admitted for autologous pbsct with Melphalan preparative regimen   Day - 0, continue transplant hydration for 24 hours post infusion of cells   For HPC transplant on 7/2/20  Begin Zarxio daily on day 0, start 4 hrs post transplant  Kepivance on days 0, +1, +2 for prevention of mucositis.  Strict I&O, daily weight, Lasix to keep O > I  VOD prophylaxis- Actigall, low dose heparin gtt & glutamine supplementation  ID- continue Fluconazole / Acyclovir prophylaxis; begin Cipro when ANC < 1,000  Antiemetics  Bowel regimen  Mouth care, skin care  Steroid induced hyperglycemia- monitor glucose  OOB/ambulate

## 2020-07-03 LAB
ALBUMIN SERPL ELPH-MCNC: 3.3 G/DL — SIGNIFICANT CHANGE UP (ref 3.3–5)
ALP SERPL-CCNC: 42 U/L — SIGNIFICANT CHANGE UP (ref 40–120)
ALT FLD-CCNC: 9 U/L — LOW (ref 10–45)
ANION GAP SERPL CALC-SCNC: 10 MMOL/L — SIGNIFICANT CHANGE UP (ref 5–17)
AST SERPL-CCNC: 15 U/L — SIGNIFICANT CHANGE UP (ref 10–40)
BASOPHILS # BLD AUTO: 0.01 K/UL — SIGNIFICANT CHANGE UP (ref 0–0.2)
BASOPHILS NFR BLD AUTO: 0.1 % — SIGNIFICANT CHANGE UP (ref 0–2)
BILIRUB SERPL-MCNC: 0.4 MG/DL — SIGNIFICANT CHANGE UP (ref 0.2–1.2)
BLD GP AB SCN SERPL QL: NEGATIVE — SIGNIFICANT CHANGE UP
BUN SERPL-MCNC: 20 MG/DL — SIGNIFICANT CHANGE UP (ref 7–23)
CALCIUM SERPL-MCNC: 7.3 MG/DL — LOW (ref 8.4–10.5)
CHLORIDE SERPL-SCNC: 103 MMOL/L — SIGNIFICANT CHANGE UP (ref 96–108)
CO2 SERPL-SCNC: 30 MMOL/L — SIGNIFICANT CHANGE UP (ref 22–31)
CREAT SERPL-MCNC: 0.88 MG/DL — SIGNIFICANT CHANGE UP (ref 0.5–1.3)
EOSINOPHIL # BLD AUTO: 0.01 K/UL — SIGNIFICANT CHANGE UP (ref 0–0.5)
EOSINOPHIL NFR BLD AUTO: 0.1 % — SIGNIFICANT CHANGE UP (ref 0–6)
GLUCOSE SERPL-MCNC: 87 MG/DL — SIGNIFICANT CHANGE UP (ref 70–99)
HCT VFR BLD CALC: 25.6 % — LOW (ref 39–50)
HGB BLD-MCNC: 8.3 G/DL — LOW (ref 13–17)
IMM GRANULOCYTES NFR BLD AUTO: 8.1 % — HIGH (ref 0–1.5)
LDH SERPL L TO P-CCNC: 274 U/L — HIGH (ref 50–242)
LYMPHOCYTES # BLD AUTO: 0.13 K/UL — LOW (ref 1–3.3)
LYMPHOCYTES # BLD AUTO: 1 % — LOW (ref 13–44)
MAGNESIUM SERPL-MCNC: 1.9 MG/DL — SIGNIFICANT CHANGE UP (ref 1.6–2.6)
MANUAL SMEAR VERIFICATION: SIGNIFICANT CHANGE UP
MCHC RBC-ENTMCNC: 23.9 PG — LOW (ref 27–34)
MCHC RBC-ENTMCNC: 32.4 GM/DL — SIGNIFICANT CHANGE UP (ref 32–36)
MCV RBC AUTO: 73.6 FL — LOW (ref 80–100)
MONOCYTES # BLD AUTO: 0.06 K/UL — SIGNIFICANT CHANGE UP (ref 0–0.9)
MONOCYTES NFR BLD AUTO: 0.5 % — LOW (ref 2–14)
NEUTROPHILS # BLD AUTO: 11.99 K/UL — HIGH (ref 1.8–7.4)
NEUTROPHILS NFR BLD AUTO: 90.2 % — HIGH (ref 43–77)
NRBC # BLD: 0 /100 WBCS — SIGNIFICANT CHANGE UP (ref 0–0)
PHOSPHATE SERPL-MCNC: 4.2 MG/DL — SIGNIFICANT CHANGE UP (ref 2.5–4.5)
PLAT MORPH BLD: NORMAL — SIGNIFICANT CHANGE UP
PLATELET # BLD AUTO: 148 K/UL — LOW (ref 150–400)
POTASSIUM SERPL-MCNC: 3.2 MMOL/L — LOW (ref 3.5–5.3)
POTASSIUM SERPL-SCNC: 3.2 MMOL/L — LOW (ref 3.5–5.3)
PROT SERPL-MCNC: 4.6 G/DL — LOW (ref 6–8.3)
RBC # BLD: 3.48 M/UL — LOW (ref 4.2–5.8)
RBC # FLD: 15.7 % — HIGH (ref 10.3–14.5)
RBC BLD AUTO: SIGNIFICANT CHANGE UP
RH IG SCN BLD-IMP: POSITIVE — SIGNIFICANT CHANGE UP
SODIUM SERPL-SCNC: 143 MMOL/L — SIGNIFICANT CHANGE UP (ref 135–145)
WBC # BLD: 13.28 K/UL — HIGH (ref 3.8–10.5)
WBC # FLD AUTO: 13.28 K/UL — HIGH (ref 3.8–10.5)

## 2020-07-03 PROCEDURE — 99291 CRITICAL CARE FIRST HOUR: CPT

## 2020-07-03 RX ORDER — POTASSIUM CHLORIDE 20 MEQ
20 PACKET (EA) ORAL
Refills: 0 | Status: COMPLETED | OUTPATIENT
Start: 2020-07-03 | End: 2020-07-03

## 2020-07-03 RX ORDER — FUROSEMIDE 40 MG
40 TABLET ORAL ONCE
Refills: 0 | Status: COMPLETED | OUTPATIENT
Start: 2020-07-03 | End: 2020-07-03

## 2020-07-03 RX ADMIN — Medication 10 MILLILITER(S): at 21:05

## 2020-07-03 RX ADMIN — Medication 400 MILLIGRAM(S): at 21:05

## 2020-07-03 RX ADMIN — Medication 1 LOZENGE: at 08:19

## 2020-07-03 RX ADMIN — Medication 40 MILLIGRAM(S): at 12:00

## 2020-07-03 RX ADMIN — Medication 1 LOZENGE: at 23:40

## 2020-07-03 RX ADMIN — Medication 5 MILLILITER(S): at 12:18

## 2020-07-03 RX ADMIN — Medication 10 MILLILITER(S): at 00:24

## 2020-07-03 RX ADMIN — Medication 10 MILLILITER(S): at 16:54

## 2020-07-03 RX ADMIN — Medication 400 MILLIGRAM(S): at 14:28

## 2020-07-03 RX ADMIN — Medication 5 MILLILITER(S): at 21:05

## 2020-07-03 RX ADMIN — CHLORHEXIDINE GLUCONATE 1 APPLICATION(S): 213 SOLUTION TOPICAL at 12:17

## 2020-07-03 RX ADMIN — Medication 1 LOZENGE: at 21:05

## 2020-07-03 RX ADMIN — URSODIOL 300 MILLIGRAM(S): 250 TABLET, FILM COATED ORAL at 08:19

## 2020-07-03 RX ADMIN — Medication 5100 MICROGRAM(S): at 17:45

## 2020-07-03 RX ADMIN — Medication 5 MILLILITER(S): at 08:19

## 2020-07-03 RX ADMIN — Medication 1 TABLET(S): at 12:19

## 2020-07-03 RX ADMIN — URSODIOL 300 MILLIGRAM(S): 250 TABLET, FILM COATED ORAL at 17:44

## 2020-07-03 RX ADMIN — Medication 480 MICROGRAM(S): at 17:44

## 2020-07-03 RX ADMIN — Medication 5 MILLILITER(S): at 16:53

## 2020-07-03 RX ADMIN — Medication 10 MILLILITER(S): at 23:40

## 2020-07-03 RX ADMIN — Medication 10 MILLILITER(S): at 12:21

## 2020-07-03 RX ADMIN — Medication 50 MILLIEQUIVALENT(S): at 15:00

## 2020-07-03 RX ADMIN — Medication 1 LOZENGE: at 16:53

## 2020-07-03 RX ADMIN — PANTOPRAZOLE SODIUM 40 MILLIGRAM(S): 20 TABLET, DELAYED RELEASE ORAL at 06:13

## 2020-07-03 RX ADMIN — Medication 1 MILLIGRAM(S): at 12:19

## 2020-07-03 RX ADMIN — FLUCONAZOLE 400 MILLIGRAM(S): 150 TABLET ORAL at 12:19

## 2020-07-03 RX ADMIN — Medication 50 MILLIEQUIVALENT(S): at 13:30

## 2020-07-03 RX ADMIN — Medication 400 MILLIGRAM(S): at 06:13

## 2020-07-03 RX ADMIN — Medication 10 MILLILITER(S): at 08:19

## 2020-07-03 RX ADMIN — Medication 10 MILLIGRAM(S): at 21:17

## 2020-07-03 RX ADMIN — Medication 50 MILLIEQUIVALENT(S): at 12:00

## 2020-07-03 RX ADMIN — Medication 1 LOZENGE: at 12:20

## 2020-07-03 RX ADMIN — Medication 1 LOZENGE: at 00:24

## 2020-07-03 RX ADMIN — Medication 5 MILLILITER(S): at 00:24

## 2020-07-03 RX ADMIN — Medication 5 MILLILITER(S): at 23:41

## 2020-07-03 NOTE — PROGRESS NOTE ADULT - SUBJECTIVE AND OBJECTIVE BOX
HPC Transplant Team                                                      Critical / Counseling Time Provided: 30 minutes                                                                                                                                                        Chief Complaint: Autologous pbsct with high dose melphalan prep regimen for treatment of amyloidosis    S: Patient seen and examined with HPC Transplant Team:   + fatigue     Denies mouth / tongue / throat pain, dyspnea, cough, nausea, vomiting, diarrhea, abdominal pain     O: Vitals:   Vital Signs Last 24 Hrs  T(C): 37.4 (2020 06:00), Max: 37.4 (2020 06:00)  T(F): 99.3 (2020 06:00), Max: 99.3 (2020 06:00)  HR: 72 (2020 06:00) (63 - 84)  BP: 118/70 (2020 06:00) (100/51 - 125/67)  BP(mean): --  RR: 18 (2020 06:00) (18 - 20)  SpO2: 97% (2020 06:00) (95% - 99%)    Admit weight: 85.7 kg   Daily Weight in k.2 (2020 09:00)  Today's weight: pending     Intake / Output:    @ 07:01  -  -03 @ 07:00  --------------------------------------------------------  IN: 4869.3 mL / OUT: 5540 mL / NET: -670.7 mL        PE:   Oropharynx: no erythema or ulcerations   Oral Mucositis:               -                                         Grade: n/a  CVS: S1, S2 RRR   Lungs: CTA throughout bilaterally   Abdomen: + BS x 4, soft, NT, ND   Extremities: + mild chronic LE edema  Gastric Mucositis:      -                                            Grade: n/a  Intestinal Mucositis:    -                                          Grade: n/a  Skin: no rash  TLC: CDI  Neuro: A&O x 3  Pain: denies     Labs:   CBC Full  -  ( 2020 07:15 )  WBC Count : 13.28 K/uL  Hemoglobin : 8.3 g/dL  Hematocrit : 25.6 %  Platelet Count - Automated : 148 K/uL  Mean Cell Volume : 73.6 fl  Mean Cell Hemoglobin : 23.9 pg  Mean Cell Hemoglobin Concentration : 32.4 gm/dL  Auto Neutrophil # : x  Auto Lymphocyte # : x  Auto Monocyte # : x  Auto Eosinophil # : x  Auto Basophil # : x  Auto Neutrophil % : x  Auto Lymphocyte % : x  Auto Monocyte % : x  Auto Eosinophil % : x  Auto Basophil % : x                          8.3    13.28 )-----------( 148      ( 2020 07:15 )             25.6     07-03    143  |  103  |  20  ----------------------------<  87  3.2<L>   |  30  |  0.88    Ca    7.3<L>      2020 07:15  Phos  4.2     07-03  Mg     1.9     07-03    TPro  4.6<L>  /  Alb  3.3  /  TBili  0.4  /  DBili  x   /  AST  15  /  ALT  9<L>  /  AlkPhos  42  07-03    PT/INR - ( 2020 07:07 )   PT: 11.8 sec;   INR: 1.04 ratio         PTT - ( 2020 07:07 )  PTT:24.9 sec  LIVER FUNCTIONS - ( 2020 07:15 )  Alb: 3.3 g/dL / Pro: 4.6 g/dL / ALK PHOS: 42 U/L / ALT: 9 U/L / AST: 15 U/L / GGT: x           Lactate Dehydrogenase, Serum: 274 U/L ( @ 07:15)    Meds:   Antimicrobials:   acyclovir   Oral Tab/Cap 400 milliGRAM(s) Oral every 8 hours  clotrimazole Lozenge 1 Lozenge Oral five times a day  fluconAZOLE   Tablet 400 milliGRAM(s) Oral daily    Heme / Onc:   heparin  Infusion 357 Unit(s)/Hr IV Continuous <Continuous>    GI:  magnesium hydroxide Suspension 30 milliLiter(s) Oral daily PRN  pantoprazole    Tablet 40 milliGRAM(s) Oral before breakfast  polyethylene glycol 3350 17 Gram(s) Oral daily PRN  senna 1 Tablet(s) Oral at bedtime PRN  sodium bicarbonate Mouth Rinse 10 milliLiter(s) Swish and Spit five times a day  ursodiol Capsule 300 milliGRAM(s) Oral two times a day with meals    Cardiovascular:   furosemide   Injectable 20 milliGRAM(s) IV Push every 24 hours PRN    Immunologic:   filgrastim-sndz (ZARXIO) Injectable 480 MICROGram(s) SubCutaneous every 24 hours    Other medications:   Biotene Dry Mouth Oral Rinse 5 milliLiter(s) Swish and Spit five times a day  chlorhexidine 4% Liquid 1 Application(s) Topical <User Schedule>  dextrose 5% + sodium chloride 0.9%. 1000 milliLiter(s) IV Continuous <Continuous>  folic acid 1 milliGRAM(s) Oral daily  lidocaine/prilocaine Cream 1 Application(s) Topical daily  LORazepam   Injectable 1 milliGRAM(s) IV Push every 24 hours  multivitamin 1 Tablet(s) Oral daily  palifermin Injectable  (eMAR) 5100 MICROGram(s) IV Push every 24 hours  sodium chloride 0.45% 1000 milliLiter(s) IV Continuous <Continuous>  sodium chloride 0.9%. 1000 milliLiter(s) IV Continuous <Continuous>      PRN:   acetaminophen   Tablet .. 650 milliGRAM(s) Oral every 6 hours PRN  acetaminophen   Tablet .. 650 milliGRAM(s) Oral every 6 hours PRN  diphenhydrAMINE 25 milliGRAM(s) Oral once PRN  diphenhydrAMINE   Injectable 25 milliGRAM(s) IV Push every 4 hours PRN  furosemide   Injectable 20 milliGRAM(s) IV Push every 24 hours PRN  magnesium hydroxide Suspension 30 milliLiter(s) Oral daily PRN  metoclopramide Injectable 10 milliGRAM(s) IV Push every 6 hours PRN  polyethylene glycol 3350 17 Gram(s) Oral daily PRN  senna 1 Tablet(s) Oral at bedtime PRN  sodium chloride 0.9% lock flush 10 milliLiter(s) IV Push every 1 hour PRN      A/P:   59 year old male with a history of amyloidosis  Post Autologous PBSCT day +1   Continue transplant hydration for 24 hours post infusion of cells   Strict I&O, prn diuresis, daily weights   Keep platelets =/> 40K for history of amyloidosis   Constipation - continue prn bowel regimen    1. Infectious Disease:   acyclovir   Oral Tab/Cap 400 milliGRAM(s) Oral every 8 hours  clotrimazole Lozenge 1 Lozenge Oral five times a day  fluconAZOLE   Tablet 400 milliGRAM(s) Oral daily    2. VOD Prophylaxis: Actigall, Glutamine, Heparin (dosed at 100 units / kg / day)     3. GI Prophylaxis:   pantoprazole    Tablet 40 milliGRAM(s) Oral before breakfast    4. Mouthcare - NS / NaHCO3 rinses, Mycelex, Biotene; Skin care     5. GVHD prophylaxis - n/a     6. Transfuse & replete electrolytes prn     7. IV hydration, daily weights, strict I&O, prn diuresis   Lasix 40 mg IV x2     8. PO intake as tolerated, nutrition follow up as needed, MVI, folic acid     9. Antiemetics, anti-diarrhea medications:   metoclopramide Injectable 10 milliGRAM(s) IV Push every 6 hours PRN  LORazepam   Injectable 1 milliGRAM(s) IV Push every 24 hours  aprepitant 80 milliGRAM(s) Oral every 24 hours    10. OOB as tolerated, physical therapy consult if needed     11. Monitor coags / fibrinogen 2x week, vitamin K as needed     12. Monitor closely for clinical changes, monitor for fevers     13. Emotional support provided, plan of care discussed and questions addressed     14. Patient education done regarding plan of care, restrictions and discharge planning     15. Continue regular social work input     I have written the above note for Dr. Norris who performed service with me in the room.   Larissa Manriquez NP-C ([phone])    I have seen and examined patient with NP, I agree with above note as scribed. HPC Transplant Team                                                      Critical / Counseling Time Provided: 30 minutes                                                                                                                                                        Chief Complaint: Autologous pbsct with high dose melphalan prep regimen for treatment of amyloidosis    S: Patient seen and examined with HPC Transplant Team:   + fatigue   + brief episode of rigors without fever last evening     Denies mouth / tongue / throat pain, dyspnea, cough, nausea, vomiting, diarrhea, abdominal pain     O: Vitals:   Vital Signs Last 24 Hrs  T(C): 37.4 (2020 06:00), Max: 37.4 (2020 06:00)  T(F): 99.3 (2020 06:00), Max: 99.3 (2020 06:00)  HR: 72 (2020 06:00) (63 - 84)  BP: 118/70 (2020 06:00) (100/51 - 125/67)  BP(mean): --  RR: 18 (2020 06:00) (18 - 20)  SpO2: 97% (2020 06:00) (95% - 99%)    Admit weight: 85.7 kg   Daily Weight in k.2 (2020 09:00)  Today's weight: 86.8kg    Intake / Output:   07-02 @ 07:01  -  07-03 @ 07:00  --------------------------------------------------------  IN: 4869.3 mL / OUT: 5540 mL / NET: -670.7 mL    PE:   Oropharynx: no erythema or ulcerations   Oral Mucositis:               -                                         Grade: n/a  CVS: S1, S2 RRR   Lungs: CTA throughout bilaterally   Abdomen: + BS x 4, soft, NT, ND   Extremities: + mild chronic LE edema  Gastric Mucositis:      -                                            Grade: n/a  Intestinal Mucositis:    -                                          Grade: n/a  Skin: no rash  TLC: CDI  Neuro: A&O x 3  Pain: denies     Labs:   CBC Full  -  ( 2020 07:15 )  WBC Count : 13.28 K/uL  Hemoglobin : 8.3 g/dL  Hematocrit : 25.6 %  Platelet Count - Automated : 148 K/uL  Mean Cell Volume : 73.6 fl  Mean Cell Hemoglobin : 23.9 pg  Mean Cell Hemoglobin Concentration : 32.4 gm/dL  Auto Neutrophil # : x  Auto Lymphocyte # : x  Auto Monocyte # : x  Auto Eosinophil # : x  Auto Basophil # : x  Auto Neutrophil % : x  Auto Lymphocyte % : x  Auto Monocyte % : x  Auto Eosinophil % : x  Auto Basophil % : x                          8.3    13.28 )-----------( 148      ( 2020 07:15 )             25.6     07-03    143  |  103  |  20  ----------------------------<  87  3.2<L>   |  30  |  0.88    Ca    7.3<L>      2020 07:15  Phos  4.2     07-  Mg     1.9     07-    TPro  4.6<L>  /  Alb  3.3  /  TBili  0.4  /  DBili  x   /  AST  15  /  ALT  9<L>  /  AlkPhos  42  07-03    PT/INR - ( 2020 07:07 )   PT: 11.8 sec;   INR: 1.04 ratio         PTT - ( 2020 07:07 )  PTT:24.9 sec  LIVER FUNCTIONS - ( 2020 07:15 )  Alb: 3.3 g/dL / Pro: 4.6 g/dL / ALK PHOS: 42 U/L / ALT: 9 U/L / AST: 15 U/L / GGT: x           Lactate Dehydrogenase, Serum: 274 U/L ( @ 07:15)    Meds:   Antimicrobials:   acyclovir   Oral Tab/Cap 400 milliGRAM(s) Oral every 8 hours  clotrimazole Lozenge 1 Lozenge Oral five times a day  fluconAZOLE   Tablet 400 milliGRAM(s) Oral daily    Heme / Onc:   heparin  Infusion 357 Unit(s)/Hr IV Continuous <Continuous>    GI:  magnesium hydroxide Suspension 30 milliLiter(s) Oral daily PRN  pantoprazole    Tablet 40 milliGRAM(s) Oral before breakfast  polyethylene glycol 3350 17 Gram(s) Oral daily PRN  senna 1 Tablet(s) Oral at bedtime PRN  sodium bicarbonate Mouth Rinse 10 milliLiter(s) Swish and Spit five times a day  ursodiol Capsule 300 milliGRAM(s) Oral two times a day with meals    Cardiovascular:   furosemide   Injectable 20 milliGRAM(s) IV Push every 24 hours PRN    Immunologic:   filgrastim-sndz (ZARXIO) Injectable 480 MICROGram(s) SubCutaneous every 24 hours    Other medications:   Biotene Dry Mouth Oral Rinse 5 milliLiter(s) Swish and Spit five times a day  chlorhexidine 4% Liquid 1 Application(s) Topical <User Schedule>  dextrose 5% + sodium chloride 0.9%. 1000 milliLiter(s) IV Continuous <Continuous>  folic acid 1 milliGRAM(s) Oral daily  lidocaine/prilocaine Cream 1 Application(s) Topical daily  LORazepam   Injectable 1 milliGRAM(s) IV Push every 24 hours  multivitamin 1 Tablet(s) Oral daily  palifermin Injectable  (eMAR) 5100 MICROGram(s) IV Push every 24 hours  sodium chloride 0.45% 1000 milliLiter(s) IV Continuous <Continuous>  sodium chloride 0.9%. 1000 milliLiter(s) IV Continuous <Continuous>      PRN:   acetaminophen   Tablet .. 650 milliGRAM(s) Oral every 6 hours PRN  acetaminophen   Tablet .. 650 milliGRAM(s) Oral every 6 hours PRN  diphenhydrAMINE 25 milliGRAM(s) Oral once PRN  diphenhydrAMINE   Injectable 25 milliGRAM(s) IV Push every 4 hours PRN  furosemide   Injectable 20 milliGRAM(s) IV Push every 24 hours PRN  magnesium hydroxide Suspension 30 milliLiter(s) Oral daily PRN  metoclopramide Injectable 10 milliGRAM(s) IV Push every 6 hours PRN  polyethylene glycol 3350 17 Gram(s) Oral daily PRN  senna 1 Tablet(s) Oral at bedtime PRN  sodium chloride 0.9% lock flush 10 milliLiter(s) IV Push every 1 hour PRN      A/P:   59 year old male with a history of amyloidosis  Post Autologous PBSCT day +1   Continue transplant hydration for 24 hours post infusion of cells   Strict I&O, prn diuresis, daily weights   Keep platelets =/> 40K for history of amyloidosis   Constipation - continue prn bowel regimen    1. Infectious Disease:   acyclovir   Oral Tab/Cap 400 milliGRAM(s) Oral every 8 hours  clotrimazole Lozenge 1 Lozenge Oral five times a day  fluconAZOLE   Tablet 400 milliGRAM(s) Oral daily    2. VOD Prophylaxis: Actigall, Glutamine, Heparin (dosed at 100 units / kg / day)     3. GI Prophylaxis:   pantoprazole    Tablet 40 milliGRAM(s) Oral before breakfast    4. Mouthcare - NS / NaHCO3 rinses, Mycelex, Biotene; Skin care     5. GVHD prophylaxis - n/a     6. Transfuse & replete electrolytes prn     7. IV hydration, daily weights, strict I&O, prn diuresis   Lasix 40 mg IV x2     8. PO intake as tolerated, nutrition follow up as needed, MVI, folic acid     9. Antiemetics, anti-diarrhea medications:   metoclopramide Injectable 10 milliGRAM(s) IV Push every 6 hours PRN  LORazepam   Injectable 1 milliGRAM(s) IV Push every 24 hours  aprepitant 80 milliGRAM(s) Oral every 24 hours    10. OOB as tolerated, physical therapy consult if needed     11. Monitor coags / fibrinogen 2x week, vitamin K as needed     12. Monitor closely for clinical changes, monitor for fevers     13. Emotional support provided, plan of care discussed and questions addressed     14. Patient education done regarding plan of care, restrictions and discharge planning     15. Continue regular social work input     I have written the above note for Dr. Norris who performed service with me in the room.   Larissa Manriquez NP-C ([phone])    I have seen and examined patient with NP, I agree with above note as scribed. [facility] Transplant Team                                                      Critical / Counseling Time Provided: 30 minutes                                                                                                                                                        Chief Complaint: Autologous pbsct with high dose melphalan prep regimen for treatment of amyloidosis    S: Patient seen and examined with [facility]PC Transplant Team:   + fatigue   + brief episode of rigors without fever last evening     Denies mouth / tongue / throat pain, dyspnea, cough, nausea, vomiting, diarrhea, abdominal pain     O: Vitals:   Vital Signs Last 24 Hrs  T(C): 37.4 (2020 06:00), Max: 37.4 (2020 06:00)  T(F): 99.3 (2020 06:00), Max: 99.3 (2020 06:00)  HR: 72 (2020 06:00) (63 - 84)  BP: 118/70 (2020 06:00) (100/51 - 125/67)  BP(mean): --  RR: 18 (2020 06:00) (18 - 20)  SpO2: 97% (2020 06:00) (95% - 99%)    Admit weight: 85.7 kg   Daily Weight in k.2 (2020 09:00)  Today's weight: 86.8kg    Intake / Output:   07-02 @ 07:01  -  07-03 @ 07:00  --------------------------------------------------------  IN: 4869.3 mL / OUT: 5540 mL / NET: -670.7 mL    PE:   Oropharynx: no erythema or ulcerations   Oral Mucositis:               -                                         Grade: n/a  CVS: S1, S2 RRR   Lungs: CTA throughout bilaterally   Abdomen: + BS x 4, soft, NT, ND   Extremities: + mild chronic LE edema  Gastric Mucositis:      -                                            Grade: n/a  Intestinal Mucositis:    -                                          Grade: n/a  Skin: no rash  TLC: CDI  Neuro: A&O x 3  Pain: denies     Labs:   CBC Full  -  ( 2020 07:15 )  WBC Count : 13.28 K/uL  Hemoglobin : 8.3 g/dL  Hematocrit : 25.6 %  Platelet Count - Automated : 148 K/uL  Mean Cell Volume : 73.6 fl  Mean Cell Hemoglobin : 23.9 pg  Mean Cell Hemoglobin Concentration : 32.4 gm/dL  Auto Neutrophil # : x  Auto Lymphocyte # : x  Auto Monocyte # : x  Auto Eosinophil # : x  Auto Basophil # : x  Auto Neutrophil % : x  Auto Lymphocyte % : x  Auto Monocyte % : x  Auto Eosinophil % : x  Auto Basophil % : x                          8.3    13.28 )-----------( 148      ( 2020 07:15 )             25.6     07-03    143  |  103  |  20  ----------------------------<  87  3.2<L>   |  30  |  0.88    Ca    7.3<L>      2020 07:15  Phos  4.2     07-  Mg     1.9     07-    TPro  4.6<L>  /  Alb  3.3  /  TBili  0.4  /  DBili  x   /  AST  15  /  ALT  9<L>  /  AlkPhos  42  07-03    PT/INR - ( 2020 07:07 )   PT: 11.8 sec;   INR: 1.04 ratio         PTT - ( 2020 07:07 )  PTT:24.9 sec  LIVER FUNCTIONS - ( 2020 07:15 )  Alb: 3.3 g/dL / Pro: 4.6 g/dL / ALK PHOS: 42 U/L / ALT: 9 U/L / AST: 15 U/L / GGT: x           Lactate Dehydrogenase, Serum: 274 U/L ( @ 07:15)    Meds:   Antimicrobials:   acyclovir   Oral Tab/Cap 400 milliGRAM(s) Oral every 8 hours  clotrimazole Lozenge 1 Lozenge Oral five times a day  fluconAZOLE   Tablet 400 milliGRAM(s) Oral daily    Heme / Onc:   heparin  Infusion 357 Unit(s)/Hr IV Continuous <Continuous>    GI:  magnesium hydroxide Suspension 30 milliLiter(s) Oral daily PRN  pantoprazole    Tablet 40 milliGRAM(s) Oral before breakfast  polyethylene glycol 3350 17 Gram(s) Oral daily PRN  senna 1 Tablet(s) Oral at bedtime PRN  sodium bicarbonate Mouth Rinse 10 milliLiter(s) Swish and Spit five times a day  ursodiol Capsule 300 milliGRAM(s) Oral two times a day with meals    Cardiovascular:   furosemide   Injectable 20 milliGRAM(s) IV Push every 24 hours PRN    Immunologic:   filgrastim-sndz (ZARXIO) Injectable 480 MICROGram(s) SubCutaneous every 24 hours    Other medications:   Biotene Dry Mouth Oral Rinse 5 milliLiter(s) Swish and Spit five times a day  chlorhexidine 4% Liquid 1 Application(s) Topical <User Schedule>  dextrose 5% + sodium chloride 0.9%. 1000 milliLiter(s) IV Continuous <Continuous>  folic acid 1 milliGRAM(s) Oral daily  lidocaine/prilocaine Cream 1 Application(s) Topical daily  LORazepam   Injectable 1 milliGRAM(s) IV Push every 24 hours  multivitamin 1 Tablet(s) Oral daily  palifermin Injectable  (eMAR) 5100 MICROGram(s) IV Push every 24 hours  sodium chloride 0.45% 1000 milliLiter(s) IV Continuous <Continuous>  sodium chloride 0.9%. 1000 milliLiter(s) IV Continuous <Continuous>      PRN:   acetaminophen   Tablet .. 650 milliGRAM(s) Oral every 6 hours PRN  acetaminophen   Tablet .. 650 milliGRAM(s) Oral every 6 hours PRN  diphenhydrAMINE 25 milliGRAM(s) Oral once PRN  diphenhydrAMINE   Injectable 25 milliGRAM(s) IV Push every 4 hours PRN  furosemide   Injectable 20 milliGRAM(s) IV Push every 24 hours PRN  magnesium hydroxide Suspension 30 milliLiter(s) Oral daily PRN  metoclopramide Injectable 10 milliGRAM(s) IV Push every 6 hours PRN  polyethylene glycol 3350 17 Gram(s) Oral daily PRN  senna 1 Tablet(s) Oral at bedtime PRN  sodium chloride 0.9% lock flush 10 milliLiter(s) IV Push every 1 hour PRN      A/P:   59 year old male with a history of amyloidosis  Post Autologous PBSCT day +1   Continue transplant hydration for 24 hours post infusion of cells   Strict I&O, prn diuresis, daily weights   Keep platelets =/> 40K for history of amyloidosis   Constipation - continue prn bowel regimen    1. Infectious Disease:   acyclovir   Oral Tab/Cap 400 milliGRAM(s) Oral every 8 hours  clotrimazole Lozenge 1 Lozenge Oral five times a day  fluconAZOLE   Tablet 400 milliGRAM(s) Oral daily    2. VOD Prophylaxis: Actigall, Glutamine, Heparin (dosed at 100 units / kg / day)     3. GI Prophylaxis:   pantoprazole    Tablet 40 milliGRAM(s) Oral before breakfast    4. Mouthcare - NS / NaHCO3 rinses, Mycelex, Biotene; Skin care     5. GVHD prophylaxis - n/a     6. Transfuse & replete electrolytes prn   Kcl 20 meq IV x 3     7. IV hydration, daily weights, strict I&O, prn diuresis   Lasix 40 mg IV x 1     8. PO intake as tolerated, nutrition follow up as needed, MVI, folic acid     9. Antiemetics, anti-diarrhea medications:   metoclopramide Injectable 10 milliGRAM(s) IV Push every 6 hours PRN  LORazepam   Injectable 1 milliGRAM(s) IV Push every 24 hours  aprepitant 80 milliGRAM(s) Oral every 24 hours    10. OOB as tolerated, physical therapy consult if needed     11. Monitor coags / fibrinogen 2x week, vitamin K as needed     12. Monitor closely for clinical changes, monitor for fevers     13. Emotional support provided, plan of care discussed and questions addressed     14. Patient education done regarding plan of care, restrictions and discharge planning     15. Continue regular social work input     I have written the above note for Dr. Norris who performed service with me in the room.   Larissa Manriquez NP-C ([phone])    I have seen and examined patient with NP, I agree with above note as scribed.

## 2020-07-03 NOTE — PROGRESS NOTE ADULT - ATTENDING COMMENTS
59 year old male with history of amyloidosis treated with CyBorD, now admitted for autologous pbsct with Melphalan preparative regimen   Day - 0, continue transplant hydration for 24 hours post infusion of cells   For HPC transplant on 7/2/20  Begin Zarxio daily on day 0, start 4 hrs post transplant  Kepivance on days 0, +1, +2 for prevention of mucositis.  Strict I&O, daily weight, Lasix to keep O > I  VOD prophylaxis- Actigall, low dose heparin gtt & glutamine supplementation  ID- continue Fluconazole / Acyclovir prophylaxis; begin Cipro when ANC < 1,000  Antiemetics  Bowel regimen  Mouth care, skin care  Steroid induced hyperglycemia- monitor glucose  OOB/ambulate 59 year old male with history of amyloidosis treated with CyBorD, now admitted for autologous pbsct with Melphalan preparative regimen   Day - 0, continue transplant hydration for 24 hours post infusion of cells   For [facility]PC transplant on 7/2/20, now dy +1  Begin Zarxio daily on day 0, start 4 hrs post transplant  Kepivance on days 0, +1, +2 for prevention of mucositis.  Strict I&O, daily weight, Lasix to keep O > I  VOD prophylaxis- Actigall, low dose heparin gtt & glutamine supplementation  ID- continue Fluconazole / Acyclovir prophylaxis; begin Cipro when ANC < 1,000  Antiemetics  Bowel regimen  Mouth care, skin care  Steroid induced hyperglycemia- monitor glucose  OOB/ambulate  cont supportive care, await engraftment

## 2020-07-04 LAB
ALBUMIN SERPL ELPH-MCNC: 3.4 G/DL — SIGNIFICANT CHANGE UP (ref 3.3–5)
ALP SERPL-CCNC: 48 U/L — SIGNIFICANT CHANGE UP (ref 40–120)
ALT FLD-CCNC: 9 U/L — LOW (ref 10–45)
ANION GAP SERPL CALC-SCNC: 1 MMOL/L — LOW (ref 5–17)
AST SERPL-CCNC: 10 U/L — SIGNIFICANT CHANGE UP (ref 10–40)
BASOPHILS # BLD AUTO: 0.01 K/UL — SIGNIFICANT CHANGE UP (ref 0–0.2)
BASOPHILS NFR BLD AUTO: 0.1 % — SIGNIFICANT CHANGE UP (ref 0–2)
BILIRUB SERPL-MCNC: 0.5 MG/DL — SIGNIFICANT CHANGE UP (ref 0.2–1.2)
BUN SERPL-MCNC: 17 MG/DL — SIGNIFICANT CHANGE UP (ref 7–23)
CALCIUM SERPL-MCNC: 7.5 MG/DL — LOW (ref 8.4–10.5)
CHLORIDE SERPL-SCNC: 104 MMOL/L — SIGNIFICANT CHANGE UP (ref 96–108)
CO2 SERPL-SCNC: 35 MMOL/L — HIGH (ref 22–31)
CREAT SERPL-MCNC: 0.79 MG/DL — SIGNIFICANT CHANGE UP (ref 0.5–1.3)
EOSINOPHIL # BLD AUTO: 0.05 K/UL — SIGNIFICANT CHANGE UP (ref 0–0.5)
EOSINOPHIL NFR BLD AUTO: 0.6 % — SIGNIFICANT CHANGE UP (ref 0–6)
GLUCOSE SERPL-MCNC: 89 MG/DL — SIGNIFICANT CHANGE UP (ref 70–99)
HCT VFR BLD CALC: 26.4 % — LOW (ref 39–50)
HGB BLD-MCNC: 8.6 G/DL — LOW (ref 13–17)
IMM GRANULOCYTES NFR BLD AUTO: 7.2 % — HIGH (ref 0–1.5)
LDH SERPL L TO P-CCNC: 194 U/L — SIGNIFICANT CHANGE UP (ref 50–242)
LYMPHOCYTES # BLD AUTO: 0.04 K/UL — LOW (ref 1–3.3)
LYMPHOCYTES # BLD AUTO: 0.4 % — LOW (ref 13–44)
MAGNESIUM SERPL-MCNC: 2.1 MG/DL — SIGNIFICANT CHANGE UP (ref 1.6–2.6)
MANUAL SMEAR VERIFICATION: SIGNIFICANT CHANGE UP
MCHC RBC-ENTMCNC: 24 PG — LOW (ref 27–34)
MCHC RBC-ENTMCNC: 32.6 GM/DL — SIGNIFICANT CHANGE UP (ref 32–36)
MCV RBC AUTO: 73.5 FL — LOW (ref 80–100)
MONOCYTES # BLD AUTO: 0.02 K/UL — SIGNIFICANT CHANGE UP (ref 0–0.9)
MONOCYTES NFR BLD AUTO: 0.2 % — LOW (ref 2–14)
NEUTROPHILS # BLD AUTO: 8.19 K/UL — HIGH (ref 1.8–7.4)
NEUTROPHILS NFR BLD AUTO: 91.5 % — HIGH (ref 43–77)
NRBC # BLD: 0 /100 WBCS — SIGNIFICANT CHANGE UP (ref 0–0)
PHOSPHATE SERPL-MCNC: 4.8 MG/DL — HIGH (ref 2.5–4.5)
PLAT MORPH BLD: NORMAL — SIGNIFICANT CHANGE UP
PLATELET # BLD AUTO: 95 K/UL — LOW (ref 150–400)
POTASSIUM SERPL-MCNC: 3.6 MMOL/L — SIGNIFICANT CHANGE UP (ref 3.5–5.3)
POTASSIUM SERPL-SCNC: 3.6 MMOL/L — SIGNIFICANT CHANGE UP (ref 3.5–5.3)
PROT SERPL-MCNC: 5 G/DL — LOW (ref 6–8.3)
RBC # BLD: 3.59 M/UL — LOW (ref 4.2–5.8)
RBC # FLD: 15.5 % — HIGH (ref 10.3–14.5)
RBC BLD AUTO: SIGNIFICANT CHANGE UP
SODIUM SERPL-SCNC: 140 MMOL/L — SIGNIFICANT CHANGE UP (ref 135–145)
WBC # BLD: 8.95 K/UL — SIGNIFICANT CHANGE UP (ref 3.8–10.5)
WBC # FLD AUTO: 8.95 K/UL — SIGNIFICANT CHANGE UP (ref 3.8–10.5)

## 2020-07-04 PROCEDURE — 99291 CRITICAL CARE FIRST HOUR: CPT

## 2020-07-04 RX ORDER — CALCIUM ACETATE 667 MG
667 TABLET ORAL
Refills: 0 | Status: COMPLETED | OUTPATIENT
Start: 2020-07-04 | End: 2020-07-05

## 2020-07-04 RX ORDER — CALCIUM GLUCONATE 100 MG/ML
1 VIAL (ML) INTRAVENOUS
Refills: 0 | Status: COMPLETED | OUTPATIENT
Start: 2020-07-04 | End: 2020-07-04

## 2020-07-04 RX ADMIN — Medication 667 MILLIGRAM(S): at 12:15

## 2020-07-04 RX ADMIN — Medication 400 MILLIGRAM(S): at 14:50

## 2020-07-04 RX ADMIN — Medication 1 LOZENGE: at 08:58

## 2020-07-04 RX ADMIN — URSODIOL 300 MILLIGRAM(S): 250 TABLET, FILM COATED ORAL at 17:44

## 2020-07-04 RX ADMIN — Medication 50 GRAM(S): at 17:44

## 2020-07-04 RX ADMIN — Medication 5 MILLILITER(S): at 15:41

## 2020-07-04 RX ADMIN — Medication 50 GRAM(S): at 15:41

## 2020-07-04 RX ADMIN — FLUCONAZOLE 400 MILLIGRAM(S): 150 TABLET ORAL at 12:13

## 2020-07-04 RX ADMIN — Medication 5 MILLILITER(S): at 12:12

## 2020-07-04 RX ADMIN — Medication 1 MILLIGRAM(S): at 12:14

## 2020-07-04 RX ADMIN — Medication 1 LOZENGE: at 15:42

## 2020-07-04 RX ADMIN — Medication 1 TABLET(S): at 12:14

## 2020-07-04 RX ADMIN — PANTOPRAZOLE SODIUM 40 MILLIGRAM(S): 20 TABLET, DELAYED RELEASE ORAL at 06:17

## 2020-07-04 RX ADMIN — Medication 5 MILLILITER(S): at 19:58

## 2020-07-04 RX ADMIN — Medication 10 MILLILITER(S): at 08:58

## 2020-07-04 RX ADMIN — Medication 5100 MICROGRAM(S): at 17:44

## 2020-07-04 RX ADMIN — Medication 10 MILLILITER(S): at 15:42

## 2020-07-04 RX ADMIN — Medication 667 MILLIGRAM(S): at 17:43

## 2020-07-04 RX ADMIN — Medication 480 MICROGRAM(S): at 17:44

## 2020-07-04 RX ADMIN — CHLORHEXIDINE GLUCONATE 1 APPLICATION(S): 213 SOLUTION TOPICAL at 08:57

## 2020-07-04 RX ADMIN — URSODIOL 300 MILLIGRAM(S): 250 TABLET, FILM COATED ORAL at 08:59

## 2020-07-04 RX ADMIN — Medication 1 LOZENGE: at 12:12

## 2020-07-04 RX ADMIN — Medication 667 MILLIGRAM(S): at 21:09

## 2020-07-04 RX ADMIN — Medication 10 MILLILITER(S): at 12:14

## 2020-07-04 RX ADMIN — Medication 400 MILLIGRAM(S): at 21:09

## 2020-07-04 RX ADMIN — Medication 1 LOZENGE: at 19:58

## 2020-07-04 RX ADMIN — Medication 400 MILLIGRAM(S): at 06:17

## 2020-07-04 RX ADMIN — Medication 10 MILLILITER(S): at 19:58

## 2020-07-04 RX ADMIN — Medication 5 MILLILITER(S): at 08:57

## 2020-07-04 NOTE — PROGRESS NOTE ADULT - ATTENDING COMMENTS
59 year old male with history of amyloidosis treated with CyBorD, now admitted for autologous pbsct with Melphalan preparative regimen   Day - 0, continue transplant hydration for 24 hours post infusion of cells   For HPC transplant on 7/2/20, now dy +1  Begin Zarxio daily on day 0, start 4 hrs post transplant  Kepivance on days 0, +1, +2 for prevention of mucositis.  Strict I&O, daily weight, Lasix to keep O > I  VOD prophylaxis- Actigall, low dose heparin gtt & glutamine supplementation  ID- continue Fluconazole / Acyclovir prophylaxis; begin Cipro when ANC < 1,000  Antiemetics  Bowel regimen  Mouth care, skin care  Steroid induced hyperglycemia- monitor glucose  OOB/ambulate  cont supportive care, await engraftment 59 year old male with history of amyloidosis treated with CyBorD, now admitted for autologous pbsct with Melphalan preparative regimen   Day - 0, continue transplant hydration for 24 hours post infusion of cells   For [facility]PC transplant on 7/2/20, now dy +2  Begin Zarxio daily on day 0, start 4 hrs post transplant  Kepivance on days 0, +1, +2 for prevention of mucositis.  Strict I&O, daily weight, Lasix to keep O > I  VOD prophylaxis- Actigall, low dose heparin gtt & glutamine supplementation  ID- continue Fluconazole / Acyclovir prophylaxis; begin Cipro when ANC < 1,000  Antiemetics  Bowel regimen  Mouth care, skin care  Steroid induced hyperglycemia- monitor glucose  OOB/ambulate  cont supportive care, await engraftment

## 2020-07-04 NOTE — PROGRESS NOTE ADULT - SUBJECTIVE AND OBJECTIVE BOX
HPC Transplant Team                                                      Critical / Counseling Time Provided: 30 minutes                                                                                                                                                        Chief Complaint:     S: Patient seen and examined with HPC Transplant Team:   Denies mouth / tongue / throat pain, dyspnea, cough, nausea, vomiting, diarrhea, abdominal pain     O: Vitals:   Vital Signs Last 24 Hrs  T(C): 37.5 (2020 06:12), Max: 37.5 (2020 06:12)  T(F): 99.5 (2020 06:12), Max: 99.5 (2020 06:12)  HR: 75 (2020 06:12) (67 - 81)  BP: 108/63 (2020 06:12) (98/59 - 110/69)  BP(mean): --  RR: 18 (2020 06:12) (18 - 18)  SpO2: 98% (2020 06:12) (96% - 98%)    Admit weight:   Daily     Daily Weight in k.8 (2020 10:00)    Intake / Output:   07-03 @ 07:01  -  07-04 @ 07:00  --------------------------------------------------------  IN: 3676 mL / OUT: 6035 mL / NET: -2359 mL          PE:   Oropharynx:   Oral Mucositis:                                                        Grade:   CVS:   Lungs:   Abdomen:  Extremities:   Gastric Mucositis:                                                  Grade:   Intestinal Mucositis:                                              Grade:   Skin:   TLC:   Neuro:   Pain:     Labs:   CBC Full  -  ( 2020 07:12 )  WBC Count : 8.95 K/uL  Hemoglobin : 8.6 g/dL  Hematocrit : 26.4 %  Platelet Count - Automated : 95 K/uL  Mean Cell Volume : 73.5 fl  Mean Cell Hemoglobin : 24.0 pg  Mean Cell Hemoglobin Concentration : 32.6 gm/dL  Auto Neutrophil # : x  Auto Lymphocyte # : x  Auto Monocyte # : x  Auto Eosinophil # : x  Auto Basophil # : x  Auto Neutrophil % : x  Auto Lymphocyte % : x  Auto Monocyte % : x  Auto Eosinophil % : x  Auto Basophil % : x                          8.6    8.95  )-----------( 95       ( 2020 07:12 )             26.4     07-03    143  |  103  |  20  ----------------------------<  87  3.2<L>   |  30  |  0.88    Ca    7.3<L>      2020 07:15  Phos  4.2     07-  Mg     1.9         TPro  4.6<L>  /  Alb  3.3  /  TBili  0.4  /  DBili  x   /  AST  15  /  ALT  9<L>  /  AlkPhos  42  -03      LIVER FUNCTIONS - ( 2020 07:15 )  Alb: 3.3 g/dL / Pro: 4.6 g/dL / ALK PHOS: 42 U/L / ALT: 9 U/L / AST: 15 U/L / GGT: x                   Karnofsky / Lansky Scale:   GVHD:   Skin:   Liver:   Gut:   Overall Grade:       Cultures:         Radiology:       Meds:   Antimicrobials:   acyclovir   Oral Tab/Cap 400 milliGRAM(s) Oral every 8 hours  clotrimazole Lozenge 1 Lozenge Oral five times a day  fluconAZOLE   Tablet 400 milliGRAM(s) Oral daily      Heme / Onc:   heparin  Infusion 357 Unit(s)/Hr IV Continuous <Continuous>      GI:  magnesium hydroxide Suspension 30 milliLiter(s) Oral daily PRN  pantoprazole    Tablet 40 milliGRAM(s) Oral before breakfast  polyethylene glycol 3350 17 Gram(s) Oral daily PRN  senna 1 Tablet(s) Oral at bedtime PRN  sodium bicarbonate Mouth Rinse 10 milliLiter(s) Swish and Spit five times a day  ursodiol Capsule 300 milliGRAM(s) Oral two times a day with meals      Cardiovascular:   furosemide   Injectable 20 milliGRAM(s) IV Push every 24 hours PRN      Immunologic:   filgrastim-sndz (ZARXIO) Injectable 480 MICROGram(s) SubCutaneous every 24 hours      Other medications:   Biotene Dry Mouth Oral Rinse 5 milliLiter(s) Swish and Spit five times a day  chlorhexidine 4% Liquid 1 Application(s) Topical <User Schedule>  folic acid 1 milliGRAM(s) Oral daily  lidocaine/prilocaine Cream 1 Application(s) Topical daily  LORazepam   Injectable 1 milliGRAM(s) IV Push every 24 hours  multivitamin 1 Tablet(s) Oral daily  palifermin Injectable  (eMAR) 5100 MICROGram(s) IV Push every 24 hours  sodium chloride 0.9%. 1000 milliLiter(s) IV Continuous <Continuous>      PRN:   acetaminophen   Tablet .. 650 milliGRAM(s) Oral every 6 hours PRN  acetaminophen   Tablet .. 650 milliGRAM(s) Oral every 6 hours PRN  diphenhydrAMINE 25 milliGRAM(s) Oral once PRN  diphenhydrAMINE   Injectable 25 milliGRAM(s) IV Push every 4 hours PRN  furosemide   Injectable 20 milliGRAM(s) IV Push every 24 hours PRN  magnesium hydroxide Suspension 30 milliLiter(s) Oral daily PRN  metoclopramide Injectable 10 milliGRAM(s) IV Push every 6 hours PRN  polyethylene glycol 3350 17 Gram(s) Oral daily PRN  senna 1 Tablet(s) Oral at bedtime PRN  sodium chloride 0.9% lock flush 10 milliLiter(s) IV Push every 1 hour PRN      A/P:   ___ year old ___  with a history of ______________________  Pre / Status Post :  Autologous / Allogeneic PBSCT / BMT day ____________    1. Infectious Disease:   Fluconazole, Acyclovir     2. VOD Prophylaxis: Actigall, Glutamine, Heparin (dosed at 100 units / kg / day)     3. GI Prophylaxis:  Protonix    4. Mouthcare - NS / NaHCO3 rinses, Mycelex, Caphosol, skin care     5. GVHD prophylaxis     6. Transfuse & replete electrolytes prn     7. IV hydration, daily weights, strict I&O, prn diuresis     8. PO intake as tolerated, nutrition follow up as needed, MVI, folic acid     9. Antiemetics, anti-diarrhea medications:   Reglan, Ativan    10. OOB as tolerated, physical therapy consult if needed     11. Monitor coags / fibrinogen 2x week, vitamin K as needed     12. Monitor closely for clinical changes, monitor for fevers     13. Emotional support provided, plan of care discussed with patient and family, questions addressed     14. Patient education done regarding chemotherapy prep, plan of care, restrictions and discharge planning     15. Continue regular social work input     I have written the above note for Dr. June who performed service with me in the room.   Kasandra Garcia  NP-C ([phone])    I have seen and examined patient with NP, I agree with above note as scribed. HPC Transplant Team                                                      Critical / Counseling Time Provided: 30 minutes    Chief Complaint: Autologous pbsct with high dose melphalan prep regimen for treatment of amyloidosis    S: Patient seen and examined with HPC Transplant Team:     + fatigue     Denies any chest pain, palpitation, SOB, abdominal pain or dysuria.                                                                                                                                                      O: Vitals:   Vital Signs Last 24 Hrs  T(C): 37.5 (04 Jul 2020 06:12), Max: 37.5 (04 Jul 2020 06:12)  T(F): 99.5 (04 Jul 2020 06:12), Max: 99.5 (04 Jul 2020 06:12)  HR: 75 (04 Jul 2020 06:12) (67 - 81)  BP: 108/63 (04 Jul 2020 06:12) (98/59 - 110/69)  BP(mean): --  RR: 18 (04 Jul 2020 06:12) (18 - 18)  SpO2: 98% (04 Jul 2020 06:12) (96% - 98%)    Admit weight: 85.7 kg  Daily weight:   85.3 kg    Intake / Output:   07-03 @ 07:01  -  07-04 @ 07:00  --------------------------------------------------------  IN: 3676 mL / OUT: 6035 mL / NET: -2359 mL    PE:   Oropharynx: no erythema or ulcerations   Oral Mucositis:               -                                         Grade: n/a  CVS: S1, S2 RRR   Lungs: CTA throughout bilaterally   Abdomen: + BS x 4, soft, NT, ND   Extremities: + mild chronic LE edema  Gastric Mucositis:      -                                            Grade: n/a  Intestinal Mucositis:    -                                          Grade: n/a  Skin: no rash  TLC: CDI  Neuro: A&O x 3  Pain: denies     Labs:   CBC Full  -  ( 04 Jul 2020 07:12 )  WBC Count : 8.95 K/uL  Hemoglobin : 8.6 g/dL  Hematocrit : 26.4 %  Platelet Count - Automated : 95 K/uL  Mean Cell Volume : 73.5 fl  Mean Cell Hemoglobin : 24.0 pg  Mean Cell Hemoglobin Concentration : 32.6 gm/dL  Auto Neutrophil # : x  Auto Lymphocyte # : x  Auto Monocyte # : x  Auto Eosinophil # : x  Auto Basophil # : x  Auto Neutrophil % : x  Auto Lymphocyte % : x  Auto Monocyte % : x  Auto Eosinophil % : x  Auto Basophil % : x                          8.6    8.95  )-----------( 95       ( 04 Jul 2020 07:12 )             26.4     07-03    143  |  103  |  20  ----------------------------<  87  3.2<L>   |  30  |  0.88    Ca    7.3<L>      03 Jul 2020 07:15  Phos  4.2     07-03  Mg     1.9     07-03    TPro  4.6<L>  /  Alb  3.3  /  TBili  0.4  /  DBili  x   /  AST  15  /  ALT  9<L>  /  AlkPhos  42  07-03      LIVER FUNCTIONS - ( 03 Jul 2020 07:15 )  Alb: 3.3 g/dL / Pro: 4.6 g/dL / ALK PHOS: 42 U/L / ALT: 9 U/L / AST: 15 U/L / GGT: x           Meds:   Antimicrobials:   acyclovir   Oral Tab/Cap 400 milliGRAM(s) Oral every 8 hours  clotrimazole Lozenge 1 Lozenge Oral five times a day  fluconAZOLE   Tablet 400 milliGRAM(s) Oral daily    Heme / Onc:   heparin  Infusion 357 Unit(s)/Hr IV Continuous <Continuous>    GI:  magnesium hydroxide Suspension 30 milliLiter(s) Oral daily PRN  pantoprazole    Tablet 40 milliGRAM(s) Oral before breakfast  polyethylene glycol 3350 17 Gram(s) Oral daily PRN  senna 1 Tablet(s) Oral at bedtime PRN  sodium bicarbonate Mouth Rinse 10 milliLiter(s) Swish and Spit five times a day  ursodiol Capsule 300 milliGRAM(s) Oral two times a day with meals    Cardiovascular:   furosemide   Injectable 20 milliGRAM(s) IV Push every 24 hours PRN    Immunologic:   filgrastim-sndz (ZARXIO) Injectable 480 MICROGram(s) SubCutaneous every 24 hours    Other medications:   Biotene Dry Mouth Oral Rinse 5 milliLiter(s) Swish and Spit five times a day  chlorhexidine 4% Liquid 1 Application(s) Topical <User Schedule>  folic acid 1 milliGRAM(s) Oral daily  lidocaine/prilocaine Cream 1 Application(s) Topical daily  LORazepam   Injectable 1 milliGRAM(s) IV Push every 24 hours  multivitamin 1 Tablet(s) Oral daily  palifermin Injectable  (eMAR) 5100 MICROGram(s) IV Push every 24 hours  sodium chloride 0.9%. 1000 milliLiter(s) IV Continuous <Continuous>    PRN:   acetaminophen   Tablet .. 650 milliGRAM(s) Oral every 6 hours PRN  acetaminophen   Tablet .. 650 milliGRAM(s) Oral every 6 hours PRN  diphenhydrAMINE 25 milliGRAM(s) Oral once PRN  diphenhydrAMINE   Injectable 25 milliGRAM(s) IV Push every 4 hours PRN  furosemide   Injectable 20 milliGRAM(s) IV Push every 24 hours PRN  magnesium hydroxide Suspension 30 milliLiter(s) Oral daily PRN  metoclopramide Injectable 10 milliGRAM(s) IV Push every 6 hours PRN  polyethylene glycol 3350 17 Gram(s) Oral daily PRN  senna 1 Tablet(s) Oral at bedtime PRN  sodium chloride 0.9% lock flush 10 milliLiter(s) IV Push every 1 hour PRN    A/P:   59 year old male with a history of amyloidosis  Post Autologous PBSCT day +2  Strict I&O, prn diuresis, daily weights   Keep platelets =/> 40K for history of amyloidosis     1. Infectious Disease:   acyclovir   Oral Tab/Cap 400 milliGRAM(s) Oral every 8 hours  clotrimazole Lozenge 1 Lozenge Oral five times a day  fluconAZOLE   Tablet 400 milliGRAM(s) Oral daily    2. VOD Prophylaxis: Actigall, Glutamine, Heparin (dosed at 100 units / kg / day)     3. GI Prophylaxis:   pantoprazole    Tablet 40 milliGRAM(s) Oral before breakfast    4. Mouthcare - NS / NaHCO3 rinses, Mycelex, Biotene; Skin care     5. GVHD prophylaxis - n/a     6. Transfuse & replete electrolytes prn     7. IV hydration, daily weights, strict I&O, prn diuresis     8. PO intake as tolerated, nutrition follow up as needed, MVI, folic acid     9. Antiemetics, anti-diarrhea medications:   metoclopramide Injectable 10 milliGRAM(s) IV Push every 6 hours PRN  LORazepam   Injectable 1 milliGRAM(s) IV Push every 24 hours  aprepitant 80 milliGRAM(s) Oral every 24 hours    10. OOB as tolerated, physical therapy consult if needed     11. Monitor coags / fibrinogen 2x week, vitamin K as needed     12. Monitor closely for clinical changes, monitor for fevers     13. Emotional support provided, plan of care discussed and questions addressed     14. Patient education done regarding plan of care, restrictions and discharge planning     15. Continue regular social work input     I have written the above note for Dr. Yoko June who performed service with me in the room.   Kasandra Garcia  NP-C ([phone])    I have seen and examined patient with NP, I agree with above note as scribed. HPC Transplant Team                                                      Critical / Counseling Time Provided: 30 minutes    Chief Complaint: Autologous pbsct with high dose melphalan prep regimen for treatment of amyloidosis    S: Patient seen and examined with HPC Transplant Team:     + fatigue     Denies any chest pain, palpitation, SOB, abdominal pain or dysuria.                                                                                                                                                      O: Vitals:   Vital Signs Last 24 Hrs  T(C): 37.5 (04 Jul 2020 06:12), Max: 37.5 (04 Jul 2020 06:12)  T(F): 99.5 (04 Jul 2020 06:12), Max: 99.5 (04 Jul 2020 06:12)  HR: 75 (04 Jul 2020 06:12) (67 - 81)  BP: 108/63 (04 Jul 2020 06:12) (98/59 - 110/69)  BP(mean): --  RR: 18 (04 Jul 2020 06:12) (18 - 18)  SpO2: 98% (04 Jul 2020 06:12) (96% - 98%)    Admit weight: 85.7 kg  Daily weight:   85.3 kg    Intake / Output:   07-03 @ 07:01  -  07-04 @ 07:00  --------------------------------------------------------  IN: 3676 mL / OUT: 6035 mL / NET: -2359 mL    PE:   Oropharynx: no erythema or ulcerations   Oral Mucositis:               -                                         Grade: n/a  CVS: S1, S2 RRR   Lungs: CTA throughout bilaterally   Abdomen: + BS x 4, soft, NT, ND   Extremities: + mild chronic LE edema  Gastric Mucositis:      -                                            Grade: n/a  Intestinal Mucositis:    -                                          Grade: n/a  Skin: no rash  TLC: CDI  Neuro: A&O x 3  Pain: denies     Labs:   CBC Full  -  ( 04 Jul 2020 07:12 )  WBC Count : 8.95 K/uL  Hemoglobin : 8.6 g/dL  Hematocrit : 26.4 %  Platelet Count - Automated : 95 K/uL  Mean Cell Volume : 73.5 fl  Mean Cell Hemoglobin : 24.0 pg  Mean Cell Hemoglobin Concentration : 32.6 gm/dL  Auto Neutrophil # : x  Auto Lymphocyte # : x  Auto Monocyte # : x  Auto Eosinophil # : x  Auto Basophil # : x  Auto Neutrophil % : x  Auto Lymphocyte % : x  Auto Monocyte % : x  Auto Eosinophil % : x  Auto Basophil % : x                          8.6    8.95  )-----------( 95       ( 04 Jul 2020 07:12 )             26.4     07-03    143  |  103  |  20  ----------------------------<  87  3.2<L>   |  30  |  0.88    Ca    7.3<L>      03 Jul 2020 07:15  Phos  4.2     07-03  Mg     1.9     07-03    TPro  4.6<L>  /  Alb  3.3  /  TBili  0.4  /  DBili  x   /  AST  15  /  ALT  9<L>  /  AlkPhos  42  07-03      LIVER FUNCTIONS - ( 03 Jul 2020 07:15 )  Alb: 3.3 g/dL / Pro: 4.6 g/dL / ALK PHOS: 42 U/L / ALT: 9 U/L / AST: 15 U/L / GGT: x           Meds:   Antimicrobials:   acyclovir   Oral Tab/Cap 400 milliGRAM(s) Oral every 8 hours  clotrimazole Lozenge 1 Lozenge Oral five times a day  fluconAZOLE   Tablet 400 milliGRAM(s) Oral daily    Heme / Onc:   heparin  Infusion 357 Unit(s)/Hr IV Continuous <Continuous>    GI:  magnesium hydroxide Suspension 30 milliLiter(s) Oral daily PRN  pantoprazole    Tablet 40 milliGRAM(s) Oral before breakfast  polyethylene glycol 3350 17 Gram(s) Oral daily PRN  senna 1 Tablet(s) Oral at bedtime PRN  sodium bicarbonate Mouth Rinse 10 milliLiter(s) Swish and Spit five times a day  ursodiol Capsule 300 milliGRAM(s) Oral two times a day with meals    Cardiovascular:   furosemide   Injectable 20 milliGRAM(s) IV Push every 24 hours PRN    Immunologic:   filgrastim-sndz (ZARXIO) Injectable 480 MICROGram(s) SubCutaneous every 24 hours    Other medications:   Biotene Dry Mouth Oral Rinse 5 milliLiter(s) Swish and Spit five times a day  chlorhexidine 4% Liquid 1 Application(s) Topical <User Schedule>  folic acid 1 milliGRAM(s) Oral daily  lidocaine/prilocaine Cream 1 Application(s) Topical daily  LORazepam   Injectable 1 milliGRAM(s) IV Push every 24 hours  multivitamin 1 Tablet(s) Oral daily  palifermin Injectable  (eMAR) 5100 MICROGram(s) IV Push every 24 hours  sodium chloride 0.9%. 1000 milliLiter(s) IV Continuous <Continuous>    PRN:   acetaminophen   Tablet .. 650 milliGRAM(s) Oral every 6 hours PRN  acetaminophen   Tablet .. 650 milliGRAM(s) Oral every 6 hours PRN  diphenhydrAMINE 25 milliGRAM(s) Oral once PRN  diphenhydrAMINE   Injectable 25 milliGRAM(s) IV Push every 4 hours PRN  furosemide   Injectable 20 milliGRAM(s) IV Push every 24 hours PRN  magnesium hydroxide Suspension 30 milliLiter(s) Oral daily PRN  metoclopramide Injectable 10 milliGRAM(s) IV Push every 6 hours PRN  polyethylene glycol 3350 17 Gram(s) Oral daily PRN  senna 1 Tablet(s) Oral at bedtime PRN  sodium chloride 0.9% lock flush 10 milliLiter(s) IV Push every 1 hour PRN    A/P:   59 year old male with a history of amyloidosis  Post Autologous PBSCT day +2  Strict I&O, prn diuresis, daily weights   Keep platelets =/> 40K for history of amyloidosis     1. Infectious Disease:   acyclovir   Oral Tab/Cap 400 milliGRAM(s) Oral every 8 hours  clotrimazole Lozenge 1 Lozenge Oral five times a day  fluconAZOLE   Tablet 400 milliGRAM(s) Oral daily    2. VOD Prophylaxis: Actigall, Glutamine, Heparin (dosed at 100 units / kg / day)     3. GI Prophylaxis:   pantoprazole    Tablet 40 milliGRAM(s) Oral before breakfast    4. Mouthcare - NS / NaHCO3 rinses, Mycelex, Biotene; Skin care     5. GVHD prophylaxis - n/a     6. Transfuse & replete electrolytes prn.  Hypocalcemia- Supplement calcium.    7. IV hydration, daily weights, strict I&O, prn diuresis     8. PO intake as tolerated, nutrition follow up as needed, MVI, folic acid     9. Antiemetics, anti-diarrhea medications:   metoclopramide Injectable 10 milliGRAM(s) IV Push every 6 hours PRN  LORazepam   Injectable 1 milliGRAM(s) IV Push every 24 hours  aprepitant 80 milliGRAM(s) Oral every 24 hours    10. OOB as tolerated, physical therapy consult if needed     11. Monitor coags / fibrinogen 2x week, vitamin K as needed     12. Monitor closely for clinical changes, monitor for fevers     13. Emotional support provided, plan of care discussed and questions addressed     14. Patient education done regarding plan of care, restrictions and discharge planning     15. Continue regular social work input     I have written the above note for Dr. Yoko June who performed service with me in the room.   Kasandra Garcia  NP-C ([phone])    I have seen and examined patient with NP, I agree with above note as scribed.

## 2020-07-05 LAB
ALBUMIN SERPL ELPH-MCNC: 3.7 G/DL — SIGNIFICANT CHANGE UP (ref 3.3–5)
ALP SERPL-CCNC: 52 U/L — SIGNIFICANT CHANGE UP (ref 40–120)
ALT FLD-CCNC: 10 U/L — SIGNIFICANT CHANGE UP (ref 10–45)
ANION GAP SERPL CALC-SCNC: 9 MMOL/L — SIGNIFICANT CHANGE UP (ref 5–17)
AST SERPL-CCNC: 14 U/L — SIGNIFICANT CHANGE UP (ref 10–40)
BASOPHILS # BLD AUTO: 0 K/UL — SIGNIFICANT CHANGE UP (ref 0–0.2)
BASOPHILS NFR BLD AUTO: 0 % — SIGNIFICANT CHANGE UP (ref 0–2)
BILIRUB SERPL-MCNC: 0.4 MG/DL — SIGNIFICANT CHANGE UP (ref 0.2–1.2)
BUN SERPL-MCNC: 14 MG/DL — SIGNIFICANT CHANGE UP (ref 7–23)
CALCIUM SERPL-MCNC: 8.3 MG/DL — LOW (ref 8.4–10.5)
CHLORIDE SERPL-SCNC: 105 MMOL/L — SIGNIFICANT CHANGE UP (ref 96–108)
CO2 SERPL-SCNC: 27 MMOL/L — SIGNIFICANT CHANGE UP (ref 22–31)
CREAT SERPL-MCNC: 0.7 MG/DL — SIGNIFICANT CHANGE UP (ref 0.5–1.3)
EOSINOPHIL # BLD AUTO: 0.03 K/UL — SIGNIFICANT CHANGE UP (ref 0–0.5)
EOSINOPHIL NFR BLD AUTO: 0.9 % — SIGNIFICANT CHANGE UP (ref 0–6)
GLUCOSE SERPL-MCNC: 94 MG/DL — SIGNIFICANT CHANGE UP (ref 70–99)
HCT VFR BLD CALC: 27.7 % — LOW (ref 39–50)
HGB BLD-MCNC: 8.9 G/DL — LOW (ref 13–17)
LDH SERPL L TO P-CCNC: 177 U/L — SIGNIFICANT CHANGE UP (ref 50–242)
LYMPHOCYTES # BLD AUTO: 0.03 K/UL — LOW (ref 1–3.3)
LYMPHOCYTES # BLD AUTO: 0.9 % — LOW (ref 13–44)
MAGNESIUM SERPL-MCNC: 2.1 MG/DL — SIGNIFICANT CHANGE UP (ref 1.6–2.6)
MCHC RBC-ENTMCNC: 23.9 PG — LOW (ref 27–34)
MCHC RBC-ENTMCNC: 32.1 GM/DL — SIGNIFICANT CHANGE UP (ref 32–36)
MCV RBC AUTO: 74.3 FL — LOW (ref 80–100)
MONOCYTES # BLD AUTO: 0 K/UL — SIGNIFICANT CHANGE UP (ref 0–0.9)
MONOCYTES NFR BLD AUTO: 0 % — LOW (ref 2–14)
NEUTROPHILS # BLD AUTO: 3.51 K/UL — SIGNIFICANT CHANGE UP (ref 1.8–7.4)
NEUTROPHILS NFR BLD AUTO: 98.2 % — HIGH (ref 43–77)
PHOSPHATE SERPL-MCNC: 4.1 MG/DL — SIGNIFICANT CHANGE UP (ref 2.5–4.5)
PLATELET # BLD AUTO: 100 K/UL — LOW (ref 150–400)
POTASSIUM SERPL-MCNC: 3.9 MMOL/L — SIGNIFICANT CHANGE UP (ref 3.5–5.3)
POTASSIUM SERPL-SCNC: 3.9 MMOL/L — SIGNIFICANT CHANGE UP (ref 3.5–5.3)
PROT SERPL-MCNC: 5.3 G/DL — LOW (ref 6–8.3)
RBC # BLD: 3.73 M/UL — LOW (ref 4.2–5.8)
RBC # FLD: 15.3 % — HIGH (ref 10.3–14.5)
SODIUM SERPL-SCNC: 141 MMOL/L — SIGNIFICANT CHANGE UP (ref 135–145)
WBC # BLD: 3.57 K/UL — LOW (ref 3.8–10.5)
WBC # FLD AUTO: 3.57 K/UL — LOW (ref 3.8–10.5)

## 2020-07-05 PROCEDURE — 99291 CRITICAL CARE FIRST HOUR: CPT

## 2020-07-05 RX ADMIN — Medication 10 MILLILITER(S): at 12:24

## 2020-07-05 RX ADMIN — URSODIOL 300 MILLIGRAM(S): 250 TABLET, FILM COATED ORAL at 18:19

## 2020-07-05 RX ADMIN — Medication 10 MILLILITER(S): at 00:37

## 2020-07-05 RX ADMIN — Medication 10 MILLILITER(S): at 08:14

## 2020-07-05 RX ADMIN — CHLORHEXIDINE GLUCONATE 1 APPLICATION(S): 213 SOLUTION TOPICAL at 06:52

## 2020-07-05 RX ADMIN — Medication 400 MILLIGRAM(S): at 21:42

## 2020-07-05 RX ADMIN — FLUCONAZOLE 400 MILLIGRAM(S): 150 TABLET ORAL at 12:24

## 2020-07-05 RX ADMIN — Medication 10 MILLILITER(S): at 16:21

## 2020-07-05 RX ADMIN — Medication 1 LOZENGE: at 16:20

## 2020-07-05 RX ADMIN — Medication 5 MILLILITER(S): at 20:36

## 2020-07-05 RX ADMIN — Medication 1 LOZENGE: at 12:23

## 2020-07-05 RX ADMIN — Medication 1 LOZENGE: at 08:13

## 2020-07-05 RX ADMIN — Medication 10 MILLILITER(S): at 20:37

## 2020-07-05 RX ADMIN — Medication 480 MICROGRAM(S): at 18:19

## 2020-07-05 RX ADMIN — URSODIOL 300 MILLIGRAM(S): 250 TABLET, FILM COATED ORAL at 08:14

## 2020-07-05 RX ADMIN — Medication 667 MILLIGRAM(S): at 08:13

## 2020-07-05 RX ADMIN — Medication 1 MILLIGRAM(S): at 12:23

## 2020-07-05 RX ADMIN — Medication 5 MILLILITER(S): at 00:36

## 2020-07-05 RX ADMIN — Medication 1 TABLET(S): at 12:24

## 2020-07-05 RX ADMIN — Medication 1 LOZENGE: at 00:36

## 2020-07-05 RX ADMIN — Medication 5 MILLILITER(S): at 12:21

## 2020-07-05 RX ADMIN — PANTOPRAZOLE SODIUM 40 MILLIGRAM(S): 20 TABLET, DELAYED RELEASE ORAL at 06:51

## 2020-07-05 RX ADMIN — Medication 5 MILLILITER(S): at 08:13

## 2020-07-05 RX ADMIN — Medication 5 MILLILITER(S): at 16:20

## 2020-07-05 RX ADMIN — Medication 400 MILLIGRAM(S): at 16:21

## 2020-07-05 RX ADMIN — Medication 400 MILLIGRAM(S): at 06:51

## 2020-07-05 RX ADMIN — Medication 1 LOZENGE: at 20:37

## 2020-07-05 NOTE — PROGRESS NOTE ADULT - ATTENDING COMMENTS
59 year old male with history of amyloidosis treated with CyBorD, now admitted for autologous pbsct with Melphalan preparative regimen   Day - 0, continue transplant hydration for 24 hours post infusion of cells   For HPC transplant on 7/2/20, now dy +2  Begin Zarxio daily on day 0, start 4 hrs post transplant  Kepivance on days 0, +1, +2 for prevention of mucositis.  Strict I&O, daily weight, Lasix to keep O > I  VOD prophylaxis- Actigall, low dose heparin gtt & glutamine supplementation  ID- continue Fluconazole / Acyclovir prophylaxis; begin Cipro when ANC < 1,000  Antiemetics  Bowel regimen  Mouth care, skin care  Steroid induced hyperglycemia- monitor glucose  OOB/ambulate  cont supportive care, await engraftment 59 year old male with history of amyloidosis treated with CyBorD, now admitted for autologous pbsct with Melphalan preparative regimen   Day - 0, continue transplant hydration for 24 hours post infusion of cells   For HPC transplant on 7/2/20, now dy +3  Begin Zarxio daily on day 0, start 4 hrs post transplant  Kepivance on days 0, +1, +2 for prevention of mucositis.  Strict I&O, daily weight, Lasix to keep O > I  VOD prophylaxis- Actigall, low dose heparin gtt & glutamine supplementation  ID- continue Fluconazole / Acyclovir prophylaxis; begin Cipro when ANC < 1,000  Antiemetics  Bowel regimen  Mouth care, skin care  Steroid induced hyperglycemia- monitor glucose  OOB/ambulate  cont supportive care, await engraftment

## 2020-07-05 NOTE — PROGRESS NOTE ADULT - SUBJECTIVE AND OBJECTIVE BOX
[facility] Transplant Team                                                      Critical / Counseling Time Provided: 30 minutes                                                                                                                                                        Chief Complaint:     S: Patient seen and examined with [facility] Transplant Team:   Denies mouth / tongue / throat pain, dyspnea, cough, nausea, vomiting, diarrhea, abdominal pain     O: Vitals:   Vital Signs Last 24 Hrs  T(C): 37.1 (2020 06:19), Max: 37.6 (2020 01:36)  T(F): 98.8 (2020 06:19), Max: 99.7 (2020 01:36)  HR: 82 (2020 06:19) (71 - 82)  BP: 120/72 (2020 06:19) (95/49 - 120/72)  BP(mean): --  RR: 18 (2020 06:19) (18 - 18)  SpO2: 98% (2020 21:41) (96% - 98%)    Admit weight:   Daily     Daily Weight in k.3 (2020 09:05)    Intake / Output:   07-04 @ 07:01  -  07-05 @ 07:00  --------------------------------------------------------  IN: 1551 mL / OUT: 3000 mL / NET: -1449 mL          PE:   Oropharynx:   Oral Mucositis:                                                        Grade:   CVS:   Lungs:   Abdomen:  Extremities:   Gastric Mucositis:                                                  Grade:   Intestinal Mucositis:                                              Grade:   Skin:   TLC:   Neuro:   Pain:     Labs:   CBC Full  -  ( 2020 06:47 )  WBC Count : 3.57 K/uL  Hemoglobin : 8.9 g/dL  Hematocrit : 27.7 %  Platelet Count - Automated : 100 K/uL  Mean Cell Volume : 74.3 fl  Mean Cell Hemoglobin : 23.9 pg  Mean Cell Hemoglobin Concentration : 32.1 gm/dL  Auto Neutrophil # : x  Auto Lymphocyte # : x  Auto Monocyte # : x  Auto Eosinophil # : x  Auto Basophil # : x  Auto Neutrophil % : x  Auto Lymphocyte % : x  Auto Monocyte % : x  Auto Eosinophil % : x  Auto Basophil % : x                          8.9    3.57  )-----------( 100      ( 2020 06:47 )             27.7     07-05    141  |  105  |  14  ----------------------------<  94  3.9   |  27  |  0.70    Ca    8.3<L>      2020 06:47  Phos  4.1     -  Mg     2.1         TPro  5.3<L>  /  Alb  3.7  /  TBili  0.4  /  DBili  x   /  AST  14  /  ALT  10  /  AlkPhos  52        LIVER FUNCTIONS - ( 2020 06:47 )  Alb: 3.7 g/dL / Pro: 5.3 g/dL / ALK PHOS: 52 U/L / ALT: 10 U/L / AST: 14 U/L / GGT: x           Lactate Dehydrogenase, Serum: 177 U/L ( @ 06:47)          Karnofsky / Lansky Scale:   GVHD:   Skin:   Liver:   Gut:   Overall Grade:       Cultures:         Radiology:       Meds:   Antimicrobials:   acyclovir   Oral Tab/Cap 400 milliGRAM(s) Oral every 8 hours  clotrimazole Lozenge 1 Lozenge Oral five times a day  fluconAZOLE   Tablet 400 milliGRAM(s) Oral daily      Heme / Onc:   heparin  Infusion 357 Unit(s)/Hr IV Continuous <Continuous>      GI:  magnesium hydroxide Suspension 30 milliLiter(s) Oral daily PRN  pantoprazole    Tablet 40 milliGRAM(s) Oral before breakfast  polyethylene glycol 3350 17 Gram(s) Oral daily PRN  senna 1 Tablet(s) Oral at bedtime PRN  sodium bicarbonate Mouth Rinse 10 milliLiter(s) Swish and Spit five times a day  ursodiol Capsule 300 milliGRAM(s) Oral two times a day with meals      Cardiovascular:   furosemide   Injectable 20 milliGRAM(s) IV Push every 24 hours PRN      Immunologic:   filgrastim-sndz (ZARXIO) Injectable 480 MICROGram(s) SubCutaneous every 24 hours      Other medications:   Biotene Dry Mouth Oral Rinse 5 milliLiter(s) Swish and Spit five times a day  calcium acetate 667 milliGRAM(s) Oral four times a day with meals  chlorhexidine 4% Liquid 1 Application(s) Topical <User Schedule>  folic acid 1 milliGRAM(s) Oral daily  lidocaine/prilocaine Cream 1 Application(s) Topical daily  LORazepam   Injectable 1 milliGRAM(s) IV Push every 24 hours  multivitamin 1 Tablet(s) Oral daily  sodium chloride 0.9%. 1000 milliLiter(s) IV Continuous <Continuous>      PRN:   acetaminophen   Tablet .. 650 milliGRAM(s) Oral every 6 hours PRN  acetaminophen   Tablet .. 650 milliGRAM(s) Oral every 6 hours PRN  diphenhydrAMINE 25 milliGRAM(s) Oral once PRN  diphenhydrAMINE   Injectable 25 milliGRAM(s) IV Push every 4 hours PRN  furosemide   Injectable 20 milliGRAM(s) IV Push every 24 hours PRN  magnesium hydroxide Suspension 30 milliLiter(s) Oral daily PRN  metoclopramide Injectable 10 milliGRAM(s) IV Push every 6 hours PRN  polyethylene glycol 3350 17 Gram(s) Oral daily PRN  senna 1 Tablet(s) Oral at bedtime PRN  sodium chloride 0.9% lock flush 10 milliLiter(s) IV Push every 1 hour PRN      A/P:   ___ year old ___  with a history of ______________________  Pre / Status Post :  Autologous / Allogeneic PBSCT / BMT day ____________    1. Infectious Disease:   Fluconazole, Acyclovir     2. VOD Prophylaxis: Actigall, Glutamine, Heparin (dosed at 100 units / kg / day)     3. GI Prophylaxis:  Protonix    4. Mouthcare - NS / NaHCO3 rinses, Mycelex, Caphosol, skin care     5. GVHD prophylaxis     6. Transfuse & replete electrolytes prn     7. IV hydration, daily weights, strict I&O, prn diuresis     8. PO intake as tolerated, nutrition follow up as needed, MVI, folic acid     9. Antiemetics, anti-diarrhea medications:   Reglan, Ativan    10. OOB as tolerated, physical therapy consult if needed     11. Monitor coags / fibrinogen 2x week, vitamin K as needed     12. Monitor closely for clinical changes, monitor for fevers     13. Emotional support provided, plan of care discussed with patient and family, questions addressed     14. Patient education done regarding chemotherapy prep, plan of care, restrictions and discharge planning     15. Continue regular social work input     I have written the above note for Dr. Loja who performed service with me in the room.   Jiji Jose KELLEY ([phone])    I have seen and examined patient with NP, I agree with above note as scribed. \[facility]\"" Transplant Team                                                      Critical / Counseling Time Provided: 30 minutes                                                                                                                                                        Chief Complaint:     S: Patient seen and examined with \[facility]\"" Transplant Team:     " I feel better than yesterday".    Denies  any chest pain, palpitation, SOB, abdominal pain or dysuria.    O: Vitals:   Vital Signs Last 24 Hrs  T(C): 37.1 (2020 06:19), Max: 37.6 (2020 01:36)  T(F): 98.8 (2020 06:19), Max: 99.7 (2020 01:36)  HR: 82 (2020 06:19) (71 - 82)  BP: 120/72 (2020 06:19) (95/49 - 120/72)  BP(mean): --  RR: 18 (2020 06:19) (18 - 18)  SpO2: 98% (2020 21:41) (96% - 98%)    Admit weight:   Daily     Daily Weight in k.3 (2020 09:05)    Intake / Output:   07-04 @ 07:01  -  -05 @ 07:00  --------------------------------------------------------  IN: 1551 mL / OUT: 3000 mL / NET: -1449 mL          PE:   Oropharynx:   Oral Mucositis:                                                        Grade:   CVS:   Lungs:   Abdomen:  Extremities:   Gastric Mucositis:                                                  Grade:   Intestinal Mucositis:                                              Grade:   Skin:   TLC:   Neuro:   Pain:     Labs:   CBC Full  -  ( 2020 06:47 )  WBC Count : 3.57 K/uL  Hemoglobin : 8.9 g/dL  Hematocrit : 27.7 %  Platelet Count - Automated : 100 K/uL  Mean Cell Volume : 74.3 fl  Mean Cell Hemoglobin : 23.9 pg  Mean Cell Hemoglobin Concentration : 32.1 gm/dL  Auto Neutrophil # : x  Auto Lymphocyte # : x  Auto Monocyte # : x  Auto Eosinophil # : x  Auto Basophil # : x  Auto Neutrophil % : x  Auto Lymphocyte % : x  Auto Monocyte % : x  Auto Eosinophil % : x  Auto Basophil % : x                          8.9    3.57  )-----------( 100      ( 2020 06:47 )             27.7     07-05    141  |  105  |  14  ----------------------------<  94  3.9   |  27  |  0.70    Ca    8.3<L>      2020 06:47  Phos  4.1       Mg     2.1     -    TPro  5.3<L>  /  Alb  3.7  /  TBili  0.4  /  DBili  x   /  AST  14  /  ALT  10  /  AlkPhos  52        LIVER FUNCTIONS - ( 2020 06:47 )  Alb: 3.7 g/dL / Pro: 5.3 g/dL / ALK PHOS: 52 U/L / ALT: 10 U/L / AST: 14 U/L / GGT: x           Lactate Dehydrogenase, Serum: 177 U/L ( @ 06:47)          Karnofsky / Lansky Scale:   GVHD:   Skin:   Liver:   Gut:   Overall Grade:       Cultures:         Radiology:       Meds:   Antimicrobials:   acyclovir   Oral Tab/Cap 400 milliGRAM(s) Oral every 8 hours  clotrimazole Lozenge 1 Lozenge Oral five times a day  fluconAZOLE   Tablet 400 milliGRAM(s) Oral daily      Heme / Onc:   heparin  Infusion 357 Unit(s)/Hr IV Continuous <Continuous>      GI:  magnesium hydroxide Suspension 30 milliLiter(s) Oral daily PRN  pantoprazole    Tablet 40 milliGRAM(s) Oral before breakfast  polyethylene glycol 3350 17 Gram(s) Oral daily PRN  senna 1 Tablet(s) Oral at bedtime PRN  sodium bicarbonate Mouth Rinse 10 milliLiter(s) Swish and Spit five times a day  ursodiol Capsule 300 milliGRAM(s) Oral two times a day with meals      Cardiovascular:   furosemide   Injectable 20 milliGRAM(s) IV Push every 24 hours PRN      Immunologic:   filgrastim-sndz (ZARXIO) Injectable 480 MICROGram(s) SubCutaneous every 24 hours      Other medications:   Biotene Dry Mouth Oral Rinse 5 milliLiter(s) Swish and Spit five times a day  calcium acetate 667 milliGRAM(s) Oral four times a day with meals  chlorhexidine 4% Liquid 1 Application(s) Topical <User Schedule>  folic acid 1 milliGRAM(s) Oral daily  lidocaine/prilocaine Cream 1 Application(s) Topical daily  LORazepam   Injectable 1 milliGRAM(s) IV Push every 24 hours  multivitamin 1 Tablet(s) Oral daily  sodium chloride 0.9%. 1000 milliLiter(s) IV Continuous <Continuous>      PRN:   acetaminophen   Tablet .. 650 milliGRAM(s) Oral every 6 hours PRN  acetaminophen   Tablet .. 650 milliGRAM(s) Oral every 6 hours PRN  diphenhydrAMINE 25 milliGRAM(s) Oral once PRN  diphenhydrAMINE   Injectable 25 milliGRAM(s) IV Push every 4 hours PRN  furosemide   Injectable 20 milliGRAM(s) IV Push every 24 hours PRN  magnesium hydroxide Suspension 30 milliLiter(s) Oral daily PRN  metoclopramide Injectable 10 milliGRAM(s) IV Push every 6 hours PRN  polyethylene glycol 3350 17 Gram(s) Oral daily PRN  senna 1 Tablet(s) Oral at bedtime PRN  sodium chloride 0.9% lock flush 10 milliLiter(s) IV Push every 1 hour PRN      A/P:   ___ year old ___  with a history of ______________________  Pre / Status Post :  Autologous / Allogeneic PBSCT / BMT day ____________    1. Infectious Disease:   Fluconazole, Acyclovir     2. VOD Prophylaxis: Actigall, Glutamine, Heparin (dosed at 100 units / kg / day)     3. GI Prophylaxis:  Protonix    4. Mouthcare - NS / NaHCO3 rinses, Mycelex, Caphosol, skin care     5. GVHD prophylaxis     6. Transfuse & replete electrolytes prn     7. IV hydration, daily weights, strict I&O, prn diuresis     8. PO intake as tolerated, nutrition follow up as needed, MVI, folic acid     9. Antiemetics, anti-diarrhea medications:   Reglan, Ativan    10. OOB as tolerated, physical therapy consult if needed     11. Monitor coags / fibrinogen 2x week, vitamin K as needed     12. Monitor closely for clinical changes, monitor for fevers     13. Emotional support provided, plan of care discussed with patient and family, questions addressed     14. Patient education done regarding chemotherapy prep, plan of care, restrictions and discharge planning     15. Continue regular social work input     I have written the above note for Dr. Loja who performed service with me in the room.   Kasandra KELLEY ([phone])    I have seen and examined patient with NP, I agree with above note as scribed. [facility] Transplant Team                                                      Critical / Counseling Time Provided: 30 minutes                                                                                                                                                        Chief Complaint:     S: Patient seen and examined with [facility] Transplant Team:     " I feel better than yesterday".    Denies  any chest pain, palpitation, SOB, abdominal pain or dysuria.    O: Vitals:   Vital Signs Last 24 Hrs  T(C): 37.1 (2020 06:19), Max: 37.6 (2020 01:36)  T(F): 98.8 (2020 06:19), Max: 99.7 (2020 01:36)  HR: 82 (2020 06:19) (71 - 82)  BP: 120/72 (2020 06:19) (95/49 - 120/72)  BP(mean): --  RR: 18 (2020 06:19) (18 - 18)  SpO2: 98% (2020 21:41) (96% - 98%)    Admit weight: 85.7  Daily Weight in k.2 kg    Intake / Output:   07-04 @ 07:01  -  -05 @ 07:00  --------------------------------------------------------  IN: 1551 mL / OUT: 3000 mL / NET: -1449 mL    PE:   Oropharynx: no erythema or ulcerations   Oral Mucositis:               -                                         Grade: n/a  CVS: S1, S2 RRR   Lungs: CTA throughout bilaterally   Abdomen: + BS x 4, soft, NT, ND   Extremities: + mild chronic LE edema  Gastric Mucositis:      -                                            Grade: n/a  Intestinal Mucositis:    -                                          Grade: n/a  Skin: no rash  TLC: CDI  Neuro: A&O x 3  Pain: denies     Labs:   CBC Full  -  ( 2020 06:47 )  WBC Count : 3.57 K/uL  Hemoglobin : 8.9 g/dL  Hematocrit : 27.7 %  Platelet Count - Automated : 100 K/uL  Mean Cell Volume : 74.3 fl  Mean Cell Hemoglobin : 23.9 pg  Mean Cell Hemoglobin Concentration : 32.1 gm/dL  Auto Neutrophil # : x  Auto Lymphocyte # : x  Auto Monocyte # : x  Auto Eosinophil # : x  Auto Basophil # : x  Auto Neutrophil % : x  Auto Lymphocyte % : x  Auto Monocyte % : x  Auto Eosinophil % : x  Auto Basophil % : x                          8.9    3.57  )-----------( 100      ( 2020 06:47 )             27.7     -    141  |  105  |  14  ----------------------------<  94  3.9   |  27  |  0.70    Ca    8.3<L>      2020 06:47  Phos  4.1     -  Mg     2.1     -    TPro  5.3<L>  /  Alb  3.7  /  TBili  0.4  /  DBili  x   /  AST  14  /  ALT  10  /  AlkPhos  52  -      LIVER FUNCTIONS - ( 2020 06:47 )  Alb: 3.7 g/dL / Pro: 5.3 g/dL / ALK PHOS: 52 U/L / ALT: 10 U/L / AST: 14 U/L / GGT: x           Lactate Dehydrogenase, Serum: 177 U/L ( @ 06:47)    Meds:   Antimicrobials:   acyclovir   Oral Tab/Cap 400 milliGRAM(s) Oral every 8 hours  clotrimazole Lozenge 1 Lozenge Oral five times a day  fluconAZOLE   Tablet 400 milliGRAM(s) Oral daily    Heme / Onc:   heparin  Infusion 357 Unit(s)/Hr IV Continuous <Continuous>    GI:  magnesium hydroxide Suspension 30 milliLiter(s) Oral daily PRN  pantoprazole    Tablet 40 milliGRAM(s) Oral before breakfast  polyethylene glycol 3350 17 Gram(s) Oral daily PRN  senna 1 Tablet(s) Oral at bedtime PRN  sodium bicarbonate Mouth Rinse 10 milliLiter(s) Swish and Spit five times a day  ursodiol Capsule 300 milliGRAM(s) Oral two times a day with meals    Cardiovascular:   furosemide   Injectable 20 milliGRAM(s) IV Push every 24 hours PRN    Immunologic:   filgrastim-sndz (ZARXIO) Injectable 480 MICROGram(s) SubCutaneous every 24 hours    Other medications:   Biotene Dry Mouth Oral Rinse 5 milliLiter(s) Swish and Spit five times a day  calcium acetate 667 milliGRAM(s) Oral four times a day with meals  chlorhexidine 4% Liquid 1 Application(s) Topical <User Schedule>  folic acid 1 milliGRAM(s) Oral daily  lidocaine/prilocaine Cream 1 Application(s) Topical daily  LORazepam   Injectable 1 milliGRAM(s) IV Push every 24 hours  multivitamin 1 Tablet(s) Oral daily  sodium chloride 0.9%. 1000 milliLiter(s) IV Continuous <Continuous>    PRN:   acetaminophen   Tablet .. 650 milliGRAM(s) Oral every 6 hours PRN  acetaminophen   Tablet .. 650 milliGRAM(s) Oral every 6 hours PRN  diphenhydrAMINE 25 milliGRAM(s) Oral once PRN  diphenhydrAMINE   Injectable 25 milliGRAM(s) IV Push every 4 hours PRN  furosemide   Injectable 20 milliGRAM(s) IV Push every 24 hours PRN  magnesium hydroxide Suspension 30 milliLiter(s) Oral daily PRN  metoclopramide Injectable 10 milliGRAM(s) IV Push every 6 hours PRN  polyethylene glycol 3350 17 Gram(s) Oral daily PRN  senna 1 Tablet(s) Oral at bedtime PRN  sodium chloride 0.9% lock flush 10 milliLiter(s) IV Push every 1 hour PRN      A/P:   59 year old male with a history of amyloidosis  Post Autologous PBSCT day +3   Strict I&O, prn diuresis, daily weights   Keep platelets =/> 40K for history of amyloidosis     1. Infectious Disease:   acyclovir   Oral Tab/Cap 400 milliGRAM(s) Oral every 8 hours  clotrimazole Lozenge 1 Lozenge Oral five times a day  fluconAZOLE   Tablet 400 milliGRAM(s) Oral daily    2. VOD Prophylaxis: Actigall, Glutamine, Heparin (dosed at 100 units / kg / day)     3. GI Prophylaxis:   pantoprazole    Tablet 40 milliGRAM(s) Oral before breakfast    4. Mouthcare - NS / NaHCO3 rinses, Mycelex, Biotene; Skin care     5. GVHD prophylaxis - n/a     6. Transfuse & replete electrolytes prn.    7. IV hydration, daily weights, strict I&O, prn diuresis     8. PO intake as tolerated, nutrition follow up as needed, MVI, folic acid     9. Antiemetics, anti-diarrhea medications:   metoclopramide Injectable 10 milliGRAM(s) IV Push every 6 hours PRN  LORazepam   Injectable 1 milliGRAM(s) IV Push every 24 hours  aprepitant 80 milliGRAM(s) Oral every 24 hours    10. OOB as tolerated, physical therapy consult if needed     11. Monitor coags / fibrinogen 2x week, vitamin K as needed     12. Monitor closely for clinical changes, monitor for fevers     13. Emotional support provided, plan of care discussed and questions addressed     14. Patient education done regarding plan of care, restrictions and discharge planning     15. Continue regular social work input     I have written the above note for Dr. Mildred June who performed service with me in the room.   Kasandra Garcia  NP-C ([phone])  I have seen and examined patient with NP, I agree with above note as scribed.

## 2020-07-06 DIAGNOSIS — R63.0 ANOREXIA: ICD-10-CM

## 2020-07-06 DIAGNOSIS — R53.83 OTHER FATIGUE: ICD-10-CM

## 2020-07-06 LAB
ALBUMIN SERPL ELPH-MCNC: 3.5 G/DL — SIGNIFICANT CHANGE UP (ref 3.3–5)
ALP SERPL-CCNC: 50 U/L — SIGNIFICANT CHANGE UP (ref 40–120)
ALT FLD-CCNC: 13 U/L — SIGNIFICANT CHANGE UP (ref 10–45)
ANION GAP SERPL CALC-SCNC: 8 MMOL/L — SIGNIFICANT CHANGE UP (ref 5–17)
APTT BLD: 28 SEC — SIGNIFICANT CHANGE UP (ref 27.5–35.5)
AST SERPL-CCNC: 17 U/L — SIGNIFICANT CHANGE UP (ref 10–40)
BILIRUB DIRECT SERPL-MCNC: <0.1 MG/DL — SIGNIFICANT CHANGE UP (ref 0–0.2)
BILIRUB INDIRECT FLD-MCNC: >0.2 MG/DL — SIGNIFICANT CHANGE UP (ref 0.2–1)
BILIRUB SERPL-MCNC: 0.3 MG/DL — SIGNIFICANT CHANGE UP (ref 0.2–1.2)
BLD GP AB SCN SERPL QL: NEGATIVE — SIGNIFICANT CHANGE UP
BUN SERPL-MCNC: 15 MG/DL — SIGNIFICANT CHANGE UP (ref 7–23)
CALCIUM SERPL-MCNC: 8.2 MG/DL — LOW (ref 8.4–10.5)
CHLORIDE SERPL-SCNC: 106 MMOL/L — SIGNIFICANT CHANGE UP (ref 96–108)
CO2 SERPL-SCNC: 27 MMOL/L — SIGNIFICANT CHANGE UP (ref 22–31)
CREAT SERPL-MCNC: 0.67 MG/DL — SIGNIFICANT CHANGE UP (ref 0.5–1.3)
FIBRINOGEN PPP-MCNC: 469 MG/DL — SIGNIFICANT CHANGE UP (ref 350–510)
GLUCOSE SERPL-MCNC: 94 MG/DL — SIGNIFICANT CHANGE UP (ref 70–99)
HCT VFR BLD CALC: 25.4 % — LOW (ref 39–50)
HGB BLD-MCNC: 8.2 G/DL — LOW (ref 13–17)
INR BLD: 1.05 RATIO — SIGNIFICANT CHANGE UP (ref 0.88–1.16)
LDH SERPL L TO P-CCNC: 159 U/L — SIGNIFICANT CHANGE UP (ref 50–242)
MAGNESIUM SERPL-MCNC: 2 MG/DL — SIGNIFICANT CHANGE UP (ref 1.6–2.6)
MCHC RBC-ENTMCNC: 24 PG — LOW (ref 27–34)
MCHC RBC-ENTMCNC: 32.3 GM/DL — SIGNIFICANT CHANGE UP (ref 32–36)
MCV RBC AUTO: 74.5 FL — LOW (ref 80–100)
NRBC # BLD: 0 /100 WBCS — SIGNIFICANT CHANGE UP (ref 0–0)
PHOSPHATE SERPL-MCNC: 4.2 MG/DL — SIGNIFICANT CHANGE UP (ref 2.5–4.5)
PLATELET # BLD AUTO: 57 K/UL — LOW (ref 150–400)
POTASSIUM SERPL-MCNC: 3.8 MMOL/L — SIGNIFICANT CHANGE UP (ref 3.5–5.3)
POTASSIUM SERPL-SCNC: 3.8 MMOL/L — SIGNIFICANT CHANGE UP (ref 3.5–5.3)
PROT SERPL-MCNC: 5.2 G/DL — LOW (ref 6–8.3)
PROTHROM AB SERPL-ACNC: 12 SEC — SIGNIFICANT CHANGE UP (ref 10.6–13.6)
RBC # BLD: 3.41 M/UL — LOW (ref 4.2–5.8)
RBC # FLD: 15.2 % — HIGH (ref 10.3–14.5)
RH IG SCN BLD-IMP: POSITIVE — SIGNIFICANT CHANGE UP
SODIUM SERPL-SCNC: 141 MMOL/L — SIGNIFICANT CHANGE UP (ref 135–145)
WBC # BLD: 0.28 K/UL — CRITICAL LOW (ref 3.8–10.5)
WBC # FLD AUTO: 0.28 K/UL — CRITICAL LOW (ref 3.8–10.5)

## 2020-07-06 PROCEDURE — 99233 SBSQ HOSP IP/OBS HIGH 50: CPT

## 2020-07-06 PROCEDURE — 99291 CRITICAL CARE FIRST HOUR: CPT

## 2020-07-06 RX ORDER — PETROLATUM,WHITE
1 JELLY (GRAM) TOPICAL
Refills: 0 | Status: DISCONTINUED | OUTPATIENT
Start: 2020-07-06 | End: 2020-07-20

## 2020-07-06 RX ORDER — HYDROCORTISONE 1 %
1 OINTMENT (GRAM) TOPICAL DAILY
Refills: 0 | Status: DISCONTINUED | OUTPATIENT
Start: 2020-07-06 | End: 2020-07-20

## 2020-07-06 RX ORDER — CIPROFLOXACIN LACTATE 400MG/40ML
500 VIAL (ML) INTRAVENOUS EVERY 12 HOURS
Refills: 0 | Status: DISCONTINUED | OUTPATIENT
Start: 2020-07-06 | End: 2020-07-08

## 2020-07-06 RX ADMIN — URSODIOL 300 MILLIGRAM(S): 250 TABLET, FILM COATED ORAL at 08:38

## 2020-07-06 RX ADMIN — Medication 10 MILLILITER(S): at 13:01

## 2020-07-06 RX ADMIN — Medication 1 LOZENGE: at 20:21

## 2020-07-06 RX ADMIN — Medication 5 MILLILITER(S): at 16:28

## 2020-07-06 RX ADMIN — Medication 5 MILLILITER(S): at 00:26

## 2020-07-06 RX ADMIN — Medication 10 MILLILITER(S): at 16:28

## 2020-07-06 RX ADMIN — CHLORHEXIDINE GLUCONATE 1 APPLICATION(S): 213 SOLUTION TOPICAL at 06:43

## 2020-07-06 RX ADMIN — Medication 5 MILLILITER(S): at 08:36

## 2020-07-06 RX ADMIN — Medication 400 MILLIGRAM(S): at 13:01

## 2020-07-06 RX ADMIN — Medication 5 MILLILITER(S): at 20:21

## 2020-07-06 RX ADMIN — Medication 480 MICROGRAM(S): at 16:34

## 2020-07-06 RX ADMIN — Medication 1 APPLICATION(S): at 16:50

## 2020-07-06 RX ADMIN — Medication 10 MILLIGRAM(S): at 16:44

## 2020-07-06 RX ADMIN — Medication 400 MILLIGRAM(S): at 21:45

## 2020-07-06 RX ADMIN — Medication 1 TABLET(S): at 12:58

## 2020-07-06 RX ADMIN — Medication 1 LOZENGE: at 16:28

## 2020-07-06 RX ADMIN — Medication 10 MILLILITER(S): at 08:38

## 2020-07-06 RX ADMIN — Medication 500 MILLIGRAM(S): at 17:52

## 2020-07-06 RX ADMIN — Medication 5 MILLILITER(S): at 12:57

## 2020-07-06 RX ADMIN — Medication 10 MILLILITER(S): at 20:21

## 2020-07-06 RX ADMIN — Medication 400 MILLIGRAM(S): at 06:43

## 2020-07-06 RX ADMIN — Medication 1 LOZENGE: at 08:36

## 2020-07-06 RX ADMIN — Medication 10 MILLILITER(S): at 00:26

## 2020-07-06 RX ADMIN — FLUCONAZOLE 400 MILLIGRAM(S): 150 TABLET ORAL at 12:58

## 2020-07-06 RX ADMIN — Medication 1 LOZENGE: at 12:58

## 2020-07-06 RX ADMIN — Medication 1 MILLIGRAM(S): at 12:58

## 2020-07-06 RX ADMIN — URSODIOL 300 MILLIGRAM(S): 250 TABLET, FILM COATED ORAL at 17:52

## 2020-07-06 RX ADMIN — Medication 1 LOZENGE: at 00:26

## 2020-07-06 RX ADMIN — PANTOPRAZOLE SODIUM 40 MILLIGRAM(S): 20 TABLET, DELAYED RELEASE ORAL at 06:43

## 2020-07-06 NOTE — PROGRESS NOTE ADULT - SUBJECTIVE AND OBJECTIVE BOX
[facility] Transplant Team                                                      Critical / Counseling Time Provided: 30 minutes                                                                                                                                                        Chief Complaint: Autologous pbsct with high dose melphalan prep regimen for treatment of amyloidosis    S: Patient seen and examined with HPC Transplant Team:       O: Vitals:   Vital Signs Last 24 Hrs  T(C): 37.1 (2020 06:25), Max: 37.4 (2020 13:40)  T(F): 98.8 (2020 06:25), Max: 99.3 (2020 13:40)  HR: 74 (2020 06:25) (64 - 83)  BP: 120/76 (2020 06:25) (101/46 - 120/76)  BP(mean): --  RR: 18 (2020 06:25) (18 - 18)  SpO2: 98% (2020 06:25) (97% - 98%)    Admit weight: 85.7kg   Daily Weight in k.2 (2020 08:58)  Today's weight:      Intake / Output:   05 @ 07:01  -   @ 07:00  --------------------------------------------------------  IN: 1459 mL / OUT: 2500 mL / NET: -1041 mL      PE:   Oropharynx: no erythema or ulcerations   Oral Mucositis:               -                                         Grade: n/a  CVS: S1, S2 RRR   Lungs: CTA throughout bilaterally   Abdomen: + BS x 4, soft, NT, ND   Extremities: + mild chronic LE edema  Gastric Mucositis:      -                                            Grade: n/a  Intestinal Mucositis:    -                                          Grade: n/a  Skin: no rash  TLC: CDI  Neuro: A&O x 3  Pain: denies     Labs:           141  |  106  |  15  ----------------------------<  94  3.8   |  27  |  0.67    Ca    8.2<L>      2020 06:50  Phos  4.2     07-06  Mg     2.0     07-06    TPro  5.2<L>  /  Alb  3.5  /  TBili  0.3  /  DBili  <0.1  /  AST  17  /  ALT  13  /  AlkPhos  50  07-    PT/INR - ( 2020 06:50 )   PT: 12.0 sec;   INR: 1.05 ratio         PTT - ( 2020 06:50 )  PTT:28.0 sec  LIVER FUNCTIONS - ( 2020 06:50 )  Alb: 3.5 g/dL / Pro: 5.2 g/dL / ALK PHOS: 50 U/L / ALT: 13 U/L / AST: 17 U/L / GGT: x           Lactate Dehydrogenase, Serum: 159 U/L ( @ 06:50)    Meds:   Antimicrobials:   acyclovir   Oral Tab/Cap 400 milliGRAM(s) Oral every 8 hours  clotrimazole Lozenge 1 Lozenge Oral five times a day  fluconAZOLE   Tablet 400 milliGRAM(s) Oral daily      Heme / Onc:   heparin  Infusion 357 Unit(s)/Hr IV Continuous <Continuous>      GI:  magnesium hydroxide Suspension 30 milliLiter(s) Oral daily PRN  pantoprazole    Tablet 40 milliGRAM(s) Oral before breakfast  polyethylene glycol 3350 17 Gram(s) Oral daily PRN  senna 1 Tablet(s) Oral at bedtime PRN  sodium bicarbonate Mouth Rinse 10 milliLiter(s) Swish and Spit five times a day  ursodiol Capsule 300 milliGRAM(s) Oral two times a day with meals      Cardiovascular:   furosemide   Injectable 20 milliGRAM(s) IV Push every 24 hours PRN      Immunologic:   filgrastim-sndz (ZARXIO) Injectable 480 MICROGram(s) SubCutaneous every 24 hours      Other medications:   Biotene Dry Mouth Oral Rinse 5 milliLiter(s) Swish and Spit five times a day  chlorhexidine 4% Liquid 1 Application(s) Topical <User Schedule>  folic acid 1 milliGRAM(s) Oral daily  lidocaine/prilocaine Cream 1 Application(s) Topical daily  LORazepam   Injectable 1 milliGRAM(s) IV Push every 24 hours  multivitamin 1 Tablet(s) Oral daily  sodium chloride 0.9%. 1000 milliLiter(s) IV Continuous <Continuous>      PRN:   acetaminophen   Tablet .. 650 milliGRAM(s) Oral every 6 hours PRN  acetaminophen   Tablet .. 650 milliGRAM(s) Oral every 6 hours PRN  diphenhydrAMINE 25 milliGRAM(s) Oral once PRN  diphenhydrAMINE   Injectable 25 milliGRAM(s) IV Push every 4 hours PRN  furosemide   Injectable 20 milliGRAM(s) IV Push every 24 hours PRN  magnesium hydroxide Suspension 30 milliLiter(s) Oral daily PRN  metoclopramide Injectable 10 milliGRAM(s) IV Push every 6 hours PRN  polyethylene glycol 3350 17 Gram(s) Oral daily PRN  senna 1 Tablet(s) Oral at bedtime PRN  sodium chloride 0.9% lock flush 10 milliLiter(s) IV Push every 1 hour PRN    A/P:   59 year old male with a history of amyloidosis  Post Autologous PBSCT day + 4  Strict I&O, prn diuresis, daily weights   Keep platelets =/> 40K for history of amyloidosis     1. Infectious Disease:   acyclovir   Oral Tab/Cap 400 milliGRAM(s) Oral every 8 hours  clotrimazole Lozenge 1 Lozenge Oral five times a day  fluconAZOLE   Tablet 400 milliGRAM(s) Oral daily    2. VOD Prophylaxis: Actigall, Glutamine, Heparin (dosed at 100 units / kg / day)     3. GI Prophylaxis:    pantoprazole    Tablet 40 milliGRAM(s) Oral before breakfast    4. Mouthcare - NS / NaHCO3 rinses, Mycelex, Biotene; Skin care     5. GVHD prophylaxis - n/a     6. Transfuse & replete electrolytes prn     7. IV hydration, daily weights, strict I&O, prn diuresis     8. PO intake as tolerated, nutrition follow up as needed, MVI, folic acid     9. Antiemetics, anti-diarrhea medications:   metoclopramide Injectable 10 milliGRAM(s) IV Push every 6 hours PRN  LORazepam   Injectable 1 milliGRAM(s) IV Push every 24 hours    10. OOB as tolerated, physical therapy consult if needed     11. Monitor coags / fibrinogen 2x week, vitamin K as needed     12. Monitor closely for clinical changes, monitor for fevers     13. Emotional support provided, plan of care discussed and questions addressed     14. Patient education done regarding plan of care, restrictions and discharge planning     15. Continue regular social work input     I have written the above note for Dr. Loja who performed service with me in the room.   Brinda Garrett NP-C ([phone])    I have seen and examined patient with NP, I agree with above note as scribed. [facility] Transplant Team                                                      Critical / Counseling Time Provided: 30 minutes                                                                                                                                                        Chief Complaint: Autologous pbsct with high dose melphalan prep regimen for treatment of amyloidosis    S: Patient seen and examined with [facility]PC Transplant Team:       O: Vitals:   Vital Signs Last 24 Hrs  T(C): 37.1 (2020 06:25), Max: 37.4 (2020 13:40)  T(F): 98.8 (2020 06:25), Max: 99.3 (2020 13:40)  HR: 74 (2020 06:25) (64 - 83)  BP: 120/76 (2020 06:25) (101/46 - 120/76)  BP(mean): --  RR: 18 (2020 06:25) (18 - 18)  SpO2: 98% (2020 06:25) (97% - 98%)    Admit weight: 85.7kg   Daily Weight in k.2 (2020 08:58)  Today's weight:      Intake / Output:   07-05 @ 07:01  -  07- @ 07:00  --------------------------------------------------------  IN: 1459 mL / OUT: 2500 mL / NET: -1041 mL      PE:   Oropharynx: no erythema or ulcerations   Oral Mucositis:               -                                         Grade: n/a  CVS: S1, S2 RRR   Lungs: CTA throughout bilaterally   Abdomen: + BS x 4, soft, NT, ND   Extremities: + mild chronic LE edema  Gastric Mucositis:      -                                            Grade: n/a  Intestinal Mucositis:    -                                          Grade: n/a  Skin: no rash  TLC: CDI  Neuro: A&O x 3  Pain: denies     Labs:                         8.2    0.28  )-----------( 57       ( 2020 06:49 )             25.4       07-06    141  |  106  |  15  ----------------------------<  94  3.8   |  27  |  0.67    Ca    8.2<L>      2020 06:50  Phos  4.2     07-06  Mg     2.0     07-06    TPro  5.2<L>  /  Alb  3.5  /  TBili  0.3  /  DBili  <0.1  /  AST  17  /  ALT  13  /  AlkPhos  50  -    PT/INR - ( 2020 06:50 )   PT: 12.0 sec;   INR: 1.05 ratio         PTT - ( 2020 06:50 )  PTT:28.0 sec  LIVER FUNCTIONS - ( 2020 06:50 )  Alb: 3.5 g/dL / Pro: 5.2 g/dL / ALK PHOS: 50 U/L / ALT: 13 U/L / AST: 17 U/L / GGT: x           Lactate Dehydrogenase, Serum: 159 U/L ( @ 06:50)    Meds:   Antimicrobials:   acyclovir   Oral Tab/Cap 400 milliGRAM(s) Oral every 8 hours  clotrimazole Lozenge 1 Lozenge Oral five times a day  fluconAZOLE   Tablet 400 milliGRAM(s) Oral daily      Heme / Onc:   heparin  Infusion 357 Unit(s)/Hr IV Continuous <Continuous>      GI:  magnesium hydroxide Suspension 30 milliLiter(s) Oral daily PRN  pantoprazole    Tablet 40 milliGRAM(s) Oral before breakfast  polyethylene glycol 3350 17 Gram(s) Oral daily PRN  senna 1 Tablet(s) Oral at bedtime PRN  sodium bicarbonate Mouth Rinse 10 milliLiter(s) Swish and Spit five times a day  ursodiol Capsule 300 milliGRAM(s) Oral two times a day with meals      Cardiovascular:   furosemide   Injectable 20 milliGRAM(s) IV Push every 24 hours PRN      Immunologic:   filgrastim-sndz (ZARXIO) Injectable 480 MICROGram(s) SubCutaneous every 24 hours      Other medications:   Biotene Dry Mouth Oral Rinse 5 milliLiter(s) Swish and Spit five times a day  chlorhexidine 4% Liquid 1 Application(s) Topical <User Schedule>  folic acid 1 milliGRAM(s) Oral daily  lidocaine/prilocaine Cream 1 Application(s) Topical daily  LORazepam   Injectable 1 milliGRAM(s) IV Push every 24 hours  multivitamin 1 Tablet(s) Oral daily  sodium chloride 0.9%. 1000 milliLiter(s) IV Continuous <Continuous>      PRN:   acetaminophen   Tablet .. 650 milliGRAM(s) Oral every 6 hours PRN  acetaminophen   Tablet .. 650 milliGRAM(s) Oral every 6 hours PRN  diphenhydrAMINE 25 milliGRAM(s) Oral once PRN  diphenhydrAMINE   Injectable 25 milliGRAM(s) IV Push every 4 hours PRN  furosemide   Injectable 20 milliGRAM(s) IV Push every 24 hours PRN  magnesium hydroxide Suspension 30 milliLiter(s) Oral daily PRN  metoclopramide Injectable 10 milliGRAM(s) IV Push every 6 hours PRN  polyethylene glycol 3350 17 Gram(s) Oral daily PRN  senna 1 Tablet(s) Oral at bedtime PRN  sodium chloride 0.9% lock flush 10 milliLiter(s) IV Push every 1 hour PRN    A/P:   59 year old male with a history of amyloidosis  Post Autologous PBSCT day + 4  Strict I&O, prn diuresis, daily weights   Keep platelets =/> 40K for history of amyloidosis   Neutropenic 20- started on cipro, if T >/= 38C, pan cx, CXR and change cipro to cefepime 2g IV q 8 hours     1. Infectious Disease:   acyclovir   Oral Tab/Cap 400 milliGRAM(s) Oral every 8 hours  clotrimazole Lozenge 1 Lozenge Oral five times a day  fluconAZOLE   Tablet 400 milliGRAM(s) Oral daily  ciprofloxacin 500mg po q 12 hours     2. VOD Prophylaxis: Actigall, Glutamine, Heparin (dosed at 100 units / kg / day)     3. GI Prophylaxis:    pantoprazole    Tablet 40 milliGRAM(s) Oral before breakfast    4. Mouthcare - NS / NaHCO3 rinses, Mycelex, Biotene; Skin care     5. GVHD prophylaxis - n/a     6. Transfuse & replete electrolytes prn     7. IV hydration, daily weights, strict I&O, prn diuresis     8. PO intake as tolerated, nutrition follow up as needed, MVI, folic acid     9. Antiemetics, anti-diarrhea medications:   metoclopramide Injectable 10 milliGRAM(s) IV Push every 6 hours PRN  LORazepam   Injectable 1 milliGRAM(s) IV Push every 24 hours    10. OOB as tolerated, physical therapy consult if needed     11. Monitor coags / fibrinogen 2x week, vitamin K as needed     12. Monitor closely for clinical changes, monitor for fevers     13. Emotional support provided, plan of care discussed and questions addressed     14. Patient education done regarding plan of care, restrictions and discharge planning     15. Continue regular social work input     I have written the above note for Dr. Loja who performed service with me in the room.   Brinda Garrett NP-C ([phone])    I have seen and examined patient with NP, I agree with above note as scribed. [facility] Trans[facility] Team                                                      Critical / Counseling Time Provided: 30 minutes                                                                                                                                                        Chief Complaint: Autologous pbsct with high dose melphalan prep regimen for treatment of amyloidosis    S: Patient seen and examined with [facility]PC Transplant Team:     +generalized weakness   +occasional nausea   +loose stool       O: Vitals:   Vital Signs Last 24 Hrs  T(C): 37.1 (2020 06:25), Max: 37.4 (2020 13:40)  T(F): 98.8 (2020 06:25), Max: 99.3 (2020 13:40)  HR: 74 (2020 06:25) (64 - 83)  BP: 120/76 (2020 06:25) (101/46 - 120/76)  BP(mean): --  RR: 18 (2020 06:25) (18 - 18)  SpO2: 98% (2020 06:25) (97% - 98%)    Admit weight: 85.7kg   Daily Weight in k.2 (2020 08:58)  Today's weight:      Intake / Output:   07-05 @ 07:01  -  07-06 @ 07:00  --------------------------------------------------------  IN: 1459 mL / OUT: 2500 mL / NET: -1041 mL      PE:   Oropharynx: no erythema or ulcerations   Oral Mucositis:               -                                         Grade: n/a  CVS: S1, S2 RRR   Lungs: CTA throughout bilaterally   Abdomen: + BS x 4, soft, NT, ND   Extremities: + mild chronic LE edema  Gastric Mucositis:      -                                            Grade: n/a  Intestinal Mucositis:    -                                          Grade: n/a  Skin: patchy erythematous maculopapular rash posterior lower Left back, anterior neck anterior  lower back 2/2 Kepivance   TLC: CDI  Neuro: A&O x 3  Pain: denies     Labs:                         8.2    0.28  )-----------( 57       ( 2020 06:49 )             25.4       07-06    141  |  106  |  15  ----------------------------<  94  3.8   |  27  |  0.67    Ca    8.2<L>      2020 06:50  Phos  4.2     07-06  Mg     2.0     07-06    TPro  5.2<L>  /  Alb  3.5  /  TBili  0.3  /  DBili  <0.1  /  AST  17  /  ALT  13  /  AlkPhos  50  07-06    PT/INR - ( 2020 06:50 )   PT: 12.0 sec;   INR: 1.05 ratio         PTT - ( 2020 06:50 )  PTT:28.0 sec  LIVER FUNCTIONS - ( 2020 06:50 )  Alb: 3.5 g/dL / Pro: 5.2 g/dL / ALK PHOS: 50 U/L / ALT: 13 U/L / AST: 17 U/L / GGT: x           Lactate Dehydrogenase, Serum: 159 U/L ( @ 06:50)    Meds:   Antimicrobials:   acyclovir   Oral Tab/Cap 400 milliGRAM(s) Oral every 8 hours  clotrimazole Lozenge 1 Lozenge Oral five times a day  fluconAZOLE   Tablet 400 milliGRAM(s) Oral daily      Heme / Onc:   heparin  Infusion 357 Unit(s)/Hr IV Continuous <Continuous>      GI:  magnesium hydroxide Suspension 30 milliLiter(s) Oral daily PRN  pantoprazole    Tablet 40 milliGRAM(s) Oral before breakfast  polyethylene glycol 3350 17 Gram(s) Oral daily PRN  senna 1 Tablet(s) Oral at bedtime PRN  sodium bicarbonate Mouth Rinse 10 milliLiter(s) Swish and Spit five times a day  ursodiol Capsule 300 milliGRAM(s) Oral two times a day with meals      Cardiovascular:   furosemide   Injectable 20 milliGRAM(s) IV Push every 24 hours PRN      Immunologic:   filgrastim-sndz (ZARXIO) Injectable 480 MICROGram(s) SubCutaneous every 24 hours      Other medications:   Biotene Dry Mouth Oral Rinse 5 milliLiter(s) Swish and Spit five times a day  chlorhexidine 4% Liquid 1 Application(s) Topical <User Schedule>  folic acid 1 milliGRAM(s) Oral daily  lidocaine/prilocaine Cream 1 Application(s) Topical daily  LORazepam   Injectable 1 milliGRAM(s) IV Push every 24 hours  multivitamin 1 Tablet(s) Oral daily  sodium chloride 0.9%. 1000 milliLiter(s) IV Continuous <Continuous>      PRN:   acetaminophen   Tablet .. 650 milliGRAM(s) Oral every 6 hours PRN  acetaminophen   Tablet .. 650 milliGRAM(s) Oral every 6 hours PRN  diphenhydrAMINE 25 milliGRAM(s) Oral once PRN  diphenhydrAMINE   Injectable 25 milliGRAM(s) IV Push every 4 hours PRN  furosemide   Injectable 20 milliGRAM(s) IV Push every 24 hours PRN  magnesium hydroxide Suspension 30 milliLiter(s) Oral daily PRN  metoclopramide Injectable 10 milliGRAM(s) IV Push every 6 hours PRN  polyethylene glycol 3350 17 Gram(s) Oral daily PRN  senna 1 Tablet(s) Oral at bedtime PRN  sodium chloride 0.9% lock flush 10 milliLiter(s) IV Push every 1 hour PRN    A/P:   59 year old male with a history of amyloidosis  Post Autologous PBSCT day + 4  Strict I&O, prn diuresis, daily weights   Keep platelets =/> 40K for history of amyloidosis   Neutropenic 20- started on cipro, if T >/= 38C, pan cx, CXR and change cipro to cefepime 2g IV q 8 hours     1. Infectious Disease:   acyclovir   Oral Tab/Cap 400 milliGRAM(s) Oral every 8 hours  clotrimazole Lozenge 1 Lozenge Oral five times a day  fluconAZOLE   Tablet 400 milliGRAM(s) Oral daily  ciprofloxacin 500mg po q 12 hours     2. VOD Prophylaxis: Actigall, Glutamine, Heparin (dosed at 100 units / kg / day)     3. GI Prophylaxis:    pantoprazole    Tablet 40 milliGRAM(s) Oral before breakfast    4. Mouthcare - NS / NaHCO3 rinses, Mycelex, Biotene; Skin care     5. GVHD prophylaxis - n/a     6. Transfuse & replete electrolytes prn     7. IV hydration, daily weights, strict I&O, prn diuresis     8. PO intake as tolerated, nutrition follow up as needed, MVI, folic acid     9. Antiemetics, anti-diarrhea medications:   metoclopramide Injectable 10 milliGRAM(s) IV Push every 6 hours PRN  LORazepam   Injectable 1 milliGRAM(s) IV Push every 24 hours    10. OOB as tolerated, physical therapy consult if needed     11. Monitor coags / fibrinogen 2x week, vitamin K as needed     12. Monitor closely for clinical changes, monitor for fevers     13. Emotional support provided, plan of care discussed and questions addressed     14. Patient education done regarding plan of care, restrictions and discharge planning     15. Continue regular social work input     I have written the above note for Dr. Loja who performed service with me in the room.   Brinda Garrett NP-C ([phone])    I have seen and examined patient with NP, I agree with above note as scribed. [facility] Transplant Team                                                      Critical / Counseling Time Provided: 30 minutes                                                                                                                                                        Chief Complaint: Autologous pbsct with high dose melphalan prep regimen for treatment of amyloidosis    S: Patient seen and examined with [facility]PC Transplant Team:     +generalized weakness   +occasional nausea   +loose stool     Denies: mouth/throat pain, dyspnia, cough, abdominal pain, vomiting, diarrhea, dizziness     O: Vitals:   Vital Signs Last 24 Hrs  T(C): 37.1 (2020 06:25), Max: 37.4 (2020 13:40)  T(F): 98.8 (2020 06:25), Max: 99.3 (2020 13:40)  HR: 74 (2020 06:25) (64 - 83)  BP: 120/76 (2020 06:25) (101/46 - 120/76)  BP(mean): --  RR: 18 (2020 06:25) (18 - 18)  SpO2: 98% (2020 06:25) (97% - 98%)    Admit weight: 85.7kg   Daily Weight in k.2 (2020 08:58)  Today's weight:85 kg       Intake / Output:   05 @ 07:01  -  07-06 @ 07:00  --------------------------------------------------------  IN: 1459 mL / OUT: 2500 mL / NET: -1041 mL      PE:   Oropharynx: no erythema or ulcerations   Oral Mucositis:               -                                         Grade: n/a  CVS: S1, S2 RRR   Lungs: CTA throughout bilaterally   Abdomen: + BS x 4, soft, NT, ND   Extremities: + mild chronic LE edema  Gastric Mucositis:      -                                            Grade: n/a  Intestinal Mucositis:    -                                          Grade: n/a  Skin: patchy erythematous maculopapular rash posterior lower Left back, anterior neck, anterior lower back 2/2 [place]   TLC: CDI  Neuro: A&O x 3  Pain: denies     Labs:                         8.2    0.28  )-----------( 57       ( 2020 06:49 )             25.4       07-06    141  |  106  |  15  ----------------------------<  94  3.8   |  27  |  0.67    Ca    8.2<L>      2020 06:50  Phos  4.2     07-  Mg     2.0     07-    TPro  5.2<L>  /  Alb  3.5  /  TBili  0.3  /  DBili  <0.1  /  AST  17  /  ALT  13  /  AlkPhos  50  07-06    PT/INR - ( 2020 06:50 )   PT: 12.0 sec;   INR: 1.05 ratio         PTT - ( 2020 06:50 )  PTT:28.0 sec  LIVER FUNCTIONS - ( 2020 06:50 )  Alb: 3.5 g/dL / Pro: 5.2 g/dL / ALK PHOS: 50 U/L / ALT: 13 U/L / AST: 17 U/L / GGT: x           Lactate Dehydrogenase, Serum: 159 U/L ( @ 06:50)    Meds:   Antimicrobials:   acyclovir   Oral Tab/Cap 400 milliGRAM(s) Oral every 8 hours  clotrimazole Lozenge 1 Lozenge Oral five times a day  fluconAZOLE   Tablet 400 milliGRAM(s) Oral daily      Heme / Onc:   heparin  Infusion 357 Unit(s)/Hr IV Continuous <Continuous>      GI:  magnesium hydroxide Suspension 30 milliLiter(s) Oral daily PRN  pantoprazole    Tablet 40 milliGRAM(s) Oral before breakfast  polyethylene glycol 3350 17 Gram(s) Oral daily PRN  senna 1 Tablet(s) Oral at bedtime PRN  sodium bicarbonate Mouth Rinse 10 milliLiter(s) Swish and Spit five times a day  ursodiol Capsule 300 milliGRAM(s) Oral two times a day with meals      Cardiovascular:   furosemide   Injectable 20 milliGRAM(s) IV Push every 24 hours PRN      Immunologic:   filgrastim-sndz (ZARXIO) Injectable 480 MICROGram(s) SubCutaneous every 24 hours      Other medications:   Biotene Dry Mouth Oral Rinse 5 milliLiter(s) Swish and Spit five times a day  chlorhexidine 4% Liquid 1 Application(s) Topical <User Schedule>  folic acid 1 milliGRAM(s) Oral daily  lidocaine/prilocaine Cream 1 Application(s) Topical daily  LORazepam   Injectable 1 milliGRAM(s) IV Push every 24 hours  multivitamin 1 Tablet(s) Oral daily  sodium chloride 0.9%. 1000 milliLiter(s) IV Continuous <Continuous>      PRN:   acetaminophen   Tablet .. 650 milliGRAM(s) Oral every 6 hours PRN  acetaminophen   Tablet .. 650 milliGRAM(s) Oral every 6 hours PRN  diphenhydrAMINE 25 milliGRAM(s) Oral once PRN  diphenhydrAMINE   Injectable 25 milliGRAM(s) IV Push every 4 hours PRN  furosemide   Injectable 20 milliGRAM(s) IV Push every 24 hours PRN  magnesium hydroxide Suspension 30 milliLiter(s) Oral daily PRN  metoclopramide Injectable 10 milliGRAM(s) IV Push every 6 hours PRN  polyethylene glycol 3350 17 Gram(s) Oral daily PRN  senna 1 Tablet(s) Oral at bedtime PRN  sodium chloride 0.9% lock flush 10 milliLiter(s) IV Push every 1 hour PRN    A/P:   59 year old male with a history of amyloidosis  Post Autologous PBSCT day + 4  Strict I&O, prn diuresis, daily weights   Keep platelets =/> 40K for history of amyloidosis   Neutropenic 20- started on cipro, if T >/= 38C, pan cx, CXR and change cipro to cefepime 2g IV q 8 hours     1. Infectious Disease:   acyclovir   Oral Tab/Cap 400 milliGRAM(s) Oral every 8 hours  clotrimazole Lozenge 1 Lozenge Oral five times a day  fluconAZOLE   Tablet 400 milliGRAM(s) Oral daily  ciprofloxacin 500mg po q 12 hours     2. VOD Prophylaxis: Actigall, Glutamine, Heparin (dosed at 100 units / kg / day)     3. GI Prophylaxis:    pantoprazole    Tablet 40 milliGRAM(s) Oral before breakfast    4. Mouthcare - NS / NaHCO3 rinses, Mycelex, Biotene; Skin care     5. GVHD prophylaxis - n/a     6. Transfuse & replete electrolytes prn     7. IV hydration, daily weights, strict I&O, prn diuresis     8. PO intake as tolerated, nutrition follow up as needed, MVI, folic acid     9. Antiemetics, anti-diarrhea medications:   metoclopramide Injectable 10 milliGRAM(s) IV Push every 6 hours PRN  LORazepam   Injectable 1 milliGRAM(s) IV Push every 24 hours    10. OOB as tolerated, physical therapy consult if needed     11. Monitor coags / fibrinogen 2x week, vitamin K as needed     12. Monitor closely for clinical changes, monitor for fevers     13. Emotional support provided, plan of care discussed and questions addressed     14. Patient education done regarding plan of care, restrictions and discharge planning     15. Continue regular social work input     I have written the above note for Dr. Loja who performed service with me in the room.   Brinda Garrett NP-C  Kandice Woodward PABRANDAN ([phone])    I have seen and examined patient with NP/PA, I agree with above note as scribed. [facility] Transplant Team                                                      Critical / Counseling Time Provided: 30 minutes                                                                                                                                                        Chief Complaint: Autologous pbsct with high dose melphalan prep regimen for treatment of amyloidosis    S: Patient seen and examined with HPC Transplant Team:     +generalized weakness   +occasional nausea   +loose stool     Denies: mouth/throat pain, dyspnea, cough, abdominal pain, vomiting, diarrhea, dizziness     O: Vitals:   Vital Signs Last 24 Hrs  T(C): 37.1 (2020 06:25), Max: 37.4 (2020 13:40)  T(F): 98.8 (2020 06:25), Max: 99.3 (2020 13:40)  HR: 74 (2020 06:25) (64 - 83)  BP: 120/76 (2020 06:25) (101/46 - 120/76)  BP(mean): --  RR: 18 (2020 06:25) (18 - 18)  SpO2: 98% (2020 06:25) (97% - 98%)    Admit weight: 85.7kg   Daily Weight in k.2 (2020 08:58)  Today's weight:85 kg       Intake / Output:   05 @ 07:01  -  07-06 @ 07:00  --------------------------------------------------------  IN: 1459 mL / OUT: 2500 mL / NET: -1041 mL      PE:   Oropharynx: no erythema or ulcerations   Oral Mucositis:               -                                         Grade: n/a  CVS: S1, S2 RRR   Lungs: CTA throughout bilaterally   Abdomen: + BS x 4, soft, NT, ND   Extremities: + mild chronic LE edema  Gastric Mucositis:      -                                            Grade: n/a  Intestinal Mucositis:    -                                          Grade: n/a  Skin: patchy erythematous maculopapular on rash posterior lower left back, anterior neck, anterior lower abdomen 2/2 Kepivance   TLC: CDI  Neuro: A&O x 3  Pain: denies     Labs:                         8.2    0.28  )-----------( 57       ( 2020 06:49 )             25.4       07-06    141  |  106  |  15  ----------------------------<  94  3.8   |  27  |  0.67    Ca    8.2<L>      2020 06:50  Phos  4.2     07-  Mg     2.0     07-    TPro  5.2<L>  /  Alb  3.5  /  TBili  0.3  /  DBili  <0.1  /  AST  17  /  ALT  13  /  AlkPhos  50  07-06    PT/INR - ( 2020 06:50 )   PT: 12.0 sec;   INR: 1.05 ratio         PTT - ( 2020 06:50 )  PTT:28.0 sec  LIVER FUNCTIONS - ( 2020 06:50 )  Alb: 3.5 g/dL / Pro: 5.2 g/dL / ALK PHOS: 50 U/L / ALT: 13 U/L / AST: 17 U/L / GGT: x           Lactate Dehydrogenase, Serum: 159 U/L ( @ 06:50)    Meds:   Antimicrobials:   acyclovir   Oral Tab/Cap 400 milliGRAM(s) Oral every 8 hours  clotrimazole Lozenge 1 Lozenge Oral five times a day  fluconAZOLE   Tablet 400 milliGRAM(s) Oral daily      Heme / Onc:   heparin  Infusion 357 Unit(s)/Hr IV Continuous <Continuous>      GI:  magnesium hydroxide Suspension 30 milliLiter(s) Oral daily PRN  pantoprazole    Tablet 40 milliGRAM(s) Oral before breakfast  polyethylene glycol 3350 17 Gram(s) Oral daily PRN  senna 1 Tablet(s) Oral at bedtime PRN  sodium bicarbonate Mouth Rinse 10 milliLiter(s) Swish and Spit five times a day  ursodiol Capsule 300 milliGRAM(s) Oral two times a day with meals      Cardiovascular:   furosemide   Injectable 20 milliGRAM(s) IV Push every 24 hours PRN      Immunologic:   filgrastim-sndz (ZARXIO) Injectable 480 MICROGram(s) SubCutaneous every 24 hours      Other medications:   Biotene Dry Mouth Oral Rinse 5 milliLiter(s) Swish and Spit five times a day  chlorhexidine 4% Liquid 1 Application(s) Topical <User Schedule>  folic acid 1 milliGRAM(s) Oral daily  lidocaine/prilocaine Cream 1 Application(s) Topical daily  LORazepam   Injectable 1 milliGRAM(s) IV Push every 24 hours  multivitamin 1 Tablet(s) Oral daily  sodium chloride 0.9%. 1000 milliLiter(s) IV Continuous <Continuous>      PRN:   acetaminophen   Tablet .. 650 milliGRAM(s) Oral every 6 hours PRN  acetaminophen   Tablet .. 650 milliGRAM(s) Oral every 6 hours PRN  diphenhydrAMINE 25 milliGRAM(s) Oral once PRN  diphenhydrAMINE   Injectable 25 milliGRAM(s) IV Push every 4 hours PRN  furosemide   Injectable 20 milliGRAM(s) IV Push every 24 hours PRN  magnesium hydroxide Suspension 30 milliLiter(s) Oral daily PRN  metoclopramide Injectable 10 milliGRAM(s) IV Push every 6 hours PRN  polyethylene glycol 3350 17 Gram(s) Oral daily PRN  senna 1 Tablet(s) Oral at bedtime PRN  sodium chloride 0.9% lock flush 10 milliLiter(s) IV Push every 1 hour PRN    A/P:   59 year old male with a history of amyloidosis  Post Autologous PBSCT day + 4  Strict I&O, prn diuresis, daily weights   Keep platelets =/> 40K for history of amyloidosis   Neutropenic 20- started on cipro, if T >/= 38C, pan cx, CXR and change cipro to cefepime 2g IV q 8 hours     1. Infectious Disease:   acyclovir   Oral Tab/Cap 400 milliGRAM(s) Oral every 8 hours  clotrimazole Lozenge 1 Lozenge Oral five times a day  fluconAZOLE   Tablet 400 milliGRAM(s) Oral daily  ciprofloxacin 500mg po q 12 hours     2. VOD Prophylaxis: Actigall, Glutamine, Heparin (dosed at 100 units / kg / day)     3. GI Prophylaxis:    pantoprazole    Tablet 40 milliGRAM(s) Oral before breakfast    4. Mouthcare - NS / NaHCO3 rinses, Mycelex, Biotene; Skin care     5. GVHD prophylaxis - n/a     6. Transfuse & replete electrolytes prn     7. IV hydration, daily weights, strict I&O, prn diuresis     8. PO intake as tolerated, nutrition follow up as needed, MVI, folic acid     9. Antiemetics, anti-diarrhea medications:   metoclopramide Injectable 10 milliGRAM(s) IV Push every 6 hours PRN  LORazepam   Injectable 1 milliGRAM(s) IV Push every 24 hours    10. OOB as tolerated, physical therapy consult if needed     11. Monitor coags / fibrinogen 2x week, vitamin K as needed     12. Monitor closely for clinical changes, monitor for fevers     13. Emotional support provided, plan of care discussed and questions addressed     14. Patient education done regarding plan of care, restrictions and discharge planning     15. Continue regular social work input     I have written the above note for Dr. Loja who performed service with me in the room.   Brinda Garrett NP-C  Kandice Woodward PABRANDAN ([phone])    I have seen and examined patient with NP/PA, I agree with above note as scribed.

## 2020-07-06 NOTE — CHART NOTE - NSCHARTNOTEFT_GEN_A_CORE
Nutrition Follow Up Note  Patient seen for: LOS follow up     Interim events noted, chart reviewed. Pt c amyloidosis, S/P autologous PBSCT day+4.    Source: pt    Diet : Diet, Regular:   GlutaSolve(Glutamine) 15gm pkg     Qty per Day:  1 (06-29-20 @ 10:54)    Patient reports: fair to good appetite. States he was able to finish his Frisian toast this morning and a piece of sausage. Reports he is saving his fruit for later as right not he is feeling a little nauseous, declined any medication for it at this time, stating the nausea is "bare-able." Reports overall appetite over the weekend was good. States chicken parmesan dinner last night was a large portion and he was not able to finish all of it. Reports he would like to hold off on supplements at this time as he wants to continue to try to eat as much as tolerated. Pt reports BM was loose yesterday but no BM as of today.     Daily Weight: 6/29: 188.9->7/2: 192.2->7/5: 187.8 pounds, wt stable at this time, has come down to closer to admit wt compared to last week when fluid shifts increased wt     Pertinent Medications: MEDICATIONS  (STANDING):  acyclovir   Oral Tab/Cap 400 milliGRAM(s) Oral every 8 hours  Biotene Dry Mouth Oral Rinse 5 milliLiter(s) Swish and Spit five times a day  chlorhexidine 4% Liquid 1 Application(s) Topical <User Schedule>  ciprofloxacin     Tablet 500 milliGRAM(s) Oral every 12 hours  clotrimazole Lozenge 1 Lozenge Oral five times a day  filgrastim-sndz (ZARXIO) Injectable 480 MICROGram(s) SubCutaneous every 24 hours  fluconAZOLE   Tablet 400 milliGRAM(s) Oral daily  folic acid 1 milliGRAM(s) Oral daily  heparin  Infusion 357 Unit(s)/Hr (3.57 mL/Hr) IV Continuous <Continuous>  lidocaine/prilocaine Cream 1 Application(s) Topical daily  LORazepam   Injectable 1 milliGRAM(s) IV Push every 24 hours  multivitamin 1 Tablet(s) Oral daily  pantoprazole    Tablet 40 milliGRAM(s) Oral before breakfast  sodium bicarbonate Mouth Rinse 10 milliLiter(s) Swish and Spit five times a day  sodium chloride 0.9%. 1000 milliLiter(s) (20 mL/Hr) IV Continuous <Continuous>  ursodiol Capsule 300 milliGRAM(s) Oral two times a day with meals    MEDICATIONS  (PRN):  acetaminophen   Tablet .. 650 milliGRAM(s) Oral every 6 hours PRN Temp greater or equal to 38C (100.4F), Mild Pain (1 - 3)  acetaminophen   Tablet .. 650 milliGRAM(s) Oral every 6 hours PRN Temp greater or equal to 38C (100.4F), Mild Pain (1 - 3)  diphenhydrAMINE 25 milliGRAM(s) Oral once PRN Rash and/or Itching  diphenhydrAMINE   Injectable 25 milliGRAM(s) IV Push every 4 hours PRN Allergy symptoms  furosemide   Injectable 20 milliGRAM(s) IV Push every 24 hours PRN If urine output is <100mL/hr for 2 hours  magnesium hydroxide Suspension 30 milliLiter(s) Oral daily PRN Constipation  metoclopramide Injectable 10 milliGRAM(s) IV Push every 6 hours PRN Nausea and/or Vomiting  polyethylene glycol 3350 17 Gram(s) Oral daily PRN Constipation  senna 1 Tablet(s) Oral at bedtime PRN Constipation  sodium chloride 0.9% lock flush 10 milliLiter(s) IV Push every 1 hour PRN Pre/post blood products, medications, blood draw, and to maintain line patency    Pertinent Labs: 07-06 @ 06:50: Na 141, BUN 15, Cr 0.67, BG 94, K+ 3.8, Phos 4.2, Mg 2.0, Alk Phos 50, ALT/SGPT 13, AST/SGOT 17, HbA1c --    Finger Sticks: None pertinent to address at this time.       Skin per nursing documentation: no pressure injuries   Edema: +1 christina. ankle     Estimated Needs:   [x] no change since previous assessment      Previous Nutrition Diagnosis: Predicted suboptimal energy intake   Nutrition Diagnosis continues at this time, to be addressed c fairly good intake at this time     New Nutrition Diagnosis: none at this time       Recommend  1) Continue c current diet.   2) Discussed avoiding high fat foods as it may make nausea worse and discussed consuming cold, bland foods. Discussed consuming binding foods if BMs remain slightly loose. Encouraged PO intake-small, frequent meals and nutrient dense snacks. In house, encouraged pt to order nutrient dense snacks c meals to consume in between meals to mimic small, frequent meals.     Monitoring and Evaluation:     Continue to monitor Nutritional intake, Tolerance to diet prescription, weights, labs, skin integrity    RD remains available upon request and will follow up per protocol  Darling Barth MS RD CDN [facility],  #[phone]

## 2020-07-06 NOTE — PROGRESS NOTE ADULT - ATTENDING COMMENTS
59 year old male with history of amyloidosis treated with CyBorD, now admitted for autologous pbsct with Melphalan preparative regimen   Day - 0, continue transplant hydration for 24 hours post infusion of cells   For HPC transplant on 7/2/20, now dy +4  Begin Zarxio daily on day 0, start 4 hrs post transplant  Kepivance on days 0, +1, +2 for prevention of mucositis.  Strict I&O, daily weight, Lasix to keep O > I  VOD prophylaxis- Actigall, low dose heparin gtt & glutamine supplementation  ID- continue Fluconazole / Acyclovir prophylaxis; begin Cipro when ANC < 1,000  Antiemetics  Bowel regimen  Mouth care, skin care  Steroid induced hyperglycemia- monitor glucose  OOB/ambulate  cont supportive care, await engraftment 59 year old male with history of amyloidosis treated with CyBorD, now admitted for autologous pbsct with Melphalan preparative regimen   s/p HPC transplant on 7/2/20, now day +4  Continue Zarxio daily until engraftment with WBC recovery  Kepivance on days 0, +1, +2 for prevention of mucositis-completed  Strict I&O, daily weight, Lasix to keep O > I  VOD prophylaxis- Actigall, low dose heparin gtt & glutamine supplementation  ID- continue Fluconazole / Acyclovir prophylaxis; begin Cipro for neutropenia on 7/6. If febrile, culture and start Cefepime.  Antiemetics  Mouth care, skin care  OOB/ambulate

## 2020-07-07 LAB
ALBUMIN SERPL ELPH-MCNC: 3.3 G/DL — SIGNIFICANT CHANGE UP (ref 3.3–5)
ALP SERPL-CCNC: 48 U/L — SIGNIFICANT CHANGE UP (ref 40–120)
ALT FLD-CCNC: 14 U/L — SIGNIFICANT CHANGE UP (ref 10–45)
ANION GAP SERPL CALC-SCNC: 9 MMOL/L — SIGNIFICANT CHANGE UP (ref 5–17)
AST SERPL-CCNC: 15 U/L — SIGNIFICANT CHANGE UP (ref 10–40)
BILIRUB SERPL-MCNC: 0.3 MG/DL — SIGNIFICANT CHANGE UP (ref 0.2–1.2)
BUN SERPL-MCNC: 11 MG/DL — SIGNIFICANT CHANGE UP (ref 7–23)
CALCIUM SERPL-MCNC: 8.1 MG/DL — LOW (ref 8.4–10.5)
CHLORIDE SERPL-SCNC: 107 MMOL/L — SIGNIFICANT CHANGE UP (ref 96–108)
CO2 SERPL-SCNC: 25 MMOL/L — SIGNIFICANT CHANGE UP (ref 22–31)
CREAT SERPL-MCNC: 0.64 MG/DL — SIGNIFICANT CHANGE UP (ref 0.5–1.3)
GLUCOSE SERPL-MCNC: 92 MG/DL — SIGNIFICANT CHANGE UP (ref 70–99)
HCT VFR BLD CALC: 23 % — LOW (ref 39–50)
HGB BLD-MCNC: 7.4 G/DL — LOW (ref 13–17)
LDH SERPL L TO P-CCNC: 138 U/L — SIGNIFICANT CHANGE UP (ref 50–242)
MAGNESIUM SERPL-MCNC: 2 MG/DL — SIGNIFICANT CHANGE UP (ref 1.6–2.6)
MCHC RBC-ENTMCNC: 23.8 PG — LOW (ref 27–34)
MCHC RBC-ENTMCNC: 32.2 GM/DL — SIGNIFICANT CHANGE UP (ref 32–36)
MCV RBC AUTO: 74 FL — LOW (ref 80–100)
NRBC # BLD: 0 /100 WBCS — SIGNIFICANT CHANGE UP (ref 0–0)
PHOSPHATE SERPL-MCNC: 4 MG/DL — SIGNIFICANT CHANGE UP (ref 2.5–4.5)
PLATELET # BLD AUTO: 38 K/UL — LOW (ref 150–400)
POTASSIUM SERPL-MCNC: 3.9 MMOL/L — SIGNIFICANT CHANGE UP (ref 3.5–5.3)
POTASSIUM SERPL-SCNC: 3.9 MMOL/L — SIGNIFICANT CHANGE UP (ref 3.5–5.3)
PROT SERPL-MCNC: 4.9 G/DL — LOW (ref 6–8.3)
RBC # BLD: 3.11 M/UL — LOW (ref 4.2–5.8)
RBC # FLD: 15.1 % — HIGH (ref 10.3–14.5)
SODIUM SERPL-SCNC: 141 MMOL/L — SIGNIFICANT CHANGE UP (ref 135–145)
WBC # BLD: <0.1 K/UL — CRITICAL LOW (ref 3.8–10.5)
WBC # FLD AUTO: <0.1 K/UL — CRITICAL LOW (ref 3.8–10.5)

## 2020-07-07 PROCEDURE — 99291 CRITICAL CARE FIRST HOUR: CPT

## 2020-07-07 RX ADMIN — Medication 10 MILLILITER(S): at 23:57

## 2020-07-07 RX ADMIN — Medication 5 MILLILITER(S): at 16:08

## 2020-07-07 RX ADMIN — Medication 1 LOZENGE: at 13:13

## 2020-07-07 RX ADMIN — Medication 10 MILLILITER(S): at 00:44

## 2020-07-07 RX ADMIN — Medication 400 MILLIGRAM(S): at 06:44

## 2020-07-07 RX ADMIN — Medication 400 MILLIGRAM(S): at 13:14

## 2020-07-07 RX ADMIN — CHLORHEXIDINE GLUCONATE 1 APPLICATION(S): 213 SOLUTION TOPICAL at 06:45

## 2020-07-07 RX ADMIN — Medication 400 MILLIGRAM(S): at 21:28

## 2020-07-07 RX ADMIN — Medication 480 MICROGRAM(S): at 16:09

## 2020-07-07 RX ADMIN — Medication 5 MILLILITER(S): at 21:29

## 2020-07-07 RX ADMIN — Medication 1 LOZENGE: at 16:09

## 2020-07-07 RX ADMIN — Medication 5 MILLILITER(S): at 13:14

## 2020-07-07 RX ADMIN — Medication 10 MILLILITER(S): at 21:29

## 2020-07-07 RX ADMIN — Medication 1 LOZENGE: at 00:44

## 2020-07-07 RX ADMIN — Medication 5 MILLILITER(S): at 00:44

## 2020-07-07 RX ADMIN — Medication 500 MILLIGRAM(S): at 06:44

## 2020-07-07 RX ADMIN — URSODIOL 300 MILLIGRAM(S): 250 TABLET, FILM COATED ORAL at 17:57

## 2020-07-07 RX ADMIN — Medication 500 MILLIGRAM(S): at 17:57

## 2020-07-07 RX ADMIN — Medication 1 MILLIGRAM(S): at 13:14

## 2020-07-07 RX ADMIN — Medication 5 MILLILITER(S): at 23:58

## 2020-07-07 RX ADMIN — FLUCONAZOLE 400 MILLIGRAM(S): 150 TABLET ORAL at 13:14

## 2020-07-07 RX ADMIN — Medication 1 LOZENGE: at 21:28

## 2020-07-07 RX ADMIN — Medication 1 LOZENGE: at 08:29

## 2020-07-07 RX ADMIN — URSODIOL 300 MILLIGRAM(S): 250 TABLET, FILM COATED ORAL at 08:29

## 2020-07-07 RX ADMIN — Medication 1 APPLICATION(S): at 13:15

## 2020-07-07 RX ADMIN — Medication 1 LOZENGE: at 23:58

## 2020-07-07 RX ADMIN — Medication 5 MILLILITER(S): at 08:29

## 2020-07-07 RX ADMIN — Medication 10 MILLILITER(S): at 16:09

## 2020-07-07 RX ADMIN — PANTOPRAZOLE SODIUM 40 MILLIGRAM(S): 20 TABLET, DELAYED RELEASE ORAL at 06:44

## 2020-07-07 RX ADMIN — Medication 10 MILLILITER(S): at 13:14

## 2020-07-07 RX ADMIN — Medication 10 MILLILITER(S): at 08:29

## 2020-07-07 RX ADMIN — Medication 1 TABLET(S): at 13:14

## 2020-07-08 DIAGNOSIS — R11.0 NAUSEA: ICD-10-CM

## 2020-07-08 LAB
ALBUMIN SERPL ELPH-MCNC: 3.7 G/DL — SIGNIFICANT CHANGE UP (ref 3.3–5)
ALP SERPL-CCNC: 55 U/L — SIGNIFICANT CHANGE UP (ref 40–120)
ALT FLD-CCNC: 15 U/L — SIGNIFICANT CHANGE UP (ref 10–45)
ANION GAP SERPL CALC-SCNC: 9 MMOL/L — SIGNIFICANT CHANGE UP (ref 5–17)
APPEARANCE UR: CLEAR — SIGNIFICANT CHANGE UP
AST SERPL-CCNC: 12 U/L — SIGNIFICANT CHANGE UP (ref 10–40)
BILIRUB SERPL-MCNC: 0.2 MG/DL — SIGNIFICANT CHANGE UP (ref 0.2–1.2)
BILIRUB UR-MCNC: NEGATIVE — SIGNIFICANT CHANGE UP
BLD GP AB SCN SERPL QL: NEGATIVE — SIGNIFICANT CHANGE UP
BUN SERPL-MCNC: 10 MG/DL — SIGNIFICANT CHANGE UP (ref 7–23)
C DIFF GDH STL QL: NEGATIVE — SIGNIFICANT CHANGE UP
C DIFF GDH STL QL: SIGNIFICANT CHANGE UP
CALCIUM SERPL-MCNC: 8.3 MG/DL — LOW (ref 8.4–10.5)
CHLORIDE SERPL-SCNC: 106 MMOL/L — SIGNIFICANT CHANGE UP (ref 96–108)
CO2 SERPL-SCNC: 24 MMOL/L — SIGNIFICANT CHANGE UP (ref 22–31)
COLOR SPEC: SIGNIFICANT CHANGE UP
CREAT SERPL-MCNC: 0.68 MG/DL — SIGNIFICANT CHANGE UP (ref 0.5–1.3)
DIFF PNL FLD: NEGATIVE — SIGNIFICANT CHANGE UP
GLUCOSE SERPL-MCNC: 96 MG/DL — SIGNIFICANT CHANGE UP (ref 70–99)
GLUCOSE UR QL: NEGATIVE — SIGNIFICANT CHANGE UP
HCT VFR BLD CALC: 25.5 % — LOW (ref 39–50)
HGB BLD-MCNC: 8.2 G/DL — LOW (ref 13–17)
KETONES UR-MCNC: NEGATIVE — SIGNIFICANT CHANGE UP
LDH SERPL L TO P-CCNC: 140 U/L — SIGNIFICANT CHANGE UP (ref 50–242)
LEUKOCYTE ESTERASE UR-ACNC: NEGATIVE — SIGNIFICANT CHANGE UP
MAGNESIUM SERPL-MCNC: 2.1 MG/DL — SIGNIFICANT CHANGE UP (ref 1.6–2.6)
MCHC RBC-ENTMCNC: 23.7 PG — LOW (ref 27–34)
MCHC RBC-ENTMCNC: 32.2 GM/DL — SIGNIFICANT CHANGE UP (ref 32–36)
MCV RBC AUTO: 73.7 FL — LOW (ref 80–100)
NITRITE UR-MCNC: NEGATIVE — SIGNIFICANT CHANGE UP
NRBC # BLD: 0 /100 WBCS — SIGNIFICANT CHANGE UP (ref 0–0)
PH UR: 6 — SIGNIFICANT CHANGE UP (ref 5–8)
PHOSPHATE SERPL-MCNC: 4.1 MG/DL — SIGNIFICANT CHANGE UP (ref 2.5–4.5)
PLATELET # BLD AUTO: 27 K/UL — LOW (ref 150–400)
POTASSIUM SERPL-MCNC: 3.8 MMOL/L — SIGNIFICANT CHANGE UP (ref 3.5–5.3)
POTASSIUM SERPL-SCNC: 3.8 MMOL/L — SIGNIFICANT CHANGE UP (ref 3.5–5.3)
PROT SERPL-MCNC: 5.4 G/DL — LOW (ref 6–8.3)
PROT UR-MCNC: NEGATIVE — SIGNIFICANT CHANGE UP
RBC # BLD: 3.46 M/UL — LOW (ref 4.2–5.8)
RBC # FLD: 14.8 % — HIGH (ref 10.3–14.5)
RH IG SCN BLD-IMP: POSITIVE — SIGNIFICANT CHANGE UP
SODIUM SERPL-SCNC: 139 MMOL/L — SIGNIFICANT CHANGE UP (ref 135–145)
SP GR SPEC: 1.01 — SIGNIFICANT CHANGE UP (ref 1.01–1.02)
UROBILINOGEN FLD QL: NEGATIVE — SIGNIFICANT CHANGE UP
WBC # BLD: <0.1 K/UL — CRITICAL LOW (ref 3.8–10.5)
WBC # FLD AUTO: <0.1 K/UL — CRITICAL LOW (ref 3.8–10.5)

## 2020-07-08 PROCEDURE — 99233 SBSQ HOSP IP/OBS HIGH 50: CPT

## 2020-07-08 PROCEDURE — 71045 X-RAY EXAM CHEST 1 VIEW: CPT | Mod: 26

## 2020-07-08 PROCEDURE — 99291 CRITICAL CARE FIRST HOUR: CPT

## 2020-07-08 RX ORDER — SUCRALFATE 1 G
1 TABLET ORAL
Refills: 0 | Status: DISCONTINUED | OUTPATIENT
Start: 2020-07-08 | End: 2020-07-20

## 2020-07-08 RX ORDER — ONDANSETRON 8 MG/1
8 TABLET, FILM COATED ORAL EVERY 8 HOURS
Refills: 0 | Status: DISCONTINUED | OUTPATIENT
Start: 2020-07-08 | End: 2020-07-20

## 2020-07-08 RX ORDER — CEFEPIME 1 G/1
INJECTION, POWDER, FOR SOLUTION INTRAMUSCULAR; INTRAVENOUS
Refills: 0 | Status: DISCONTINUED | OUTPATIENT
Start: 2020-07-08 | End: 2020-07-18

## 2020-07-08 RX ORDER — CEFEPIME 1 G/1
2000 INJECTION, POWDER, FOR SOLUTION INTRAMUSCULAR; INTRAVENOUS EVERY 8 HOURS
Refills: 0 | Status: DISCONTINUED | OUTPATIENT
Start: 2020-07-09 | End: 2020-07-18

## 2020-07-08 RX ORDER — ONDANSETRON 8 MG/1
8 TABLET, FILM COATED ORAL ONCE
Refills: 0 | Status: COMPLETED | OUTPATIENT
Start: 2020-07-08 | End: 2020-07-08

## 2020-07-08 RX ORDER — CEFEPIME 1 G/1
2000 INJECTION, POWDER, FOR SOLUTION INTRAMUSCULAR; INTRAVENOUS ONCE
Refills: 0 | Status: COMPLETED | OUTPATIENT
Start: 2020-07-08 | End: 2020-07-08

## 2020-07-08 RX ADMIN — Medication 5 MILLILITER(S): at 08:29

## 2020-07-08 RX ADMIN — Medication 1 GRAM(S): at 23:06

## 2020-07-08 RX ADMIN — Medication 5 MILLILITER(S): at 23:07

## 2020-07-08 RX ADMIN — Medication 400 MILLIGRAM(S): at 21:38

## 2020-07-08 RX ADMIN — Medication 1 LOZENGE: at 16:42

## 2020-07-08 RX ADMIN — Medication 500 MILLIGRAM(S): at 17:05

## 2020-07-08 RX ADMIN — Medication 650 MILLIGRAM(S): at 12:45

## 2020-07-08 RX ADMIN — URSODIOL 300 MILLIGRAM(S): 250 TABLET, FILM COATED ORAL at 17:05

## 2020-07-08 RX ADMIN — HEPARIN SODIUM 3.57 UNIT(S)/HR: 5000 INJECTION INTRAVENOUS; SUBCUTANEOUS at 23:32

## 2020-07-08 RX ADMIN — Medication 30 MILLILITER(S): at 12:58

## 2020-07-08 RX ADMIN — Medication 10 MILLILITER(S): at 23:07

## 2020-07-08 RX ADMIN — Medication 30 MILLILITER(S): at 17:17

## 2020-07-08 RX ADMIN — Medication 650 MILLIGRAM(S): at 20:23

## 2020-07-08 RX ADMIN — Medication 1 LOZENGE: at 23:07

## 2020-07-08 RX ADMIN — Medication 10 MILLILITER(S): at 08:28

## 2020-07-08 RX ADMIN — Medication 5 MILLILITER(S): at 12:24

## 2020-07-08 RX ADMIN — Medication 400 MILLIGRAM(S): at 06:19

## 2020-07-08 RX ADMIN — Medication 10 MILLILITER(S): at 16:42

## 2020-07-08 RX ADMIN — Medication 5 MILLILITER(S): at 16:42

## 2020-07-08 RX ADMIN — SODIUM CHLORIDE 50 MILLILITER(S): 9 INJECTION INTRAMUSCULAR; INTRAVENOUS; SUBCUTANEOUS at 23:32

## 2020-07-08 RX ADMIN — Medication 1 GRAM(S): at 12:42

## 2020-07-08 RX ADMIN — CEFEPIME 100 MILLIGRAM(S): 1 INJECTION, POWDER, FOR SOLUTION INTRAMUSCULAR; INTRAVENOUS at 20:23

## 2020-07-08 RX ADMIN — Medication 480 MICROGRAM(S): at 17:05

## 2020-07-08 RX ADMIN — Medication 25 MILLIGRAM(S): at 12:42

## 2020-07-08 RX ADMIN — PANTOPRAZOLE SODIUM 40 MILLIGRAM(S): 20 TABLET, DELAYED RELEASE ORAL at 06:19

## 2020-07-08 RX ADMIN — Medication 10 MILLILITER(S): at 12:23

## 2020-07-08 RX ADMIN — Medication 30 MILLILITER(S): at 21:46

## 2020-07-08 RX ADMIN — Medication 1 APPLICATION(S): at 12:30

## 2020-07-08 RX ADMIN — Medication 1 MILLIGRAM(S): at 23:08

## 2020-07-08 RX ADMIN — Medication 1 LOZENGE: at 08:28

## 2020-07-08 RX ADMIN — Medication 10 MILLILITER(S): at 20:23

## 2020-07-08 RX ADMIN — Medication 500 MILLIGRAM(S): at 06:19

## 2020-07-08 RX ADMIN — FLUCONAZOLE 400 MILLIGRAM(S): 150 TABLET ORAL at 12:23

## 2020-07-08 RX ADMIN — Medication 1 LOZENGE: at 20:22

## 2020-07-08 RX ADMIN — Medication 5 MILLILITER(S): at 20:22

## 2020-07-08 RX ADMIN — CHLORHEXIDINE GLUCONATE 1 APPLICATION(S): 213 SOLUTION TOPICAL at 12:59

## 2020-07-08 RX ADMIN — Medication 1 MILLIGRAM(S): at 12:23

## 2020-07-08 RX ADMIN — Medication 1 GRAM(S): at 17:06

## 2020-07-08 RX ADMIN — URSODIOL 300 MILLIGRAM(S): 250 TABLET, FILM COATED ORAL at 08:29

## 2020-07-08 RX ADMIN — Medication 1 LOZENGE: at 12:24

## 2020-07-08 RX ADMIN — Medication 1 TABLET(S): at 12:23

## 2020-07-08 RX ADMIN — Medication 400 MILLIGRAM(S): at 13:57

## 2020-07-08 RX ADMIN — ONDANSETRON 8 MILLIGRAM(S): 8 TABLET, FILM COATED ORAL at 06:19

## 2020-07-08 NOTE — CHART NOTE - NSCHARTNOTEFT_GEN_A_CORE
MEDICINE PA    Notified by RN patient with temperature 100.6 . Seen and examined patient at bedside. Patient is alert, NAD c/o mild fevers. Denies HA, CP, SOB, cough, N/V, or abd pain.    VITAL SIGNS:  T(C): 38.1 (07-08-20 @ 19:48), Max: 38.1 (07-08-20 @ 19:48)  HR: 77 (07-08-20 @ 17:00) (71 - 85)  BP: 109/66 (07-08-20 @ 17:00) (104/56 - 121/75)  RR: 18 (07-08-20 @ 17:00) (18 - 18)  SpO2: 98% (07-08-20 @ 17:00) (96% - 100%)  Wt(kg): --      LABORATORY:                          8.2    <0.10 )-----------( 27       ( 08 Jul 2020 07:30 )             25.5       07-08    139  |  106  |  10  ----------------------------<  96  3.8   |  24  |  0.68    Ca    8.3<L>      08 Jul 2020 07:30  Phos  4.1     07-08  Mg     2.1     07-08    TPro  5.4<L>  /  Alb  3.7  /  TBili  0.2  /  DBili  x   /  AST  12  /  ALT  15  /  AlkPhos  55  07-08        PHYSICAL EXAM:    Constitutional: AOx3. +fevers    Respiratory: clear lungs bilaterally. No wheezing, rhonchi, or crackles.    Cardiovascular: S1 S2. No murmurs.    Gastrointestinal: BS X4 active. soft. nontender.    Extremities/Vascular: +2 pulses bilaterally. Mild LE edema.      ASSESSMENT/PLAN:     1) Neutropenic Fever  -Tylenol 650 mg and cooling measures prn for pyrexia  -BC x2, UA/UC  -CXR  -Change cipro to cefepime 2g IV q8hrs  -F/U primary team in AM    Higinio Kaur Jr, PA-C   Department of Medicine MEDICINE [facility]    Notified by RN patient with temperature 100.6 . Seen and examined patient at bedside. Patient is alert, NAD c/o mild fevers. Denies HA, CP, SOB, cough, N/V, or abd pain.    VITAL SIGNS:  T(C): 38.1 (07-08-20 @ 19:48), Max: 38.1 (07-08-20 @ 19:48)  HR: 77 (07-08-20 @ 17:00) (71 - 85)  BP: 109/66 (07-08-20 @ 17:00) (104/56 - 121/75)  RR: 18 (07-08-20 @ 17:00) (18 - 18)  SpO2: 98% (07-08-20 @ 17:00) (96% - 100%)  Wt(kg): --      LABORATORY:                          8.2    <0.10 )-----------( 27       ( 08 Jul 2020 07:30 )             25.5       07-08    139  |  106  |  10  ----------------------------<  96  3.8   |  24  |  0.68    Ca    8.3<L>      08 Jul 2020 07:30  Phos  4.1     07-08  Mg     2.1     07-08    TPro  5.4<L>  /  Alb  3.7  /  TBili  0.2  /  DBili  x   /  AST  12  /  ALT  15  /  AlkPhos  55  07-08        PHYSICAL EXAM:    Constitutional: AOx3. +fevers    Respiratory: clear lungs bilaterally. No wheezing, rhonchi, or crackles.    Cardiovascular: S1 S2. No murmurs.    Gastrointestinal: BS X4 active. soft. nontender.    Extremities/Vascular: +2 pulses bilaterally. Mild LE edema.      ASSESSMENT/PLAN: 59 year old male with a history of amyloidosis  Post Autologous PBSCT day + 6    1) Neutropenic Fever  -Tylenol 650 mg and cooling measures prn for pyrexia  -BC x2, UA/UC  -CXR  -Change cipro to cefepime 2g IV q8hrs  -F/U primary team in AM    Higinio Kaur Jr, PA-C   Department of Medicine

## 2020-07-08 NOTE — PROGRESS NOTE ADULT - SUBJECTIVE AND OBJECTIVE BOX
[facility] Transplant Team                                                      Critical / Counseling Time Provided: 30 minutes                                                                                                                                                        Chief Complaint: Autologous pbsct with high dose melphalan prep regimen for treatment of amyloidosis    S: Patient seen and examined with [facility] Transplant Team:   + fatigue   + diarrhea   + nausea     O: Vitals:   Vital Signs Last 24 Hrs  T(C): 37.6 (2020 06:00), Max: 37.8 (2020 19:27)  T(F): 99.7 (2020 06:00), Max: 100 (2020 19:27)  HR: 85 (2020 06:00) (61 - 85)  BP: 120/70 (2020 06:00) (105/63 - 121/69)  BP(mean): --  RR: 18 (2020 06:00) (18 - 18)  SpO2: 98% (2020 06:00) (96% - 99%)    Admit weight: 85.7kg  Daily Weight in k.5 (2020 09:30)  Today's weight:    Intake / Output:   07-07 @ 07:01  -  08 @ 07:00  --------------------------------------------------------  IN: 1930 mL / OUT: 3500 mL / NET: -1570 mL    PE:   Oropharynx: no erythema or ulcerations   Oral Mucositis:               -                                         Grade: n/a  CVS: S1, S2 RRR   Lungs: CTA throughout bilaterally   Abdomen: + BS x 4, soft, NT, ND   Extremities: + mild chronic LE edema  Gastric Mucositis:      -                                            Grade: n/a  Intestinal Mucositis:    +                                          ndGndrndanddndend:nd nd2nd Skin: patchy erythematous maculopapular rash on back, neck, abdomen 2/2 [place]   TLC: CDI  Neuro: A&O x 3  Pain: denies     Labs:       Meds:   Antimicrobials:   acyclovir   Oral Tab/Cap 400 milliGRAM(s) Oral every 8 hours  ciprofloxacin     Tablet 500 milliGRAM(s) Oral every 12 hours  clotrimazole Lozenge 1 Lozenge Oral five times a day  fluconAZOLE   Tablet 400 milliGRAM(s) Oral daily      Heme / Onc:   heparin  Infusion 357 Unit(s)/Hr IV Continuous <Continuous>      GI:  magnesium hydroxide Suspension 30 milliLiter(s) Oral daily PRN  pantoprazole    Tablet 40 milliGRAM(s) Oral before breakfast  polyethylene glycol 3350 17 Gram(s) Oral daily PRN  senna 1 Tablet(s) Oral at bedtime PRN  sodium bicarbonate Mouth Rinse 10 milliLiter(s) Swish and Spit five times a day  ursodiol Capsule 300 milliGRAM(s) Oral two times a day with meals      Cardiovascular:   furosemide   Injectable 20 milliGRAM(s) IV Push every 24 hours PRN      Immunologic:   filgrastim-sndz (ZARXIO) Injectable 480 MICROGram(s) SubCutaneous every 24 hours      Other medications:   Biotene Dry Mouth Oral Rinse 5 milliLiter(s) Swish and Spit five times a day  chlorhexidine 4% Liquid 1 Application(s) Topical <User Schedule>  folic acid 1 milliGRAM(s) Oral daily  hydrocortisone 1% Cream 1 Application(s) Topical daily  lidocaine/prilocaine Cream 1 Application(s) Topical daily  LORazepam   Injectable 1 milliGRAM(s) IV Push every 24 hours  multivitamin 1 Tablet(s) Oral daily  sodium chloride 0.9%. 1000 milliLiter(s) IV Continuous <Continuous>      PRN:   acetaminophen   Tablet .. 650 milliGRAM(s) Oral every 6 hours PRN  acetaminophen   Tablet .. 650 milliGRAM(s) Oral every 6 hours PRN  AQUAPHOR (petrolatum Ointment) 1 Application(s) Topical two times a day PRN  diphenhydrAMINE 25 milliGRAM(s) Oral once PRN  diphenhydrAMINE   Injectable 25 milliGRAM(s) IV Push every 4 hours PRN  furosemide   Injectable 20 milliGRAM(s) IV Push every 24 hours PRN  magnesium hydroxide Suspension 30 milliLiter(s) Oral daily PRN  metoclopramide Injectable 10 milliGRAM(s) IV Push every 6 hours PRN  polyethylene glycol 3350 17 Gram(s) Oral daily PRN  senna 1 Tablet(s) Oral at bedtime PRN  sodium chloride 0.9% lock flush 10 milliLiter(s) IV Push every 1 hour PRN    A/P:   59 year old male with a history of amyloidosis  Post Autologous PBSCT day + 6  Strict I&O, prn diuresis, daily weights   Keep platelets =/> 40K for history of amyloidosis   Neutropenic 20- started on cipro, if T >/= 38C, pan cx, CXR and change cipro to cefepime 2g IV q 8 hours   Chemotherapy induced grade 1 intestinal mucositis (diarrhea) - c. diff sent, f/u results     1. Infectious Disease:   acyclovir   Oral Tab/Cap 400 milliGRAM(s) Oral every 8 hours  ciprofloxacin     Tablet 500 milliGRAM(s) Oral every 12 hours  clotrimazole Lozenge 1 Lozenge Oral five times a day  fluconAZOLE   Tablet 400 milliGRAM(s) Oral daily    2. VOD Prophylaxis: Actigall, Glutamine, Heparin (dosed at 100 units / kg / day)     3. GI Prophylaxis:    pantoprazole    Tablet 40 milliGRAM(s) Oral before breakfast    4. Mouthcare - NS / NaHCO3 rinses, Mycelex, Biotene; Skin care     5. GVHD prophylaxis - n/a     6. Transfuse & replete electrolytes prn     7. IV hydration, daily weights, strict I&O, prn diuresis     8. PO intake as tolerated, nutrition follow up as needed, MVI, folic acid     9. Antiemetics, anti-diarrhea medications:   metoclopramide Injectable 10 milliGRAM(s) IV Push every 6 hours PRN    10. OOB as tolerated, physical therapy consult if needed     11. Monitor coags / fibrinogen 2x week, vitamin K as needed     12. Monitor closely for clinical changes, monitor for fevers     13. Emotional support provided, plan of care discussed and questions addressed     14. Patient education done regarding plan of care, restrictions and discharge planning     15. Continue regular social work input     I have written the above note for Dr. Loja who performed service with me in the room.   Brinda Garrett NP-C ([phone])    I have seen and examined patient with NP, I agree with above note as scribed. [facility] Transplant Team                                                      Critical / Counseling Time Provided: 30 minutes                                                                                                                                                        Chief Complaint: Autologous pbsct with high dose melphalan prep regimen for treatment of amyloidosis    S: Patient seen and examined with [facility] Transplant Team:   + fatigue   + diarrhea   + nausea     O: Vitals:   Vital Signs Last 24 Hrs  T(C): 37.6 (2020 06:00), Max: 37.8 (2020 19:27)  T(F): 99.7 (2020 06:00), Max: 100 (2020 19:27)  HR: 85 (2020 06:00) (61 - 85)  BP: 120/70 (2020 06:00) (105/63 - 121/69)  BP(mean): --  RR: 18 (2020 06:00) (18 - 18)  SpO2: 98% (2020 06:00) (96% - 99%)    Admit weight: 85.7kg  Daily Weight in k.5 (2020 09:30)  Today's weight:    Intake / Output:   07-07 @ 07:01  -  08 @ 07:00  --------------------------------------------------------  IN: 1930 mL / OUT: 3500 mL / NET: -1570 mL    PE:   Oropharynx: no erythema or ulcerations   Oral Mucositis:               -                                         Grade: n/a  CVS: S1, S2 RRR   Lungs: CTA throughout bilaterally   Abdomen: + BS x 4, soft, NT, ND   Extremities: + mild chronic LE edema  Gastric Mucositis:      -                                            Grade: n/a  Intestinal Mucositis:    +                                          ndGndrndanddndend:nd nd2nd Skin: patchy erythematous maculopapular rash on back, neck, abdomen 2/2 [place]   TLC: CDI  Neuro: A&O x 3  Pain: denies     Labs:       Meds:   Antimicrobials:   acyclovir   Oral Tab/Cap 400 milliGRAM(s) Oral every 8 hours  ciprofloxacin     Tablet 500 milliGRAM(s) Oral every 12 hours  clotrimazole Lozenge 1 Lozenge Oral five times a day  fluconAZOLE   Tablet 400 milliGRAM(s) Oral daily      Heme / Onc:   heparin  Infusion 357 Unit(s)/Hr IV Continuous <Continuous>      GI:  magnesium hydroxide Suspension 30 milliLiter(s) Oral daily PRN  pantoprazole    Tablet 40 milliGRAM(s) Oral before breakfast  polyethylene glycol 3350 17 Gram(s) Oral daily PRN  senna 1 Tablet(s) Oral at bedtime PRN  sodium bicarbonate Mouth Rinse 10 milliLiter(s) Swish and Spit five times a day  ursodiol Capsule 300 milliGRAM(s) Oral two times a day with meals      Cardiovascular:   furosemide   Injectable 20 milliGRAM(s) IV Push every 24 hours PRN      Immunologic:   filgrastim-sndz (ZARXIO) Injectable 480 MICROGram(s) SubCutaneous every 24 hours      Other medications:   Biotene Dry Mouth Oral Rinse 5 milliLiter(s) Swish and Spit five times a day  chlorhexidine 4% Liquid 1 Application(s) Topical <User Schedule>  folic acid 1 milliGRAM(s) Oral daily  hydrocortisone 1% Cream 1 Application(s) Topical daily  lidocaine/prilocaine Cream 1 Application(s) Topical daily  LORazepam   Injectable 1 milliGRAM(s) IV Push every 24 hours  multivitamin 1 Tablet(s) Oral daily  sodium chloride 0.9%. 1000 milliLiter(s) IV Continuous <Continuous>      PRN:   acetaminophen   Tablet .. 650 milliGRAM(s) Oral every 6 hours PRN  acetaminophen   Tablet .. 650 milliGRAM(s) Oral every 6 hours PRN  AQUAPHOR (petrolatum Ointment) 1 Application(s) Topical two times a day PRN  diphenhydrAMINE 25 milliGRAM(s) Oral once PRN  diphenhydrAMINE   Injectable 25 milliGRAM(s) IV Push every 4 hours PRN  furosemide   Injectable 20 milliGRAM(s) IV Push every 24 hours PRN  magnesium hydroxide Suspension 30 milliLiter(s) Oral daily PRN  metoclopramide Injectable 10 milliGRAM(s) IV Push every 6 hours PRN  polyethylene glycol 3350 17 Gram(s) Oral daily PRN  senna 1 Tablet(s) Oral at bedtime PRN  sodium chloride 0.9% lock flush 10 milliLiter(s) IV Push every 1 hour PRN    A/P:   59 year old male with a history of amyloidosis  Post Autologous PBSCT day + 6  Strict I&O, prn diuresis, daily weights   Keep platelets =/> 40K for history of amyloidosis   Neutropenic 20- started on cipro, if T >/= 38C, pan cx, CXR and change cipro to cefepime 2g IV q 8 hours   Chemotherapy induced grade 1 intestinal mucositis (diarrhea) - c. diff sent, f/u results - d/c bowel regimen   Chemotherapy induced nausea - added zofran 8mg IV q 8 hours prn     1. Infectious Disease:   acyclovir   Oral Tab/Cap 400 milliGRAM(s) Oral every 8 hours  ciprofloxacin     Tablet 500 milliGRAM(s) Oral every 12 hours  clotrimazole Lozenge 1 Lozenge Oral five times a day  fluconAZOLE   Tablet 400 milliGRAM(s) Oral daily    2. VOD Prophylaxis: Actigall, Glutamine, Heparin (dosed at 100 units / kg / day)     3. GI Prophylaxis:    pantoprazole    Tablet 40 milliGRAM(s) Oral before breakfast    4. Mouthcare - NS / NaHCO3 rinses, Mycelex, Biotene; Skin care     5. GVHD prophylaxis - n/a     6. Transfuse & replete electrolytes prn     7. IV hydration, daily weights, strict I&O, prn diuresis     8. PO intake as tolerated, nutrition follow up as needed, MVI, folic acid     9. Antiemetics, anti-diarrhea medications:   metoclopramide Injectable 10 milliGRAM(s) IV Push every 6 hours PRN    10. OOB as tolerated, physical therapy consult if needed     11. Monitor coags / fibrinogen 2x week, vitamin K as needed     12. Monitor closely for clinical changes, monitor for fevers     13. Emotional support provided, plan of care discussed and questions addressed     14. Patient education done regarding plan of care, restrictions and discharge planning     15. Continue regular social work input     I have written the above note for Dr. Loja who performed service with me in the room.   Brinda Garrett NP-C ([phone])    I have seen and examined patient with NP, I agree with above note as scribed. [facility] Transplant Team                                                      Critical / Counseling Time Provided: 30 minutes                                                                                                                                                        Chief Complaint: Autologous pbsct with high dose melphalan prep regimen for treatment of amyloidosis    S: Patient seen and examined with [facility] Transplant Team:   + fatigue   + diarrhea   + nausea     O: Vitals:   Vital Signs Last 24 Hrs  T(C): 37.6 (2020 06:00), Max: 37.8 (2020 19:27)  T(F): 99.7 (2020 06:00), Max: 100 (2020 19:27)  HR: 85 (2020 06:00) (61 - 85)  BP: 120/70 (2020 06:00) (105/63 - 121/69)  BP(mean): --  RR: 18 (2020 06:00) (18 - 18)  SpO2: 98% (2020 06:00) (96% - 99%)    Admit weight: 85.7kg  Daily Weight in k.5 (2020 09:30)  Today's weight:    Intake / Output:   07-07 @ 07:01  -  08 @ 07:00  --------------------------------------------------------  IN: 1930 mL / OUT: 3500 mL / NET: -1570 mL    PE:   Oropharynx: no erythema or ulcerations   Oral Mucositis:               -                                         Grade: n/a  CVS: S1, S2 RRR   Lungs: CTA throughout bilaterally   Abdomen: + BS x 4, soft, NT, ND   Extremities: + mild chronic LE edema  Gastric Mucositis:      -                                            Grade: n/a  Intestinal Mucositis:    +                                          ndGndrndanddndend:nd nd2nd Skin: patchy erythematous maculopapular rash on back, neck, abdomen 2/2 [place]   TLC: CDI  Neuro: A&O x 3  Pain: denies     Labs:                         8.2    <0.10 )-----------( 27       ( 2020 07:30 )             25.5     07-08    139  |  106  |  10  ----------------------------<  96  3.8   |  24  |  0.68    Ca    8.3<L>      2020 07:30  Phos  4.1     07-08  Mg     2.1     -    TPro  5.4<L>  /  Alb  3.7  /  TBili  0.2  /  DBili  x   /  AST  12  /  ALT  15  /  AlkPhos  55  -      Meds:   Antimicrobials:   acyclovir   Oral Tab/Cap 400 milliGRAM(s) Oral every 8 hours  ciprofloxacin     Tablet 500 milliGRAM(s) Oral every 12 hours  clotrimazole Lozenge 1 Lozenge Oral five times a day  fluconAZOLE   Tablet 400 milliGRAM(s) Oral daily      Heme / Onc:   heparin  Infusion 357 Unit(s)/Hr IV Continuous <Continuous>      GI:  magnesium hydroxide Suspension 30 milliLiter(s) Oral daily PRN  pantoprazole    Tablet 40 milliGRAM(s) Oral before breakfast  polyethylene glycol 3350 17 Gram(s) Oral daily PRN  senna 1 Tablet(s) Oral at bedtime PRN  sodium bicarbonate Mouth Rinse 10 milliLiter(s) Swish and Spit five times a day  ursodiol Capsule 300 milliGRAM(s) Oral two times a day with meals      Cardiovascular:   furosemide   Injectable 20 milliGRAM(s) IV Push every 24 hours PRN      Immunologic:   filgrastim-sndz (ZARXIO) Injectable 480 MICROGram(s) SubCutaneous every 24 hours      Other medications:   Biotene Dry Mouth Oral Rinse 5 milliLiter(s) Swish and Spit five times a day  chlorhexidine 4% Liquid 1 Application(s) Topical <User Schedule>  folic acid 1 milliGRAM(s) Oral daily  hydrocortisone 1% Cream 1 Application(s) Topical daily  lidocaine/prilocaine Cream 1 Application(s) Topical daily  LORazepam   Injectable 1 milliGRAM(s) IV Push every 24 hours  multivitamin 1 Tablet(s) Oral daily  sodium chloride 0.9%. 1000 milliLiter(s) IV Continuous <Continuous>      PRN:   acetaminophen   Tablet .. 650 milliGRAM(s) Oral every 6 hours PRN  acetaminophen   Tablet .. 650 milliGRAM(s) Oral every 6 hours PRN  AQUAPHOR (petrolatum Ointment) 1 Application(s) Topical two times a day PRN  diphenhydrAMINE 25 milliGRAM(s) Oral once PRN  diphenhydrAMINE   Injectable 25 milliGRAM(s) IV Push every 4 hours PRN  furosemide   Injectable 20 milliGRAM(s) IV Push every 24 hours PRN  magnesium hydroxide Suspension 30 milliLiter(s) Oral daily PRN  metoclopramide Injectable 10 milliGRAM(s) IV Push every 6 hours PRN  polyethylene glycol 3350 17 Gram(s) Oral daily PRN  senna 1 Tablet(s) Oral at bedtime PRN  sodium chloride 0.9% lock flush 10 milliLiter(s) IV Push every 1 hour PRN    A/P:   59 year old male with a history of amyloidosis  Post Autologous PBSCT day + 6  Strict I&O, prn diuresis, daily weights   Keep platelets =/> 40K for history of amyloidosis   Neutropenic 20- started on cipro, if T >/= 38C, pan cx, CXR and change cipro to cefepime 2g IV q 8 hours   Chemotherapy induced grade 1 intestinal mucositis (diarrhea) - c. diff sent, f/u results - d/c bowel regimen   Chemotherapy induced nausea - added zofran 8mg IV q 8 hours prn     1. Infectious Disease:   acyclovir   Oral Tab/Cap 400 milliGRAM(s) Oral every 8 hours  ciprofloxacin     Tablet 500 milliGRAM(s) Oral every 12 hours  clotrimazole Lozenge 1 Lozenge Oral five times a day  fluconAZOLE   Tablet 400 milliGRAM(s) Oral daily    2. VOD Prophylaxis: Actigall, Glutamine, Heparin (dosed at 100 units / kg / day)     3. GI Prophylaxis:    pantoprazole    Tablet 40 milliGRAM(s) Oral before breakfast    4. Mouthcare - NS / NaHCO3 rinses, Mycelex, Biotene; Skin care     5. GVHD prophylaxis - n/a     6. Transfuse & replete electrolytes prn     7. IV hydration, daily weights, strict I&O, prn diuresis     8. PO intake as tolerated, nutrition follow up as needed, MVI, folic acid     9. Antiemetics, anti-diarrhea medications:   metoclopramide Injectable 10 milliGRAM(s) IV Push every 6 hours PRN    10. OOB as tolerated, physical therapy consult if needed     11. Monitor coags / fibrinogen 2x week, vitamin K as needed     12. Monitor closely for clinical changes, monitor for fevers     13. Emotional support provided, plan of care discussed and questions addressed     14. Patient education done regarding plan of care, restrictions and discharge planning     15. Continue regular social work input     I have written the above note for Dr. Loja who performed service with me in the room.   Brinda Garrett NP-C ([phone])    I have seen and examined patient with NP, I agree with above note as scribed. [facility] Transplant Team                                                      Critical / Counseling Time Provided: 30 minutes                                                                                                                                                        Chief Complaint: Autologous pbsct with high dose melphalan prep regimen for treatment of amyloidosis    S: Patient seen and examined with [facility] Transplant Team:   + fatigue   + diarrhea   + nausea   + epigastric pain / heartburn  + early satiety     O: Vitals:   Vital Signs Last 24 Hrs  T(C): 37.6 (2020 06:00), Max: 37.8 (2020 19:27)  T(F): 99.7 (2020 06:00), Max: 100 (2020 19:27)  HR: 85 (2020 06:00) (61 - 85)  BP: 120/70 (2020 06:00) (105/63 - 121/69)  BP(mean): --  RR: 18 (2020 06:00) (18 - 18)  SpO2: 98% (2020 06:00) (96% - 99%)    Admit weight: 85.7kg  Daily Weight in k.5 (2020 09:30)  Today's weight: 82kg     Intake / Output:   07-07 @ 07:01  -  07-08 @ 07:00  --------------------------------------------------------  IN: 1930 mL / OUT: 3500 mL / NET: -1570 mL    PE:   Oropharynx: no erythema or ulcerations   Oral Mucositis:               -                                         Grade: n/a  CVS: S1, S2 RRR   Lungs: CTA throughout bilaterally   Abdomen: + BS x 4, soft, NT, ND   Extremities: + mild chronic LE edema  Gastric Mucositis:      +                                            ndGndrndanddndend:nd nd2nd Intestinal Mucositis:    +                                          ndGndrndanddndend:nd nd2nd Skin: patchy erythematous maculopapular rash on back, neck, abdomen 2/2 [place]   TLC: CDI  Neuro: A&O x 3  Pain: denies     Labs:                         8.2    <0.10 )-----------( 27       ( 2020 07:30 )             25.5     07-08    139  |  106  |  10  ----------------------------<  96  3.8   |  24  |  0.68    Ca    8.3<L>      2020 07:30  Phos  4.1     07-08  Mg     2.1     -08    TPro  5.4<L>  /  Alb  3.7  /  TBili  0.2  /  DBili  x   /  AST  12  /  ALT  15  /  AlkPhos  55  -      Meds:   Antimicrobials:   acyclovir   Oral Tab/Cap 400 milliGRAM(s) Oral every 8 hours  ciprofloxacin     Tablet 500 milliGRAM(s) Oral every 12 hours  clotrimazole Lozenge 1 Lozenge Oral five times a day  fluconAZOLE   Tablet 400 milliGRAM(s) Oral daily      Heme / Onc:   heparin  Infusion 357 Unit(s)/Hr IV Continuous <Continuous>      GI:  magnesium hydroxide Suspension 30 milliLiter(s) Oral daily PRN  pantoprazole    Tablet 40 milliGRAM(s) Oral before breakfast  polyethylene glycol 3350 17 Gram(s) Oral daily PRN  senna 1 Tablet(s) Oral at bedtime PRN  sodium bicarbonate Mouth Rinse 10 milliLiter(s) Swish and Spit five times a day  ursodiol Capsule 300 milliGRAM(s) Oral two times a day with meals      Cardiovascular:   furosemide   Injectable 20 milliGRAM(s) IV Push every 24 hours PRN      Immunologic:   filgrastim-sndz (ZARXIO) Injectable 480 MICROGram(s) SubCutaneous every 24 hours      Other medications:   Biotene Dry Mouth Oral Rinse 5 milliLiter(s) Swish and Spit five times a day  chlorhexidine 4% Liquid 1 Application(s) Topical <User Schedule>  folic acid 1 milliGRAM(s) Oral daily  hydrocortisone 1% Cream 1 Application(s) Topical daily  lidocaine/prilocaine Cream 1 Application(s) Topical daily  LORazepam   Injectable 1 milliGRAM(s) IV Push every 24 hours  multivitamin 1 Tablet(s) Oral daily  sodium chloride 0.9%. 1000 milliLiter(s) IV Continuous <Continuous>      PRN:   acetaminophen   Tablet .. 650 milliGRAM(s) Oral every 6 hours PRN  acetaminophen   Tablet .. 650 milliGRAM(s) Oral every 6 hours PRN  AQUAPHOR (petrolatum Ointment) 1 Application(s) Topical two times a day PRN  diphenhydrAMINE 25 milliGRAM(s) Oral once PRN  diphenhydrAMINE   Injectable 25 milliGRAM(s) IV Push every 4 hours PRN  furosemide   Injectable 20 milliGRAM(s) IV Push every 24 hours PRN  magnesium hydroxide Suspension 30 milliLiter(s) Oral daily PRN  metoclopramide Injectable 10 milliGRAM(s) IV Push every 6 hours PRN  polyethylene glycol 3350 17 Gram(s) Oral daily PRN  senna 1 Tablet(s) Oral at bedtime PRN  sodium chloride 0.9% lock flush 10 milliLiter(s) IV Push every 1 hour PRN    A/P:   59 year old male with a history of amyloidosis  Post Autologous PBSCT day + 6  Strict I&O, prn diuresis, daily weights   Keep platelets =/> 40K for history of amyloidosis   Neutropenic 20- started on cipro, if T >/= 38C, pan cx, CXR and change cipro to cefepime 2g IV q 8 hours   Chemotherapy induced grade 1 intestinal mucositis (diarrhea) - c. diff sent, f/u results - d/c bowel regimen   Chemotherapy induced grade 1 GI mucositis  with nausea - added zofran 8mg IV q 8 hours prn, carafate / maalox prn     1. Infectious Disease:   acyclovir   Oral Tab/Cap 400 milliGRAM(s) Oral every 8 hours  ciprofloxacin     Tablet 500 milliGRAM(s) Oral every 12 hours  clotrimazole Lozenge 1 Lozenge Oral five times a day  fluconAZOLE   Tablet 400 milliGRAM(s) Oral daily    2. VOD Prophylaxis: Actigall, Glutamine, Heparin (dosed at 100 units / kg / day)     3. GI Prophylaxis:    pantoprazole    Tablet 40 milliGRAM(s) Oral before breakfast    4. Mouthcare - NS / NaHCO3 rinses, Mycelex, Biotene; Skin care     5. GVHD prophylaxis - n/a     6. Transfuse & replete electrolytes prn     7. IV hydration, daily weights, strict I&O, prn diuresis     8. PO intake as tolerated, nutrition follow up as needed, MVI, folic acid     9. Antiemetics, anti-diarrhea medications:   metoclopramide Injectable 10 milliGRAM(s) IV Push every 6 hours PRN    10. OOB as tolerated, physical therapy consult if needed     11. Monitor coags / fibrinogen 2x week, vitamin K as needed     12. Monitor closely for clinical changes, monitor for fevers     13. Emotional support provided, plan of care discussed and questions addressed     14. Patient education done regarding plan of care, restrictions and discharge planning     15. Continue regular social work input     I have written the above note for Dr. Loja who performed service with me in the room.   Brinda Garrett NP-C ([phone])    I have seen and examined patient with NP, I agree with above note as scribed. [facility] Transplant Team                                                      Critical / Counseling Time Provided: 30 minutes                                                                                                                                                        Chief Complaint: Autologous pbsct with high dose melphalan prep regimen for treatment of amyloidosis    S: Patient seen and examined with [facility] Transplant Team:   + fatigue   + diarrhea   + nausea   + epigastric pain / heartburn  + early satiety     O: Vitals:   Vital Signs Last 24 Hrs  T(C): 37.6 (2020 06:00), Max: 37.8 (2020 19:27)  T(F): 99.7 (2020 06:00), Max: 100 (2020 19:27)  HR: 85 (2020 06:00) (61 - 85)  BP: 120/70 (2020 06:00) (105/63 - 121/69)  BP(mean): --  RR: 18 (2020 06:00) (18 - 18)  SpO2: 98% (2020 06:00) (96% - 99%)    Admit weight: 85.7kg  Daily Weight in k.5 (2020 09:30)  Today's weight: 82kg     Intake / Output:   07-07 @ 07:01  -  07-08 @ 07:00  --------------------------------------------------------  IN: 1930 mL / OUT: 3500 mL / NET: -1570 mL    PE:   Oropharynx: no erythema or ulcerations   Oral Mucositis:               -                                         Grade: n/a  CVS: S1, S2 RRR   Lungs: CTA throughout bilaterally   Abdomen: + BS x 4 - mildly hyperactive, soft, NT, ND   Extremities: + chronic trace LE edema; L>R   Gastric Mucositis:      +                                            ndGndrndanddndend:nd nd2nd Intestinal Mucositis:    +                                          ndGndrndanddndend:nd nd2nd Skin: patchy erythematous macular rash on back, neck, abdomen likely secondary to Kepivance   TLC: CDI  Neuro: A&O x 3  Pain: denies     Labs:                         8.2    <0.10 )-----------( 27       ( 2020 07:30 )             25.5     07-08    139  |  106  |  10  ----------------------------<  96  3.8   |  24  |  0.68    Ca    8.3<L>      2020 07:30  Phos  4.1     07-08  Mg     2.1     07-08    TPro  5.4<L>  /  Alb  3.7  /  TBili  0.2  /  DBili  x   /  AST  12  /  ALT  15  /  AlkPhos  55  07-08      Meds:   Antimicrobials:   acyclovir   Oral Tab/Cap 400 milliGRAM(s) Oral every 8 hours  ciprofloxacin     Tablet 500 milliGRAM(s) Oral every 12 hours  clotrimazole Lozenge 1 Lozenge Oral five times a day  fluconAZOLE   Tablet 400 milliGRAM(s) Oral daily      Heme / Onc:   heparin  Infusion 357 Unit(s)/Hr IV Continuous <Continuous>      GI:  magnesium hydroxide Suspension 30 milliLiter(s) Oral daily PRN  pantoprazole    Tablet 40 milliGRAM(s) Oral before breakfast  polyethylene glycol 3350 17 Gram(s) Oral daily PRN  senna 1 Tablet(s) Oral at bedtime PRN  sodium bicarbonate Mouth Rinse 10 milliLiter(s) Swish and Spit five times a day  ursodiol Capsule 300 milliGRAM(s) Oral two times a day with meals      Cardiovascular:   furosemide   Injectable 20 milliGRAM(s) IV Push every 24 hours PRN      Immunologic:   filgrastim-sndz (ZARXIO) Injectable 480 MICROGram(s) SubCutaneous every 24 hours      Other medications:   Biotene Dry Mouth Oral Rinse 5 milliLiter(s) Swish and Spit five times a day  chlorhexidine 4% Liquid 1 Application(s) Topical <User Schedule>  folic acid 1 milliGRAM(s) Oral daily  hydrocortisone 1% Cream 1 Application(s) Topical daily  lidocaine/prilocaine Cream 1 Application(s) Topical daily  LORazepam   Injectable 1 milliGRAM(s) IV Push every 24 hours  multivitamin 1 Tablet(s) Oral daily  sodium chloride 0.9%. 1000 milliLiter(s) IV Continuous <Continuous>      PRN:   acetaminophen   Tablet .. 650 milliGRAM(s) Oral every 6 hours PRN  acetaminophen   Tablet .. 650 milliGRAM(s) Oral every 6 hours PRN  AQUAPHOR (petrolatum Ointment) 1 Application(s) Topical two times a day PRN  diphenhydrAMINE 25 milliGRAM(s) Oral once PRN  diphenhydrAMINE   Injectable 25 milliGRAM(s) IV Push every 4 hours PRN  furosemide   Injectable 20 milliGRAM(s) IV Push every 24 hours PRN  magnesium hydroxide Suspension 30 milliLiter(s) Oral daily PRN  metoclopramide Injectable 10 milliGRAM(s) IV Push every 6 hours PRN  polyethylene glycol 3350 17 Gram(s) Oral daily PRN  senna 1 Tablet(s) Oral at bedtime PRN  sodium chloride 0.9% lock flush 10 milliLiter(s) IV Push every 1 hour PRN    A/P:   59 year old male with a history of amyloidosis  Post Autologous PBSCT day + 6  Strict I&O, prn diuresis, daily weights   Keep platelets =/> 40K for history of amyloidosis   Neutropenic 20- started on cipro, if T >/= 38C, pan cx, CXR and change cipro to cefepime 2g IV q 8 hours   Chemotherapy induced grade 1 intestinal mucositis (diarrhea) - c. diff sent, f/u results - d/c bowel regimen   Chemotherapy induced grade 1 GI mucositis  with nausea - added zofran 8mg IV q 8 hours prn, carafate / maalox prn. Increased IVF to 50ml / hr     1. Infectious Disease:   acyclovir   Oral Tab/Cap 400 milliGRAM(s) Oral every 8 hours  ciprofloxacin     Tablet 500 milliGRAM(s) Oral every 12 hours  clotrimazole Lozenge 1 Lozenge Oral five times a day  fluconAZOLE   Tablet 400 milliGRAM(s) Oral daily    2. VOD Prophylaxis: Actigall, Glutamine, Heparin (dosed at 100 units / kg / day)     3. GI Prophylaxis:    pantoprazole    Tablet 40 milliGRAM(s) Oral before breakfast    4. Mouthcare - NS / NaHCO3 rinses, Mycelex, Biotene; Skin care     5. GVHD prophylaxis - n/a     6. Transfuse & replete electrolytes prn     7. IV hydration, daily weights, strict I&O, prn diuresis     8. PO intake as tolerated, nutrition follow up as needed, MVI, folic acid     9. Antiemetics, anti-diarrhea medications:   metoclopramide Injectable 10 milliGRAM(s) IV Push every 6 hours PRN    10. OOB as tolerated, physical therapy consult if needed     11. Monitor coags / fibrinogen 2x week, vitamin K as needed     12. Monitor closely for clinical changes, monitor for fevers     13. Emotional support provided, plan of care discussed and questions addressed     14. Patient education done regarding plan of care, restrictions and discharge planning     15. Continue regular social work input     I have written the above note for Dr. Loja who performed service with me in the room.   Brinda Garrett NP-C ([phone])    I have seen and examined patient with NP, I agree with above note as scribed. [facility] Transplant Team                                                      Critical / Counseling Time Provided: 30 minutes                                                                                                                                                        Chief Complaint: Autologous pbsct with high dose melphalan prep regimen for treatment of amyloidosis    S: Patient seen and examined with [facility] Transplant Team:   + fatigue   + diarrhea   + nausea   + epigastric pain / heartburn  + early satiety     O: Vitals:   Vital Signs Last 24 Hrs  T(C): 37.6 (2020 06:00), Max: 37.8 (2020 19:27)  T(F): 99.7 (2020 06:00), Max: 100 (2020 19:27)  HR: 85 (2020 06:00) (61 - 85)  BP: 120/70 (2020 06:00) (105/63 - 121/69)  BP(mean): --  RR: 18 (2020 06:00) (18 - 18)  SpO2: 98% (2020 06:00) (96% - 99%)    Admit weight: 85.7kg  Daily Weight in k.5 (2020 09:30)  Today's weight: 82kg     Intake / Output:   07-07 @ 07:01  -  07-08 @ 07:00  --------------------------------------------------------  IN: 1930 mL / OUT: 3500 mL / NET: -1570 mL    PE:   Oropharynx: no erythema or ulcerations   Oral Mucositis:               -                                         Grade: n/a  CVS: S1, S2 RRR   Lungs: CTA throughout bilaterally   Abdomen: + BS x 4 - mildly hyperactive, soft, NT, ND   Extremities: + chronic trace LE edema; L>R   Gastric Mucositis:      +                                            ndGndrndanddndend:nd nd2nd Intestinal Mucositis:    +                                          ndGndrndanddndend:nd nd2nd Skin: patchy erythematous macular rash on back, neck, abdomen likely secondary to Kepivance   TLC: CDI  Neuro: A&O x 3  Pain: denies     Labs:                         8.2    <0.10 )-----------( 27       ( 2020 07:30 )             25.5     07-08    139  |  106  |  10  ----------------------------<  96  3.8   |  24  |  0.68    Ca    8.3<L>      2020 07:30  Phos  4.1     07-08  Mg     2.1     07-08    TPro  5.4<L>  /  Alb  3.7  /  TBili  0.2  /  DBili  x   /  AST  12  /  ALT  15  /  AlkPhos  55  07-08      Meds:   Antimicrobials:   acyclovir   Oral Tab/Cap 400 milliGRAM(s) Oral every 8 hours  ciprofloxacin     Tablet 500 milliGRAM(s) Oral every 12 hours  clotrimazole Lozenge 1 Lozenge Oral five times a day  fluconAZOLE   Tablet 400 milliGRAM(s) Oral daily      Heme / Onc:   heparin  Infusion 357 Unit(s)/Hr IV Continuous <Continuous>      GI:  magnesium hydroxide Suspension 30 milliLiter(s) Oral daily PRN  pantoprazole    Tablet 40 milliGRAM(s) Oral before breakfast  polyethylene glycol 3350 17 Gram(s) Oral daily PRN  senna 1 Tablet(s) Oral at bedtime PRN  sodium bicarbonate Mouth Rinse 10 milliLiter(s) Swish and Spit five times a day  ursodiol Capsule 300 milliGRAM(s) Oral two times a day with meals      Cardiovascular:   furosemide   Injectable 20 milliGRAM(s) IV Push every 24 hours PRN      Immunologic:   filgrastim-sndz (ZARXIO) Injectable 480 MICROGram(s) SubCutaneous every 24 hours      Other medications:   Biotene Dry Mouth Oral Rinse 5 milliLiter(s) Swish and Spit five times a day  chlorhexidine 4% Liquid 1 Application(s) Topical <User Schedule>  folic acid 1 milliGRAM(s) Oral daily  hydrocortisone 1% Cream 1 Application(s) Topical daily  lidocaine/prilocaine Cream 1 Application(s) Topical daily  LORazepam   Injectable 1 milliGRAM(s) IV Push every 24 hours  multivitamin 1 Tablet(s) Oral daily  sodium chloride 0.9%. 1000 milliLiter(s) IV Continuous <Continuous>      PRN:   acetaminophen   Tablet .. 650 milliGRAM(s) Oral every 6 hours PRN  acetaminophen   Tablet .. 650 milliGRAM(s) Oral every 6 hours PRN  AQUAPHOR (petrolatum Ointment) 1 Application(s) Topical two times a day PRN  diphenhydrAMINE 25 milliGRAM(s) Oral once PRN  diphenhydrAMINE   Injectable 25 milliGRAM(s) IV Push every 4 hours PRN  furosemide   Injectable 20 milliGRAM(s) IV Push every 24 hours PRN  magnesium hydroxide Suspension 30 milliLiter(s) Oral daily PRN  metoclopramide Injectable 10 milliGRAM(s) IV Push every 6 hours PRN  polyethylene glycol 3350 17 Gram(s) Oral daily PRN  senna 1 Tablet(s) Oral at bedtime PRN  sodium chloride 0.9% lock flush 10 milliLiter(s) IV Push every 1 hour PRN    A/P:   59 year old male with a history of amyloidosis  Post Autologous PBSCT day + 6  Strict I&O, prn diuresis, daily weights   Keep platelets =/> 40K for history of amyloidosis   Neutropenic 20- started on cipro, if T >/= 38C, pan cx, CXR and change cipro to cefepime 2g IV q 8 hours   Chemotherapy induced grade 1 intestinal mucositis (diarrhea) - c. diff negative - d/c bowel regimen; no need to add imodium at this time, only reports 2 small volume diarrhea episodes  Chemotherapy induced grade 1 GI mucositis  with nausea - added zofran 8mg IV q 8 hours prn, carafate / maalox prn. Increased IVF to 50ml / hr     1. Infectious Disease:   acyclovir   Oral Tab/Cap 400 milliGRAM(s) Oral every 8 hours  ciprofloxacin     Tablet 500 milliGRAM(s) Oral every 12 hours  clotrimazole Lozenge 1 Lozenge Oral five times a day  fluconAZOLE   Tablet 400 milliGRAM(s) Oral daily    2. VOD Prophylaxis: Actigall, Glutamine, Heparin (dosed at 100 units / kg / day)     3. GI Prophylaxis:    pantoprazole    Tablet 40 milliGRAM(s) Oral before breakfast    4. Mouthcare - NS / NaHCO3 rinses, Mycelex, Biotene; Skin care     5. GVHD prophylaxis - n/a     6. Transfuse & replete electrolytes prn     7. IV hydration, daily weights, strict I&O, prn diuresis     8. PO intake as tolerated, nutrition follow up as needed, MVI, folic acid     9. Antiemetics, anti-diarrhea medications:   metoclopramide Injectable 10 milliGRAM(s) IV Push every 6 hours PRN    10. OOB as tolerated, physical therapy consult if needed     11. Monitor coags / fibrinogen 2x week, vitamin K as needed     12. Monitor closely for clinical changes, monitor for fevers     13. Emotional support provided, plan of care discussed and questions addressed     14. Patient education done regarding plan of care, restrictions and discharge planning     15. Continue regular social work input     I have written the above note for Dr. Loja who performed service with me in the room.   Brinda Garrett NP-C ([phone])    I have seen and examined patient with NP, I agree with above note as scribed. [facility] Transplant Team                                                      Critical / Counseling Time Provided: 30 minutes                                                                                                                                                        Chief Complaint: Autologous pbsct with high dose melphalan prep regimen for treatment of amyloidosis    S: Patient seen and examined with [facility] Transplant Team:   + fatigue   + diarrhea   + nausea   + epigastric pain / heartburn  + early satiety     O: Vitals:   Vital Signs Last 24 Hrs  T(C): 37.6 (2020 06:00), Max: 37.8 (2020 19:27)  T(F): 99.7 (2020 06:00), Max: 100 (2020 19:27)  HR: 85 (2020 06:00) (61 - 85)  BP: 120/70 (2020 06:00) (105/63 - 121/69)  BP(mean): --  RR: 18 (2020 06:00) (18 - 18)  SpO2: 98% (2020 06:00) (96% - 99%)    Admit weight: 85.7kg  Daily Weight in k.5 (2020 09:30)  Today's weight: 82kg     Intake / Output:   07-07 @ 07:01  -  07-08 @ 07:00  --------------------------------------------------------  IN: 1930 mL / OUT: 3500 mL / NET: -1570 mL    PE:   Oropharynx: no erythema or ulcerations   Oral Mucositis:               -                                         Grade: n/a  CVS: S1, S2 RRR   Lungs: CTA throughout bilaterally   Abdomen: + BS x 4 - mildly hyperactive, soft, NT, ND   Extremities: + chronic trace LE edema; L>R   Gastric Mucositis:      +                                            ndGndrndanddndend:nd nd2nd Intestinal Mucositis:    +                                          ndGndrndanddndend:nd nd2nd Skin: patchy erythematous macular rash on back, neck, abdomen likely secondary to Kepivance   TLC: CDI  Neuro: A&O x 3  Pain: denies     Labs:                         8.2    <0.10 )-----------( 27       ( 2020 07:30 )             25.5     07-08    139  |  106  |  10  ----------------------------<  96  3.8   |  24  |  0.68    Ca    8.3<L>      2020 07:30  Phos  4.1     07-08  Mg     2.1     07-08    TPro  5.4<L>  /  Alb  3.7  /  TBili  0.2  /  DBili  x   /  AST  12  /  ALT  15  /  AlkPhos  55  07-08      Meds:   Antimicrobials:   acyclovir   Oral Tab/Cap 400 milliGRAM(s) Oral every 8 hours  ciprofloxacin     Tablet 500 milliGRAM(s) Oral every 12 hours  clotrimazole Lozenge 1 Lozenge Oral five times a day  fluconAZOLE   Tablet 400 milliGRAM(s) Oral daily      Heme / Onc:   heparin  Infusion 357 Unit(s)/Hr IV Continuous <Continuous>      GI:  magnesium hydroxide Suspension 30 milliLiter(s) Oral daily PRN  pantoprazole    Tablet 40 milliGRAM(s) Oral before breakfast  polyethylene glycol 3350 17 Gram(s) Oral daily PRN  senna 1 Tablet(s) Oral at bedtime PRN  sodium bicarbonate Mouth Rinse 10 milliLiter(s) Swish and Spit five times a day  ursodiol Capsule 300 milliGRAM(s) Oral two times a day with meals      Cardiovascular:   furosemide   Injectable 20 milliGRAM(s) IV Push every 24 hours PRN      Immunologic:   filgrastim-sndz (ZARXIO) Injectable 480 MICROGram(s) SubCutaneous every 24 hours      Other medications:   Biotene Dry Mouth Oral Rinse 5 milliLiter(s) Swish and Spit five times a day  chlorhexidine 4% Liquid 1 Application(s) Topical <User Schedule>  folic acid 1 milliGRAM(s) Oral daily  hydrocortisone 1% Cream 1 Application(s) Topical daily  lidocaine/prilocaine Cream 1 Application(s) Topical daily  LORazepam   Injectable 1 milliGRAM(s) IV Push every 24 hours  multivitamin 1 Tablet(s) Oral daily  sodium chloride 0.9%. 1000 milliLiter(s) IV Continuous <Continuous>      PRN:   acetaminophen   Tablet .. 650 milliGRAM(s) Oral every 6 hours PRN  acetaminophen   Tablet .. 650 milliGRAM(s) Oral every 6 hours PRN  AQUAPHOR (petrolatum Ointment) 1 Application(s) Topical two times a day PRN  diphenhydrAMINE 25 milliGRAM(s) Oral once PRN  diphenhydrAMINE   Injectable 25 milliGRAM(s) IV Push every 4 hours PRN  furosemide   Injectable 20 milliGRAM(s) IV Push every 24 hours PRN  magnesium hydroxide Suspension 30 milliLiter(s) Oral daily PRN  metoclopramide Injectable 10 milliGRAM(s) IV Push every 6 hours PRN  polyethylene glycol 3350 17 Gram(s) Oral daily PRN  senna 1 Tablet(s) Oral at bedtime PRN  sodium chloride 0.9% lock flush 10 milliLiter(s) IV Push every 1 hour PRN    A/P:   59 year old male with a history of amyloidosis  Post Autologous PBSCT day + 6  Strict I&O, prn diuresis, daily weights   Keep platelets =/> 40K for history of amyloidosis   Neutropenic 20- started on cipro, if T >/= 38C, pan cx, CXR and change cipro to cefepime 2g IV q 8 hours   Chemotherapy induced grade 1 intestinal mucositis (diarrhea) - c. diff negative - d/c bowel regimen; no need to add imodium at this time, only reports 2 small volume diarrhea episodes  Chemotherapy induced grade 1 GI mucositis  with nausea - added zofran 8mg IV q 8 hours prn, carafate / maalox prn. Increased IVF to 50ml / hr     1. Infectious Disease:   acyclovir   Oral Tab/Cap 400 milliGRAM(s) Oral every 8 hours  ciprofloxacin     Tablet 500 milliGRAM(s) Oral every 12 hours  clotrimazole Lozenge 1 Lozenge Oral five times a day  fluconAZOLE   Tablet 400 milliGRAM(s) Oral daily    2. VOD Prophylaxis: Actigall, Glutamine, Heparin (dosed at 100 units / kg / day)     3. GI Prophylaxis:    pantoprazole    Tablet 40 milliGRAM(s) Oral before breakfast    4. Mouth care - NS / NaHCO3 rinses, Mycelex, Biotene; Skin care     5. GVHD prophylaxis - n/a     6. Transfuse & replete electrolytes prn     7. IV hydration, daily weights, strict I&O, prn diuresis     8. PO intake as tolerated, nutrition follow up as needed, MVI, folic acid     9. Antiemetics, anti-diarrhea medications:   metoclopramide Injectable 10 milliGRAM(s) IV Push every 6 hours PRN    10. OOB as tolerated, physical therapy consult if needed     11. Monitor coags / fibrinogen 2x week, vitamin K as needed     12. Monitor closely for clinical changes, monitor for fevers     13. Emotional support provided, plan of care discussed and questions addressed     14. Patient education done regarding plan of care, restrictions and discharge planning     15. Continue regular social work input     I have written the above note for Dr. Loja who performed service with me in the room.   Brinda Garrett NP-C ([phone])    I have seen and examined patient with NP, I agree with above note as scribed.

## 2020-07-08 NOTE — PROGRESS NOTE ADULT - ATTENDING COMMENTS
59 year old male with history of amyloidosis treated with CyBorD, now admitted for autologous pbsct with Melphalan preparative regimen   s/p [facility] transplant on 7/2/20, now day + 6  Continue Zarxio daily until engraftment with WBC recovery  Kepivance on days 0, +1, +2 for prevention of mucositis-completed  Strict I&O, daily weight, Lasix to keep O > I  VOD prophylaxis- Actigall, low dose heparin gtt & glutamine supplementation  ID- continue Fluconazole / Acyclovir prophylaxis; begin Cipro for neutropenia on 7/6. If febrile, culture and start Cefepime.  Antiemetics  Mouth care, skin care  OOB/ambulate 59 year old male with history of amyloidosis treated with CyBorD, now admitted for autologous pbsct with Melphalan preparative regimen   s/p [facility] transplant on 7/2/20, now day + 6  Continue Zarxio daily until engraftment with WBC recovery  Kepivance on days 0, +1, +2 for prevention of mucositis-completed  IV hydration, strict I&O, daily weight, Lasix to keep O > I  VOD prophylaxis- Actigall, low dose heparin gtt & glutamine supplementation  ID- continue Fluconazole / Acyclovir prophylaxis; begin Cipro for neutropenia on 7/6. If febrile, culture and start Cefepime.  GI mucositis secondary to antineoplastic chemotherapy- continue PPI, add Carafate  Antiemetics  Mouth care, skin care  OOB/ambulate

## 2020-07-08 NOTE — PROGRESS NOTE ADULT - SUBJECTIVE AND OBJECTIVE BOX
HPI: 59M with PMH of HCV, amyloidosis, dyspepsia, hematuria, colitis, Raynaud's phenomenon, and cutaneous mastocytosis here for an autologous peripheral blood stem cell transplant with high dose melphalan preparative regimen. Diagnosed in 2016 with HCV, and subsequently in 2019 found to have amyloidosis. Was treated with CyBorD x 7 cycles. Palliative following for symptom management post-transplant.    INTERVAL EVENTS:  7/6: day #4 post transplant, states having fatigue, taste changes, appetite loss; no pain, minimal nausea  7/8: day #6 post transplant, having diarrhea and continued taste changes, nausea (managed) and some epigastric pain    ADVANCE DIRECTIVES:    DNR  MOLST  [ ]  Living Will  [ ]   DECISION MAKER(s):  [ ] Health Care Proxy(s)  [ ] Surrogate(s)  [ ] Guardian           Name(s): Phone Number(s): spouse [phone]    BASELINE (I)ADL(s) (prior to admission):  [place]: [X ]Total  [ ] Moderate [ ]Dependent    Allergies    No Known Allergies    Intolerances    MEDICATIONS  (STANDING):  acyclovir   Oral Tab/Cap 400 milliGRAM(s) Oral every 8 hours  Biotene Dry Mouth Oral Rinse 5 milliLiter(s) Swish and Spit five times a day  chlorhexidine 4% Liquid 1 Application(s) Topical <User Schedule>  ciprofloxacin     Tablet 500 milliGRAM(s) Oral every 12 hours  clotrimazole Lozenge 1 Lozenge Oral five times a day  filgrastim-sndz (ZARXIO) Injectable 480 MICROGram(s) SubCutaneous every 24 hours  fluconAZOLE   Tablet 400 milliGRAM(s) Oral daily  folic acid 1 milliGRAM(s) Oral daily  heparin  Infusion 357 Unit(s)/Hr (3.57 mL/Hr) IV Continuous <Continuous>  hydrocortisone 1% Cream 1 Application(s) Topical daily  lidocaine/prilocaine Cream 1 Application(s) Topical daily  LORazepam   Injectable 1 milliGRAM(s) IV Push every 24 hours  multivitamin 1 Tablet(s) Oral daily  pantoprazole    Tablet 40 milliGRAM(s) Oral before breakfast  sodium bicarbonate Mouth Rinse 10 milliLiter(s) Swish and Spit five times a day  sodium chloride 0.9%. 1000 milliLiter(s) (50 mL/Hr) IV Continuous <Continuous>  sucralfate suspension 1 Gram(s) Oral four times a day  ursodiol Capsule 300 milliGRAM(s) Oral two times a day with meals    MEDICATIONS  (PRN):  acetaminophen   Tablet .. 650 milliGRAM(s) Oral every 6 hours PRN Temp greater or equal to 38C (100.4F), Mild Pain (1 - 3)  acetaminophen   Tablet .. 650 milliGRAM(s) Oral every 6 hours PRN Temp greater or equal to 38C (100.4F), Mild Pain (1 - 3)  aluminum hydroxide/magnesium hydroxide/simethicone Suspension 30 milliLiter(s) Oral every 4 hours PRN Dyspepsia  AQUAPHOR (petrolatum Ointment) 1 Application(s) Topical two times a day PRN dry/itchy skin  bismuth subsalicylate Liquid 30 milliLiter(s) Oral every 4 hours PRN nausea / diarrhea / upset stomach  diphenhydrAMINE 25 milliGRAM(s) Oral once PRN Rash and/or Itching  diphenhydrAMINE   Injectable 25 milliGRAM(s) IV Push every 4 hours PRN Allergy symptoms  furosemide   Injectable 20 milliGRAM(s) IV Push every 24 hours PRN If urine output is <100mL/hr for 2 hours  metoclopramide Injectable 10 milliGRAM(s) IV Push every 6 hours PRN Nausea and/or Vomiting  ondansetron Injectable 8 milliGRAM(s) IV Push every 8 hours PRN Nausea and/or Vomiting  sodium chloride 0.9% lock flush 10 milliLiter(s) IV Push every 1 hour PRN Pre/post blood products, medications, blood draw, and to maintain line patency    PRESENT SYMPTOMS: [ ]Unable to obtain due to poor mentation   Source if other than patient:  [ ]Family   [ ]Team     Pain: [ ]yes [ X]no  QOL impact -   Location -                    Aggravating factors -  Quality -  Radiation -  Timing-  Severity (0-10 scale):  Minimal acceptable level (0-10 scale):     CPOT:    https://www.[facility].org/getattachment/occ05o80-2r5r-6d5w-7j7l-4321w4284s7l/Critical-Care-Pain-Observation-Tool-(CPOT)      PAIN AD Score:     http://geriatrictoolkit.[facility]/cog/painad.pdf (press ctrl +  left click to view)    Dyspnea:                           [ ]Mild [ ]Moderate [ ]Severe  Anxiety:                             [ ]Mild [ ]Moderate [ ]Severe  Fatigue:                             [ ]Mild [x]Moderate [ ]Severe  Nausea:                             [ xMild [ ]Moderate [ ]Severe  Loss of appetite:              [ ]Mild [x]Moderate [ ]Severe  Constipation:                    [ ]Mild [ ]Moderate [ ]Severe    Other Symptoms: taste changes  [X ]All other review of systems negative     Palliative Performance Status Version 2:         %    http://[facility].org/files/news/palliative_performance_scale_[email or web address].pdf  PHYSICAL EXAM:  Vital Signs Last 24 Hrs  T(C): 37.2 (08 Jul 2020 15:15), Max: 37.8 (07 Jul 2020 19:27)  T(F): 99 (08 Jul 2020 15:15), Max: 100 (07 Jul 2020 19:27)  HR: 73 (08 Jul 2020 15:15) (67 - 85)  BP: 105/67 (08 Jul 2020 15:15) (104/56 - 121/75)  BP(mean): --  RR: 18 (08 Jul 2020 15:15) (18 - 18)  SpO2: 98% (08 Jul 2020 15:15) (96% - 100%)    Limited exam for immunocompromised patient's safety given BMT & to limit infection risk during COVID-19 pandemic. Please refer to primary team's examination for today.  GENERAL:  [X ]Alert  [ ]Oriented x   [ ]Lethargic  [ ]Cachexia  [ ]Unarousable  [ X]Verbal  [ ]Non-Verbal  Behavioral:   [ ] Anxiety  [ ] Delirium [ ] Agitation [ ] Other  HEENT:  [X ]Normal   [ ]Dry mouth   [ ]ET Tube/Trach  [ ]Oral lesions  PULMONARY:   [ ]Clear [ ]Tachypnea  [ ]Audible excessive secretions   [ ]Rhonchi        [ ]Right [ ]Left [ ]Bilateral  [ ]Crackles        [ ]Right [ ]Left [ ]Bilateral  [ ]Wheezing     [ ]Right [ ]Left [ ]Bilateral  [ ]Diminished breath sounds [ ]right [ ]left [ ]bilateral  CARDIOVASCULAR:    [ ]Regular [ ]Irregular [ ]Tachy  [ ]Rogelio [ ]Murmur [ ]Other  GASTROINTESTINAL:  [ ]Soft  [ ]Distended   [ ]+BS  [ ]Non tender [ ]Tender  [ ]PEG [ ]OGT/ NGT  Last BM:     GENITOURINARY:  [ ]Normal [ ] Incontinent   [ ]Oliguria/Anuria   [ ]Aguilar  MUSCULOSKELETAL:   [X ]Normal   [ ]Weakness  [ ]Bed/Wheelchair bound [ ]Edema  NEUROLOGIC:   [X ]No focal deficits  [ ]Cognitive impairment  [ ]Dysphagia [ ]Dysarthria [ ]Paresis [ ]Other   SKIN:   [ ]Normal    [ ]Rash  [ ]Pressure ulcer(s)       Present on admission [ ]y [ ]n    CRITICAL CARE:  [ ] Shock Present  [ ]Septic [ ]Cardiogenic [ ]Neurologic [ ]Hypovolemic  [ ]  Vasopressors [ ]  Inotropes   [ ]Respiratory failure present [ ]Mechanical ventilation [ ]Non-invasive ventilatory support [ ]High flow  [ ]Acute  [ ]Chronic [ ]Hypoxic  [ ]Hypercarbic [ ]Other  [ ]Other organ failure     LABS: reviewed                          8.2    <0.10 )-----------( 27       ( 08 Jul 2020 07:30 )             25.5     07-08    139  |  106  |  10  ----------------------------<  96  3.8   |  24  |  0.68    Ca    8.3<L>      08 Jul 2020 07:30  Phos  4.1     07-08  Mg     2.1     07-08      RADIOLOGY & ADDITIONAL STUDIES:    PROTEIN CALORIE MALNUTRITION PRESENT: [ ]mild [ ]moderate [ ]severe [ ]underweight [ ]morbid obesity  https://www.andeal.org/vault/2440/web/files/ONC/Table_Clinical%20Characteristics%20to%20Document%20Malnutrition-White%20JV%20et%20al%090973.pdf    Height (cm): 175 (06-29-20 @ 10:46), 175 (03-26-20 @ 20:41), 180 (11-27-19 @ 16:59)  Weight (kg): 85.7 (06-29-20 @ 10:46), 83 (03-26-20 @ 20:41), 81 (12-02-19 @ 08:18)  BMI (kg/m2): 28 (06-29-20 @ 10:46), 27.1 (03-26-20 @ 20:41), 26.4 (03-26-20 @ 20:41)    [ ]PPSV2 < or = to 30% [ ]significant weight loss  [ ]poor nutritional intake  [ ]anasarca     Albumin, Serum: 3.7 g/dL (06-30-20 @ 07:06)   [ ]Artificial Nutrition      REFERRALS:   [ ]Chaplaincy  [ ]Hospice  [ ]Child Life  [ ]Social Work  [ ]Case management [ ]Holistic Therapy     Goals of Care Document:     ______________  Miguel Angel Albrecht MD   of Geriatric and Palliative Medicine  [facility]     Please page the following number for clinical matters between the hours of 9AM and 5PM   from Monday through Friday : [phone]    After 5PM and on weekends, please page: [phone]. The Geriatric and Palliative Medicine consult service has 24/7 coverage for medical recommendations, including for symptom management needs.

## 2020-07-08 NOTE — PROVIDER CONTACT NOTE (CHANGE IN STATUS NOTIFICATION) - SITUATION
Pt. has watery stool x2 episodes this shift & during the day
Pt. observed having rigors on rounds.Pt. states he feels flu-like symptoms

## 2020-07-09 DIAGNOSIS — D70.9 NEUTROPENIA, UNSPECIFIED: ICD-10-CM

## 2020-07-09 DIAGNOSIS — R19.7 DIARRHEA, UNSPECIFIED: ICD-10-CM

## 2020-07-09 DIAGNOSIS — K12.30 ORAL MUCOSITIS (ULCERATIVE), UNSPECIFIED: ICD-10-CM

## 2020-07-09 LAB
ALBUMIN SERPL ELPH-MCNC: 3.5 G/DL — SIGNIFICANT CHANGE UP (ref 3.3–5)
ALP SERPL-CCNC: 50 U/L — SIGNIFICANT CHANGE UP (ref 40–120)
ALT FLD-CCNC: 12 U/L — SIGNIFICANT CHANGE UP (ref 10–45)
AMYLASE P1 CFR SERPL: 44 U/L — SIGNIFICANT CHANGE UP (ref 25–125)
ANION GAP SERPL CALC-SCNC: 10 MMOL/L — SIGNIFICANT CHANGE UP (ref 5–17)
APTT BLD: 28.7 SEC — SIGNIFICANT CHANGE UP (ref 27.5–35.5)
AST SERPL-CCNC: 9 U/L — LOW (ref 10–40)
BILIRUB SERPL-MCNC: 0.2 MG/DL — SIGNIFICANT CHANGE UP (ref 0.2–1.2)
BUN SERPL-MCNC: 9 MG/DL — SIGNIFICANT CHANGE UP (ref 7–23)
CALCIUM SERPL-MCNC: 8.1 MG/DL — LOW (ref 8.4–10.5)
CHLORIDE SERPL-SCNC: 108 MMOL/L — SIGNIFICANT CHANGE UP (ref 96–108)
CO2 SERPL-SCNC: 22 MMOL/L — SIGNIFICANT CHANGE UP (ref 22–31)
CREAT SERPL-MCNC: 0.75 MG/DL — SIGNIFICANT CHANGE UP (ref 0.5–1.3)
CULTURE RESULTS: NO GROWTH — SIGNIFICANT CHANGE UP
FIBRINOGEN PPP-MCNC: 588 MG/DL — HIGH (ref 350–510)
GLUCOSE SERPL-MCNC: 98 MG/DL — SIGNIFICANT CHANGE UP (ref 70–99)
HCT VFR BLD CALC: 23.4 % — LOW (ref 39–50)
HGB BLD-MCNC: 7.6 G/DL — LOW (ref 13–17)
INR BLD: 1.11 RATIO — SIGNIFICANT CHANGE UP (ref 0.88–1.16)
LDH SERPL L TO P-CCNC: 127 U/L — SIGNIFICANT CHANGE UP (ref 50–242)
LIDOCAIN IGE QN: 23 U/L — SIGNIFICANT CHANGE UP (ref 7–60)
MAGNESIUM SERPL-MCNC: 2 MG/DL — SIGNIFICANT CHANGE UP (ref 1.6–2.6)
MCHC RBC-ENTMCNC: 24.1 PG — LOW (ref 27–34)
MCHC RBC-ENTMCNC: 32.5 GM/DL — SIGNIFICANT CHANGE UP (ref 32–36)
MCV RBC AUTO: 74.1 FL — LOW (ref 80–100)
NRBC # BLD: 0 /100 WBCS — SIGNIFICANT CHANGE UP (ref 0–0)
PHOSPHATE SERPL-MCNC: 3.9 MG/DL — SIGNIFICANT CHANGE UP (ref 2.5–4.5)
PLATELET # BLD AUTO: 27 K/UL — LOW (ref 150–400)
POTASSIUM SERPL-MCNC: 3.6 MMOL/L — SIGNIFICANT CHANGE UP (ref 3.5–5.3)
POTASSIUM SERPL-SCNC: 3.6 MMOL/L — SIGNIFICANT CHANGE UP (ref 3.5–5.3)
PROT SERPL-MCNC: 5.4 G/DL — LOW (ref 6–8.3)
PROTHROM AB SERPL-ACNC: 12.7 SEC — SIGNIFICANT CHANGE UP (ref 10.6–13.6)
RBC # BLD: 3.16 M/UL — LOW (ref 4.2–5.8)
RBC # FLD: 14.6 % — HIGH (ref 10.3–14.5)
SARS-COV-2 RNA SPEC QL NAA+PROBE: SIGNIFICANT CHANGE UP
SODIUM SERPL-SCNC: 140 MMOL/L — SIGNIFICANT CHANGE UP (ref 135–145)
SPECIMEN SOURCE: SIGNIFICANT CHANGE UP
WBC # BLD: <0.1 K/UL — CRITICAL LOW (ref 3.8–10.5)
WBC # FLD AUTO: <0.1 K/UL — CRITICAL LOW (ref 3.8–10.5)

## 2020-07-09 PROCEDURE — 99291 CRITICAL CARE FIRST HOUR: CPT

## 2020-07-09 RX ORDER — LOPERAMIDE HCL 2 MG
2 TABLET ORAL EVERY 6 HOURS
Refills: 0 | Status: DISCONTINUED | OUTPATIENT
Start: 2020-07-09 | End: 2020-07-20

## 2020-07-09 RX ORDER — SODIUM CHLORIDE 9 MG/ML
1000 INJECTION INTRAMUSCULAR; INTRAVENOUS; SUBCUTANEOUS
Refills: 0 | Status: DISCONTINUED | OUTPATIENT
Start: 2020-07-09 | End: 2020-07-10

## 2020-07-09 RX ADMIN — Medication 400 MILLIGRAM(S): at 21:02

## 2020-07-09 RX ADMIN — Medication 1 LOZENGE: at 12:23

## 2020-07-09 RX ADMIN — Medication 1 APPLICATION(S): at 12:15

## 2020-07-09 RX ADMIN — Medication 10 MILLILITER(S): at 21:03

## 2020-07-09 RX ADMIN — Medication 650 MILLIGRAM(S): at 21:35

## 2020-07-09 RX ADMIN — Medication 1 MILLIGRAM(S): at 12:24

## 2020-07-09 RX ADMIN — URSODIOL 300 MILLIGRAM(S): 250 TABLET, FILM COATED ORAL at 07:57

## 2020-07-09 RX ADMIN — CHLORHEXIDINE GLUCONATE 1 APPLICATION(S): 213 SOLUTION TOPICAL at 05:57

## 2020-07-09 RX ADMIN — Medication 5 MILLILITER(S): at 07:58

## 2020-07-09 RX ADMIN — FLUCONAZOLE 400 MILLIGRAM(S): 150 TABLET ORAL at 12:23

## 2020-07-09 RX ADMIN — Medication 1 GRAM(S): at 17:15

## 2020-07-09 RX ADMIN — Medication 10 MILLILITER(S): at 23:03

## 2020-07-09 RX ADMIN — Medication 0.5 MILLIGRAM(S): at 23:22

## 2020-07-09 RX ADMIN — CEFEPIME 100 MILLIGRAM(S): 1 INJECTION, POWDER, FOR SOLUTION INTRAMUSCULAR; INTRAVENOUS at 21:02

## 2020-07-09 RX ADMIN — Medication 1 LOZENGE: at 07:57

## 2020-07-09 RX ADMIN — Medication 1 GRAM(S): at 23:03

## 2020-07-09 RX ADMIN — Medication 10 MILLILITER(S): at 15:28

## 2020-07-09 RX ADMIN — Medication 10 MILLILITER(S): at 07:57

## 2020-07-09 RX ADMIN — Medication 5 MILLILITER(S): at 21:02

## 2020-07-09 RX ADMIN — Medication 10 MILLILITER(S): at 12:24

## 2020-07-09 RX ADMIN — Medication 1 LOZENGE: at 15:28

## 2020-07-09 RX ADMIN — Medication 5 MILLILITER(S): at 15:27

## 2020-07-09 RX ADMIN — Medication 1 GRAM(S): at 12:25

## 2020-07-09 RX ADMIN — Medication 400 MILLIGRAM(S): at 15:22

## 2020-07-09 RX ADMIN — PANTOPRAZOLE SODIUM 40 MILLIGRAM(S): 20 TABLET, DELAYED RELEASE ORAL at 06:14

## 2020-07-09 RX ADMIN — Medication 2 MILLIGRAM(S): at 23:28

## 2020-07-09 RX ADMIN — Medication 400 MILLIGRAM(S): at 05:54

## 2020-07-09 RX ADMIN — Medication 1 TABLET(S): at 12:24

## 2020-07-09 RX ADMIN — CEFEPIME 100 MILLIGRAM(S): 1 INJECTION, POWDER, FOR SOLUTION INTRAMUSCULAR; INTRAVENOUS at 14:26

## 2020-07-09 RX ADMIN — Medication 480 MICROGRAM(S): at 17:15

## 2020-07-09 RX ADMIN — Medication 1 LOZENGE: at 21:02

## 2020-07-09 RX ADMIN — Medication 1 LOZENGE: at 23:03

## 2020-07-09 RX ADMIN — Medication 25 MILLIGRAM(S): at 11:31

## 2020-07-09 RX ADMIN — URSODIOL 300 MILLIGRAM(S): 250 TABLET, FILM COATED ORAL at 17:14

## 2020-07-09 RX ADMIN — Medication 5 MILLILITER(S): at 23:05

## 2020-07-09 RX ADMIN — Medication 5 MILLILITER(S): at 12:22

## 2020-07-09 RX ADMIN — Medication 1 GRAM(S): at 05:57

## 2020-07-09 RX ADMIN — CEFEPIME 100 MILLIGRAM(S): 1 INJECTION, POWDER, FOR SOLUTION INTRAMUSCULAR; INTRAVENOUS at 05:53

## 2020-07-09 RX ADMIN — Medication 30 MILLILITER(S): at 07:56

## 2020-07-09 NOTE — PROGRESS NOTE ADULT - ATTENDING COMMENTS
59 year old male with history of amyloidosis treated with CyBorD, now admitted for autologous pbsct with Melphalan preparative regimen   s/p HPC transplant on 7/2/20, now day + 7  Continue Zarxio daily until engraftment with WBC recovery  Kepivance on days 0, +1, +2 for prevention of mucositis-completed  IV hydration, strict I&O, daily weight, Lasix to keep O > I  VOD prophylaxis- Actigall, low dose heparin gtt & glutamine supplementation  ID- continue Fluconazole / Acyclovir prophylaxis; begin Cipro for neutropenia on 7/6. If febrile, culture and start Cefepime.  GI mucositis secondary to antineoplastic chemotherapy- continue PPI, add Carafate  Antiemetics  Mouth care, skin care  OOB/ambulate 59 year old male with history of amyloidosis treated with CyBorD, now admitted for autologous pbsct with Melphalan preparative regimen   s/p HPC transplant on 7/2/20, now day + 7  Continue Zarxio daily until engraftment with WBC recovery  Kepivance on days 0, +1, +2 for prevention of mucositis-completed  IV hydration, strict I&O, daily weight, Lasix to keep O > I  VOD prophylaxis- Actigall, low dose heparin gtt & glutamine supplementation  ID- Neutropenic fevers- check cultures; Cefepime started; continue Fluconazole / Acyclovir prophylaxis  GI mucositis secondary to antineoplastic chemotherapy- continue PPI, add Carafate; Imodium for diarrhea  Antiemetics  Supplement lytes prn  Mouth care, skin care  OOB/ambulate

## 2020-07-09 NOTE — PROGRESS NOTE ADULT - SUBJECTIVE AND OBJECTIVE BOX
Chief Complaint: Autologous pbsct with high dose melphalan prep regimen for treatment of amyloidosis    S: Patient seen and examined with HPC Transplant Team:   + fatigue   + diarrhea   + nausea   + epigastric pain / heartburn  + early satiety       O: Vitals:   Vital Signs Last 24 Hrs  T(C): 37.7 (2020 06:15), Max: 38.1 (2020 19:48)  T(F): 99.9 (2020 06:15), Max: 100.6 (2020 19:48)  HR: 82 (2020 06:15) (71 - 87)  BP: 106/68 (2020 06:15) (92/53 - 121/75)  BP(mean): --  RR: 18 (2020 06:15) (18 - 18)  SpO2: 95% (2020 06:15) (95% - 100%)    Admit weight:  85.7kg   Daily Weight in k (2020 09:30)  Today's weight:    Intake / Output:   07-08 @ 07:01  -  09 @ 07:00  --------------------------------------------------------  IN: 2149 mL / OUT: 2800 mL / NET: -651 mL    PE:   Oropharynx: no erythema or ulcerations   Oral Mucositis:               -                                         Grade: n/a  CVS: S1, S2 RRR   Lungs: CTA throughout bilaterally   Abdomen: + BS x 4 - mildly hyperactive, soft, NT, ND   Extremities: + chronic trace LE edema; L>R   Gastric Mucositis:      +                                            ndGndrndanddndend:nd nd2nd Intestinal Mucositis:    +                                          ndGndrndanddndend:nd nd2nd Skin: patchy erythematous macular rash on back, neck, abdomen likely secondary to Kepivance   TLC: CDI  Neuro: A&O x 3  Pain: denies             Labs:       Cultures:         Radiology:       Meds:   Antimicrobials:   acyclovir   Oral Tab/Cap 400 milliGRAM(s) Oral every 8 hours  cefepime   IVPB      cefepime   IVPB 2000 milliGRAM(s) IV Intermittent every 8 hours  clotrimazole Lozenge 1 Lozenge Oral five times a day  fluconAZOLE   Tablet 400 milliGRAM(s) Oral daily      Heme / Onc:   heparin  Infusion 357 Unit(s)/Hr IV Continuous <Continuous>      GI:  aluminum hydroxide/magnesium hydroxide/simethicone Suspension 30 milliLiter(s) Oral every 4 hours PRN  bismuth subsalicylate Liquid 30 milliLiter(s) Oral every 4 hours PRN  pantoprazole    Tablet 40 milliGRAM(s) Oral before breakfast  sodium bicarbonate Mouth Rinse 10 milliLiter(s) Swish and Spit five times a day  sucralfate suspension 1 Gram(s) Oral four times a day  ursodiol Capsule 300 milliGRAM(s) Oral two times a day with meals      Cardiovascular:   furosemide   Injectable 20 milliGRAM(s) IV Push every 24 hours PRN      Immunologic:   filgrastim-sndz (ZARXIO) Injectable 480 MICROGram(s) SubCutaneous every 24 hours      Other medications:   Biotene Dry Mouth Oral Rinse 5 milliLiter(s) Swish and Spit five times a day  chlorhexidine 4% Liquid 1 Application(s) Topical <User Schedule>  folic acid 1 milliGRAM(s) Oral daily  hydrocortisone 1% Cream 1 Application(s) Topical daily  lidocaine/prilocaine Cream 1 Application(s) Topical daily  LORazepam   Injectable 1 milliGRAM(s) IV Push every 24 hours  multivitamin 1 Tablet(s) Oral daily  sodium chloride 0.9%. 1000 milliLiter(s) IV Continuous <Continuous>      PRN:   acetaminophen   Tablet .. 650 milliGRAM(s) Oral every 6 hours PRN  acetaminophen   Tablet .. 650 milliGRAM(s) Oral every 6 hours PRN  aluminum hydroxide/magnesium hydroxide/simethicone Suspension 30 milliLiter(s) Oral every 4 hours PRN  AQUAPHOR (petrolatum Ointment) 1 Application(s) Topical two times a day PRN  bismuth subsalicylate Liquid 30 milliLiter(s) Oral every 4 hours PRN  diphenhydrAMINE 25 milliGRAM(s) Oral once PRN  diphenhydrAMINE   Injectable 25 milliGRAM(s) IV Push every 4 hours PRN  furosemide   Injectable 20 milliGRAM(s) IV Push every 24 hours PRN  metoclopramide Injectable 10 milliGRAM(s) IV Push every 6 hours PRN  ondansetron Injectable 8 milliGRAM(s) IV Push every 8 hours PRN  sodium chloride 0.9% lock flush 10 milliLiter(s) IV Push every 1 hour PRN      A/P:   59 year old male with a history of amyloidosis  Post Autologous PBSCT day + 7  Strict I&O, prn diuresis, daily weights   Keep platelets =/> 40K for history of amyloidosis   Neutropenic Fever: Cipo changed to cefepime, f.u blood/ urine cultures form   Chemotherapy induced grade 1 intestinal mucositis (diarrhea) - c. diff negative - d/c bowel regimen; no need to add imodium at this time, only reports 2 small volume diarrhea episodes  Chemotherapy induced grade 1 GI mucositis  with nausea - added zofran 8mg IV q 8 hours prn, carafate / maalox prn. Increased IVF to 50ml / hr     1. Infectious Disease:   acyclovir   Oral Tab/Cap 400 milliGRAM(s) Oral every 8 hours  cefepime   IVPB      cefepime   IVPB 2000 milliGRAM(s) IV Intermittent every 8 hours  clotrimazole Lozenge 1 Lozenge Oral five times a day  fluconAZOLE   Tablet 400 milliGRAM(s) Oral daily    2. VOD Prophylaxis: Actigall, Glutamine, Heparin (dosed at 100 units / kg / day)     3. GI Prophylaxis:    pantoprazole    Tablet 40 milliGRAM(s) Oral before breakfast    4. Mouth care - NS / NaHCO3 rinses, Mycelex, Biotene; Skin care     5. GVHD prophylaxis - n/a     6. Transfuse & replete electrolytes prn     7. IV hydration, daily weights, strict I&O, prn diuresis     8. PO intake as tolerated, nutrition follow up as needed, MVI, folic acid     9. Antiemetics, anti-diarrhea medications:   metoclopramide Injectable 10 milliGRAM(s) IV Push every 6 hours PRN    10. OOB as tolerated, physical therapy consult if needed     11. Monitor coags / fibrinogen 2x week, vitamin K as needed     12. Monitor closely for clinical changes, monitor for fevers     13. Emotional support provided, plan of care discussed and questions addressed     14. Patient education done regarding plan of care, restrictions and discharge planning     15. Continue regular social work input     I have written the above note for Dr. Loja who performed service with me in the room.   Kandice Woodward PA-C ([phone])    I have seen and examined patient with PA, I agree with above note as scribed. Chief Complaint: Autologous pbsct with high dose melphalan prep regimen for treatment of amyloidosis    S: Patient seen and examined with HPC Transplant Team:   + fatigue   + diarrhea   + nausea   + epigastric pain / heartburn  + early satiety       O: Vitals:   Vital Signs Last 24 Hrs  T(C): 37.7 (2020 06:15), Max: 38.1 (2020 19:48)  T(F): 99.9 (2020 06:15), Max: 100.6 (2020 19:48)  HR: 82 (2020 06:15) (71 - 87)  BP: 106/68 (2020 06:15) (92/53 - 121/75)  BP(mean): --  RR: 18 (2020 06:15) (18 - 18)  SpO2: 95% (2020 06:15) (95% - 100%)    Admit weight:  85.7kg   Daily Weight in k (2020 09:30)  Today's weight:    Intake / Output:   07-08 @ 07:01  -   @ 07:00  --------------------------------------------------------  IN: 2149 mL / OUT: 2800 mL / NET: -651 mL    PE:   Oropharynx: no erythema or ulcerations   Oral Mucositis:               -                                         Grade: n/a  CVS: S1, S2 RRR   Lungs: CTA throughout bilaterally   Abdomen: + BS x 4 - mildly hyperactive, soft, NT, ND   Extremities: + chronic trace LE edema; L>R   Gastric Mucositis:      +                                            ndGndrndanddndend:nd nd2nd Intestinal Mucositis:    +                                          ndGndrndanddndend:nd nd2nd Skin: patchy erythematous macular rash on back, neck, abdomen likely secondary to Kepivance   TLC: CDI  Neuro: A&O x 3  Pain: denies       Labs:                    140  |  108  |  9   ----------------------------<  98  3.6   |  22  |  0.75    Ca    8.1<L>      2020 07:18  Phos  3.9     07-09  Mg     2.0     07-09    TPro  5.4<L>  /  Alb  3.5  /  TBili  0.2  /  DBili  x   /  AST  9<L>  /  ALT  12  /  AlkPhos  50              COVID-19 PCR . (20 @ 13:50)    COVID-19 PCR: NotDetec: Testing is performed using polymerase chain reaction (PCR) or  transcription mediated amplification (TMA). This COVID-19 (SARS-CoV-2)  nucleic acid amplification test was validated by [id number] and is  in use under the FDA Emergency Use Authorization (EUA) for clinical labs  CLIA-certified to perform high complexity testing. Test results should be  correlated with clinical presentation, patient history, and epidemiology.        Radiology:       Meds:   Antimicrobials:   acyclovir   Oral Tab/Cap 400 milliGRAM(s) Oral every 8 hours  cefepime   IVPB      cefepime   IVPB 2000 milliGRAM(s) IV Intermittent every 8 hours  clotrimazole Lozenge 1 Lozenge Oral five times a day  fluconAZOLE   Tablet 400 milliGRAM(s) Oral daily      Heme / Onc:   heparin  Infusion 357 Unit(s)/Hr IV Continuous <Continuous>      GI:  aluminum hydroxide/magnesium hydroxide/simethicone Suspension 30 milliLiter(s) Oral every 4 hours PRN  bismuth subsalicylate Liquid 30 milliLiter(s) Oral every 4 hours PRN  pantoprazole    Tablet 40 milliGRAM(s) Oral before breakfast  sodium bicarbonate Mouth Rinse 10 milliLiter(s) Swish and Spit five times a day  sucralfate suspension 1 Gram(s) Oral four times a day  ursodiol Capsule 300 milliGRAM(s) Oral two times a day with meals      Cardiovascular:   furosemide   Injectable 20 milliGRAM(s) IV Push every 24 hours PRN      Immunologic:   filgrastim-sndz (ZARXIO) Injectable 480 MICROGram(s) SubCutaneous every 24 hours      Other medications:   Biotene Dry Mouth Oral Rinse 5 milliLiter(s) Swish and Spit five times a day  chlorhexidine 4% Liquid 1 Application(s) Topical <User Schedule>  folic acid 1 milliGRAM(s) Oral daily  hydrocortisone 1% Cream 1 Application(s) Topical daily  lidocaine/prilocaine Cream 1 Application(s) Topical daily  LORazepam   Injectable 1 milliGRAM(s) IV Push every 24 hours  multivitamin 1 Tablet(s) Oral daily  sodium chloride 0.9%. 1000 milliLiter(s) IV Continuous <Continuous>      PRN:   acetaminophen   Tablet .. 650 milliGRAM(s) Oral every 6 hours PRN  acetaminophen   Tablet .. 650 milliGRAM(s) Oral every 6 hours PRN  aluminum hydroxide/magnesium hydroxide/simethicone Suspension 30 milliLiter(s) Oral every 4 hours PRN  AQUAPHOR (petrolatum Ointment) 1 Application(s) Topical two times a day PRN  bismuth subsalicylate Liquid 30 milliLiter(s) Oral every 4 hours PRN  diphenhydrAMINE 25 milliGRAM(s) Oral once PRN  diphenhydrAMINE   Injectable 25 milliGRAM(s) IV Push every 4 hours PRN  furosemide   Injectable 20 milliGRAM(s) IV Push every 24 hours PRN  metoclopramide Injectable 10 milliGRAM(s) IV Push every 6 hours PRN  ondansetron Injectable 8 milliGRAM(s) IV Push every 8 hours PRN  sodium chloride 0.9% lock flush 10 milliLiter(s) IV Push every 1 hour PRN      A/P:   59 year old male with a history of amyloidosis  Post Autologous PBSCT day + 7  Strict I&O, prn diuresis, daily weights   Keep platelets =/> 40K for history of amyloidosis   Neutropenic Fever: Cipo changed to cefepime, f.u blood/ urine cultures form   Chemotherapy induced grade 1 intestinal mucositis (diarrhea) - c. diff negative - d/c bowel regimen; no need to add imodium at this time, only reports 2 small volume diarrhea episodes  Chemotherapy induced grade 1 GI mucositis  with nausea - added zofran 8mg IV q 8 hours prn, carafate / maalox prn. Increased IVF to 50ml / hr     1. Infectious Disease:   acyclovir   Oral Tab/Cap 400 milliGRAM(s) Oral every 8 hours  cefepime   IVPB      cefepime   IVPB 2000 milliGRAM(s) IV Intermittent every 8 hours  clotrimazole Lozenge 1 Lozenge Oral five times a day  fluconAZOLE   Tablet 400 milliGRAM(s) Oral daily    2. VOD Prophylaxis: Actigall, Glutamine, Heparin (dosed at 100 units / kg / day)     3. GI Prophylaxis:    pantoprazole    Tablet 40 milliGRAM(s) Oral before breakfast    4. Mouth care - NS / NaHCO3 rinses, Mycelex, Biotene; Skin care     5. GVHD prophylaxis - n/a     6. Transfuse & replete electrolytes prn     7. IV hydration, daily weights, strict I&O, prn diuresis     8. PO intake as tolerated, nutrition follow up as needed, MVI, folic acid     9. Antiemetics, anti-diarrhea medications:   metoclopramide Injectable 10 milliGRAM(s) IV Push every 6 hours PRN    10. OOB as tolerated, physical therapy consult if needed     11. Monitor coags / fibrinogen 2x week, vitamin K as needed     12. Monitor closely for clinical changes, monitor for fevers     13. Emotional support provided, plan of care discussed and questions addressed     14. Patient education done regarding plan of care, restrictions and discharge planning     15. Continue regular social work input     I have written the above note for Dr. Loja who performed service with me in the room.   Kandice Woodward PA-C ([phone])    I have seen and examined patient with PA, I agree with above note as scribed. Chief Complaint: Autologous pbsct with high dose melphalan prep regimen for treatment of amyloidosis    S: Patient seen and examined with HPC Transplant Team:   + general weakness   + diarrhea   + intermittent nausea   + epigastric pain: improving   +fever overnight    Denies: mouth/throat pain, cough, SOB, abdominal pain, dizziness          O: Vitals:   Vital Signs Last 24 Hrs  T(C): 37.7 (2020 06:15), Max: 38.1 (2020 19:48)  T(F): 99.9 (2020 06:15), Max: 100.6 (2020 19:48)  HR: 82 (2020 06:15) (71 - 87)  BP: 106/68 (2020 06:15) (92/53 - 121/75)  BP(mean): --  RR: 18 (2020 06:15) (18 - 18)  SpO2: 95% (2020 06:15) (95% - 100%)    Admit weight:  85.7kg   Daily Weight in k (2020 09:30)  Today's weight:82.3 kg     Intake / Output:    @ 07:01  -   @ 07:00  --------------------------------------------------------  IN: 2149 mL / OUT: 2800 mL / NET: -651 mL    PE:   HEENT: Oropharynx: no erythema or ulcerations  Conjunctiva:  anicteric    Oral Mucositis:               -                                         Grade: n/a  CVS: S1, S2 RRR   Lungs: CTA throughout bilaterally   Abdomen: + BS x 4: normoactive, soft, NT, ND   Extremities: + chronic trace LE edema; L>R   Gastric Mucositis:      +                                            ndGndrndanddndend:nd nd2nd Intestinal Mucositis:    +                                          ndGndrndanddndend:nd nd2nd Skin: hyperpigmentation on back, neck, abdomen likely secondary to Kepivance   TLC: CDI  Neuro: A&O x 3  Pain: denies       Labs:                    140  |  108  |  9   ----------------------------<  98  3.6   |  22  |  0.75    Ca    8.1<L>      2020 07:18  Phos  3.9     07-  Mg     2.0     -    TPro  5.4<L>  /  Alb  3.5  /  TBili  0.2  /  DBili  x   /  AST  9<L>  /  ALT  12  /  AlkPhos  50  -            COVID-19 PCR . (20 @ 13:50)    COVID-19 PCR: NotDetec: Testing is performed using polymerase chain reaction (PCR) or  transcription mediated amplification (TMA). This COVID-19 (SARS-CoV-2)  nucleic acid amplification test was validated by [facility] and is  in use under the FDA Emergency Use Authorization (EUA) for clinical labs  CLIA-certified to perform high complexity testing. Test results should be  correlated with clinical presentation, patient history, and epidemiology.        Radiology:       Meds:   Antimicrobials:   acyclovir   Oral Tab/Cap 400 milliGRAM(s) Oral every 8 hours  cefepime   IVPB      cefepime   IVPB 2000 milliGRAM(s) IV Intermittent every 8 hours  clotrimazole Lozenge 1 Lozenge Oral five times a day  fluconAZOLE   Tablet 400 milliGRAM(s) Oral daily      Heme / Onc:   heparin  Infusion 357 Unit(s)/Hr IV Continuous <Continuous>      GI:  aluminum hydroxide/magnesium hydroxide/simethicone Suspension 30 milliLiter(s) Oral every 4 hours PRN  bismuth subsalicylate Liquid 30 milliLiter(s) Oral every 4 hours PRN  pantoprazole    Tablet 40 milliGRAM(s) Oral before breakfast  sodium bicarbonate Mouth Rinse 10 milliLiter(s) Swish and Spit five times a day  sucralfate suspension 1 Gram(s) Oral four times a day  ursodiol Capsule 300 milliGRAM(s) Oral two times a day with meals      Cardiovascular:   furosemide   Injectable 20 milliGRAM(s) IV Push every 24 hours PRN      Immunologic:   filgrastim-sndz (ZARXIO) Injectable 480 MICROGram(s) SubCutaneous every 24 hours      Other medications:   Biotene Dry Mouth Oral Rinse 5 milliLiter(s) Swish and Spit five times a day  chlorhexidine 4% Liquid 1 Application(s) Topical <User Schedule>  folic acid 1 milliGRAM(s) Oral daily  hydrocortisone 1% Cream 1 Application(s) Topical daily  lidocaine/prilocaine Cream 1 Application(s) Topical daily  LORazepam   Injectable 1 milliGRAM(s) IV Push every 24 hours  multivitamin 1 Tablet(s) Oral daily  sodium chloride 0.9%. 1000 milliLiter(s) IV Continuous <Continuous>      PRN:   acetaminophen   Tablet .. 650 milliGRAM(s) Oral every 6 hours PRN  acetaminophen   Tablet .. 650 milliGRAM(s) Oral every 6 hours PRN  aluminum hydroxide/magnesium hydroxide/simethicone Suspension 30 milliLiter(s) Oral every 4 hours PRN  AQUAPHOR (petrolatum Ointment) 1 Application(s) Topical two times a day PRN  bismuth subsalicylate Liquid 30 milliLiter(s) Oral every 4 hours PRN  diphenhydrAMINE 25 milliGRAM(s) Oral once PRN  diphenhydrAMINE   Injectable 25 milliGRAM(s) IV Push every 4 hours PRN  furosemide   Injectable 20 milliGRAM(s) IV Push every 24 hours PRN  metoclopramide Injectable 10 milliGRAM(s) IV Push every 6 hours PRN  ondansetron Injectable 8 milliGRAM(s) IV Push every 8 hours PRN  sodium chloride 0.9% lock flush 10 milliLiter(s) IV Push every 1 hour PRN      A/P:   59 year old male with a history of amyloidosis  Post Autologous PBSCT day + 7  Strict I&O, prn diuresis, daily weights   Keep platelets =/> 40K for history of amyloidosis   Neutropenic Fever: Cipo changed to cefepime, f.u blood/ urine cultures form   Chemotherapy induced grade 1 intestinal mucositis (diarrhea) - c. diff negative - d/c bowel regimen; no need to add imodium at this time, only reports 2 small volume diarrhea episodes  Chemotherapy induced grade 1 GI mucositis  with nausea - added zofran 8mg IV q 8 hours prn, carafate / maalox prn. Increased IVF to 50ml / hr     1. Infectious Disease:   acyclovir   Oral Tab/Cap 400 milliGRAM(s) Oral every 8 hours  cefepime   IVPB      cefepime   IVPB 2000 milliGRAM(s) IV Intermittent every 8 hours  clotrimazole Lozenge 1 Lozenge Oral five times a day  fluconAZOLE   Tablet 400 milliGRAM(s) Oral daily    2. VOD Prophylaxis: Actigall, Glutamine, Heparin (dosed at 100 units / kg / day)     3. GI Prophylaxis:    pantoprazole    Tablet 40 milliGRAM(s) Oral before breakfast    4. Mouth care - NS / NaHCO3 rinses, Mycelex, Biotene; Skin care     5. GVHD prophylaxis - n/a     6. Transfuse & replete electrolytes prn     7. IV hydration, daily weights, strict I&O, prn diuresis     8. PO intake as tolerated, nutrition follow up as needed, MVI, folic acid     9. Antiemetics, anti-diarrhea medications:   metoclopramide Injectable 10 milliGRAM(s) IV Push every 6 hours PRN    10. OOB as tolerated, physical therapy consult if needed     11. Monitor coags / fibrinogen 2x week, vitamin K as needed     12. Monitor closely for clinical changes, monitor for fevers     13. Emotional support provided, plan of care discussed and questions addressed     14. Patient education done regarding plan of care, restrictions and discharge planning     15. Continue regular social work input     I have written the above note for Dr. Loja who performed service with me in the room.   Kandice Woodward PA-C ([phone])    I have seen and examined patient with PA, I agree with above note as scribed. Chief Complaint: Autologous pbsct with high dose melphalan prep regimen for treatment of amyloidosis    S: Patient seen and examined with HPC Transplant Team:   + general weakness   + diarrhea   + intermittent nausea   + epigastric pain: improving   +fever overnight    Denies: mouth/throat pain, cough, SOB, abdominal pain, dizziness          O: Vitals:   Vital Signs Last 24 Hrs  T(C): 37.7 (2020 06:15), Max: 38.1 (2020 19:48)  T(F): 99.9 (2020 06:15), Max: 100.6 (2020 19:48)  HR: 82 (2020 06:15) (71 - 87)  BP: 106/68 (2020 06:15) (92/53 - 121/75)  BP(mean): --  RR: 18 (2020 06:15) (18 - 18)  SpO2: 95% (2020 06:15) (95% - 100%)    Admit weight:  85.7kg   Daily Weight in k (2020 09:30)  Today's weight:82.3 kg     Intake / Output:   08 @ 07:01  -   @ 07:00  --------------------------------------------------------  IN: 2149 mL / OUT: 2800 mL / NET: -651 mL    PE:   HEENT: Oropharynx: no erythema or ulcerations  Oral Mucositis:               -                                         Grade: n/a  CVS: S1, S2 RRR   Lungs: CTA throughout bilaterally   Abdomen: + BS x 4: normoactive, soft, NT, ND   Extremities: + chronic trace LE edema; L>R   Gastric Mucositis:      +                                            ndGndrndanddndend:nd nd2nd Intestinal Mucositis:    +                                          ndGndrndanddndend:nd nd2nd Skin: hyperpigmentation on back, neck, abdomen likely secondary to Kepivance   TLC: CDI  Neuro: A&O x 3  Pain: denies       Labs:                    140  |  108  |  9   ----------------------------<  98  3.6   |  22  |  0.75    Ca    8.1<L>      2020 07:18  Phos  3.9     07-  Mg     2.0     07-    TPro  5.4<L>  /  Alb  3.5  /  TBili  0.2  /  DBili  x   /  AST  9<L>  /  ALT  12  /  AlkPhos  50  07-      COVID-19 PCR . (20 @ 13:50)    COVID-19 PCR: NotDetec: Testing is performed using polymerase chain reaction (PCR) or  transcription mediated amplification (TMA). This COVID-19 (SARS-CoV-2)  nucleic acid amplification test was validated by [facility] and is  in use under the FDA Emergency Use Authorization (EUA) for clinical labs  CLIA-certified to perform high complexity testing. Test results should be  correlated with clinical presentation, patient history, and epidemiology.    Radiology:    CXR: Lungs clear     Meds:   Antimicrobials:   acyclovir   Oral Tab/Cap 400 milliGRAM(s) Oral every 8 hours  cefepime   IVPB      cefepime   IVPB 2000 milliGRAM(s) IV Intermittent every 8 hours  clotrimazole Lozenge 1 Lozenge Oral five times a day  fluconAZOLE   Tablet 400 milliGRAM(s) Oral daily      Heme / Onc:   heparin  Infusion 357 Unit(s)/Hr IV Continuous <Continuous>      GI:  aluminum hydroxide/magnesium hydroxide/simethicone Suspension 30 milliLiter(s) Oral every 4 hours PRN  bismuth subsalicylate Liquid 30 milliLiter(s) Oral every 4 hours PRN  pantoprazole    Tablet 40 milliGRAM(s) Oral before breakfast  sodium bicarbonate Mouth Rinse 10 milliLiter(s) Swish and Spit five times a day  sucralfate suspension 1 Gram(s) Oral four times a day  ursodiol Capsule 300 milliGRAM(s) Oral two times a day with meals      Cardiovascular:   furosemide   Injectable 20 milliGRAM(s) IV Push every 24 hours PRN      Immunologic:   filgrastim-sndz (ZARXIO) Injectable 480 MICROGram(s) SubCutaneous every 24 hours      Other medications:   Biotene Dry Mouth Oral Rinse 5 milliLiter(s) Swish and Spit five times a day  chlorhexidine 4% Liquid 1 Application(s) Topical <User Schedule>  folic acid 1 milliGRAM(s) Oral daily  hydrocortisone 1% Cream 1 Application(s) Topical daily  lidocaine/prilocaine Cream 1 Application(s) Topical daily  LORazepam   Injectable 1 milliGRAM(s) IV Push every 24 hours  multivitamin 1 Tablet(s) Oral daily  sodium chloride 0.9%. 1000 milliLiter(s) IV Continuous <Continuous>      PRN:   acetaminophen   Tablet .. 650 milliGRAM(s) Oral every 6 hours PRN  acetaminophen   Tablet .. 650 milliGRAM(s) Oral every 6 hours PRN  aluminum hydroxide/magnesium hydroxide/simethicone Suspension 30 milliLiter(s) Oral every 4 hours PRN  AQUAPHOR (petrolatum Ointment) 1 Application(s) Topical two times a day PRN  bismuth subsalicylate Liquid 30 milliLiter(s) Oral every 4 hours PRN  diphenhydrAMINE 25 milliGRAM(s) Oral once PRN  diphenhydrAMINE   Injectable 25 milliGRAM(s) IV Push every 4 hours PRN  furosemide   Injectable 20 milliGRAM(s) IV Push every 24 hours PRN  metoclopramide Injectable 10 milliGRAM(s) IV Push every 6 hours PRN  ondansetron Injectable 8 milliGRAM(s) IV Push every 8 hours PRN  sodium chloride 0.9% lock flush 10 milliLiter(s) IV Push every 1 hour PRN      A/P:   59 year old male with a history of amyloidosis  Post Autologous PBSCT day + 7  Strict I&O, prn diuresis, daily weights   Keep platelets =/> 40K for history of amyloidosis   Neutropenic Fever: Cipo changed to cefepime, f/u blood/ urine cultures form   Chemotherapy induced grade 1 intestinal mucositis (diarrhea) - c. diff negative - added imodium  Chemotherapy induced grade 1 GI mucositis: Improving with carafate / maalox     1. Infectious Disease:   acyclovir   Oral Tab/Cap 400 milliGRAM(s) Oral every 8 hours  cefepime   IVPB 2000 milliGRAM(s) IV Intermittent every 8 hours  clotrimazole Lozenge 1 Lozenge Oral five times a day  fluconAZOLE   Tablet 400 milliGRAM(s) Oral daily    2. VOD Prophylaxis: Actigall, Glutamine, Heparin (dosed at 100 units / kg / day)     3. GI Prophylaxis:    pantoprazole    Tablet 40 milliGRAM(s) Oral before breakfast    4. Mouth care - NS / NaHCO3 rinses, Mycelex, Biotene; Skin care     5. GVHD prophylaxis - n/a     6. Transfuse & replete electrolytes prn   1 U PLT for PLT goal >40     7. IV hydration, daily weights, strict I&O, prn diuresis     8. PO intake as tolerated, nutrition follow up as needed, MVI, folic acid     9. Antiemetics, anti-diarrhea medications:   metoclopramide Injectable 10 milliGRAM(s) IV Push every 6 hours PRN    10. OOB as tolerated, physical therapy consult if needed     11. Monitor coags / fibrinogen 2x week, vitamin K as needed     12. Monitor closely for clinical changes, monitor for fevers     13. Emotional support provided, plan of care discussed and questions addressed     14. Patient education done regarding plan of care, restrictions and discharge planning     15. Continue regular social work input     I have written the above note for Dr. Loja who performed service with me in the room.   Kandice Woodward PA-C ([phone])    I have seen and examined patient with PA, I agree with above note as scribed.

## 2020-07-09 NOTE — PROGRESS NOTE ADULT - PROBLEM SELECTOR PLAN 3
7/8:Chemotherapy induced grade 1 intestinal mucositis (diarrhea) - c. diff negative - d/c bowel regimen; no need to add imodium at this time, only reports 2 small volume diarrhea episodes 7/8:Chemotherapy induced grade 1 intestinal mucositis (diarrhea) -   c. diff negative -   7/9: added imodium

## 2020-07-09 NOTE — CHART NOTE - NSCHARTNOTEFT_GEN_A_CORE
Upon Nutritional Assessment by the Registered Dietitian your patient was determined to meet criteria / has evidence of the following diagnosis/diagnoses:          [x]  Mild Protein Calorie Malnutrition        [ ]  Moderate Protein Calorie Malnutrition        [ ] Severe Protein Calorie Malnutrition        [ ] Unspecified Protein Calorie Malnutrition        [ ] Underweight / BMI <19        [ ] Morbid Obesity / BMI > 40      Findings as based on:  [x] Comprehensive nutrition assessment   [ ] Nutrition Focused Physical Exam  [ ] Other:       Nutrition Plan/Recommendations:      1. Recommend Ensure Enlive x 2 daily (350kcal, 20g protein per 8 ounces).   2. Encouraged PO intake-small, frequent meals and nutrient dense snacks. In house, encouraged pt to order nutrient dense snacks c meals to consume in between meals to mimic small, frequent meals. Discussed protein rich foods available on menu. Discussed consuming protein first at meal times and then eating the other foods.       PROVIDER Section:     By signing this assessment you are acknowledging and agree with the diagnosis/diagnoses assigned by the Registered Dietitian    Comments:

## 2020-07-09 NOTE — CHART NOTE - NSCHARTNOTEFT_GEN_A_CORE
Nutrition Follow Up Note  Patient seen for: LOS follow up     Interim events noted, chart reviewed. Pt S/P autologous PBSCT day+7. Pt c grade 1 gastric and intestinal mucositis.     Source: pt, RN     Diet : Diet, Regular:   GlutaSolve(Glutamine) 15gm pkg     Qty per Day:  1 (06-29-20 @ 10:54)    Patient reports:    Daily Weight: 6/29: 188.9->7/5: 187.8->7/9: 181.4 pounds, wt loss noted, noted wt 7/8: about 180 pounds and 7/7: 184 pounds     Pertinent Medications: MEDICATIONS  (STANDING):  acyclovir   Oral Tab/Cap 400 milliGRAM(s) Oral every 8 hours  Biotene Dry Mouth Oral Rinse 5 milliLiter(s) Swish and Spit five times a day  cefepime   IVPB      cefepime   IVPB 2000 milliGRAM(s) IV Intermittent every 8 hours  chlorhexidine 4% Liquid 1 Application(s) Topical <User Schedule>  clotrimazole Lozenge 1 Lozenge Oral five times a day  filgrastim-sndz (ZARXIO) Injectable 480 MICROGram(s) SubCutaneous every 24 hours  fluconAZOLE   Tablet 400 milliGRAM(s) Oral daily  folic acid 1 milliGRAM(s) Oral daily  heparin  Infusion 357 Unit(s)/Hr (3.57 mL/Hr) IV Continuous <Continuous>  hydrocortisone 1% Cream 1 Application(s) Topical daily  lidocaine/prilocaine Cream 1 Application(s) Topical daily  LORazepam   Injectable 1 milliGRAM(s) IV Push every 24 hours  multivitamin 1 Tablet(s) Oral daily  pantoprazole    Tablet 40 milliGRAM(s) Oral before breakfast  sodium bicarbonate Mouth Rinse 10 milliLiter(s) Swish and Spit five times a day  sodium chloride 0.9%. 1000 milliLiter(s) (75 mL/Hr) IV Continuous <Continuous>  sucralfate suspension 1 Gram(s) Oral four times a day  ursodiol Capsule 300 milliGRAM(s) Oral two times a day with meals    MEDICATIONS  (PRN):  acetaminophen   Tablet .. 650 milliGRAM(s) Oral every 6 hours PRN Temp greater or equal to 38C (100.4F), Mild Pain (1 - 3)  acetaminophen   Tablet .. 650 milliGRAM(s) Oral every 6 hours PRN Temp greater or equal to 38C (100.4F), Mild Pain (1 - 3)  aluminum hydroxide/magnesium hydroxide/simethicone Suspension 30 milliLiter(s) Oral every 4 hours PRN Dyspepsia  AQUAPHOR (petrolatum Ointment) 1 Application(s) Topical two times a day PRN dry/itchy skin  bismuth subsalicylate Liquid 30 milliLiter(s) Oral every 4 hours PRN nausea / diarrhea / upset stomach  diphenhydrAMINE 25 milliGRAM(s) Oral once PRN Rash and/or Itching  diphenhydrAMINE   Injectable 25 milliGRAM(s) IV Push every 4 hours PRN Allergy symptoms  furosemide   Injectable 20 milliGRAM(s) IV Push every 24 hours PRN If urine output is <100mL/hr for 2 hours  loperamide 2 milliGRAM(s) Oral every 6 hours PRN Diarrhea  metoclopramide Injectable 10 milliGRAM(s) IV Push every 6 hours PRN Nausea and/or Vomiting  ondansetron Injectable 8 milliGRAM(s) IV Push every 8 hours PRN Nausea and/or Vomiting  sodium chloride 0.9% lock flush 10 milliLiter(s) IV Push every 1 hour PRN Pre/post blood products, medications, blood draw, and to maintain line patency    Pertinent Labs: 07-09 @ 07:18: Na 140, BUN 9, Cr 0.75, BG 98, K+ 3.6, Phos 3.9, Mg 2.0, Alk Phos 50, ALT/SGPT 12, AST/SGOT 9<L>, HbA1c --    Finger Sticks: None pertinent to address at this time.       Skin per nursing documentation: no pressure injuries   Edema: none at this time     Estimated Needs:   [x] no change since previous assessment      Previous Nutrition Diagnosis: Predicted suboptimal energy intake   Nutrition Diagnosis is now advanced to diagnosis below     New Nutrition Diagnosis: malnutrition  Related to: decreased appetite, increased demand for nutrients, altered GI function    As evidenced by: pt c wt loss, decreased PO intake      Interventions:     Recommend  1) Continue c current diet.   2) Encouraged PO intake-small, frequent meals and nutrient dense snacks. In house, encouraged pt to order nutrient dense snacks c meals to consume in between meals to mimic small, frequent meals. Discussed protein rich foods available on menu. Discussed consuming protein first at meal times and then eating the other foods.     Monitoring and Evaluation:     Continue to monitor Nutritional intake, Tolerance to diet prescription, weights, labs, skin integrity    RD remains available upon request and will follow up per protocol  Darling Barth MS RD CDN [facility],  #[phone] Nutrition Follow Up Note  Patient seen for: LOS follow up     Interim events noted, chart reviewed. Pt S/P autologous PBSCT day+7. Pt c grade 1 gastric and intestinal mucositis.     Source: pt, RN     Diet : Diet, Regular:   GlutaSolve(Glutamine) 15gm pkg     Qty per Day:  1 (06-29-20 @ 10:54)    Patient reports: appetite and intake have decreased, reports yesterday was his most difficult day, reports he was eating yesterday, seemed to be eating nutrient dense foods in smaller amounts. Reports he was able to eat cereal and a bagel this morning as well. Reports BMs are watery and plans on asking for some Lomotil. Reports he is agreeable to taking Ensure Enlive at this time. Reports mouth and throat are without any sores, does not have much taste.     Daily Weight: 6/29: 188.9->7/5: 187.8->7/9: 181.4 pounds, wt loss noted, noted wt 7/8: about 180 pounds and 7/7: 184 pounds ; pt deferred nutrition focused physical exam at this time, visually pt continues to look well nourished, slightly thinner in the face, reports he had "beefed" up prior to coming in since he knew he would lose some wt in house    Pertinent Medications: MEDICATIONS  (STANDING):  acyclovir   Oral Tab/Cap 400 milliGRAM(s) Oral every 8 hours  Biotene Dry Mouth Oral Rinse 5 milliLiter(s) Swish and Spit five times a day  cefepime   IVPB      cefepime   IVPB 2000 milliGRAM(s) IV Intermittent every 8 hours  chlorhexidine 4% Liquid 1 Application(s) Topical <User Schedule>  clotrimazole Lozenge 1 Lozenge Oral five times a day  filgrastim-sndz (ZARXIO) Injectable 480 MICROGram(s) SubCutaneous every 24 hours  fluconAZOLE   Tablet 400 milliGRAM(s) Oral daily  folic acid 1 milliGRAM(s) Oral daily  heparin  Infusion 357 Unit(s)/Hr (3.57 mL/Hr) IV Continuous <Continuous>  hydrocortisone 1% Cream 1 Application(s) Topical daily  lidocaine/prilocaine Cream 1 Application(s) Topical daily  LORazepam   Injectable 1 milliGRAM(s) IV Push every 24 hours  multivitamin 1 Tablet(s) Oral daily  pantoprazole    Tablet 40 milliGRAM(s) Oral before breakfast  sodium bicarbonate Mouth Rinse 10 milliLiter(s) Swish and Spit five times a day  sodium chloride 0.9%. 1000 milliLiter(s) (75 mL/Hr) IV Continuous <Continuous>  sucralfate suspension 1 Gram(s) Oral four times a day  ursodiol Capsule 300 milliGRAM(s) Oral two times a day with meals    MEDICATIONS  (PRN):  acetaminophen   Tablet .. 650 milliGRAM(s) Oral every 6 hours PRN Temp greater or equal to 38C (100.4F), Mild Pain (1 - 3)  acetaminophen   Tablet .. 650 milliGRAM(s) Oral every 6 hours PRN Temp greater or equal to 38C (100.4F), Mild Pain (1 - 3)  aluminum hydroxide/magnesium hydroxide/simethicone Suspension 30 milliLiter(s) Oral every 4 hours PRN Dyspepsia  AQUAPHOR (petrolatum Ointment) 1 Application(s) Topical two times a day PRN dry/itchy skin  bismuth subsalicylate Liquid 30 milliLiter(s) Oral every 4 hours PRN nausea / diarrhea / upset stomach  diphenhydrAMINE 25 milliGRAM(s) Oral once PRN Rash and/or Itching  diphenhydrAMINE   Injectable 25 milliGRAM(s) IV Push every 4 hours PRN Allergy symptoms  furosemide   Injectable 20 milliGRAM(s) IV Push every 24 hours PRN If urine output is <100mL/hr for 2 hours  loperamide 2 milliGRAM(s) Oral every 6 hours PRN Diarrhea  metoclopramide Injectable 10 milliGRAM(s) IV Push every 6 hours PRN Nausea and/or Vomiting  ondansetron Injectable 8 milliGRAM(s) IV Push every 8 hours PRN Nausea and/or Vomiting  sodium chloride 0.9% lock flush 10 milliLiter(s) IV Push every 1 hour PRN Pre/post blood products, medications, blood draw, and to maintain line patency    Pertinent Labs: 07-09 @ 07:18: Na 140, BUN 9, Cr 0.75, BG 98, K+ 3.6, Phos 3.9, Mg 2.0, Alk Phos 50, ALT/SGPT 12, AST/SGOT 9<L>, HbA1c --    Finger Sticks: None pertinent to address at this time.       Skin per nursing documentation: no pressure injuries   Edema: none at this time     Estimated Needs:   [x] no change since previous assessment      Previous Nutrition Diagnosis: Predicted suboptimal energy intake   Nutrition Diagnosis is now advanced to diagnosis below     New Nutrition Diagnosis: malnutrition (acute, mild)  Related to: decreased appetite, increased demand for nutrients, altered GI function    As evidenced by: pt c some wt loss, decreased PO intake     Interventions: education and supplementation    Recommend  1) Continue c current diet.   2) Encouraged PO intake-small, frequent meals and nutrient dense snacks. In house, encouraged pt to order nutrient dense snacks c meals to consume in between meals to mimic small, frequent meals. Discussed protein rich foods available on menu. Discussed consuming protein first at meal times and then eating the other foods.   3) Recommend Ensure Enlive x 2 daily (350kcal, 20g protein per 8 ounces).   4) Provided pt c Alternative Cold Item list to help encourage PO intake.     Monitoring and Evaluation:     Continue to monitor Nutritional intake, Tolerance to diet prescription, weights, labs, skin integrity    RD remains available upon request and will follow up per protocol  Darling Barth MS RD CDN [facility],  #[phone]

## 2020-07-09 NOTE — PROGRESS NOTE ADULT - PROBLEM SELECTOR PLAN 2
7/8: Chemotherapy induced grade 1 GI mucositis  with nausea - added zofran 8mg IV q 8 hours prn, carafate / maalox prn. Increased IVF to 50ml / hr

## 2020-07-10 LAB
ALBUMIN SERPL ELPH-MCNC: 3.1 G/DL — LOW (ref 3.3–5)
ALP SERPL-CCNC: 44 U/L — SIGNIFICANT CHANGE UP (ref 40–120)
ALT FLD-CCNC: 10 U/L — SIGNIFICANT CHANGE UP (ref 10–45)
ANION GAP SERPL CALC-SCNC: 8 MMOL/L — SIGNIFICANT CHANGE UP (ref 5–17)
AST SERPL-CCNC: 7 U/L — LOW (ref 10–40)
BILIRUB DIRECT SERPL-MCNC: <0.1 MG/DL — SIGNIFICANT CHANGE UP (ref 0–0.2)
BILIRUB INDIRECT FLD-MCNC: >0 MG/DL — LOW (ref 0.2–1)
BILIRUB SERPL-MCNC: 0.1 MG/DL — LOW (ref 0.2–1.2)
BLD GP AB SCN SERPL QL: NEGATIVE — SIGNIFICANT CHANGE UP
BUN SERPL-MCNC: 8 MG/DL — SIGNIFICANT CHANGE UP (ref 7–23)
CALCIUM SERPL-MCNC: 8 MG/DL — LOW (ref 8.4–10.5)
CHLORIDE SERPL-SCNC: 110 MMOL/L — HIGH (ref 96–108)
CO2 SERPL-SCNC: 22 MMOL/L — SIGNIFICANT CHANGE UP (ref 22–31)
CREAT SERPL-MCNC: 0.72 MG/DL — SIGNIFICANT CHANGE UP (ref 0.5–1.3)
GLUCOSE SERPL-MCNC: 93 MG/DL — SIGNIFICANT CHANGE UP (ref 70–99)
HCT VFR BLD CALC: 21.1 % — LOW (ref 39–50)
HGB BLD-MCNC: 6.8 G/DL — CRITICAL LOW (ref 13–17)
LDH SERPL L TO P-CCNC: 114 U/L — SIGNIFICANT CHANGE UP (ref 50–242)
MAGNESIUM SERPL-MCNC: 1.9 MG/DL — SIGNIFICANT CHANGE UP (ref 1.6–2.6)
MCHC RBC-ENTMCNC: 23.8 PG — LOW (ref 27–34)
MCHC RBC-ENTMCNC: 32.2 GM/DL — SIGNIFICANT CHANGE UP (ref 32–36)
MCV RBC AUTO: 73.8 FL — LOW (ref 80–100)
NRBC # BLD: 0 /100 WBCS — SIGNIFICANT CHANGE UP (ref 0–0)
PHOSPHATE SERPL-MCNC: 4 MG/DL — SIGNIFICANT CHANGE UP (ref 2.5–4.5)
PLATELET # BLD AUTO: 26 K/UL — LOW (ref 150–400)
POTASSIUM SERPL-MCNC: 3.5 MMOL/L — SIGNIFICANT CHANGE UP (ref 3.5–5.3)
POTASSIUM SERPL-SCNC: 3.5 MMOL/L — SIGNIFICANT CHANGE UP (ref 3.5–5.3)
PROT SERPL-MCNC: 5.2 G/DL — LOW (ref 6–8.3)
RBC # BLD: 2.86 M/UL — LOW (ref 4.2–5.8)
RBC # FLD: 14.5 % — SIGNIFICANT CHANGE UP (ref 10.3–14.5)
RH IG SCN BLD-IMP: POSITIVE — SIGNIFICANT CHANGE UP
SODIUM SERPL-SCNC: 140 MMOL/L — SIGNIFICANT CHANGE UP (ref 135–145)
WBC # BLD: <0.1 K/UL — CRITICAL LOW (ref 3.8–10.5)
WBC # FLD AUTO: <0.1 K/UL — CRITICAL LOW (ref 3.8–10.5)

## 2020-07-10 PROCEDURE — 99291 CRITICAL CARE FIRST HOUR: CPT

## 2020-07-10 PROCEDURE — 99233 SBSQ HOSP IP/OBS HIGH 50: CPT

## 2020-07-10 RX ORDER — SODIUM CHLORIDE 9 MG/ML
1000 INJECTION INTRAMUSCULAR; INTRAVENOUS; SUBCUTANEOUS
Refills: 0 | Status: DISCONTINUED | OUTPATIENT
Start: 2020-07-10 | End: 2020-07-20

## 2020-07-10 RX ADMIN — Medication 1 GRAM(S): at 05:55

## 2020-07-10 RX ADMIN — Medication 1 LOZENGE: at 18:21

## 2020-07-10 RX ADMIN — Medication 10 MILLILITER(S): at 08:23

## 2020-07-10 RX ADMIN — Medication 10 MILLILITER(S): at 12:41

## 2020-07-10 RX ADMIN — Medication 5 MILLILITER(S): at 23:12

## 2020-07-10 RX ADMIN — Medication 400 MILLIGRAM(S): at 13:03

## 2020-07-10 RX ADMIN — Medication 1 APPLICATION(S): at 12:43

## 2020-07-10 RX ADMIN — Medication 10 MILLILITER(S): at 19:33

## 2020-07-10 RX ADMIN — Medication 480 MICROGRAM(S): at 18:23

## 2020-07-10 RX ADMIN — Medication 5 MILLILITER(S): at 19:32

## 2020-07-10 RX ADMIN — Medication 5 MILLILITER(S): at 18:21

## 2020-07-10 RX ADMIN — Medication 1 LOZENGE: at 19:33

## 2020-07-10 RX ADMIN — CEFEPIME 100 MILLIGRAM(S): 1 INJECTION, POWDER, FOR SOLUTION INTRAMUSCULAR; INTRAVENOUS at 05:55

## 2020-07-10 RX ADMIN — CHLORHEXIDINE GLUCONATE 1 APPLICATION(S): 213 SOLUTION TOPICAL at 08:23

## 2020-07-10 RX ADMIN — CEFEPIME 100 MILLIGRAM(S): 1 INJECTION, POWDER, FOR SOLUTION INTRAMUSCULAR; INTRAVENOUS at 21:26

## 2020-07-10 RX ADMIN — Medication 0.5 MILLIGRAM(S): at 23:08

## 2020-07-10 RX ADMIN — Medication 1 MILLIGRAM(S): at 12:41

## 2020-07-10 RX ADMIN — Medication 1 GRAM(S): at 18:24

## 2020-07-10 RX ADMIN — Medication 1 GRAM(S): at 23:07

## 2020-07-10 RX ADMIN — Medication 650 MILLIGRAM(S): at 22:03

## 2020-07-10 RX ADMIN — SODIUM CHLORIDE 50 MILLILITER(S): 9 INJECTION INTRAMUSCULAR; INTRAVENOUS; SUBCUTANEOUS at 19:57

## 2020-07-10 RX ADMIN — Medication 1 LOZENGE: at 08:23

## 2020-07-10 RX ADMIN — Medication 5 MILLILITER(S): at 08:22

## 2020-07-10 RX ADMIN — FLUCONAZOLE 400 MILLIGRAM(S): 150 TABLET ORAL at 12:40

## 2020-07-10 RX ADMIN — PANTOPRAZOLE SODIUM 40 MILLIGRAM(S): 20 TABLET, DELAYED RELEASE ORAL at 06:15

## 2020-07-10 RX ADMIN — HEPARIN SODIUM 3.57 UNIT(S)/HR: 5000 INJECTION INTRAVENOUS; SUBCUTANEOUS at 19:58

## 2020-07-10 RX ADMIN — Medication 1 LOZENGE: at 12:27

## 2020-07-10 RX ADMIN — Medication 5 MILLILITER(S): at 12:26

## 2020-07-10 RX ADMIN — CEFEPIME 100 MILLIGRAM(S): 1 INJECTION, POWDER, FOR SOLUTION INTRAMUSCULAR; INTRAVENOUS at 13:03

## 2020-07-10 RX ADMIN — Medication 400 MILLIGRAM(S): at 05:55

## 2020-07-10 RX ADMIN — Medication 25 MILLIGRAM(S): at 10:00

## 2020-07-10 RX ADMIN — Medication 1 GRAM(S): at 12:33

## 2020-07-10 RX ADMIN — Medication 400 MILLIGRAM(S): at 21:27

## 2020-07-10 RX ADMIN — Medication 10 MILLILITER(S): at 23:13

## 2020-07-10 RX ADMIN — Medication 1 LOZENGE: at 23:13

## 2020-07-10 RX ADMIN — Medication 10 MILLILITER(S): at 18:22

## 2020-07-10 RX ADMIN — URSODIOL 300 MILLIGRAM(S): 250 TABLET, FILM COATED ORAL at 18:22

## 2020-07-10 RX ADMIN — URSODIOL 300 MILLIGRAM(S): 250 TABLET, FILM COATED ORAL at 08:21

## 2020-07-10 RX ADMIN — Medication 1 TABLET(S): at 12:41

## 2020-07-10 RX ADMIN — Medication 2 MILLIGRAM(S): at 08:21

## 2020-07-10 NOTE — PROGRESS NOTE ADULT - PROBLEM SELECTOR PLAN 2
7/8: Chemotherapy induced grade 1 GI mucositis  with nausea - added zofran 8mg IV q 8 hours prn, carafate / maalox prn. Increased IVF to 50ml / hr 7/8: Chemotherapy induced grade 1 GI mucositis  with nausea - added zofran 8mg IV q 8 hours prn, carafate / maalox prn.

## 2020-07-10 NOTE — PROGRESS NOTE ADULT - PROBLEM SELECTOR PLAN 3
7/8:Chemotherapy induced grade 1 intestinal mucositis (diarrhea) -   c. diff negative -   7/9: added imodium

## 2020-07-10 NOTE — PROGRESS NOTE ADULT - SUBJECTIVE AND OBJECTIVE BOX
Chief Complaint: Autologous pbsct with high dose melphalan prep regimen for treatment of amyloidosis    S: Patient seen and examined with HPC Transplant Team:   + general weakness   + diarrhea   + intermittent nausea   + epigastric pain: improving   +fever overnight    Denies: mouth/throat pain, cough, SOB, abdominal pain, dizziness        O: Vitals:   Vital Signs Last 24 Hrs  T(C): 37.9 (10 Jul 2020 06:00), Max: 38 (2020 21:33)  T(F): 100.2 (10 Jul 2020 06:00), Max: 100.4 (2020 21:33)  HR: 81 (10 Jul 2020 06:00) (69 - 85)  BP: 108/64 (10 Jul 2020 06:00) (92/54 - 117/71)  BP(mean): --  RR: 18 (10 Jul 2020 06:00) (18 - 18)  SpO2: 98% (10 Jul 2020 06:00) (96% - 100%)    Admit weight:85.7kg     Daily Weight in k.3 (2020 08:32)  Today's weight:    Intake / Output:    @ 07: @ 07:00  --------------------------------------------------------  IN: 2149 mL / OUT: 2800 mL / NET: -651 mL     @ 07:10 @ 06:59  --------------------------------------------------------  IN: 2739 mL / OUT: 2240 mL / NET: 499 mL      PE:   HEENT: Oropharynx: no erythema or ulcerations  Oral Mucositis:               -                                         Grade: n/a  CVS: S1, S2 RRR   Lungs: CTA throughout bilaterally   Abdomen: + BS x 4: normoactive, soft, NT, ND   Extremities: + chronic trace LE edema; L>R   Gastric Mucositis:      +                                            ndGndrndanddndend:nd nd2nd Intestinal Mucositis:    +                                          ndGndrndanddndend:nd nd2nd Skin: hyperpigmentation on back, neck, abdomen likely secondary to Kepivance   TLC: CDI  Neuro: A&O x 3  Pain: denies           Labs:     Cultures:   Culture - Urine (20 @ 00:23)    Specimen Source: .Urine Clean Catch (Midstream)    Culture Results:   No growth      Culture - Blood (20 @ 23:56)    Specimen Source: .Blood Blood    Culture Results:   No growth to date.    Culture - Blood (20 @ 23:56)    Specimen Source: .Blood Blood    Culture Results:   No growth to date.      Radiology:    CXR: Lungs clear     Meds:   Antimicrobials:   acyclovir   Oral Tab/Cap 400 milliGRAM(s) Oral every 8 hours  cefepime   IVPB      cefepime   IVPB 2000 milliGRAM(s) IV Intermittent every 8 hours  clotrimazole Lozenge 1 Lozenge Oral five times a day  fluconAZOLE   Tablet 400 milliGRAM(s) Oral daily      Heme / Onc:   heparin  Infusion 357 Unit(s)/Hr IV Continuous <Continuous>      GI:  aluminum hydroxide/magnesium hydroxide/simethicone Suspension 30 milliLiter(s) Oral every 4 hours PRN  bismuth subsalicylate Liquid 30 milliLiter(s) Oral every 4 hours PRN  loperamide 2 milliGRAM(s) Oral every 6 hours PRN  pantoprazole    Tablet 40 milliGRAM(s) Oral before breakfast  sodium bicarbonate Mouth Rinse 10 milliLiter(s) Swish and Spit five times a day  sucralfate suspension 1 Gram(s) Oral four times a day  ursodiol Capsule 300 milliGRAM(s) Oral two times a day with meals      Cardiovascular:   furosemide   Injectable 20 milliGRAM(s) IV Push every 24 hours PRN      Immunologic:   filgrastim-sndz (ZARXIO) Injectable 480 MICROGram(s) SubCutaneous every 24 hours      Other medications:   Biotene Dry Mouth Oral Rinse 5 milliLiter(s) Swish and Spit five times a day  chlorhexidine 4% Liquid 1 Application(s) Topical <User Schedule>  folic acid 1 milliGRAM(s) Oral daily  hydrocortisone 1% Cream 1 Application(s) Topical daily  lidocaine/prilocaine Cream 1 Application(s) Topical daily  multivitamin 1 Tablet(s) Oral daily  sodium chloride 0.9%. 1000 milliLiter(s) IV Continuous <Continuous>      PRN:   acetaminophen   Tablet .. 650 milliGRAM(s) Oral every 6 hours PRN  acetaminophen   Tablet .. 650 milliGRAM(s) Oral every 6 hours PRN  aluminum hydroxide/magnesium hydroxide/simethicone Suspension 30 milliLiter(s) Oral every 4 hours PRN  AQUAPHOR (petrolatum Ointment) 1 Application(s) Topical two times a day PRN  bismuth subsalicylate Liquid 30 milliLiter(s) Oral every 4 hours PRN  diphenhydrAMINE 25 milliGRAM(s) Oral once PRN  diphenhydrAMINE   Injectable 25 milliGRAM(s) IV Push every 4 hours PRN  furosemide   Injectable 20 milliGRAM(s) IV Push every 24 hours PRN  loperamide 2 milliGRAM(s) Oral every 6 hours PRN  metoclopramide Injectable 10 milliGRAM(s) IV Push every 6 hours PRN  ondansetron Injectable 8 milliGRAM(s) IV Push every 8 hours PRN  sodium chloride 0.9% lock flush 10 milliLiter(s) IV Push every 1 hour PRN        A/P:   59 year old male with a history of amyloidosis  Post Autologous PBSCT day + 8  Strict I&O, prn diuresis, daily weights   Keep platelets =/> 40K for history of amyloidosis   Neutropenic Fever: continue with cefepime, 7/8 blood and urine cultures NTD, CXR WNL, COVID negative   Chemotherapy induced grade 1 intestinal mucositis (diarrhea) - c. diff negative - added imodium  Chemotherapy induced grade 1 GI mucositis: Improving with carafate / maalox     1. Infectious Disease:   acyclovir   Oral Tab/Cap 400 milliGRAM(s) Oral every 8 hours  cefepime   IVPB 2000 milliGRAM(s) IV Intermittent every 8 hours  clotrimazole Lozenge 1 Lozenge Oral five times a day  fluconAZOLE   Tablet 400 milliGRAM(s) Oral daily    2. VOD Prophylaxis: Actigall, Glutamine, Heparin (dosed at 100 units / kg / day)     3. GI Prophylaxis:    pantoprazole    Tablet 40 milliGRAM(s) Oral before breakfast    4. Mouth care - NS / NaHCO3 rinses, Mycelex, Biotene; Skin care     5. GVHD prophylaxis - n/a     6. Transfuse & replete electrolytes prn       7. IV hydration, daily weights, strict I&O, prn diuresis     8. PO intake as tolerated, nutrition follow up as needed, MVI, folic acid     9. Antiemetics, anti-diarrhea medications:   metoclopramide Injectable 10 milliGRAM(s) IV Push every 6 hours PRN    10. OOB as tolerated, physical therapy consult if needed     11. Monitor coags / fibrinogen 2x week, vitamin K as needed     12. Monitor closely for clinical changes, monitor for fevers     13. Emotional support provided, plan of care discussed and questions addressed     14. Patient education done regarding plan of care, restrictions and discharge planning     15. Continue regular social work input     I have written the above note for Dr. Loja who performed service with me in the room.   Kandice Woodward PA-C ([phone])    I have seen and examined patient with PA, I agree with above note as scribed. Chief Complaint: Autologous pbsct with high dose melphalan prep regimen for treatment of amyloidosis    S: Patient seen and examined with HPC Transplant Team:   + general weakness   + diarrhea   + intermittent nausea   + epigastric pain: improving   +fever overnight    Denies: mouth/throat pain, cough, SOB, abdominal pain, dizziness        O: Vitals:   Vital Signs Last 24 Hrs  T(C): 37.9 (10 Jul 2020 06:00), Max: 38 (2020 21:33)  T(F): 100.2 (10 Jul 2020 06:00), Max: 100.4 (2020 21:33)  HR: 81 (10 Jul 2020 06:00) (69 - 85)  BP: 108/64 (10 Jul 2020 06:00) (92/54 - 117/71)  BP(mean): --  RR: 18 (10 Jul 2020 06:00) (18 - 18)  SpO2: 98% (10 Jul 2020 06:00) (96% - 100%)    Admit weight:85.7kg     Daily Weight in k.3 (2020 08:32)  Today's weight:    Intake / Output:    @ 07: @ 07:00  --------------------------------------------------------  IN: 2149 mL / OUT: 2800 mL / NET: -651 mL     @ 07:10 @ 06:59  --------------------------------------------------------  IN: 2739 mL / OUT: 2240 mL / NET: 499 mL      PE:   HEENT: Oropharynx: no erythema or ulcerations  Oral Mucositis:               -                                         Grade: n/a  CVS: S1, S2 RRR   Lungs: CTA throughout bilaterally   Abdomen: + BS x 4: normoactive, soft, NT, ND   Extremities: + chronic trace LE edema; L>R   Gastric Mucositis:      +                                            ndGndrndanddndend:nd nd2nd Intestinal Mucositis:    +                                          ndGndrndanddndend:nd nd2nd Skin: hyperpigmentation on back, neck, abdomen likely secondary to Kepivance   TLC: CDI  Neuro: A&O x 3  Pain: denies     Labs:                         6.8    <0.10 )-----------( 26       ( 10 Jul 2020 07:03 )             21.1       07-10    140  |  110<H>  |  8   ----------------------------<  93  3.5   |  22  |  0.72    Ca    8.0<L>      10 Jul 2020 07:03 Calculated Corrected Ca 8.7   Phos  4.0     07-10  Mg     1.9     07-10    TPro  5.2<L>  /  Alb  3.1<L>  /  TBili  0.1<L>  /  DBili  <0.1  /  AST  7<L>  /  ALT  10  /  AlkPhos  44  07-10    COVID-19 PCR . (20 @ 13:50)    COVID-19 PCR: NotDetec: Testing is performed using polymerase chain reaction (PCR) or  transcription mediated amplification (TMA). This COVID-19 (SARS-CoV-2)  nucleic acid amplification test was validated by [employer] and is  in use under the FDA Emergency Use Authorization (EUA) for clinical labs  CLIA-certified to perform high complexity testing. Test results should be  correlated with clinical presentation, patient history, and epidemiology.      Cultures:   Culture - Urine (20 @ 00:23)    Specimen Source: .Urine Clean Catch (Midstream)    Culture Results:   No growth      Culture - Blood (20 @ 23:56)    Specimen Source: .Blood Blood    Culture Results:   No growth to date.    Culture - Blood (20 @ 23:56)    Specimen Source: .Blood Blood    Culture Results:   No growth to date.      Radiology:    CXR: Lungs clear     Meds:   Antimicrobials:   acyclovir   Oral Tab/Cap 400 milliGRAM(s) Oral every 8 hours  cefepime   IVPB      cefepime   IVPB 2000 milliGRAM(s) IV Intermittent every 8 hours  clotrimazole Lozenge 1 Lozenge Oral five times a day  fluconAZOLE   Tablet 400 milliGRAM(s) Oral daily      Heme / Onc:   heparin  Infusion 357 Unit(s)/Hr IV Continuous <Continuous>      GI:  aluminum hydroxide/magnesium hydroxide/simethicone Suspension 30 milliLiter(s) Oral every 4 hours PRN  bismuth subsalicylate Liquid 30 milliLiter(s) Oral every 4 hours PRN  loperamide 2 milliGRAM(s) Oral every 6 hours PRN  pantoprazole    Tablet 40 milliGRAM(s) Oral before breakfast  sodium bicarbonate Mouth Rinse 10 milliLiter(s) Swish and Spit five times a day  sucralfate suspension 1 Gram(s) Oral four times a day  ursodiol Capsule 300 milliGRAM(s) Oral two times a day with meals      Cardiovascular:   furosemide   Injectable 20 milliGRAM(s) IV Push every 24 hours PRN      Immunologic:   filgrastim-sndz (ZARXIO) Injectable 480 MICROGram(s) SubCutaneous every 24 hours      Other medications:   Biotene Dry Mouth Oral Rinse 5 milliLiter(s) Swish and Spit five times a day  chlorhexidine 4% Liquid 1 Application(s) Topical <User Schedule>  folic acid 1 milliGRAM(s) Oral daily  hydrocortisone 1% Cream 1 Application(s) Topical daily  lidocaine/prilocaine Cream 1 Application(s) Topical daily  multivitamin 1 Tablet(s) Oral daily  sodium chloride 0.9%. 1000 milliLiter(s) IV Continuous <Continuous>      PRN:   acetaminophen   Tablet .. 650 milliGRAM(s) Oral every 6 hours PRN  acetaminophen   Tablet .. 650 milliGRAM(s) Oral every 6 hours PRN  aluminum hydroxide/magnesium hydroxide/simethicone Suspension 30 milliLiter(s) Oral every 4 hours PRN  AQUAPHOR (petrolatum Ointment) 1 Application(s) Topical two times a day PRN  bismuth subsalicylate Liquid 30 milliLiter(s) Oral every 4 hours PRN  diphenhydrAMINE 25 milliGRAM(s) Oral once PRN  diphenhydrAMINE   Injectable 25 milliGRAM(s) IV Push every 4 hours PRN  furosemide   Injectable 20 milliGRAM(s) IV Push every 24 hours PRN  loperamide 2 milliGRAM(s) Oral every 6 hours PRN  metoclopramide Injectable 10 milliGRAM(s) IV Push every 6 hours PRN  ondansetron Injectable 8 milliGRAM(s) IV Push every 8 hours PRN  sodium chloride 0.9% lock flush 10 milliLiter(s) IV Push every 1 hour PRN        A/P:   59 year old male with a history of amyloidosis  Post Autologous PBSCT day + 8  Strict I&O, prn diuresis, daily weights   Keep platelets =/> 40K for history of amyloidosis   Neutropenic Fever: continue with cefepime, 7/8 blood and urine cultures NTD, CXR WNL, COVID negative   Chemotherapy induced grade 1 intestinal mucositis (diarrhea) - c. diff negative - added imodium  Chemotherapy induced grade 1 GI mucositis: Improving with carafate / maalox     1. Infectious Disease:   acyclovir   Oral Tab/Cap 400 milliGRAM(s) Oral every 8 hours  cefepime   IVPB 2000 milliGRAM(s) IV Intermittent every 8 hours  clotrimazole Lozenge 1 Lozenge Oral five times a day  fluconAZOLE   Tablet 400 milliGRAM(s) Oral daily    2. VOD Prophylaxis: Actigall, Glutamine, Heparin (dosed at 100 units / kg / day)     3. GI Prophylaxis:    pantoprazole    Tablet 40 milliGRAM(s) Oral before breakfast    4. Mouth care - NS / NaHCO3 rinses, Mycelex, Biotene; Skin care     5. GVHD prophylaxis - n/a     6. Transfuse & replete electrolytes prn       7. IV hydration, daily weights, strict I&O, prn diuresis     8. PO intake as tolerated, nutrition follow up as needed, MVI, folic acid     9. Antiemetics, anti-diarrhea medications:   metoclopramide Injectable 10 milliGRAM(s) IV Push every 6 hours PRN    10. OOB as tolerated, physical therapy consult if needed     11. Monitor coags / fibrinogen 2x week, vitamin K as needed     12. Monitor closely for clinical changes, monitor for fevers     13. Emotional support provided, plan of care discussed and questions addressed     14. Patient education done regarding plan of care, restrictions and discharge planning     15. Continue regular social work input     I have written the above note for Dr. Loja who performed service with me in the room.   Kandice Woodward PA-C ([phone])    I have seen and examined patient with PA, I agree with above note as scribed. Chief Complaint: Autologous pbsct with high dose melphalan prep regimen for treatment of amyloidosis    S: Patient seen and examined with HPC Transplant Team:   + general weakness   + diarrhea   + intermittent nausea   + epigastric pain: improving   +fever overnight    Denies: mouth/throat pain, cough, SOB, abdominal pain, dizziness        O: Vitals:   Vital Signs Last 24 Hrs  T(C): 37.9 (10 Jul 2020 06:00), Max: 38 (2020 21:33)  T(F): 100.2 (10 Jul 2020 06:00), Max: 100.4 (2020 21:33)  HR: 81 (10 Jul 2020 06:00) (69 - 85)  BP: 108/64 (10 Jul 2020 06:00) (92/54 - 117/71)  BP(mean): --  RR: 18 (10 Jul 2020 06:00) (18 - 18)  SpO2: 98% (10 Jul 2020 06:00) (96% - 100%)    Admit weight:85.7kg     Daily Weight in k.3 (2020 08:32)  Today's weight:    Intake / Output:    @ 07: @ 07:00  --------------------------------------------------------  IN: 2149 mL / OUT: 2800 mL / NET: -651 mL     @ 07:10 @ 06:59  --------------------------------------------------------  IN: 2739 mL / OUT: 2240 mL / NET: 499 mL      PE:   HEENT: Oropharynx: no erythema or ulcerations  Oral Mucositis:               -                                         Grade: n/a  CVS: S1, S2 RRR   Lungs: CTA throughout bilaterally   Abdomen: + BS x 4: normoactive, soft, NT, ND   Extremities: + chronic trace LE edema; L>R   Gastric Mucositis:      +                                            ndGndrndanddndend:nd nd2nd Intestinal Mucositis:    +                                          ndGndrndanddndend:nd nd2nd Skin: hyperpigmentation on back, neck, abdomen likely secondary to Kepivance   TLC: CDI  Neuro: A&O x 3  Pain: denies     Labs:                         6.8    <0.10 )-----------( 26       ( 10 Jul 2020 07:03 )             21.1       07-10    140  |  110<H>  |  8   ----------------------------<  93  3.5   |  22  |  0.72    Ca    8.0<L>      10 Jul 2020 07:03 Calculated Corrected Ca 8.7   Phos  4.0     07-10  Mg     1.9     07-10    TPro  5.2<L>  /  Alb  3.1<L>  /  TBili  0.1<L>  /  DBili  <0.1  /  AST  7<L>  /  ALT  10  /  AlkPhos  44  07-10    COVID-19 PCR . (20 @ 13:50)    COVID-19 PCR: NotDetec: Testing is performed using polymerase chain reaction (PCR) or  transcription mediated amplification (TMA). This COVID-19 (SARS-CoV-2)  nucleic acid amplification test was validated by [employer] and is  in use under the FDA Emergency Use Authorization (EUA) for clinical labs  CLIA-certified to perform high complexity testing. Test results should be  correlated with clinical presentation, patient history, and epidemiology.      Cultures:   Culture - Urine (20 @ 00:23)    Specimen Source: .Urine Clean Catch (Midstream)    Culture Results:   No growth      Culture - Blood (20 @ 23:56)    Specimen Source: .Blood Blood    Culture Results:   No growth to date.    Culture - Blood (20 @ 23:56)    Specimen Source: .Blood Blood    Culture Results:   No growth to date.      Radiology:    CXR: Lungs clear     Meds:   Antimicrobials:   acyclovir   Oral Tab/Cap 400 milliGRAM(s) Oral every 8 hours  cefepime   IVPB      cefepime   IVPB 2000 milliGRAM(s) IV Intermittent every 8 hours  clotrimazole Lozenge 1 Lozenge Oral five times a day  fluconAZOLE   Tablet 400 milliGRAM(s) Oral daily      Heme / Onc:   heparin  Infusion 357 Unit(s)/Hr IV Continuous <Continuous>      GI:  aluminum hydroxide/magnesium hydroxide/simethicone Suspension 30 milliLiter(s) Oral every 4 hours PRN  bismuth subsalicylate Liquid 30 milliLiter(s) Oral every 4 hours PRN  loperamide 2 milliGRAM(s) Oral every 6 hours PRN  pantoprazole    Tablet 40 milliGRAM(s) Oral before breakfast  sodium bicarbonate Mouth Rinse 10 milliLiter(s) Swish and Spit five times a day  sucralfate suspension 1 Gram(s) Oral four times a day  ursodiol Capsule 300 milliGRAM(s) Oral two times a day with meals      Cardiovascular:   furosemide   Injectable 20 milliGRAM(s) IV Push every 24 hours PRN      Immunologic:   filgrastim-sndz (ZARXIO) Injectable 480 MICROGram(s) SubCutaneous every 24 hours      Other medications:   Biotene Dry Mouth Oral Rinse 5 milliLiter(s) Swish and Spit five times a day  chlorhexidine 4% Liquid 1 Application(s) Topical <User Schedule>  folic acid 1 milliGRAM(s) Oral daily  hydrocortisone 1% Cream 1 Application(s) Topical daily  lidocaine/prilocaine Cream 1 Application(s) Topical daily  multivitamin 1 Tablet(s) Oral daily  sodium chloride 0.9%. 1000 milliLiter(s) IV Continuous <Continuous>      PRN:   acetaminophen   Tablet .. 650 milliGRAM(s) Oral every 6 hours PRN  acetaminophen   Tablet .. 650 milliGRAM(s) Oral every 6 hours PRN  aluminum hydroxide/magnesium hydroxide/simethicone Suspension 30 milliLiter(s) Oral every 4 hours PRN  AQUAPHOR (petrolatum Ointment) 1 Application(s) Topical two times a day PRN  bismuth subsalicylate Liquid 30 milliLiter(s) Oral every 4 hours PRN  diphenhydrAMINE 25 milliGRAM(s) Oral once PRN  diphenhydrAMINE   Injectable 25 milliGRAM(s) IV Push every 4 hours PRN  furosemide   Injectable 20 milliGRAM(s) IV Push every 24 hours PRN  loperamide 2 milliGRAM(s) Oral every 6 hours PRN  metoclopramide Injectable 10 milliGRAM(s) IV Push every 6 hours PRN  ondansetron Injectable 8 milliGRAM(s) IV Push every 8 hours PRN  sodium chloride 0.9% lock flush 10 milliLiter(s) IV Push every 1 hour PRN        A/P:   59 year old male with a history of amyloidosis  Post Autologous PBSCT day + 8  Strict I&O, prn diuresis, daily weights   Keep platelets =/> 40K for history of amyloidosis   Neutropenic Fever: continue with cefepime, 7/8 blood and urine cultures NTD, CXR WNL, COVID negative   Chemotherapy induced grade 1 intestinal mucositis (diarrhea) - c. diff negative - added imodium  Chemotherapy induced grade 1 GI mucositis: Improving with carafate / maalox     1. Infectious Disease:   acyclovir   Oral Tab/Cap 400 milliGRAM(s) Oral every 8 hours  cefepime   IVPB 2000 milliGRAM(s) IV Intermittent every 8 hours  clotrimazole Lozenge 1 Lozenge Oral five times a day  fluconAZOLE   Tablet 400 milliGRAM(s) Oral daily    2. VOD Prophylaxis: Actigall, Glutamine, Heparin (dosed at 100 units / kg / day)     3. GI Prophylaxis:    pantoprazole    Tablet 40 milliGRAM(s) Oral before breakfast    4. Mouth care - NS / NaHCO3 rinses, Mycelex, Biotene; Skin care     5. GVHD prophylaxis - n/a     6. Transfuse & replete electrolytes prn   1 U PLT for PLt goal >40K  1 U P RBC for anemia     7. IV hydration, daily weights, strict I&O, prn diuresis     8. PO intake as tolerated, nutrition follow up as needed, MVI, folic acid     9. Antiemetics, anti-diarrhea medications:   metoclopramide Injectable 10 milliGRAM(s) IV Push every 6 hours PRN    10. OOB as tolerated, physical therapy consult if needed     11. Monitor coags / fibrinogen 2x week, vitamin K as needed     12. Monitor closely for clinical changes, monitor for fevers     13. Emotional support provided, plan of care discussed and questions addressed     14. Patient education done regarding plan of care, restrictions and discharge planning     15. Continue regular social work input     I have written the above note for Dr. Loja who performed service with me in the room.   Kandice Woodward PA-C ([phone])    I have seen and examined patient with PA, I agree with above note as scribed. Chief Complaint: Autologous pbsct with high dose melphalan prep regimen for treatment of amyloidosis    S: Patient seen and examined with HPC Transplant Team:   + general weakness   + diarrhea   + intermittent nausea   +abdominal cramping   +fever overnight    Denies: mouth/throat pain, cough, SOB, abdominal pain, dizziness        O: Vitals:   Vital Signs Last 24 Hrs  T(C): 37.9 (10 Jul 2020 06:00), Max: 38 (2020 21:33)  T(F): 100.2 (10 Jul 2020 06:00), Max: 100.4 (2020 21:33)  HR: 81 (10 Jul 2020 06:00) (69 - 85)  BP: 108/64 (10 Jul 2020 06:00) (92/54 - 117/71)  BP(mean): --  RR: 18 (10 Jul 2020 06:00) (18 - 18)  SpO2: 98% (10 Jul 2020 06:00) (96% - 100%)    Admit weight:85.7kg     Daily Weight in k.3 (2020 08:32)  Today's weight:82.6 kg     Intake / Output:    @ 07:  -   @ 07:00  --------------------------------------------------------  IN: 2149 mL / OUT: 2800 mL / NET: -651 mL     @ 07:10 @ 06:59  --------------------------------------------------------  IN: 2739 mL / OUT: 2240 mL / NET: 499 mL      PE:   HEENT: Oropharynx: no erythema or ulcerations  Oral Mucositis:               -                                         Grade: n/a  CVS: S1, S2 RRR   Lungs: CTA throughout bilaterally   Abdomen: + BS x 4: mildly hyperactive, soft, NT, ND   Extremities: + chronic trace LE edema; L>R   Gastric Mucositis:      +                                            ndGndrndanddndend:nd nd2nd Intestinal Mucositis:    +                                          ndGndrndanddndend:nd nd2nd Skin: patchy erythematous rash with some  hyperpigmentation on back, neck, abdomen likely secondary to Kepivance   TLC: CDI  Neuro: A&O x 3  Pain: denies     Labs:                         6.8    <0.10 )-----------( 26       ( 10 Jul 2020 07:03 )             21.1       07-10    140  |  110<H>  |  8   ----------------------------<  93  3.5   |  22  |  0.72    Ca    8.0<L>      10 Jul 2020 07:03 Calculated Corrected Ca 8.7   Phos  4.0     07-10  Mg     1.9     07-10    TPro  5.2<L>  /  Alb  3.1<L>  /  TBili  0.1<L>  /  DBili  <0.1  /  AST  7<L>  /  ALT  10  /  AlkPhos  44  07-10    COVID-19 PCR . (20 @ 13:50)    COVID-19 PCR: NotDetec: Testing is performed using polymerase chain reaction (PCR) or  transcription mediated amplification (TMA). This COVID-19 (SARS-CoV-2)  nucleic acid amplification test was validated by [employer] and is  in use under the FDA Emergency Use Authorization (EUA) for clinical labs  CLIA-certified to perform high complexity testing. Test results should be  correlated with clinical presentation, patient history, and epidemiology.      Cultures:   Culture - Urine (20 @ 00:23)    Specimen Source: .Urine Clean Catch (Midstream)    Culture Results:   No growth      Culture - Blood (20 @ 23:56)    Specimen Source: .Blood Blood    Culture Results:   No growth to date.    Culture - Blood (20 @ 23:56)    Specimen Source: .Blood Blood    Culture Results:   No growth to date.      Radiology:    CXR: Lungs clear     Meds:   Antimicrobials:   acyclovir   Oral Tab/Cap 400 milliGRAM(s) Oral every 8 hours  cefepime   IVPB      cefepime   IVPB 2000 milliGRAM(s) IV Intermittent every 8 hours  clotrimazole Lozenge 1 Lozenge Oral five times a day  fluconAZOLE   Tablet 400 milliGRAM(s) Oral daily      Heme / Onc:   heparin  Infusion 357 Unit(s)/Hr IV Continuous <Continuous>      GI:  aluminum hydroxide/magnesium hydroxide/simethicone Suspension 30 milliLiter(s) Oral every 4 hours PRN  bismuth subsalicylate Liquid 30 milliLiter(s) Oral every 4 hours PRN  loperamide 2 milliGRAM(s) Oral every 6 hours PRN  pantoprazole    Tablet 40 milliGRAM(s) Oral before breakfast  sodium bicarbonate Mouth Rinse 10 milliLiter(s) Swish and Spit five times a day  sucralfate suspension 1 Gram(s) Oral four times a day  ursodiol Capsule 300 milliGRAM(s) Oral two times a day with meals      Cardiovascular:   furosemide   Injectable 20 milliGRAM(s) IV Push every 24 hours PRN      Immunologic:   filgrastim-sndz (ZARXIO) Injectable 480 MICROGram(s) SubCutaneous every 24 hours      Other medications:   Biotene Dry Mouth Oral Rinse 5 milliLiter(s) Swish and Spit five times a day  chlorhexidine 4% Liquid 1 Application(s) Topical <User Schedule>  folic acid 1 milliGRAM(s) Oral daily  hydrocortisone 1% Cream 1 Application(s) Topical daily  lidocaine/prilocaine Cream 1 Application(s) Topical daily  multivitamin 1 Tablet(s) Oral daily  sodium chloride 0.9%. 1000 milliLiter(s) IV Continuous <Continuous>      PRN:   acetaminophen   Tablet .. 650 milliGRAM(s) Oral every 6 hours PRN  acetaminophen   Tablet .. 650 milliGRAM(s) Oral every 6 hours PRN  aluminum hydroxide/magnesium hydroxide/simethicone Suspension 30 milliLiter(s) Oral every 4 hours PRN  AQUAPHOR (petrolatum Ointment) 1 Application(s) Topical two times a day PRN  bismuth subsalicylate Liquid 30 milliLiter(s) Oral every 4 hours PRN  diphenhydrAMINE 25 milliGRAM(s) Oral once PRN  diphenhydrAMINE   Injectable 25 milliGRAM(s) IV Push every 4 hours PRN  furosemide   Injectable 20 milliGRAM(s) IV Push every 24 hours PRN  loperamide 2 milliGRAM(s) Oral every 6 hours PRN  metoclopramide Injectable 10 milliGRAM(s) IV Push every 6 hours PRN  ondansetron Injectable 8 milliGRAM(s) IV Push every 8 hours PRN  sodium chloride 0.9% lock flush 10 milliLiter(s) IV Push every 1 hour PRN        A/P:   59 year old male with a history of amyloidosis  Post Autologous PBSCT day + 8  Strict I&O, prn diuresis, daily weights   Keep platelets =/> 40K for history of amyloidosis   Neutropenic Fever: continue with cefepime, 7/8 blood and urine cultures NTD, CXR WNL, COVID negative   Chemotherapy induced grade 1 intestinal mucositis (diarrhea) - c. diff negative - added imodium  Chemotherapy induced grade 1 GI mucositis: Improving with carafate / maalox     1. Infectious Disease:   acyclovir   Oral Tab/Cap 400 milliGRAM(s) Oral every 8 hours  cefepime   IVPB 2000 milliGRAM(s) IV Intermittent every 8 hours  clotrimazole Lozenge 1 Lozenge Oral five times a day  fluconAZOLE   Tablet 400 milliGRAM(s) Oral daily    2. VOD Prophylaxis: Actigall, Glutamine, Heparin (dosed at 100 units / kg / day)     3. GI Prophylaxis:    pantoprazole    Tablet 40 milliGRAM(s) Oral before breakfast    4. Mouth care - NS / NaHCO3 rinses, Mycelex, Biotene; Skin care     5. GVHD prophylaxis - n/a     6. Transfuse & replete electrolytes prn   1 U PLT for PLt goal >40K  1 U P RBC for anemia     7. IV hydration, daily weights, strict I&O, prn diuresis     8. PO intake as tolerated, nutrition follow up as needed, MVI, folic acid     9. Antiemetics, anti-diarrhea medications:   metoclopramide Injectable 10 milliGRAM(s) IV Push every 6 hours PRN    10. OOB as tolerated, physical therapy consult if needed     11. Monitor coags / fibrinogen 2x week, vitamin K as needed     12. Monitor closely for clinical changes, monitor for fevers     13. Emotional support provided, plan of care discussed and questions addressed     14. Patient education done regarding plan of care, restrictions and discharge planning     15. Continue regular social work input     I have written the above note for Dr. Loja who performed service with me in the room.   Kandice Woodward PA-C ([phone])    I have seen and examined patient with PA, I agree with above note as scribed. Chief Complaint: Autologous pbsct with high dose melphalan prep regimen for treatment of amyloidosis    S: Patient seen and examined with HPC Transplant Team:   + general weakness   + diarrhea   + intermittent nausea   +abdominal cramping   +fever overnight    Denies: mouth/throat pain, cough, SOB, abdominal pain, dizziness        O: Vitals:   Vital Signs Last 24 Hrs  T(C): 37.9 (10 Jul 2020 06:00), Max: 38 (2020 21:33)  T(F): 100.2 (10 Jul 2020 06:00), Max: 100.4 (2020 21:33)  HR: 81 (10 Jul 2020 06:00) (69 - 85)  BP: 108/64 (10 Jul 2020 06:00) (92/54 - 117/71)  BP(mean): --  RR: 18 (10 Jul 2020 06:00) (18 - 18)  SpO2: 98% (10 Jul 2020 06:00) (96% - 100%)    Admit weight:85.7kg     Daily Weight in k.3 (2020 08:32)  Today's weight:82.6 kg     Intake / Output:    @ 07:  -   @ 07:00  --------------------------------------------------------  IN: 2149 mL / OUT: 2800 mL / NET: -651 mL     @ 07:10 @ 06:59  --------------------------------------------------------  IN: 2739 mL / OUT: 2240 mL / NET: 499 mL      PE:   HEENT: Oropharynx: no erythema or ulcerations  Oral Mucositis:               -                                         Grade: n/a  CVS: S1, S2 RRR   Lungs: CTA throughout bilaterally   Abdomen: + BS x 4: mildly hyperactive, soft, NT, ND   Extremities: + chronic trace LE edema; L>R   Gastric Mucositis:      +                                            ndGndrndanddndend:nd nd2nd Intestinal Mucositis:    +                                          ndGndrndanddndend:nd nd2nd Skin: patchy erythematous rash with some  hyperpigmentation on back, neck, abdomen likely secondary to Kepivance   TLC: CDI  Neuro: A&O x 3  Pain: denies     Labs:                         6.8    <0.10 )-----------( 26       ( 10 Jul 2020 07:03 )             21.1       07-10    140  |  110<H>  |  8   ----------------------------<  93  3.5   |  22  |  0.72    Ca    8.0<L>      10 Jul 2020 07:03 Calculated Corrected Ca 8.7   Phos  4.0     07-10  Mg     1.9     07-10    TPro  5.2<L>  /  Alb  3.1<L>  /  TBili  0.1<L>  /  DBili  <0.1  /  AST  7<L>  /  ALT  10  /  AlkPhos  44  07-10    COVID-19 PCR . (20 @ 13:50)    COVID-19 PCR: NotDetec: Testing is performed using polymerase chain reaction (PCR) or  transcription mediated amplification (TMA). This COVID-19 (SARS-CoV-2)  nucleic acid amplification test was validated by [employer] and is  in use under the FDA Emergency Use Authorization (EUA) for clinical labs  CLIA-certified to perform high complexity testing. Test results should be  correlated with clinical presentation, patient history, and epidemiology.      Cultures:   Culture - Urine (20 @ 00:23)    Specimen Source: .Urine Clean Catch (Midstream)    Culture Results:   No growth      Culture - Blood (20 @ 23:56)    Specimen Source: .Blood Blood    Culture Results:   No growth to date.    Culture - Blood (20 @ 23:56)    Specimen Source: .Blood Blood    Culture Results:   No growth to date.      Radiology:    CXR: Lungs clear     Meds:   Antimicrobials:   acyclovir   Oral Tab/Cap 400 milliGRAM(s) Oral every 8 hours  cefepime   IVPB      cefepime   IVPB 2000 milliGRAM(s) IV Intermittent every 8 hours  clotrimazole Lozenge 1 Lozenge Oral five times a day  fluconAZOLE   Tablet 400 milliGRAM(s) Oral daily      Heme / Onc:   heparin  Infusion 357 Unit(s)/Hr IV Continuous <Continuous>      GI:  aluminum hydroxide/magnesium hydroxide/simethicone Suspension 30 milliLiter(s) Oral every 4 hours PRN  bismuth subsalicylate Liquid 30 milliLiter(s) Oral every 4 hours PRN  loperamide 2 milliGRAM(s) Oral every 6 hours PRN  pantoprazole    Tablet 40 milliGRAM(s) Oral before breakfast  sodium bicarbonate Mouth Rinse 10 milliLiter(s) Swish and Spit five times a day  sucralfate suspension 1 Gram(s) Oral four times a day  ursodiol Capsule 300 milliGRAM(s) Oral two times a day with meals      Cardiovascular:   furosemide   Injectable 20 milliGRAM(s) IV Push every 24 hours PRN      Immunologic:   filgrastim-sndz (ZARXIO) Injectable 480 MICROGram(s) SubCutaneous every 24 hours      Other medications:   Biotene Dry Mouth Oral Rinse 5 milliLiter(s) Swish and Spit five times a day  chlorhexidine 4% Liquid 1 Application(s) Topical <User Schedule>  folic acid 1 milliGRAM(s) Oral daily  hydrocortisone 1% Cream 1 Application(s) Topical daily  lidocaine/prilocaine Cream 1 Application(s) Topical daily  multivitamin 1 Tablet(s) Oral daily  sodium chloride 0.9%. 1000 milliLiter(s) IV Continuous <Continuous>      PRN:   acetaminophen   Tablet .. 650 milliGRAM(s) Oral every 6 hours PRN  acetaminophen   Tablet .. 650 milliGRAM(s) Oral every 6 hours PRN  aluminum hydroxide/magnesium hydroxide/simethicone Suspension 30 milliLiter(s) Oral every 4 hours PRN  AQUAPHOR (petrolatum Ointment) 1 Application(s) Topical two times a day PRN  bismuth subsalicylate Liquid 30 milliLiter(s) Oral every 4 hours PRN  diphenhydrAMINE 25 milliGRAM(s) Oral once PRN  diphenhydrAMINE   Injectable 25 milliGRAM(s) IV Push every 4 hours PRN  furosemide   Injectable 20 milliGRAM(s) IV Push every 24 hours PRN  loperamide 2 milliGRAM(s) Oral every 6 hours PRN  metoclopramide Injectable 10 milliGRAM(s) IV Push every 6 hours PRN  ondansetron Injectable 8 milliGRAM(s) IV Push every 8 hours PRN  sodium chloride 0.9% lock flush 10 milliLiter(s) IV Push every 1 hour PRN      A/P:   59 year old male with a history of amyloidosis  Post Autologous PBSCT day + 8  Keep platelets =/> 40K for history of amyloidosis   Neutropenic Fever: continue with cefepime, 7/8 blood and urine cultures NTD, CXR WNL, COVID negative   Chemotherapy induced grade 1 intestinal mucositis (diarrhea) - c. diff negative - added imodium  Chemotherapy induced grade 1 GI mucositis: Improving with carafate / maalox     1. Infectious Disease:   acyclovir   Oral Tab/Cap 400 milliGRAM(s) Oral every 8 hours  cefepime   IVPB 2000 milliGRAM(s) IV Intermittent every 8 hours  clotrimazole Lozenge 1 Lozenge Oral five times a day  fluconAZOLE   Tablet 400 milliGRAM(s) Oral daily    2. VOD Prophylaxis: Actigall, Glutamine, Heparin (dosed at 100 units / kg / day)     3. GI Prophylaxis:    pantoprazole    Tablet 40 milliGRAM(s) Oral before breakfast    4. Mouth care - NS / NaHCO3 rinses, Mycelex, Biotene; Skin care     5. GVHD prophylaxis - n/a     6. Transfuse & replete electrolytes prn   1 U PLT for PLt goal >40K  1 U P RBC for anemia     7. IV hydration, daily weights, strict I&O, prn diuresis     8. PO intake as tolerated, nutrition follow up as needed, MVI, folic acid     9. Antiemetics, anti-diarrhea medications:   metoclopramide Injectable 10 milliGRAM(s) IV Push every 6 hours PRN    10. OOB as tolerated, physical therapy consult if needed     11. Monitor coags / fibrinogen 2x week, vitamin K as needed     12. Monitor closely for clinical changes, monitor for fevers     13. Emotional support provided, plan of care discussed and questions addressed     14. Patient education done regarding plan of care, restrictions and discharge planning     15. Continue regular social work input     I have written the above note for Dr. Loja who performed service with me in the room.   Kandice Woodward PA-C ([phone])    I have seen and examined patient with PA, I agree with above note as scribed. Chief Complaint: Autologous pbsct with high dose melphalan prep regimen for treatment of amyloidosis    S: Patient seen and examined with HPC Transplant Team:   + general weakness   + diarrhea   + intermittent nausea   +abdominal cramping   +fever overnight    Denies: mouth/throat pain, cough, SOB, abdominal pain, dizziness        O: Vitals:   Vital Signs Last 24 Hrs  T(C): 37.9 (10 Jul 2020 06:00), Max: 38 (2020 21:33)  T(F): 100.2 (10 Jul 2020 06:00), Max: 100.4 (2020 21:33)  HR: 81 (10 Jul 2020 06:00) (69 - 85)  BP: 108/64 (10 Jul 2020 06:00) (92/54 - 117/71)  BP(mean): --  RR: 18 (10 Jul 2020 06:00) (18 - 18)  SpO2: 98% (10 Jul 2020 06:00) (96% - 100%)    Admit weight:85.7kg     Daily Weight in k.3 (2020 08:32)  Today's weight:82.6 kg     Intake / Output:    @ 07:  -   @ 07:00  --------------------------------------------------------  IN: 2149 mL / OUT: 2800 mL / NET: -651 mL     @ 07:10 @ 06:59  --------------------------------------------------------  IN: 2739 mL / OUT: 2240 mL / NET: 499 mL      PE:   HEENT: Oropharynx: no erythema or ulcerations  Oral Mucositis:               -                                         Grade: n/a  CVS: S1, S2 RRR   Lungs: CTA throughout bilaterally   Abdomen: + BS x 4: mildly hyperactive, soft, NT, ND   Extremities: + chronic trace LE edema; L>R   Gastric Mucositis:      +                                            ndGndrndanddndend:nd nd2nd Intestinal Mucositis:    +                                          ndGndrndanddndend:nd nd2nd Skin: patchy erythematous rash with some  hyperpigmentation on back, neck, abdomen likely secondary to Kepivance   TLC: CDI  Neuro: A&O x 3  Pain: denies     Labs:                         6.8    <0.10 )-----------( 26       ( 10 Jul 2020 07:03 )             21.1       07-10    140  |  110<H>  |  8   ----------------------------<  93  3.5   |  22  |  0.72    Ca    8.0<L>      10 Jul 2020 07:03 Calculated Corrected Ca 8.7   Phos  4.0     07-10  Mg     1.9     07-10    TPro  5.2<L>  /  Alb  3.1<L>  /  TBili  0.1<L>  /  DBili  <0.1  /  AST  7<L>  /  ALT  10  /  AlkPhos  44  07-10    COVID-19 PCR . (20 @ 13:50)    COVID-19 PCR: NotDetec: Testing is performed using polymerase chain reaction (PCR) or  transcription mediated amplification (TMA). This COVID-19 (SARS-CoV-2)  nucleic acid amplification test was validated by [employer] and is  in use under the FDA Emergency Use Authorization (EUA) for clinical labs  CLIA-certified to perform high complexity testing. Test results should be  correlated with clinical presentation, patient history, and epidemiology.      Cultures:   Culture - Urine (20 @ 00:23)    Specimen Source: .Urine Clean Catch (Midstream)    Culture Results:   No growth      Culture - Blood (20 @ 23:56)    Specimen Source: .Blood Blood    Culture Results:   No growth to date.    Culture - Blood (20 @ 23:56)    Specimen Source: .Blood Blood    Culture Results:   No growth to date.      Radiology:    CXR: Lungs clear     Meds:   Antimicrobials:   acyclovir   Oral Tab/Cap 400 milliGRAM(s) Oral every 8 hours  cefepime   IVPB      cefepime   IVPB 2000 milliGRAM(s) IV Intermittent every 8 hours  clotrimazole Lozenge 1 Lozenge Oral five times a day  fluconAZOLE   Tablet 400 milliGRAM(s) Oral daily      Heme / Onc:   heparin  Infusion 357 Unit(s)/Hr IV Continuous <Continuous>      GI:  aluminum hydroxide/magnesium hydroxide/simethicone Suspension 30 milliLiter(s) Oral every 4 hours PRN  bismuth subsalicylate Liquid 30 milliLiter(s) Oral every 4 hours PRN  loperamide 2 milliGRAM(s) Oral every 6 hours PRN  pantoprazole    Tablet 40 milliGRAM(s) Oral before breakfast  sodium bicarbonate Mouth Rinse 10 milliLiter(s) Swish and Spit five times a day  sucralfate suspension 1 Gram(s) Oral four times a day  ursodiol Capsule 300 milliGRAM(s) Oral two times a day with meals      Cardiovascular:   furosemide   Injectable 20 milliGRAM(s) IV Push every 24 hours PRN      Immunologic:   filgrastim-sndz (ZARXIO) Injectable 480 MICROGram(s) SubCutaneous every 24 hours      Other medications:   Biotene Dry Mouth Oral Rinse 5 milliLiter(s) Swish and Spit five times a day  chlorhexidine 4% Liquid 1 Application(s) Topical <User Schedule>  folic acid 1 milliGRAM(s) Oral daily  hydrocortisone 1% Cream 1 Application(s) Topical daily  lidocaine/prilocaine Cream 1 Application(s) Topical daily  multivitamin 1 Tablet(s) Oral daily  sodium chloride 0.9%. 1000 milliLiter(s) IV Continuous <Continuous>      PRN:   acetaminophen   Tablet .. 650 milliGRAM(s) Oral every 6 hours PRN  acetaminophen   Tablet .. 650 milliGRAM(s) Oral every 6 hours PRN  aluminum hydroxide/magnesium hydroxide/simethicone Suspension 30 milliLiter(s) Oral every 4 hours PRN  AQUAPHOR (petrolatum Ointment) 1 Application(s) Topical two times a day PRN  bismuth subsalicylate Liquid 30 milliLiter(s) Oral every 4 hours PRN  diphenhydrAMINE 25 milliGRAM(s) Oral once PRN  diphenhydrAMINE   Injectable 25 milliGRAM(s) IV Push every 4 hours PRN  furosemide   Injectable 20 milliGRAM(s) IV Push every 24 hours PRN  loperamide 2 milliGRAM(s) Oral every 6 hours PRN  metoclopramide Injectable 10 milliGRAM(s) IV Push every 6 hours PRN  ondansetron Injectable 8 milliGRAM(s) IV Push every 8 hours PRN  sodium chloride 0.9% lock flush 10 milliLiter(s) IV Push every 1 hour PRN      A/P:   59 year old male with a history of amyloidosis  Post Autologous PBSCT day + 8  Keep platelets =/> 40K for history of amyloidosis   Neutropenic Fever: continue with cefepime, 7/8 blood and urine cultures NTD, CXR WNL, COVID negative   Chemotherapy induced grade 1 intestinal mucositis (diarrhea) - c. diff negative - continue with imodium  Chemotherapy induced grade 1 GI mucositis: Improving with carafate / maalox     1. Infectious Disease:   acyclovir   Oral Tab/Cap 400 milliGRAM(s) Oral every 8 hours  cefepime   IVPB 2000 milliGRAM(s) IV Intermittent every 8 hours  clotrimazole Lozenge 1 Lozenge Oral five times a day  fluconAZOLE   Tablet 400 milliGRAM(s) Oral daily    2. VOD Prophylaxis: Actigall, Glutamine, Heparin (dosed at 100 units / kg / day)     3. GI Prophylaxis:    pantoprazole    Tablet 40 milliGRAM(s) Oral before breakfast    4. Mouth care - NS / NaHCO3 rinses, Mycelex, Biotene; Skin care     5. GVHD prophylaxis - n/a     6. Transfuse & replete electrolytes prn   1 U PLT for PLt goal >40K  1 U P RBC for anemia     7. IV hydration, daily weights, strict I&O, prn diuresis     8. PO intake as tolerated, nutrition follow up as needed, MVI, folic acid     9. Antiemetics, anti-diarrhea medications:   metoclopramide Injectable 10 milliGRAM(s) IV Push every 6 hours PRN    10. OOB as tolerated, physical therapy consult if needed     11. Monitor coags / fibrinogen 2x week, vitamin K as needed     12. Monitor closely for clinical changes, monitor for fevers     13. Emotional support provided, plan of care discussed and questions addressed     14. Patient education done regarding plan of care, restrictions and discharge planning     15. Continue regular social work input     I have written the above note for Dr. Loja who performed service with me in the room.   Kandice Woodward PA-C ([phone])    I have seen and examined patient with PA, I agree with above note as scribed. Chief Complaint: Autologous pbsct with high dose melphalan prep regimen for treatment of amyloidosis    S: Patient seen and examined with HPC Transplant Team:   + general weakness   + diarrhea   + intermittent nausea   +abdominal cramping   +fever overnight    Denies: mouth/throat pain, cough, SOB, abdominal pain, dizziness        O: Vitals:   Vital Signs Last 24 Hrs  T(C): 37.9 (10 Jul 2020 06:00), Max: 38 (2020 21:33)  T(F): 100.2 (10 Jul 2020 06:00), Max: 100.4 (2020 21:33)  HR: 81 (10 Jul 2020 06:00) (69 - 85)  BP: 108/64 (10 Jul 2020 06:00) (92/54 - 117/71)  BP(mean): --  RR: 18 (10 Jul 2020 06:00) (18 - 18)  SpO2: 98% (10 Jul 2020 06:00) (96% - 100%)    Admit weight:85.7kg     Daily Weight in k.3 (2020 08:32)  Today's weight:82.6 kg     Intake / Output:    @ 07:  -   @ 07:00  --------------------------------------------------------  IN: 2149 mL / OUT: 2800 mL / NET: -651 mL     @ 07:10 @ 06:59  --------------------------------------------------------  IN: 2739 mL / OUT: 2240 mL / NET: 499 mL      PE:   HEENT: Oropharynx: no erythema or ulcerations  Oral Mucositis:               -                                         Grade: n/a  CVS: S1, S2 RRR   Lungs: CTA throughout bilaterally   Abdomen: + BS x 4: mildly hyperactive, soft, NT, ND   Extremities: + chronic trace LE edema; L>R   Gastric Mucositis:      +                                            ndGndrndanddndend:nd nd2nd Intestinal Mucositis:    +                                          ndGndrndanddndend:nd nd2nd Skin: patchy erythematous rash with some  hyperpigmentation on back, neck, abdomen likely secondary to Kepivance   TLC: CDI  Neuro: A&O x 3  Pain: denies     Labs:                         6.8    <0.10 )-----------( 26       ( 10 Jul 2020 07:03 )             21.1       07-10    140  |  110<H>  |  8   ----------------------------<  93  3.5   |  22  |  0.72    Ca    8.0<L>      10 Jul 2020 07:03 Calculated Corrected Ca 8.7   Phos  4.0     07-10  Mg     1.9     07-10    TPro  5.2<L>  /  Alb  3.1<L>  /  TBili  0.1<L>  /  DBili  <0.1  /  AST  7<L>  /  ALT  10  /  AlkPhos  44  07-10    COVID-19 PCR . (20 @ 13:50)    COVID-19 PCR: NotDetec: Testing is performed using polymerase chain reaction (PCR) or  transcription mediated amplification (TMA). This COVID-19 (SARS-CoV-2)  nucleic acid amplification test was validated by [employer] and is  in use under the FDA Emergency Use Authorization (EUA) for clinical labs  CLIA-certified to perform high complexity testing. Test results should be  correlated with clinical presentation, patient history, and epidemiology.      Cultures:   Culture - Urine (20 @ 00:23)    Specimen Source: .Urine Clean Catch (Midstream)    Culture Results:   No growth      Culture - Blood (20 @ 23:56)    Specimen Source: .Blood Blood    Culture Results:   No growth to date.    Culture - Blood (20 @ 23:56)    Specimen Source: .Blood Blood    Culture Results:   No growth to date.      Radiology:    CXR: Lungs clear     Meds:   Antimicrobials:   acyclovir   Oral Tab/Cap 400 milliGRAM(s) Oral every 8 hours  cefepime   IVPB      cefepime   IVPB 2000 milliGRAM(s) IV Intermittent every 8 hours  clotrimazole Lozenge 1 Lozenge Oral five times a day  fluconAZOLE   Tablet 400 milliGRAM(s) Oral daily      Heme / Onc:   heparin  Infusion 357 Unit(s)/Hr IV Continuous <Continuous>      GI:  aluminum hydroxide/magnesium hydroxide/simethicone Suspension 30 milliLiter(s) Oral every 4 hours PRN  bismuth subsalicylate Liquid 30 milliLiter(s) Oral every 4 hours PRN  loperamide 2 milliGRAM(s) Oral every 6 hours PRN  pantoprazole    Tablet 40 milliGRAM(s) Oral before breakfast  sodium bicarbonate Mouth Rinse 10 milliLiter(s) Swish and Spit five times a day  sucralfate suspension 1 Gram(s) Oral four times a day  ursodiol Capsule 300 milliGRAM(s) Oral two times a day with meals      Cardiovascular:   furosemide   Injectable 20 milliGRAM(s) IV Push every 24 hours PRN      Immunologic:   filgrastim-sndz (ZARXIO) Injectable 480 MICROGram(s) SubCutaneous every 24 hours      Other medications:   Biotene Dry Mouth Oral Rinse 5 milliLiter(s) Swish and Spit five times a day  chlorhexidine 4% Liquid 1 Application(s) Topical <User Schedule>  folic acid 1 milliGRAM(s) Oral daily  hydrocortisone 1% Cream 1 Application(s) Topical daily  lidocaine/prilocaine Cream 1 Application(s) Topical daily  multivitamin 1 Tablet(s) Oral daily  sodium chloride 0.9%. 1000 milliLiter(s) IV Continuous <Continuous>      PRN:   acetaminophen   Tablet .. 650 milliGRAM(s) Oral every 6 hours PRN  acetaminophen   Tablet .. 650 milliGRAM(s) Oral every 6 hours PRN  aluminum hydroxide/magnesium hydroxide/simethicone Suspension 30 milliLiter(s) Oral every 4 hours PRN  AQUAPHOR (petrolatum Ointment) 1 Application(s) Topical two times a day PRN  bismuth subsalicylate Liquid 30 milliLiter(s) Oral every 4 hours PRN  diphenhydrAMINE 25 milliGRAM(s) Oral once PRN  diphenhydrAMINE   Injectable 25 milliGRAM(s) IV Push every 4 hours PRN  furosemide   Injectable 20 milliGRAM(s) IV Push every 24 hours PRN  loperamide 2 milliGRAM(s) Oral every 6 hours PRN  metoclopramide Injectable 10 milliGRAM(s) IV Push every 6 hours PRN  ondansetron Injectable 8 milliGRAM(s) IV Push every 8 hours PRN  sodium chloride 0.9% lock flush 10 milliLiter(s) IV Push every 1 hour PRN      A/P:   59 year old male with a history of amyloidosis  Post Autologous PBSCT day + 8  Keep platelets =/> 40K for history of amyloidosis   Neutropenic Fever: continue with Cefepime, 7/8 blood and urine cultures NTD, CXR WNL, COVID negative   Chemotherapy induced grade 1 intestinal mucositis (diarrhea) - c. diff negative - continue with imodium  Chemotherapy induced grade 1 GI mucositis: Improving with carafate / maalox     1. Infectious Disease:   acyclovir   Oral Tab/Cap 400 milliGRAM(s) Oral every 8 hours  cefepime   IVPB 2000 milliGRAM(s) IV Intermittent every 8 hours  clotrimazole Lozenge 1 Lozenge Oral five times a day  fluconAZOLE   Tablet 400 milliGRAM(s) Oral daily    2. VOD Prophylaxis: Actigall, Glutamine, Heparin (dosed at 100 units / kg / day)     3. GI Prophylaxis:    pantoprazole    Tablet 40 milliGRAM(s) Oral before breakfast    4. Mouth care - NS / NaHCO3 rinses, Mycelex, Biotene; Skin care     5. GVHD prophylaxis - n/a     6. Transfuse & replete electrolytes prn   1 U PLT for PLt goal >40K  1 U P RBC for anemia     7. IV hydration, daily weights, strict I&O, prn diuresis     8. PO intake as tolerated, nutrition follow up as needed, MVI, folic acid     9. Antiemetics, anti-diarrhea medications:   metoclopramide Injectable 10 milliGRAM(s) IV Push every 6 hours PRN    10. OOB as tolerated, physical therapy consult if needed     11. Monitor coags / fibrinogen 2x week, vitamin K as needed     12. Monitor closely for clinical changes, monitor for fevers     13. Emotional support provided, plan of care discussed and questions addressed     14. Patient education done regarding plan of care, restrictions and discharge planning     15. Continue regular social work input     I have written the above note for Dr. Loja who performed service with me in the room.   Kandice Woodward PA-C ([phone])    I have seen and examined patient with PA, I agree with above note as scribed.

## 2020-07-10 NOTE — PROGRESS NOTE ADULT - ATTENDING COMMENTS
59 year old male with history of amyloidosis treated with CyBorD, now admitted for autologous pbsct with Melphalan preparative regimen   s/p HPC transplant on 7/2/20, now day + 8  Continue Zarxio daily until engraftment with WBC recovery  Kepivance on days 0, +1, +2 for prevention of mucositis-completed  IV hydration, strict I&O, daily weight, Lasix to keep O > I  VOD prophylaxis- Actigall, low dose heparin gtt & glutamine supplementation  ID- Neutropenic fevers- check cultures; Cefepime started; continue Fluconazole / Acyclovir prophylaxis  GI mucositis secondary to antineoplastic chemotherapy- continue PPI, add Carafate; Imodium for diarrhea  Antiemetics  Supplement lytes prn  Mouth care, skin care  OOB/ambulate 59 year old male with history of amyloidosis treated with CyBorD, now admitted for autologous pbsct with Melphalan preparative regimen   s/p HPC transplant on 7/2/20, now day + 8  Continue Zarxio daily until engraftment with WBC recovery  Kepivance on days 0, +1, +2 for prevention of mucositis-completed  IV hydration, strict I&O, daily weight, Lasix to keep O > I  VOD prophylaxis- Actigall, low dose heparin gtt & glutamine supplementation  ID- Neutropenic fevers- on Cefepime; continue Fluconazole / Acyclovir prophylaxis. Culture for temp spikes  GI mucositis secondary to antineoplastic chemotherapy- continue PPI, add Carafate; Imodium for diarrhea  Antiemetics  Supplement lytes prn  Mouth care, skin care  OOB/ambulate

## 2020-07-10 NOTE — PROGRESS NOTE ADULT - SUBJECTIVE AND OBJECTIVE BOX
HPI: 59M with PMH of HCV, amyloidosis, dyspepsia, hematuria, colitis, Raynaud's phenomenon, and cutaneous mastocytosis here for an autologous peripheral blood stem cell transplant with high dose melphalan preparative regimen. Diagnosed in 2016 with HCV, and subsequently in 2019 found to have amyloidosis. Was treated with CyBorD x 7 cycles. Palliative following for symptom management post-transplant.    INTERVAL EVENTS:  7/6: day #4 post transplant, states having fatigue, taste changes, appetite loss; no pain, minimal nausea  7/8: day #6 post transplant, having diarrhea and continued taste changes, nausea (managed) and some epigastric pain  7/10: day #8 post transplant, diarrhea improving, has continued taste changes but trying to eat more    ADVANCE DIRECTIVES:    DNR  MOLST  [ ]  Living Will  [ ]   DECISION MAKER(s):  [ ] Health Care Proxy(s)  [ ] Surrogate(s)  [ ] Guardian           Name(s): Phone Number(s): spouse [phone]    BASELINE (I)ADL(s) (prior to admission):  [place]: [X ]Total  [ ] Moderate [ ]Dependent    Allergies    No Known Allergies    Intolerances    MEDICATIONS  (STANDING):  acyclovir   Oral Tab/Cap 400 milliGRAM(s) Oral every 8 hours  Biotene Dry Mouth Oral Rinse 5 milliLiter(s) Swish and Spit five times a day  cefepime   IVPB      cefepime   IVPB 2000 milliGRAM(s) IV Intermittent every 8 hours  chlorhexidine 4% Liquid 1 Application(s) Topical <User Schedule>  clotrimazole Lozenge 1 Lozenge Oral five times a day  filgrastim-sndz (ZARXIO) Injectable 480 MICROGram(s) SubCutaneous every 24 hours  fluconAZOLE   Tablet 400 milliGRAM(s) Oral daily  folic acid 1 milliGRAM(s) Oral daily  heparin  Infusion 357 Unit(s)/Hr (3.57 mL/Hr) IV Continuous <Continuous>  hydrocortisone 1% Cream 1 Application(s) Topical daily  lidocaine/prilocaine Cream 1 Application(s) Topical daily  multivitamin 1 Tablet(s) Oral daily  pantoprazole    Tablet 40 milliGRAM(s) Oral before breakfast  sodium bicarbonate Mouth Rinse 10 milliLiter(s) Swish and Spit five times a day  sodium chloride 0.9%. 1000 milliLiter(s) (50 mL/Hr) IV Continuous <Continuous>  sucralfate suspension 1 Gram(s) Oral four times a day  ursodiol Capsule 300 milliGRAM(s) Oral two times a day with meals    MEDICATIONS  (PRN):  acetaminophen   Tablet .. 650 milliGRAM(s) Oral every 6 hours PRN Temp greater or equal to 38C (100.4F), Mild Pain (1 - 3)  acetaminophen   Tablet .. 650 milliGRAM(s) Oral every 6 hours PRN Temp greater or equal to 38C (100.4F), Mild Pain (1 - 3)  aluminum hydroxide/magnesium hydroxide/simethicone Suspension 30 milliLiter(s) Oral every 4 hours PRN Dyspepsia  AQUAPHOR (petrolatum Ointment) 1 Application(s) Topical two times a day PRN dry/itchy skin  bismuth subsalicylate Liquid 30 milliLiter(s) Oral every 4 hours PRN nausea / diarrhea / upset stomach  diphenhydrAMINE 25 milliGRAM(s) Oral once PRN Rash and/or Itching  diphenhydrAMINE   Injectable 25 milliGRAM(s) IV Push every 4 hours PRN Allergy symptoms  furosemide   Injectable 20 milliGRAM(s) IV Push every 24 hours PRN If urine output is <100mL/hr for 2 hours  loperamide 2 milliGRAM(s) Oral every 6 hours PRN Diarrhea  LORazepam   Injectable 0.5 milliGRAM(s) IV Push daily PRN Anixiety/ Insomnia  metoclopramide Injectable 10 milliGRAM(s) IV Push every 6 hours PRN Nausea and/or Vomiting  ondansetron Injectable 8 milliGRAM(s) IV Push every 8 hours PRN Nausea and/or Vomiting  sodium chloride 0.9% lock flush 10 milliLiter(s) IV Push every 1 hour PRN Pre/post blood products, medications, blood draw, and to maintain line patency    PRESENT SYMPTOMS: [ ]Unable to obtain due to poor mentation   Source if other than patient:  [ ]Family   [ ]Team     Pain: [ ]yes [ X]no  QOL impact -   Location -                    Aggravating factors -  Quality -  Radiation -  Timing-  Severity (0-10 scale):  Minimal acceptable level (0-10 scale):     CPOT:    https://www.[email or web address]/getattachment/[email or web address]-8626k5958s0h/Critical-Care-Pain-Observation-Tool-(CPOT)      PAIN AD Score:     http://geriatrictoolkit.[email or web address]/painad.pdf (press ctrl +  left click to view)    Dyspnea:                           [ ]Mild [ ]Moderate [ ]Severe  Anxiety:                             [ ]Mild [ ]Moderate [ ]Severe  Fatigue:                             [ ]Mild [x]Moderate [ ]Severe  Nausea:                             [ xMild [ ]Moderate [ ]Severe  Loss of appetite:              [ ]Mild [x]Moderate [ ]Severe  Constipation:                    [ ]Mild [ ]Moderate [ ]Severe    Other Symptoms: taste changes  [X ]All other review of systems negative     Palliative Performance Status Version 2:         %    http://npcrc.org/files/news/palliative_performance_scale_ppsv2.pdf  PHYSICAL EXAM:  Vital Signs Last 24 Hrs  T(C): 37.7 (10 Jul 2020 15:30), Max: 38 (09 Jul 2020 21:33)  T(F): 99.8 (10 Jul 2020 15:30), Max: 100.4 (09 Jul 2020 21:33)  HR: 76 (10 Jul 2020 15:30) (71 - 85)  BP: 97/69 (10 Jul 2020 15:30) (92/54 - 111/65)  BP(mean): --  RR: 18 (10 Jul 2020 15:30) (18 - 18)  SpO2: 97% (10 Jul 2020 15:30) (96% - 100%)    Limited exam for immunocompromised patient's safety given BMT & to limit infection risk during COVID-19 pandemic. Please refer to primary team's examination for today.  GENERAL:  [X ]Alert  [ ]Oriented x   [ ]Lethargic  [ ]Cachexia  [ ]Unarousable  [ X]Verbal  [ ]Non-Verbal  Behavioral:   [ ] Anxiety  [ ] Delirium [ ] Agitation [ ] Other  HEENT:  [X ]Normal   [ ]Dry mouth   [ ]ET Tube/Trach  [ ]Oral lesions  PULMONARY:   [ ]Clear [ ]Tachypnea  [ ]Audible excessive secretions   [ ]Rhonchi        [ ]Right [ ]Left [ ]Bilateral  [ ]Crackles        [ ]Right [ ]Left [ ]Bilateral  [ ]Wheezing     [ ]Right [ ]Left [ ]Bilateral  [ ]Diminished breath sounds [ ]right [ ]left [ ]bilateral  CARDIOVASCULAR:    [ ]Regular [ ]Irregular [ ]Tachy  [ ]Rogelio [ ]Murmur [ ]Other  GASTROINTESTINAL:  [ ]Soft  [ ]Distended   [ ]+BS  [ ]Non tender [ ]Tender  [ ]PEG [ ]OGT/ NGT  Last BM:     GENITOURINARY:  [ ]Normal [ ] Incontinent   [ ]Oliguria/Anuria   [ ]Aguilar  MUSCULOSKELETAL:   [X ]Normal   [ ]Weakness  [ ]Bed/Wheelchair bound [ ]Edema  NEUROLOGIC:   [X ]No focal deficits  [ ]Cognitive impairment  [ ]Dysphagia [ ]Dysarthria [ ]Paresis [ ]Other   SKIN:   [ ]Normal    [ ]Rash  [ ]Pressure ulcer(s)       Present on admission [ ]y [ ]n    CRITICAL CARE:  [ ] Shock Present  [ ]Septic [ ]Cardiogenic [ ]Neurologic [ ]Hypovolemic  [ ]  Vasopressors [ ]  Inotropes   [ ]Respiratory failure present [ ]Mechanical ventilation [ ]Non-invasive ventilatory support [ ]High flow  [ ]Acute  [ ]Chronic [ ]Hypoxic  [ ]Hypercarbic [ ]Other  [ ]Other organ failure     LABS: reviewed                          6.8    <0.10 )-----------( 26       ( 10 Jul 2020 07:03 )             21.1     07-10    140  |  110<H>  |  8   ----------------------------<  93  3.5   |  22  |  0.72    Ca    8.0<L>      10 Jul 2020 07:03  Phos  4.0     07-10  Mg     1.9     07-10      RADIOLOGY & ADDITIONAL STUDIES:    PROTEIN CALORIE MALNUTRITION PRESENT: [ ]mild [ ]moderate [ ]severe [ ]underweight [ ]morbid obesity  https://www.andeal.org/vault/2440/web/files/ONC/Table_Clinical%20Characteristics%20to%20Document%20Malnutrition-White%20JV%20et%20al%202012.pdf    Height (cm): 175 (06-29-20 @ 10:46), 175 (03-26-20 @ 20:41), 180 (11-27-19 @ 16:59)  Weight (kg): 85.7 (06-29-20 @ 10:46), 83 (03-26-20 @ 20:41), 81 (12-02-19 @ 08:18)  BMI (kg/m2): 28 (06-29-20 @ 10:46), 27.1 (03-26-20 @ 20:41), 26.4 (03-26-20 @ 20:41)    [ ]PPSV2 < or = to 30% [ ]significant weight loss  [ ]poor nutritional intake  [ ]anasarca     Albumin, Serum: 3.7 g/dL (06-30-20 @ 07:06)   [ ]Artificial Nutrition      REFERRALS:   [ ]Chaplaincy  [ ]Hospice  [ ]Child Life  [ ]Social Work  [ ]Case management [ ]Holistic Therapy     Goals of Care Document:     ______________  Miguel Angel Albrecht MD   of Geriatric and Palliative Medicine  [facility]     Please page the following number for clinical matters between the hours of 9AM and 5PM   from Monday through Friday : [phone]    After 5PM and on weekends, please page: [phone]. The Geriatric and Palliative Medicine consult service has 24/7 coverage for medical recommendations, including for symptom management needs.

## 2020-07-10 NOTE — CHART NOTE - NSCHARTNOTEFT_GEN_A_CORE
Pt seen for malnutrition follow up. Pt with amyloidosis day +8 S/P autologous peripheral blood stem cell transplant, noted with grade 1 gastric and intestinal mucositis.    Pt reports he was unable to eat any dinner last night but was able to try ensure supplement which he was able to tolerate. Pt stated diarrhea is ongoing and stated he will have a bowel movement practically anytime he eats. Pt with 5 watery BMs documented yesterday, 1 thus far today, started on imodium. Pt plans to try bagel with cream cheese for breakfast today and identified some foods he would be willing to eat on alternative cold menu items list. RD reviewed binding foods in setting of diarrhea and continued to encouraged pt to order nutrient dense foods with meals to consume in between to mimic smaller, more frequent meals in house. Emphasis on foods with protein.     Mild malnutrition is ongoing, addressed with supplements, nutrition care plan in progress    1. Continue regular diet with supplements as ordered, will honor flavor preferences  2. Continue to encourage po intake and obtain/honor food preferences as able     RD to remain available as needed.    Ama Perez R.D., [facility], Pager #[phone]

## 2020-07-11 LAB
ALBUMIN SERPL ELPH-MCNC: 3.2 G/DL — LOW (ref 3.3–5)
ALP SERPL-CCNC: 43 U/L — SIGNIFICANT CHANGE UP (ref 40–120)
ALT FLD-CCNC: 10 U/L — SIGNIFICANT CHANGE UP (ref 10–45)
ANION GAP SERPL CALC-SCNC: 7 MMOL/L — SIGNIFICANT CHANGE UP (ref 5–17)
AST SERPL-CCNC: 9 U/L — LOW (ref 10–40)
BILIRUB SERPL-MCNC: 0.2 MG/DL — SIGNIFICANT CHANGE UP (ref 0.2–1.2)
BUN SERPL-MCNC: 8 MG/DL — SIGNIFICANT CHANGE UP (ref 7–23)
CALCIUM SERPL-MCNC: 8.2 MG/DL — LOW (ref 8.4–10.5)
CHLORIDE SERPL-SCNC: 110 MMOL/L — HIGH (ref 96–108)
CO2 SERPL-SCNC: 26 MMOL/L — SIGNIFICANT CHANGE UP (ref 22–31)
CREAT SERPL-MCNC: 0.68 MG/DL — SIGNIFICANT CHANGE UP (ref 0.5–1.3)
GLUCOSE SERPL-MCNC: 95 MG/DL — SIGNIFICANT CHANGE UP (ref 70–99)
HCT VFR BLD CALC: 22.4 % — LOW (ref 39–50)
HGB BLD-MCNC: 7.4 G/DL — LOW (ref 13–17)
LDH SERPL L TO P-CCNC: 118 U/L — SIGNIFICANT CHANGE UP (ref 50–242)
MAGNESIUM SERPL-MCNC: 2 MG/DL — SIGNIFICANT CHANGE UP (ref 1.6–2.6)
MCHC RBC-ENTMCNC: 24.5 PG — LOW (ref 27–34)
MCHC RBC-ENTMCNC: 33 GM/DL — SIGNIFICANT CHANGE UP (ref 32–36)
MCV RBC AUTO: 74.2 FL — LOW (ref 80–100)
NRBC # BLD: 0 /100 WBCS — SIGNIFICANT CHANGE UP (ref 0–0)
PHOSPHATE SERPL-MCNC: 3.8 MG/DL — SIGNIFICANT CHANGE UP (ref 2.5–4.5)
PLATELET # BLD AUTO: 26 K/UL — LOW (ref 150–400)
POTASSIUM SERPL-MCNC: 3.4 MMOL/L — LOW (ref 3.5–5.3)
POTASSIUM SERPL-SCNC: 3.4 MMOL/L — LOW (ref 3.5–5.3)
PROT SERPL-MCNC: 5.2 G/DL — LOW (ref 6–8.3)
RBC # BLD: 3.02 M/UL — LOW (ref 4.2–5.8)
RBC # FLD: 15 % — HIGH (ref 10.3–14.5)
SODIUM SERPL-SCNC: 143 MMOL/L — SIGNIFICANT CHANGE UP (ref 135–145)
WBC # BLD: <0.1 K/UL — CRITICAL LOW (ref 3.8–10.5)
WBC # FLD AUTO: <0.1 K/UL — CRITICAL LOW (ref 3.8–10.5)

## 2020-07-11 PROCEDURE — 99291 CRITICAL CARE FIRST HOUR: CPT

## 2020-07-11 RX ORDER — POTASSIUM CHLORIDE 20 MEQ
20 PACKET (EA) ORAL
Refills: 0 | Status: COMPLETED | OUTPATIENT
Start: 2020-07-11 | End: 2020-07-11

## 2020-07-11 RX ADMIN — CEFEPIME 100 MILLIGRAM(S): 1 INJECTION, POWDER, FOR SOLUTION INTRAMUSCULAR; INTRAVENOUS at 05:31

## 2020-07-11 RX ADMIN — Medication 1 GRAM(S): at 12:06

## 2020-07-11 RX ADMIN — FLUCONAZOLE 400 MILLIGRAM(S): 150 TABLET ORAL at 12:06

## 2020-07-11 RX ADMIN — Medication 1 LOZENGE: at 19:46

## 2020-07-11 RX ADMIN — Medication 1 TABLET(S): at 12:06

## 2020-07-11 RX ADMIN — Medication 5 MILLILITER(S): at 17:04

## 2020-07-11 RX ADMIN — Medication 10 MILLILITER(S): at 17:04

## 2020-07-11 RX ADMIN — Medication 10 MILLILITER(S): at 07:54

## 2020-07-11 RX ADMIN — CEFEPIME 100 MILLIGRAM(S): 1 INJECTION, POWDER, FOR SOLUTION INTRAMUSCULAR; INTRAVENOUS at 13:02

## 2020-07-11 RX ADMIN — Medication 10 MILLILITER(S): at 23:05

## 2020-07-11 RX ADMIN — Medication 2 MILLIGRAM(S): at 07:53

## 2020-07-11 RX ADMIN — Medication 400 MILLIGRAM(S): at 13:02

## 2020-07-11 RX ADMIN — Medication 10 MILLILITER(S): at 19:47

## 2020-07-11 RX ADMIN — URSODIOL 300 MILLIGRAM(S): 250 TABLET, FILM COATED ORAL at 07:53

## 2020-07-11 RX ADMIN — CHLORHEXIDINE GLUCONATE 1 APPLICATION(S): 213 SOLUTION TOPICAL at 07:55

## 2020-07-11 RX ADMIN — PANTOPRAZOLE SODIUM 40 MILLIGRAM(S): 20 TABLET, DELAYED RELEASE ORAL at 05:31

## 2020-07-11 RX ADMIN — URSODIOL 300 MILLIGRAM(S): 250 TABLET, FILM COATED ORAL at 17:03

## 2020-07-11 RX ADMIN — Medication 1 LOZENGE: at 23:05

## 2020-07-11 RX ADMIN — Medication 480 MICROGRAM(S): at 17:04

## 2020-07-11 RX ADMIN — Medication 1 GRAM(S): at 23:04

## 2020-07-11 RX ADMIN — Medication 50 MILLIEQUIVALENT(S): at 13:56

## 2020-07-11 RX ADMIN — CEFEPIME 100 MILLIGRAM(S): 1 INJECTION, POWDER, FOR SOLUTION INTRAMUSCULAR; INTRAVENOUS at 21:06

## 2020-07-11 RX ADMIN — Medication 1 MILLIGRAM(S): at 12:06

## 2020-07-11 RX ADMIN — Medication 1 LOZENGE: at 12:07

## 2020-07-11 RX ADMIN — Medication 5 MILLILITER(S): at 19:46

## 2020-07-11 RX ADMIN — Medication 1 GRAM(S): at 17:04

## 2020-07-11 RX ADMIN — Medication 25 MILLIGRAM(S): at 10:29

## 2020-07-11 RX ADMIN — Medication 1 LOZENGE: at 17:04

## 2020-07-11 RX ADMIN — Medication 5 MILLILITER(S): at 07:55

## 2020-07-11 RX ADMIN — Medication 1 GRAM(S): at 05:31

## 2020-07-11 RX ADMIN — Medication 1 LOZENGE: at 07:54

## 2020-07-11 RX ADMIN — Medication 5 MILLILITER(S): at 23:05

## 2020-07-11 RX ADMIN — Medication 50 MILLIEQUIVALENT(S): at 12:06

## 2020-07-11 RX ADMIN — Medication 10 MILLILITER(S): at 12:07

## 2020-07-11 RX ADMIN — Medication 650 MILLIGRAM(S): at 10:29

## 2020-07-11 RX ADMIN — Medication 400 MILLIGRAM(S): at 21:08

## 2020-07-11 RX ADMIN — Medication 5 MILLILITER(S): at 12:07

## 2020-07-11 RX ADMIN — Medication 400 MILLIGRAM(S): at 05:30

## 2020-07-11 NOTE — PROGRESS NOTE ADULT - ATTENDING COMMENTS
59 year old male with history of amyloidosis treated with CyBorD, now admitted for autologous pbsct with Melphalan preparative regimen   s/p HPC transplant on 7/2/20, now day + 8  Continue Zarxio daily until engraftment with WBC recovery  Kepivance on days 0, +1, +2 for prevention of mucositis-completed  IV hydration, strict I&O, daily weight, Lasix to keep O > I  VOD prophylaxis- Actigall, low dose heparin gtt & glutamine supplementation  ID- Neutropenic fevers- on Cefepime; continue Fluconazole / Acyclovir prophylaxis. Culture for temp spikes  GI mucositis secondary to antineoplastic chemotherapy- continue PPI, add Carafate; Imodium for diarrhea  Antiemetics  Supplement lytes prn  Mouth care, skin care  OOB/ambulate 59 year old male with history of amyloidosis treated with CyBorD, now admitted for autologous pbsct with Melphalan preparative regimen   s/p HPC transplant on 7/2/20, now day + 9, awaiting count recovery    -Continue Zarxio daily until engraftment with WBC recovery  -IV hydration, strict I&O, daily weight, Lasix to keep O > I  -VOD prophylaxis- Actigall, low dose heparin gtt & glutamine supplementation  -ID- Neutropenic fevers- on Cefepime; continue Fluconazole / Acyclovir prophylaxis. Will do a CT C/A/P to assess site of fevers. Last COVID PCR on 7/8 was negative  -GI mucositis secondary to antineoplastic chemotherapy- continue PPI, add Carafate; Imodium for diarrhea  -Antiemetics  -Supplement lytes prn  -Mouth care, skin care  -OOB/ambulate 59 year old male with history of amyloidosis treated with CyBorD, now admitted for autologous pbsct with Melphalan preparative regimen   s/p HPC transplant on 7/2/20, now day + 9, awaiting count recovery    -Continue Zarxio daily until engraftment with WBC recovery  -IV hydration, strict I&O, daily weight, Lasix to keep O > I  -VOD prophylaxis- Actigall, low dose heparin gtt & glutamine supplementation  -ID- Neutropenic fevers- on Cefepime; continue Fluconazole / Acyclovir prophylaxis. d/w pt doing CT C/A/P to assess site of fevers, he reports improved colitis symptoms and would prefer to reassess tomorrow. Last COVID PCR on 7/8 was negative  -GI mucositis secondary to antineoplastic chemotherapy- continue PPI, add Carafate; Imodium for diarrhea  -Antiemetics  -Supplement lytes prn  -Mouth care, skin care  -OOB/ambulate

## 2020-07-11 NOTE — PROGRESS NOTE ADULT - PROBLEM SELECTOR PLAN 2
7/8: Chemotherapy induced grade 1 GI mucositis  with nausea - added zofran 8mg IV q 8 hours prn, carafate / maalox prn.

## 2020-07-11 NOTE — PROGRESS NOTE ADULT - SUBJECTIVE AND OBJECTIVE BOX
HPC Transplant Team                                                      Critical / Counseling Time Provided: 30 minutes                                                                                                                                                        Chief Complaint: Autologous pbsct with high dose melphalan prep regimen for treatment of amyloidosis    S: Patient seen and examined with HPC Transplant Team:   + general weakness   + diarrhea   + intermittent nausea   +abdominal cramping   +fever overnight    Denies: mouth/throat pain, cough, SOB, abdominal pain, dizziness       O: Vitals:   Vital Signs Last 24 Hrs  T(C): 37.4 (2020 05:52), Max: 38.1 (10 Jul 2020 21:27)  T(F): 99.3 (2020 05:52), Max: 100.5 (10 Jul 2020 21:27)  HR: 82 (2020 05:52) (71 - 83)  BP: 104/69 (2020 05:52) (97/55 - 111/64)  BP(mean): --  RR: 18 (2020 05:52) (18 - 18)  SpO2: 98% (2020 05:52) (95% - 99%)    Admit weight:   Daily     Daily Weight in k.6 (10 Jul 2020 09:30)    Intake / Output:   07-10 @ 07:  -  11 @ 07:00  --------------------------------------------------------  IN: 3197 mL / OUT: 2976 mL / NET: 221 mL          PE:   Oropharynx:   Oral Mucositis:                                                        Grade:   CVS:   Lungs:   Abdomen:  Extremities:   Gastric Mucositis:                                                  Grade:   Intestinal Mucositis:                                              Grade:   Skin:   TLC:   Neuro:   Pain:     Labs:   CBC Full  -  ( 2020 06:50 )  WBC Count : <0.10 K/uL  Hemoglobin : 7.4 g/dL  Hematocrit : 22.4 %  Platelet Count - Automated : 26 K/uL  Mean Cell Volume : 74.2 fl  Mean Cell Hemoglobin : 24.5 pg  Mean Cell Hemoglobin Concentration : 33.0 gm/dL  Auto Neutrophil # : x  Auto Lymphocyte # : x  Auto Monocyte # : x  Auto Eosinophil # : x  Auto Basophil # : x  Auto Neutrophil % : x  Auto Lymphocyte % : x  Auto Monocyte % : x  Auto Eosinophil % : x  Auto Basophil % : x                          7.4    <0.10 )-----------( 26       ( 2020 06:50 )             22.4     07-11    143  |  110<H>  |  8   ----------------------------<  95  3.4<L>   |  26  |  0.68    Ca    8.2<L>      2020 06:51  Phos  3.8     -  Mg     2.0         TPro  5.2<L>  /  Alb  3.2<L>  /  TBili  0.2  /  DBili  x   /  AST  9<L>  /  ALT  10  /  AlkPhos  43  07-11      LIVER FUNCTIONS - ( 2020 06:51 )  Alb: 3.2 g/dL / Pro: 5.2 g/dL / ALK PHOS: 43 U/L / ALT: 10 U/L / AST: 9 U/L / GGT: x           Lactate Dehydrogenase, Serum: 118 U/L ( @ 06:51)        Cultures:         Radiology:       Meds:   Antimicrobials:   acyclovir   Oral Tab/Cap 400 milliGRAM(s) Oral every 8 hours  cefepime   IVPB      cefepime   IVPB 2000 milliGRAM(s) IV Intermittent every 8 hours  clotrimazole Lozenge 1 Lozenge Oral five times a day  fluconAZOLE   Tablet 400 milliGRAM(s) Oral daily      Heme / Onc:   heparin  Infusion 357 Unit(s)/Hr IV Continuous <Continuous>      GI:  aluminum hydroxide/magnesium hydroxide/simethicone Suspension 30 milliLiter(s) Oral every 4 hours PRN  bismuth subsalicylate Liquid 30 milliLiter(s) Oral every 4 hours PRN  loperamide 2 milliGRAM(s) Oral every 6 hours PRN  pantoprazole    Tablet 40 milliGRAM(s) Oral before breakfast  sodium bicarbonate Mouth Rinse 10 milliLiter(s) Swish and Spit five times a day  sucralfate suspension 1 Gram(s) Oral four times a day  ursodiol Capsule 300 milliGRAM(s) Oral two times a day with meals      Cardiovascular:   furosemide   Injectable 20 milliGRAM(s) IV Push every 24 hours PRN      Immunologic:   filgrastim-sndz (ZARXIO) Injectable 480 MICROGram(s) SubCutaneous every 24 hours      Other medications:   Biotene Dry Mouth Oral Rinse 5 milliLiter(s) Swish and Spit five times a day  chlorhexidine 4% Liquid 1 Application(s) Topical <User Schedule>  folic acid 1 milliGRAM(s) Oral daily  hydrocortisone 1% Cream 1 Application(s) Topical daily  lidocaine/prilocaine Cream 1 Application(s) Topical daily  multivitamin 1 Tablet(s) Oral daily  sodium chloride 0.9%. 1000 milliLiter(s) IV Continuous <Continuous>      PRN:   acetaminophen   Tablet .. 650 milliGRAM(s) Oral every 6 hours PRN  acetaminophen   Tablet .. 650 milliGRAM(s) Oral every 6 hours PRN  aluminum hydroxide/magnesium hydroxide/simethicone Suspension 30 milliLiter(s) Oral every 4 hours PRN  AQUAPHOR (petrolatum Ointment) 1 Application(s) Topical two times a day PRN  bismuth subsalicylate Liquid 30 milliLiter(s) Oral every 4 hours PRN  diphenhydrAMINE 25 milliGRAM(s) Oral once PRN  diphenhydrAMINE   Injectable 25 milliGRAM(s) IV Push every 4 hours PRN  furosemide   Injectable 20 milliGRAM(s) IV Push every 24 hours PRN  loperamide 2 milliGRAM(s) Oral every 6 hours PRN  LORazepam   Injectable 0.5 milliGRAM(s) IV Push daily PRN  metoclopramide Injectable 10 milliGRAM(s) IV Push every 6 hours PRN  ondansetron Injectable 8 milliGRAM(s) IV Push every 8 hours PRN  sodium chloride 0.9% lock flush 10 milliLiter(s) IV Push every 1 hour PRN        A/P:   59 year old male with a history of amyloidosis  Post Autologous PBSCT day + 9  Keep platelets =/> 40K for history of amyloidosis   Neutropenic Fever: continue with Cefepime, 7/8 blood and urine cultures NTD, CXR WNL, COVID negative   Chemotherapy induced grade 1 intestinal mucositis (diarrhea) - c. diff negative - continue with imodium  Chemotherapy induced grade 1 GI mucositis: Improving with carafate / maalox     1. Infectious Disease:   acyclovir   Oral Tab/Cap 400 milliGRAM(s) Oral every 8 hours  cefepime   IVPB 2000 milliGRAM(s) IV Intermittent every 8 hours  clotrimazole Lozenge 1 Lozenge Oral five times a day  fluconAZOLE   Tablet 400 milliGRAM(s) Oral daily    2. VOD Prophylaxis: Actigall, Glutamine, Heparin (dosed at 100 units / kg / day)     3. GI Prophylaxis:    pantoprazole    Tablet 40 milliGRAM(s) Oral before breakfast    4. Mouth care - NS / NaHCO3 rinses, Mycelex, Biotene; Skin care     5. GVHD prophylaxis - n/a     6. Transfuse & replete electrolytes prn   1 U PLT for PLt goal >40K  1 U P RBC for anemia     7. IV hydration, daily weights, strict I&O, prn diuresis     8. PO intake as tolerated, nutrition follow up as needed, MVI, folic acid     9. Antiemetics, anti-diarrhea medications:   metoclopramide Injectable 10 milliGRAM(s) IV Push every 6 hours PRN    10. OOB as tolerated, physical therapy consult if needed     11. Monitor coags / fibrinogen 2x week, vitamin K as needed     12. Monitor closely for clinical changes, monitor for fevers     13. Emotional support provided, plan of care discussed and questions addressed     14. Patient education done regarding plan of care, restrictions and discharge planning     15. Continue regular social work input     I have written the above note for Dr. Ponce who performed service with me in the room.   Gael Nielsen PA-C ([phone])    I have seen and examined patient with PA, I agree with above note as scribed. HPC Transplant Team                                                      Critical / Counseling Time Provided: 30 minutes                                                                                                                                                        Chief Complaint: Autologous pbsct with high dose melphalan prep regimen for treatment of amyloidosis    S: Patient seen and examined with HPC Transplant Team:   + general weakness   + diarrhea   + intermittent nausea   +abdominal cramping   +fever overnight    Denies: mouth/throat pain, cough, SOB, abdominal pain, dizziness       O: Vitals:   Vital Signs Last 24 Hrs  T(C): 37.4 (2020 05:52), Max: 38.1 (10 Jul 2020 21:27)  T(F): 99.3 (2020 05:52), Max: 100.5 (10 Jul 2020 21:27)  HR: 82 (2020 05:52) (71 - 83)  BP: 104/69 (2020 05:52) (97/55 - 111/64)  BP(mean): --  RR: 18 (2020 05:52) (18 - 18)  SpO2: 98% (2020 05:52) (95% - 99%)    Admit weight: 85 kg  Daily Weight in k.4 (2020 09:12)    Intake / Output:   07-10 @ 07:01  -  11 @ 07:00  --------------------------------------------------------  IN: 3197 mL / OUT: 2976 mL / NET: 221 mL      PE:   HEENT: Oropharynx: no erythema or ulcerations  Oral Mucositis:               -                                         Grade: n/a  CVS: S1, S2 RRR   Lungs: CTA throughout bilaterally   Abdomen: + BS x 4: mildly hyperactive, soft, NT, ND   Extremities: + chronic trace LE edema; L>R   Gastric Mucositis:      +                                            ndGndrndanddndend:nd nd2nd Intestinal Mucositis:    +                                          ndGndrndanddndend:nd nd2nd Skin: patchy erythematous rash with some  hyperpigmentation on back, neck, abdomen likely secondary to Kepivance   TLC: CDI  Neuro: A&O x 3  Pain: denies     Labs:   CBC Full  -  ( 2020 06:50 )  WBC Count : <0.10 K/uL  Hemoglobin : 7.4 g/dL  Hematocrit : 22.4 %  Platelet Count - Automated : 26 K/uL  Mean Cell Volume : 74.2 fl  Mean Cell Hemoglobin : 24.5 pg  Mean Cell Hemoglobin Concentration : 33.0 gm/dL  Auto Neutrophil # : x  Auto Lymphocyte # : x  Auto Monocyte # : x  Auto Eosinophil # : x  Auto Basophil # : x  Auto Neutrophil % : x  Auto Lymphocyte % : x  Auto Monocyte % : x  Auto Eosinophil % : x  Auto Basophil % : x                          7.4    <0.10 )-----------( 26       ( 2020 06:50 )             22.4     -    143  |  110<H>  |  8   ----------------------------<  95  3.4<L>   |  26  |  0.68    Ca    8.2<L>      2020 06:51  Phos  3.8       Mg     2.0         TPro  5.2<L>  /  Alb  3.2<L>  /  TBili  0.2  /  DBili  x   /  AST  9<L>  /  ALT  10  /  AlkPhos  43  11      LIVER FUNCTIONS - ( 2020 06:51 )  Alb: 3.2 g/dL / Pro: 5.2 g/dL / ALK PHOS: 43 U/L / ALT: 10 U/L / AST: 9 U/L / GGT: x           Lactate Dehydrogenase, Serum: 118 U/L ( @ 06:51)      Cultures:     Culture - Urine (20 @ 00:23)    Specimen Source: .Urine Clean Catch (Midstream)    Culture Results:   No growth    Culture - Blood (20 @ 23:56)    Specimen Source: .Blood Blood    Culture Results:   No growth to date.    Culture - Blood (20 @ 23:56)    Specimen Source: .Blood Blood    Culture Results:   No growth to date.      Radiology:      from: Xray Chest 1 View- PORTABLE-Urgent (20 @ 21:05)   IMPRESSION:  1.  The lungs are clear.      Meds:   Antimicrobials:   acyclovir   Oral Tab/Cap 400 milliGRAM(s) Oral every 8 hours  cefepime   IVPB 2000 milliGRAM(s) IV Intermittent every 8 hours  clotrimazole Lozenge 1 Lozenge Oral five times a day  fluconAZOLE   Tablet 400 milliGRAM(s) Oral daily      Heme / Onc:   heparin  Infusion 357 Unit(s)/Hr IV Continuous <Continuous>      GI:  aluminum hydroxide/magnesium hydroxide/simethicone Suspension 30 milliLiter(s) Oral every 4 hours PRN  bismuth subsalicylate Liquid 30 milliLiter(s) Oral every 4 hours PRN  loperamide 2 milliGRAM(s) Oral every 6 hours PRN  pantoprazole    Tablet 40 milliGRAM(s) Oral before breakfast  sodium bicarbonate Mouth Rinse 10 milliLiter(s) Swish and Spit five times a day  sucralfate suspension 1 Gram(s) Oral four times a day  ursodiol Capsule 300 milliGRAM(s) Oral two times a day with meals      Cardiovascular:   furosemide   Injectable 20 milliGRAM(s) IV Push every 24 hours PRN      Immunologic:   filgrastim-sndz (ZARXIO) Injectable 480 MICROGram(s) SubCutaneous every 24 hours      Other medications:   Biotene Dry Mouth Oral Rinse 5 milliLiter(s) Swish and Spit five times a day  chlorhexidine 4% Liquid 1 Application(s) Topical <User Schedule>  folic acid 1 milliGRAM(s) Oral daily  hydrocortisone 1% Cream 1 Application(s) Topical daily  lidocaine/prilocaine Cream 1 Application(s) Topical daily  multivitamin 1 Tablet(s) Oral daily  sodium chloride 0.9%. 1000 milliLiter(s) IV Continuous <Continuous>      PRN:   acetaminophen   Tablet .. 650 milliGRAM(s) Oral every 6 hours PRN  acetaminophen   Tablet .. 650 milliGRAM(s) Oral every 6 hours PRN  aluminum hydroxide/magnesium hydroxide/simethicone Suspension 30 milliLiter(s) Oral every 4 hours PRN  AQUAPHOR (petrolatum Ointment) 1 Application(s) Topical two times a day PRN  bismuth subsalicylate Liquid 30 milliLiter(s) Oral every 4 hours PRN  diphenhydrAMINE 25 milliGRAM(s) Oral once PRN  diphenhydrAMINE   Injectable 25 milliGRAM(s) IV Push every 4 hours PRN  furosemide   Injectable 20 milliGRAM(s) IV Push every 24 hours PRN  loperamide 2 milliGRAM(s) Oral every 6 hours PRN  LORazepam   Injectable 0.5 milliGRAM(s) IV Push daily PRN  metoclopramide Injectable 10 milliGRAM(s) IV Push every 6 hours PRN  ondansetron Injectable 8 milliGRAM(s) IV Push every 8 hours PRN  sodium chloride 0.9% lock flush 10 milliLiter(s) IV Push every 1 hour PRN      A/P:   59 year old male with a history of amyloidosis  Post Autologous PBSCT day + 9  Keep platelets =/> 40K for history of amyloidosis   Neutropenic Fever: continue with Cefepime,  blood and urine cultures NTD, CXR WNL, COVID negative   Repeat Cultures 7/10 pending  Chemotherapy induced grade 1 intestinal mucositis (diarrhea) - c. diff negative - continue with imodium  Chemotherapy induced grade 1 GI mucositis: Improving with carafate / maalox     1. Infectious Disease:   acyclovir   Oral Tab/Cap 400 milliGRAM(s) Oral every 8 hours  cefepime   IVPB 2000 milliGRAM(s) IV Intermittent every 8 hours  clotrimazole Lozenge 1 Lozenge Oral five times a day  fluconAZOLE   Tablet 400 milliGRAM(s) Oral daily    2. VOD Prophylaxis: Actigall, Glutamine, Heparin (dosed at 100 units / kg / day)     3. GI Prophylaxis:    pantoprazole    Tablet 40 milliGRAM(s) Oral before breakfast    4. Mouth care - NS / NaHCO3 rinses, Mycelex, Biotene; Skin care     5. GVHD prophylaxis - n/a     6. Transfuse & replete electrolytes prn   1 U PLT for PLt goal >40K  1 U P RBC for anemia     7. IV hydration, daily weights, strict I&O, prn diuresis     8. PO intake as tolerated, nutrition follow up as needed, MVI, folic acid     9. Antiemetics, anti-diarrhea medications:   metoclopramide Injectable 10 milliGRAM(s) IV Push every 6 hours PRN    10. OOB as tolerated, physical therapy consult if needed     11. Monitor coags / fibrinogen 2x week, vitamin K as needed     12. Monitor closely for clinical changes, monitor for fevers     13. Emotional support provided, plan of care discussed and questions addressed     14. Patient education done regarding plan of care, restrictions and discharge planning     15. Continue regular social work input     I have written the above note for Dr. Ponce who performed service with me in the room.   Gael Nielsen PA-C ([phone])    I have seen and examined patient with PA, I agree with above note as scribed.

## 2020-07-11 NOTE — PROGRESS NOTE ADULT - PROBLEM SELECTOR PLAN 1
7/8 T max 100.6 Cipro changed to Cefepime blood and urine culture WNL, COVID negative, CXR WNL 7/8 T max 100.6 Cipro changed to Cefepime blood and urine culture WNL, COVID negative, CXR WNL  Follow up repeat cultures 7/10

## 2020-07-12 LAB
ALBUMIN SERPL ELPH-MCNC: 3.3 G/DL — SIGNIFICANT CHANGE UP (ref 3.3–5)
ALP SERPL-CCNC: 42 U/L — SIGNIFICANT CHANGE UP (ref 40–120)
ALT FLD-CCNC: 8 U/L — LOW (ref 10–45)
ANION GAP SERPL CALC-SCNC: 8 MMOL/L — SIGNIFICANT CHANGE UP (ref 5–17)
AST SERPL-CCNC: 12 U/L — SIGNIFICANT CHANGE UP (ref 10–40)
BILIRUB SERPL-MCNC: <0.1 MG/DL — LOW (ref 0.2–1.2)
BUN SERPL-MCNC: 9 MG/DL — SIGNIFICANT CHANGE UP (ref 7–23)
CALCIUM SERPL-MCNC: 8.3 MG/DL — LOW (ref 8.4–10.5)
CHLORIDE SERPL-SCNC: 107 MMOL/L — SIGNIFICANT CHANGE UP (ref 96–108)
CO2 SERPL-SCNC: 26 MMOL/L — SIGNIFICANT CHANGE UP (ref 22–31)
CREAT SERPL-MCNC: 0.64 MG/DL — SIGNIFICANT CHANGE UP (ref 0.5–1.3)
GLUCOSE SERPL-MCNC: 91 MG/DL — SIGNIFICANT CHANGE UP (ref 70–99)
HCT VFR BLD CALC: 21.3 % — LOW (ref 39–50)
HGB BLD-MCNC: 7 G/DL — CRITICAL LOW (ref 13–17)
LDH SERPL L TO P-CCNC: 118 U/L — SIGNIFICANT CHANGE UP (ref 50–242)
MAGNESIUM SERPL-MCNC: 1.9 MG/DL — SIGNIFICANT CHANGE UP (ref 1.6–2.6)
MCHC RBC-ENTMCNC: 24.4 PG — LOW (ref 27–34)
MCHC RBC-ENTMCNC: 32.9 GM/DL — SIGNIFICANT CHANGE UP (ref 32–36)
MCV RBC AUTO: 74.2 FL — LOW (ref 80–100)
NRBC # BLD: 50 /100 WBCS — HIGH (ref 0–0)
PHOSPHATE SERPL-MCNC: 3.4 MG/DL — SIGNIFICANT CHANGE UP (ref 2.5–4.5)
PLATELET # BLD AUTO: 29 K/UL — LOW (ref 150–400)
POTASSIUM SERPL-MCNC: 3.8 MMOL/L — SIGNIFICANT CHANGE UP (ref 3.5–5.3)
POTASSIUM SERPL-SCNC: 3.8 MMOL/L — SIGNIFICANT CHANGE UP (ref 3.5–5.3)
PROT SERPL-MCNC: 5.2 G/DL — LOW (ref 6–8.3)
RBC # BLD: 2.87 M/UL — LOW (ref 4.2–5.8)
RBC # FLD: 14.9 % — HIGH (ref 10.3–14.5)
SODIUM SERPL-SCNC: 141 MMOL/L — SIGNIFICANT CHANGE UP (ref 135–145)
WBC # BLD: <0.1 K/UL — CRITICAL LOW (ref 3.8–10.5)
WBC # FLD AUTO: <0.1 K/UL — CRITICAL LOW (ref 3.8–10.5)

## 2020-07-12 PROCEDURE — 99291 CRITICAL CARE FIRST HOUR: CPT

## 2020-07-12 RX ADMIN — Medication 5 MILLILITER(S): at 17:13

## 2020-07-12 RX ADMIN — Medication 10 MILLILITER(S): at 17:13

## 2020-07-12 RX ADMIN — URSODIOL 300 MILLIGRAM(S): 250 TABLET, FILM COATED ORAL at 17:14

## 2020-07-12 RX ADMIN — Medication 1 LOZENGE: at 12:02

## 2020-07-12 RX ADMIN — PANTOPRAZOLE SODIUM 40 MILLIGRAM(S): 20 TABLET, DELAYED RELEASE ORAL at 05:44

## 2020-07-12 RX ADMIN — Medication 1 LOZENGE: at 17:13

## 2020-07-12 RX ADMIN — Medication 5 MILLILITER(S): at 12:00

## 2020-07-12 RX ADMIN — FLUCONAZOLE 400 MILLIGRAM(S): 150 TABLET ORAL at 12:00

## 2020-07-12 RX ADMIN — Medication 1 TABLET(S): at 12:00

## 2020-07-12 RX ADMIN — Medication 1 LOZENGE: at 19:28

## 2020-07-12 RX ADMIN — Medication 10 MILLILITER(S): at 19:28

## 2020-07-12 RX ADMIN — URSODIOL 300 MILLIGRAM(S): 250 TABLET, FILM COATED ORAL at 12:03

## 2020-07-12 RX ADMIN — Medication 10 MILLILITER(S): at 12:01

## 2020-07-12 RX ADMIN — Medication 400 MILLIGRAM(S): at 13:12

## 2020-07-12 RX ADMIN — Medication 400 MILLIGRAM(S): at 21:40

## 2020-07-12 RX ADMIN — Medication 480 MICROGRAM(S): at 13:12

## 2020-07-12 RX ADMIN — Medication 400 MILLIGRAM(S): at 05:43

## 2020-07-12 RX ADMIN — Medication 5 MILLILITER(S): at 19:27

## 2020-07-12 RX ADMIN — Medication 1 MILLIGRAM(S): at 12:00

## 2020-07-12 NOTE — PROGRESS NOTE ADULT - PROBLEM SELECTOR PLAN 1
7/8 T max 100.6 Cipro changed to Cefepime blood and urine culture WNL, COVID negative, CXR WNL  Follow up repeat cultures 7/10

## 2020-07-12 NOTE — PROGRESS NOTE ADULT - ATTENDING COMMENTS
59 year old male with history of amyloidosis treated with CyBorD, now admitted for autologous pbsct with Melphalan preparative regimen   s/p HPC transplant on 7/2/20, now day + 9, awaiting count recovery    -Continue Zarxio daily until engraftment with WBC recovery  -IV hydration, strict I&O, daily weight, Lasix to keep O > I  -VOD prophylaxis- Actigall, low dose heparin gtt & glutamine supplementation  -ID- Neutropenic fevers- on Cefepime; continue Fluconazole / Acyclovir prophylaxis. d/w pt doing CT C/A/P to assess site of fevers, he reports improved colitis symptoms and would prefer to reassess tomorrow. Last COVID PCR on 7/8 was negative  -GI mucositis secondary to antineoplastic chemotherapy- continue PPI, add Carafate; Imodium for diarrhea  -Antiemetics  -Supplement lytes prn  -Mouth care, skin care  -OOB/ambulate 59 year old male with history of amyloidosis treated with CyBorD, now admitted for autologous pbsct with Melphalan preparative regimen   s/p HPC transplant on 7/2/20, now day + 10, awaiting count recovery    -Continue Zarxio daily until engraftment with WBC recovery  -IV hydration, strict I&O, daily weight, Lasix to keep O > I  -VOD prophylaxis- Actigall, low dose heparin gtt & glutamine supplementation  -ID- Neutropenic fevers- on Cefepime; continue Fluconazole / Acyclovir prophylaxis. Has been afebrile x24 hrs; pt refusing CT scans, will ask team to reassess tomorrow if he has recurrent fevers.  Last COVID PCR on 7/8 was negative  -GI mucositis secondary to antineoplastic chemotherapy- continue PPI, add Carafate; Imodium for diarrhea. Improved, as per pt  -Antiemetics  -Supplement lytes prn  -Mouth care, skin care  -OOB/ambulate

## 2020-07-12 NOTE — PROGRESS NOTE ADULT - SUBJECTIVE AND OBJECTIVE BOX
HPC Transplant Team                                                      Critical / Counseling Time Provided: 30 minutes                                                                                                                                                        Chief Complaint: Autologous pbsct with high dose melphalan prep regimen for treatment of amyloidosis    S: Patient seen and examined with HPC Transplant Team:   + general weakness   + diarrhea   + intermittent nausea   +abdominal cramping   +fever overnight    Denies: mouth/throat pain, cough, SOB, abdominal pain, dizziness       O: Vitals:   Vital Signs Last 24 Hrs  T(C): 37.5 (2020 06:01), Max: 37.7 (2020 21:20)  T(F): 99.5 (2020 06:01), Max: 99.9 (2020 21:20)  HR: 81 (2020 06:01) (73 - 83)  BP: 112/70 (2020 06:01) (104/66 - 116/71)  BP(mean): --  RR: 18 (2020 06:01) (17 - 18)  SpO2: 95% (2020 06:01) (95% - 100%)    Admit weight: 85 kg  Daily     Daily Weight in k.4 (2020 09:12)    Intake / Output:   -11 @ 07:01  -  12 @ 07:00  --------------------------------------------------------  IN: 3152 mL / OUT: 2775 mL / NET: 377 mL      PE:   HEENT: Oropharynx: no erythema or ulcerations  Oral Mucositis:               -                                         Grade: n/a  CVS: S1, S2 RRR   Lungs: CTA throughout bilaterally   Abdomen: + BS x 4: mildly hyperactive, soft, NT, ND   Extremities: + chronic trace LE edema; L>R   Gastric Mucositis:      +                                            ndGndrndanddndend:nd nd2nd Intestinal Mucositis:    +                                          ndGndrndanddndend:nd nd2nd Skin: patchy erythematous rash with some  hyperpigmentation on back, neck, abdomen likely secondary to Kepivance   TLC: CDI  Neuro: A&O x 3  Pain: denies     Labs:   CBC Full  -  ( 2020 07:13 )  WBC Count : <0.10 K/uL  Hemoglobin : 7.0 g/dL  Hematocrit : 21.3 %  Platelet Count - Automated : 29 K/uL  Mean Cell Volume : 74.2 fl  Mean Cell Hemoglobin : 24.4 pg  Mean Cell Hemoglobin Concentration : 32.9 gm/dL  Auto Neutrophil # : x  Auto Lymphocyte # : x  Auto Monocyte # : x  Auto Eosinophil # : x  Auto Basophil # : x  Auto Neutrophil % : x  Auto Lymphocyte % : x  Auto Monocyte % : x  Auto Eosinophil % : x  Auto Basophil % : x                          7.0    <0.10 )-----------( 29       ( 2020 07:13 )             21.3         141  |  107  |  9   ----------------------------<  91  3.8   |  26  |  0.64    Ca    8.3<L>      2020 07:13  Phos  3.4       Mg     1.9         TPro  5.2<L>  /  Alb  3.3  /  TBili  <0.1<L>  /  DBili  x   /  AST  12  /  ALT  8<L>  /  AlkPhos  42  12      LIVER FUNCTIONS - ( 2020 07:13 )  Alb: 3.3 g/dL / Pro: 5.2 g/dL / ALK PHOS: 42 U/L / ALT: 8 U/L / AST: 12 U/L / GGT: x           Lactate Dehydrogenase, Serum: 118 U/L ( @ 07:13)      Cultures:     Culture - Blood (20 @ 00:37)    Specimen Source: .Blood Blood-Catheter    Culture Results:   No growth to date.    Culture - Blood (20 @ 00:37)    Specimen Source: .Blood Blood-Catheter    Culture Results:   No growth to date.      Culture - Urine (20 @ 00:23)    Specimen Source: .Urine Clean Catch (Midstream)    Culture Results:   No growth    Culture - Blood (20 @ 23:56)    Specimen Source: .Blood Blood    Culture Results:   No growth to date.    Culture - Blood (20 @ 23:56)    Specimen Source: .Blood Blood    Culture Results:   No growth to date.      Radiology:      from: Xray Chest 1 View- PORTABLE-Urgent (20 @ 21:05)   IMPRESSION:  1.  The lungs are clear.      Meds:   Antimicrobials:   acyclovir   Oral Tab/Cap 400 milliGRAM(s) Oral every 8 hours  cefepime   IVPB 2000 milliGRAM(s) IV Intermittent every 8 hours  clotrimazole Lozenge 1 Lozenge Oral five times a day  fluconAZOLE   Tablet 400 milliGRAM(s) Oral daily      Heme / Onc:   heparin  Infusion 357 Unit(s)/Hr IV Continuous <Continuous>      GI:  aluminum hydroxide/magnesium hydroxide/simethicone Suspension 30 milliLiter(s) Oral every 4 hours PRN  bismuth subsalicylate Liquid 30 milliLiter(s) Oral every 4 hours PRN  loperamide 2 milliGRAM(s) Oral every 6 hours PRN  pantoprazole    Tablet 40 milliGRAM(s) Oral before breakfast  sodium bicarbonate Mouth Rinse 10 milliLiter(s) Swish and Spit five times a day  sucralfate suspension 1 Gram(s) Oral four times a day  ursodiol Capsule 300 milliGRAM(s) Oral two times a day with meals      Cardiovascular:   furosemide   Injectable 20 milliGRAM(s) IV Push every 24 hours PRN      Immunologic:   filgrastim-sndz (ZARXIO) Injectable 480 MICROGram(s) SubCutaneous every 24 hours      Other medications:   Biotene Dry Mouth Oral Rinse 5 milliLiter(s) Swish and Spit five times a day  chlorhexidine 4% Liquid 1 Application(s) Topical <User Schedule>  folic acid 1 milliGRAM(s) Oral daily  hydrocortisone 1% Cream 1 Application(s) Topical daily  lidocaine/prilocaine Cream 1 Application(s) Topical daily  multivitamin 1 Tablet(s) Oral daily  sodium chloride 0.9%. 1000 milliLiter(s) IV Continuous <Continuous>      PRN:   acetaminophen   Tablet .. 650 milliGRAM(s) Oral every 6 hours PRN  acetaminophen   Tablet .. 650 milliGRAM(s) Oral every 6 hours PRN  aluminum hydroxide/magnesium hydroxide/simethicone Suspension 30 milliLiter(s) Oral every 4 hours PRN  AQUAPHOR (petrolatum Ointment) 1 Application(s) Topical two times a day PRN  bismuth subsalicylate Liquid 30 milliLiter(s) Oral every 4 hours PRN  diphenhydrAMINE 25 milliGRAM(s) Oral once PRN  diphenhydrAMINE   Injectable 25 milliGRAM(s) IV Push every 4 hours PRN  furosemide   Injectable 20 milliGRAM(s) IV Push every 24 hours PRN  loperamide 2 milliGRAM(s) Oral every 6 hours PRN  LORazepam   Injectable 0.5 milliGRAM(s) IV Push daily PRN  metoclopramide Injectable 10 milliGRAM(s) IV Push every 6 hours PRN  ondansetron Injectable 8 milliGRAM(s) IV Push every 8 hours PRN  sodium chloride 0.9% lock flush 10 milliLiter(s) IV Push every 1 hour PRN      A/P:   59 year old male with a history of amyloidosis  Post Autologous PBSCT day + 10  Keep platelets =/> 40K for history of amyloidosis   Neutropenic Fever: continue with Cefepime,  blood and urine cultures NTD, CXR WNL, COVID negative   Repeat Cultures  no growth  Chemotherapy induced grade 1 intestinal mucositis (diarrhea) - c. diff negative - continue with imodium  Chemotherapy induced grade 1 GI mucositis: Improving with carafate / maalox     1. Infectious Disease:   acyclovir   Oral Tab/Cap 400 milliGRAM(s) Oral every 8 hours  cefepime   IVPB 2000 milliGRAM(s) IV Intermittent every 8 hours  clotrimazole Lozenge 1 Lozenge Oral five times a day  fluconAZOLE   Tablet 400 milliGRAM(s) Oral daily    2. VOD Prophylaxis: Actigall, Glutamine, Heparin (dosed at 100 units / kg / day)     3. GI Prophylaxis:    pantoprazole    Tablet 40 milliGRAM(s) Oral before breakfast    4. Mouth care - NS / NaHCO3 rinses, Mycelex, Biotene; Skin care     5. GVHD prophylaxis - n/a     6. Transfuse & replete electrolytes prn   1 U PLT for PLt goal >40K  1 U P RBC for anemia     7. IV hydration, daily weights, strict I&O, prn diuresis     8. PO intake as tolerated, nutrition follow up as needed, MVI, folic acid     9. Antiemetics, anti-diarrhea medications:   metoclopramide Injectable 10 milliGRAM(s) IV Push every 6 hours PRN    10. OOB as tolerated, physical therapy consult if needed     11. Monitor coags / fibrinogen 2x week, vitamin K as needed     12. Monitor closely for clinical changes, monitor for fevers     13. Emotional support provided, plan of care discussed and questions addressed     14. Patient education done regarding plan of care, restrictions and discharge planning     15. Continue regular social work input     I have written the above note for Dr. Ponce who performed service with me in the room.   Gael Nielsen PA-C ([phone])    I have seen and examined patient with PA, I agree with above note as scribed. HPC Transplant Team                                                      Critical / Counseling Time Provided: 30 minutes                                                                                                                                                        Chief Complaint: Autologous pbsct with high dose melphalan prep regimen for treatment of amyloidosis    S: Patient seen and examined with HPC Transplant Team:   + general weakness   + diarrhea improved   + intermittent nausea     Denies: mouth/throat pain, cough, SOB, abdominal pain, dizziness       O: Vitals:   Vital Signs Last 24 Hrs  T(C): 37.5 (2020 06:01), Max: 37.7 (2020 21:20)  T(F): 99.5 (2020 06:01), Max: 99.9 (2020 21:20)  HR: 81 (2020 06:01) (73 - 83)  BP: 112/70 (2020 06:01) (104/66 - 116/71)  RR: 18 (2020 06:01) (17 - 18)  SpO2: 95% (2020 06:01) (95% - 100%)    Admit weight: 85 kg  Daily Weight in k.4 (2020 09:12)  Daily Weight in k (2020 09:00)    Intake / Output:   07-11 @ 07:01  -  12 @ 07:00  --------------------------------------------------------  IN: 3152 mL / OUT: 2775 mL / NET: 377 mL      PE:   HEENT: Oropharynx: no erythema or ulcerations  Oral Mucositis:               -                                         Grade: n/a  CVS: S1, S2 RRR   Lungs: CTA throughout bilaterally   Abdomen: + BS x 4: mildly hyperactive, soft, NT, ND   Extremities: + chronic trace LE edema; L>R   Gastric Mucositis:      +                                            ndGndrndanddndend:nd nd2nd Intestinal Mucositis:    +                                          ndGndrndanddndend:nd nd2nd Skin: patchy erythematous rash with some  hyperpigmentation on back, neck, abdomen likely secondary to Kepivance   TLC: CDI  Neuro: A&O x 3  Pain: denies     Labs:   CBC Full  -  ( 2020 07:13 )  WBC Count : <0.10 K/uL  Hemoglobin : 7.0 g/dL  Hematocrit : 21.3 %  Platelet Count - Automated : 29 K/uL  Mean Cell Volume : 74.2 fl  Mean Cell Hemoglobin : 24.4 pg  Mean Cell Hemoglobin Concentration : 32.9 gm/dL  Auto Neutrophil # : x  Auto Lymphocyte # : x  Auto Monocyte # : x  Auto Eosinophil # : x  Auto Basophil # : x  Auto Neutrophil % : x  Auto Lymphocyte % : x  Auto Monocyte % : x  Auto Eosinophil % : x  Auto Basophil % : x                          7.0    <0.10 )-----------( 29       ( 2020 07:13 )             21.3         141  |  107  |  9   ----------------------------<  91  3.8   |  26  |  0.64    Ca    8.3<L>      2020 07:13  Phos  3.4       Mg     1.9         TPro  5.2<L>  /  Alb  3.3  /  TBili  <0.1<L>  /  DBili  x   /  AST  12  /  ALT  8<L>  /  AlkPhos  42  -12      LIVER FUNCTIONS - ( 2020 07:13 )  Alb: 3.3 g/dL / Pro: 5.2 g/dL / ALK PHOS: 42 U/L / ALT: 8 U/L / AST: 12 U/L / GGT: x           Lactate Dehydrogenase, Serum: 118 U/L ( @ 07:13)      Cultures:     Culture - Blood (20 @ 00:37)    Specimen Source: .Blood Blood-Catheter    Culture Results:   No growth to date.    Culture - Blood (20 @ 00:37)    Specimen Source: .Blood Blood-Catheter    Culture Results:   No growth to date.      Culture - Urine (20 @ 00:23)    Specimen Source: .Urine Clean Catch (Midstream)    Culture Results:   No growth    Culture - Blood (20 @ 23:56)    Specimen Source: .Blood Blood    Culture Results:   No growth to date.    Culture - Blood (20 @ 23:56)    Specimen Source: .Blood Blood    Culture Results:   No growth to date.      Radiology:      from: Xray Chest 1 View- PORTABLE-Urgent (20 @ 21:05)   IMPRESSION:  1.  The lungs are clear.      Meds:   Antimicrobials:   acyclovir   Oral Tab/Cap 400 milliGRAM(s) Oral every 8 hours  cefepime   IVPB 2000 milliGRAM(s) IV Intermittent every 8 hours  clotrimazole Lozenge 1 Lozenge Oral five times a day  fluconAZOLE   Tablet 400 milliGRAM(s) Oral daily      Heme / Onc:   heparin  Infusion 357 Unit(s)/Hr IV Continuous <Continuous>      GI:  aluminum hydroxide/magnesium hydroxide/simethicone Suspension 30 milliLiter(s) Oral every 4 hours PRN  bismuth subsalicylate Liquid 30 milliLiter(s) Oral every 4 hours PRN  loperamide 2 milliGRAM(s) Oral every 6 hours PRN  pantoprazole    Tablet 40 milliGRAM(s) Oral before breakfast  sodium bicarbonate Mouth Rinse 10 milliLiter(s) Swish and Spit five times a day  sucralfate suspension 1 Gram(s) Oral four times a day  ursodiol Capsule 300 milliGRAM(s) Oral two times a day with meals      Cardiovascular:   furosemide   Injectable 20 milliGRAM(s) IV Push every 24 hours PRN      Immunologic:   filgrastim-sndz (ZARXIO) Injectable 480 MICROGram(s) SubCutaneous every 24 hours      Other medications:   Biotene Dry Mouth Oral Rinse 5 milliLiter(s) Swish and Spit five times a day  chlorhexidine 4% Liquid 1 Application(s) Topical <User Schedule>  folic acid 1 milliGRAM(s) Oral daily  hydrocortisone 1% Cream 1 Application(s) Topical daily  lidocaine/prilocaine Cream 1 Application(s) Topical daily  multivitamin 1 Tablet(s) Oral daily  sodium chloride 0.9%. 1000 milliLiter(s) IV Continuous <Continuous>      PRN:   acetaminophen   Tablet .. 650 milliGRAM(s) Oral every 6 hours PRN  acetaminophen   Tablet .. 650 milliGRAM(s) Oral every 6 hours PRN  aluminum hydroxide/magnesium hydroxide/simethicone Suspension 30 milliLiter(s) Oral every 4 hours PRN  AQUAPHOR (petrolatum Ointment) 1 Application(s) Topical two times a day PRN  bismuth subsalicylate Liquid 30 milliLiter(s) Oral every 4 hours PRN  diphenhydrAMINE 25 milliGRAM(s) Oral once PRN  diphenhydrAMINE   Injectable 25 milliGRAM(s) IV Push every 4 hours PRN  furosemide   Injectable 20 milliGRAM(s) IV Push every 24 hours PRN  loperamide 2 milliGRAM(s) Oral every 6 hours PRN  LORazepam   Injectable 0.5 milliGRAM(s) IV Push daily PRN  metoclopramide Injectable 10 milliGRAM(s) IV Push every 6 hours PRN  ondansetron Injectable 8 milliGRAM(s) IV Push every 8 hours PRN  sodium chloride 0.9% lock flush 10 milliLiter(s) IV Push every 1 hour PRN      A/P:   59 year old male with a history of amyloidosis  Post Autologous PBSCT day + 10  Keep platelets =/> 40K for history of amyloidosis   Neutropenic Fever: continue with Cefepime,  blood and urine cultures NTD, CXR WNL, COVID negative   Repeat Cultures  no growth, afebrile > 24 hrs  Chemotherapy induced grade 1 intestinal mucositis (diarrhea) - c. diff negative - continue with imodium  Chemotherapy induced grade 1 GI mucositis: Improving with carafate / maalox     1. Infectious Disease:   acyclovir   Oral Tab/Cap 400 milliGRAM(s) Oral every 8 hours  cefepime   IVPB 2000 milliGRAM(s) IV Intermittent every 8 hours  clotrimazole Lozenge 1 Lozenge Oral five times a day  fluconAZOLE   Tablet 400 milliGRAM(s) Oral daily    2. VOD Prophylaxis: Actigall, Glutamine, Heparin (dosed at 100 units / kg / day)     3. GI Prophylaxis:    pantoprazole    Tablet 40 milliGRAM(s) Oral before breakfast    4. Mouth care - NS / NaHCO3 rinses, Mycelex, Biotene; Skin care     5. GVHD prophylaxis - n/a     6. Transfuse & replete electrolytes prn   s/p 1 bag  PLT for PLt goal >40K       7. IV hydration, daily weights, strict I&O, prn diuresis     8. PO intake as tolerated, nutrition follow up as needed, MVI, folic acid     9. Antiemetics, anti-diarrhea medications:   metoclopramide Injectable 10 milliGRAM(s) IV Push every 6 hours PRN    10. OOB as tolerated, physical therapy consult if needed     11. Monitor coags / fibrinogen 2x week, vitamin K as needed     12. Monitor closely for clinical changes, monitor for fevers     13. Emotional support provided, plan of care discussed and questions addressed     14. Patient education done regarding plan of care, restrictions and discharge planning     15. Continue regular social work input     I have written the above note for Dr. Ponce who performed service with me in the room.   Gael Nielsen PA-C ([phone])    I have seen and examined patient with PA, I agree with above note as scribed.

## 2020-07-13 LAB
ALBUMIN SERPL ELPH-MCNC: 3.5 G/DL — SIGNIFICANT CHANGE UP (ref 3.3–5)
ALP SERPL-CCNC: 48 U/L — SIGNIFICANT CHANGE UP (ref 40–120)
ALT FLD-CCNC: 10 U/L — SIGNIFICANT CHANGE UP (ref 10–45)
ANION GAP SERPL CALC-SCNC: 9 MMOL/L — SIGNIFICANT CHANGE UP (ref 5–17)
APTT BLD: 26.6 SEC — LOW (ref 27.5–35.5)
AST SERPL-CCNC: 15 U/L — SIGNIFICANT CHANGE UP (ref 10–40)
BILIRUB DIRECT SERPL-MCNC: <0.1 MG/DL — SIGNIFICANT CHANGE UP (ref 0–0.2)
BILIRUB INDIRECT FLD-MCNC: >0.1 MG/DL — LOW (ref 0.2–1)
BILIRUB SERPL-MCNC: 0.2 MG/DL — SIGNIFICANT CHANGE UP (ref 0.2–1.2)
BLD GP AB SCN SERPL QL: NEGATIVE — SIGNIFICANT CHANGE UP
BUN SERPL-MCNC: 10 MG/DL — SIGNIFICANT CHANGE UP (ref 7–23)
CALCIUM SERPL-MCNC: 8.8 MG/DL — SIGNIFICANT CHANGE UP (ref 8.4–10.5)
CHLORIDE SERPL-SCNC: 106 MMOL/L — SIGNIFICANT CHANGE UP (ref 96–108)
CO2 SERPL-SCNC: 26 MMOL/L — SIGNIFICANT CHANGE UP (ref 22–31)
CREAT SERPL-MCNC: 0.67 MG/DL — SIGNIFICANT CHANGE UP (ref 0.5–1.3)
FIBRINOGEN PPP-MCNC: 628 MG/DL — HIGH (ref 350–510)
GLUCOSE SERPL-MCNC: 87 MG/DL — SIGNIFICANT CHANGE UP (ref 70–99)
HCT VFR BLD CALC: 23.4 % — LOW (ref 39–50)
HGB BLD-MCNC: 7.7 G/DL — LOW (ref 13–17)
INR BLD: 1.05 RATIO — SIGNIFICANT CHANGE UP (ref 0.88–1.16)
LDH SERPL L TO P-CCNC: 134 U/L — SIGNIFICANT CHANGE UP (ref 50–242)
MAGNESIUM SERPL-MCNC: 2.1 MG/DL — SIGNIFICANT CHANGE UP (ref 1.6–2.6)
MCHC RBC-ENTMCNC: 24.4 PG — LOW (ref 27–34)
MCHC RBC-ENTMCNC: 32.9 GM/DL — SIGNIFICANT CHANGE UP (ref 32–36)
MCV RBC AUTO: 74.3 FL — LOW (ref 80–100)
NRBC # BLD: 0 /100 WBCS — SIGNIFICANT CHANGE UP (ref 0–0)
PHOSPHATE SERPL-MCNC: 3.8 MG/DL — SIGNIFICANT CHANGE UP (ref 2.5–4.5)
PLATELET # BLD AUTO: 37 K/UL — LOW (ref 150–400)
POTASSIUM SERPL-MCNC: 3.8 MMOL/L — SIGNIFICANT CHANGE UP (ref 3.5–5.3)
POTASSIUM SERPL-SCNC: 3.8 MMOL/L — SIGNIFICANT CHANGE UP (ref 3.5–5.3)
PROT SERPL-MCNC: 5.6 G/DL — LOW (ref 6–8.3)
PROTHROM AB SERPL-ACNC: 12 SEC — SIGNIFICANT CHANGE UP (ref 10.6–13.6)
RBC # BLD: 3.15 M/UL — LOW (ref 4.2–5.8)
RBC # FLD: 15.2 % — HIGH (ref 10.3–14.5)
RH IG SCN BLD-IMP: POSITIVE — SIGNIFICANT CHANGE UP
SODIUM SERPL-SCNC: 141 MMOL/L — SIGNIFICANT CHANGE UP (ref 135–145)
WBC # BLD: <0.1 K/UL — CRITICAL LOW (ref 3.8–10.5)
WBC # FLD AUTO: <0.1 K/UL — CRITICAL LOW (ref 3.8–10.5)

## 2020-07-13 PROCEDURE — 99232 SBSQ HOSP IP/OBS MODERATE 35: CPT

## 2020-07-13 RX ADMIN — Medication 1 TABLET(S): at 12:39

## 2020-07-13 RX ADMIN — Medication 1 LOZENGE: at 16:33

## 2020-07-13 RX ADMIN — Medication 1 LOZENGE: at 07:52

## 2020-07-13 RX ADMIN — Medication 1 LOZENGE: at 20:00

## 2020-07-13 RX ADMIN — Medication 480 MICROGRAM(S): at 17:09

## 2020-07-13 RX ADMIN — Medication 1 LOZENGE: at 12:39

## 2020-07-13 RX ADMIN — Medication 5 MILLILITER(S): at 07:52

## 2020-07-13 RX ADMIN — Medication 1 MILLIGRAM(S): at 12:39

## 2020-07-13 RX ADMIN — Medication 10 MILLILITER(S): at 07:52

## 2020-07-13 RX ADMIN — Medication 10 MILLILITER(S): at 20:00

## 2020-07-13 RX ADMIN — Medication 5 MILLILITER(S): at 16:33

## 2020-07-13 RX ADMIN — Medication 400 MILLIGRAM(S): at 06:46

## 2020-07-13 RX ADMIN — Medication 5 MILLILITER(S): at 00:06

## 2020-07-13 RX ADMIN — Medication 10 MILLILITER(S): at 12:39

## 2020-07-13 RX ADMIN — PANTOPRAZOLE SODIUM 40 MILLIGRAM(S): 20 TABLET, DELAYED RELEASE ORAL at 06:46

## 2020-07-13 RX ADMIN — Medication 5 MILLILITER(S): at 12:39

## 2020-07-13 RX ADMIN — URSODIOL 300 MILLIGRAM(S): 250 TABLET, FILM COATED ORAL at 16:33

## 2020-07-13 RX ADMIN — CHLORHEXIDINE GLUCONATE 1 APPLICATION(S): 213 SOLUTION TOPICAL at 06:46

## 2020-07-13 RX ADMIN — Medication 10 MILLILITER(S): at 00:06

## 2020-07-13 RX ADMIN — FLUCONAZOLE 400 MILLIGRAM(S): 150 TABLET ORAL at 12:39

## 2020-07-13 RX ADMIN — Medication 400 MILLIGRAM(S): at 13:15

## 2020-07-13 RX ADMIN — Medication 5 MILLILITER(S): at 20:00

## 2020-07-13 RX ADMIN — URSODIOL 300 MILLIGRAM(S): 250 TABLET, FILM COATED ORAL at 07:52

## 2020-07-13 RX ADMIN — Medication 400 MILLIGRAM(S): at 21:34

## 2020-07-13 RX ADMIN — Medication 1 LOZENGE: at 00:06

## 2020-07-13 RX ADMIN — Medication 10 MILLILITER(S): at 16:34

## 2020-07-13 NOTE — PROGRESS NOTE ADULT - ATTENDING COMMENTS
59 year old male with history of amyloidosis treated with CyBorD, now admitted for autologous pbsct with Melphalan preparative regimen   s/p HPC transplant on 7/2/20, now day + 11    -Continue Zarxio daily until engraftment with WBC recovery  -IV hydration, strict I&O, daily weight, Lasix to keep O > I  -VOD prophylaxis- Actigall, low dose heparin gtt & glutamine supplementation  -ID- Neutropenic fevers- on Cefepime; continue Fluconazole / Acyclovir prophylaxis. Has been afebrile x24 hrs; pt refusing CT scans, will ask team to reassess tomorrow if he has recurrent fevers.  Last COVID PCR on 7/8 was negative  -GI mucositis secondary to antineoplastic chemotherapy- continue PPI, add Carafate; Imodium for diarrhea. Improved, as per pt  -Antiemetics  -Supplement lytes prn  -Mouth care, skin care  -OOB/ambulate 59 year old male with history of amyloidosis treated with CyBorD, now admitted for autologous pbsct with Melphalan preparative regimen   s/p HPC transplant on 7/2/20, now day + 11    -Continue Zarxio daily until engraftment with WBC recovery  -IV hydration, strict I&O, daily weight, Lasix to keep O > I  -VOD prophylaxis- Actigall, low dose heparin gtt & glutamine supplementation  -ID- Neutropenic fevers- on Cefepime; continue Fluconazole / Acyclovir prophylaxis. Has been afebrile x24 hrs;  hold on  CT scans, will reassess tomorrow if he has recurrent fevers.  Last COVID PCR on 7/8 was negative  -GI mucositis secondary to antineoplastic chemotherapy- continue PPI, add Carafate; Imodium for diarrhea. Improved, as per pt  -Antiemetics  -Supplement lytes prn  - transfuse if plts less than 40....will hold today at 37...tx rbc as needed  -Mouth care, skin care  -OOB/ambulate

## 2020-07-13 NOTE — PROGRESS NOTE ADULT - PROBLEM SELECTOR PLAN 3
7/8:Chemotherapy induced grade 1 intestinal mucositis (diarrhea) -   c. diff negative -   7/9: added imodium 7/8:Chemotherapy induced grade 1 intestinal mucositis (diarrhea) -   c. diff negative -   7/9: added imodium  7/13: resolved

## 2020-07-13 NOTE — PROGRESS NOTE ADULT - PROBLEM SELECTOR PLAN 2
7/8: Chemotherapy induced grade 1 GI mucositis  with nausea - added zofran 8mg IV q 8 hours prn, carafate / maalox prn.  Chemotherapy induced grade 1 intestinal mucositis - previous history of IBS, continue supportive care

## 2020-07-13 NOTE — PROGRESS NOTE ADULT - PROBLEM SELECTOR PLAN 1
7/8 T max 100.6 Cipro changed to Cefepime blood and urine culture WNL, COVID negative, CXR WNL  Follow up repeat cultures 7/10  7/13- afebrile > 24 hours

## 2020-07-13 NOTE — PROGRESS NOTE ADULT - SUBJECTIVE AND OBJECTIVE BOX
[facility] Transplant Team                                                      Critical / Counseling Time Provided: 30 minutes                                                                                                                                                        Chief Complaint:     S: Patient seen and examined with [facility] Transplant Team:   + general weakness   + diarrhea improved   + intermittent nausea     O: Vitals:   Vital Signs Last 24 Hrs  T(C): 37.5 (2020 06:13), Max: 37.8 (2020 21:28)  T(F): 99.5 (2020 06:13), Max: 100.1 (2020 21:28)  HR: 72 (2020 06:13) (68 - 79)  BP: 120/72 (2020 06:13) (107/73 - 122/76)  BP(mean): --  RR: 18 (2020 06:13) (17 - 18)  SpO2: 98% (2020 06:13) (98% - 100%)    Admit weight: 85 kg  Daily Weight in k (2020 09:00)  Today's weight:    Intake / Output:   07-12 @ 07:01  -  07-13 @ 07:00  --------------------------------------------------------  IN: 2156 mL / OUT: 4100 mL / NET: -1944 mL    PE:   HEENT: Oropharynx: no erythema or ulcerations  Oral Mucositis:               -                                         Grade: n/a  CVS: S1, S2 RRR   Lungs: CTA throughout bilaterally   Abdomen: + BS x 4: mildly hyperactive, soft, NT, ND   Extremities: + chronic trace LE edema; L>R   Gastric Mucositis:      +                                            ndGndrndanddndend:nd nd2nd Intestinal Mucositis:    +                                          ndGndrndanddndend:nd nd2nd Skin: patchy erythematous rash with some  hyperpigmentation on back, neck, abdomen likely secondary to Kepivance   TLC: CDI  Neuro: A&O x 3  Pain: denies     Labs:   CBC Full  -  ( 2020 07:03 )  WBC Count : <0.10 K/uL  Hemoglobin : 7.7 g/dL  Hematocrit : 23.4 %  Platelet Count - Automated : 37 K/uL  Mean Cell Volume : 74.3 fl  Mean Cell Hemoglobin : 24.4 pg  Mean Cell Hemoglobin Concentration : 32.9 gm/dL  Auto Neutrophil # : x  Auto Lymphocyte # : x  Auto Monocyte # : x  Auto Eosinophil # : x  Auto Basophil # : x  Auto Neutrophil % : x  Auto Lymphocyte % : x  Auto Monocyte % : x  Auto Eosinophil % : x  Auto Basophil % : x                          7.7    <0.10 )-----------( 37       ( 2020 07:03 )             23.4     07-13    141  |  106  |  10  ----------------------------<  87  3.8   |  26  |  0.67    Ca    8.8      2020 07:03  Phos  3.8     07-  Mg     2.1         TPro  5.6<L>  /  Alb  3.5  /  TBili  0.2  /  DBili  <0.1  /  AST  15  /  ALT  10  /  AlkPhos  48  -13    PT/INR - ( 2020 07:03 )   PT: 12.0 sec;   INR: 1.05 ratio         PTT - ( 2020 07:03 )  PTT:26.6 sec  LIVER FUNCTIONS - ( 2020 07:03 )  Alb: 3.5 g/dL / Pro: 5.6 g/dL / ALK PHOS: 48 U/L / ALT: 10 U/L / AST: 15 U/L / GGT: x           Lactate Dehydrogenase, Serum: 134 U/L ( @ 07:03)    Cultures:   Culture Results: blood  No growth to date. (20 @ 00:37)    Culture Results: blood  No growth to date. (20 @ 00:37)    Culture Results: urine  No growth (20 @ 00:23)    Culture Results: blood  No growth to date. (20 @ 23:56)    Culture Results: blood  No growth to date. (20 @ 23:56)    Radiology:   EXAM:  XR CHEST PORTABLE URGENT 1V                        PROCEDURE DATE:  2020    IMPRESSION:  1.  The lungs are clear.      Meds:   Antimicrobials:   acyclovir   Oral Tab/Cap 400 milliGRAM(s) Oral every 8 hours  cefepime   IVPB      cefepime   IVPB 2000 milliGRAM(s) IV Intermittent every 8 hours  clotrimazole Lozenge 1 Lozenge Oral five times a day  fluconAZOLE   Tablet 400 milliGRAM(s) Oral daily      Heme / Onc:   heparin  Infusion 357 Unit(s)/Hr IV Continuous <Continuous>      GI:  aluminum hydroxide/magnesium hydroxide/simethicone Suspension 30 milliLiter(s) Oral every 4 hours PRN  bismuth subsalicylate Liquid 30 milliLiter(s) Oral every 4 hours PRN  loperamide 2 milliGRAM(s) Oral every 6 hours PRN  pantoprazole    Tablet 40 milliGRAM(s) Oral before breakfast  sodium bicarbonate Mouth Rinse 10 milliLiter(s) Swish and Spit five times a day  sucralfate suspension 1 Gram(s) Oral four times a day  ursodiol Capsule 300 milliGRAM(s) Oral two times a day with meals      Cardiovascular:   furosemide   Injectable 20 milliGRAM(s) IV Push every 24 hours PRN      Immunologic:   filgrastim-sndz (ZARXIO) Injectable 480 MICROGram(s) SubCutaneous every 24 hours      Other medications:   Biotene Dry Mouth Oral Rinse 5 milliLiter(s) Swish and Spit five times a day  chlorhexidine 4% Liquid 1 Application(s) Topical <User Schedule>  folic acid 1 milliGRAM(s) Oral daily  hydrocortisone 1% Cream 1 Application(s) Topical daily  lidocaine/prilocaine Cream 1 Application(s) Topical daily  multivitamin 1 Tablet(s) Oral daily  sodium chloride 0.9%. 1000 milliLiter(s) IV Continuous <Continuous>      PRN:   acetaminophen   Tablet .. 650 milliGRAM(s) Oral every 6 hours PRN  acetaminophen   Tablet .. 650 milliGRAM(s) Oral every 6 hours PRN  aluminum hydroxide/magnesium hydroxide/simethicone Suspension 30 milliLiter(s) Oral every 4 hours PRN  AQUAPHOR (petrolatum Ointment) 1 Application(s) Topical two times a day PRN  bismuth subsalicylate Liquid 30 milliLiter(s) Oral every 4 hours PRN  diphenhydrAMINE 25 milliGRAM(s) Oral once PRN  diphenhydrAMINE   Injectable 25 milliGRAM(s) IV Push every 4 hours PRN  furosemide   Injectable 20 milliGRAM(s) IV Push every 24 hours PRN  loperamide 2 milliGRAM(s) Oral every 6 hours PRN  LORazepam   Injectable 0.5 milliGRAM(s) IV Push daily PRN  metoclopramide Injectable 10 milliGRAM(s) IV Push every 6 hours PRN  ondansetron Injectable 8 milliGRAM(s) IV Push every 8 hours PRN  sodium chloride 0.9% lock flush 10 milliLiter(s) IV Push every 1 hour PRN    A/P:   59 year old male with a history of amyloidosis  Post Autologous PBSCT day + 11  Keep platelets =/> 40K for history of amyloidosis   Has remained afebrile for 24 hours - continue Cefepime through engraftment, cultures negative   Chemotherapy induced grade 1 intestinal mucositis (diarrhea) - c. diff negative - continue with imodium  Chemotherapy induced grade 1 GI mucositis: Improving with carafate / maalox     1. Infectious Disease:   acyclovir   Oral Tab/Cap 400 milliGRAM(s) Oral every 8 hours  cefepime   IVPB      cefepime   IVPB 2000 milliGRAM(s) IV Intermittent every 8 hours  clotrimazole Lozenge 1 Lozenge Oral five times a day  fluconAZOLE   Tablet 400 milliGRAM(s) Oral daily    2. VOD Prophylaxis: Actigall, Glutamine, Heparin (dosed at 100 units / kg / day)     3. GI Prophylaxis:   pantoprazole    Tablet 40 milliGRAM(s) Oral before breakfast    4. Mouthcare - NS / NaHCO3 rinses, Mycelex, Biotene; Skin care     5. GVHD prophylaxis - n/a     6. Transfuse & replete electrolytes prn     7. IV hydration, daily weights, strict I&O, prn diuresis     8. PO intake as tolerated, nutrition follow up as needed, MVI, folic acid     9. Antiemetics, anti-diarrhea medications:   loperamide 2 milliGRAM(s) Oral every 6 hours PRN  LORazepam   Injectable 0.5 milliGRAM(s) IV Push daily PRN  metoclopramide Injectable 10 milliGRAM(s) IV Push every 6 hours PRN  ondansetron Injectable 8 milliGRAM(s) IV Push every 8 hours PRN    10. OOB as tolerated, physical therapy consult if needed     11. Monitor coags / fibrinogen 2x week, vitamin K as needed     12. Monitor closely for clinical changes, monitor for fevers     13. Emotional support provided, plan of care discussed and questions addressed     14. Patient education done regarding plan of care, restrictions and discharge planning     15. Continue regular social work input     I have written the above note for Dr. Roman who performed service with me in the room.   Brinda Garrett NP-C ([phone])    I have seen and examined patient with NP, I agree with above note as scribed. [facility] Transplant Team                                                      Critical / Counseling Time Provided: 30 minutes                                                                                                                                                        Chief Complaint:     S: Patient seen and examined with [facility] Transplant Team:   + general weakness   + diarrhea improved   + intermittent nausea     O: Vitals:   Vital Signs Last 24 Hrs  T(C): 37.5 (2020 06:13), Max: 37.8 (2020 21:28)  T(F): 99.5 (2020 06:13), Max: 100.1 (2020 21:28)  HR: 72 (2020 06:13) (68 - 79)  BP: 120/72 (2020 06:13) (107/73 - 122/76)  BP(mean): --  RR: 18 (2020 06:13) (17 - 18)  SpO2: 98% (2020 06:13) (98% - 100%)    Admit weight: 85 kg  Daily Weight in k (2020 09:00)  Today's weight: 82.8kg     Intake / Output:   -12 @ 07:01  -  07-13 @ 07:00  --------------------------------------------------------  IN: 2156 mL / OUT: 4100 mL / NET: -1944 mL    PE:   HEENT: Oropharynx: no erythema or ulcerations  Oral Mucositis:               -                                         Grade: n/a  CVS: S1, S2 RRR   Lungs: CTA throughout bilaterally   Abdomen: + BS x 4: mildly hyperactive, soft, NT, ND   Extremities: + chronic trace LE edema; L>R   Gastric Mucositis:      +                                            ndGndrndanddndend:nd nd2nd Intestinal Mucositis:    +                                          ndGndrndanddndend:nd nd2nd Skin: patchy erythematous rash with some  hyperpigmentation on back, neck, abdomen likely secondary to Kepivance   TLC: CDI  Neuro: A&O x 3  Pain: denies     Labs:   CBC Full  -  ( 2020 07:03 )  WBC Count : <0.10 K/uL  Hemoglobin : 7.7 g/dL  Hematocrit : 23.4 %  Platelet Count - Automated : 37 K/uL  Mean Cell Volume : 74.3 fl  Mean Cell Hemoglobin : 24.4 pg  Mean Cell Hemoglobin Concentration : 32.9 gm/dL  Auto Neutrophil # : x  Auto Lymphocyte # : x  Auto Monocyte # : x  Auto Eosinophil # : x  Auto Basophil # : x  Auto Neutrophil % : x  Auto Lymphocyte % : x  Auto Monocyte % : x  Auto Eosinophil % : x  Auto Basophil % : x                          7.7    <0.10 )-----------( 37       ( 2020 07:03 )             23.4     07-13    141  |  106  |  10  ----------------------------<  87  3.8   |  26  |  0.67    Ca    8.8      2020 07:03  Phos  3.8     07-  Mg     2.1         TPro  5.6<L>  /  Alb  3.5  /  TBili  0.2  /  DBili  <0.1  /  AST  15  /  ALT  10  /  AlkPhos  48  07-13    PT/INR - ( 2020 07:03 )   PT: 12.0 sec;   INR: 1.05 ratio         PTT - ( 2020 07:03 )  PTT:26.6 sec  LIVER FUNCTIONS - ( 2020 07:03 )  Alb: 3.5 g/dL / Pro: 5.6 g/dL / ALK PHOS: 48 U/L / ALT: 10 U/L / AST: 15 U/L / GGT: x           Lactate Dehydrogenase, Serum: 134 U/L ( @ 07:03)    Cultures:   Culture Results: blood  No growth to date. (20 @ 00:37)    Culture Results: blood  No growth to date. (20 @ 00:37)    Culture Results: urine  No growth (20 @ 00:23)    Culture Results: blood  No growth to date. (20 @ 23:56)    Culture Results: blood  No growth to date. (20 @ 23:56)    Radiology:   EXAM:  XR CHEST PORTABLE URGENT 1V                        PROCEDURE DATE:  2020    IMPRESSION:  1.  The lungs are clear.      Meds:   Antimicrobials:   acyclovir   Oral Tab/Cap 400 milliGRAM(s) Oral every 8 hours  cefepime   IVPB      cefepime   IVPB 2000 milliGRAM(s) IV Intermittent every 8 hours  clotrimazole Lozenge 1 Lozenge Oral five times a day  fluconAZOLE   Tablet 400 milliGRAM(s) Oral daily      Heme / Onc:   heparin  Infusion 357 Unit(s)/Hr IV Continuous <Continuous>      GI:  aluminum hydroxide/magnesium hydroxide/simethicone Suspension 30 milliLiter(s) Oral every 4 hours PRN  bismuth subsalicylate Liquid 30 milliLiter(s) Oral every 4 hours PRN  loperamide 2 milliGRAM(s) Oral every 6 hours PRN  pantoprazole    Tablet 40 milliGRAM(s) Oral before breakfast  sodium bicarbonate Mouth Rinse 10 milliLiter(s) Swish and Spit five times a day  sucralfate suspension 1 Gram(s) Oral four times a day  ursodiol Capsule 300 milliGRAM(s) Oral two times a day with meals      Cardiovascular:   furosemide   Injectable 20 milliGRAM(s) IV Push every 24 hours PRN      Immunologic:   filgrastim-sndz (ZARXIO) Injectable 480 MICROGram(s) SubCutaneous every 24 hours      Other medications:   Biotene Dry Mouth Oral Rinse 5 milliLiter(s) Swish and Spit five times a day  chlorhexidine 4% Liquid 1 Application(s) Topical <User Schedule>  folic acid 1 milliGRAM(s) Oral daily  hydrocortisone 1% Cream 1 Application(s) Topical daily  lidocaine/prilocaine Cream 1 Application(s) Topical daily  multivitamin 1 Tablet(s) Oral daily  sodium chloride 0.9%. 1000 milliLiter(s) IV Continuous <Continuous>      PRN:   acetaminophen   Tablet .. 650 milliGRAM(s) Oral every 6 hours PRN  acetaminophen   Tablet .. 650 milliGRAM(s) Oral every 6 hours PRN  aluminum hydroxide/magnesium hydroxide/simethicone Suspension 30 milliLiter(s) Oral every 4 hours PRN  AQUAPHOR (petrolatum Ointment) 1 Application(s) Topical two times a day PRN  bismuth subsalicylate Liquid 30 milliLiter(s) Oral every 4 hours PRN  diphenhydrAMINE 25 milliGRAM(s) Oral once PRN  diphenhydrAMINE   Injectable 25 milliGRAM(s) IV Push every 4 hours PRN  furosemide   Injectable 20 milliGRAM(s) IV Push every 24 hours PRN  loperamide 2 milliGRAM(s) Oral every 6 hours PRN  LORazepam   Injectable 0.5 milliGRAM(s) IV Push daily PRN  metoclopramide Injectable 10 milliGRAM(s) IV Push every 6 hours PRN  ondansetron Injectable 8 milliGRAM(s) IV Push every 8 hours PRN  sodium chloride 0.9% lock flush 10 milliLiter(s) IV Push every 1 hour PRN    A/P:   59 year old male with a history of amyloidosis  Post Autologous PBSCT day + 11  Keep platelets =/> 40K for history of amyloidosis   Has remained afebrile for 24 hours - continue Cefepime through engraftment, cultures negative   Chemotherapy induced grade 1 intestinal mucositis (diarrhea) - c. diff negative - continue with imodium  Chemotherapy induced grade 1 GI mucositis: Improving with carafate / maalox     1. Infectious Disease:   acyclovir   Oral Tab/Cap 400 milliGRAM(s) Oral every 8 hours  cefepime   IVPB      cefepime   IVPB 2000 milliGRAM(s) IV Intermittent every 8 hours  clotrimazole Lozenge 1 Lozenge Oral five times a day  fluconAZOLE   Tablet 400 milliGRAM(s) Oral daily    2. VOD Prophylaxis: Actigall, Glutamine, Heparin (dosed at 100 units / kg / day)     3. GI Prophylaxis:   pantoprazole    Tablet 40 milliGRAM(s) Oral before breakfast    4. Mouthcare - NS / NaHCO3 rinses, Mycelex, Biotene; Skin care     5. GVHD prophylaxis - n/a     6. Transfuse & replete electrolytes prn     7. IV hydration, daily weights, strict I&O, prn diuresis     8. PO intake as tolerated, nutrition follow up as needed, MVI, folic acid     9. Antiemetics, anti-diarrhea medications:   loperamide 2 milliGRAM(s) Oral every 6 hours PRN  LORazepam   Injectable 0.5 milliGRAM(s) IV Push daily PRN  metoclopramide Injectable 10 milliGRAM(s) IV Push every 6 hours PRN  ondansetron Injectable 8 milliGRAM(s) IV Push every 8 hours PRN    10. OOB as tolerated, physical therapy consult if needed     11. Monitor coags / fibrinogen 2x week, vitamin K as needed     12. Monitor closely for clinical changes, monitor for fevers     13. Emotional support provided, plan of care discussed and questions addressed     14. Patient education done regarding plan of care, restrictions and discharge planning     15. Continue regular social work input     I have written the above note for Dr. Roman who performed service with me in the room.   Brinda Garrett NP-C ([phone])    I have seen and examined patient with NP, I agree with above note as scribed. [facility] Transplant Team                                                      Critical / Counseling Time Provided: 30 minutes                                                                                                                                                        Chief Complaint:     S: Patient seen and examined with [facility] Transplant Team:   + fatigue   +Decreased appetite   + mild, occasional, cramping like abdomina pain    Denies: mouth/throat pain, cough, SOB, diarrhea, dizziness         O: Vitals:   Vital Signs Last 24 Hrs  T(C): 37.5 (2020 06:13), Max: 37.8 (2020 21:28)  T(F): 99.5 (2020 06:13), Max: 100.1 (2020 21:28)  HR: 72 (2020 06:13) (68 - 79)  BP: 120/72 (2020 06:13) (107/73 - 122/76)  BP(mean): --  RR: 18 (2020 06:13) (17 - 18)  SpO2: 98% (2020 06:13) (98% - 100%)    Admit weight: 85 kg  Daily Weight in k (2020 09:00)  Today's weight: 82.8kg     Intake / Output:   -12 @ 07:01  -  07-13 @ 07:00  --------------------------------------------------------  IN: 2156 mL / OUT: 4100 mL / NET: -1944 mL    PE:   HEENT: Oropharynx: no erythema or ulcerations  Oral Mucositis:               -                                         Grade: n/a  CVS: S1, S2 RRR   Lungs: CTA throughout bilaterally   Abdomen: + BS x 4: mildly hyperactive, soft, NT, ND   Extremities: + chronic trace LE edema; L>R   Gastric Mucositis:      +                                            ndGndrndanddndend:nd nd2nd Intestinal Mucositis:    -                                        Grade: n/a  Skin: patchy erythematous rash with some  hyperpigmentation on back, neck, abdomen likely secondary to Kepivance   TLC: CDI  Neuro: A&O x 3  Pain: denies     Labs:   CBC Full  -  ( 2020 07:03 )  WBC Count : <0.10 K/uL  Hemoglobin : 7.7 g/dL  Hematocrit : 23.4 %  Platelet Count - Automated : 37 K/uL  Mean Cell Volume : 74.3 fl  Mean Cell Hemoglobin : 24.4 pg  Mean Cell Hemoglobin Concentration : 32.9 gm/dL  Auto Neutrophil # : x  Auto Lymphocyte # : x  Auto Monocyte # : x  Auto Eosinophil # : x  Auto Basophil # : x  Auto Neutrophil % : x  Auto Lymphocyte % : x  Auto Monocyte % : x  Auto Eosinophil % : x  Auto Basophil % : x                          7.7    <0.10 )-----------( 37       ( 2020 07:03 )             23.4     07-13    141  |  106  |  10  ----------------------------<  87  3.8   |  26  |  0.67    Ca    8.8      2020 07:03  Phos  3.8     07-  Mg     2.1         TPro  5.6<L>  /  Alb  3.5  /  TBili  0.2  /  DBili  <0.1  /  AST  15  /  ALT  10  /  AlkPhos  48  07-13    PT/INR - ( 2020 07:03 )   PT: 12.0 sec;   INR: 1.05 ratio         PTT - ( 2020 07:03 )  PTT:26.6 sec  LIVER FUNCTIONS - ( 2020 07:03 )  Alb: 3.5 g/dL / Pro: 5.6 g/dL / ALK PHOS: 48 U/L / ALT: 10 U/L / AST: 15 U/L / GGT: x           Lactate Dehydrogenase, Serum: 134 U/L ( @ 07:03)    Cultures:   Culture Results: blood  No growth to date. (20 @ 00:37)    Culture Results: blood  No growth to date. (20 @ 00:37)    Culture Results: urine  No growth (20 @ 00:23)    Culture Results: blood  No growth to date. (20 @ 23:56)    Culture Results: blood  No growth to date. (20 @ 23:56)    Radiology:   EXAM:  XR CHEST PORTABLE URGENT 1V                        PROCEDURE DATE:  2020    IMPRESSION:  1.  The lungs are clear.      Meds:   Antimicrobials:   acyclovir   Oral Tab/Cap 400 milliGRAM(s) Oral every 8 hours  cefepime   IVPB      cefepime   IVPB 2000 milliGRAM(s) IV Intermittent every 8 hours  clotrimazole Lozenge 1 Lozenge Oral five times a day  fluconAZOLE   Tablet 400 milliGRAM(s) Oral daily      Heme / Onc:   heparin  Infusion 357 Unit(s)/Hr IV Continuous <Continuous>    GI:  aluminum hydroxide/magnesium hydroxide/simethicone Suspension 30 milliLiter(s) Oral every 4 hours PRN  bismuth subsalicylate Liquid 30 milliLiter(s) Oral every 4 hours PRN  loperamide 2 milliGRAM(s) Oral every 6 hours PRN  pantoprazole    Tablet 40 milliGRAM(s) Oral before breakfast  sodium bicarbonate Mouth Rinse 10 milliLiter(s) Swish and Spit five times a day  sucralfate suspension 1 Gram(s) Oral four times a day  ursodiol Capsule 300 milliGRAM(s) Oral two times a day with meals    Cardiovascular:   furosemide   Injectable 20 milliGRAM(s) IV Push every 24 hours PRN    Immunologic:   filgrastim-sndz (ZARXIO) Injectable 480 MICROGram(s) SubCutaneous every 24 hours      Other medications:   Biotene Dry Mouth Oral Rinse 5 milliLiter(s) Swish and Spit five times a day  chlorhexidine 4% Liquid 1 Application(s) Topical <User Schedule>  folic acid 1 milliGRAM(s) Oral daily  hydrocortisone 1% Cream 1 Application(s) Topical daily  lidocaine/prilocaine Cream 1 Application(s) Topical daily  multivitamin 1 Tablet(s) Oral daily  sodium chloride 0.9%. 1000 milliLiter(s) IV Continuous <Continuous>      PRN:   acetaminophen   Tablet .. 650 milliGRAM(s) Oral every 6 hours PRN  acetaminophen   Tablet .. 650 milliGRAM(s) Oral every 6 hours PRN  aluminum hydroxide/magnesium hydroxide/simethicone Suspension 30 milliLiter(s) Oral every 4 hours PRN  AQUAPHOR (petrolatum Ointment) 1 Application(s) Topical two times a day PRN  bismuth subsalicylate Liquid 30 milliLiter(s) Oral every 4 hours PRN  diphenhydrAMINE 25 milliGRAM(s) Oral once PRN  diphenhydrAMINE   Injectable 25 milliGRAM(s) IV Push every 4 hours PRN  furosemide   Injectable 20 milliGRAM(s) IV Push every 24 hours PRN  loperamide 2 milliGRAM(s) Oral every 6 hours PRN  LORazepam   Injectable 0.5 milliGRAM(s) IV Push daily PRN  metoclopramide Injectable 10 milliGRAM(s) IV Push every 6 hours PRN  ondansetron Injectable 8 milliGRAM(s) IV Push every 8 hours PRN  sodium chloride 0.9% lock flush 10 milliLiter(s) IV Push every 1 hour PRN    A/P:   59 year old male with a history of amyloidosis  Post Autologous PBSCT day + 11  Keep platelets =/> 40K for history of amyloidosis   Has remained afebrile for 24 hours - continue Cefepime through engraftment, cultures negative   Chemotherapy induced grade 1 intestinal mucositis (diarrhea) - c. diff negative - now resolved   Chemotherapy induced grade 1 GI mucositis: Improving with carafate / maalox     1. Infectious Disease:   acyclovir   Oral Tab/Cap 400 milliGRAM(s) Oral every 8 hours  cefepime   IVPB      cefepime   IVPB 2000 milliGRAM(s) IV Intermittent every 8 hours  clotrimazole Lozenge 1 Lozenge Oral five times a day  fluconAZOLE   Tablet 400 milliGRAM(s) Oral daily    2. VOD Prophylaxis: Actigall, Glutamine, Heparin (dosed at 100 units / kg / day)     3. GI Prophylaxis:   pantoprazole    Tablet 40 milliGRAM(s) Oral before breakfast    4. Mouthcare - NS / NaHCO3 rinses, Mycelex, Biotene; Skin care     5. GVHD prophylaxis - n/a     6. Transfuse & replete electrolytes prn     7. IV hydration, daily weights, strict I&O, prn diuresis     8. PO intake as tolerated, nutrition follow up as needed, MVI, folic acid     9. Antiemetics, anti-diarrhea medications:   loperamide 2 milliGRAM(s) Oral every 6 hours PRN  LORazepam   Injectable 0.5 milliGRAM(s) IV Push daily PRN  metoclopramide Injectable 10 milliGRAM(s) IV Push every 6 hours PRN  ondansetron Injectable 8 milliGRAM(s) IV Push every 8 hours PRN    10. OOB as tolerated, physical therapy consult if needed     11. Monitor coags / fibrinogen 2x week, vitamin K as needed     12. Monitor closely for clinical changes, monitor for fevers     13. Emotional support provided, plan of care discussed and questions addressed     14. Patient education done regarding plan of care, restrictions and discharge planning     15. Continue regular social work input     I have written the above note for Dr. Roman who performed service with me in the room.   Brinda Garrett NP-C ([phone])    I have seen and examined patient with NP, I agree with above note as scribed.

## 2020-07-14 LAB
ALBUMIN SERPL ELPH-MCNC: 3.5 G/DL — SIGNIFICANT CHANGE UP (ref 3.3–5)
ALP SERPL-CCNC: 50 U/L — SIGNIFICANT CHANGE UP (ref 40–120)
ALT FLD-CCNC: 14 U/L — SIGNIFICANT CHANGE UP (ref 10–45)
ANION GAP SERPL CALC-SCNC: 11 MMOL/L — SIGNIFICANT CHANGE UP (ref 5–17)
AST SERPL-CCNC: 16 U/L — SIGNIFICANT CHANGE UP (ref 10–40)
BILIRUB SERPL-MCNC: 0.1 MG/DL — LOW (ref 0.2–1.2)
BUN SERPL-MCNC: 12 MG/DL — SIGNIFICANT CHANGE UP (ref 7–23)
CALCIUM SERPL-MCNC: 8.6 MG/DL — SIGNIFICANT CHANGE UP (ref 8.4–10.5)
CHLORIDE SERPL-SCNC: 103 MMOL/L — SIGNIFICANT CHANGE UP (ref 96–108)
CO2 SERPL-SCNC: 24 MMOL/L — SIGNIFICANT CHANGE UP (ref 22–31)
CREAT SERPL-MCNC: 0.64 MG/DL — SIGNIFICANT CHANGE UP (ref 0.5–1.3)
CULTURE RESULTS: SIGNIFICANT CHANGE UP
CULTURE RESULTS: SIGNIFICANT CHANGE UP
GLUCOSE SERPL-MCNC: 94 MG/DL — SIGNIFICANT CHANGE UP (ref 70–99)
HCT VFR BLD CALC: 23.9 % — LOW (ref 39–50)
HGB BLD-MCNC: 7.8 G/DL — LOW (ref 13–17)
LDH SERPL L TO P-CCNC: 140 U/L — SIGNIFICANT CHANGE UP (ref 50–242)
MAGNESIUM SERPL-MCNC: 2 MG/DL — SIGNIFICANT CHANGE UP (ref 1.6–2.6)
MCHC RBC-ENTMCNC: 24.5 PG — LOW (ref 27–34)
MCHC RBC-ENTMCNC: 32.6 GM/DL — SIGNIFICANT CHANGE UP (ref 32–36)
MCV RBC AUTO: 74.9 FL — LOW (ref 80–100)
NRBC # BLD: 0 /100 WBCS — SIGNIFICANT CHANGE UP (ref 0–0)
PHOSPHATE SERPL-MCNC: 3.7 MG/DL — SIGNIFICANT CHANGE UP (ref 2.5–4.5)
PLATELET # BLD AUTO: 26 K/UL — LOW (ref 150–400)
POTASSIUM SERPL-MCNC: 3.8 MMOL/L — SIGNIFICANT CHANGE UP (ref 3.5–5.3)
POTASSIUM SERPL-SCNC: 3.8 MMOL/L — SIGNIFICANT CHANGE UP (ref 3.5–5.3)
PROT SERPL-MCNC: 6 G/DL — SIGNIFICANT CHANGE UP (ref 6–8.3)
RBC # BLD: 3.19 M/UL — LOW (ref 4.2–5.8)
RBC # FLD: 15 % — HIGH (ref 10.3–14.5)
SODIUM SERPL-SCNC: 138 MMOL/L — SIGNIFICANT CHANGE UP (ref 135–145)
SPECIMEN SOURCE: SIGNIFICANT CHANGE UP
SPECIMEN SOURCE: SIGNIFICANT CHANGE UP
WBC # BLD: <0.1 K/UL — CRITICAL LOW (ref 3.8–10.5)
WBC # FLD AUTO: <0.1 K/UL — CRITICAL LOW (ref 3.8–10.5)

## 2020-07-14 PROCEDURE — 99291 CRITICAL CARE FIRST HOUR: CPT

## 2020-07-14 RX ORDER — ALTEPLASE 100 MG
2 KIT INTRAVENOUS ONCE
Refills: 0 | Status: COMPLETED | OUTPATIENT
Start: 2020-07-14 | End: 2020-07-15

## 2020-07-14 RX ADMIN — Medication 1 LOZENGE: at 13:44

## 2020-07-14 RX ADMIN — Medication 5 MILLILITER(S): at 13:43

## 2020-07-14 RX ADMIN — Medication 1 LOZENGE: at 20:27

## 2020-07-14 RX ADMIN — Medication 1 TABLET(S): at 13:44

## 2020-07-14 RX ADMIN — Medication 400 MILLIGRAM(S): at 06:50

## 2020-07-14 RX ADMIN — Medication 400 MILLIGRAM(S): at 21:57

## 2020-07-14 RX ADMIN — Medication 1 LOZENGE: at 00:23

## 2020-07-14 RX ADMIN — Medication 400 MILLIGRAM(S): at 13:43

## 2020-07-14 RX ADMIN — FLUCONAZOLE 400 MILLIGRAM(S): 150 TABLET ORAL at 13:44

## 2020-07-14 RX ADMIN — Medication 10 MILLILITER(S): at 00:23

## 2020-07-14 RX ADMIN — Medication 1 LOZENGE: at 08:48

## 2020-07-14 RX ADMIN — Medication 5 MILLILITER(S): at 08:50

## 2020-07-14 RX ADMIN — Medication 5 MILLILITER(S): at 16:57

## 2020-07-14 RX ADMIN — Medication 10 MILLILITER(S): at 20:28

## 2020-07-14 RX ADMIN — Medication 5 MILLILITER(S): at 20:27

## 2020-07-14 RX ADMIN — Medication 1 LOZENGE: at 16:57

## 2020-07-14 RX ADMIN — Medication 25 MILLIGRAM(S): at 11:14

## 2020-07-14 RX ADMIN — Medication 10 MILLILITER(S): at 08:50

## 2020-07-14 RX ADMIN — URSODIOL 300 MILLIGRAM(S): 250 TABLET, FILM COATED ORAL at 08:56

## 2020-07-14 RX ADMIN — Medication 5 MILLILITER(S): at 00:22

## 2020-07-14 RX ADMIN — Medication 10 MILLILITER(S): at 13:43

## 2020-07-14 RX ADMIN — Medication 1 MILLIGRAM(S): at 13:44

## 2020-07-14 RX ADMIN — Medication 1 APPLICATION(S): at 13:45

## 2020-07-14 RX ADMIN — Medication 480 MICROGRAM(S): at 17:53

## 2020-07-14 RX ADMIN — URSODIOL 300 MILLIGRAM(S): 250 TABLET, FILM COATED ORAL at 17:53

## 2020-07-14 RX ADMIN — PANTOPRAZOLE SODIUM 40 MILLIGRAM(S): 20 TABLET, DELAYED RELEASE ORAL at 06:52

## 2020-07-14 RX ADMIN — Medication 10 MILLILITER(S): at 16:57

## 2020-07-14 RX ADMIN — CHLORHEXIDINE GLUCONATE 1 APPLICATION(S): 213 SOLUTION TOPICAL at 06:52

## 2020-07-14 NOTE — PROGRESS NOTE ADULT - PROBLEM SELECTOR PROBLEM 3
Diarrhea, unspecified type
Amyloidosis, unspecified type
Appetite loss
Appetite loss
Diarrhea, unspecified type

## 2020-07-14 NOTE — PROGRESS NOTE ADULT - SUBJECTIVE AND OBJECTIVE BOX
[facility] Transplant Team                                                      Critical / Counseling Time Provided: 30 minutes                                                                                                                                                        Chief Complaint: Autologous pbsct with high dose melphalan prep regimen for treatment of amyloidosis    S: Patient seen and examined with [facility]PC Transplant Team:   + fatigue  + decreased appetite  + occasional abdominal pain / cramping     O: Vitals:   Vital Signs Last 24 Hrs  T(C): 37.1 (2020 06:18), Max: 37.7 (2020 21:41)  T(F): 98.8 (2020 06:18), Max: 99.9 (2020 21:41)  HR: 78 (2020 06:18) (75 - 80)  BP: 110/70 (2020 06:18) (98/60 - 114/73)  BP(mean): --  RR: 18 (2020 06:18) (17 - 18)  SpO2: 98% (2020 06:18) (96% - 99%)    Admit weight: 85kg  Daily Weight in k.8 (2020 10:31)  Today's weight:    Intake / Output:   07-13 @ 07:01  -  -14 @ 07:00  --------------------------------------------------------  IN: 2017 mL / OUT: 2350 mL / NET: -333 mL    PE:   HEENT: Oropharynx: no erythema or ulcerations  Oral Mucositis:               -                                         Grade: n/a  CVS: S1, S2 RRR   Lungs: CTA throughout bilaterally   Abdomen: + BS x 4: mildly hyperactive, soft, NT, ND   Extremities: + chronic trace LE edema; L>R   Gastric Mucositis:      +                                            ndGndrndanddndend:nd nd2nd Intestinal Mucositis:    -                                        Grade: n/a  Skin: patchy erythematous rash with some  hyperpigmentation on back, neck, abdomen likely secondary to Kepivance   TLC: CDI  Neuro: A&O x 3  Pain: denies       Labs:       Cultures:   Culture Results: blood  No growth to date. (20 @ 00:37)    Culture Results: blood  No growth to date. (20 @ 00:37)    Culture Results: urine  No growth (20 @ 00:23)    Culture Results: blood  No growth to date. (20 @ 23:56)    Culture Results: blood  No growth to date. (20 @ 23:56)        Radiology:   EXAM:  XR CHEST PORTABLE URGENT 1V                        PROCEDURE DATE:  2020    IMPRESSION:  1.  The lungs are clear.    Meds:   Antimicrobials:   acyclovir   Oral Tab/Cap 400 milliGRAM(s) Oral every 8 hours  cefepime   IVPB      cefepime   IVPB 2000 milliGRAM(s) IV Intermittent every 8 hours  clotrimazole Lozenge 1 Lozenge Oral five times a day  fluconAZOLE   Tablet 400 milliGRAM(s) Oral daily      Heme / Onc:   heparin  Infusion 357 Unit(s)/Hr IV Continuous <Continuous>      GI:  aluminum hydroxide/magnesium hydroxide/simethicone Suspension 30 milliLiter(s) Oral every 4 hours PRN  bismuth subsalicylate Liquid 30 milliLiter(s) Oral every 4 hours PRN  loperamide 2 milliGRAM(s) Oral every 6 hours PRN  pantoprazole    Tablet 40 milliGRAM(s) Oral before breakfast  sodium bicarbonate Mouth Rinse 10 milliLiter(s) Swish and Spit five times a day  sucralfate suspension 1 Gram(s) Oral four times a day  ursodiol Capsule 300 milliGRAM(s) Oral two times a day with meals      Cardiovascular:   furosemide   Injectable 20 milliGRAM(s) IV Push every 24 hours PRN      Immunologic:   filgrastim-sndz (ZARXIO) Injectable 480 MICROGram(s) SubCutaneous every 24 hours      Other medications:   Biotene Dry Mouth Oral Rinse 5 milliLiter(s) Swish and Spit five times a day  chlorhexidine 4% Liquid 1 Application(s) Topical <User Schedule>  folic acid 1 milliGRAM(s) Oral daily  hydrocortisone 1% Cream 1 Application(s) Topical daily  lidocaine/prilocaine Cream 1 Application(s) Topical daily  multivitamin 1 Tablet(s) Oral daily  sodium chloride 0.9%. 1000 milliLiter(s) IV Continuous <Continuous>      PRN:   acetaminophen   Tablet .. 650 milliGRAM(s) Oral every 6 hours PRN  acetaminophen   Tablet .. 650 milliGRAM(s) Oral every 6 hours PRN  aluminum hydroxide/magnesium hydroxide/simethicone Suspension 30 milliLiter(s) Oral every 4 hours PRN  AQUAPHOR (petrolatum Ointment) 1 Application(s) Topical two times a day PRN  bismuth subsalicylate Liquid 30 milliLiter(s) Oral every 4 hours PRN  diphenhydrAMINE 25 milliGRAM(s) Oral once PRN  diphenhydrAMINE   Injectable 25 milliGRAM(s) IV Push every 4 hours PRN  furosemide   Injectable 20 milliGRAM(s) IV Push every 24 hours PRN  loperamide 2 milliGRAM(s) Oral every 6 hours PRN  LORazepam   Injectable 0.5 milliGRAM(s) IV Push daily PRN  metoclopramide Injectable 10 milliGRAM(s) IV Push every 6 hours PRN  ondansetron Injectable 8 milliGRAM(s) IV Push every 8 hours PRN  sodium chloride 0.9% lock flush 10 milliLiter(s) IV Push every 1 hour PRN    A/P:   59 year old male with a history of amyloidosis  Post Autologous PBSCT day + 12  Keep platelets =/> 40K for history of amyloidosis   Has remained afebrile for 24 hours - continue Cefepime through engraftment, cultures negative   Chemotherapy induced grade 1 intestinal mucositis (diarrhea) - c. diff negative - now resolved   Chemotherapy induced grade 1 GI mucositis: Improving with carafate / maalox     1. Infectious Disease:   acyclovir   Oral Tab/Cap 400 milliGRAM(s) Oral every 8 hours  cefepime   IVPB      cefepime   IVPB 2000 milliGRAM(s) IV Intermittent every 8 hours  clotrimazole Lozenge 1 Lozenge Oral five times a day  fluconAZOLE   Tablet 400 milliGRAM(s) Oral daily    2. VOD Prophylaxis: Actigall, Glutamine, Heparin (dosed at 100 units / kg / day)     3. GI Prophylaxis:    pantoprazole    Tablet 40 milliGRAM(s) Oral before breakfast    4. Mouthcare - NS / NaHCO3 rinses, Mycelex, Biotene; Skin care     5. GVHD prophylaxis - n/a     6. Transfuse & replete electrolytes prn     7. IV hydration, daily weights, strict I&O, prn diuresis     8. PO intake as tolerated, nutrition follow up as needed, MVI, folic acid     9. Antiemetics, anti-diarrhea medications:   loperamide 2 milliGRAM(s) Oral every 6 hours PRN  LORazepam   Injectable 0.5 milliGRAM(s) IV Push daily PRN  metoclopramide Injectable 10 milliGRAM(s) IV Push every 6 hours PRN  ondansetron Injectable 8 milliGRAM(s) IV Push every 8 hours PRN    10. OOB as tolerated, physical therapy consult if needed     11. Monitor coags / fibrinogen 2x week, vitamin K as needed     12. Monitor closely for clinical changes, monitor for fevers     13. Emotional support provided, plan of care discussed and questions addressed     14. Patient education done regarding plan of care, restrictions and discharge planning     15. Continue regular social work input     I have written the above note for Dr. Roman who performed service with me in the room.   Brinda Garrett NP-C ([phone])    I have seen and examined patient with NP, I agree with above note as scribed. [facility] Trans[facility] Team                                                      Critical / Counseling Time Provided: 30 minutes                                                                                                                                                        Chief Complaint: Autologous pbsct with high dose melphalan prep regimen for treatment of amyloidosis    S: Patient seen and examined with [facility]PC Transplant Team:   + fatigue  + decreased appetite  + occasional abdominal pain / cramping     O: Vitals:   Vital Signs Last 24 Hrs  T(C): 37.1 (2020 06:18), Max: 37.7 (2020 21:41)  T(F): 98.8 (2020 06:18), Max: 99.9 (2020 21:41)  HR: 78 (2020 06:18) (75 - 80)  BP: 110/70 (2020 06:18) (98/60 - 114/73)  BP(mean): --  RR: 18 (2020 06:18) (17 - 18)  SpO2: 98% (2020 06:18) (96% - 99%)    Admit weight: 85kg  Daily Weight in k.8 (2020 10:31)  Today's weight:    Intake / Output:   07-13 @ 07:01  -  07-14 @ 07:00  --------------------------------------------------------  IN: 2017 mL / OUT: 2350 mL / NET: -333 mL    PE:   HEENT: Oropharynx: no erythema or ulcerations  Oral Mucositis:               -                                         Grade: n/a  CVS: S1, S2 RRR   Lungs: CTA throughout bilaterally   Abdomen: + BS x 4: mildly hyperactive, soft, NT, ND   Extremities: + chronic trace LE edema; L>R   Gastric Mucositis:      +                                            ndGndrndanddndend:nd nd2nd Intestinal Mucositis:    -                                        Grade: n/a  Skin: patchy erythematous rash with some  hyperpigmentation on back, neck, abdomen likely secondary to Kepivance   TLC: CDI  Neuro: A&O x 3  Pain: denies       Labs:                         7.8    <0.10 )-----------( 26       ( 2020 07:00 )             23.9     07-14    138  |  103  |  12  ----------------------------<  94  3.8   |  24  |  0.64    Ca    8.6      2020 07:00  Phos  3.7     07-  Mg     2.0     -    TPro  6.0  /  Alb  3.5  /  TBili  0.1<L>  /  DBili  x   /  AST  16  /  ALT  14  /  AlkPhos  50  -      Cultures:   Culture Results: blood  No growth to date. (20 @ 00:37)    Culture Results: blood  No growth to date. (20 @ 00:37)    Culture Results: urine  No growth (20 @ 00:23)    Culture Results: blood  No growth to date. (20 @ 23:56)    Culture Results: blood  No growth to date. (20 @ 23:56)        Radiology:   EXAM:  XR CHEST PORTABLE URGENT 1V                        PROCEDURE DATE:  2020    IMPRESSION:  1.  The lungs are clear.    Meds:   Antimicrobials:   acyclovir   Oral Tab/Cap 400 milliGRAM(s) Oral every 8 hours  cefepime   IVPB      cefepime   IVPB 2000 milliGRAM(s) IV Intermittent every 8 hours  clotrimazole Lozenge 1 Lozenge Oral five times a day  fluconAZOLE   Tablet 400 milliGRAM(s) Oral daily      Heme / Onc:   heparin  Infusion 357 Unit(s)/Hr IV Continuous <Continuous>      GI:  aluminum hydroxide/magnesium hydroxide/simethicone Suspension 30 milliLiter(s) Oral every 4 hours PRN  bismuth subsalicylate Liquid 30 milliLiter(s) Oral every 4 hours PRN  loperamide 2 milliGRAM(s) Oral every 6 hours PRN  pantoprazole    Tablet 40 milliGRAM(s) Oral before breakfast  sodium bicarbonate Mouth Rinse 10 milliLiter(s) Swish and Spit five times a day  sucralfate suspension 1 Gram(s) Oral four times a day  ursodiol Capsule 300 milliGRAM(s) Oral two times a day with meals      Cardiovascular:   furosemide   Injectable 20 milliGRAM(s) IV Push every 24 hours PRN      Immunologic:   filgrastim-sndz (ZARXIO) Injectable 480 MICROGram(s) SubCutaneous every 24 hours      Other medications:   Biotene Dry Mouth Oral Rinse 5 milliLiter(s) Swish and Spit five times a day  chlorhexidine 4% Liquid 1 Application(s) Topical <User Schedule>  folic acid 1 milliGRAM(s) Oral daily  hydrocortisone 1% Cream 1 Application(s) Topical daily  lidocaine/prilocaine Cream 1 Application(s) Topical daily  multivitamin 1 Tablet(s) Oral daily  sodium chloride 0.9%. 1000 milliLiter(s) IV Continuous <Continuous>      PRN:   acetaminophen   Tablet .. 650 milliGRAM(s) Oral every 6 hours PRN  acetaminophen   Tablet .. 650 milliGRAM(s) Oral every 6 hours PRN  aluminum hydroxide/magnesium hydroxide/simethicone Suspension 30 milliLiter(s) Oral every 4 hours PRN  AQUAPHOR (petrolatum Ointment) 1 Application(s) Topical two times a day PRN  bismuth subsalicylate Liquid 30 milliLiter(s) Oral every 4 hours PRN  diphenhydrAMINE 25 milliGRAM(s) Oral once PRN  diphenhydrAMINE   Injectable 25 milliGRAM(s) IV Push every 4 hours PRN  furosemide   Injectable 20 milliGRAM(s) IV Push every 24 hours PRN  loperamide 2 milliGRAM(s) Oral every 6 hours PRN  LORazepam   Injectable 0.5 milliGRAM(s) IV Push daily PRN  metoclopramide Injectable 10 milliGRAM(s) IV Push every 6 hours PRN  ondansetron Injectable 8 milliGRAM(s) IV Push every 8 hours PRN  sodium chloride 0.9% lock flush 10 milliLiter(s) IV Push every 1 hour PRN    A/P:   59 year old male with a history of amyloidosis  Post Autologous PBSCT day + 12  Keep platelets =/> 40K for history of amyloidosis   Has remained afebrile for 24 hours - continue Cefepime through engraftment, cultures negative   Chemotherapy induced grade 1 intestinal mucositis (diarrhea) - c. diff negative - now resolved   Chemotherapy induced grade 1 GI mucositis: Improving with carafate / maalox     1. Infectious Disease:   acyclovir   Oral Tab/Cap 400 milliGRAM(s) Oral every 8 hours  cefepime   IVPB      cefepime   IVPB 2000 milliGRAM(s) IV Intermittent every 8 hours  clotrimazole Lozenge 1 Lozenge Oral five times a day  fluconAZOLE   Tablet 400 milliGRAM(s) Oral daily    2. VOD Prophylaxis: Actigall, Glutamine, Heparin (dosed at 100 units / kg / day)     3. GI Prophylaxis:    pantoprazole    Tablet 40 milliGRAM(s) Oral before breakfast    4. Mouthcare - NS / NaHCO3 rinses, Mycelex, Biotene; Skin care     5. GVHD prophylaxis - n/a     6. Transfuse & replete electrolytes prn   1 bag platelets     7. IV hydration, daily weights, strict I&O, prn diuresis     8. PO intake as tolerated, nutrition follow up as needed, MVI, folic acid     9. Antiemetics, anti-diarrhea medications:   loperamide 2 milliGRAM(s) Oral every 6 hours PRN  LORazepam   Injectable 0.5 milliGRAM(s) IV Push daily PRN  metoclopramide Injectable 10 milliGRAM(s) IV Push every 6 hours PRN  ondansetron Injectable 8 milliGRAM(s) IV Push every 8 hours PRN    10. OOB as tolerated, physical therapy consult if needed     11. Monitor coags / fibrinogen 2x week, vitamin K as needed     12. Monitor closely for clinical changes, monitor for fevers     13. Emotional support provided, plan of care discussed and questions addressed     14. Patient education done regarding plan of care, restrictions and discharge planning     15. Continue regular social work input     I have written the above note for Dr. Roman who performed service with me in the room.   Brinda Garrett NP-C ([phone])    I have seen and examined patient with NP, I agree with above note as scribed. [facility] Team                                                      Critical / Counseling Time Provided: 30 minutes                                                                                                                                                        Chief Complaint: Autologous pbsct with high dose melphalan prep regimen for treatment of amyloidosis    S: Patient seen and examined with [facility]PC Transplant Team:   + fatigue  + decreased appetite  + occasional abdominal pain / cramping     O: Vitals:   Vital Signs Last 24 Hrs  T(C): 37.1 (2020 06:18), Max: 37.7 (2020 21:41)  T(F): 98.8 (2020 06:18), Max: 99.9 (2020 21:41)  HR: 78 (2020 06:18) (75 - 80)  BP: 110/70 (2020 06:18) (98/60 - 114/73)  BP(mean): --  RR: 18 (2020 06:18) (17 - 18)  SpO2: 98% (2020 06:18) (96% - 99%)    Admit weight: 85kg  Daily Weight in k.8 (2020 10:31)  Today's weight: 82.1kg     Intake / Output:   13 @ 07:01  -  -14 @ 07:00  --------------------------------------------------------  IN: 2017 mL / OUT: 2350 mL / NET: -333 mL    PE:   HEENT: Oropharynx: no erythema or ulcerations  Oral Mucositis:               -                                         Grade: n/a  CVS: S1, S2 RRR   Lungs: CTA throughout bilaterally   Abdomen: + BS x 4: mildly hyperactive, soft, NT, ND   Extremities: + chronic trace LE edema; L>R   Gastric Mucositis:      +                                            ndGndrndanddndend:nd nd2nd Intestinal Mucositis:    -                                        Grade: n/a  Skin: patchy erythematous rash with some  hyperpigmentation on back, neck, abdomen likely secondary to Kepivance   TLC: CDI  Neuro: A&O x 3  Pain: denies       Labs:                         7.8    <0.10 )-----------( 26       ( 2020 07:00 )             23.9     07-14    138  |  103  |  12  ----------------------------<  94  3.8   |  24  |  0.64    Ca    8.6      2020 07:00  Phos  3.7     07-  Mg     2.0         TPro  6.0  /  Alb  3.5  /  TBili  0.1<L>  /  DBili  x   /  AST  16  /  ALT  14  /  AlkPhos  50  07      Cultures:   Culture Results: blood  No growth to date. (20 @ 00:37)    Culture Results: blood  No growth to date. (20 @ 00:37)    Culture Results: urine  No growth (20 @ 00:23)    Culture Results: blood  No growth to date. (20 @ 23:56)    Culture Results: blood  No growth to date. (20 @ 23:56)        Radiology:   EXAM:  XR CHEST PORTABLE URGENT 1V                        PROCEDURE DATE:  2020    IMPRESSION:  1.  The lungs are clear.    Meds:   Antimicrobials:   acyclovir   Oral Tab/Cap 400 milliGRAM(s) Oral every 8 hours  cefepime   IVPB      cefepime   IVPB 2000 milliGRAM(s) IV Intermittent every 8 hours  clotrimazole Lozenge 1 Lozenge Oral five times a day  fluconAZOLE   Tablet 400 milliGRAM(s) Oral daily      Heme / Onc:   heparin  Infusion 357 Unit(s)/Hr IV Continuous <Continuous>      GI:  aluminum hydroxide/magnesium hydroxide/simethicone Suspension 30 milliLiter(s) Oral every 4 hours PRN  bismuth subsalicylate Liquid 30 milliLiter(s) Oral every 4 hours PRN  loperamide 2 milliGRAM(s) Oral every 6 hours PRN  pantoprazole    Tablet 40 milliGRAM(s) Oral before breakfast  sodium bicarbonate Mouth Rinse 10 milliLiter(s) Swish and Spit five times a day  sucralfate suspension 1 Gram(s) Oral four times a day  ursodiol Capsule 300 milliGRAM(s) Oral two times a day with meals      Cardiovascular:   furosemide   Injectable 20 milliGRAM(s) IV Push every 24 hours PRN      Immunologic:   filgrastim-sndz (ZARXIO) Injectable 480 MICROGram(s) SubCutaneous every 24 hours      Other medications:   Biotene Dry Mouth Oral Rinse 5 milliLiter(s) Swish and Spit five times a day  chlorhexidine 4% Liquid 1 Application(s) Topical <User Schedule>  folic acid 1 milliGRAM(s) Oral daily  hydrocortisone 1% Cream 1 Application(s) Topical daily  lidocaine/prilocaine Cream 1 Application(s) Topical daily  multivitamin 1 Tablet(s) Oral daily  sodium chloride 0.9%. 1000 milliLiter(s) IV Continuous <Continuous>      PRN:   acetaminophen   Tablet .. 650 milliGRAM(s) Oral every 6 hours PRN  acetaminophen   Tablet .. 650 milliGRAM(s) Oral every 6 hours PRN  aluminum hydroxide/magnesium hydroxide/simethicone Suspension 30 milliLiter(s) Oral every 4 hours PRN  AQUAPHOR (petrolatum Ointment) 1 Application(s) Topical two times a day PRN  bismuth subsalicylate Liquid 30 milliLiter(s) Oral every 4 hours PRN  diphenhydrAMINE 25 milliGRAM(s) Oral once PRN  diphenhydrAMINE   Injectable 25 milliGRAM(s) IV Push every 4 hours PRN  furosemide   Injectable 20 milliGRAM(s) IV Push every 24 hours PRN  loperamide 2 milliGRAM(s) Oral every 6 hours PRN  LORazepam   Injectable 0.5 milliGRAM(s) IV Push daily PRN  metoclopramide Injectable 10 milliGRAM(s) IV Push every 6 hours PRN  ondansetron Injectable 8 milliGRAM(s) IV Push every 8 hours PRN  sodium chloride 0.9% lock flush 10 milliLiter(s) IV Push every 1 hour PRN    A/P:   59 year old male with a history of amyloidosis  Post Autologous PBSCT day + 12  Keep platelets =/> 40K for history of amyloidosis   Has remained afebrile for 24 hours - continue Cefepime through engraftment, cultures negative   Chemotherapy induced grade 1 intestinal mucositis (diarrhea) - c. diff negative - now resolved   Chemotherapy induced grade 1 GI mucositis: Improving with carafate / maalox     1. Infectious Disease:   acyclovir   Oral Tab/Cap 400 milliGRAM(s) Oral every 8 hours  cefepime   IVPB      cefepime   IVPB 2000 milliGRAM(s) IV Intermittent every 8 hours  clotrimazole Lozenge 1 Lozenge Oral five times a day  fluconAZOLE   Tablet 400 milliGRAM(s) Oral daily    2. VOD Prophylaxis: Actigall, Glutamine, Heparin (dosed at 100 units / kg / day)     3. GI Prophylaxis:    pantoprazole    Tablet 40 milliGRAM(s) Oral before breakfast    4. Mouthcare - NS / NaHCO3 rinses, Mycelex, Biotene; Skin care     5. GVHD prophylaxis - n/a     6. Transfuse & replete electrolytes prn   1 bag platelets     7. IV hydration, daily weights, strict I&O, prn diuresis     8. PO intake as tolerated, nutrition follow up as needed, MVI, folic acid     9. Antiemetics, anti-diarrhea medications:   loperamide 2 milliGRAM(s) Oral every 6 hours PRN  LORazepam   Injectable 0.5 milliGRAM(s) IV Push daily PRN  metoclopramide Injectable 10 milliGRAM(s) IV Push every 6 hours PRN  ondansetron Injectable 8 milliGRAM(s) IV Push every 8 hours PRN    10. OOB as tolerated, physical therapy consult if needed     11. Monitor coags / fibrinogen 2x week, vitamin K as needed     12. Monitor closely for clinical changes, monitor for fevers     13. Emotional support provided, plan of care discussed and questions addressed     14. Patient education done regarding plan of care, restrictions and discharge planning     15. Continue regular social work input     I have written the above note for Dr. Roman who performed service with me in the room.   Brinda Garrett NP-C ([phone])    I have seen and examined patient with NP, I agree with above note as scribed. [facility] Transplant Team                                                      Critical / Counseling Time Provided: 30 minutes                                                                                                                                                        Chief Complaint: Autologous pbsct with high dose melphalan prep regimen for treatment of amyloidosis    S: Patient seen and examined with [facility]PC Transplant Team:   + fatigue  + decreased appetite  + soft stool    Denies: mouth/throat pain, cough, SOB, diarrhea, abdominal pain,  dizziness       O: Vitals:   Vital Signs Last 24 Hrs  T(C): 37.1 (2020 06:18), Max: 37.7 (2020 21:41)  T(F): 98.8 (2020 06:18), Max: 99.9 (2020 21:41)  HR: 78 (2020 06:18) (75 - 80)  BP: 110/70 (2020 06:18) (98/60 - 114/73)  BP(mean): --  RR: 18 (2020 06:18) (17 - 18)  SpO2: 98% (2020 06:18) (96% - 99%)    Admit weight: 85kg  Daily Weight in k.8 (2020 10:31)  Today's weight: 82.1kg     Intake / Output:   -13 @ 07:01  -  07-14 @ 07:00  --------------------------------------------------------  IN: 2017 mL / OUT: 2350 mL / NET: -333 mL    PE:   HEENT: Oropharynx: no erythema or ulcerations  Oral Mucositis:               -                                         Grade: n/a  CVS: S1, S2 RRR   Lungs: CTA throughout bilaterally   Abdomen: + BS x 4: mildly hyperactive, soft, NT, ND   Extremities: + chronic trace LE edema; L>R   Gastric Mucositis:      +                                            ndGndrndanddndend:nd nd2nd Intestinal Mucositis:    -                                        Grade: n/a  Skin: hyperpigmentation on back, neck, abdomen likely secondary to Kepivance   TLC: CDI  Neuro: A&O x 3  Pain: denies       Labs:                         7.8    <0.10 )-----------( 26       ( 2020 07:00 )             23.9     07-14    138  |  103  |  12  ----------------------------<  94  3.8   |  24  |  0.64    Ca    8.6      2020 07:00  Phos  3.7     -  Mg     2.0         TPro  6.0  /  Alb  3.5  /  TBili  0.1<L>  /  DBili  x   /  AST  16  /  ALT  14  /  AlkPhos  50        Cultures:   Culture Results: blood  No growth to date. (20 @ 00:37)    Culture Results: blood  No growth to date. (20 @ 00:37)    Culture Results: urine  No growth (20 @ 00:23)    Culture Results: blood  No growth to date. (20 @ 23:56)    Culture Results: blood  No growth to date. (20 @ 23:56)        Radiology:   EXAM:  XR CHEST PORTABLE URGENT 1V                        PROCEDURE DATE:  2020    IMPRESSION:  1.  The lungs are clear.    Meds:   Antimicrobials:   acyclovir   Oral Tab/Cap 400 milliGRAM(s) Oral every 8 hours  cefepime   IVPB      cefepime   IVPB 2000 milliGRAM(s) IV Intermittent every 8 hours  clotrimazole Lozenge 1 Lozenge Oral five times a day  fluconAZOLE   Tablet 400 milliGRAM(s) Oral daily      Heme / Onc:   heparin  Infusion 357 Unit(s)/Hr IV Continuous <Continuous>      GI:  aluminum hydroxide/magnesium hydroxide/simethicone Suspension 30 milliLiter(s) Oral every 4 hours PRN  bismuth subsalicylate Liquid 30 milliLiter(s) Oral every 4 hours PRN  loperamide 2 milliGRAM(s) Oral every 6 hours PRN  pantoprazole    Tablet 40 milliGRAM(s) Oral before breakfast  sodium bicarbonate Mouth Rinse 10 milliLiter(s) Swish and Spit five times a day  sucralfate suspension 1 Gram(s) Oral four times a day  ursodiol Capsule 300 milliGRAM(s) Oral two times a day with meals      Cardiovascular:   furosemide   Injectable 20 milliGRAM(s) IV Push every 24 hours PRN      Immunologic:   filgrastim-sndz (ZARXIO) Injectable 480 MICROGram(s) SubCutaneous every 24 hours      Other medications:   Biotene Dry Mouth Oral Rinse 5 milliLiter(s) Swish and Spit five times a day  chlorhexidine 4% Liquid 1 Application(s) Topical <User Schedule>  folic acid 1 milliGRAM(s) Oral daily  hydrocortisone 1% Cream 1 Application(s) Topical daily  lidocaine/prilocaine Cream 1 Application(s) Topical daily  multivitamin 1 Tablet(s) Oral daily  sodium chloride 0.9%. 1000 milliLiter(s) IV Continuous <Continuous>      PRN:   acetaminophen   Tablet .. 650 milliGRAM(s) Oral every 6 hours PRN  acetaminophen   Tablet .. 650 milliGRAM(s) Oral every 6 hours PRN  aluminum hydroxide/magnesium hydroxide/simethicone Suspension 30 milliLiter(s) Oral every 4 hours PRN  AQUAPHOR (petrolatum Ointment) 1 Application(s) Topical two times a day PRN  bismuth subsalicylate Liquid 30 milliLiter(s) Oral every 4 hours PRN  diphenhydrAMINE 25 milliGRAM(s) Oral once PRN  diphenhydrAMINE   Injectable 25 milliGRAM(s) IV Push every 4 hours PRN  furosemide   Injectable 20 milliGRAM(s) IV Push every 24 hours PRN  loperamide 2 milliGRAM(s) Oral every 6 hours PRN  LORazepam   Injectable 0.5 milliGRAM(s) IV Push daily PRN  metoclopramide Injectable 10 milliGRAM(s) IV Push every 6 hours PRN  ondansetron Injectable 8 milliGRAM(s) IV Push every 8 hours PRN  sodium chloride 0.9% lock flush 10 milliLiter(s) IV Push every 1 hour PRN    A/P:   59 year old male with a history of amyloidosis  Post Autologous PBSCT day + 12  Keep platelets =/> 40K for history of amyloidosis   Has remained afebrile for 24 hours - continue Cefepime through engraftment, cultures negative   Chemotherapy induced grade 1 intestinal mucositis (diarrhea) - c. diff negative - now resolved   Chemotherapy induced grade 1 GI mucositis: Improving with carafate / maalox     1. Infectious Disease:   acyclovir   Oral Tab/Cap 400 milliGRAM(s) Oral every 8 hours  cefepime   IVPB      cefepime   IVPB 2000 milliGRAM(s) IV Intermittent every 8 hours  clotrimazole Lozenge 1 Lozenge Oral five times a day  fluconAZOLE   Tablet 400 milliGRAM(s) Oral daily    2. VOD Prophylaxis: Actigall, Glutamine, Heparin (dosed at 100 units / kg / day)     3. GI Prophylaxis:    pantoprazole    Tablet 40 milliGRAM(s) Oral before breakfast    4. Mouthcare - NS / NaHCO3 rinses, Mycelex, Biotene; Skin care     5. GVHD prophylaxis - n/a     6. Transfuse & replete electrolytes prn   1 bag platelets     7. IV hydration, daily weights, strict I&O, prn diuresis     8. PO intake as tolerated, nutrition follow up as needed, MVI, folic acid     9. Antiemetics, anti-diarrhea medications:   loperamide 2 milliGRAM(s) Oral every 6 hours PRN  LORazepam   Injectable 0.5 milliGRAM(s) IV Push daily PRN  metoclopramide Injectable 10 milliGRAM(s) IV Push every 6 hours PRN  ondansetron Injectable 8 milliGRAM(s) IV Push every 8 hours PRN    10. OOB as tolerated, physical therapy consult if needed     11. Monitor coags / fibrinogen 2x week, vitamin K as needed     12. Monitor closely for clinical changes, monitor for fevers     13. Emotional support provided, plan of care discussed and questions addressed     14. Patient education done regarding plan of care, restrictions and discharge planning     15. Continue regular social work input     I have written the above note for Dr. Roman who performed service with me in the room.   Brinda Garrett NP-C ([phone])    I have seen and examined patient with NP, I agree with above note as scribed.

## 2020-07-14 NOTE — PROGRESS NOTE ADULT - ASSESSMENT
59 year old male with history of amyloidosis treated with Velcade and Cybor D, hepatitis C treated with Harvoni admitted for autologous pbsct with melphalan preparative regimen
59M with PMH of HCV, amyloidosis, dyspepsia, hematuria, colitis, Raynaud's phenomenon, and cutaneous mastocytosis here for an autologous peripheral blood stem cell transplant with high dose melphalan preparative regimen. Diagnosed in 2016 with HCV, and subsequently in 2019 found to have amyloidosis. Was treated with CyBorD x 7 cycles. Palliative following for symptom management post-transplant.
After risks, benefits, alternatives discussion the pt verbalized understanding and gave verbal consent.  Will plan for TLC CV catheter placement.    MARITZA Davalos  [facility] [id number]  Ext [phone]
59 year old male with history of amyloidosis treated with Velcade and Cybor D, hepatitis C treated with Harvoni admitted for autologous pbsct with melphalan preparative regimen

## 2020-07-14 NOTE — PROGRESS NOTE ADULT - PROBLEM SELECTOR PLAN 3
7/8:Chemotherapy induced grade 1 intestinal mucositis (diarrhea) -   c. diff negative -   7/9: added imodium  7/13: resolved

## 2020-07-14 NOTE — CHART NOTE - NSCHARTNOTEFT_GEN_A_CORE
Nutrition Follow Up Note  Patient seen for: Malnutrition follow up. Pt with amyloidosis day + 12 S/P autologous peripheral blood stem cell transplant, noted with grade 1 gastric mucositis, previously grade 1 intestinal mucositis which has since resolved.    Source: pt    Diet : Regular diet with ensure 2 daily and glutasolve 1 packet daily     Patient reports: No N+V, last episode of diarrhea 4 days ago, pt with normal BM today.      PO intake : Pt reports appetite has improved over the past few days, stated he is able to eat 50% of meals and will focus on items with protein first, pt will choose items from alternative cold menu list at times such as peanut butter or cheese with crackers and hummus. Pt will supplement with 1 ensure per day-has many at bedside, requesting this to be discontinued.       Weight: 188.9 lbs (6/29), 180.7 lbs (7/8), 182.5 lbs (7/13), weight decreased but trending back up, noted with 1+ christina ankle edema.    Pertinent Medications: MEDICATIONS  (STANDING):  acyclovir   Oral Tab/Cap 400 milliGRAM(s) Oral every 8 hours  Biotene Dry Mouth Oral Rinse 5 milliLiter(s) Swish and Spit five times a day  cefepime   IVPB      cefepime   IVPB 2000 milliGRAM(s) IV Intermittent every 8 hours  chlorhexidine 4% Liquid 1 Application(s) Topical <User Schedule>  clotrimazole Lozenge 1 Lozenge Oral five times a day  filgrastim-sndz (ZARXIO) Injectable 480 MICROGram(s) SubCutaneous every 24 hours  fluconAZOLE   Tablet 400 milliGRAM(s) Oral daily  folic acid 1 milliGRAM(s) Oral daily  heparin  Infusion 357 Unit(s)/Hr (3.57 mL/Hr) IV Continuous <Continuous>  hydrocortisone 1% Cream 1 Application(s) Topical daily  lidocaine/prilocaine Cream 1 Application(s) Topical daily  multivitamin 1 Tablet(s) Oral daily  pantoprazole    Tablet 40 milliGRAM(s) Oral before breakfast  sodium bicarbonate Mouth Rinse 10 milliLiter(s) Swish and Spit five times a day  sodium chloride 0.9%. 1000 milliLiter(s) (20 mL/Hr) IV Continuous <Continuous>  sucralfate suspension 1 Gram(s) Oral four times a day  ursodiol Capsule 300 milliGRAM(s) Oral two times a day with meals    MEDICATIONS  (PRN):  acetaminophen   Tablet .. 650 milliGRAM(s) Oral every 6 hours PRN Temp greater or equal to 38C (100.4F), Mild Pain (1 - 3)  acetaminophen   Tablet .. 650 milliGRAM(s) Oral every 6 hours PRN Temp greater or equal to 38C (100.4F), Mild Pain (1 - 3)  aluminum hydroxide/magnesium hydroxide/simethicone Suspension 30 milliLiter(s) Oral every 4 hours PRN Dyspepsia  AQUAPHOR (petrolatum Ointment) 1 Application(s) Topical two times a day PRN dry/itchy skin  bismuth subsalicylate Liquid 30 milliLiter(s) Oral every 4 hours PRN nausea / diarrhea / upset stomach  diphenhydrAMINE 25 milliGRAM(s) Oral once PRN Rash and/or Itching  diphenhydrAMINE   Injectable 25 milliGRAM(s) IV Push every 4 hours PRN Allergy symptoms  furosemide   Injectable 20 milliGRAM(s) IV Push every 24 hours PRN If urine output is <100mL/hr for 2 hours  loperamide 2 milliGRAM(s) Oral every 6 hours PRN Diarrhea  LORazepam   Injectable 0.5 milliGRAM(s) IV Push daily PRN Anixiety/ Insomnia  metoclopramide Injectable 10 milliGRAM(s) IV Push every 6 hours PRN Nausea and/or Vomiting  ondansetron Injectable 8 milliGRAM(s) IV Push every 8 hours PRN Nausea and/or Vomiting  sodium chloride 0.9% lock flush 10 milliLiter(s) IV Push every 1 hour PRN Pre/post blood products, medications, blood draw, and to maintain line patency    Pertinent Labs: 07-14 @ 07:00: Na 138, BUN 12, Cr 0.64, BG 94, K+ 3.8, Phos 3.7, Mg 2.0, Alk Phos 50, ALT/SGPT 14, AST/SGOT 16, HbA1c --    Finger Sticks: none       Skin per nursing documentation: free of pressure injury   Edema: as above    Estimated Needs:   [x ] no change since previous assessment  [ ] recalculated:     Previous Nutrition Diagnosis: Mild malnutrition   Nutrition Diagnosis is: ongoing, addressed with improving appetite/intake, nutrition care plan achieved     New Nutrition Diagnosis:  Related to:    As evidenced by:      Interventions:     Recommend  1) Continue regular diet with glutasolve 1 packet daily, discontinue ensure shakes per pt request   2) Continue to encourage po intake and obtain/honor food preferences as able, Encouraged pt to order nutrient dense snacks with meals to consume in between to mimic smaller, more frequent meals in house, continue to prioritize protein containing foods first     Monitoring and Evaluation:     Continue to monitor Nutritional intake, Tolerance to diet prescription, weights, labs, skin integrity    RD remains available upon request and will follow up per protocol    Ama Perez R.D., [facility], Pager #[phone]

## 2020-07-14 NOTE — PROGRESS NOTE ADULT - ATTENDING COMMENTS
59 year old male with history of amyloidosis treated with CyBorD, now admitted for autologous pbsct with Melphalan preparative regimen   s/p HPC transplant on 7/2/20, now day + 12    -Continue Zarxio daily until engraftment with WBC recovery  -IV hydration, strict I&O, daily weight, Lasix to keep O > I  -VOD prophylaxis- Actigall, low dose heparin gtt & glutamine supplementation  -ID- Neutropenic fevers- on Cefepime; continue Fluconazole / Acyclovir prophylaxis. Has been afebrile x24 hrs;  hold on  CT scans, will reassess tomorrow if he has recurrent fevers.  Last COVID PCR on 7/8 was negative  -GI mucositis secondary to antineoplastic chemotherapy- continue PPI, add Carafate; Imodium for diarrhea. Improved, as per pt  -Antiemetics  -Supplement lytes prn  - transfuse if plts less than 40....will hold today at 37...tx rbc as needed  -Mouth care, skin care  -OOB/ambulate 59 year old male with history of amyloidosis treated with CyBorD, now admitted for autologous pbsct with Melphalan preparative regimen   s/p HPC transplant on 7/2/20, now day + 12    -Continue Zarxio daily until engraftment with WBC recovery  -IV hydration, strict I&O, daily weight, Lasix to keep O > I  -VOD prophylaxis- Actigall, low dose heparin gtt & glutamine supplementation  -ID- Neutropenic fevers- on Cefepime; continue Fluconazole / Acyclovir prophylaxis. Has been afebrile x48 hrs;  hold on  CT scans, will reassess tomorrow if he has recurrent fevers.  Last COVID PCR on 7/8 was negative  -GI mucositis secondary to antineoplastic chemotherapy- continue PPI, add Carafate; Imodium for diarrhea. Improved, as per pt  -Antiemetics  -Supplement lytes prn  - transfuse if plts less than 40.......tx rbc as needed  -Mouth care, skin care  -OOB/ambulate

## 2020-07-15 LAB
ALBUMIN SERPL ELPH-MCNC: 4.2 G/DL — SIGNIFICANT CHANGE UP (ref 3.3–5)
ALP SERPL-CCNC: 52 U/L — SIGNIFICANT CHANGE UP (ref 40–120)
ALT FLD-CCNC: 24 U/L — SIGNIFICANT CHANGE UP (ref 10–45)
ANION GAP SERPL CALC-SCNC: 10 MMOL/L — SIGNIFICANT CHANGE UP (ref 5–17)
AST SERPL-CCNC: 33 U/L — SIGNIFICANT CHANGE UP (ref 10–40)
BASOPHILS # BLD AUTO: 0 K/UL — SIGNIFICANT CHANGE UP (ref 0–0.2)
BASOPHILS NFR BLD AUTO: 0 % — SIGNIFICANT CHANGE UP (ref 0–2)
BILIRUB DIRECT SERPL-MCNC: <0.1 MG/DL — SIGNIFICANT CHANGE UP (ref 0–0.2)
BILIRUB INDIRECT FLD-MCNC: >0.2 MG/DL — SIGNIFICANT CHANGE UP (ref 0.2–1)
BILIRUB SERPL-MCNC: 0.3 MG/DL — SIGNIFICANT CHANGE UP (ref 0.2–1.2)
BLD GP AB SCN SERPL QL: NEGATIVE — SIGNIFICANT CHANGE UP
BUN SERPL-MCNC: 5 MG/DL — LOW (ref 7–23)
CALCIUM SERPL-MCNC: 9.2 MG/DL — SIGNIFICANT CHANGE UP (ref 8.4–10.5)
CHLORIDE SERPL-SCNC: 101 MMOL/L — SIGNIFICANT CHANGE UP (ref 96–108)
CO2 SERPL-SCNC: 29 MMOL/L — SIGNIFICANT CHANGE UP (ref 22–31)
CREAT SERPL-MCNC: 0.74 MG/DL — SIGNIFICANT CHANGE UP (ref 0.5–1.3)
EOSINOPHIL # BLD AUTO: 0.07 K/UL — SIGNIFICANT CHANGE UP (ref 0–0.5)
EOSINOPHIL NFR BLD AUTO: 10.4 % — HIGH (ref 0–6)
GLUCOSE SERPL-MCNC: 97 MG/DL — SIGNIFICANT CHANGE UP (ref 70–99)
HCT VFR BLD CALC: 23.9 % — LOW (ref 39–50)
HCT VFR BLD CALC: 32.8 % — LOW (ref 39–50)
HGB BLD-MCNC: 10.8 G/DL — LOW (ref 13–17)
HGB BLD-MCNC: 7.7 G/DL — LOW (ref 13–17)
LDH SERPL L TO P-CCNC: 202 U/L — SIGNIFICANT CHANGE UP (ref 50–242)
LYMPHOCYTES # BLD AUTO: 0.02 K/UL — LOW (ref 1–3.3)
LYMPHOCYTES # BLD AUTO: 2.6 % — LOW (ref 13–44)
MAGNESIUM SERPL-MCNC: 2 MG/DL — SIGNIFICANT CHANGE UP (ref 1.6–2.6)
MANUAL SMEAR VERIFICATION: SIGNIFICANT CHANGE UP
MCHC RBC-ENTMCNC: 23.9 PG — LOW (ref 27–34)
MCHC RBC-ENTMCNC: 27.5 PG — SIGNIFICANT CHANGE UP (ref 27–34)
MCHC RBC-ENTMCNC: 32.2 GM/DL — SIGNIFICANT CHANGE UP (ref 32–36)
MCHC RBC-ENTMCNC: 32.9 GM/DL — SIGNIFICANT CHANGE UP (ref 32–36)
MCV RBC AUTO: 74.2 FL — LOW (ref 80–100)
MCV RBC AUTO: 83.5 FL — SIGNIFICANT CHANGE UP (ref 80–100)
MONOCYTES # BLD AUTO: 0 K/UL — SIGNIFICANT CHANGE UP (ref 0–0.9)
MONOCYTES NFR BLD AUTO: 0 % — LOW (ref 2–14)
NEUTROPHILS # BLD AUTO: 0.57 K/UL — LOW (ref 1.8–7.4)
NEUTROPHILS NFR BLD AUTO: 87 % — HIGH (ref 43–77)
NRBC # BLD: 0 /100 WBCS — SIGNIFICANT CHANGE UP (ref 0–0)
PHOSPHATE SERPL-MCNC: 4.1 MG/DL — SIGNIFICANT CHANGE UP (ref 2.5–4.5)
PLAT MORPH BLD: NORMAL — SIGNIFICANT CHANGE UP
PLATELET # BLD AUTO: 131 K/UL — LOW (ref 150–400)
PLATELET # BLD AUTO: 31 K/UL — LOW (ref 150–400)
POTASSIUM SERPL-MCNC: 3.6 MMOL/L — SIGNIFICANT CHANGE UP (ref 3.5–5.3)
POTASSIUM SERPL-SCNC: 3.6 MMOL/L — SIGNIFICANT CHANGE UP (ref 3.5–5.3)
PROT SERPL-MCNC: 6.8 G/DL — SIGNIFICANT CHANGE UP (ref 6–8.3)
RBC # BLD: 3.22 M/UL — LOW (ref 4.2–5.8)
RBC # BLD: 3.93 M/UL — LOW (ref 4.2–5.8)
RBC # FLD: 14 % — SIGNIFICANT CHANGE UP (ref 10.3–14.5)
RBC # FLD: 14.9 % — HIGH (ref 10.3–14.5)
RBC BLD AUTO: SIGNIFICANT CHANGE UP
RH IG SCN BLD-IMP: POSITIVE — SIGNIFICANT CHANGE UP
SODIUM SERPL-SCNC: 140 MMOL/L — SIGNIFICANT CHANGE UP (ref 135–145)
WBC # BLD: 0.14 K/UL — CRITICAL LOW (ref 3.8–10.5)
WBC # BLD: 0.66 K/UL — CRITICAL LOW (ref 3.8–10.5)
WBC # FLD AUTO: 0.14 K/UL — CRITICAL LOW (ref 3.8–10.5)
WBC # FLD AUTO: 0.66 K/UL — CRITICAL LOW (ref 3.8–10.5)

## 2020-07-15 PROCEDURE — 99291 CRITICAL CARE FIRST HOUR: CPT

## 2020-07-15 RX ORDER — FILGRASTIM 480MCG/1.6
600 VIAL (ML) INJECTION EVERY 24 HOURS
Refills: 0 | Status: DISCONTINUED | OUTPATIENT
Start: 2020-07-15 | End: 2020-07-19

## 2020-07-15 RX ORDER — FLUCONAZOLE 150 MG/1
2 TABLET ORAL
Qty: 60 | Refills: 3
Start: 2020-07-15 | End: 2020-11-11

## 2020-07-15 RX ORDER — PANTOPRAZOLE SODIUM 20 MG/1
1 TABLET, DELAYED RELEASE ORAL
Qty: 30 | Refills: 3
Start: 2020-07-15 | End: 2020-11-11

## 2020-07-15 RX ORDER — ATOVAQUONE 750 MG/5ML
5 SUSPENSION ORAL
Qty: 300 | Refills: 3
Start: 2020-07-15 | End: 2020-11-11

## 2020-07-15 RX ORDER — METOCLOPRAMIDE HCL 10 MG
1 TABLET ORAL
Qty: 56 | Refills: 0
Start: 2020-07-15 | End: 2020-07-28

## 2020-07-15 RX ORDER — ACYCLOVIR SODIUM 500 MG
1 VIAL (EA) INTRAVENOUS
Qty: 90 | Refills: 3
Start: 2020-07-15 | End: 2020-11-11

## 2020-07-15 RX ORDER — ONDANSETRON 8 MG/1
1 TABLET, FILM COATED ORAL
Qty: 42 | Refills: 0
Start: 2020-07-15 | End: 2020-07-28

## 2020-07-15 RX ORDER — HYDROCORTISONE 20 MG
50 TABLET ORAL ONCE
Refills: 0 | Status: COMPLETED | OUTPATIENT
Start: 2020-07-15 | End: 2020-07-15

## 2020-07-15 RX ORDER — FOLIC ACID 0.8 MG
1 TABLET ORAL
Qty: 30 | Refills: 3
Start: 2020-07-15 | End: 2020-11-11

## 2020-07-15 RX ADMIN — Medication 10 MILLILITER(S): at 02:43

## 2020-07-15 RX ADMIN — Medication 1 LOZENGE: at 12:00

## 2020-07-15 RX ADMIN — Medication 10 MILLILITER(S): at 09:31

## 2020-07-15 RX ADMIN — Medication 1 LOZENGE: at 02:43

## 2020-07-15 RX ADMIN — Medication 10 MILLILITER(S): at 12:03

## 2020-07-15 RX ADMIN — PANTOPRAZOLE SODIUM 40 MILLIGRAM(S): 20 TABLET, DELAYED RELEASE ORAL at 06:32

## 2020-07-15 RX ADMIN — Medication 400 MILLIGRAM(S): at 14:16

## 2020-07-15 RX ADMIN — Medication 5 MILLILITER(S): at 00:30

## 2020-07-15 RX ADMIN — Medication 600 MICROGRAM(S): at 14:15

## 2020-07-15 RX ADMIN — Medication 400 MILLIGRAM(S): at 06:32

## 2020-07-15 RX ADMIN — Medication 5 MILLILITER(S): at 16:43

## 2020-07-15 RX ADMIN — Medication 1 LOZENGE: at 09:30

## 2020-07-15 RX ADMIN — CHLORHEXIDINE GLUCONATE 1 APPLICATION(S): 213 SOLUTION TOPICAL at 06:32

## 2020-07-15 RX ADMIN — Medication 5 MILLILITER(S): at 21:12

## 2020-07-15 RX ADMIN — URSODIOL 300 MILLIGRAM(S): 250 TABLET, FILM COATED ORAL at 18:11

## 2020-07-15 RX ADMIN — Medication 10 MILLILITER(S): at 21:13

## 2020-07-15 RX ADMIN — URSODIOL 300 MILLIGRAM(S): 250 TABLET, FILM COATED ORAL at 09:31

## 2020-07-15 RX ADMIN — Medication 10 MILLILITER(S): at 16:44

## 2020-07-15 RX ADMIN — Medication 50 MILLIGRAM(S): at 12:15

## 2020-07-15 RX ADMIN — Medication 1 TABLET(S): at 12:02

## 2020-07-15 RX ADMIN — Medication 400 MILLIGRAM(S): at 21:13

## 2020-07-15 RX ADMIN — Medication 1 MILLIGRAM(S): at 12:02

## 2020-07-15 RX ADMIN — Medication 1 LOZENGE: at 23:27

## 2020-07-15 RX ADMIN — Medication 5 MILLILITER(S): at 23:27

## 2020-07-15 RX ADMIN — FLUCONAZOLE 400 MILLIGRAM(S): 150 TABLET ORAL at 12:05

## 2020-07-15 RX ADMIN — Medication 5 MILLILITER(S): at 12:00

## 2020-07-15 RX ADMIN — Medication 1 LOZENGE: at 21:13

## 2020-07-15 RX ADMIN — Medication 5 MILLILITER(S): at 09:30

## 2020-07-15 RX ADMIN — ALTEPLASE 2 MILLIGRAM(S): KIT at 14:15

## 2020-07-15 RX ADMIN — Medication 1 LOZENGE: at 16:44

## 2020-07-15 RX ADMIN — Medication 10 MILLILITER(S): at 23:27

## 2020-07-15 RX ADMIN — Medication 1 APPLICATION(S): at 12:07

## 2020-07-15 NOTE — PROGRESS NOTE ADULT - SUBJECTIVE AND OBJECTIVE BOX
[facility] Team                                                      Critical / Counseling Time Provided: 30 minutes                                                                                                                                                        Chief Complaint: Autologous pbsct with high dose melphalan prep regimen for treatment of amyloidosis    S: Patient seen and examined with [facility] Transplant Team:       O: Vitals:   Vital Signs Last 24 Hrs  T(C): 37.2 (15 Jul 2020 06:27), Max: 37.3 (2020 17:07)  T(F): 99 (15 Jul 2020 06:27), Max: 99.2 (2020 22:15)  HR: 76 (15 Jul 2020 06:27) (71 - 79)  BP: 110/64 (15 Jul 2020 06:27) (103/70 - 115/70)  BP(mean): --  RR: 18 (15 Jul 2020 06:27) (18 - 18)  SpO2: 98% (2020 22:15) (98% - 99%)    Admit weight: 85kg  Daily Weight in k.1 (2020 08:45)  Today's weight:    Intake / Output:   07-14 @ 07:01  -  -15 @ 07:00  --------------------------------------------------------  IN: 2336 mL / OUT: 2725 mL / NET: -389 mL      PE:   HEENT: Oropharynx: no erythema or ulcerations  Oral Mucositis:               -                                         Grade: n/a  CVS: S1, S2 RRR   Lungs: CTA throughout bilaterally   Abdomen: + BS x 4: mildly hyperactive, soft, NT, ND   Extremities: + chronic trace LE edema; L>R   Gastric Mucositis:      +                                            ndGndrndanddndend:nd nd2nd Intestinal Mucositis:    -                                        Grade: n/a  Skin: hyperpigmentation on back, neck, abdomen likely secondary to Kepivance   TLC: CDI  Neuro: A&O x 3  Pain: denies     Labs:       Cultures:   Culture Results: blood  No growth to date. (20 @ 00:37)    Culture Results: blood  No growth to date. (20 @ 00:37)    Culture Results: urine  No growth (20 @ 00:23)    Culture Results: blood  No growth to date. (20 @ 23:56)    Culture Results: blood  No growth to date. (20 @ 23:56)      Radiology:   EXAM:  XR CHEST PORTABLE URGENT 1V                        PROCEDURE DATE:  2020    IMPRESSION:  1.  The lungs are clear.    Meds:   Antimicrobials:   acyclovir   Oral Tab/Cap 400 milliGRAM(s) Oral every 8 hours  cefepime   IVPB      cefepime   IVPB 2000 milliGRAM(s) IV Intermittent every 8 hours  clotrimazole Lozenge 1 Lozenge Oral five times a day  fluconAZOLE   Tablet 400 milliGRAM(s) Oral daily      Heme / Onc:   alteplase for catheter clearance 2 milliGRAM(s) Catheter once  heparin  Infusion 357 Unit(s)/Hr IV Continuous <Continuous>      GI:  aluminum hydroxide/magnesium hydroxide/simethicone Suspension 30 milliLiter(s) Oral every 4 hours PRN  bismuth subsalicylate Liquid 30 milliLiter(s) Oral every 4 hours PRN  loperamide 2 milliGRAM(s) Oral every 6 hours PRN  pantoprazole    Tablet 40 milliGRAM(s) Oral before breakfast  sodium bicarbonate Mouth Rinse 10 milliLiter(s) Swish and Spit five times a day  sucralfate suspension 1 Gram(s) Oral four times a day  ursodiol Capsule 300 milliGRAM(s) Oral two times a day with meals      Cardiovascular:   furosemide   Injectable 20 milliGRAM(s) IV Push every 24 hours PRN      Immunologic:   filgrastim-sndz (ZARXIO) Injectable 480 MICROGram(s) SubCutaneous every 24 hours      Other medications:   Biotene Dry Mouth Oral Rinse 5 milliLiter(s) Swish and Spit five times a day  chlorhexidine 4% Liquid 1 Application(s) Topical <User Schedule>  folic acid 1 milliGRAM(s) Oral daily  hydrocortisone 1% Cream 1 Application(s) Topical daily  lidocaine/prilocaine Cream 1 Application(s) Topical daily  multivitamin 1 Tablet(s) Oral daily  sodium chloride 0.9%. 1000 milliLiter(s) IV Continuous <Continuous>      PRN:   acetaminophen   Tablet .. 650 milliGRAM(s) Oral every 6 hours PRN  acetaminophen   Tablet .. 650 milliGRAM(s) Oral every 6 hours PRN  aluminum hydroxide/magnesium hydroxide/simethicone Suspension 30 milliLiter(s) Oral every 4 hours PRN  AQUAPHOR (petrolatum Ointment) 1 Application(s) Topical two times a day PRN  bismuth subsalicylate Liquid 30 milliLiter(s) Oral every 4 hours PRN  diphenhydrAMINE 25 milliGRAM(s) Oral once PRN  diphenhydrAMINE   Injectable 25 milliGRAM(s) IV Push every 4 hours PRN  furosemide   Injectable 20 milliGRAM(s) IV Push every 24 hours PRN  loperamide 2 milliGRAM(s) Oral every 6 hours PRN  LORazepam   Injectable 0.5 milliGRAM(s) IV Push daily PRN  metoclopramide Injectable 10 milliGRAM(s) IV Push every 6 hours PRN  ondansetron Injectable 8 milliGRAM(s) IV Push every 8 hours PRN  sodium chloride 0.9% lock flush 10 milliLiter(s) IV Push every 1 hour PRN    A/P:   59 year old male with a history of amyloidosis  Post Autologous PBSCT day + 13  Keep platelets =/> 40K for history of amyloidosis   Has remained afebrile for 24 hours - continue Cefepime through engraftment, cultures negative   Chemotherapy induced grade 1 intestinal mucositis (diarrhea) - c. diff negative - now resolved   Chemotherapy induced grade 1 GI mucositis: Improving with carafate / maalox     1. Infectious Disease:   acyclovir   Oral Tab/Cap 400 milliGRAM(s) Oral every 8 hours  cefepime   IVPB      cefepime   IVPB 2000 milliGRAM(s) IV Intermittent every 8 hours  clotrimazole Lozenge 1 Lozenge Oral five times a day  fluconAZOLE   Tablet 400 milliGRAM(s) Oral daily    2. VOD Prophylaxis: Actigall, Glutamine, Heparin (dosed at 100 units / kg / day)     3. GI Prophylaxis:   pantoprazole    Tablet 40 milliGRAM(s) Oral before breakfast  aluminum hydroxide/magnesium hydroxide/simethicone Suspension 30 milliLiter(s) Oral every 4 hours PRN  bismuth subsalicylate Liquid 30 milliLiter(s) Oral every 4 hours PRN  sucralfate suspension 1 Gram(s) Oral four times a day    4. Mouthcare - NS / NaHCO3 rinses, Mycelex, Biotene; Skin care     5. GVHD prophylaxis - n/a     6. Transfuse & replete electrolytes prn     7. IV hydration, daily weights, strict I&O, prn diuresis     8. PO intake as tolerated, nutrition follow up as needed, MVI, folic acid     9. Antiemetics, anti-diarrhea medications:   loperamide 2 milliGRAM(s) Oral every 6 hours PRN  LORazepam   Injectable 0.5 milliGRAM(s) IV Push daily PRN  metoclopramide Injectable 10 milliGRAM(s) IV Push every 6 hours PRN  ondansetron Injectable 8 milliGRAM(s) IV Push every 8 hours PRN    10. OOB as tolerated, physical therapy consult if needed     11. Monitor coags / fibrinogen 2x week, vitamin K as needed     12. Monitor closely for clinical changes, monitor for fevers     13. Emotional support provided, plan of care discussed and questions addressed     14. Patient education done regarding plan of care, restrictions and discharge planning     15. Continue regular social work input     I have written the above note for Dr. Roman who performed service with me in the room.   Brinda Garrett NP-C ([phone])    I have seen and examined patient with NP, I agree with above note as scribed. [facility] Team                                                      Critical / Counseling Time Provided: 30 minutes                                                                                                                                                        Chief Complaint: Autologous pbsct with high dose melphalan prep regimen for treatment of amyloidosis    S: Patient seen and examined with [facility] Transplant Team:       O: Vitals:   Vital Signs Last 24 Hrs  T(C): 37.2 (15 Jul 2020 06:27), Max: 37.3 (2020 17:07)  T(F): 99 (15 Jul 2020 06:27), Max: 99.2 (2020 22:15)  HR: 76 (15 Jul 2020 06:27) (71 - 79)  BP: 110/64 (15 Jul 2020 06:27) (103/70 - 115/70)  BP(mean): --  RR: 18 (15 Jul 2020 06:27) (18 - 18)  SpO2: 98% (2020 22:15) (98% - 99%)    Admit weight: 85kg  Daily Weight in k.1 (2020 08:45)  Today's weight:    Intake / Output:   07-14 @ 07:01  -  -15 @ 07:00  --------------------------------------------------------  IN: 2336 mL / OUT: 2725 mL / NET: -389 mL      PE:   HEENT: Oropharynx: no erythema or ulcerations  Oral Mucositis:               -                                         Grade: n/a  CVS: S1, S2 RRR   Lungs: CTA throughout bilaterally   Abdomen: + BS x 4: mildly hyperactive, soft, NT, ND   Extremities: + chronic trace LE edema; L>R   Gastric Mucositis:      +                                            ndGndrndanddndend:nd nd2nd Intestinal Mucositis:    -                                        Grade: n/a  Skin: hyperpigmentation on back, neck, abdomen likely secondary to Kepivance   TLC: CDI  Neuro: A&O x 3  Pain: denies     Labs:                         10.8   0.66  )-----------( 131      ( 15 Jul 2020 06:54 )             32.8     07-15    140  |  101  |  5<L>  ----------------------------<  97  3.6   |  29  |  0.74    Ca    9.2      15 Jul 2020 06:54  Phos  4.1     -15  Mg     2.0     -15    TPro  6.8  /  Alb  4.2  /  TBili  0.3  /  DBili  <0.1  /  AST  33  /  ALT  24  /  AlkPhos  52  07-15        Cultures:   Culture Results: blood  No growth to date. (20 @ 00:37)    Culture Results: blood  No growth to date. (20 @ 00:37)    Culture Results: urine  No growth (20 @ 00:23)    Culture Results: blood  No growth to date. (20 @ 23:56)    Culture Results: blood  No growth to date. (20 @ 23:56)      Radiology:   EXAM:  XR CHEST PORTABLE URGENT 1V                        PROCEDURE DATE:  2020    IMPRESSION:  1.  The lungs are clear.    Meds:   Antimicrobials:   acyclovir   Oral Tab/Cap 400 milliGRAM(s) Oral every 8 hours  cefepime   IVPB      cefepime   IVPB 2000 milliGRAM(s) IV Intermittent every 8 hours  clotrimazole Lozenge 1 Lozenge Oral five times a day  fluconAZOLE   Tablet 400 milliGRAM(s) Oral daily      Heme / Onc:   alteplase for catheter clearance 2 milliGRAM(s) Catheter once  heparin  Infusion 357 Unit(s)/Hr IV Continuous <Continuous>      GI:  aluminum hydroxide/magnesium hydroxide/simethicone Suspension 30 milliLiter(s) Oral every 4 hours PRN  bismuth subsalicylate Liquid 30 milliLiter(s) Oral every 4 hours PRN  loperamide 2 milliGRAM(s) Oral every 6 hours PRN  pantoprazole    Tablet 40 milliGRAM(s) Oral before breakfast  sodium bicarbonate Mouth Rinse 10 milliLiter(s) Swish and Spit five times a day  sucralfate suspension 1 Gram(s) Oral four times a day  ursodiol Capsule 300 milliGRAM(s) Oral two times a day with meals      Cardiovascular:   furosemide   Injectable 20 milliGRAM(s) IV Push every 24 hours PRN      Immunologic:   filgrastim-sndz (ZARXIO) Injectable 480 MICROGram(s) SubCutaneous every 24 hours      Other medications:   Biotene Dry Mouth Oral Rinse 5 milliLiter(s) Swish and Spit five times a day  chlorhexidine 4% Liquid 1 Application(s) Topical <User Schedule>  folic acid 1 milliGRAM(s) Oral daily  hydrocortisone 1% Cream 1 Application(s) Topical daily  lidocaine/prilocaine Cream 1 Application(s) Topical daily  multivitamin 1 Tablet(s) Oral daily  sodium chloride 0.9%. 1000 milliLiter(s) IV Continuous <Continuous>      PRN:   acetaminophen   Tablet .. 650 milliGRAM(s) Oral every 6 hours PRN  acetaminophen   Tablet .. 650 milliGRAM(s) Oral every 6 hours PRN  aluminum hydroxide/magnesium hydroxide/simethicone Suspension 30 milliLiter(s) Oral every 4 hours PRN  AQUAPHOR (petrolatum Ointment) 1 Application(s) Topical two times a day PRN  bismuth subsalicylate Liquid 30 milliLiter(s) Oral every 4 hours PRN  diphenhydrAMINE 25 milliGRAM(s) Oral once PRN  diphenhydrAMINE   Injectable 25 milliGRAM(s) IV Push every 4 hours PRN  furosemide   Injectable 20 milliGRAM(s) IV Push every 24 hours PRN  loperamide 2 milliGRAM(s) Oral every 6 hours PRN  LORazepam   Injectable 0.5 milliGRAM(s) IV Push daily PRN  metoclopramide Injectable 10 milliGRAM(s) IV Push every 6 hours PRN  ondansetron Injectable 8 milliGRAM(s) IV Push every 8 hours PRN  sodium chloride 0.9% lock flush 10 milliLiter(s) IV Push every 1 hour PRN    A/P:   59 year old male with a history of amyloidosis  Post Autologous PBSCT day + 13  Keep platelets =/> 40K for history of amyloidosis   Has remained afebrile for 24 hours - continue Cefepime through engraftment, cultures negative   Chemotherapy induced grade 1 intestinal mucositis (diarrhea) - c. diff negative - now resolved   Chemotherapy induced grade 1 GI mucositis: Improving with carafate / maalox     1. Infectious Disease:   acyclovir   Oral Tab/Cap 400 milliGRAM(s) Oral every 8 hours  cefepime   IVPB      cefepime   IVPB 2000 milliGRAM(s) IV Intermittent every 8 hours  clotrimazole Lozenge 1 Lozenge Oral five times a day  fluconAZOLE   Tablet 400 milliGRAM(s) Oral daily    2. VOD Prophylaxis: Actigall, Glutamine, Heparin (dosed at 100 units / kg / day)     3. GI Prophylaxis:   pantoprazole    Tablet 40 milliGRAM(s) Oral before breakfast  aluminum hydroxide/magnesium hydroxide/simethicone Suspension 30 milliLiter(s) Oral every 4 hours PRN  bismuth subsalicylate Liquid 30 milliLiter(s) Oral every 4 hours PRN  sucralfate suspension 1 Gram(s) Oral four times a day    4. Mouthcare - NS / NaHCO3 rinses, Mycelex, Biotene; Skin care     5. GVHD prophylaxis - n/a     6. Transfuse & replete electrolytes prn     7. IV hydration, daily weights, strict I&O, prn diuresis     8. PO intake as tolerated, nutrition follow up as needed, MVI, folic acid     9. Antiemetics, anti-diarrhea medications:   loperamide 2 milliGRAM(s) Oral every 6 hours PRN  LORazepam   Injectable 0.5 milliGRAM(s) IV Push daily PRN  metoclopramide Injectable 10 milliGRAM(s) IV Push every 6 hours PRN  ondansetron Injectable 8 milliGRAM(s) IV Push every 8 hours PRN    10. OOB as tolerated, physical therapy consult if needed     11. Monitor coags / fibrinogen 2x week, vitamin K as needed     12. Monitor closely for clinical changes, monitor for fevers     13. Emotional support provided, plan of care discussed and questions addressed     14. Patient education done regarding plan of care, restrictions and discharge planning     15. Continue regular social work input     I have written the above note for Dr. Roman who performed service with me in the room.   Brinda Garrett NP-C ([phone])    I have seen and examined patient with NP, I agree with above note as scribed. [facility] Team                                                      Critical / Counseling Time Provided: 30 minutes                                                                                                                                                        Chief Complaint: Autologous pbsct with high dose melphalan prep regimen for treatment of amyloidosis    S: Patient seen and examined with [facility] Transplant Team:       O: Vitals:   Vital Signs Last 24 Hrs  T(C): 37.2 (15 Jul 2020 06:27), Max: 37.3 (2020 17:07)  T(F): 99 (15 Jul 2020 06:27), Max: 99.2 (2020 22:15)  HR: 76 (15 Jul 2020 06:27) (71 - 79)  BP: 110/64 (15 Jul 2020 06:27) (103/70 - 115/70)  BP(mean): --  RR: 18 (15 Jul 2020 06:27) (18 - 18)  SpO2: 98% (2020 22:15) (98% - 99%)    Admit weight: 85kg  Daily Weight in k.1 (2020 08:45)  Today's weight:    Intake / Output:   07-14 @ 07:01  -  07-15 @ 07:00  --------------------------------------------------------  IN: 2336 mL / OUT: 2725 mL / NET: -389 mL      PE:   HEENT: Oropharynx: no erythema or ulcerations  Oral Mucositis:               -                                         Grade: n/a  CVS: S1, S2 RRR   Lungs: CTA throughout bilaterally   Abdomen: + BS x 4: mildly hyperactive, soft, NT, ND   Extremities: + chronic trace LE edema; L>R   Gastric Mucositis:      +                                            ndGndrndanddndend:nd nd2nd Intestinal Mucositis:    -                                        Grade: n/a  Skin: hyperpigmentation on back, neck, abdomen likely secondary to Kepivance   TLC: CDI  Neuro: A&O x 3  Pain: denies     Labs:                         7.7    0.14  )-----------( 31       ( 15 Jul 2020 10:24 )             23.9            		    07-15    140  |  101  |  5<L>  ----------------------------<  97  3.6   |  29  |  0.74    Ca    9.2      15 Jul 2020 06:54  Phos  4.1     -15  Mg     2.0     -15    TPro  6.8  /  Alb  4.2  /  TBili  0.3  /  DBili  <0.1  /  AST  33  /  ALT  24  /  AlkPhos  52  07-15        Cultures:   Culture Results: blood  No growth to date. (20 @ 00:37)    Culture Results: blood  No growth to date. (20 @ 00:37)    Culture Results: urine  No growth (20 @ 00:23)    Culture Results: blood  No growth to date. (20 @ 23:56)    Culture Results: blood  No growth to date. (20 @ 23:56)      Radiology:   EXAM:  XR CHEST PORTABLE URGENT 1V                        PROCEDURE DATE:  2020    IMPRESSION:  1.  The lungs are clear.    Meds:   Antimicrobials:   acyclovir   Oral Tab/Cap 400 milliGRAM(s) Oral every 8 hours  cefepime   IVPB      cefepime   IVPB 2000 milliGRAM(s) IV Intermittent every 8 hours  clotrimazole Lozenge 1 Lozenge Oral five times a day  fluconAZOLE   Tablet 400 milliGRAM(s) Oral daily      Heme / Onc:   alteplase for catheter clearance 2 milliGRAM(s) Catheter once  heparin  Infusion 357 Unit(s)/Hr IV Continuous <Continuous>      GI:  aluminum hydroxide/magnesium hydroxide/simethicone Suspension 30 milliLiter(s) Oral every 4 hours PRN  bismuth subsalicylate Liquid 30 milliLiter(s) Oral every 4 hours PRN  loperamide 2 milliGRAM(s) Oral every 6 hours PRN  pantoprazole    Tablet 40 milliGRAM(s) Oral before breakfast  sodium bicarbonate Mouth Rinse 10 milliLiter(s) Swish and Spit five times a day  sucralfate suspension 1 Gram(s) Oral four times a day  ursodiol Capsule 300 milliGRAM(s) Oral two times a day with meals      Cardiovascular:   furosemide   Injectable 20 milliGRAM(s) IV Push every 24 hours PRN      Immunologic:   filgrastim-sndz (ZARXIO) Injectable 480 MICROGram(s) SubCutaneous every 24 hours      Other medications:   Biotene Dry Mouth Oral Rinse 5 milliLiter(s) Swish and Spit five times a day  chlorhexidine 4% Liquid 1 Application(s) Topical <User Schedule>  folic acid 1 milliGRAM(s) Oral daily  hydrocortisone 1% Cream 1 Application(s) Topical daily  lidocaine/prilocaine Cream 1 Application(s) Topical daily  multivitamin 1 Tablet(s) Oral daily  sodium chloride 0.9%. 1000 milliLiter(s) IV Continuous <Continuous>      PRN:   acetaminophen   Tablet .. 650 milliGRAM(s) Oral every 6 hours PRN  acetaminophen   Tablet .. 650 milliGRAM(s) Oral every 6 hours PRN  aluminum hydroxide/magnesium hydroxide/simethicone Suspension 30 milliLiter(s) Oral every 4 hours PRN  AQUAPHOR (petrolatum Ointment) 1 Application(s) Topical two times a day PRN  bismuth subsalicylate Liquid 30 milliLiter(s) Oral every 4 hours PRN  diphenhydrAMINE 25 milliGRAM(s) Oral once PRN  diphenhydrAMINE   Injectable 25 milliGRAM(s) IV Push every 4 hours PRN  furosemide   Injectable 20 milliGRAM(s) IV Push every 24 hours PRN  loperamide 2 milliGRAM(s) Oral every 6 hours PRN  LORazepam   Injectable 0.5 milliGRAM(s) IV Push daily PRN  metoclopramide Injectable 10 milliGRAM(s) IV Push every 6 hours PRN  ondansetron Injectable 8 milliGRAM(s) IV Push every 8 hours PRN  sodium chloride 0.9% lock flush 10 milliLiter(s) IV Push every 1 hour PRN    A/P:   59 year old male with a history of amyloidosis  Post Autologous PBSCT day + 13  Keep platelets =/> 40K for history of amyloidosis   Has remained afebrile for 24 hours - continue Cefepime through engraftment, cultures negative   Chemotherapy induced grade 1 intestinal mucositis (diarrhea) - c. diff negative - now resolved   Chemotherapy induced grade 1 GI mucositis: Improving with carafate / maalox     1. Infectious Disease:   acyclovir   Oral Tab/Cap 400 milliGRAM(s) Oral every 8 hours  cefepime   IVPB      cefepime   IVPB 2000 milliGRAM(s) IV Intermittent every 8 hours  clotrimazole Lozenge 1 Lozenge Oral five times a day  fluconAZOLE   Tablet 400 milliGRAM(s) Oral daily    2. VOD Prophylaxis: Actigall, Glutamine, Heparin (dosed at 100 units / kg / day)     3. GI Prophylaxis:   pantoprazole    Tablet 40 milliGRAM(s) Oral before breakfast  aluminum hydroxide/magnesium hydroxide/simethicone Suspension 30 milliLiter(s) Oral every 4 hours PRN  bismuth subsalicylate Liquid 30 milliLiter(s) Oral every 4 hours PRN  sucralfate suspension 1 Gram(s) Oral four times a day    4. Mouthcare - NS / NaHCO3 rinses, Mycelex, Biotene; Skin care     5. GVHD prophylaxis - n/a     6. Transfuse & replete electrolytes prn   1 U Platelet transfusion for PLT goal>40K    7. IV hydration, daily weights, strict I&O, prn diuresis     8. PO intake as tolerated, nutrition follow up as needed, MVI, folic acid     9. Antiemetics, anti-diarrhea medications:   loperamide 2 milliGRAM(s) Oral every 6 hours PRN  LORazepam   Injectable 0.5 milliGRAM(s) IV Push daily PRN  metoclopramide Injectable 10 milliGRAM(s) IV Push every 6 hours PRN  ondansetron Injectable 8 milliGRAM(s) IV Push every 8 hours PRN    10. OOB as tolerated, physical therapy consult if needed     11. Monitor coags / fibrinogen 2x week, vitamin K as needed     12. Monitor closely for clinical changes, monitor for fevers     13. Emotional support provided, plan of care discussed and questions addressed     14. Patient education done regarding plan of care, restrictions and discharge planning     15. Continue regular social work input     I have written the above note for Dr. Roman who performed service with me in the room.   Brinda Garrett NP-C ([phone])    I have seen and examined patient with NP, I agree with above note as scribed. [facility] Team                                                      Critical / Counseling Time Provided: 30 minutes                                                                                                                                                        Chief Complaint: Autologous pbsct with high dose melphalan prep regimen for treatment of amyloidosis    S: Patient seen and examined with [facility] Transplant Team:   +fatigue   patent has no complaints today    Denies: mouth/throat pain, cough, SOB, abdominal pain, nausea, vomiting, abdominal pain, dizziness     O: Vitals:   Vital Signs Last 24 Hrs  T(C): 37.2 (15 Jul 2020 06:27), Max: 37.3 (2020 17:07)  T(F): 99 (15 Jul 2020 06:27), Max: 99.2 (2020 22:15)  HR: 76 (15 Jul 2020 06:27) (71 - 79)  BP: 110/64 (15 Jul 2020 06:27) (103/70 - 115/70)  BP(mean): --  RR: 18 (15 Jul 2020 06:27) (18 - 18)  SpO2: 98% (2020 22:15) (98% - 99%)    Admit weight: 85kg  Daily Weight in k.1 (2020 08:45)  Today's weight:81.9 kg     Intake / Output:   14 @ 07:01  -  07-15 @ 07:00  --------------------------------------------------------  IN: 2336 mL / OUT: 2725 mL / NET: -389 mL      PE:   HEENT: Oropharynx: no erythema or ulcerations  Oral Mucositis:               -                                         Grade: n/a  CVS: S1, S2 RRR   Lungs: CTA throughout bilaterally   Abdomen: + BS x 4: mildly hyperactive, soft, NT, ND   Extremities: + chronic trace LE edema; L>R   Gastric Mucositis:      +                                            ndGndrndanddndend:nd nd2nd Intestinal Mucositis:    -                                        Grade: n/a  Skin: hyperpigmentation on back, neck, abdomen likely secondary to Kepivance   TLC: CDI  Neuro: A&O x 3  Pain: denies     Labs:                         7.7    0.14  )-----------( 31       ( 15 Jul 2020 10:24 )             23.9            		    07-15    140  |  101  |  5<L>  ----------------------------<  97  3.6   |  29  |  0.74    Ca    9.2      15 Jul 2020 06:54  Phos  4.1     -15  Mg     2.0     07-15    TPro  6.8  /  Alb  4.2  /  TBili  0.3  /  DBili  <0.1  /  AST  33  /  ALT  24  /  AlkPhos  52  07-15        Cultures:   Culture Results: blood  No growth to date. (20 @ 00:37)    Culture Results: blood  No growth to date. (20 @ 00:37)    Culture Results: urine  No growth (20 @ 00:23)    Culture Results: blood  No growth to date. (20 @ 23:56)    Culture Results: blood  No growth to date. (20 @ 23:56)      Radiology:   EXAM:  XR CHEST PORTABLE URGENT 1V                        PROCEDURE DATE:  2020    IMPRESSION:  1.  The lungs are clear.    Meds:   Antimicrobials:   acyclovir   Oral Tab/Cap 400 milliGRAM(s) Oral every 8 hours  cefepime   IVPB      cefepime   IVPB 2000 milliGRAM(s) IV Intermittent every 8 hours  clotrimazole Lozenge 1 Lozenge Oral five times a day  fluconAZOLE   Tablet 400 milliGRAM(s) Oral daily      Heme / Onc:   alteplase for catheter clearance 2 milliGRAM(s) Catheter once  heparin  Infusion 357 Unit(s)/Hr IV Continuous <Continuous>      GI:  aluminum hydroxide/magnesium hydroxide/simethicone Suspension 30 milliLiter(s) Oral every 4 hours PRN  bismuth subsalicylate Liquid 30 milliLiter(s) Oral every 4 hours PRN  loperamide 2 milliGRAM(s) Oral every 6 hours PRN  pantoprazole    Tablet 40 milliGRAM(s) Oral before breakfast  sodium bicarbonate Mouth Rinse 10 milliLiter(s) Swish and Spit five times a day  sucralfate suspension 1 Gram(s) Oral four times a day  ursodiol Capsule 300 milliGRAM(s) Oral two times a day with meals      Cardiovascular:   furosemide   Injectable 20 milliGRAM(s) IV Push every 24 hours PRN      Immunologic:   filgrastim-sndz (ZARXIO) Injectable 480 MICROGram(s) SubCutaneous every 24 hours      Other medications:   Biotene Dry Mouth Oral Rinse 5 milliLiter(s) Swish and Spit five times a day  chlorhexidine 4% Liquid 1 Application(s) Topical <User Schedule>  folic acid 1 milliGRAM(s) Oral daily  hydrocortisone 1% Cream 1 Application(s) Topical daily  lidocaine/prilocaine Cream 1 Application(s) Topical daily  multivitamin 1 Tablet(s) Oral daily  sodium chloride 0.9%. 1000 milliLiter(s) IV Continuous <Continuous>      PRN:   acetaminophen   Tablet .. 650 milliGRAM(s) Oral every 6 hours PRN  acetaminophen   Tablet .. 650 milliGRAM(s) Oral every 6 hours PRN  aluminum hydroxide/magnesium hydroxide/simethicone Suspension 30 milliLiter(s) Oral every 4 hours PRN  AQUAPHOR (petrolatum Ointment) 1 Application(s) Topical two times a day PRN  bismuth subsalicylate Liquid 30 milliLiter(s) Oral every 4 hours PRN  diphenhydrAMINE 25 milliGRAM(s) Oral once PRN  diphenhydrAMINE   Injectable 25 milliGRAM(s) IV Push every 4 hours PRN  furosemide   Injectable 20 milliGRAM(s) IV Push every 24 hours PRN  loperamide 2 milliGRAM(s) Oral every 6 hours PRN  LORazepam   Injectable 0.5 milliGRAM(s) IV Push daily PRN  metoclopramide Injectable 10 milliGRAM(s) IV Push every 6 hours PRN  ondansetron Injectable 8 milliGRAM(s) IV Push every 8 hours PRN  sodium chloride 0.9% lock flush 10 milliLiter(s) IV Push every 1 hour PRN    A/P:   59 year old male with a history of amyloidosis  Post Autologous PBSCT day + 13  Keep platelets =/> 40K for history of amyloidosis   Has remained afebrile for 24 hours - continue Cefepime through engraftment, cultures negative   Chemotherapy induced grade 1 intestinal mucositis (diarrhea) - c. diff negative - now resolved   Chemotherapy induced grade 1 GI mucositis - now resolved     1. Infectious Disease:   acyclovir   Oral Tab/Cap 400 milliGRAM(s) Oral every 8 hours  cefepime   IVPB      cefepime   IVPB 2000 milliGRAM(s) IV Intermittent every 8 hours  clotrimazole Lozenge 1 Lozenge Oral five times a day  fluconAZOLE   Tablet 400 milliGRAM(s) Oral daily    2. VOD Prophylaxis: Actigall, Glutamine, Heparin (dosed at 100 units / kg / day)     3. GI Prophylaxis:   pantoprazole    Tablet 40 milliGRAM(s) Oral before breakfast  aluminum hydroxide/magnesium hydroxide/simethicone Suspension 30 milliLiter(s) Oral every 4 hours PRN  bismuth subsalicylate Liquid 30 milliLiter(s) Oral every 4 hours PRN  sucralfate suspension 1 Gram(s) Oral four times a day    4. Mouthcare - NS / NaHCO3 rinses, Mycelex, Biotene; Skin care     5. GVHD prophylaxis - n/a     6. Transfuse & replete electrolytes prn   1 U Platelet transfusion for PLT goal>40K    7. IV hydration, daily weights, strict I&O, prn diuresis     8. PO intake as tolerated, nutrition follow up as needed, MVI, folic acid     9. Antiemetics, anti-diarrhea medications:   loperamide 2 milliGRAM(s) Oral every 6 hours PRN  LORazepam   Injectable 0.5 milliGRAM(s) IV Push daily PRN  metoclopramide Injectable 10 milliGRAM(s) IV Push every 6 hours PRN  ondansetron Injectable 8 milliGRAM(s) IV Push every 8 hours PRN    10. OOB as tolerated, physical therapy consult if needed     11. Monitor coags / fibrinogen 2x week, vitamin K as needed     12. Monitor closely for clinical changes, monitor for fevers     13. Emotional support provided, plan of care discussed and questions addressed     14. Patient education done regarding plan of care, restrictions and discharge planning     15. Continue regular social work input     I have written the above note for Dr. Roman who performed service with me in the room.   Brinda Garrett NP-C ([phone])    I have seen and examined patient with NP, I agree with above note as scribed. [facility]                                                      Critical / Counseling Time Provided: 30 minutes                                                                                                                                                        Chief Complaint: Autologous pbsct with high dose melphalan prep regimen for treatment of amyloidosis    S: Patient seen and examined with [facility] Transplant Team:   +fatigue   patent has no complaints today    Denies: mouth/throat pain, cough, SOB, abdominal pain, nausea, vomiting, abdominal pain, dizziness     O: Vitals:   Vital Signs Last 24 Hrs  T(C): 37.2 (15 Jul 2020 06:27), Max: 37.3 (2020 17:07)  T(F): 99 (15 Jul 2020 06:27), Max: 99.2 (2020 22:15)  HR: 76 (15 Jul 2020 06:27) (71 - 79)  BP: 110/64 (15 Jul 2020 06:27) (103/70 - 115/70)  BP(mean): --  RR: 18 (15 Jul 2020 06:27) (18 - 18)  SpO2: 98% (2020 22:15) (98% - 99%)    Admit weight: 85kg  Daily Weight in k.1 (2020 08:45)  Today's weight:81.9 kg     Intake / Output:   14 @ 07:01  -  07-15 @ 07:00  --------------------------------------------------------  IN: 2336 mL / OUT: 2725 mL / NET: -389 mL      PE:   HEENT: Oropharynx: no erythema or ulcerations  Oral Mucositis:               -                                         Grade: n/a  CVS: S1, S2 RRR   Lungs: CTA throughout bilaterally   Abdomen: + BS x 4: mildly hyperactive, soft, NT, ND   Extremities: + chronic trace LE edema; L>R   Gastric Mucositis:      +                                            ndGndrndanddndend:nd nd2nd Intestinal Mucositis:    -                                        Grade: n/a  Skin: hyperpigmentation on back, neck, abdomen likely secondary to Kepivance   TLC: CDI  Neuro: A&O x 3  Pain: denies     Labs:                         7.7    0.14  )-----------( 31       ( 15 Jul 2020 10:24 )             23.9            		    07-15    140  |  101  |  5<L>  ----------------------------<  97  3.6   |  29  |  0.74    Ca    9.2      15 Jul 2020 06:54  Phos  4.1     07-15  Mg     2.0     07-15    TPro  6.8  /  Alb  4.2  /  TBili  0.3  /  DBili  <0.1  /  AST  33  /  ALT  24  /  AlkPhos  52  07-15        Cultures:   Culture Results: blood  No growth to date. (20 @ 00:37)    Culture Results: blood  No growth to date. (20 @ 00:37)    Culture Results: urine  No growth (20 @ 00:23)    Culture Results: blood  No growth to date. (20 @ 23:56)    Culture Results: blood  No growth to date. (20 @ 23:56)      Radiology:   EXAM:  XR CHEST PORTABLE URGENT 1V                        PROCEDURE DATE:  2020    IMPRESSION:  1.  The lungs are clear.    Meds:   Antimicrobials:   acyclovir   Oral Tab/Cap 400 milliGRAM(s) Oral every 8 hours  cefepime   IVPB      cefepime   IVPB 2000 milliGRAM(s) IV Intermittent every 8 hours  clotrimazole Lozenge 1 Lozenge Oral five times a day  fluconAZOLE   Tablet 400 milliGRAM(s) Oral daily      Heme / Onc:   alteplase for catheter clearance 2 milliGRAM(s) Catheter once  heparin  Infusion 357 Unit(s)/Hr IV Continuous <Continuous>      GI:  aluminum hydroxide/magnesium hydroxide/simethicone Suspension 30 milliLiter(s) Oral every 4 hours PRN  bismuth subsalicylate Liquid 30 milliLiter(s) Oral every 4 hours PRN  loperamide 2 milliGRAM(s) Oral every 6 hours PRN  pantoprazole    Tablet 40 milliGRAM(s) Oral before breakfast  sodium bicarbonate Mouth Rinse 10 milliLiter(s) Swish and Spit five times a day  sucralfate suspension 1 Gram(s) Oral four times a day  ursodiol Capsule 300 milliGRAM(s) Oral two times a day with meals      Cardiovascular:   furosemide   Injectable 20 milliGRAM(s) IV Push every 24 hours PRN      Immunologic:   filgrastim-sndz (ZARXIO) Injectable 480 MICROGram(s) SubCutaneous every 24 hours  7/15: dose increased to 600micogram       Other medications:   Biotene Dry Mouth Oral Rinse 5 milliLiter(s) Swish and Spit five times a day  chlorhexidine 4% Liquid 1 Application(s) Topical <User Schedule>  folic acid 1 milliGRAM(s) Oral daily  hydrocortisone 1% Cream 1 Application(s) Topical daily  lidocaine/prilocaine Cream 1 Application(s) Topical daily  multivitamin 1 Tablet(s) Oral daily  sodium chloride 0.9%. 1000 milliLiter(s) IV Continuous <Continuous>      PRN:   acetaminophen   Tablet .. 650 milliGRAM(s) Oral every 6 hours PRN  acetaminophen   Tablet .. 650 milliGRAM(s) Oral every 6 hours PRN  aluminum hydroxide/magnesium hydroxide/simethicone Suspension 30 milliLiter(s) Oral every 4 hours PRN  AQUAPHOR (petrolatum Ointment) 1 Application(s) Topical two times a day PRN  bismuth subsalicylate Liquid 30 milliLiter(s) Oral every 4 hours PRN  diphenhydrAMINE 25 milliGRAM(s) Oral once PRN  diphenhydrAMINE   Injectable 25 milliGRAM(s) IV Push every 4 hours PRN  furosemide   Injectable 20 milliGRAM(s) IV Push every 24 hours PRN  loperamide 2 milliGRAM(s) Oral every 6 hours PRN  LORazepam   Injectable 0.5 milliGRAM(s) IV Push daily PRN  metoclopramide Injectable 10 milliGRAM(s) IV Push every 6 hours PRN  ondansetron Injectable 8 milliGRAM(s) IV Push every 8 hours PRN  sodium chloride 0.9% lock flush 10 milliLiter(s) IV Push every 1 hour PRN    A/P:   59 year old male with a history of amyloidosis  Post Autologous PBSCT day + 13  Keep platelets =/> 40K for history of amyloidosis   Has remained afebrile for 24 hours - continue Cefepime through engraftment, cultures negative   Chemotherapy induced grade 1 intestinal mucositis (diarrhea) - c. diff negative - now resolved   Chemotherapy induced grade 1 GI mucositis - now resolved   7/15: continue Zarxio daily until post engraftment dose increased to 600 micrograms     1. Infectious Disease:   acyclovir   Oral Tab/Cap 400 milliGRAM(s) Oral every 8 hours  cefepime   IVPB      cefepime   IVPB 2000 milliGRAM(s) IV Intermittent every 8 hours  clotrimazole Lozenge 1 Lozenge Oral five times a day  fluconAZOLE   Tablet 400 milliGRAM(s) Oral daily    2. VOD Prophylaxis: Actigall, Glutamine, Heparin (dosed at 100 units / kg / day)     3. GI Prophylaxis:   pantoprazole    Tablet 40 milliGRAM(s) Oral before breakfast  aluminum hydroxide/magnesium hydroxide/simethicone Suspension 30 milliLiter(s) Oral every 4 hours PRN  bismuth subsalicylate Liquid 30 milliLiter(s) Oral every 4 hours PRN  sucralfate suspension 1 Gram(s) Oral four times a day    4. Mouthcare - NS / NaHCO3 rinses, Mycelex, Biotene; Skin care     5. GVHD prophylaxis - n/a     6. Transfuse & replete electrolytes prn   1 U Platelet transfusion for PLT goal>40K    7. IV hydration, daily weights, strict I&O, prn diuresis     8. PO intake as tolerated, nutrition follow up as needed, MVI, folic acid     9. Antiemetics, anti-diarrhea medications:   loperamide 2 milliGRAM(s) Oral every 6 hours PRN  LORazepam   Injectable 0.5 milliGRAM(s) IV Push daily PRN  metoclopramide Injectable 10 milliGRAM(s) IV Push every 6 hours PRN  ondansetron Injectable 8 milliGRAM(s) IV Push every 8 hours PRN    10. OOB as tolerated, physical therapy consult if needed     11. Monitor coags / fibrinogen 2x week, vitamin K as needed     12. Monitor closely for clinical changes, monitor for fevers     13. Emotional support provided, plan of care discussed and questions addressed     14. Patient education done regarding plan of care, restrictions and discharge planning     15. Continue regular social work input     I have written the above note for Dr. Roman who performed service with me in the room.   Brinda Garrett NP-C ([phone])    I have seen and examined patient with NP, I agree with above note as scribed. [facility] Team                                                      Critical / Counseling Time Provided: 30 minutes                                                                                                                                                        Chief Complaint: Autologous pbsct with high dose melphalan prep regimen for treatment of amyloidosis    S: Patient seen and examined with [facility] Transplant Team:   +fatigue   patent has no complaints today    Denies: mouth/throat pain, cough, SOB, abdominal pain, nausea, vomiting, abdominal pain, dizziness       O: Vitals:   Vital Signs Last 24 Hrs  T(C): 37.2 (15 Jul 2020 06:27), Max: 37.3 (2020 17:07)  T(F): 99 (15 Jul 2020 06:27), Max: 99.2 (2020 22:15)  HR: 76 (15 Jul 2020 06:27) (71 - 79)  BP: 110/64 (15 Jul 2020 06:27) (103/70 - 115/70)  BP(mean): --  RR: 18 (15 Jul 2020 06:27) (18 - 18)  SpO2: 98% (2020 22:15) (98% - 99%)    Admit weight: 85kg  Daily Weight in k.1 (2020 08:45)  Today's weight:81.9 kg     Intake / Output:   14 @ 07:01  -  07-15 @ 07:00  --------------------------------------------------------  IN: 2336 mL / OUT: 2725 mL / NET: -389 mL      PE:   HEENT: Oropharynx: no erythema or ulcerations  Oral Mucositis:               -                                         Grade: n/a  CVS: S1, S2 RRR   Lungs: CTA throughout bilaterally   Abdomen: + BS x 4: mildly hyperactive, soft, NT, ND   Extremities: + chronic trace LE edema; L>R   Gastric Mucositis:      +                                            ndGndrndanddndend:nd nd2nd Intestinal Mucositis:    -                                        Grade: n/a  Skin: hyperpigmentation on back, neck, abdomen likely secondary to Kepivance   TLC: CDI  Neuro: A&O x 3  Pain: denies     Labs:                         7.7    0.14  )-----------( 31       ( 15 Jul 2020 10:24 )             23.9            		    07-15    140  |  101  |  5<L>  ----------------------------<  97  3.6   |  29  |  0.74    Ca    9.2      15 Jul 2020 06:54  Phos  4.1     07-15  Mg     2.0     07-15    TPro  6.8  /  Alb  4.2  /  TBili  0.3  /  DBili  <0.1  /  AST  33  /  ALT  24  /  AlkPhos  52  07-15        Cultures:   Culture Results: blood  No growth to date. (20 @ 00:37)    Culture Results: blood  No growth to date. (20 @ 00:37)    Culture Results: urine  No growth (20 @ 00:23)    Culture Results: blood  No growth to date. (20 @ 23:56)    Culture Results: blood  No growth to date. (20 @ 23:56)      Radiology:   EXAM:  XR CHEST PORTABLE URGENT 1V                        PROCEDURE DATE:  2020    IMPRESSION:  1.  The lungs are clear.    Meds:   Antimicrobials:   acyclovir   Oral Tab/Cap 400 milliGRAM(s) Oral every 8 hours  cefepime   IVPB      cefepime   IVPB 2000 milliGRAM(s) IV Intermittent every 8 hours  clotrimazole Lozenge 1 Lozenge Oral five times a day  fluconAZOLE   Tablet 400 milliGRAM(s) Oral daily      Heme / Onc:   alteplase for catheter clearance 2 milliGRAM(s) Catheter once  heparin  Infusion 357 Unit(s)/Hr IV Continuous <Continuous>      GI:  aluminum hydroxide/magnesium hydroxide/simethicone Suspension 30 milliLiter(s) Oral every 4 hours PRN  bismuth subsalicylate Liquid 30 milliLiter(s) Oral every 4 hours PRN  loperamide 2 milliGRAM(s) Oral every 6 hours PRN  pantoprazole    Tablet 40 milliGRAM(s) Oral before breakfast  sodium bicarbonate Mouth Rinse 10 milliLiter(s) Swish and Spit five times a day  sucralfate suspension 1 Gram(s) Oral four times a day  ursodiol Capsule 300 milliGRAM(s) Oral two times a day with meals      Cardiovascular:   furosemide   Injectable 20 milliGRAM(s) IV Push every 24 hours PRN      Immunologic:   filgrastim-sndz (ZARXIO) Injectable 480 MICROGram(s) SubCutaneous every 24 hours  7/15: dose increased to 600micogram       Other medications:   Biotene Dry Mouth Oral Rinse 5 milliLiter(s) Swish and Spit five times a day  chlorhexidine 4% Liquid 1 Application(s) Topical <User Schedule>  folic acid 1 milliGRAM(s) Oral daily  hydrocortisone 1% Cream 1 Application(s) Topical daily  lidocaine/prilocaine Cream 1 Application(s) Topical daily  multivitamin 1 Tablet(s) Oral daily  sodium chloride 0.9%. 1000 milliLiter(s) IV Continuous <Continuous>      PRN:   acetaminophen   Tablet .. 650 milliGRAM(s) Oral every 6 hours PRN  acetaminophen   Tablet .. 650 milliGRAM(s) Oral every 6 hours PRN  aluminum hydroxide/magnesium hydroxide/simethicone Suspension 30 milliLiter(s) Oral every 4 hours PRN  AQUAPHOR (petrolatum Ointment) 1 Application(s) Topical two times a day PRN  bismuth subsalicylate Liquid 30 milliLiter(s) Oral every 4 hours PRN  diphenhydrAMINE 25 milliGRAM(s) Oral once PRN  diphenhydrAMINE   Injectable 25 milliGRAM(s) IV Push every 4 hours PRN  furosemide   Injectable 20 milliGRAM(s) IV Push every 24 hours PRN  loperamide 2 milliGRAM(s) Oral every 6 hours PRN  LORazepam   Injectable 0.5 milliGRAM(s) IV Push daily PRN  metoclopramide Injectable 10 milliGRAM(s) IV Push every 6 hours PRN  ondansetron Injectable 8 milliGRAM(s) IV Push every 8 hours PRN  sodium chloride 0.9% lock flush 10 milliLiter(s) IV Push every 1 hour PRN    A/P:   59 year old male with a history of amyloidosis  Post Autologous PBSCT day + 13  Keep platelets =/> 40K for history of amyloidosis   Has remained afebrile for 24 hours - continue Cefepime through engraftment, cultures negative   Chemotherapy induced grade 1 intestinal mucositis (diarrhea) - c. diff negative - now resolved   Chemotherapy induced grade 1 GI mucositis - now resolved   7/15: continue Zarxio daily until post engraftment dose increased to 600 micrograms     1. Infectious Disease:   acyclovir   Oral Tab/Cap 400 milliGRAM(s) Oral every 8 hours  cefepime   IVPB      cefepime   IVPB 2000 milliGRAM(s) IV Intermittent every 8 hours  clotrimazole Lozenge 1 Lozenge Oral five times a day  fluconAZOLE   Tablet 400 milliGRAM(s) Oral daily    2. VOD Prophylaxis: Actigall, Glutamine, Heparin (dosed at 100 units / kg / day)     3. GI Prophylaxis:   pantoprazole    Tablet 40 milliGRAM(s) Oral before breakfast  aluminum hydroxide/magnesium hydroxide/simethicone Suspension 30 milliLiter(s) Oral every 4 hours PRN  bismuth subsalicylate Liquid 30 milliLiter(s) Oral every 4 hours PRN  sucralfate suspension 1 Gram(s) Oral four times a day    4. Mouthcare - NS / NaHCO3 rinses, Mycelex, Biotene; Skin care     5. GVHD prophylaxis - n/a     6. Transfuse & replete electrolytes prn   1 U Platelet transfusion for PLT goal>40K    7. IV hydration, daily weights, strict I&O, prn diuresis     8. PO intake as tolerated, nutrition follow up as needed, MVI, folic acid     9. Antiemetics, anti-diarrhea medications:   loperamide 2 milliGRAM(s) Oral every 6 hours PRN  LORazepam   Injectable 0.5 milliGRAM(s) IV Push daily PRN  metoclopramide Injectable 10 milliGRAM(s) IV Push every 6 hours PRN  ondansetron Injectable 8 milliGRAM(s) IV Push every 8 hours PRN    10. OOB as tolerated, physical therapy consult if needed     11. Monitor coags / fibrinogen 2x week, vitamin K as needed     12. Monitor closely for clinical changes, monitor for fevers     13. Emotional support provided, plan of care discussed and questions addressed     14. Patient education done regarding plan of care, restrictions and discharge planning     15. Continue regular social work input     I have written the above note for Dr. Roman who performed service with me in the room.   Brinda Garrett NP-C ([phone])    I have seen and examined patient with NP, I agree with above note as scribed. [facility] Team                                                      Critical / Counseling Time Provided: 30 minutes                                                                                                                                                        Chief Complaint: Autologous pbsct with high dose melphalan prep regimen for treatment of amyloidosis    S: Patient seen and examined with [facility] Transplant Team:   +fatigue   patent has no complaints today    Denies: mouth/throat pain, cough, SOB, abdominal pain, nausea, vomiting, abdominal pain, dizziness       O: Vitals:   Vital Signs Last 24 Hrs  T(C): 37.2 (15 Jul 2020 06:27), Max: 37.3 (2020 17:07)  T(F): 99 (15 Jul 2020 06:27), Max: 99.2 (2020 22:15)  HR: 76 (15 Jul 2020 06:27) (71 - 79)  BP: 110/64 (15 Jul 2020 06:27) (103/70 - 115/70)  BP(mean): --  RR: 18 (15 Jul 2020 06:27) (18 - 18)  SpO2: 98% (2020 22:15) (98% - 99%)    Admit weight: 85kg  Daily Weight in k.1 (2020 08:45)  Today's weight:81.9 kg     Intake / Output:   14 @ 07:01  -  07-15 @ 07:00  --------------------------------------------------------  IN: 2336 mL / OUT: 2725 mL / NET: -389 mL      PE:   HEENT: Oropharynx: no erythema or ulcerations  Oral Mucositis:               -                                         Grade: n/a  CVS: S1, S2 RRR   Lungs: CTA throughout bilaterally   Abdomen: + BS x 4: mildly hyperactive, soft, NT, ND   Extremities: + chronic trace LE edema; L>R   Gastric Mucositis:      +                                            ndGndrndanddndend:nd nd2nd Intestinal Mucositis:    -                                        Grade: n/a  Skin: hyperpigmentation on back, neck, abdomen likely secondary to Kepivance   TLC: CDI  Neuro: A&O x 3  Pain: denies     Labs:                         7.7    0.14  )-----------( 31       ( 15 Jul 2020 10:24 )             23.9            		    07-15    140  |  101  |  5<L>  ----------------------------<  97  3.6   |  29  |  0.74    Ca    9.2      15 Jul 2020 06:54  Phos  4.1     07-15  Mg     2.0     07-15    TPro  6.8  /  Alb  4.2  /  TBili  0.3  /  DBili  <0.1  /  AST  33  /  ALT  24  /  AlkPhos  52  07-15        Cultures:   Culture Results: blood  No growth to date. (20 @ 00:37)    Culture Results: blood  No growth to date. (20 @ 00:37)    Culture Results: urine  No growth (20 @ 00:23)    Culture Results: blood  No growth to date. (20 @ 23:56)    Culture Results: blood  No growth to date. (20 @ 23:56)      Radiology:   EXAM:  XR CHEST PORTABLE URGENT 1V                        PROCEDURE DATE:  2020    IMPRESSION:  1.  The lungs are clear.    Meds:   Antimicrobials:   acyclovir   Oral Tab/Cap 400 milliGRAM(s) Oral every 8 hours  cefepime   IVPB      cefepime   IVPB 2000 milliGRAM(s) IV Intermittent every 8 hours  clotrimazole Lozenge 1 Lozenge Oral five times a day  fluconAZOLE   Tablet 400 milliGRAM(s) Oral daily      Heme / Onc:   alteplase for catheter clearance 2 milliGRAM(s) Catheter once  heparin  Infusion 357 Unit(s)/Hr IV Continuous <Continuous>      GI:  aluminum hydroxide/magnesium hydroxide/simethicone Suspension 30 milliLiter(s) Oral every 4 hours PRN  bismuth subsalicylate Liquid 30 milliLiter(s) Oral every 4 hours PRN  loperamide 2 milliGRAM(s) Oral every 6 hours PRN  pantoprazole    Tablet 40 milliGRAM(s) Oral before breakfast  sodium bicarbonate Mouth Rinse 10 milliLiter(s) Swish and Spit five times a day  sucralfate suspension 1 Gram(s) Oral four times a day  ursodiol Capsule 300 milliGRAM(s) Oral two times a day with meals      Cardiovascular:   furosemide   Injectable 20 milliGRAM(s) IV Push every 24 hours PRN      Immunologic:   filgrastim-sndz (ZARXIO) Injectable 480 MICROGram(s) SubCutaneous every 24 hours  7/15: dose increased to 600micogram       Other medications:   Biotene Dry Mouth Oral Rinse 5 milliLiter(s) Swish and Spit five times a day  chlorhexidine 4% Liquid 1 Application(s) Topical <User Schedule>  folic acid 1 milliGRAM(s) Oral daily  hydrocortisone 1% Cream 1 Application(s) Topical daily  lidocaine/prilocaine Cream 1 Application(s) Topical daily  multivitamin 1 Tablet(s) Oral daily  sodium chloride 0.9%. 1000 milliLiter(s) IV Continuous <Continuous>      PRN:   acetaminophen   Tablet .. 650 milliGRAM(s) Oral every 6 hours PRN  acetaminophen   Tablet .. 650 milliGRAM(s) Oral every 6 hours PRN  aluminum hydroxide/magnesium hydroxide/simethicone Suspension 30 milliLiter(s) Oral every 4 hours PRN  AQUAPHOR (petrolatum Ointment) 1 Application(s) Topical two times a day PRN  bismuth subsalicylate Liquid 30 milliLiter(s) Oral every 4 hours PRN  diphenhydrAMINE 25 milliGRAM(s) Oral once PRN  diphenhydrAMINE   Injectable 25 milliGRAM(s) IV Push every 4 hours PRN  furosemide   Injectable 20 milliGRAM(s) IV Push every 24 hours PRN  loperamide 2 milliGRAM(s) Oral every 6 hours PRN  LORazepam   Injectable 0.5 milliGRAM(s) IV Push daily PRN  metoclopramide Injectable 10 milliGRAM(s) IV Push every 6 hours PRN  ondansetron Injectable 8 milliGRAM(s) IV Push every 8 hours PRN  sodium chloride 0.9% lock flush 10 milliLiter(s) IV Push every 1 hour PRN    A/P:   59 year old male with a history of amyloidosis  Post Autologous PBSCT day + 13  Keep platelets =/> 40K for history of amyloidosis   Has remained afebrile for  over 24 hours - continue Cefepime through engraftment, cultures negative   Chemotherapy induced grade 1 intestinal mucositis (diarrhea) - c. diff negative - now resolved   Chemotherapy induced grade 1 GI mucositis - now resolved   7/15: continue Zarxio daily until post engraftment dose increased to 600 micrograms     1. Infectious Disease:   acyclovir   Oral Tab/Cap 400 milliGRAM(s) Oral every 8 hours  cefepime   IVPB      cefepime   IVPB 2000 milliGRAM(s) IV Intermittent every 8 hours  clotrimazole Lozenge 1 Lozenge Oral five times a day  fluconAZOLE   Tablet 400 milliGRAM(s) Oral daily    2. VOD Prophylaxis: Actigall, Glutamine, Heparin (dosed at 100 units / kg / day)     3. GI Prophylaxis:   pantoprazole    Tablet 40 milliGRAM(s) Oral before breakfast  aluminum hydroxide/magnesium hydroxide/simethicone Suspension 30 milliLiter(s) Oral every 4 hours PRN  bismuth subsalicylate Liquid 30 milliLiter(s) Oral every 4 hours PRN  sucralfate suspension 1 Gram(s) Oral four times a day    4. Mouthcare - NS / NaHCO3 rinses, Mycelex, Biotene; Skin care     5. GVHD prophylaxis - n/a     6. Transfuse & replete electrolytes prn   1 U Platelet transfusion for PLT goal>40K    7. IV hydration, daily weights, strict I&O, prn diuresis     8. PO intake as tolerated, nutrition follow up as needed, MVI, folic acid     9. Antiemetics, anti-diarrhea medications:   loperamide 2 milliGRAM(s) Oral every 6 hours PRN  LORazepam   Injectable 0.5 milliGRAM(s) IV Push daily PRN  metoclopramide Injectable 10 milliGRAM(s) IV Push every 6 hours PRN  ondansetron Injectable 8 milliGRAM(s) IV Push every 8 hours PRN    10. OOB as tolerated, physical therapy consult if needed     11. Monitor coags / fibrinogen 2x week, vitamin K as needed     12. Monitor closely for clinical changes, monitor for fevers     13. Emotional support provided, plan of care discussed and questions addressed     14. Patient education done regarding plan of care, restrictions and discharge planning     15. Continue regular social work input     I have written the above note for Dr. Roman who performed service with me in the room.   Brinda Garrett NP-C ([phone])    I have seen and examined patient with NP, I agree with above note as scribed.

## 2020-07-15 NOTE — PROGRESS NOTE ADULT - ATTENDING COMMENTS
59 year old male with history of amyloidosis treated with CyBorD, now admitted for autologous pbsct with Melphalan preparative regimen   s/p [facility] transplant on 7/2/20, now day + 13    -Continue Zarxio daily until engraftment with WBC recovery  -IV hydration, strict I&O, daily weight, Lasix to keep O > I  -VOD prophylaxis- Actigall, low dose heparin gtt & glutamine supplementation  -ID- Neutropenic fevers- on Cefepime; continue Fluconazole / Acyclovir prophylaxis. Has been afebrile x48 hrs;  hold on  CT scans, will reassess tomorrow if he has recurrent fevers.  Last COVID PCR on 7/8 was negative  -GI mucositis secondary to antineoplastic chemotherapy- continue PPI, add Carafate; Imodium for diarrhea. Improved, as per pt  -Antiemetics  -Supplement lytes prn  - transfuse if plts less than 40.......tx rbc as needed  -Mouth care, skin care  -OOB/ambulate 59 year old male with history of amyloidosis treated with CyBorD, now admitted for autologous pbsct with Melphalan preparative regimen   s/p [facility] transplant on 7/2/20, now day + 13..slow movement upward    -Continue Zarxio daily until engraftment with WBC recovery..increase dose to 600  -IV hydration, strict I&O, daily weight, Lasix to keep O > I  -VOD prophylaxis- Actigall, low dose heparin gtt & glutamine supplementation  -ID- Neutropenic fevers- on Cefepime; continue Fluconazole / Acyclovir prophylaxis. Has been afebrile x48 hrs;  hold on  CT scans, will reassess tomorrow if he has recurrent fevers.  Last COVID PCR on 7/8 was negative  -GI mucositis secondary to antineoplastic chemotherapy- continue PPI, add Carafate; Imodium for diarrhea. Improved, as per pt  -Antiemetics  -Supplement lytes prn  - transfuse if plts less than 40.......tx rbc as needed  -Mouth care, skin care  -OOB/ambulate

## 2020-07-16 ENCOUNTER — TRANSCRIPTION ENCOUNTER (OUTPATIENT)
Age: 60
End: 2020-07-16

## 2020-07-16 LAB
ALBUMIN SERPL ELPH-MCNC: 3.3 G/DL — SIGNIFICANT CHANGE UP (ref 3.3–5)
ALP SERPL-CCNC: 47 U/L — SIGNIFICANT CHANGE UP (ref 40–120)
ALT FLD-CCNC: 11 U/L — SIGNIFICANT CHANGE UP (ref 10–45)
ANION GAP SERPL CALC-SCNC: 10 MMOL/L — SIGNIFICANT CHANGE UP (ref 5–17)
APTT BLD: 28.4 SEC — SIGNIFICANT CHANGE UP (ref 27.5–35.5)
AST SERPL-CCNC: 15 U/L — SIGNIFICANT CHANGE UP (ref 10–40)
BILIRUB SERPL-MCNC: <0.1 MG/DL — LOW (ref 0.2–1.2)
BLD GP AB SCN SERPL QL: NEGATIVE — SIGNIFICANT CHANGE UP
BUN SERPL-MCNC: 10 MG/DL — SIGNIFICANT CHANGE UP (ref 7–23)
CALCIUM SERPL-MCNC: 8.6 MG/DL — SIGNIFICANT CHANGE UP (ref 8.4–10.5)
CHLORIDE SERPL-SCNC: 106 MMOL/L — SIGNIFICANT CHANGE UP (ref 96–108)
CO2 SERPL-SCNC: 26 MMOL/L — SIGNIFICANT CHANGE UP (ref 22–31)
CREAT SERPL-MCNC: 0.65 MG/DL — SIGNIFICANT CHANGE UP (ref 0.5–1.3)
CULTURE RESULTS: NO GROWTH — SIGNIFICANT CHANGE UP
CULTURE RESULTS: SIGNIFICANT CHANGE UP
CULTURE RESULTS: SIGNIFICANT CHANGE UP
FIBRINOGEN PPP-MCNC: 518 MG/DL — HIGH (ref 350–510)
GLUCOSE SERPL-MCNC: 93 MG/DL — SIGNIFICANT CHANGE UP (ref 70–99)
HCT VFR BLD CALC: 20.2 % — CRITICAL LOW (ref 39–50)
HGB BLD-MCNC: 6.6 G/DL — CRITICAL LOW (ref 13–17)
INR BLD: 1.02 RATIO — SIGNIFICANT CHANGE UP (ref 0.88–1.16)
LDH SERPL L TO P-CCNC: 121 U/L — SIGNIFICANT CHANGE UP (ref 50–242)
MAGNESIUM SERPL-MCNC: 2 MG/DL — SIGNIFICANT CHANGE UP (ref 1.6–2.6)
MCHC RBC-ENTMCNC: 24.5 PG — LOW (ref 27–34)
MCHC RBC-ENTMCNC: 32.7 GM/DL — SIGNIFICANT CHANGE UP (ref 32–36)
MCV RBC AUTO: 75.1 FL — LOW (ref 80–100)
NRBC # BLD: 0 /100 WBCS — SIGNIFICANT CHANGE UP (ref 0–0)
PHOSPHATE SERPL-MCNC: 3.1 MG/DL — SIGNIFICANT CHANGE UP (ref 2.5–4.5)
PLATELET # BLD AUTO: 32 K/UL — LOW (ref 150–400)
POTASSIUM SERPL-MCNC: 3.6 MMOL/L — SIGNIFICANT CHANGE UP (ref 3.5–5.3)
POTASSIUM SERPL-SCNC: 3.6 MMOL/L — SIGNIFICANT CHANGE UP (ref 3.5–5.3)
PROT SERPL-MCNC: 5.3 G/DL — LOW (ref 6–8.3)
PROTHROM AB SERPL-ACNC: 12.1 SEC — SIGNIFICANT CHANGE UP (ref 10.6–13.6)
RBC # BLD: 2.69 M/UL — LOW (ref 4.2–5.8)
RBC # FLD: 14.8 % — HIGH (ref 10.3–14.5)
RH IG SCN BLD-IMP: POSITIVE — SIGNIFICANT CHANGE UP
SARS-COV-2 RNA SPEC QL NAA+PROBE: SIGNIFICANT CHANGE UP
SODIUM SERPL-SCNC: 142 MMOL/L — SIGNIFICANT CHANGE UP (ref 135–145)
SPECIMEN SOURCE: SIGNIFICANT CHANGE UP
WBC # BLD: 0.22 K/UL — CRITICAL LOW (ref 3.8–10.5)
WBC # FLD AUTO: 0.22 K/UL — CRITICAL LOW (ref 3.8–10.5)

## 2020-07-16 PROCEDURE — 99291 CRITICAL CARE FIRST HOUR: CPT

## 2020-07-16 RX ORDER — HYDROCORTISONE 20 MG
50 TABLET ORAL DAILY
Refills: 0 | Status: DISCONTINUED | OUTPATIENT
Start: 2020-07-16 | End: 2020-07-20

## 2020-07-16 RX ORDER — HYDROCORTISONE 20 MG
50 TABLET ORAL DAILY
Refills: 0 | Status: DISCONTINUED | OUTPATIENT
Start: 2020-07-16 | End: 2020-07-16

## 2020-07-16 RX ADMIN — Medication 400 MILLIGRAM(S): at 23:28

## 2020-07-16 RX ADMIN — Medication 1 LOZENGE: at 20:41

## 2020-07-16 RX ADMIN — Medication 1 TABLET(S): at 12:12

## 2020-07-16 RX ADMIN — URSODIOL 300 MILLIGRAM(S): 250 TABLET, FILM COATED ORAL at 17:44

## 2020-07-16 RX ADMIN — Medication 1 MILLIGRAM(S): at 12:12

## 2020-07-16 RX ADMIN — Medication 5 MILLILITER(S): at 16:17

## 2020-07-16 RX ADMIN — HEPARIN SODIUM 3.57 UNIT(S)/HR: 5000 INJECTION INTRAVENOUS; SUBCUTANEOUS at 22:15

## 2020-07-16 RX ADMIN — FLUCONAZOLE 400 MILLIGRAM(S): 150 TABLET ORAL at 12:13

## 2020-07-16 RX ADMIN — Medication 5 MILLILITER(S): at 23:31

## 2020-07-16 RX ADMIN — URSODIOL 300 MILLIGRAM(S): 250 TABLET, FILM COATED ORAL at 08:46

## 2020-07-16 RX ADMIN — Medication 400 MILLIGRAM(S): at 05:29

## 2020-07-16 RX ADMIN — Medication 1 LOZENGE: at 12:12

## 2020-07-16 RX ADMIN — Medication 5 MILLILITER(S): at 20:41

## 2020-07-16 RX ADMIN — Medication 1 LOZENGE: at 08:54

## 2020-07-16 RX ADMIN — PANTOPRAZOLE SODIUM 40 MILLIGRAM(S): 20 TABLET, DELAYED RELEASE ORAL at 05:36

## 2020-07-16 RX ADMIN — Medication 1 LOZENGE: at 23:31

## 2020-07-16 RX ADMIN — Medication 600 MICROGRAM(S): at 13:18

## 2020-07-16 RX ADMIN — CHLORHEXIDINE GLUCONATE 1 APPLICATION(S): 213 SOLUTION TOPICAL at 05:30

## 2020-07-16 RX ADMIN — Medication 10 MILLILITER(S): at 08:47

## 2020-07-16 RX ADMIN — Medication 10 MILLILITER(S): at 23:31

## 2020-07-16 RX ADMIN — Medication 10 MILLILITER(S): at 16:18

## 2020-07-16 RX ADMIN — Medication 10 MILLILITER(S): at 20:41

## 2020-07-16 RX ADMIN — Medication 5 MILLILITER(S): at 12:11

## 2020-07-16 RX ADMIN — Medication 50 MILLIGRAM(S): at 11:00

## 2020-07-16 RX ADMIN — Medication 10 MILLILITER(S): at 12:12

## 2020-07-16 RX ADMIN — Medication 5 MILLILITER(S): at 08:47

## 2020-07-16 RX ADMIN — Medication 1 LOZENGE: at 16:18

## 2020-07-16 RX ADMIN — Medication 400 MILLIGRAM(S): at 13:18

## 2020-07-16 NOTE — DISCHARGE NOTE PROVIDER - CARE PROVIDER_API CALL
Lila Roman  MEDICAL ONCOLOGY  [address] [place], [place] [address]  Phone: [phone]  Fax: [phone]  Follow Up Time:

## 2020-07-16 NOTE — DISCHARGE NOTE PROVIDER - INSTRUCTIONS
Diet and activities as per [facility] discharge guidelines and safe food handling guidelines. NO RESTAURANT OR TAKE OUT FOOD AT THIS TIME, ONLY HOME COOKED PREPARED/FROZEN FOODS. You are allowed to have fresh baked pizza right out of the oven. This is the ONLY takeout food at this time. Please have it sliced at home with your own knifes.   Notify your physician if bleeding; swelling; persistent nausea and vomiting; unable to urinate; pain not relieved by medications; fever; numbness, tingling; excessive diarrhea, inability to tolerate liquids or foods; increased irritability or sluggishness, rash  Dr. Roman's phone number at the [facility] is [phone]. Dr. Roman's cell phone number IN CASE OF EMERGENCY is [phone]. Transplant unit phone number if you have questions or concerns is [phone].

## 2020-07-16 NOTE — DISCHARGE NOTE PROVIDER - NSDCFUSCHEDAPPT_GEN_ALL_CORE_FT
MARIA LUISA PHILLIP ; 07/23/2020 ; [facility]  KENJI MARIA LUISA ; 07/23/2020 ; [facility]  KENJI MARIA LUISA ; 07/27/2020 ; [facility]  KENJI MARIA LUISA ; 07/30/2020 ; [facility]  KENJI MARIA LUISA ; 07/30/2020 ; [facility]  KENJI MARIA LUISA ; 08/03/2020 ; [facility]  KENJI MARIA LUISA ; 08/06/2020 ; [facility]  KENJI MARIA LUISA ; 08/06/2020 ; [facility]  KENJI MARIA LUISA ; 08/13/2020 ; [facility]  KENJI MARIA LUISA ; 08/20/2020 ; [facility]

## 2020-07-16 NOTE — DISCHARGE NOTE PROVIDER - HOSPITAL COURSE
59 year old male with a medical history of amyloidosis, hepatitis C, dyspepsia, hematuria, colitis, Raynaud's phenomenon, and cutaneous mastocytosis who was admitted to     for an autologous peripheral blood stem cell transplant with high dose melphalan preparative regimen. He was initially referred by Dr. Joel Wells. He initially experienced joint pain in 2014, and his symptoms were managed with Advil. In 2016, his LFTs were elevated and he was diagnosed with hepatitis C which was treated with Harvoni. He also experienced hematuria and bloody stool. A biopsy of his colon showed chronic colitis with erosions. He was treated with steroids and Mesalamine, and was later started on Humira in mid August 2016. After his first dose of Humira, he experienced left knee swelling, alleviated with Advil. After the second dose of Humira, his ankles swelled, and he was placed on steroids. He also was started on Famotidine for early satiety and dyspepsia. The blood stool subsided. The dyspepsia progressed, and an upper endoscopy was performed by Dr. Parks on 10/30/19. A biopsy of the second portion of the duodenum showed amyloidosis confirmed with congo red stain, IgM immunostain was positive in areas of amyloid deposition, with kappa LC predominance. The stomach was noted to have impaired distensibility on endoscopy. His renal function remained normal. In 02/20 cystoscopy was performed for hematuria and showed prominent blood vessels. The amyloidosis was treated with CyBorD x 7 cycles, completed 2 weeks ago. He has no complaints on admission.         On         Upon admission, a TLC was placed in IR.   received IV hydration, pain management, anxiolysis, antiemetics, nutritional support, and antibacterial / antiviral / antifungal / GI / PCP and VOD (SOS) prophylaxis. When his ANC dropped below 1000, he was started on prophylactic Ciprofloxacin. Labs were monitored on a daily basis, and he received electrolyte repletion and transfusional support as needed.        On      After pre-medication Mr. Saba received 316 mL of:  Autologous, mobilized, plasma reduced, pooled, thawed, washed, HCP apheresis over approximately 1 hr. Cell counts as follows    Total MNC( x10^8/kg)=8.32    CD34+cells ( x10^6 kg)=9.96    Cell Viability (%)=65     Mr. Saba tolerated the infusion well with no adverse resections noted.        While admited, ______ had pancytopenia related to high dose chemotherapy prep regiment. ___ also developed neutropenic fevers. When __ became febrile, blood and urine cultures were sent and CXR completed.  Ciprofloxacin was changed to cefepime for a broadened anti-microbial coverage. Infectious work  up was negative with, negative blood/urine cultures and CXR showing clear lungs.          __ also had a drug -induced rash secondary to Kepivance which resolved with no intervention.         ____admision   was also complicated by  Grade 2 oral mucositis which resolved with supportive care.         __ also developed Methotrexate - induced Amador 2 Transaminitis with hyperbilirubinemia which had resulted with no intervention. Mr. Saba 59 year old male with a medical history of amyloidosis, hepatitis C, dyspepsia, hematuria, colitis, Raynaud's phenomenon, and cutaneous mastocytosis who was admitted to Bone marrow transplant Unit     for an autologous peripheral blood stem cell transplant. He was initially referred by Dr. Joel Wells. He initially experienced joint pain in 2014, and his symptoms were managed with Advil. In 2016, his LFTs were elevated and he was diagnosed with hepatitis C which was treated with Harvoni. He also experienced hematuria and bloody stool. A biopsy of his colon showed chronic colitis with erosions. He was treated with steroids and Mesalamine, and was later started on Humira in mid August 2016. After his first dose of Humira, he experienced left knee swelling, alleviated with Advil. After the second dose of Humira, his ankles swelled, and he was placed on steroids. He also was started on Famotidine for early satiety and dyspepsia. The blood stool subsided. The dyspepsia progressed, and an upper endoscopy was performed by Dr. Parks on 10/30/19. A biopsy of the second portion of the duodenum showed amyloidosis confirmed with congo red stain, IgM immunostain was positive in areas of amyloid deposition, with kappa LC predominance. The stomach was noted to have impaired distensibility on endoscopy. His renal function remained normal. In 02/20 cystoscopy was performed for hematuria and showed prominent blood vessels. The amyloidosis was treated with CyBorD x 7 cycles, completed 2 weeks ago. He has no complaints on admission.         On  for an autologous peripheral blood stem cell transplant with high dose melphalan preparative regimen.         Upon admission, a TLC was placed in IR.   received IV hydration, pain management, anxiolysis, antiemetics, nutritional support, and antibacterial / antiviral / antifungal / GI / PCP and VOD (SOS) prophylaxis. When his ANC dropped below 1000, he was started on prophylactic Ciprofloxacin. Labs were monitored on a daily basis, and he received electrolyte repletion and transfusional support as needed.        On      After pre-medication Mr. Saba received 316 mL of:  Autologous, mobilized, plasma reduced, pooled, thawed, washed, HCP apheresis over approximately 1 hr. Cell counts as follows    Total MNC( x10^8/kg)=8.32    CD34+cells ( x10^6 kg)=9.96    Cell Viability (%)=65     Mr. Saba tolerated the infusion well with no adverse resections noted.        While admited, ______ had pancytopenia related to high dose chemotherapy prep regiment. ___ also developed neutropenic fevers. When __ became febrile, blood and urine cultures were sent and CXR completed.  Ciprofloxacin was changed to cefepime for a broadened anti-microbial coverage. Infectious work  up was negative with, negative blood/urine cultures and CXR showing clear lungs.          __ also had a drug -induced rash secondary to Kepivance which resolved with no intervention.         ____admision   was also complicated by  Grade 2 oral mucositis which resolved with supportive care.         __ also developed Methotrexate - induced Amador 2 Transaminitis with hyperbilirubinemia which had resulted with no intervention. Mr. Saba is a 59 year old male with a medical history of amyloidosis, hepatitis C, dyspepsia, hematuria, colitis, Raynaud's phenomenon, and cutaneous mastocytosis who was admitted to [facility] Trans[facility] Unit    for an autologous peripheral blood stem cell transplant. He was initially referred by Dr. Joel Wells. He initially experienced joint pain in 2014, and his symptoms were managed with Advil. In 2016, his LFTs were elevated and he was diagnosed with hepatitis C which he was treated with Harvoni. He also experienced hematuria and bloody stool. A biopsy of his colon showed chronic colitis with erosions. He was treated with steroids and Mesalamine, and was later started on Humira in mid August 2016. After his first dose of Humira, he experienced left knee swelling, alleviated with Advil. After the second dose of Humira, his ankles swelled, and he was placed on steroids. He also was started on Famotidine for early satiety and dyspepsia. The blood stool subsided. The dyspepsia progressed, and an upper endoscopy was performed by Dr. Parks on 10/30/19. A biopsy of the second portion of the duodenum showed amyloidosis confirmed with congo red stain, IgM immunostain was positive in areas of amyloid deposition, with kappa LC predominance. The stomach was noted to have impaired distensibility on endoscopy. His renal function remained normal. In 02/20 cystoscopy was performed for hematuria and showed prominent blood vessels. The amyloidosis was treated with CyBorD x 7 cycles.       On  for an autologous peripheral blood stem cell transplant with high dose melphalan preparative regimen.         On 06/29/20 Mr. Saba was admitted to [facility] for an  for an autologous peripheral blood stem cell transplant for Amyloidosis. He had no complaint at the time of his admission.        Upon admission, a TLC was placed in IR. Mr. Saba  received IV hydration, pain management, anxiolysis, antiemetics, nutritional support, and antibacterial / antiviral / antifungal / GI / PCP and VOD (SOS) prophylaxis. When his ANC dropped below 1000, he was started on prophylactic Ciprofloxacin. Labs were monitored on a daily basis, and he received electrolyte repletion and transfusional support as needed.        On 07/02/20, after pre-medication Mr. Saba received 316 mL of:  Autologous, mobilized, plasma reduced, pooled, thawed, washed, HCP apheresis over approximately 1 hr. Cell counts as follows    Total MNC( x10^8/kg)=8.32    CD34+cells ( x10^6 kg)=9.96    Cell Viability (%)=65     Mr. Saba tolerated the infusion well with no adverse resections noted.        While admited, he had pancytopenia related to high dose chemotherapy prep regiment. He also developed neutropenic fevers. When he became febrile, blood and urine cultures were sent and CXR completed. Ciprofloxacin was changed to cefepime for a broadened anti-microbial coverage. Infectious work  up was negative with, negative blood/urine cultures and CXR showing clear lungs.          He also had a drug -induced rash secondary to Kepivance which resolved with no intervention.         His hospital stay was also complicated by chemotherapy induced grade 1 intestinal mucositis with diarrhea, c. diff  sample was send off and resulted negative, his symptoms resolved with Imodium. Additionally, he experienced chemotherapy induced grade 1 GI mucositis with nausea  he was treated with anti-emetics as well as prn carafate and maalox, his symptoms then resolved.         Early engraftment was noticed on 7/15, Zarxio was increased from 480 micrograms to 600 micrograms SQ daily. Cefepime was continued till post engraftment. On______, given count recovery no fevers an negative infectious work up, negative blood/urine cultures and CXR with clear lungs, Cefepime and Zarxio was discontinued. Mr. Saba is a 59 year old male with a medical history of amyloidosis, hepatitis C, dyspepsia, hematuria, colitis, Raynaud's phenomenon, and cutaneous mastocytosis who was admitted to [facility] Trans[facility] Unit    for an autologous peripheral blood stem cell transplant. He was initially referred by Dr. Joel Wells. He initially experienced joint pain in 2014, and his symptoms were managed with Advil. In 2016, his LFTs were elevated and he was diagnosed with hepatitis C which he was treated with Harvoni. He also experienced hematuria and bloody stool. A biopsy of his colon showed chronic colitis with erosions. He was treated with steroids and Mesalamine, and was later started on Humira in mid August 2016. After his first dose of Humira, he experienced left knee swelling, alleviated with Advil. After the second dose of Humira, his ankles swelled, and he was placed on steroids. He also was started on Famotidine for early satiety and dyspepsia. The blood stool subsided. The dyspepsia progressed, and an upper endoscopy was performed by Dr. Parks on 10/30/19. A biopsy of the second portion of the duodenum showed amyloidosis confirmed with congo red stain, IgM immunostain was positive in areas of amyloid deposition, with kappa LC predominance. The stomach was noted to have impaired distensibility on endoscopy. His renal function remained normal. In 02/20 cystoscopy was performed for hematuria and showed prominent blood vessels. The amyloidosis was treated with CyBorD x 7 cycles.       On  for an autologous peripheral blood stem cell transplant with high dose melphalan preparative regimen.         On 06/29/20 Mr. Saba was admitted to [facility] for an  for an autologous peripheral blood stem cell transplant for Amyloidosis. He had no complaint at the time of his admission.        Upon admission, a TLC was placed in IR. Mr. Saba  received IV hydration, pain management, anxiolysis, antiemetics, nutritional support, and antibacterial / antiviral / antifungal / GI / PCP and VOD (SOS) prophylaxis. When his ANC dropped below 1000, he was started on prophylactic Ciprofloxacin. Labs were monitored on a daily basis, and he received electrolyte repletion and transfusional support as needed.        On 07/02/20, after pre-medication Mr. Saba received 316 mL of:  Autologous, mobilized, plasma reduced, pooled, thawed, washed, HCP apheresis over approximately 1 hr. Cell counts as follows    Total MNC( x10^8/kg)=8.32    CD34+cells ( x10^6 kg)=9.96    Cell Viability (%)=65     Mr. Saba tolerated the infusion well with no adverse resections noted.        While admited, he had pancytopenia related to high dose chemotherapy prep regiment. He also developed neutropenic fevers. When he became febrile, blood and urine cultures were sent and CXR completed. Ciprofloxacin was changed to cefepime for a broadened anti-microbial coverage. Infectious work  up was negative with, negative blood/urine cultures and CXR showing clear lungs.          He also had a drug -induced rash secondary to Kepivance which resolved with no intervention.         His hospital stay was also complicated by chemotherapy induced grade 1 intestinal mucositis with diarrhea, c. diff  sample was send off and resulted negative, his symptoms resolved with Imodium. Additionally, he experienced chemotherapy induced grade 1 GI mucositis with nausea  he was treated with anti-emetics as well as prn carafate and maalox, his symptoms then resolved.         Early engraftment was noticed on 7/15, Zarxio was increased from 480 micrograms to 600 micrograms SQ daily. Cefepime was continued till post engraftment. On______, given count recovery no fevers an negative infectious work up, negative blood/urine cultures and CXR with clear lungs, Cefepime and Zarxio was discontinued.         Nasopharyngeal  Surveillance COVID swabs were preformed weekly and all resulted negative, with the most recent collected on ______.        Currently, Mr. Saba is ready and stable for discharge with a follow up appointments at [facility]. Mr. Saba is a 59 year old male with a medical history of amyloidosis, hepatitis C, dyspepsia, hematuria, colitis, Raynaud's phenomenon, and cutaneous mastocytosis who was admitted to Bone Marrow Transplant Unit    for an autologous peripheral blood stem cell transplant. He was initially referred by Dr. Joel Wells. He initially experienced joint pain in 2014, and his symptoms were managed with Advil. In 2016, his LFTs were elevated and he was diagnosed with hepatitis C which he was treated with Harvoni. He also experienced hematuria and bloody stool. A biopsy of his colon showed chronic colitis with erosions. He was treated with steroids and Mesalamine, and was later started on Humira in mid August 2016. After his first dose of Humira, he experienced left knee swelling, alleviated with Advil. After the second dose of Humira, his ankles swelled, and he was placed on steroids. He also was started on Famotidine for early satiety and dyspepsia. The blood stool subsided. The dyspepsia progressed, and an upper endoscopy was performed by Dr. Parks on 10/30/19. A biopsy of the second portion of the duodenum showed amyloidosis confirmed with congo red stain, IgM immunostain was positive in areas of amyloid deposition, with kappa LC predominance. The stomach was noted to have impaired distensibility on endoscopy. His renal function remained normal. In 02/20 cystoscopy was performed for hematuria and showed prominent blood vessels. The amyloidosis was treated with CyBorD x 7 cycles.       On  for an autologous peripheral blood stem cell transplant with high dose melphalan preparative regimen.         On 06/29/20 Mr. Saba was admitted to [facility] for an  for an autologous peripheral blood stem cell transplant for Amyloidosis. He had no complaint at the time of his admission.         Upon admission, a TLC was placed in IR. Mr. Saba  received IV hydration, pain management, anxiolysis, antiemetics, nutritional support, and antibacterial / antiviral / antifungal / GI / PCP and VOD (SOS) prophylaxis. When his ANC dropped below 1000, he was started on prophylactic Ciprofloxacin. Labs were monitored on a daily basis, and he received electrolyte repletion and transfusional support as needed. His platelets were kelp as a goal of 40K for his history of amyloidosis.         On 07/02/20, after pre-medication Mr. Saba received 316 mL of:  Autologous, mobilized, plasma reduced, pooled, thawed, washed, HCP apheresis over approximately 1 hr. Cell counts as follows    Total MNC( x10^8/kg)=8.32    CD34+cells ( x10^6 kg)=9.96    Cell Viability (%)=65     Mr. Saba tolerated the infusion well with no adverse resections noted.        While admited, he had pancytopenia related to high dose chemotherapy prep regiment. He also developed neutropenic fevers. When he became febrile, blood and urine cultures were sent and CXR completed. Ciprofloxacin was changed to cefepime for a broadened anti-microbial coverage. Infectious work  up was negative with, negative blood/urine cultures and CXR showing clear lungs.          He also had a drug -induced rash secondary to Kepivance which resolved with no intervention.         His hospital stay was also complicated by chemotherapy induced grade 1 intestinal mucositis with diarrhea, c. diff  sample was send off and resulted negative, his symptoms resolved with Imodium. Additionally, he experienced chemotherapy induced grade 1 GI mucositis with nausea  he was treated with anti-emetics as well as prn carafate and maalox, his symptoms then resolved.         Early engraftment was noticed on 7/15, Zarxio was increased from 480 micrograms to 600 micrograms SQ daily. Cefepime was continued till post engraftment. On______, given count recovery no fevers an negative infectious work up, negative blood/urine cultures and CXR with clear lungs, Cefepime and Zarxio was discontinued.         Nasopharyngeal  Surveillance COVID swabs were preformed weekly and all resulted negative, with the most recent collected on ______.        Currently, Mr. Saba is ready and stable for discharge with a follow up appointments at [facility]. Mr. Saba is a 59 year old male with a medical history of amyloidosis, hepatitis C, dyspepsia, hematuria, colitis, Raynaud's phenomenon, and cutaneous mastocytosis who was admitted to [facility] Mar[facility] Transplant Unit    for an autologous peripheral blood stem cell transplant. He was initially referred by Dr. Joel Wells. He initially experienced joint pain in 2014, and his symptoms were managed with Advil. In 2016, his LFTs were elevated and he was diagnosed with hepatitis C which he was treated with Harvoni. He also experienced hematuria and bloody stool. A biopsy of his colon showed chronic colitis with erosions. He was treated with steroids and Mesalamine, and was later started on Humira in mid August 2016. After his first dose of Humira, he experienced left knee swelling, alleviated with Advil. After the second dose of Humira, his ankles swelled, and he was placed on steroids. He also was started on Famotidine for early satiety and dyspepsia. The blood stool subsided. The dyspepsia progressed, and an upper endoscopy was performed by Dr. Parks on 10/30/19. A biopsy of the second portion of the duodenum showed amyloidosis confirmed with congo red stain, IgM immunostain was positive in areas of amyloid deposition, with kappa LC predominance. The stomach was noted to have impaired distensibility on endoscopy. His renal function remained normal. In 02/20 cystoscopy was performed for hematuria and showed prominent blood vessels. The amyloidosis was treated with CyBorD x 7 cycles.           On 06/29/20 Mr. Saba was admitted to [facility] for an for an autologous peripheral blood stem cell transplant with high dose Melphalan prep regimen for treatment of Amyloidosis. He had no complaint at the time of his admission.         Upon admission, a TLC was placed in IR. Mr. Saba received IV hydration, pain management, anxiolysis, antiemetics, nutritional support, and antibacterial / antiviral / antifungal / GI / PCP and VOD (SOS) prophylaxis. When his ANC dropped below 1000, he was started on prophylactic Ciprofloxacin. Labs were monitored on a daily basis, and he received electrolyte repletion and transfusional support as needed. Initially, his platelets were kelp at a goal of 40K for his history of amyloidosis, however post engraftment the platelets goal was above 15K. As of note Mr. Saba was started on Acyclovir prior to his admission, hence Acyclovir was continued.          On 07/02/20, after pre-medication Mr. Saba received 316 mL of: Autologous, mobilized, plasma reduced, pooled, thawed, washed, HCP apheresis over approximately 1 hr. Cell counts as follows    Total MNC( x10^8/kg)=8.32    CD34+cells ( x10^6 kg)=9.96    Cell Viability (%)=65     Mr. Saba tolerated the infusion well with no adverse resections noted.        While admited, he had pancytopenia related to high dose chemotherapy prep regiment. He also developed neutropenic fevers. When he became febrile, blood and urine cultures were sent and CXR completed. Ciprofloxacin was changed to cefepime for a broadened anti-microbial coverage. Infectious work up was negative with, negative blood/urine cultures and CXR showing clear lungs.          He also had a drug -induced rash secondary to Kepivance which resolved with no intervention.         His hospital stay was also complicated by chemotherapy induced grade 1 intestinal mucositis with diarrhea, c. diff sample was send off and resulted negative, his symptoms resolved with Imodium. Additionally, he experienced chemotherapy induced grade 1 GI mucositis with nausea, he was treated with anti-emetics as well as prn Carafate and Maalox, his symptoms then resolved.         Early engraftment was noticed on 7/15, Zarxio was increased from 480 micrograms to 600 micrograms SQ daily. Cefepime was continued till post engraftment. On 7/18, given count recovery no fevers an negative infectious work up, negative blood/urine cultures and CXR with clear lungs, Cefepime was discontinued and Zarxio was discontinued on 7/19.         Nasopharyngeal  Surveillance COVID swabs were preformed weekly and all resulted negative, with the most recent collected on 7/20.        Currently, Mr. Saba is ready and stable for discharge with a follow up appointments at [facility]. Mr. Saba is a 59 year old male with a medical history of amyloidosis, hepatitis C, dyspepsia, hematuria, colitis, Raynaud's phenomenon, and cutaneous mastocytosis who was admitted to [facility] Transplant Unit    for an autologous peripheral blood stem cell transplant. He was initially referred by Dr. Joel Wells. He initially experienced joint pain in 2014, and his symptoms were managed with Advil. In 2016, his LFTs were elevated and he was diagnosed with hepatitis C which he was treated with Harvoni. He also experienced hematuria and bloody stool. A biopsy of his colon showed chronic colitis with erosions. He was treated with steroids and Mesalamine, and was later started on Humira in mid August 2016. After his first dose of Humira, he experienced left knee swelling, alleviated with Advil. After the second dose of Humira, his ankles swelled, and he was placed on steroids. He also was started on Famotidine for early satiety and dyspepsia. The blood stool subsided. The dyspepsia progressed, and an upper endoscopy was performed by Dr. Parks on 10/30/19. A biopsy of the second portion of the duodenum showed amyloidosis confirmed with congo red stain, IgM immunostain was positive in areas of amyloid deposition, with kappa LC predominance. The stomach was noted to have impaired distensibility on endoscopy. His renal function remained normal. In 02/20 cystoscopy was performed for hematuria and showed prominent blood vessels. The amyloidosis was treated with CyBorD x 7 cycles.           On 06/29/20 Mr. Saba was admitted to [facility] for an for an autologous peripheral blood stem cell transplant with high dose Melphalan prep regimen for treatment of Amyloidosis. He had no complaint at the time of his admission.         Upon admission, a TLC was placed in IR. Mr. Saba received IV hydration, pain management, anxiolysis, antiemetics, nutritional support, and antibacterial / antiviral / antifungal / GI / PCP and VOD (SOS) prophylaxis. When his ANC dropped below 1000, he was started on prophylactic Ciprofloxacin. Labs were monitored on a daily basis, and he received electrolyte repletion and transfusional support as needed. Initially, his platelets were kelp at a goal of 40K for his history of amyloidosis, however post engraftment the platelets goal was kept above 15K. As of note Mr. Saba was started on Acyclovir prior to his admission, hence Acyclovir was continued.          On 07/02/20, after pre-medication Mr. Saba received 316 mL of: Autologous, mobilized, plasma reduced, pooled, thawed, washed, HCP apheresis over approximately 1 hr. Cell counts as follows    Total MNC( x10^8/kg)=8.32    CD34+cells ( x10^6 kg)=9.96    Cell Viability (%)=65     Mr. Saba tolerated the infusion well with no adverse resections noted.        While admited, he had pancytopenia related to high dose chemotherapy prep regiment. He also developed neutropenic fevers. When he became febrile, blood and urine cultures were sent and CXR completed. Ciprofloxacin was changed to cefepime for a broadened anti-microbial coverage. Infectious work up was negative with, negative blood/urine cultures and CXR showing clear lungs.          He also had a drug -induced rash secondary to Kepivance which resolved with no intervention.         His hospital stay was also complicated by chemotherapy induced grade 1 intestinal mucositis with diarrhea, c. diff sample was send off and resulted negative, his symptoms resolved with Imodium. Additionally, he experienced chemotherapy induced grade 1 GI mucositis with nausea, he was treated with anti-emetics as well as prn Carafate and Maalox, his symptoms then resolved.         Early engraftment was noticed on 7/15, Zarxio was increased from 480 micrograms to 600 micrograms SQ daily. Cefepime was continued till post engraftment. On 7/18, given count recovery no fevers an negative infectious work up, negative blood/urine cultures and CXR with clear lungs, Cefepime was discontinued and Zarxio was discontinued on 7/19.         Nasopharyngeal  Surveillance COVID swabs were preformed weekly and all resulted negative, with the most recent collected on 7/20.        Currently, Mr. Saba is ready and stable for discharge with a follow up appointments at [facility]. Mr. Saba is a 59 year old male with a medical history of amyloidosis, hepatitis C, dyspepsia, hematuria, colitis, Raynaud's phenomenon, and cutaneous mastocytosis who was admitted to [facility]    for an autologous peripheral blood stem cell transplant. He was initially referred by Dr. Joel Wells. He initially experienced joint pain in 2014, and his symptoms were managed with Advil. In 2016, his LFTs were elevated and he was diagnosed with hepatitis C which he was treated with Harvoni. He also experienced hematuria and bloody stool. A biopsy of his colon showed chronic colitis with erosions. He was treated with steroids and Mesalamine, and was later started on Humira in mid August 2016. After his first dose of Humira, he experienced left knee swelling, alleviated with Advil. After the second dose of Humira, his ankles swelled, and he was placed on steroids. He also was started on Famotidine for early satiety and dyspepsia. The bloody stool subsided. The dyspepsia progressed, and an upper endoscopy was performed by Dr. Parks on 10/30/19. A biopsy of the second portion of the duodenum showed amyloidosis confirmed with congo red stain, IgM immunostain was positive in areas of amyloid deposition, with kappa LC predominance. The stomach was noted to have impaired distensibility on endoscopy. His renal function remained normal. In 02/20 cystoscopy was performed for hematuria and showed prominent blood vessels. The amyloidosis was treated with CyBorD x 7 cycles.           On 06/29/20 Mr. Saba was admitted to [facility] for an for an autologous peripheral blood stem cell transplant with high dose Melphalan prep regimen for treatment of Amyloidosis. He had no complaint at the time of his admission.         Upon admission, a TLC was placed in IR. Mr. Saba received IV hydration, pain management, anxiolysis, antiemetics, nutritional support, and antibacterial / antiviral / antifungal / GI / PCP and VOD (SOS) prophylaxis. When his ANC dropped below 1000, he was started on prophylactic Ciprofloxacin. Labs were monitored on a daily basis, and he received electrolyte repletion and transfusional support as needed. Initially, his platelets were kelp at a goal of 40K for his history of amyloidosis, however post engraftment the platelets goal was kept above 15K. As of note Mr. Saba was started on Acyclovir prior to his admission, hence Acyclovir was continued.          On 07/02/20, after pre-medication Mr. Saba received 316 mL of: Autologous, mobilized, plasma reduced, pooled, thawed, washed, HCP apheresis over approximately 1 hr. Cell counts as follows    Total MNC( x10^8/kg)=8.32    CD34+cells ( x10^6 kg)=9.96    Cell Viability (%)=65     Mr. Saba tolerated the infusion well with no adverse resections noted.        While admited, he had pancytopenia related to high dose chemotherapy prep regiment. He also developed neutropenic fevers. When he became febrile, blood and urine cultures were sent and CXR completed. Ciprofloxacin was changed to cefepime for a broadened anti-microbial coverage. Infectious work up was negative with, negative blood/urine cultures and CXR showing clear lungs.          He also had a drug induced rash secondary to Kepivance which resolved with no intervention.         His hospital stay was also complicated by chemotherapy induced grade 1 intestinal mucositis with diarrhea, c. diff sample was sent off and resulted negative, his symptoms resolved with Imodium. Additionally, he experienced chemotherapy induced grade 1 GI mucositis with nausea, he was treated with anti-emetics as well as prn Carafate and Maalox, his symptoms then resolved.         Early engraftment was noticed on 7/15, Zarxio was increased from 480 micrograms to 600 micrograms SQ daily. Cefepime was continued until post engraftment. On 7/18, given count recovery no fevers an negative infectious work up, negative blood/urine cultures and CXR with clear lungs, Cefepime was discontinued. Zarxio was discontinued on 7/19.         Nasopharyngeal  Surveillance COVID swabs were preformed weekly and all resulted negative, with the most recent collected on 7/20.        Currently, Mr. Saba is ready and stable for discharge with a follow up appointments at [facility].

## 2020-07-16 NOTE — PROGRESS NOTE ADULT - SUBJECTIVE AND OBJECTIVE BOX
HPC Transplant Team                                                      Critical / Counseling Time Provided: 30 minutes                                                                                                                                                        Chief Complaint: Autologous pbsct with high dose melphalan prep regimen for treatment of amyloidosis    S: Patient seen and examined with HPC Transplant Team:   +fatigue   no complaints today    O: Vitals:   Vital Signs Last 24 Hrs  T(C): 37 (2020 06:17), Max: 37.4 (15 Jul 2020 13:00)  T(F): 98.6 (2020 06:17), Max: 99.3 (15 Jul 2020 13:00)  HR: 76 (2020 06:17) (74 - 80)  BP: 107/63 (2020 06:17) (98/57 - 115/69)  BP(mean): --  RR: 18 (2020 06:17) (18 - 18)  SpO2: 98% (2020 06:17) (98% - 100%)    Admit weight: 85kg  Daily Weight in k.9 (15 Jul 2020 09:23)  Today's weight:     Intake / Output:   07-15 @ 07:01  -  07-16 @ 07:00  --------------------------------------------------------  IN: 1745 mL / OUT: 3400 mL / NET: -1655 mL  PE:   HEENT: Oropharynx: no erythema or ulcerations  Oral Mucositis:               -                                         Grade: n/a  CVS: S1, S2 RRR   Lungs: CTA throughout bilaterally   Abdomen: + BS x 4: mildly hyperactive, soft, NT, ND   Extremities: + chronic trace LE edema; L>R   Gastric Mucositis:      -                                             Grade: n/a - resolved   Intestinal Mucositis:    -                                        Grade: n/a  Skin: hyperpigmentation on back, neck, abdomen likely secondary to Kepivance   TLC: CDI  Neuro: A&O x 3  Pain: denies     Labs:   CBC Full  -  ( 2020 06:41 )  WBC Count : 0.22 K/uL  Hemoglobin : 6.6 g/dL  Hematocrit : 20.2 %  Platelet Count - Automated : 32 K/uL  Mean Cell Volume : 75.1 fl  Mean Cell Hemoglobin : 24.5 pg  Mean Cell Hemoglobin Concentration : 32.7 gm/dL  Auto Neutrophil # : x  Auto Lymphocyte # : x  Auto Monocyte # : x  Auto Eosinophil # : x  Auto Basophil # : x  Auto Neutrophil % : x  Auto Lymphocyte % : x  Auto Monocyte % : x  Auto Eosinophil % : x  Auto Basophil % : x                          6.6    0.22  )-----------( 32       ( 2020 06:41 )             20.2     07-16    142  |  106  |  10  ----------------------------<  93  3.6   |  26  |  0.65    Ca    8.6      2020 06:41  Phos  3.1     07-  Mg     2.0     -    TPro  5.3<L>  /  Alb  3.3  /  TBili  <0.1<L>  /  DBili  x   /  AST  15  /  ALT  11  /  AlkPhos  47  07-    PT/INR - ( 2020 06:41 )   PT: 12.1 sec;   INR: 1.02 ratio         PTT - ( 2020 06:41 )  PTT:28.4 sec  LIVER FUNCTIONS - ( 2020 06:41 )  Alb: 3.3 g/dL / Pro: 5.3 g/dL / ALK PHOS: 47 U/L / ALT: 11 U/L / AST: 15 U/L / GGT: x           Lactate Dehydrogenase, Serum: 121 U/L ( @ 06:41)    Cultures:   Culture Results: blood  No growth to date. (20 @ 00:37)    Culture Results: blood  No growth to date. (20 @ 00:37)    Culture Results: urine  No growth (20 @ 00:23)    Culture Results: blood  No growth to date. (20 @ 23:56)    Culture Results: blood  No growth to date. (20 @ 23:56)      Radiology:   EXAM:  XR CHEST PORTABLE URGENT 1V                        PROCEDURE DATE:  2020    IMPRESSION:  1.  The lungs are clear.    Meds:   Antimicrobials:   acyclovir   Oral Tab/Cap 400 milliGRAM(s) Oral every 8 hours  cefepime   IVPB      cefepime   IVPB 2000 milliGRAM(s) IV Intermittent every 8 hours  clotrimazole Lozenge 1 Lozenge Oral five times a day  fluconAZOLE   Tablet 400 milliGRAM(s) Oral daily      Heme / Onc:   heparin  Infusion 357 Unit(s)/Hr IV Continuous <Continuous>      GI:  aluminum hydroxide/magnesium hydroxide/simethicone Suspension 30 milliLiter(s) Oral every 4 hours PRN  bismuth subsalicylate Liquid 30 milliLiter(s) Oral every 4 hours PRN  loperamide 2 milliGRAM(s) Oral every 6 hours PRN  pantoprazole    Tablet 40 milliGRAM(s) Oral before breakfast  sodium bicarbonate Mouth Rinse 10 milliLiter(s) Swish and Spit five times a day  sucralfate suspension 1 Gram(s) Oral four times a day  ursodiol Capsule 300 milliGRAM(s) Oral two times a day with meals      Cardiovascular:   furosemide   Injectable 20 milliGRAM(s) IV Push every 24 hours PRN      Immunologic:   filgrastim-sndz (ZARXIO) Injectable 600 MICROGram(s) SubCutaneous every 24 hours      Other medications:   Biotene Dry Mouth Oral Rinse 5 milliLiter(s) Swish and Spit five times a day  chlorhexidine 4% Liquid 1 Application(s) Topical <User Schedule>  folic acid 1 milliGRAM(s) Oral daily  hydrocortisone 1% Cream 1 Application(s) Topical daily  lidocaine/prilocaine Cream 1 Application(s) Topical daily  multivitamin 1 Tablet(s) Oral daily  sodium chloride 0.9%. 1000 milliLiter(s) IV Continuous <Continuous>      PRN:   acetaminophen   Tablet .. 650 milliGRAM(s) Oral every 6 hours PRN  acetaminophen   Tablet .. 650 milliGRAM(s) Oral every 6 hours PRN  aluminum hydroxide/magnesium hydroxide/simethicone Suspension 30 milliLiter(s) Oral every 4 hours PRN  AQUAPHOR (petrolatum Ointment) 1 Application(s) Topical two times a day PRN  bismuth subsalicylate Liquid 30 milliLiter(s) Oral every 4 hours PRN  furosemide   Injectable 20 milliGRAM(s) IV Push every 24 hours PRN  loperamide 2 milliGRAM(s) Oral every 6 hours PRN  LORazepam   Injectable 0.5 milliGRAM(s) IV Push daily PRN  metoclopramide Injectable 10 milliGRAM(s) IV Push every 6 hours PRN  ondansetron Injectable 8 milliGRAM(s) IV Push every 8 hours PRN  sodium chloride 0.9% lock flush 10 milliLiter(s) IV Push every 1 hour PRN    A/P:   59 year old male with a history of amyloidosis  Post Autologous PBSCT day + 14   Keep platelets =/> 40K for history of amyloidosis   Continue Cefepime through engraftment, cultures negative   Chemotherapy induced grade 1 intestinal mucositis (diarrhea) - c. diff negative - now resolved   Chemotherapy induced grade 1 GI mucositis - now resolved   7/15: continue Zarxio daily until post engraftment dose increased to 600 micrograms     1. Infectious Disease:   acyclovir   Oral Tab/Cap 400 milliGRAM(s) Oral every 8 hours  cefepime   IVPB      cefepime   IVPB 2000 milliGRAM(s) IV Intermittent every 8 hours  clotrimazole Lozenge 1 Lozenge Oral five times a day  fluconAZOLE   Tablet 400 milliGRAM(s) Oral daily    2. VOD Prophylaxis: Actigall, Glutamine, Heparin (dosed at 100 units / kg / day)     3. GI Prophylaxis:    pantoprazole    Tablet 40 milliGRAM(s) Oral before breakfast  bismuth subsalicylate Liquid 30 milliLiter(s) Oral every 4 hours PRN  sucralfate suspension 1 Gram(s) Oral four times a day    4. Mouthcare - NS / NaHCO3 rinses, Mycelex, Biotene; Skin care     5. GVHD prophylaxis - n/a     6. Transfuse & replete electrolytes prn   1 unit PRBC   1 bag platelets     7. IV hydration, daily weights, strict I&O, prn diuresis     8. PO intake as tolerated, nutrition follow up as needed, MVI, folic acid     9. Antiemetics, anti-diarrhea medications:   loperamide 2 milliGRAM(s) Oral every 6 hours PRN  LORazepam   Injectable 0.5 milliGRAM(s) IV Push daily PRN  metoclopramide Injectable 10 milliGRAM(s) IV Push every 6 hours PRN  ondansetron Injectable 8 milliGRAM(s) IV Push every 8 hours PRN    10. OOB as tolerated, physical therapy consult if needed     11. Monitor coags / fibrinogen 2x week, vitamin K as needed     12. Monitor closely for clinical changes, monitor for fevers     13. Emotional support provided, plan of care discussed and questions addressed     14. Patient education done regarding plan of care, restrictions and discharge planning     15. Continue regular social work input     I have written the above note for Dr. Roman who performed service with me in the room.   Brinda KELLEY ([phone])    I have seen and examined patient with NP, I agree with above note as scribed. HPC Transplant Team                                                      Critical / Counseling Time Provided: 30 minutes                                                                                                                                                        Chief Complaint: Autologous pbsct with high dose melphalan prep regimen for treatment of amyloidosis    S: Patient seen and examined with HPC Transplant Team:   +fatigue   no complaints today    Denies: mouth/throat pain, cough, SOB, abdominal pain, nausea, vomiting, diarrhea     O: Vitals:   Vital Signs Last 24 Hrs  T(C): 37 (2020 06:17), Max: 37.4 (15 Jul 2020 13:00)  T(F): 98.6 (2020 06:17), Max: 99.3 (15 Jul 2020 13:00)  HR: 76 (2020 06:17) (74 - 80)  BP: 107/63 (2020 06:17) (98/57 - 115/69)  BP(mean): --  RR: 18 (2020 06:17) (18 - 18)  SpO2: 98% (2020 06:17) (98% - 100%)    Admit weight: 85kg  Daily Weight in k.9 (15 Jul 2020 09:23)  Today's weight:81.2 kg      Intake / Output:   -15 @ 07:01  -  -16 @ 07:00  --------------------------------------------------------  IN: 1745 mL / OUT: 3400 mL / NET: -1655 mL  PE:   HEENT: Oropharynx: no erythema or ulcerations  Oral Mucositis:               -                                         Grade: n/a  CVS: S1, S2 RRR   Lungs: CTA throughout bilaterally   Abdomen: + BS x 4: mildly hyperactive, soft, NT, ND   Extremities: + chronic trace LE edema; L>R   Gastric Mucositis:      -                                             Grade: n/a - resolved   Intestinal Mucositis:    -                                        Grade: n/a  Skin: hyperpigmentation on back, neck, chest, abdomen likely secondary to Kepivance   TLC: CDI  Neuro: A&O x 3  Pain: denies     Labs:   CBC Full  -  ( 2020 06:41 )  WBC Count : 0.22 K/uL  Hemoglobin : 6.6 g/dL  Hematocrit : 20.2 %  Platelet Count - Automated : 32 K/uL  Mean Cell Volume : 75.1 fl  Mean Cell Hemoglobin : 24.5 pg  Mean Cell Hemoglobin Concentration : 32.7 gm/dL  Auto Neutrophil # : x  Auto Lymphocyte # : x  Auto Monocyte # : x  Auto Eosinophil # : x  Auto Basophil # : x  Auto Neutrophil % : x  Auto Lymphocyte % : x  Auto Monocyte % : x  Auto Eosinophil % : x  Auto Basophil % : x                          6.6    0.22  )-----------( 32       ( 2020 06:41 )             20.2     -    142  |  106  |  10  ----------------------------<  93  3.6   |  26  |  0.65    Ca    8.6      2020 06:41  Phos  3.1     -  Mg     2.0         TPro  5.3<L>  /  Alb  3.3  /  TBili  <0.1<L>  /  DBili  x   /  AST  15  /  ALT  11  /  AlkPhos  47  -    PT/INR - ( 2020 06:41 )   PT: 12.1 sec;   INR: 1.02 ratio         PTT - ( 2020 06:41 )  PTT:28.4 sec  LIVER FUNCTIONS - ( 2020 06:41 )  Alb: 3.3 g/dL / Pro: 5.3 g/dL / ALK PHOS: 47 U/L / ALT: 11 U/L / AST: 15 U/L / GGT: x           Lactate Dehydrogenase, Serum: 121 U/L ( @ 06:41)    Cultures:   Culture Results: blood  No growth to date. (20 @ 00:37)    Culture Results: blood  No growth to date. (20 @ 00:37)    Culture Results: urine  No growth (20 @ 00:23)    Culture Results: blood  No growth to date. (20 @ 23:56)    Culture Results: blood  No growth to date. (20 @ 23:56)      Radiology:   EXAM:  XR CHEST PORTABLE URGENT 1V                        PROCEDURE DATE:  2020    IMPRESSION:  1.  The lungs are clear.    Meds:   Antimicrobials:   acyclovir   Oral Tab/Cap 400 milliGRAM(s) Oral every 8 hours  cefepime   IVPB      cefepime   IVPB 2000 milliGRAM(s) IV Intermittent every 8 hours  clotrimazole Lozenge 1 Lozenge Oral five times a day  fluconAZOLE   Tablet 400 milliGRAM(s) Oral daily      Heme / Onc:   heparin  Infusion 357 Unit(s)/Hr IV Continuous <Continuous>      GI:  aluminum hydroxide/magnesium hydroxide/simethicone Suspension 30 milliLiter(s) Oral every 4 hours PRN  bismuth subsalicylate Liquid 30 milliLiter(s) Oral every 4 hours PRN  loperamide 2 milliGRAM(s) Oral every 6 hours PRN  pantoprazole    Tablet 40 milliGRAM(s) Oral before breakfast  sodium bicarbonate Mouth Rinse 10 milliLiter(s) Swish and Spit five times a day  sucralfate suspension 1 Gram(s) Oral four times a day  ursodiol Capsule 300 milliGRAM(s) Oral two times a day with meals      Cardiovascular:   furosemide   Injectable 20 milliGRAM(s) IV Push every 24 hours PRN      Immunologic:   filgrastim-sndz (ZARXIO) Injectable 600 MICROGram(s) SubCutaneous every 24 hours      Other medications:   Biotene Dry Mouth Oral Rinse 5 milliLiter(s) Swish and Spit five times a day  chlorhexidine 4% Liquid 1 Application(s) Topical <User Schedule>  folic acid 1 milliGRAM(s) Oral daily  hydrocortisone 1% Cream 1 Application(s) Topical daily  lidocaine/prilocaine Cream 1 Application(s) Topical daily  multivitamin 1 Tablet(s) Oral daily  sodium chloride 0.9%. 1000 milliLiter(s) IV Continuous <Continuous>      PRN:   acetaminophen   Tablet .. 650 milliGRAM(s) Oral every 6 hours PRN  acetaminophen   Tablet .. 650 milliGRAM(s) Oral every 6 hours PRN  aluminum hydroxide/magnesium hydroxide/simethicone Suspension 30 milliLiter(s) Oral every 4 hours PRN  AQUAPHOR (petrolatum Ointment) 1 Application(s) Topical two times a day PRN  bismuth subsalicylate Liquid 30 milliLiter(s) Oral every 4 hours PRN  furosemide   Injectable 20 milliGRAM(s) IV Push every 24 hours PRN  loperamide 2 milliGRAM(s) Oral every 6 hours PRN  LORazepam   Injectable 0.5 milliGRAM(s) IV Push daily PRN  metoclopramide Injectable 10 milliGRAM(s) IV Push every 6 hours PRN  ondansetron Injectable 8 milliGRAM(s) IV Push every 8 hours PRN  sodium chloride 0.9% lock flush 10 milliLiter(s) IV Push every 1 hour PRN    A/P:   59 year old male with a history of amyloidosis  Post Autologous PBSCT day + 14   Keep platelets =/> 40K for history of amyloidosis   Continue Cefepime through engraftment, cultures negative   Chemotherapy induced grade 1 intestinal mucositis (diarrhea) - c. diff negative - now resolved   Chemotherapy induced grade 1 GI mucositis - now resolved   Early engraftment, continue Zarxio daily until post engraftment dose increased to 600 micrograms on 7/15      1. Infectious Disease:   acyclovir   Oral Tab/Cap 400 milliGRAM(s) Oral every 8 hours  cefepime   IVPB      cefepime   IVPB 2000 milliGRAM(s) IV Intermittent every 8 hours  clotrimazole Lozenge 1 Lozenge Oral five times a day  fluconAZOLE   Tablet 400 milliGRAM(s) Oral daily    2. VOD Prophylaxis: Actigall, Glutamine, Heparin (dosed at 100 units / kg / day)     3. GI Prophylaxis:    pantoprazole    Tablet 40 milliGRAM(s) Oral before breakfast  bismuth subsalicylate Liquid 30 milliLiter(s) Oral every 4 hours PRN  sucralfate suspension 1 Gram(s) Oral four times a day    4. Mouthcare - NS / NaHCO3 rinses, Mycelex, Biotene; Skin care     5. GVHD prophylaxis - n/a     6. Transfuse & replete electrolytes prn   1 unit PRBC   1 bag platelets     7. IV hydration, daily weights, strict I&O, prn diuresis     8. PO intake as tolerated, nutrition follow up as needed, MVI, folic acid     9. Antiemetics, anti-diarrhea medications:   loperamide 2 milliGRAM(s) Oral every 6 hours PRN  LORazepam   Injectable 0.5 milliGRAM(s) IV Push daily PRN  metoclopramide Injectable 10 milliGRAM(s) IV Push every 6 hours PRN  ondansetron Injectable 8 milliGRAM(s) IV Push every 8 hours PRN    10. OOB as tolerated, physical therapy consult if needed     11. Monitor coags / fibrinogen 2x week, vitamin K as needed     12. Monitor closely for clinical changes, monitor for fevers     13. Emotional support provided, plan of care discussed and questions addressed     14. Patient education done regarding plan of care, restrictions and discharge planning     15. Continue regular social work input     I have written the above note for Dr. Roman who performed service with me in the room.   Brinda Garrett NP-C ([phone])    I have seen and examined patient with NP, I agree with above note as scribed.

## 2020-07-16 NOTE — DISCHARGE NOTE PROVIDER - NSDCMRMEDTOKEN_GEN_ALL_CORE_FT
atovaquone 750 mg/5 mL oral suspension: 5 milliliter(s) orally 2 times a day  fluconazole 200 mg oral tablet: 2 tab(s) orally once a day  folic acid 1 mg oral tablet: 1 tab(s) orally once a day  metoclopramide 10 mg oral tablet: 1 tab(s) orally 4 times a day (before meals and at bedtime), As Needed  Multiple Vitamins oral tablet: 1 tab(s) orally once a day  ondansetron 8 mg oral tablet: 1 tab(s) orally every 8 hours, As Needed nausea  pantoprazole 40 mg oral delayed release tablet: 1 tab(s) orally once a day (before a meal)  Zovirax 400 mg oral tablet: 1 tab(s) orally every 8 hours

## 2020-07-16 NOTE — DISCHARGE NOTE PROVIDER - NSDCCPCAREPLAN_GEN_ALL_CORE_FT
PRINCIPAL DISCHARGE DIAGNOSIS  Diagnosis: Amyloidosis, unspecified type  Assessment and Plan of Treatment: Amyloidosis, unspecified type s/p  Autologous PBSCT      SECONDARY DISCHARGE DIAGNOSES  Diagnosis: Stem cell transplant candidate  Assessment and Plan of Treatment: Stem cell transplant candidate s/p Autologous PBSCT PRINCIPAL DISCHARGE DIAGNOSIS  Diagnosis: Amyloidosis, unspecified type  Assessment and Plan of Treatment: Amyloidosis, unspecified type s/p  Autologous PBSCT      SECONDARY DISCHARGE DIAGNOSES  Diagnosis: Stem cell transplant candidate  Assessment and Plan of Treatment: Stem cell transplant candidate s/p Autologous PBSCT  1. Diet and activities as per [facility] discharge guidelines and safe food handling guidelines. NO RESTAURANT OR TAKE OUT FOOD AT THIS TIME, ONLY HOME COOKED PREPARED/FROZEN FOODS. You are allowed to have fresh baked pizza right out of the oven. This is the ONLY takeout food at this time.  2. Notify your physician if bleeding; swelling; persistent nausea and vomiting; unable to urinate; pain not relieved by medications; fever; numbness, tingling; excessive diarrhea, inability to tolerate liquids or foods; increased irritability or sluggishness, rash  3. Dr. Roman's phone number at the [facility] is [phone]. Dr. Roman's cell phone number IN CASE OF EMERGENCY is [phone]. Transplant unit phone number if you have questions or concerns is [phone].

## 2020-07-16 NOTE — DISCHARGE NOTE PROVIDER - NSDCFUADDAPPT_GEN_ALL_CORE_FT
You have a follow up appointment at the [facility] with Marion Rivera NP on Thursday, 7/23/20 at 2pm. If you need platelets, you have a possible platelet appointment in the treatment room at 4pm    You have a possible platelet appointment in the treatment room of the [facility] at 4pm    You have a follow up appointment at the [facility] with Eli Lai NP on Thursday, 7/30/20 at 2pm. If you need platelets, you have a possible platelet appointment in the treatment room at 2:30pm    You have a possible platelet appointment in the treatment room of the [facility] on Monday, 8/3/20 at 2pm    You have a follow up appointment with Eli Lai NP at the [facility] on Thursday, 8/6/20 at 2pm. If you need platelets, a possible platelet appointment has been made for you at 2:30pm in the treatment room     You have a follow up appointment with Marion Rivera NP at the [facility] on Thursday, 8/13/20 at 1:30pm     You have a follow up appointment with Dr. Roman at the [facility] on Thursday, 8/20/20 at 1:30pm     Please arrive to all appointments 15 minutes early to have your blood drawn

## 2020-07-16 NOTE — PROGRESS NOTE ADULT - ATTENDING COMMENTS
59 year old male with history of amyloidosis treated with CyBorD, now admitted for autologous pbsct with Melphalan preparative regimen   s/p HPC transplant on 7/2/20, now day + 14..slow movement upward    -Continue Zarxio daily until engraftment with WBC recovery..increase dose to 600  -IV hydration, strict I&O, daily weight, Lasix to keep O > I  -VOD prophylaxis- Actigall, low dose heparin gtt & glutamine supplementation  -ID- Neutropenic fevers- on Cefepime; continue Fluconazole / Acyclovir prophylaxis. Has been afebrile x48 hrs;  hold on  CT scans, will reassess tomorrow if he has recurrent fevers.  Last COVID PCR on 7/8 was negative  -GI mucositis secondary to antineoplastic chemotherapy- continue PPI, add Carafate; Imodium for diarrhea. Improved, as per pt  -Antiemetics  -Supplement lytes prn  - transfuse if plts less than 40.......tx rbc as needed  -Mouth care, skin care  -OOB/ambulate 59 year old male with history of amyloidosis treated with CyBorD, now admitted for autologous pbsct with Melphalan preparative regimen   s/p HPC transplant on 7/2/20, now day + 14..slow movement upward    -Continue Zarxio daily until engraftment with WBC recovery..increase dose to 600  -IV hydration, strict I&O, daily weight, Lasix to keep O > I  -VOD prophylaxis- Actigall, low dose heparin gtt & glutamine supplementation  -ID- Neutropenic fevers- on Cefepime; continue Fluconazole / Acyclovir prophylaxis. Has been afebrile x48 hrs;  hold on  CT scans, will reassess tomorrow if he has recurrent fevers.  Last COVID PCR on 7/8 was negative  -GI mucositis secondary to antineoplastic chemotherapy- continue PPI, add Carafate; Imodium for diarrhea. Improved, as per pt  -Antiemetics  -Supplement lytes prn  - transfuse if plts less than 40.......tx rbc as needed  -Mouth care, skin care  -OOB/ambulate  - d/c planning for next week

## 2020-07-17 LAB
ALBUMIN SERPL ELPH-MCNC: 3.3 G/DL — SIGNIFICANT CHANGE UP (ref 3.3–5)
ALP SERPL-CCNC: 47 U/L — SIGNIFICANT CHANGE UP (ref 40–120)
ALT FLD-CCNC: 10 U/L — SIGNIFICANT CHANGE UP (ref 10–45)
ANION GAP SERPL CALC-SCNC: 9 MMOL/L — SIGNIFICANT CHANGE UP (ref 5–17)
ANISOCYTOSIS BLD QL: SLIGHT — SIGNIFICANT CHANGE UP
AST SERPL-CCNC: 16 U/L — SIGNIFICANT CHANGE UP (ref 10–40)
BASOPHILS # BLD AUTO: 0 K/UL — SIGNIFICANT CHANGE UP (ref 0–0.2)
BASOPHILS NFR BLD AUTO: 0 % — SIGNIFICANT CHANGE UP (ref 0–2)
BILIRUB DIRECT SERPL-MCNC: <0.1 MG/DL — SIGNIFICANT CHANGE UP (ref 0–0.2)
BILIRUB INDIRECT FLD-MCNC: >0.1 MG/DL — LOW (ref 0.2–1)
BILIRUB SERPL-MCNC: 0.2 MG/DL — SIGNIFICANT CHANGE UP (ref 0.2–1.2)
BLD GP AB SCN SERPL QL: NEGATIVE — SIGNIFICANT CHANGE UP
BUN SERPL-MCNC: 10 MG/DL — SIGNIFICANT CHANGE UP (ref 7–23)
CALCIUM SERPL-MCNC: 8.5 MG/DL — SIGNIFICANT CHANGE UP (ref 8.4–10.5)
CHLORIDE SERPL-SCNC: 107 MMOL/L — SIGNIFICANT CHANGE UP (ref 96–108)
CO2 SERPL-SCNC: 27 MMOL/L — SIGNIFICANT CHANGE UP (ref 22–31)
CREAT SERPL-MCNC: 0.63 MG/DL — SIGNIFICANT CHANGE UP (ref 0.5–1.3)
DACRYOCYTES BLD QL SMEAR: SLIGHT — SIGNIFICANT CHANGE UP
EOSINOPHIL # BLD AUTO: 0 K/UL — SIGNIFICANT CHANGE UP (ref 0–0.5)
EOSINOPHIL NFR BLD AUTO: 0 % — SIGNIFICANT CHANGE UP (ref 0–6)
GLUCOSE SERPL-MCNC: 95 MG/DL — SIGNIFICANT CHANGE UP (ref 70–99)
HCT VFR BLD CALC: 21.1 % — LOW (ref 39–50)
HGB BLD-MCNC: 7 G/DL — CRITICAL LOW (ref 13–17)
HYPOCHROMIA BLD QL: SLIGHT — SIGNIFICANT CHANGE UP
LDH SERPL L TO P-CCNC: 141 U/L — SIGNIFICANT CHANGE UP (ref 50–242)
LYMPHOCYTES # BLD AUTO: 0.15 K/UL — LOW (ref 1–3.3)
LYMPHOCYTES # BLD AUTO: 28 % — SIGNIFICANT CHANGE UP (ref 13–44)
MAGNESIUM SERPL-MCNC: 1.9 MG/DL — SIGNIFICANT CHANGE UP (ref 1.6–2.6)
MANUAL SMEAR VERIFICATION: SIGNIFICANT CHANGE UP
MCHC RBC-ENTMCNC: 25.2 PG — LOW (ref 27–34)
MCHC RBC-ENTMCNC: 33.2 GM/DL — SIGNIFICANT CHANGE UP (ref 32–36)
MCV RBC AUTO: 75.9 FL — LOW (ref 80–100)
MONOCYTES # BLD AUTO: 0.11 K/UL — SIGNIFICANT CHANGE UP (ref 0–0.9)
MONOCYTES NFR BLD AUTO: 20 % — HIGH (ref 2–14)
NEUTROPHILS # BLD AUTO: 0.29 K/UL — LOW (ref 1.8–7.4)
NEUTROPHILS NFR BLD AUTO: 40 % — LOW (ref 43–77)
NEUTS BAND # BLD: 12 % — HIGH (ref 0–8)
NRBC # BLD: 0 /100 — SIGNIFICANT CHANGE UP (ref 0–0)
OVALOCYTES BLD QL SMEAR: SLIGHT — SIGNIFICANT CHANGE UP
PHOSPHATE SERPL-MCNC: 2.8 MG/DL — SIGNIFICANT CHANGE UP (ref 2.5–4.5)
PLAT MORPH BLD: NORMAL — SIGNIFICANT CHANGE UP
PLATELET # BLD AUTO: 34 K/UL — LOW (ref 150–400)
POIKILOCYTOSIS BLD QL AUTO: SLIGHT — SIGNIFICANT CHANGE UP
POTASSIUM SERPL-MCNC: 3.3 MMOL/L — LOW (ref 3.5–5.3)
POTASSIUM SERPL-SCNC: 3.3 MMOL/L — LOW (ref 3.5–5.3)
PROT SERPL-MCNC: 5.2 G/DL — LOW (ref 6–8.3)
RBC # BLD: 2.78 M/UL — LOW (ref 4.2–5.8)
RBC # FLD: 14.6 % — HIGH (ref 10.3–14.5)
RBC BLD AUTO: ABNORMAL
RH IG SCN BLD-IMP: POSITIVE — SIGNIFICANT CHANGE UP
SODIUM SERPL-SCNC: 143 MMOL/L — SIGNIFICANT CHANGE UP (ref 135–145)
TOXIC GRANULES BLD QL SMEAR: PRESENT — SIGNIFICANT CHANGE UP
WBC # BLD: 0.55 K/UL — CRITICAL LOW (ref 3.8–10.5)
WBC # FLD AUTO: 0.55 K/UL — CRITICAL LOW (ref 3.8–10.5)

## 2020-07-17 PROCEDURE — 99291 CRITICAL CARE FIRST HOUR: CPT

## 2020-07-17 RX ORDER — POTASSIUM CHLORIDE 20 MEQ
20 PACKET (EA) ORAL
Refills: 0 | Status: COMPLETED | OUTPATIENT
Start: 2020-07-17 | End: 2020-07-17

## 2020-07-17 RX ADMIN — Medication 5 MILLILITER(S): at 07:46

## 2020-07-17 RX ADMIN — Medication 50 MILLIGRAM(S): at 10:30

## 2020-07-17 RX ADMIN — FLUCONAZOLE 400 MILLIGRAM(S): 150 TABLET ORAL at 11:27

## 2020-07-17 RX ADMIN — Medication 1 LOZENGE: at 07:45

## 2020-07-17 RX ADMIN — Medication 1 MILLIGRAM(S): at 11:19

## 2020-07-17 RX ADMIN — Medication 50 MILLIEQUIVALENT(S): at 10:55

## 2020-07-17 RX ADMIN — Medication 10 MILLILITER(S): at 11:19

## 2020-07-17 RX ADMIN — Medication 50 MILLIEQUIVALENT(S): at 08:30

## 2020-07-17 RX ADMIN — HEPARIN SODIUM 3.57 UNIT(S)/HR: 5000 INJECTION INTRAVENOUS; SUBCUTANEOUS at 20:32

## 2020-07-17 RX ADMIN — CHLORHEXIDINE GLUCONATE 1 APPLICATION(S): 213 SOLUTION TOPICAL at 05:02

## 2020-07-17 RX ADMIN — Medication 10 MILLILITER(S): at 16:41

## 2020-07-17 RX ADMIN — Medication 1 TABLET(S): at 11:19

## 2020-07-17 RX ADMIN — URSODIOL 300 MILLIGRAM(S): 250 TABLET, FILM COATED ORAL at 17:59

## 2020-07-17 RX ADMIN — Medication 400 MILLIGRAM(S): at 05:02

## 2020-07-17 RX ADMIN — PANTOPRAZOLE SODIUM 40 MILLIGRAM(S): 20 TABLET, DELAYED RELEASE ORAL at 06:07

## 2020-07-17 RX ADMIN — Medication 5 MILLILITER(S): at 11:19

## 2020-07-17 RX ADMIN — Medication 5 MILLILITER(S): at 20:32

## 2020-07-17 RX ADMIN — Medication 1 LOZENGE: at 16:42

## 2020-07-17 RX ADMIN — Medication 5 MILLILITER(S): at 23:04

## 2020-07-17 RX ADMIN — Medication 10 MILLILITER(S): at 23:05

## 2020-07-17 RX ADMIN — Medication 1 LOZENGE: at 11:19

## 2020-07-17 RX ADMIN — Medication 600 MICROGRAM(S): at 14:28

## 2020-07-17 RX ADMIN — Medication 5 MILLILITER(S): at 16:41

## 2020-07-17 RX ADMIN — Medication 1 LOZENGE: at 23:05

## 2020-07-17 RX ADMIN — Medication 10 MILLILITER(S): at 20:31

## 2020-07-17 RX ADMIN — URSODIOL 300 MILLIGRAM(S): 250 TABLET, FILM COATED ORAL at 07:45

## 2020-07-17 RX ADMIN — Medication 50 MILLIEQUIVALENT(S): at 14:28

## 2020-07-17 RX ADMIN — Medication 400 MILLIGRAM(S): at 14:28

## 2020-07-17 RX ADMIN — Medication 400 MILLIGRAM(S): at 21:07

## 2020-07-17 RX ADMIN — Medication 10 MILLILITER(S): at 07:45

## 2020-07-17 RX ADMIN — Medication 1 LOZENGE: at 20:31

## 2020-07-17 NOTE — PROVIDER CONTACT NOTE (CRITICAL VALUE NOTIFICATION) - ACTION/TREATMENT ORDERED:
continue to monitor
NO NEW ORDERS
NP aware and notified, One unit prbc and one unit platelets ordered and transfused. Will monitor for s/s of bleeding
NP aware and notified, One unit prbc ordered and transfusing well

## 2020-07-17 NOTE — PROVIDER CONTACT NOTE (CRITICAL VALUE NOTIFICATION) - BACKGROUND
DAY 0 STEM CELLS  HISTORY HEP C
10 days  post allo
Amloidosis  day + s/p autologous stem cell transplant
Amyloidosis day + 14 s/p autologous stem cell transplant

## 2020-07-17 NOTE — PROGRESS NOTE ADULT - SUBJECTIVE AND OBJECTIVE BOX
HPC Transplant Team                                                      Critical / Counseling Time Provided: 30 minutes                                                                                                                                                        Chief Complaint: Autologous pbsct with high dose melphalan prep regimen for treatment of amyloidosis    S: Patient seen and examined with HPC Transplant Team:   +fatigue   no complaints today      O: Vitals:   Vital Signs Last 24 Hrs  T(C): 37.5 (2020 05:08), Max: 37.7 (2020 18:43)  T(F): 99.5 (2020 05:08), Max: 99.8 (2020 18:43)  HR: 76 (2020 05:08) (74 - 78)  BP: 102/62 (2020 05:08) (102/62 - 125/77)  BP(mean): --  RR: 18 (2020 05:08) (18 - 18)  SpO2: 95% (2020 05:08) (95% - 100%)    Admit weight:   Daily Weight in k.2 (2020 09:30)  Today's weight:    Intake / Output:   07-16 @ 07:01  -  - @ 07:00  --------------------------------------------------------  IN: 3178.1 mL / OUT: 2225 mL / NET: 953.1 mL    PE:   HEENT: Oropharynx: no erythema or ulcerations  Oral Mucositis:               -                                         Grade: n/a  CVS: S1, S2 RRR   Lungs: CTA throughout bilaterally   Abdomen: + BS x 4: mildly hyperactive, soft, NT, ND   Extremities: + chronic trace LE edema; L>R   Gastric Mucositis:      -                                             Grade: n/a - resolved   Intestinal Mucositis:    -                                        Grade: n/a  Skin: hyperpigmentation on back, neck, chest, abdomen likely secondary to Kepivance   TLC: CDI  Neuro: A&O x 3  Pain: denies   Labs:       Cultures:   Culture Results: blood  No growth to date. (20 @ 00:37)    Culture Results: blood  No growth to date. (20 @ 00:37)    Culture Results: urine  No growth (20 @ 00:23)    Culture Results: blood  No growth to date. (20 @ 23:56)    Culture Results: blood  No growth to date. (20 @ 23:56)      Radiology:   EXAM:  XR CHEST PORTABLE URGENT 1V                        PROCEDURE DATE:  2020    IMPRESSION:  1.  The lungs are clear.    Meds:   Antimicrobials:   acyclovir   Oral Tab/Cap 400 milliGRAM(s) Oral every 8 hours  cefepime   IVPB      cefepime   IVPB 2000 milliGRAM(s) IV Intermittent every 8 hours  clotrimazole Lozenge 1 Lozenge Oral five times a day  fluconAZOLE   Tablet 400 milliGRAM(s) Oral daily      Heme / Onc:   heparin  Infusion 357 Unit(s)/Hr IV Continuous <Continuous>      GI:  aluminum hydroxide/magnesium hydroxide/simethicone Suspension 30 milliLiter(s) Oral every 4 hours PRN  bismuth subsalicylate Liquid 30 milliLiter(s) Oral every 4 hours PRN  loperamide 2 milliGRAM(s) Oral every 6 hours PRN  pantoprazole    Tablet 40 milliGRAM(s) Oral before breakfast  sodium bicarbonate Mouth Rinse 10 milliLiter(s) Swish and Spit five times a day  sucralfate suspension 1 Gram(s) Oral four times a day  ursodiol Capsule 300 milliGRAM(s) Oral two times a day with meals      Cardiovascular:   furosemide   Injectable 20 milliGRAM(s) IV Push every 24 hours PRN      Immunologic:   filgrastim-sndz (ZARXIO) Injectable 600 MICROGram(s) SubCutaneous every 24 hours      Other medications:   Biotene Dry Mouth Oral Rinse 5 milliLiter(s) Swish and Spit five times a day  chlorhexidine 4% Liquid 1 Application(s) Topical <User Schedule>  folic acid 1 milliGRAM(s) Oral daily  hydrocortisone 1% Cream 1 Application(s) Topical daily  lidocaine/prilocaine Cream 1 Application(s) Topical daily  multivitamin 1 Tablet(s) Oral daily  sodium chloride 0.9%. 1000 milliLiter(s) IV Continuous <Continuous>      PRN:   acetaminophen   Tablet .. 650 milliGRAM(s) Oral every 6 hours PRN  acetaminophen   Tablet .. 650 milliGRAM(s) Oral every 6 hours PRN  aluminum hydroxide/magnesium hydroxide/simethicone Suspension 30 milliLiter(s) Oral every 4 hours PRN  AQUAPHOR (petrolatum Ointment) 1 Application(s) Topical two times a day PRN  bismuth subsalicylate Liquid 30 milliLiter(s) Oral every 4 hours PRN  furosemide   Injectable 20 milliGRAM(s) IV Push every 24 hours PRN  hydrocortisone sodium succinate Injectable 50 milliGRAM(s) IV Push daily PRN  loperamide 2 milliGRAM(s) Oral every 6 hours PRN  LORazepam   Injectable 0.5 milliGRAM(s) IV Push daily PRN  metoclopramide Injectable 10 milliGRAM(s) IV Push every 6 hours PRN  ondansetron Injectable 8 milliGRAM(s) IV Push every 8 hours PRN  sodium chloride 0.9% lock flush 10 milliLiter(s) IV Push every 1 hour PRN    A/P:   59 year old male with a history of amyloidosis  Post Autologous PBSCT day + 15  Keep platelets =/> 40K for history of amyloidosis   Continue Cefepime through engraftment, cultures negative   Chemotherapy induced grade 1 intestinal mucositis (diarrhea) - c. diff negative - now resolved   Chemotherapy induced grade 1 GI mucositis - now resolved   Early engraftment, continue Zarxio daily until post engraftment dose increased to 600 micrograms on 7/15    1. Infectious Disease:   acyclovir   Oral Tab/Cap 400 milliGRAM(s) Oral every 8 hours  cefepime   IVPB      cefepime   IVPB 2000 milliGRAM(s) IV Intermittent every 8 hours  clotrimazole Lozenge 1 Lozenge Oral five times a day  fluconAZOLE   Tablet 400 milliGRAM(s) Oral daily    2. VOD Prophylaxis: Actigall, Glutamine, Heparin (dosed at 100 units / kg / day)     3. GI Prophylaxis:    pantoprazole    Tablet 40 milliGRAM(s) Oral before breakfast    4. Mouthcare - NS / NaHCO3 rinses, Mycelex, Biotene; Skin care     5. GVHD prophylaxis - n/a     6. Transfuse & replete electrolytes prn     7. IV hydration, daily weights, strict I&O, prn diuresis     8. PO intake as tolerated, nutrition follow up as needed, MVI, folic acid     9. Antiemetics, anti-diarrhea medications:   loperamide 2 milliGRAM(s) Oral every 6 hours PRN  LORazepam   Injectable 0.5 milliGRAM(s) IV Push daily PRN  metoclopramide Injectable 10 milliGRAM(s) IV Push every 6 hours PRN  ondansetron Injectable 8 milliGRAM(s) IV Push every 8 hours PRN  aluminum hydroxide/magnesium hydroxide/simethicone Suspension 30 milliLiter(s) Oral every 4 hours PRN  bismuth subsalicylate Liquid 30 milliLiter(s) Oral every 4 hours PRN    10. OOB as tolerated, physical therapy consult if needed     11. Monitor coags / fibrinogen 2x week, vitamin K as needed     12. Monitor closely for clinical changes, monitor for fevers     13. Emotional support provided, plan of care discussed and questions addressed     14. Patient education done regarding  plan of care, restrictions and discharge planning     15. Continue regular social work input     I have written the above note for Dr. Roman who performed service with me in the room.   Brinda Garrett NP-C ([phone])    I have seen and examined patient with NP, I agree with above note as scribed. [facility] Transplant Team                                                      Critical / Counseling Time Provided: 30 minutes                                                                                                                                                        Chief Complaint: Autologous pbsct with high dose melphalan prep regimen for treatment of amyloidosis    S: Patient seen and examined with HPC Transplant Team:   +fatigue   no complaints today      O: Vitals:   Vital Signs Last 24 Hrs  T(C): 37.5 (2020 05:08), Max: 37.7 (2020 18:43)  T(F): 99.5 (2020 05:08), Max: 99.8 (2020 18:43)  HR: 76 (2020 05:08) (74 - 78)  BP: 102/62 (2020 05:08) (102/62 - 125/77)  BP(mean): --  RR: 18 (2020 05:08) (18 - 18)  SpO2: 95% (2020 05:08) (95% - 100%)    Admit weight: 85.7kg   Daily Weight in k.2 (2020 09:30)  Today's weight:    Intake / Output:   07-16 @ 07:01  -  -17 @ 07:00  --------------------------------------------------------  IN: 3178.1 mL / OUT: 2225 mL / NET: 953.1 mL    PE:   HEENT: Oropharynx: no erythema or ulcerations  Oral Mucositis:               -                                         Grade: n/a  CVS: S1, S2 RRR   Lungs: CTA throughout bilaterally   Abdomen: + BS x 4: mildly hyperactive, soft, NT, ND   Extremities: + chronic trace LE edema; L>R   Gastric Mucositis:      -                                             Grade: n/a - resolved   Intestinal Mucositis:    -                                        Grade: n/a  Skin: hyperpigmentation on back, neck, chest, abdomen likely secondary to Kepiv[place]   TLC: CDI  Neuro: A&O x 3  Pain: denies     Labs:                         7.0    0.55  )-----------( 34       ( 2020 06:50 )             21.1     07-17    143  |  107  |  10  ----------------------------<  95  3.3<L>   |  27  |  0.63    Ca    8.5      2020 06:50  Phos  2.8       Mg     1.9         TPro  5.2<L>  /  Alb  3.3  /  TBili  0.2  /  DBili  <0.1  /  AST  16  /  ALT  10  /  AlkPhos  47        Cultures:   Culture Results: blood  No growth to date. (20 @ 00:37)    Culture Results: blood  No growth to date. (20 @ 00:37)    Culture Results: urine  No growth (20 @ 00:23)    Culture Results: blood  No growth to date. (20 @ 23:56)    Culture Results: blood  No growth to date. (20 @ 23:56)      Radiology:   EXAM:  XR CHEST PORTABLE URGENT 1V                        PROCEDURE DATE:  2020    IMPRESSION:  1.  The lungs are clear.    Meds:   Antimicrobials:   acyclovir   Oral Tab/Cap 400 milliGRAM(s) Oral every 8 hours  cefepime   IVPB      cefepime   IVPB 2000 milliGRAM(s) IV Intermittent every 8 hours  clotrimazole Lozenge 1 Lozenge Oral five times a day  fluconAZOLE   Tablet 400 milliGRAM(s) Oral daily      Heme / Onc:   heparin  Infusion 357 Unit(s)/Hr IV Continuous <Continuous>      GI:  aluminum hydroxide/magnesium hydroxide/simethicone Suspension 30 milliLiter(s) Oral every 4 hours PRN  bismuth subsalicylate Liquid 30 milliLiter(s) Oral every 4 hours PRN  loperamide 2 milliGRAM(s) Oral every 6 hours PRN  pantoprazole    Tablet 40 milliGRAM(s) Oral before breakfast  sodium bicarbonate Mouth Rinse 10 milliLiter(s) Swish and Spit five times a day  sucralfate suspension 1 Gram(s) Oral four times a day  ursodiol Capsule 300 milliGRAM(s) Oral two times a day with meals      Cardiovascular:   furosemide   Injectable 20 milliGRAM(s) IV Push every 24 hours PRN      Immunologic:   filgrastim-sndz (ZARXIO) Injectable 600 MICROGram(s) SubCutaneous every 24 hours      Other medications:   Biotene Dry Mouth Oral Rinse 5 milliLiter(s) Swish and Spit five times a day  chlorhexidine 4% Liquid 1 Application(s) Topical <User Schedule>  folic acid 1 milliGRAM(s) Oral daily  hydrocortisone 1% Cream 1 Application(s) Topical daily  lidocaine/prilocaine Cream 1 Application(s) Topical daily  multivitamin 1 Tablet(s) Oral daily  sodium chloride 0.9%. 1000 milliLiter(s) IV Continuous <Continuous>      PRN:   acetaminophen   Tablet .. 650 milliGRAM(s) Oral every 6 hours PRN  acetaminophen   Tablet .. 650 milliGRAM(s) Oral every 6 hours PRN  aluminum hydroxide/magnesium hydroxide/simethicone Suspension 30 milliLiter(s) Oral every 4 hours PRN  AQUAPHOR (petrolatum Ointment) 1 Application(s) Topical two times a day PRN  bismuth subsalicylate Liquid 30 milliLiter(s) Oral every 4 hours PRN  furosemide   Injectable 20 milliGRAM(s) IV Push every 24 hours PRN  hydrocortisone sodium succinate Injectable 50 milliGRAM(s) IV Push daily PRN  loperamide 2 milliGRAM(s) Oral every 6 hours PRN  LORazepam   Injectable 0.5 milliGRAM(s) IV Push daily PRN  metoclopramide Injectable 10 milliGRAM(s) IV Push every 6 hours PRN  ondansetron Injectable 8 milliGRAM(s) IV Push every 8 hours PRN  sodium chloride 0.9% lock flush 10 milliLiter(s) IV Push every 1 hour PRN    A/P:   59 year old male with a history of amyloidosis  Post Autologous PBSCT day + 15  Keep platelets =/> 40K for history of amyloidosis   Continue Cefepime through engraftment, cultures negative   Chemotherapy induced grade 1 intestinal mucositis (diarrhea) - c. diff negative - now resolved   Chemotherapy induced grade 1 GI mucositis - now resolved   Early engraftment, continue Zarxio daily until post engraftment dose increased to 600 micrograms on 7/15    1. Infectious Disease:   acyclovir   Oral Tab/Cap 400 milliGRAM(s) Oral every 8 hours  cefepime   IVPB      cefepime   IVPB 2000 milliGRAM(s) IV Intermittent every 8 hours  clotrimazole Lozenge 1 Lozenge Oral five times a day  fluconAZOLE   Tablet 400 milliGRAM(s) Oral daily    2. VOD Prophylaxis: Actigall, Glutamine, Heparin (dosed at 100 units / kg / day)     3. GI Prophylaxis:    pantoprazole    Tablet 40 milliGRAM(s) Oral before breakfast    4. Mouthcare - NS / NaHCO3 rinses, Mycelex, Biotene; Skin care     5. GVHD prophylaxis - n/a     6. Transfuse & replete electrolytes prn   1 unit PRBC   1 bag platelets  KCl 20 mEq IV x 3     7. IV hydration, daily weights, strict I&O, prn diuresis     8. PO intake as tolerated, nutrition follow up as needed, MVI, folic acid     9. Antiemetics, anti-diarrhea medications:   loperamide 2 milliGRAM(s) Oral every 6 hours PRN  LORazepam   Injectable 0.5 milliGRAM(s) IV Push daily PRN  metoclopramide Injectable 10 milliGRAM(s) IV Push every 6 hours PRN  ondansetron Injectable 8 milliGRAM(s) IV Push every 8 hours PRN  aluminum hydroxide/magnesium hydroxide/simethicone Suspension 30 milliLiter(s) Oral every 4 hours PRN  bismuth subsalicylate Liquid 30 milliLiter(s) Oral every 4 hours PRN    10. OOB as tolerated, physical therapy consult if needed     11. Monitor coags / fibrinogen 2x week, vitamin K as needed     12. Monitor closely for clinical changes, monitor for fevers     13. Emotional support provided, plan of care discussed and questions addressed     14. Patient education done regarding  plan of care, restrictions and discharge planning     15. Continue regular social work input     I have written the above note for Dr. Roman who performed service with me in the room.   Brinda Garrett NP-C ([phone])    I have seen and examined patient with NP, I agree with above note as scribed. [facility]PC Transplant Team                                                      Critical / Counseling Time Provided: 30 minutes                                                                                                                                                        Chief Complaint: Autologous pbsct with high dose melphalan prep regimen for treatment of amyloidosis    S: Patient seen and examined with HPC Transplant Team:   +fatigue   no complaints today      O: Vitals:   Vital Signs Last 24 Hrs  T(C): 37.5 (2020 05:08), Max: 37.7 (2020 18:43)  T(F): 99.5 (2020 05:08), Max: 99.8 (2020 18:43)  HR: 76 (2020 05:08) (74 - 78)  BP: 102/62 (2020 05:08) (102/62 - 125/77)  BP(mean): --  RR: 18 (2020 05:08) (18 - 18)  SpO2: 95% (2020 05:08) (95% - 100%)    Admit weight: 85.7kg   Daily Weight in k.2 (2020 09:30)  Today's weight: 82.4kg     Intake / Output:   -16 @ 07:01  -  07-17 @ 07:00  --------------------------------------------------------  IN: 3178.1 mL / OUT: 2225 mL / NET: 953.1 mL    PE:   HEENT: Oropharynx: no erythema or ulcerations  Oral Mucositis:               -                                         Grade: n/a  CVS: S1, S2 RRR   Lungs: CTA throughout bilaterally   Abdomen: + BS x 4: mildly hyperactive, soft, NT, ND   Extremities: + chronic trace LE edema; L>R   Gastric Mucositis:      -                                             Grade: n/a - resolved   Intestinal Mucositis:    -                                        Grade: n/a  Skin: hyperpigmentation on back, neck, chest, abdomen likely secondary to Kepiv[place]   TLC: CDI  Neuro: A&O x 3  Pain: denies     Labs:                         7.0    0.55  )-----------( 34       ( 2020 06:50 )             21.1     07-17    143  |  107  |  10  ----------------------------<  95  3.3<L>   |  27  |  0.63    Ca    8.5      2020 06:50  Phos  2.8       Mg     1.9         TPro  5.2<L>  /  Alb  3.3  /  TBili  0.2  /  DBili  <0.1  /  AST  16  /  ALT  10  /  AlkPhos  47        Cultures:   Culture Results: blood  No growth to date. (20 @ 00:37)    Culture Results: blood  No growth to date. (20 @ 00:37)    Culture Results: urine  No growth (20 @ 00:23)    Culture Results: blood  No growth to date. (20 @ 23:56)    Culture Results: blood  No growth to date. (20 @ 23:56)      Radiology:   EXAM:  XR CHEST PORTABLE URGENT 1V                        PROCEDURE DATE:  2020    IMPRESSION:  1.  The lungs are clear.    Meds:   Antimicrobials:   acyclovir   Oral Tab/Cap 400 milliGRAM(s) Oral every 8 hours  cefepime   IVPB      cefepime   IVPB 2000 milliGRAM(s) IV Intermittent every 8 hours  clotrimazole Lozenge 1 Lozenge Oral five times a day  fluconAZOLE   Tablet 400 milliGRAM(s) Oral daily      Heme / Onc:   heparin  Infusion 357 Unit(s)/Hr IV Continuous <Continuous>      GI:  aluminum hydroxide/magnesium hydroxide/simethicone Suspension 30 milliLiter(s) Oral every 4 hours PRN  bismuth subsalicylate Liquid 30 milliLiter(s) Oral every 4 hours PRN  loperamide 2 milliGRAM(s) Oral every 6 hours PRN  pantoprazole    Tablet 40 milliGRAM(s) Oral before breakfast  sodium bicarbonate Mouth Rinse 10 milliLiter(s) Swish and Spit five times a day  sucralfate suspension 1 Gram(s) Oral four times a day  ursodiol Capsule 300 milliGRAM(s) Oral two times a day with meals      Cardiovascular:   furosemide   Injectable 20 milliGRAM(s) IV Push every 24 hours PRN      Immunologic:   filgrastim-sndz (ZARXIO) Injectable 600 MICROGram(s) SubCutaneous every 24 hours      Other medications:   Biotene Dry Mouth Oral Rinse 5 milliLiter(s) Swish and Spit five times a day  chlorhexidine 4% Liquid 1 Application(s) Topical <User Schedule>  folic acid 1 milliGRAM(s) Oral daily  hydrocortisone 1% Cream 1 Application(s) Topical daily  lidocaine/prilocaine Cream 1 Application(s) Topical daily  multivitamin 1 Tablet(s) Oral daily  sodium chloride 0.9%. 1000 milliLiter(s) IV Continuous <Continuous>      PRN:   acetaminophen   Tablet .. 650 milliGRAM(s) Oral every 6 hours PRN  acetaminophen   Tablet .. 650 milliGRAM(s) Oral every 6 hours PRN  aluminum hydroxide/magnesium hydroxide/simethicone Suspension 30 milliLiter(s) Oral every 4 hours PRN  AQUAPHOR (petrolatum Ointment) 1 Application(s) Topical two times a day PRN  bismuth subsalicylate Liquid 30 milliLiter(s) Oral every 4 hours PRN  furosemide   Injectable 20 milliGRAM(s) IV Push every 24 hours PRN  hydrocortisone sodium succinate Injectable 50 milliGRAM(s) IV Push daily PRN  loperamide 2 milliGRAM(s) Oral every 6 hours PRN  LORazepam   Injectable 0.5 milliGRAM(s) IV Push daily PRN  metoclopramide Injectable 10 milliGRAM(s) IV Push every 6 hours PRN  ondansetron Injectable 8 milliGRAM(s) IV Push every 8 hours PRN  sodium chloride 0.9% lock flush 10 milliLiter(s) IV Push every 1 hour PRN    A/P:   59 year old male with a history of amyloidosis  Post Autologous PBSCT day + 15  Keep platelets =/> 40K for history of amyloidosis   Continue Cefepime through engraftment, cultures negative   Chemotherapy induced grade 1 intestinal mucositis (diarrhea) - c. diff negative - now resolved   Chemotherapy induced grade 1 GI mucositis - now resolved   Early engraftment, continue Zarxio daily until post engraftment dose increased to 600 micrograms on 7/15    1. Infectious Disease:   acyclovir   Oral Tab/Cap 400 milliGRAM(s) Oral every 8 hours  cefepime   IVPB      cefepime   IVPB 2000 milliGRAM(s) IV Intermittent every 8 hours  clotrimazole Lozenge 1 Lozenge Oral five times a day  fluconAZOLE   Tablet 400 milliGRAM(s) Oral daily    2. VOD Prophylaxis: Actigall, Glutamine, Heparin (dosed at 100 units / kg / day)     3. GI Prophylaxis:    pantoprazole    Tablet 40 milliGRAM(s) Oral before breakfast    4. Mouthcare - NS / NaHCO3 rinses, Mycelex, Biotene; Skin care     5. GVHD prophylaxis - n/a     6. Transfuse & replete electrolytes prn   1 unit PRBC   1 bag platelets  KCl 20 mEq IV x 3     7. IV hydration, daily weights, strict I&O, prn diuresis     8. PO intake as tolerated, nutrition follow up as needed, MVI, folic acid     9. Antiemetics, anti-diarrhea medications:   loperamide 2 milliGRAM(s) Oral every 6 hours PRN  LORazepam   Injectable 0.5 milliGRAM(s) IV Push daily PRN  metoclopramide Injectable 10 milliGRAM(s) IV Push every 6 hours PRN  ondansetron Injectable 8 milliGRAM(s) IV Push every 8 hours PRN  aluminum hydroxide/magnesium hydroxide/simethicone Suspension 30 milliLiter(s) Oral every 4 hours PRN  bismuth subsalicylate Liquid 30 milliLiter(s) Oral every 4 hours PRN    10. OOB as tolerated, physical therapy consult if needed     11. Monitor coags / fibrinogen 2x week, vitamin K as needed     12. Monitor closely for clinical changes, monitor for fevers     13. Emotional support provided, plan of care discussed and questions addressed     14. Patient education done regarding  plan of care, restrictions and discharge planning     15. Continue regular social work input     I have written the above note for Dr. Roman who performed service with me in the room.   Brinda Garrett NP-C ([phone])    I have seen and examined patient with NP, I agree with above note as scribed.

## 2020-07-17 NOTE — PROGRESS NOTE ADULT - ATTENDING COMMENTS
59 year old male with history of amyloidosis treated with CyBorD, now admitted for autologous pbsct with Melphalan preparative regimen   s/p HPC transplant on 7/2/20, now day + 15..slow movement upward    -Continue Zarxio daily until engraftment with WBC recovery..increase dose to 600  -IV hydration, strict I&O, daily weight, Lasix to keep O > I  -VOD prophylaxis- Actigall, low dose heparin gtt & glutamine supplementation  -ID- Neutropenic fevers- on Cefepime; continue Fluconazole / Acyclovir prophylaxis. Has been afebrile x48 hrs;  hold on  CT scans, will reassess tomorrow if he has recurrent fevers.  Last COVID PCR on 7/8 was negative  -GI mucositis secondary to antineoplastic chemotherapy- continue PPI, add Carafate; Imodium for diarrhea. Improved, as per pt  -Antiemetics  -Supplement lytes prn  - transfuse if plts less than 40.......tx rbc as needed  -Mouth care, skin care  -OOB/ambulate  - d/c planning for next week 59 year old male with history of amyloidosis treated with CyBorD, now admitted for autologous pbsct with Melphalan preparative regimen   s/p HPC transplant on 7/2/20, now day + 15..slow movement upward    -Continue Zarxio daily until engraftment with WBC recovery..increased  dose to 600  -IV hydration, strict I&O, daily weight, Lasix to keep O > I  -VOD prophylaxis- Actigall, low dose heparin gtt & glutamine supplementation  -ID- Neutropenic fevers- on Cefepime; continue Fluconazole / Acyclovir prophylaxis. Has been afebrile x48 hrs;  hold on  CT scans, will reassess tomorrow if he has recurrent fevers.  Last COVID PCR on 7/8 was negative  -GI mucositis secondary to antineoplastic chemotherapy- continue PPI, add Carafate; Imodium for diarrhea. Improved, as per pt  -Antiemetics  -Supplement lytes prn  - for this weekend  transfuse if plts less than 15.....now that counts are recovering and GI tract should be healing.....tx rbc as needed  -Mouth care, skin care  -OOB/ambulate  - d/c planning for next week

## 2020-07-18 LAB
ALBUMIN SERPL ELPH-MCNC: 3.5 G/DL — SIGNIFICANT CHANGE UP (ref 3.3–5)
ALP SERPL-CCNC: 51 U/L — SIGNIFICANT CHANGE UP (ref 40–120)
ALT FLD-CCNC: 13 U/L — SIGNIFICANT CHANGE UP (ref 10–45)
ANION GAP SERPL CALC-SCNC: 11 MMOL/L — SIGNIFICANT CHANGE UP (ref 5–17)
AST SERPL-CCNC: 14 U/L — SIGNIFICANT CHANGE UP (ref 10–40)
BASOPHILS # BLD AUTO: 0 K/UL — SIGNIFICANT CHANGE UP (ref 0–0.2)
BASOPHILS NFR BLD AUTO: 0 % — SIGNIFICANT CHANGE UP (ref 0–2)
BILIRUB SERPL-MCNC: 0.2 MG/DL — SIGNIFICANT CHANGE UP (ref 0.2–1.2)
BUN SERPL-MCNC: 9 MG/DL — SIGNIFICANT CHANGE UP (ref 7–23)
CALCIUM SERPL-MCNC: 8.9 MG/DL — SIGNIFICANT CHANGE UP (ref 8.4–10.5)
CHLORIDE SERPL-SCNC: 107 MMOL/L — SIGNIFICANT CHANGE UP (ref 96–108)
CO2 SERPL-SCNC: 25 MMOL/L — SIGNIFICANT CHANGE UP (ref 22–31)
CREAT SERPL-MCNC: 0.71 MG/DL — SIGNIFICANT CHANGE UP (ref 0.5–1.3)
EOSINOPHIL # BLD AUTO: 0 K/UL — SIGNIFICANT CHANGE UP (ref 0–0.5)
EOSINOPHIL NFR BLD AUTO: 0 % — SIGNIFICANT CHANGE UP (ref 0–6)
GLUCOSE SERPL-MCNC: 91 MG/DL — SIGNIFICANT CHANGE UP (ref 70–99)
HCT VFR BLD CALC: 22.5 % — LOW (ref 39–50)
HGB BLD-MCNC: 7.5 G/DL — LOW (ref 13–17)
LDH SERPL L TO P-CCNC: 225 U/L — SIGNIFICANT CHANGE UP (ref 50–242)
LYMPHOCYTES # BLD AUTO: 0.16 K/UL — LOW (ref 1–3.3)
LYMPHOCYTES # BLD AUTO: 6.4 % — LOW (ref 13–44)
MAGNESIUM SERPL-MCNC: 1.9 MG/DL — SIGNIFICANT CHANGE UP (ref 1.6–2.6)
MANUAL SMEAR VERIFICATION: SIGNIFICANT CHANGE UP
MCHC RBC-ENTMCNC: 25.5 PG — LOW (ref 27–34)
MCHC RBC-ENTMCNC: 33.3 GM/DL — SIGNIFICANT CHANGE UP (ref 32–36)
MCV RBC AUTO: 76.5 FL — LOW (ref 80–100)
METAMYELOCYTES # FLD: 0.9 % — HIGH (ref 0–0)
MONOCYTES # BLD AUTO: 0.23 K/UL — SIGNIFICANT CHANGE UP (ref 0–0.9)
MONOCYTES NFR BLD AUTO: 9.1 % — SIGNIFICANT CHANGE UP (ref 2–14)
MYELOCYTES NFR BLD: 3.6 % — HIGH (ref 0–0)
NEUTROPHILS # BLD AUTO: 1.95 K/UL — SIGNIFICANT CHANGE UP (ref 1.8–7.4)
NEUTROPHILS NFR BLD AUTO: 69.1 % — SIGNIFICANT CHANGE UP (ref 43–77)
NEUTS BAND # BLD: 7.3 % — SIGNIFICANT CHANGE UP (ref 0–8)
NRBC # BLD: 2 /100 — HIGH (ref 0–0)
PHOSPHATE SERPL-MCNC: 2.5 MG/DL — SIGNIFICANT CHANGE UP (ref 2.5–4.5)
PLAT MORPH BLD: NORMAL — SIGNIFICANT CHANGE UP
PLATELET # BLD AUTO: 47 K/UL — LOW (ref 150–400)
POTASSIUM SERPL-MCNC: 3.5 MMOL/L — SIGNIFICANT CHANGE UP (ref 3.5–5.3)
POTASSIUM SERPL-SCNC: 3.5 MMOL/L — SIGNIFICANT CHANGE UP (ref 3.5–5.3)
PROMYELOCYTES # FLD: 2.7 % — HIGH (ref 0–0)
PROT SERPL-MCNC: 5.4 G/DL — LOW (ref 6–8.3)
RBC # BLD: 2.94 M/UL — LOW (ref 4.2–5.8)
RBC # FLD: 15.2 % — HIGH (ref 10.3–14.5)
RBC BLD AUTO: SIGNIFICANT CHANGE UP
SODIUM SERPL-SCNC: 143 MMOL/L — SIGNIFICANT CHANGE UP (ref 135–145)
VARIANT LYMPHS # BLD: 0.9 % — SIGNIFICANT CHANGE UP (ref 0–6)
WBC # BLD: 2.55 K/UL — LOW (ref 3.8–10.5)
WBC # FLD AUTO: 2.55 K/UL — LOW (ref 3.8–10.5)

## 2020-07-18 PROCEDURE — 99291 CRITICAL CARE FIRST HOUR: CPT

## 2020-07-18 RX ADMIN — Medication 5 MILLILITER(S): at 17:12

## 2020-07-18 RX ADMIN — Medication 600 MICROGRAM(S): at 17:13

## 2020-07-18 RX ADMIN — Medication 10 MILLILITER(S): at 13:20

## 2020-07-18 RX ADMIN — Medication 400 MILLIGRAM(S): at 20:54

## 2020-07-18 RX ADMIN — Medication 5 MILLILITER(S): at 13:20

## 2020-07-18 RX ADMIN — Medication 1 LOZENGE: at 08:05

## 2020-07-18 RX ADMIN — Medication 1 MILLIGRAM(S): at 13:18

## 2020-07-18 RX ADMIN — URSODIOL 300 MILLIGRAM(S): 250 TABLET, FILM COATED ORAL at 17:14

## 2020-07-18 RX ADMIN — Medication 400 MILLIGRAM(S): at 13:19

## 2020-07-18 RX ADMIN — Medication 10 MILLILITER(S): at 23:02

## 2020-07-18 RX ADMIN — URSODIOL 300 MILLIGRAM(S): 250 TABLET, FILM COATED ORAL at 08:08

## 2020-07-18 RX ADMIN — Medication 1 LOZENGE: at 13:19

## 2020-07-18 RX ADMIN — HEPARIN SODIUM 3.57 UNIT(S)/HR: 5000 INJECTION INTRAVENOUS; SUBCUTANEOUS at 19:26

## 2020-07-18 RX ADMIN — Medication 10 MILLILITER(S): at 19:28

## 2020-07-18 RX ADMIN — PANTOPRAZOLE SODIUM 40 MILLIGRAM(S): 20 TABLET, DELAYED RELEASE ORAL at 05:52

## 2020-07-18 RX ADMIN — Medication 1 LOZENGE: at 19:27

## 2020-07-18 RX ADMIN — Medication 1 TABLET(S): at 13:19

## 2020-07-18 RX ADMIN — Medication 1 LOZENGE: at 17:13

## 2020-07-18 RX ADMIN — Medication 400 MILLIGRAM(S): at 05:52

## 2020-07-18 RX ADMIN — Medication 5 MILLILITER(S): at 23:02

## 2020-07-18 RX ADMIN — Medication 10 MILLILITER(S): at 17:13

## 2020-07-18 RX ADMIN — Medication 5 MILLILITER(S): at 08:05

## 2020-07-18 RX ADMIN — FLUCONAZOLE 400 MILLIGRAM(S): 150 TABLET ORAL at 13:20

## 2020-07-18 RX ADMIN — Medication 1 LOZENGE: at 23:02

## 2020-07-18 RX ADMIN — Medication 5 MILLILITER(S): at 19:27

## 2020-07-18 RX ADMIN — CHLORHEXIDINE GLUCONATE 1 APPLICATION(S): 213 SOLUTION TOPICAL at 12:00

## 2020-07-18 RX ADMIN — Medication 0.5 MILLIGRAM(S): at 21:35

## 2020-07-18 RX ADMIN — Medication 10 MILLILITER(S): at 08:06

## 2020-07-18 NOTE — PROGRESS NOTE ADULT - SUBJECTIVE AND OBJECTIVE BOX
[facility]PC Transplant Team                                                      Critical / Counseling Time Provided: 30 minutes                                                                                                                                                        Chief Complaint: Autologous pbsct with high dose melphalan prep regimen for treatment of amyloidosis    S: Patient seen and examined with HPC Transplant Team:   +fatigue   no complaints today    Denies mouth / tongue / throat pain, dyspnea, cough, nausea, vomiting, diarrhea, abdominal pain     O: Vitals:   Vital Signs Last 24 Hrs  T(C): 37.3 (2020 06:05), Max: 37.6 (2020 19:18)  T(F): 99.1 (2020 06:05), Max: 99.7 (2020 19:18)  HR: 80 (2020 06:05) (70 - 88)  BP: 102/68 (2020 06:05) (102/68 - 120/79)  BP(mean): --  RR: 18 (2020 06:05) (18 - 20)  SpO2: 95% (2020 06:05) (95% - 100%)    Admit weight: 85.7 kg   Daily Weight in k.4 (2020 09:15)  Today's weight: pending     Intake / Output:   17 @ 07:01  -  -18 @ 07:00  --------------------------------------------------------  IN: 3290 mL / OUT: 3650 mL / NET: -360 mL      PE:   HEENT: Oropharynx: no erythema or ulcerations  Oral Mucositis:               -                                         Grade: n/a  CVS: S1, S2 RRR   Lungs: CTA throughout bilaterally   Abdomen: + BS x 4: mildly hyperactive, soft, NT, ND   Extremities: + chronic trace LE edema; L>R   Gastric Mucositis:      -                                             Grade: n/a - resolved   Intestinal Mucositis:    -                                        Grade: n/a  Skin: hyperpigmentation on back, neck, chest, abdomen likely secondary to Kepivance   TLC: CDI  Neuro: A&O x 3  Pain: denies       Labs:   CBC Full  -  ( 2020 06:49 )  WBC Count : 2.55 K/uL  Hemoglobin : 7.5 g/dL  Hematocrit : 22.5 %  Platelet Count - Automated : 47 K/uL  Mean Cell Volume : 76.5 fl  Mean Cell Hemoglobin : 25.5 pg  Mean Cell Hemoglobin Concentration : 33.3 gm/dL  Auto Neutrophil # : x  Auto Lymphocyte # : x  Auto Monocyte # : x  Auto Eosinophil # : x  Auto Basophil # : x  Auto Neutrophil % : x  Auto Lymphocyte % : x  Auto Monocyte % : x  Auto Eosinophil % : x  Auto Basophil % : x                          7.5    2.55  )-----------( 47       ( 2020 06:49 )             22.5     -18    143  |  107  |  9   ----------------------------<  91  3.5   |  25  |  0.71    Ca    8.9      2020 06:49  Phos  2.5       Mg     1.9         TPro  5.4<L>  /  Alb  3.5  /  TBili  0.2  /  DBili  x   /  AST  14  /  ALT  13  /  AlkPhos  51        LIVER FUNCTIONS - ( 2020 06:49 )  Alb: 3.5 g/dL / Pro: 5.4 g/dL / ALK PHOS: 51 U/L / ALT: 13 U/L / AST: 14 U/L / GGT: x           Lactate Dehydrogenase, Serum: 225 U/L ( @ 06:49)    Cultures:   Culture - Blood (20 @ 00:37)    Specimen Source: .Blood Blood-Catheter    Culture Results:   No Growth Final    Radiology:   < from: Xray Chest 1 View- PORTABLE-Urgent (20 @ 21:05) >  IMPRESSION:  1.  The lungs are clear.    Meds:   Antimicrobials:   acyclovir   Oral Tab/Cap 400 milliGRAM(s) Oral every 8 hours  cefepime   IVPB 2000 milliGRAM(s) IV Intermittent every 8 hours  clotrimazole Lozenge 1 Lozenge Oral five times a day  fluconAZOLE   Tablet 400 milliGRAM(s) Oral daily    Heme / Onc:   heparin  Infusion 357 Unit(s)/Hr IV Continuous <Continuous>    GI:  aluminum hydroxide/magnesium hydroxide/simethicone Suspension 30 milliLiter(s) Oral every 4 hours PRN  bismuth subsalicylate Liquid 30 milliLiter(s) Oral every 4 hours PRN  loperamide 2 milliGRAM(s) Oral every 6 hours PRN  pantoprazole    Tablet 40 milliGRAM(s) Oral before breakfast  sodium bicarbonate Mouth Rinse 10 milliLiter(s) Swish and Spit five times a day  sucralfate suspension 1 Gram(s) Oral four times a day  ursodiol Capsule 300 milliGRAM(s) Oral two times a day with meals    Cardiovascular:   furosemide   Injectable 20 milliGRAM(s) IV Push every 24 hours PRN    Immunologic:   filgrastim-sndz (ZARXIO) Injectable 600 MICROGram(s) SubCutaneous every 24 hours    Other medications:   Biotene Dry Mouth Oral Rinse 5 milliLiter(s) Swish and Spit five times a day  chlorhexidine 4% Liquid 1 Application(s) Topical <User Schedule>  folic acid 1 milliGRAM(s) Oral daily  hydrocortisone 1% Cream 1 Application(s) Topical daily  lidocaine/prilocaine Cream 1 Application(s) Topical daily  multivitamin 1 Tablet(s) Oral daily  sodium chloride 0.9%. 1000 milliLiter(s) IV Continuous <Continuous>      PRN:   acetaminophen   Tablet .. 650 milliGRAM(s) Oral every 6 hours PRN  acetaminophen   Tablet .. 650 milliGRAM(s) Oral every 6 hours PRN  aluminum hydroxide/magnesium hydroxide/simethicone Suspension 30 milliLiter(s) Oral every 4 hours PRN  AQUAPHOR (petrolatum Ointment) 1 Application(s) Topical two times a day PRN  bismuth subsalicylate Liquid 30 milliLiter(s) Oral every 4 hours PRN  furosemide   Injectable 20 milliGRAM(s) IV Push every 24 hours PRN  hydrocortisone sodium succinate Injectable 50 milliGRAM(s) IV Push daily PRN  loperamide 2 milliGRAM(s) Oral every 6 hours PRN  metoclopramide Injectable 10 milliGRAM(s) IV Push every 6 hours PRN  ondansetron Injectable 8 milliGRAM(s) IV Push every 8 hours PRN  sodium chloride 0.9% lock flush 10 milliLiter(s) IV Push every 1 hour PRN      A/P:  59 year old male with a history of amyloidosis  Post Autologous PBSCT day + 16  Keep platelets =/> 40K for history of amyloidosis   Continue Cefepime through engraftment, cultures negative   Chemotherapy induced grade 1 intestinal mucositis (diarrhea) - c. diff negative - now resolved   Chemotherapy induced grade 1 GI mucositis - now resolved   Early engraftment, continue Zarxio daily until post engraftment dose increased to 600 micrograms on 7/15    1. Infectious Disease:   acyclovir   Oral Tab/Cap 400 milliGRAM(s) Oral every 8 hours  cefepime   IVPB 2000 milliGRAM(s) IV Intermittent every 8 hours  clotrimazole Lozenge 1 Lozenge Oral five times a day  fluconAZOLE   Tablet 400 milliGRAM(s) Oral daily    2. VOD Prophylaxis: Actigall, Glutamine, Heparin (dosed at 100 units / kg / day)     3. GI Prophylaxis:    pantoprazole    Tablet 40 milliGRAM(s) Oral before breakfast    4. Mouthcare - NS / NaHCO3 rinses, Mycelex, Biotene; Skin care     5. GVHD prophylaxis - n/a     6. Transfuse & replete electrolytes prn     7. IV hydration, daily weights, strict I&O, prn diuresis     8. PO intake as tolerated, nutrition follow up as needed, MVI, folic acid     9. Antiemetics, anti-diarrhea medications:   loperamide 2 milliGRAM(s) Oral every 6 hours PRN  LORazepam   Injectable 0.5 milliGRAM(s) IV Push daily PRN  metoclopramide Injectable 10 milliGRAM(s) IV Push every 6 hours PRN  ondansetron Injectable 8 milliGRAM(s) IV Push every 8 hours PRN  aluminum hydroxide/magnesium hydroxide/simethicone Suspension 30 milliLiter(s) Oral every 4 hours PRN  bismuth subsalicylate Liquid 30 milliLiter(s) Oral every 4 hours PRN    10. OOB as tolerated, physical therapy consult if needed     11. Monitor coags / fibrinogen 2x week, vitamin K as needed     12. Monitor closely for clinical changes, monitor for fevers     13. Emotional support provided, plan of care discussed and questions addressed     14. Patient education done regarding  plan of care, restrictions and discharge planning     15. Continue regular social work input     I have written the above note for Dr. Goldberg who performed service with me in the room.   Larissa RODRIGUEZC ([phone])    I have seen and examined patient with NP, I agree with above note as scribed. [facility] Transplant Team                                                      Critical / Counseling Time Provided: 30 minutes                                                                                                                                                        Chief Complaint: Autologous pbsct with high dose melphalan prep regimen for treatment of amyloidosis    S: Patient seen and examined with HPC Transplant Team:   no acute complaints     Denies mouth / tongue / throat pain, dyspnea, cough, nausea, vomiting, diarrhea, abdominal pain     O: Vitals:   Vital Signs Last 24 Hrs  T(C): 37.3 (2020 06:05), Max: 37.6 (2020 19:18)  T(F): 99.1 (2020 06:05), Max: 99.7 (2020 19:18)  HR: 80 (2020 06:05) (70 - 88)  BP: 102/68 (2020 06:05) (102/68 - 120/79)  BP(mean): --  RR: 18 (2020 06:05) (18 - 20)  SpO2: 95% (2020 06:05) (95% - 100%)    Admit weight: 85.7 kg   Daily Weight in k.4 (2020 09:15)  Today's weight: 83.2 kg     Intake / Output:   17 @ 07:01  -  18 @ 07:00  --------------------------------------------------------  IN: 3290 mL / OUT: 3650 mL / NET: -360 mL      PE:   HEENT: Oropharynx: no erythema or ulcerations  Oral Mucositis:               -                                         Grade: n/a  CVS: S1, S2 RRR   Lungs: CTA throughout bilaterally   Abdomen: + BS x 4: mildly hyperactive, soft, NT, ND   Extremities: + chronic trace LE edema; L>R   Gastric Mucositis:      -                                             Grade: n/a - resolved   Intestinal Mucositis:    -                                        Grade: n/a  Skin: hyperpigmentation on back, neck, chest, abdomen likely secondary to Kepiv[place]   TLC: CDI  Neuro: A&O x 3  Pain: denies       Labs:   CBC Full  -  ( 2020 06:49 )  WBC Count : 2.55 K/uL  Hemoglobin : 7.5 g/dL  Hematocrit : 22.5 %  Platelet Count - Automated : 47 K/uL  Mean Cell Volume : 76.5 fl  Mean Cell Hemoglobin : 25.5 pg  Mean Cell Hemoglobin Concentration : 33.3 gm/dL  Auto Neutrophil # : x  Auto Lymphocyte # : x  Auto Monocyte # : x  Auto Eosinophil # : x  Auto Basophil # : x  Auto Neutrophil % : x  Auto Lymphocyte % : x  Auto Monocyte % : x  Auto Eosinophil % : x  Auto Basophil % : x                          7.5    2.55  )-----------( 47       ( 2020 06:49 )             22.5     -18    143  |  107  |  9   ----------------------------<  91  3.5   |  25  |  0.71    Ca    8.9      2020 06:49  Phos  2.5       Mg     1.9         TPro  5.4<L>  /  Alb  3.5  /  TBili  0.2  /  DBili  x   /  AST  14  /  ALT  13  /  AlkPhos  51        LIVER FUNCTIONS - ( 2020 06:49 )  Alb: 3.5 g/dL / Pro: 5.4 g/dL / ALK PHOS: 51 U/L / ALT: 13 U/L / AST: 14 U/L / GGT: x           Lactate Dehydrogenase, Serum: 225 U/L ( @ 06:49)    Cultures:   Culture - Blood (20 @ 00:37)    Specimen Source: .Blood Blood-Catheter    Culture Results:   No Growth Final    Radiology:   < from: Xray Chest 1 View- PORTABLE-Urgent (20 @ 21:05) >  IMPRESSION:  1.  The lungs are clear.    Meds:   Antimicrobials:   acyclovir   Oral Tab/Cap 400 milliGRAM(s) Oral every 8 hours  cefepime   IVPB 2000 milliGRAM(s) IV Intermittent every 8 hours  clotrimazole Lozenge 1 Lozenge Oral five times a day  fluconAZOLE   Tablet 400 milliGRAM(s) Oral daily    Heme / Onc:   heparin  Infusion 357 Unit(s)/Hr IV Continuous <Continuous>    GI:  aluminum hydroxide/magnesium hydroxide/simethicone Suspension 30 milliLiter(s) Oral every 4 hours PRN  bismuth subsalicylate Liquid 30 milliLiter(s) Oral every 4 hours PRN  loperamide 2 milliGRAM(s) Oral every 6 hours PRN  pantoprazole    Tablet 40 milliGRAM(s) Oral before breakfast  sodium bicarbonate Mouth Rinse 10 milliLiter(s) Swish and Spit five times a day  sucralfate suspension 1 Gram(s) Oral four times a day  ursodiol Capsule 300 milliGRAM(s) Oral two times a day with meals    Cardiovascular:   furosemide   Injectable 20 milliGRAM(s) IV Push every 24 hours PRN    Immunologic:   filgrastim-sndz (ZARXIO) Injectable 600 MICROGram(s) SubCutaneous every 24 hours    Other medications:   Biotene Dry Mouth Oral Rinse 5 milliLiter(s) Swish and Spit five times a day  chlorhexidine 4% Liquid 1 Application(s) Topical <User Schedule>  folic acid 1 milliGRAM(s) Oral daily  hydrocortisone 1% Cream 1 Application(s) Topical daily  lidocaine/prilocaine Cream 1 Application(s) Topical daily  multivitamin 1 Tablet(s) Oral daily  sodium chloride 0.9%. 1000 milliLiter(s) IV Continuous <Continuous>      PRN:   acetaminophen   Tablet .. 650 milliGRAM(s) Oral every 6 hours PRN  acetaminophen   Tablet .. 650 milliGRAM(s) Oral every 6 hours PRN  aluminum hydroxide/magnesium hydroxide/simethicone Suspension 30 milliLiter(s) Oral every 4 hours PRN  AQUAPHOR (petrolatum Ointment) 1 Application(s) Topical two times a day PRN  bismuth subsalicylate Liquid 30 milliLiter(s) Oral every 4 hours PRN  furosemide   Injectable 20 milliGRAM(s) IV Push every 24 hours PRN  hydrocortisone sodium succinate Injectable 50 milliGRAM(s) IV Push daily PRN  loperamide 2 milliGRAM(s) Oral every 6 hours PRN  metoclopramide Injectable 10 milliGRAM(s) IV Push every 6 hours PRN  ondansetron Injectable 8 milliGRAM(s) IV Push every 8 hours PRN  sodium chloride 0.9% lock flush 10 milliLiter(s) IV Push every 1 hour PRN      A/P:  59 year old male with a history of amyloidosis  Post Autologous PBSCT day + 16  Keep platelets =/> 15K for history of amyloidosis   Continue Cefepime through engraftment, cultures negative   Chemotherapy induced grade 1 intestinal mucositis (diarrhea) - c. diff negative - now resolved   Chemotherapy induced grade 1 GI mucositis - now resolved   Early engraftment, continue Zarxio daily until post engraftment dose increased to 600 micrograms on 7/15    1. Infectious Disease:   acyclovir   Oral Tab/Cap 400 milliGRAM(s) Oral every 8 hours  cefepime   IVPB 2000 milliGRAM(s) IV Intermittent every 8 hours  clotrimazole Lozenge 1 Lozenge Oral five times a day  fluconAZOLE   Tablet 400 milliGRAM(s) Oral daily    2. VOD Prophylaxis: Actigall, Glutamine, Heparin (dosed at 100 units / kg / day)     3. GI Prophylaxis:    pantoprazole    Tablet 40 milliGRAM(s) Oral before breakfast    4. Mouthcare - NS / NaHCO3 rinses, Mycelex, Biotene; Skin care     5. GVHD prophylaxis - n/a     6. Transfuse & replete electrolytes prn     7. IV hydration, daily weights, strict I&O, prn diuresis     8. PO intake as tolerated, nutrition follow up as needed, MVI, folic acid     9. Antiemetics, anti-diarrhea medications:   loperamide 2 milliGRAM(s) Oral every 6 hours PRN  LORazepam   Injectable 0.5 milliGRAM(s) IV Push daily PRN  metoclopramide Injectable 10 milliGRAM(s) IV Push every 6 hours PRN  ondansetron Injectable 8 milliGRAM(s) IV Push every 8 hours PRN  aluminum hydroxide/magnesium hydroxide/simethicone Suspension 30 milliLiter(s) Oral every 4 hours PRN  bismuth subsalicylate Liquid 30 milliLiter(s) Oral every 4 hours PRN    10. OOB as tolerated, physical therapy consult if needed     11. Monitor coags / fibrinogen 2x week, vitamin K as needed     12. Monitor closely for clinical changes, monitor for fevers     13. Emotional support provided, plan of care discussed and questions addressed     14. Patient education done regarding  plan of care, restrictions and discharge planning     15. Continue regular social work input     I have written the above note for Dr. Goldberg who performed service with me in the room.   Larissa RODRIGUEZC ([phone])    I have seen and examined patient with NP, I agree with above note as scribed. [facility] Transplant Team                                                      Critical / Counseling Time Provided: 30 minutes                                                                                                                                                        Chief Complaint: Autologous pbsct with high dose melphalan prep regimen for treatment of amyloidosis    S: Patient seen and examined with HPC Transplant Team:   no acute complaints     Denies mouth / tongue / throat pain, dyspnea, cough, nausea, vomiting, diarrhea, abdominal pain     O: Vitals:   Vital Signs Last 24 Hrs  T(C): 37.3 (2020 06:05), Max: 37.6 (2020 19:18)  T(F): 99.1 (2020 06:05), Max: 99.7 (2020 19:18)  HR: 80 (2020 06:05) (70 - 88)  BP: 102/68 (2020 06:05) (102/68 - 120/79)  BP(mean): --  RR: 18 (2020 06:05) (18 - 20)  SpO2: 95% (2020 06:05) (95% - 100%)    Admit weight: 85.7 kg   Daily Weight in k.4 (2020 09:15)  Today's weight: 83.2 kg     Intake / Output:   17 @ 07:01  -  18 @ 07:00  --------------------------------------------------------  IN: 3290 mL / OUT: 3650 mL / NET: -360 mL      PE:   HEENT: Oropharynx: no erythema or ulcerations  Oral Mucositis:               -                                         Grade: n/a  CVS: S1, S2 RRR   Lungs: CTA throughout bilaterally   Abdomen: + BS x 4: mildly hyperactive, soft, NT, ND   Extremities: + chronic trace LE edema; L>R   Gastric Mucositis:      -                                             Grade: n/a - resolved   Intestinal Mucositis:    -                                        Grade: n/a  Skin: hyperpigmentation on back, neck, chest, abdomen likely secondary to Kepiv[place]   TLC: CDI  Neuro: A&O x 3  Pain: denies       Labs:   CBC Full  -  ( 2020 06:49 )  WBC Count : 2.55 K/uL  Hemoglobin : 7.5 g/dL  Hematocrit : 22.5 %  Platelet Count - Automated : 47 K/uL  Mean Cell Volume : 76.5 fl  Mean Cell Hemoglobin : 25.5 pg  Mean Cell Hemoglobin Concentration : 33.3 gm/dL  Auto Neutrophil # : x  Auto Lymphocyte # : x  Auto Monocyte # : x  Auto Eosinophil # : x  Auto Basophil # : x  Auto Neutrophil % : x  Auto Lymphocyte % : x  Auto Monocyte % : x  Auto Eosinophil % : x  Auto Basophil % : x                          7.5    2.55  )-----------( 47       ( 2020 06:49 )             22.5     -18    143  |  107  |  9   ----------------------------<  91  3.5   |  25  |  0.71    Ca    8.9      2020 06:49  Phos  2.5       Mg     1.9         TPro  5.4<L>  /  Alb  3.5  /  TBili  0.2  /  DBili  x   /  AST  14  /  ALT  13  /  AlkPhos  51        LIVER FUNCTIONS - ( 2020 06:49 )  Alb: 3.5 g/dL / Pro: 5.4 g/dL / ALK PHOS: 51 U/L / ALT: 13 U/L / AST: 14 U/L / GGT: x           Lactate Dehydrogenase, Serum: 225 U/L ( @ 06:49)    Cultures:   Culture - Blood (20 @ 00:37)    Specimen Source: .Blood Blood-Catheter    Culture Results:   No Growth Final    Radiology:   < from: Xray Chest 1 View- PORTABLE-Urgent (20 @ 21:05) >  IMPRESSION:  1.  The lungs are clear.    Meds:   Antimicrobials:   acyclovir   Oral Tab/Cap 400 milliGRAM(s) Oral every 8 hours  cefepime   IVPB 2000 milliGRAM(s) IV Intermittent every 8 hours  clotrimazole Lozenge 1 Lozenge Oral five times a day  fluconAZOLE   Tablet 400 milliGRAM(s) Oral daily    Heme / Onc:   heparin  Infusion 357 Unit(s)/Hr IV Continuous <Continuous>    GI:  aluminum hydroxide/magnesium hydroxide/simethicone Suspension 30 milliLiter(s) Oral every 4 hours PRN  bismuth subsalicylate Liquid 30 milliLiter(s) Oral every 4 hours PRN  loperamide 2 milliGRAM(s) Oral every 6 hours PRN  pantoprazole    Tablet 40 milliGRAM(s) Oral before breakfast  sodium bicarbonate Mouth Rinse 10 milliLiter(s) Swish and Spit five times a day  sucralfate suspension 1 Gram(s) Oral four times a day  ursodiol Capsule 300 milliGRAM(s) Oral two times a day with meals    Cardiovascular:   furosemide   Injectable 20 milliGRAM(s) IV Push every 24 hours PRN    Immunologic:   filgrastim-sndz (ZARXIO) Injectable 600 MICROGram(s) SubCutaneous every 24 hours    Other medications:   Biotene Dry Mouth Oral Rinse 5 milliLiter(s) Swish and Spit five times a day  chlorhexidine 4% Liquid 1 Application(s) Topical <User Schedule>  folic acid 1 milliGRAM(s) Oral daily  hydrocortisone 1% Cream 1 Application(s) Topical daily  lidocaine/prilocaine Cream 1 Application(s) Topical daily  multivitamin 1 Tablet(s) Oral daily  sodium chloride 0.9%. 1000 milliLiter(s) IV Continuous <Continuous>      PRN:   acetaminophen   Tablet .. 650 milliGRAM(s) Oral every 6 hours PRN  acetaminophen   Tablet .. 650 milliGRAM(s) Oral every 6 hours PRN  aluminum hydroxide/magnesium hydroxide/simethicone Suspension 30 milliLiter(s) Oral every 4 hours PRN  AQUAPHOR (petrolatum Ointment) 1 Application(s) Topical two times a day PRN  bismuth subsalicylate Liquid 30 milliLiter(s) Oral every 4 hours PRN  furosemide   Injectable 20 milliGRAM(s) IV Push every 24 hours PRN  hydrocortisone sodium succinate Injectable 50 milliGRAM(s) IV Push daily PRN  loperamide 2 milliGRAM(s) Oral every 6 hours PRN  metoclopramide Injectable 10 milliGRAM(s) IV Push every 6 hours PRN  ondansetron Injectable 8 milliGRAM(s) IV Push every 8 hours PRN  sodium chloride 0.9% lock flush 10 milliLiter(s) IV Push every 1 hour PRN      A/P:  59 year old male with a history of amyloidosis  Post Autologous PBSCT day + 16  Keep platelets =/> 15K for history of amyloidosis   Chemotherapy induced grade 1 intestinal mucositis (diarrhea) - c. diff negative - now resolved   Chemotherapy induced grade 1 GI mucositis - now resolved   Early engraftment, continue Zarxio daily until post engraftment dose increased to 600 micrograms on 7/15  Patient is not neutropenic, Cefepime discontinued      1. Infectious Disease:   acyclovir   Oral Tab/Cap 400 milliGRAM(s) Oral every 8 hours  cefepime   IVPB 2000 milliGRAM(s) IV Intermittent every 8 hours  clotrimazole Lozenge 1 Lozenge Oral five times a day  fluconAZOLE   Tablet 400 milliGRAM(s) Oral daily    2. VOD Prophylaxis: Actigall, Glutamine, Heparin (dosed at 100 units / kg / day)     3. GI Prophylaxis:    pantoprazole    Tablet 40 milliGRAM(s) Oral before breakfast    4. Mouthcare - NS / NaHCO3 rinses, Mycelex, Biotene; Skin care     5. GVHD prophylaxis - n/a     6. Transfuse & replete electrolytes prn     7. IV hydration, daily weights, strict I&O, prn diuresis     8. PO intake as tolerated, nutrition follow up as needed, MVI, folic acid     9. Antiemetics, anti-diarrhea medications:   loperamide 2 milliGRAM(s) Oral every 6 hours PRN  LORazepam   Injectable 0.5 milliGRAM(s) IV Push daily PRN  metoclopramide Injectable 10 milliGRAM(s) IV Push every 6 hours PRN  ondansetron Injectable 8 milliGRAM(s) IV Push every 8 hours PRN  aluminum hydroxide/magnesium hydroxide/simethicone Suspension 30 milliLiter(s) Oral every 4 hours PRN  bismuth subsalicylate Liquid 30 milliLiter(s) Oral every 4 hours PRN    10. OOB as tolerated, physical therapy consult if needed     11. Monitor coags / fibrinogen 2x week, vitamin K as needed     12. Monitor closely for clinical changes, monitor for fevers     13. Emotional support provided, plan of care discussed and questions addressed     14. Patient education done regarding  plan of care, restrictions and discharge planning     15. Continue regular social work input     I have written the above note for Dr. Goldberg who performed service with me in the room.   Larissa Manriquez NP-C ([phone])    I have seen and examined patient with NP, I agree with above note as scribed.

## 2020-07-18 NOTE — PROGRESS NOTE ADULT - ATTENDING COMMENTS
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 Has been afebrile x48 hrs;  hold on  CT scans, will reassess tomorrow if he has recurrent fevers.  Last COVID PCR on 7/8 was negative  -GI mucositis secondary to antineoplastic chemotherapy- continue PPI, add Carafate; Imodium for diarrhea. 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   -IV hydration, strict I&O, daily weight, Lasix to keep O > I  -VOD prophylaxis- Actigall, low dose heparin gtt & glutamine supplementation  -ID- Neutropenic fevers- on Cefepime; continue Fluconazole / Acyclovir prophylaxis. Has been afebrile since 7/12 and without neutropenia at this point, will discontinue cefepime today 7/18  -GI mucositis secondary to antineoplastic chemotherapy- continue PPI, add Carafate; Imodium for diarrhea. Improved, as per pt  -Antiemetics  -Supplement lytes prn  - for this weekend  transfuse if plts less than 15.....now that counts are recovering and GI tract should be healing.....tx rbc as needed  -Mouth care, skin care  -OOB/ambulate  - d/c planning for early next week

## 2020-07-19 LAB
ALBUMIN SERPL ELPH-MCNC: 3.4 G/DL — SIGNIFICANT CHANGE UP (ref 3.3–5)
ALP SERPL-CCNC: 62 U/L — SIGNIFICANT CHANGE UP (ref 40–120)
ALT FLD-CCNC: 14 U/L — SIGNIFICANT CHANGE UP (ref 10–45)
ANION GAP SERPL CALC-SCNC: 11 MMOL/L — SIGNIFICANT CHANGE UP (ref 5–17)
AST SERPL-CCNC: 20 U/L — SIGNIFICANT CHANGE UP (ref 10–40)
BASOPHILS # BLD AUTO: 0 K/UL — SIGNIFICANT CHANGE UP (ref 0–0.2)
BASOPHILS NFR BLD AUTO: 0 % — SIGNIFICANT CHANGE UP (ref 0–2)
BILIRUB SERPL-MCNC: 0.1 MG/DL — LOW (ref 0.2–1.2)
BUN SERPL-MCNC: 7 MG/DL — SIGNIFICANT CHANGE UP (ref 7–23)
CALCIUM SERPL-MCNC: 8.8 MG/DL — SIGNIFICANT CHANGE UP (ref 8.4–10.5)
CHLORIDE SERPL-SCNC: 106 MMOL/L — SIGNIFICANT CHANGE UP (ref 96–108)
CO2 SERPL-SCNC: 24 MMOL/L — SIGNIFICANT CHANGE UP (ref 22–31)
CREAT SERPL-MCNC: 0.76 MG/DL — SIGNIFICANT CHANGE UP (ref 0.5–1.3)
EOSINOPHIL # BLD AUTO: 0 K/UL — SIGNIFICANT CHANGE UP (ref 0–0.5)
EOSINOPHIL NFR BLD AUTO: 0 % — SIGNIFICANT CHANGE UP (ref 0–6)
GLUCOSE SERPL-MCNC: 86 MG/DL — SIGNIFICANT CHANGE UP (ref 70–99)
HCT VFR BLD CALC: 23.5 % — LOW (ref 39–50)
HGB BLD-MCNC: 7.7 G/DL — LOW (ref 13–17)
LDH SERPL L TO P-CCNC: 363 U/L — HIGH (ref 50–242)
LYMPHOCYTES # BLD AUTO: 0.64 K/UL — LOW (ref 1–3.3)
LYMPHOCYTES # BLD AUTO: 7 % — LOW (ref 13–44)
MAGNESIUM SERPL-MCNC: 1.8 MG/DL — SIGNIFICANT CHANGE UP (ref 1.6–2.6)
MANUAL SMEAR VERIFICATION: SIGNIFICANT CHANGE UP
MCHC RBC-ENTMCNC: 25.6 PG — LOW (ref 27–34)
MCHC RBC-ENTMCNC: 32.8 GM/DL — SIGNIFICANT CHANGE UP (ref 32–36)
MCV RBC AUTO: 78.1 FL — LOW (ref 80–100)
MONOCYTES # BLD AUTO: 1.29 K/UL — HIGH (ref 0–0.9)
MONOCYTES NFR BLD AUTO: 14 % — SIGNIFICANT CHANGE UP (ref 2–14)
MYELOCYTES NFR BLD: 2 % — HIGH (ref 0–0)
NEUTROPHILS # BLD AUTO: 6.91 K/UL — SIGNIFICANT CHANGE UP (ref 1.8–7.4)
NEUTROPHILS NFR BLD AUTO: 64 % — SIGNIFICANT CHANGE UP (ref 43–77)
NEUTS BAND # BLD: 11 % — HIGH (ref 0–8)
NRBC # BLD: 0 /100 — SIGNIFICANT CHANGE UP (ref 0–0)
PHOSPHATE SERPL-MCNC: 2 MG/DL — LOW (ref 2.5–4.5)
PLAT MORPH BLD: NORMAL — SIGNIFICANT CHANGE UP
PLATELET # BLD AUTO: 31 K/UL — LOW (ref 150–400)
POTASSIUM SERPL-MCNC: 3.6 MMOL/L — SIGNIFICANT CHANGE UP (ref 3.5–5.3)
POTASSIUM SERPL-SCNC: 3.6 MMOL/L — SIGNIFICANT CHANGE UP (ref 3.5–5.3)
PROMYELOCYTES # FLD: 1 % — HIGH (ref 0–0)
PROT SERPL-MCNC: 5.3 G/DL — LOW (ref 6–8.3)
RBC # BLD: 3.01 M/UL — LOW (ref 4.2–5.8)
RBC # FLD: 15.4 % — HIGH (ref 10.3–14.5)
RBC BLD AUTO: SIGNIFICANT CHANGE UP
SODIUM SERPL-SCNC: 141 MMOL/L — SIGNIFICANT CHANGE UP (ref 135–145)
VARIANT LYMPHS # BLD: 1 % — SIGNIFICANT CHANGE UP (ref 0–6)
WBC # BLD: 9.21 K/UL — SIGNIFICANT CHANGE UP (ref 3.8–10.5)
WBC # FLD AUTO: 9.21 K/UL — SIGNIFICANT CHANGE UP (ref 3.8–10.5)

## 2020-07-19 PROCEDURE — 99291 CRITICAL CARE FIRST HOUR: CPT

## 2020-07-19 RX ORDER — POTASSIUM PHOSPHATE, MONOBASIC POTASSIUM PHOSPHATE, DIBASIC 236; 224 MG/ML; MG/ML
15 INJECTION, SOLUTION INTRAVENOUS ONCE
Refills: 0 | Status: COMPLETED | OUTPATIENT
Start: 2020-07-19 | End: 2020-07-19

## 2020-07-19 RX ADMIN — Medication 400 MILLIGRAM(S): at 05:15

## 2020-07-19 RX ADMIN — Medication 5 MILLILITER(S): at 08:18

## 2020-07-19 RX ADMIN — Medication 5 MILLILITER(S): at 21:21

## 2020-07-19 RX ADMIN — PANTOPRAZOLE SODIUM 40 MILLIGRAM(S): 20 TABLET, DELAYED RELEASE ORAL at 05:15

## 2020-07-19 RX ADMIN — Medication 1 LOZENGE: at 08:18

## 2020-07-19 RX ADMIN — URSODIOL 300 MILLIGRAM(S): 250 TABLET, FILM COATED ORAL at 08:17

## 2020-07-19 RX ADMIN — Medication 5 MILLILITER(S): at 12:27

## 2020-07-19 RX ADMIN — Medication 1 LOZENGE: at 23:52

## 2020-07-19 RX ADMIN — Medication 1 LOZENGE: at 21:20

## 2020-07-19 RX ADMIN — Medication 10 MILLILITER(S): at 23:52

## 2020-07-19 RX ADMIN — CHLORHEXIDINE GLUCONATE 1 APPLICATION(S): 213 SOLUTION TOPICAL at 08:18

## 2020-07-19 RX ADMIN — Medication 10 MILLILITER(S): at 21:21

## 2020-07-19 RX ADMIN — Medication 400 MILLIGRAM(S): at 13:56

## 2020-07-19 RX ADMIN — URSODIOL 300 MILLIGRAM(S): 250 TABLET, FILM COATED ORAL at 17:22

## 2020-07-19 RX ADMIN — Medication 10 MILLILITER(S): at 08:18

## 2020-07-19 RX ADMIN — Medication 5 MILLILITER(S): at 23:52

## 2020-07-19 RX ADMIN — Medication 1 LOZENGE: at 12:28

## 2020-07-19 RX ADMIN — Medication 10 MILLILITER(S): at 17:22

## 2020-07-19 RX ADMIN — FLUCONAZOLE 400 MILLIGRAM(S): 150 TABLET ORAL at 12:28

## 2020-07-19 RX ADMIN — Medication 1 MILLIGRAM(S): at 12:28

## 2020-07-19 RX ADMIN — Medication 5 MILLILITER(S): at 17:22

## 2020-07-19 RX ADMIN — Medication 10 MILLILITER(S): at 12:28

## 2020-07-19 RX ADMIN — Medication 400 MILLIGRAM(S): at 21:20

## 2020-07-19 RX ADMIN — POTASSIUM PHOSPHATE, MONOBASIC POTASSIUM PHOSPHATE, DIBASIC 62.5 MILLIMOLE(S): 236; 224 INJECTION, SOLUTION INTRAVENOUS at 12:04

## 2020-07-19 RX ADMIN — Medication 1 TABLET(S): at 12:28

## 2020-07-19 RX ADMIN — Medication 1 LOZENGE: at 17:22

## 2020-07-19 NOTE — PROGRESS NOTE ADULT - SUBJECTIVE AND OBJECTIVE BOX
[facility] Transplant Team                                                      Critical / Counseling Time Provided: 30 minutes                                                                                                                                                        Chief Complaint: Autologous pbsct with high dose melphalan prep regimen for treatment of amyloidosis    S: Patient seen and examined with HPC Transplant Team:   no acute complaints     Denies mouth / tongue / throat pain, dyspnea, cough, nausea, vomiting, diarrhea, abdominal pain     O: Vitals:   Vital Signs Last 24 Hrs  T(C): 37.5 (2020 05:34), Max: 37.5 (2020 05:34)  T(F): 99.5 (2020 05:34), Max: 99.5 (2020 05:34)  HR: 87 (2020 05:34) (74 - 87)  BP: 105/66 (2020 05:34) (105/66 - 114/76)  BP(mean): --  RR: 18 (2020 05:34) (18 - 19)  SpO2: 96% (2020 05:34) (95% - 99%)    Admit weight: 85.7 kg   Daily Weight in k.2 (2020 09:16)  Today's weight: pending     Intake / Output:    @ 07:01  -  19 @ 07:00  --------------------------------------------------------  IN: 1413.4 mL / OUT: 2450 mL / NET: -1036.6 mL    PE:   HEENT: Oropharynx: no erythema or ulcerations  Oral Mucositis:               -                                         Grade: n/a  CVS: S1, S2 RRR   Lungs: CTA throughout bilaterally   Abdomen: + BS x 4: mildly hyperactive, soft, NT, ND   Extremities: + chronic trace LE edema; L>R   Gastric Mucositis:      -                                             Grade: n/a - resolved   Intestinal Mucositis:    -                                        Grade: n/a  Skin: hyperpigmentation on back, neck, chest, abdomen likely secondary to Kepiv[place]   TLC: CDI  Neuro: A&O x 3  Pain: denies       Labs:   CBC Full  -  ( 2020 07:06 )  WBC Count : x  Hemoglobin : 7.7 g/dL  Hematocrit : 23.5 %  Platelet Count - Automated : x  Mean Cell Volume : 78.1 fl  Mean Cell Hemoglobin : 25.6 pg  Mean Cell Hemoglobin Concentration : 32.8 gm/dL  Auto Neutrophil # : x  Auto Lymphocyte # : x  Auto Monocyte # : x  Auto Eosinophil # : x  Auto Basophil # : x  Auto Neutrophil % : x  Auto Lymphocyte % : x  Auto Monocyte % : x  Auto Eosinophil % : x  Auto Basophil % : x                          7.7    x     )-----------( x        ( 2020 07:06 )             23.5     07-18    143  |  107  |  9   ----------------------------<  91  3.5   |  25  |  0.71    Ca    8.9      2020 06:49  Phos  2.5     -  Mg     1.9         TPro  5.4<L>  /  Alb  3.5  /  TBili  0.2  /  DBili  x   /  AST  14  /  ALT  13  /  AlkPhos  51  -18      LIVER FUNCTIONS - ( 2020 06:49 )  Alb: 3.5 g/dL / Pro: 5.4 g/dL / ALK PHOS: 51 U/L / ALT: 13 U/L / AST: 14 U/L / GGT: x           Cultures:   Culture - Blood (20 @ 00:37)    Specimen Source: .Blood Blood-Catheter    Culture Results:   No Growth Final    Radiology:   < from: Xray Chest 1 View- PORTABLE-Urgent (20 @ 21:05) >  IMPRESSION:  1.  The lungs are clear    Meds:   Antimicrobials:   acyclovir   Oral Tab/Cap 400 milliGRAM(s) Oral every 8 hours  clotrimazole Lozenge 1 Lozenge Oral five times a day  fluconAZOLE   Tablet 400 milliGRAM(s) Oral daily    Heme / Onc:   heparin  Infusion 357 Unit(s)/Hr IV Continuous <Continuous>    GI:  aluminum hydroxide/magnesium hydroxide/simethicone Suspension 30 milliLiter(s) Oral every 4 hours PRN  bismuth subsalicylate Liquid 30 milliLiter(s) Oral every 4 hours PRN  loperamide 2 milliGRAM(s) Oral every 6 hours PRN  pantoprazole    Tablet 40 milliGRAM(s) Oral before breakfast  sodium bicarbonate Mouth Rinse 10 milliLiter(s) Swish and Spit five times a day  sucralfate suspension 1 Gram(s) Oral four times a day  ursodiol Capsule 300 milliGRAM(s) Oral two times a day with meals    Cardiovascular:   furosemide   Injectable 20 milliGRAM(s) IV Push every 24 hours PRN    Immunologic:   filgrastim-sndz (ZARXIO) Injectable 600 MICROGram(s) SubCutaneous every 24 hours    Other medications:   Biotene Dry Mouth Oral Rinse 5 milliLiter(s) Swish and Spit five times a day  chlorhexidine 4% Liquid 1 Application(s) Topical <User Schedule>  folic acid 1 milliGRAM(s) Oral daily  hydrocortisone 1% Cream 1 Application(s) Topical daily  lidocaine/prilocaine Cream 1 Application(s) Topical daily  multivitamin 1 Tablet(s) Oral daily  sodium chloride 0.9%. 1000 milliLiter(s) IV Continuous <Continuous>      PRN:   acetaminophen   Tablet .. 650 milliGRAM(s) Oral every 6 hours PRN  acetaminophen   Tablet .. 650 milliGRAM(s) Oral every 6 hours PRN  aluminum hydroxide/magnesium hydroxide/simethicone Suspension 30 milliLiter(s) Oral every 4 hours PRN  AQUAPHOR (petrolatum Ointment) 1 Application(s) Topical two times a day PRN  bismuth subsalicylate Liquid 30 milliLiter(s) Oral every 4 hours PRN  furosemide   Injectable 20 milliGRAM(s) IV Push every 24 hours PRN  hydrocortisone sodium succinate Injectable 50 milliGRAM(s) IV Push daily PRN  loperamide 2 milliGRAM(s) Oral every 6 hours PRN  LORazepam   Injectable 0.5 milliGRAM(s) IV Push daily PRN  metoclopramide Injectable 10 milliGRAM(s) IV Push every 6 hours PRN  ondansetron Injectable 8 milliGRAM(s) IV Push every 8 hours PRN  sodium chloride 0.9% lock flush 10 milliLiter(s) IV Push every 1 hour PRN      A/P: 59 year old male with a history of amyloidosis  Post Autologous PBSCT day + 17  Keep platelets =/> 15K for history of amyloidosis   Chemotherapy induced grade 1 intestinal mucositis (diarrhea) - c. diff negative - now resolved   Chemotherapy induced grade 1 GI mucositis - now resolved   Early engraftment, continue Zarxio daily until post engraftment dose increased to 600 micrograms on 7/15  Patient is not neutropenic, Cefepime discontinued      1. Infectious Disease:   acyclovir   Oral Tab/Cap 400 milliGRAM(s) Oral every 8 hours  clotrimazole Lozenge 1 Lozenge Oral five times a day  fluconAZOLE   Tablet 400 milliGRAM(s) Oral daily    2. VOD Prophylaxis: Actigall, Glutamine, Heparin (dosed at 100 units / kg / day)     3. GI Prophylaxis:    pantoprazole    Tablet 40 milliGRAM(s) Oral before breakfast    4. Mouthcare - NS / NaHCO3 rinses, Mycelex, Biotene; Skin care     5. GVHD prophylaxis - n/a     6. Transfuse & replete electrolytes prn     7. IV hydration, daily weights, strict I&O, prn diuresis     8. PO intake as tolerated, nutrition follow up as needed, MVI, folic acid     9. Antiemetics, anti-diarrhea medications:   loperamide 2 milliGRAM(s) Oral every 6 hours PRN  LORazepam   Injectable 0.5 milliGRAM(s) IV Push daily PRN  metoclopramide Injectable 10 milliGRAM(s) IV Push every 6 hours PRN  ondansetron Injectable 8 milliGRAM(s) IV Push every 8 hours PRN  aluminum hydroxide/magnesium hydroxide/simethicone Suspension 30 milliLiter(s) Oral every 4 hours PRN  bismuth subsalicylate Liquid 30 milliLiter(s) Oral every 4 hours PRN    10. OOB as tolerated, physical therapy consult if needed     11. Monitor coags / fibrinogen 2x week, vitamin K as needed     12. Monitor closely for clinical changes, monitor for fevers     13. Emotional support provided, plan of care discussed and questions addressed     14. Patient education done regarding  plan of care, restrictions and discharge planning     15. Continue regular social work input     I have written the above note for Dr. Goldberg who performed service with me in the room.   Larissa Manriquez NP-C ([phone])    I have seen and examined patient with NP, I agree with above note as scribed. [facility] Transplant Team                                                      Critical / Counseling Time Provided: 30 minutes                                                                                                                                                        Chief Complaint: Autologous pbsct with high dose melphalan prep regimen for treatment of amyloidosis    S: Patient seen and examined with [facility]PC Transplant Team:   no acute complaints     Denies mouth / tongue / throat pain, dyspnea, cough, nausea, vomiting, diarrhea, abdominal pain     O: Vitals:   Vital Signs Last 24 Hrs  T(C): 37.5 (2020 05:34), Max: 37.5 (2020 05:34)  T(F): 99.5 (2020 05:34), Max: 99.5 (2020 05:34)  HR: 87 (2020 05:34) (74 - 87)  BP: 105/66 (2020 05:34) (105/66 - 114/76)  BP(mean): --  RR: 18 (2020 05:34) (18 - 19)  SpO2: 96% (2020 05:34) (95% - 99%)    Admit weight: 85.7 kg   Daily Weight in k.2 (2020 09:16)  Today's weight: 83.6 kg     Intake / Output:    @ 07:01  -  -19 @ 07:00  --------------------------------------------------------  IN: 1413.4 mL / OUT: 2450 mL / NET: -1036.6 mL    PE:   HEENT: Oropharynx: no erythema or ulcerations  Oral Mucositis:               -                                         Grade: n/a  CVS: S1, S2 RRR   Lungs: CTA throughout bilaterally   Abdomen: + BS x 4: mildly hyperactive, soft, NT, ND   Extremities: + chronic trace LE edema; L>R   Gastric Mucositis:      -                                             Grade: n/a - resolved   Intestinal Mucositis:    -                                        Grade: n/a  Skin: hyperpigmentation on back, neck, chest, abdomen likely secondary to [place]iv[place]   TLC: CDI  Neuro: A&O x 3  Pain: denies       Labs:   CBC Full  -  ( 2020 07:06 )  WBC Count : x  Hemoglobin : 7.7 g/dL  Hematocrit : 23.5 %  Platelet Count - Automated : x  Mean Cell Volume : 78.1 fl  Mean Cell Hemoglobin : 25.6 pg  Mean Cell Hemoglobin Concentration : 32.8 gm/dL  Auto Neutrophil # : x  Auto Lymphocyte # : x  Auto Monocyte # : x  Auto Eosinophil # : x  Auto Basophil # : x  Auto Neutrophil % : x  Auto Lymphocyte % : x  Auto Monocyte % : x  Auto Eosinophil % : x  Auto Basophil % : x                          7.7    x     )-----------( x        ( 2020 07:06 )             23.5     07-18    143  |  107  |  9   ----------------------------<  91  3.5   |  25  |  0.71    Ca    8.9      2020 06:49  Phos  2.5     07-18  Mg     1.9     -18    TPro  5.4<L>  /  Alb  3.5  /  TBili  0.2  /  DBili  x   /  AST  14  /  ALT  13  /  AlkPhos  51  -18      LIVER FUNCTIONS - ( 2020 06:49 )  Alb: 3.5 g/dL / Pro: 5.4 g/dL / ALK PHOS: 51 U/L / ALT: 13 U/L / AST: 14 U/L / GGT: x           Cultures:   Culture - Blood (20 @ 00:37)    Specimen Source: .Blood Blood-Catheter    Culture Results:   No Growth Final    Radiology:   < from: Xray Chest 1 View- PORTABLE-Urgent (20 @ 21:05) >  IMPRESSION:  1.  The lungs are clear    Meds:   Antimicrobials:   acyclovir   Oral Tab/Cap 400 milliGRAM(s) Oral every 8 hours  clotrimazole Lozenge 1 Lozenge Oral five times a day  fluconAZOLE   Tablet 400 milliGRAM(s) Oral daily    Heme / Onc:   heparin  Infusion 357 Unit(s)/Hr IV Continuous <Continuous>    GI:  aluminum hydroxide/magnesium hydroxide/simethicone Suspension 30 milliLiter(s) Oral every 4 hours PRN  bismuth subsalicylate Liquid 30 milliLiter(s) Oral every 4 hours PRN  loperamide 2 milliGRAM(s) Oral every 6 hours PRN  pantoprazole    Tablet 40 milliGRAM(s) Oral before breakfast  sodium bicarbonate Mouth Rinse 10 milliLiter(s) Swish and Spit five times a day  sucralfate suspension 1 Gram(s) Oral four times a day  ursodiol Capsule 300 milliGRAM(s) Oral two times a day with meals    Cardiovascular:   furosemide   Injectable 20 milliGRAM(s) IV Push every 24 hours PRN    Immunologic:   filgrastim-sndz (ZARXIO) Injectable 600 MICROGram(s) SubCutaneous every 24 hours    Other medications:   Biotene Dry Mouth Oral Rinse 5 milliLiter(s) Swish and Spit five times a day  chlorhexidine 4% Liquid 1 Application(s) Topical <User Schedule>  folic acid 1 milliGRAM(s) Oral daily  hydrocortisone 1% Cream 1 Application(s) Topical daily  lidocaine/prilocaine Cream 1 Application(s) Topical daily  multivitamin 1 Tablet(s) Oral daily  sodium chloride 0.9%. 1000 milliLiter(s) IV Continuous <Continuous>      PRN:   acetaminophen   Tablet .. 650 milliGRAM(s) Oral every 6 hours PRN  acetaminophen   Tablet .. 650 milliGRAM(s) Oral every 6 hours PRN  aluminum hydroxide/magnesium hydroxide/simethicone Suspension 30 milliLiter(s) Oral every 4 hours PRN  AQUAPHOR (petrolatum Ointment) 1 Application(s) Topical two times a day PRN  bismuth subsalicylate Liquid 30 milliLiter(s) Oral every 4 hours PRN  furosemide   Injectable 20 milliGRAM(s) IV Push every 24 hours PRN  hydrocortisone sodium succinate Injectable 50 milliGRAM(s) IV Push daily PRN  loperamide 2 milliGRAM(s) Oral every 6 hours PRN  LORazepam   Injectable 0.5 milliGRAM(s) IV Push daily PRN  metoclopramide Injectable 10 milliGRAM(s) IV Push every 6 hours PRN  ondansetron Injectable 8 milliGRAM(s) IV Push every 8 hours PRN  sodium chloride 0.9% lock flush 10 milliLiter(s) IV Push every 1 hour PRN      A/P: 59 year old male with a history of amyloidosis  Post Autologous PBSCT day + 17  Keep platelets =/> 15K for history of amyloidosis   Chemotherapy induced grade 1 intestinal mucositis (diarrhea) - c. diff negative - now resolved   Chemotherapy induced grade 1 GI mucositis - now resolved   Early engraftment, continue Zarxio daily until post engraftment dose increased to 600 micrograms on 7/15  Patient is not neutropenic, Cefepime discontinued      1. Infectious Disease:   acyclovir   Oral Tab/Cap 400 milliGRAM(s) Oral every 8 hours  clotrimazole Lozenge 1 Lozenge Oral five times a day  fluconAZOLE   Tablet 400 milliGRAM(s) Oral daily    2. VOD Prophylaxis: Actigall, Glutamine, Heparin (dosed at 100 units / kg / day)     3. GI Prophylaxis:    pantoprazole    Tablet 40 milliGRAM(s) Oral before breakfast    4. Mouthcare - NS / NaHCO3 rinses, Mycelex, Biotene; Skin care     5. GVHD prophylaxis - n/a     6. Transfuse & replete electrolytes prn     7. IV hydration, daily weights, strict I&O, prn diuresis     8. PO intake as tolerated, nutrition follow up as needed, MVI, folic acid     9. Antiemetics, anti-diarrhea medications:   loperamide 2 milliGRAM(s) Oral every 6 hours PRN  LORazepam   Injectable 0.5 milliGRAM(s) IV Push daily PRN  metoclopramide Injectable 10 milliGRAM(s) IV Push every 6 hours PRN  ondansetron Injectable 8 milliGRAM(s) IV Push every 8 hours PRN  aluminum hydroxide/magnesium hydroxide/simethicone Suspension 30 milliLiter(s) Oral every 4 hours PRN  bismuth subsalicylate Liquid 30 milliLiter(s) Oral every 4 hours PRN    10. OOB as tolerated, physical therapy consult if needed     11. Monitor coags / fibrinogen 2x week, vitamin K as needed     12. Monitor closely for clinical changes, monitor for fevers     13. Emotional support provided, plan of care discussed and questions addressed     14. Patient education done regarding  plan of care, restrictions and discharge planning     15. Continue regular social work input     I have written the above note for Dr. Goldberg who performed service with me in the room.   Larissa Manriquez NP-C ([phone])    I have seen and examined patient with NP, I agree with above note as scribed. [facility] Transplant Team                                                      Critical / Counseling Time Provided: 30 minutes                                                                                                                                                        Chief Complaint: Autologous pbsct with high dose melphalan prep regimen for treatment of amyloidosis    S: Patient seen and examined with [facility]PC Transplant Team:   no acute complaints     Denies mouth / tongue / throat pain, dyspnea, cough, nausea, vomiting, diarrhea, abdominal pain     O: Vitals:   Vital Signs Last 24 Hrs  T(C): 37.5 (2020 05:34), Max: 37.5 (2020 05:34)  T(F): 99.5 (2020 05:34), Max: 99.5 (2020 05:34)  HR: 87 (2020 05:34) (74 - 87)  BP: 105/66 (2020 05:34) (105/66 - 114/76)  BP(mean): --  RR: 18 (2020 05:34) (18 - 19)  SpO2: 96% (2020 05:34) (95% - 99%)    Admit weight: 85.7 kg   Daily Weight in k.2 (2020 09:16)  Today's weight: 83.6 kg     Intake / Output:    @ 07:01  -  -19 @ 07:00  --------------------------------------------------------  IN: 1413.4 mL / OUT: 2450 mL / NET: -1036.6 mL    PE:   HEENT: Oropharynx: no erythema or ulcerations  Oral Mucositis:               -                                         Grade: n/a  CVS: S1, S2 RRR   Lungs: CTA throughout bilaterally   Abdomen: + BS x 4: mildly hyperactive, soft, NT, ND   Extremities: + chronic trace LE edema; L>R   Gastric Mucositis:      -                                             Grade: n/a - resolved   Intestinal Mucositis:    -                                        Grade: n/a  Skin: hyperpigmentation on back, neck, chest, abdomen likely secondary to [place]   TLC: CDI  Neuro: A&O x 3  Pain: denies       Labs:   CBC Full  -  ( 2020 07:06 )  WBC Count : x  Hemoglobin : 7.7 g/dL  Hematocrit : 23.5 %  Platelet Count - Automated : x  Mean Cell Volume : 78.1 fl  Mean Cell Hemoglobin : 25.6 pg  Mean Cell Hemoglobin Concentration : 32.8 gm/dL  Auto Neutrophil # : x  Auto Lymphocyte # : x  Auto Monocyte # : x  Auto Eosinophil # : x  Auto Basophil # : x  Auto Neutrophil % : x  Auto Lymphocyte % : x  Auto Monocyte % : x  Auto Eosinophil % : x  Auto Basophil % : x                          7.7    x     )-----------( x        ( 2020 07:06 )             23.5     07-18    143  |  107  |  9   ----------------------------<  91  3.5   |  25  |  0.71    Ca    8.9      2020 06:49  Phos  2.5     07-18  Mg     1.9     -18    TPro  5.4<L>  /  Alb  3.5  /  TBili  0.2  /  DBili  x   /  AST  14  /  ALT  13  /  AlkPhos  51  -18      LIVER FUNCTIONS - ( 2020 06:49 )  Alb: 3.5 g/dL / Pro: 5.4 g/dL / ALK PHOS: 51 U/L / ALT: 13 U/L / AST: 14 U/L / GGT: x           Cultures:   Culture - Blood (20 @ 00:37)    Specimen Source: .Blood Blood-Catheter    Culture Results:   No Growth Final    Radiology:   < from: Xray Chest 1 View- PORTABLE-Urgent (20 @ 21:05) >  IMPRESSION:  1.  The lungs are clear    Meds:   Antimicrobials:   acyclovir   Oral Tab/Cap 400 milliGRAM(s) Oral every 8 hours  clotrimazole Lozenge 1 Lozenge Oral five times a day  fluconAZOLE   Tablet 400 milliGRAM(s) Oral daily    Heme / Onc:   heparin  Infusion 357 Unit(s)/Hr IV Continuous <Continuous>    GI:  aluminum hydroxide/magnesium hydroxide/simethicone Suspension 30 milliLiter(s) Oral every 4 hours PRN  bismuth subsalicylate Liquid 30 milliLiter(s) Oral every 4 hours PRN  loperamide 2 milliGRAM(s) Oral every 6 hours PRN  pantoprazole    Tablet 40 milliGRAM(s) Oral before breakfast  sodium bicarbonate Mouth Rinse 10 milliLiter(s) Swish and Spit five times a day  sucralfate suspension 1 Gram(s) Oral four times a day  ursodiol Capsule 300 milliGRAM(s) Oral two times a day with meals    Cardiovascular:   furosemide   Injectable 20 milliGRAM(s) IV Push every 24 hours PRN    Immunologic:   filgrastim-sndz (ZARXIO) Injectable 600 MICROGram(s) SubCutaneous every 24 hours    Other medications:   Biotene Dry Mouth Oral Rinse 5 milliLiter(s) Swish and Spit five times a day  chlorhexidine 4% Liquid 1 Application(s) Topical <User Schedule>  folic acid 1 milliGRAM(s) Oral daily  hydrocortisone 1% Cream 1 Application(s) Topical daily  lidocaine/prilocaine Cream 1 Application(s) Topical daily  multivitamin 1 Tablet(s) Oral daily  sodium chloride 0.9%. 1000 milliLiter(s) IV Continuous <Continuous>      PRN:   acetaminophen   Tablet .. 650 milliGRAM(s) Oral every 6 hours PRN  acetaminophen   Tablet .. 650 milliGRAM(s) Oral every 6 hours PRN  aluminum hydroxide/magnesium hydroxide/simethicone Suspension 30 milliLiter(s) Oral every 4 hours PRN  AQUAPHOR (petrolatum Ointment) 1 Application(s) Topical two times a day PRN  bismuth subsalicylate Liquid 30 milliLiter(s) Oral every 4 hours PRN  furosemide   Injectable 20 milliGRAM(s) IV Push every 24 hours PRN  hydrocortisone sodium succinate Injectable 50 milliGRAM(s) IV Push daily PRN  loperamide 2 milliGRAM(s) Oral every 6 hours PRN  LORazepam   Injectable 0.5 milliGRAM(s) IV Push daily PRN  metoclopramide Injectable 10 milliGRAM(s) IV Push every 6 hours PRN  ondansetron Injectable 8 milliGRAM(s) IV Push every 8 hours PRN  sodium chloride 0.9% lock flush 10 milliLiter(s) IV Push every 1 hour PRN      A/P: 59 year old male with a history of amyloidosis  Post Autologous PBSCT day + 17  Keep platelets =/> 15K for history of amyloidosis   Chemotherapy induced grade 1 intestinal mucositis (diarrhea) - c. diff negative - now resolved   Chemotherapy induced grade 1 GI mucositis - now resolved   Early engraftment, continue Zarxio daily until post engraftment dose increased to 600 micrograms on 7/15  Patient is not neutropenic, Cefepime discontinued      1. Infectious Disease:   acyclovir   Oral Tab/Cap 400 milliGRAM(s) Oral every 8 hours  clotrimazole Lozenge 1 Lozenge Oral five times a day  fluconAZOLE   Tablet 400 milliGRAM(s) Oral daily    2. VOD Prophylaxis: Actigall, Glutamine, Heparin (dosed at 100 units / kg / day)     3. GI Prophylaxis:    pantoprazole    Tablet 40 milliGRAM(s) Oral before breakfast    4. Mouthcare - NS / NaHCO3 rinses, Mycelex, Biotene; Skin care     5. GVHD prophylaxis - n/a     6. Transfuse & replete electrolytes prn   Hypophosphatemia: replace kphos 15mmol iv x 1     7. IV hydration, daily weights, strict I&O, prn diuresis     8. PO intake as tolerated, nutrition follow up as needed, MVI, folic acid     9. Antiemetics, anti-diarrhea medications:   loperamide 2 milliGRAM(s) Oral every 6 hours PRN  LORazepam   Injectable 0.5 milliGRAM(s) IV Push daily PRN  metoclopramide Injectable 10 milliGRAM(s) IV Push every 6 hours PRN  ondansetron Injectable 8 milliGRAM(s) IV Push every 8 hours PRN  aluminum hydroxide/magnesium hydroxide/simethicone Suspension 30 milliLiter(s) Oral every 4 hours PRN  bismuth subsalicylate Liquid 30 milliLiter(s) Oral every 4 hours PRN    10. OOB as tolerated, physical therapy consult if needed     11. Monitor coags / fibrinogen 2x week, vitamin K as needed     12. Monitor closely for clinical changes, monitor for fevers     13. Emotional support provided, plan of care discussed and questions addressed     14. Patient education done regarding  plan of care, restrictions and discharge planning     15. Continue regular social work input     I have written the above note for Dr. Goldberg who performed service with me in the room.   Larissa Manriquez NP-C ([phone])    I have seen and examined patient with NP, I agree with above note as scribed. [facility] Transplant Team                                                      Critical / Counseling Time Provided: 30 minutes                                                                                                                                                        Chief Complaint: Autologous pbsct with high dose melphalan prep regimen for treatment of amyloidosis    S: Patient seen and examined with HPC Transplant Team:   no acute complaints     Denies mouth / tongue / throat pain, dyspnea, cough, nausea, vomiting, diarrhea, abdominal pain     O: Vitals:   Vital Signs Last 24 Hrs  T(C): 37.5 (2020 05:34), Max: 37.5 (2020 05:34)  T(F): 99.5 (2020 05:34), Max: 99.5 (2020 05:34)  HR: 87 (2020 05:34) (74 - 87)  BP: 105/66 (2020 05:34) (105/66 - 114/76)  BP(mean): --  RR: 18 (2020 05:34) (18 - 19)  SpO2: 96% (2020 05:34) (95% - 99%)    Admit weight: 85.7 kg   Daily Weight in k.2 (2020 09:16)  Today's weight: 83.6 kg     Intake / Output:    @ 07:01  -  -19 @ 07:00  --------------------------------------------------------  IN: 1413.4 mL / OUT: 2450 mL / NET: -1036.6 mL    PE:   HEENT: Oropharynx: no erythema or ulcerations  Oral Mucositis:               -                                         Grade: n/a  CVS: S1, S2 RRR   Lungs: CTA throughout bilaterally   Abdomen: + BS x 4: mildly hyperactive, soft, NT, ND   Extremities: + chronic trace LE edema; L>R   Gastric Mucositis:      -                                             Grade: n/a - resolved   Intestinal Mucositis:    -                                        Grade: n/a  Skin: hyperpigmentation on back, neck, chest, abdomen likely secondary to Kepivance   TLC: CDI  Neuro: A&O x 3  Pain: denies       Labs:   CBC Full  -  ( 2020 07:06 )  WBC Count : x  Hemoglobin : 7.7 g/dL  Hematocrit : 23.5 %  Platelet Count - Automated : x  Mean Cell Volume : 78.1 fl  Mean Cell Hemoglobin : 25.6 pg  Mean Cell Hemoglobin Concentration : 32.8 gm/dL  Auto Neutrophil # : x  Auto Lymphocyte # : x  Auto Monocyte # : x  Auto Eosinophil # : x  Auto Basophil # : x  Auto Neutrophil % : x  Auto Lymphocyte % : x  Auto Monocyte % : x  Auto Eosinophil % : x  Auto Basophil % : x                          7.7    x     )-----------( x        ( 2020 07:06 )             23.5     07-18    143  |  107  |  9   ----------------------------<  91  3.5   |  25  |  0.71    Ca    8.9      2020 06:49  Phos  2.5     07-18  Mg     1.9     -18    TPro  5.4<L>  /  Alb  3.5  /  TBili  0.2  /  DBili  x   /  AST  14  /  ALT  13  /  AlkPhos  51  -18      LIVER FUNCTIONS - ( 2020 06:49 )  Alb: 3.5 g/dL / Pro: 5.4 g/dL / ALK PHOS: 51 U/L / ALT: 13 U/L / AST: 14 U/L / GGT: x           Cultures:   Culture - Blood (20 @ 00:37)    Specimen Source: .Blood Blood-Catheter    Culture Results:   No Growth Final    Radiology:   < from: Xray Chest 1 View- PORTABLE-Urgent (20 @ 21:05) >  IMPRESSION:  1.  The lungs are clear    Meds:   Antimicrobials:   acyclovir   Oral Tab/Cap 400 milliGRAM(s) Oral every 8 hours  clotrimazole Lozenge 1 Lozenge Oral five times a day  fluconAZOLE   Tablet 400 milliGRAM(s) Oral daily    Heme / Onc:   heparin  Infusion 357 Unit(s)/Hr IV Continuous <Continuous>    GI:  aluminum hydroxide/magnesium hydroxide/simethicone Suspension 30 milliLiter(s) Oral every 4 hours PRN  bismuth subsalicylate Liquid 30 milliLiter(s) Oral every 4 hours PRN  loperamide 2 milliGRAM(s) Oral every 6 hours PRN  pantoprazole    Tablet 40 milliGRAM(s) Oral before breakfast  sodium bicarbonate Mouth Rinse 10 milliLiter(s) Swish and Spit five times a day  sucralfate suspension 1 Gram(s) Oral four times a day  ursodiol Capsule 300 milliGRAM(s) Oral two times a day with meals    Cardiovascular:   furosemide   Injectable 20 milliGRAM(s) IV Push every 24 hours PRN    Immunologic:   filgrastim-sndz (ZARXIO) Injectable 600 MICROGram(s) SubCutaneous every 24 hours    Other medications:   Biotene Dry Mouth Oral Rinse 5 milliLiter(s) Swish and Spit five times a day  chlorhexidine 4% Liquid 1 Application(s) Topical <User Schedule>  folic acid 1 milliGRAM(s) Oral daily  hydrocortisone 1% Cream 1 Application(s) Topical daily  lidocaine/prilocaine Cream 1 Application(s) Topical daily  multivitamin 1 Tablet(s) Oral daily  sodium chloride 0.9%. 1000 milliLiter(s) IV Continuous <Continuous>      PRN:   acetaminophen   Tablet .. 650 milliGRAM(s) Oral every 6 hours PRN  acetaminophen   Tablet .. 650 milliGRAM(s) Oral every 6 hours PRN  aluminum hydroxide/magnesium hydroxide/simethicone Suspension 30 milliLiter(s) Oral every 4 hours PRN  AQUAPHOR (petrolatum Ointment) 1 Application(s) Topical two times a day PRN  bismuth subsalicylate Liquid 30 milliLiter(s) Oral every 4 hours PRN  furosemide   Injectable 20 milliGRAM(s) IV Push every 24 hours PRN  hydrocortisone sodium succinate Injectable 50 milliGRAM(s) IV Push daily PRN  loperamide 2 milliGRAM(s) Oral every 6 hours PRN  LORazepam   Injectable 0.5 milliGRAM(s) IV Push daily PRN  metoclopramide Injectable 10 milliGRAM(s) IV Push every 6 hours PRN  ondansetron Injectable 8 milliGRAM(s) IV Push every 8 hours PRN  sodium chloride 0.9% lock flush 10 milliLiter(s) IV Push every 1 hour PRN      A/P: 59 year old male with a history of amyloidosis  Post Autologous PBSCT day + 17  Keep platelets =/> 15K for history of amyloidosis   Chemotherapy induced grade 1 intestinal mucositis (diarrhea) - c. diff negative - now resolved   Chemotherapy induced grade 1 GI mucositis - now resolved   Early engraftment, continue Zarxio daily until post engraftment dose increased to 600 micrograms on 7/15  Patient is not neutropenic, Cefepime discontinued      1. Infectious Disease:   acyclovir   Oral Tab/Cap 400 milliGRAM(s) Oral every 8 hours  clotrimazole Lozenge 1 Lozenge Oral five times a day  fluconAZOLE   Tablet 400 milliGRAM(s) Oral daily    2. VOD Prophylaxis: Actigall, Glutamine, Heparin (dosed at 100 units / kg / day)     3. GI Prophylaxis:    pantoprazole    Tablet 40 milliGRAM(s) Oral before breakfast     4. Mouthcare - NS / NaHCO3 rinses, Mycelex, Biotene; Skin care     5. GVHD prophylaxis - n/a     6. Transfuse & replete electrolytes prn   Hypophosphatemia: replace kphos 15mmol iv x 1     7. IV hydration, daily weights, strict I&O, prn diuresis     8. PO intake as tolerated, nutrition follow up as needed, MVI, folic acid     9. Antiemetics, anti-diarrhea medications:   loperamide 2 milliGRAM(s) Oral every 6 hours PRN  LORazepam   Injectable 0.5 milliGRAM(s) IV Push daily PRN  metoclopramide Injectable 10 milliGRAM(s) IV Push every 6 hours PRN  ondansetron Injectable 8 milliGRAM(s) IV Push every 8 hours PRN  aluminum hydroxide/magnesium hydroxide/simethicone Suspension 30 milliLiter(s) Oral every 4 hours PRN  bismuth subsalicylate Liquid 30 milliLiter(s) Oral every 4 hours PRN    10. OOB as tolerated, physical therapy consult if needed     11. Monitor coags / fibrinogen 2x week, vitamin K as needed     12. Monitor closely for clinical changes, monitor for fevers     13. Emotional support provided, plan of care discussed and questions addressed     14. Patient education done regarding  plan of care, restrictions and discharge planning     15. Continue regular social work input     I have written the above note for Dr. Goldberg who performed service with me in the room.   Larissa Manriquez NP-C ([phone])    I have seen and examined patient with NP, I agree with above note as scribed. HPC Transplant Team                                                      Critical / Counseling Time Provided: 30 minutes                                                                                                                                                        Chief Complaint: Autologous pbsct with high dose melphalan prep regimen for treatment of amyloidosis    S: Patient seen and examined with HPC Transplant Team:   no acute complaints     Denies mouth / tongue / throat pain, dyspnea, cough, nausea, vomiting, diarrhea, abdominal pain     O: Vitals:   Vital Signs Last 24 Hrs  T(C): 37.5 (2020 05:34), Max: 37.5 (2020 05:34)  T(F): 99.5 (2020 05:34), Max: 99.5 (2020 05:34)  HR: 87 (2020 05:34) (74 - 87)  BP: 105/66 (2020 05:34) (105/66 - 114/76)  BP(mean): --  RR: 18 (2020 05:34) (18 - 19)  SpO2: 96% (2020 05:34) (95% - 99%)    Admit weight: 85.7 kg   Daily Weight in k.2 (2020 09:16)  Today's weight: 83.6 kg     Intake / Output:    @ 07:01  -  -19 @ 07:00  --------------------------------------------------------  IN: 1413.4 mL / OUT: 2450 mL / NET: -1036.6 mL    PE:   HEENT: Oropharynx: no erythema or ulcerations  Oral Mucositis:               -                                         Grade: n/a  CVS: S1, S2 RRR   Lungs: CTA throughout bilaterally   Abdomen: + BS x 4: mildly hyperactive, soft, NT, ND   Extremities: + chronic trace LE edema; L>R   Gastric Mucositis:      -                                             Grade: n/a - resolved   Intestinal Mucositis:    -                                        Grade: n/a  Skin: hyperpigmentation on back, neck, chest, abdomen likely secondary to [place]iv[place]   TLC: CDI  Neuro: A&O x 3  Pain: denies       Labs:   CBC Full  -  ( 2020 07:06 )  WBC Count : x  Hemoglobin : 7.7 g/dL  Hematocrit : 23.5 %  Platelet Count - Automated : x  Mean Cell Volume : 78.1 fl  Mean Cell Hemoglobin : 25.6 pg  Mean Cell Hemoglobin Concentration : 32.8 gm/dL  Auto Neutrophil # : x  Auto Lymphocyte # : x  Auto Monocyte # : x  Auto Eosinophil # : x  Auto Basophil # : x  Auto Neutrophil % : x  Auto Lymphocyte % : x  Auto Monocyte % : x  Auto Eosinophil % : x  Auto Basophil % : x                          7.7    x     )-----------( x        ( 2020 07:06 )             23.5     07-18    143  |  107  |  9   ----------------------------<  91  3.5   |  25  |  0.71    Ca    8.9      2020 06:49  Phos  2.5     07-18  Mg     1.9     -18    TPro  5.4<L>  /  Alb  3.5  /  TBili  0.2  /  DBili  x   /  AST  14  /  ALT  13  /  AlkPhos  51  -18      LIVER FUNCTIONS - ( 2020 06:49 )  Alb: 3.5 g/dL / Pro: 5.4 g/dL / ALK PHOS: 51 U/L / ALT: 13 U/L / AST: 14 U/L / GGT: x           Cultures:   Culture - Blood (20 @ 00:37)    Specimen Source: .Blood Blood-Catheter    Culture Results:   No Growth Final    Radiology:   < from: Xray Chest 1 View- PORTABLE-Urgent (20 @ 21:05) >  IMPRESSION:  1.  The lungs are clear    Meds:   Antimicrobials:   acyclovir   Oral Tab/Cap 400 milliGRAM(s) Oral every 8 hours  clotrimazole Lozenge 1 Lozenge Oral five times a day  fluconAZOLE   Tablet 400 milliGRAM(s) Oral daily    Heme / Onc:   heparin  Infusion 357 Unit(s)/Hr IV Continuous <Continuous>    GI:  aluminum hydroxide/magnesium hydroxide/simethicone Suspension 30 milliLiter(s) Oral every 4 hours PRN  bismuth subsalicylate Liquid 30 milliLiter(s) Oral every 4 hours PRN  loperamide 2 milliGRAM(s) Oral every 6 hours PRN  pantoprazole    Tablet 40 milliGRAM(s) Oral before breakfast  sodium bicarbonate Mouth Rinse 10 milliLiter(s) Swish and Spit five times a day  sucralfate suspension 1 Gram(s) Oral four times a day  ursodiol Capsule 300 milliGRAM(s) Oral two times a day with meals    Cardiovascular:   furosemide   Injectable 20 milliGRAM(s) IV Push every 24 hours PRN    Immunologic:   filgrastim-sndz (ZARXIO) Injectable 600 MICROGram(s) SubCutaneous every 24 hours    Other medications:   Biotene Dry Mouth Oral Rinse 5 milliLiter(s) Swish and Spit five times a day  chlorhexidine 4% Liquid 1 Application(s) Topical <User Schedule>  folic acid 1 milliGRAM(s) Oral daily  hydrocortisone 1% Cream 1 Application(s) Topical daily  lidocaine/prilocaine Cream 1 Application(s) Topical daily  multivitamin 1 Tablet(s) Oral daily  sodium chloride 0.9%. 1000 milliLiter(s) IV Continuous <Continuous>      PRN:   acetaminophen   Tablet .. 650 milliGRAM(s) Oral every 6 hours PRN  acetaminophen   Tablet .. 650 milliGRAM(s) Oral every 6 hours PRN  aluminum hydroxide/magnesium hydroxide/simethicone Suspension 30 milliLiter(s) Oral every 4 hours PRN  AQUAPHOR (petrolatum Ointment) 1 Application(s) Topical two times a day PRN  bismuth subsalicylate Liquid 30 milliLiter(s) Oral every 4 hours PRN  furosemide   Injectable 20 milliGRAM(s) IV Push every 24 hours PRN  hydrocortisone sodium succinate Injectable 50 milliGRAM(s) IV Push daily PRN  loperamide 2 milliGRAM(s) Oral every 6 hours PRN  LORazepam   Injectable 0.5 milliGRAM(s) IV Push daily PRN  metoclopramide Injectable 10 milliGRAM(s) IV Push every 6 hours PRN  ondansetron Injectable 8 milliGRAM(s) IV Push every 8 hours PRN  sodium chloride 0.9% lock flush 10 milliLiter(s) IV Push every 1 hour PRN      A/P: 59 year old male with a history of amyloidosis  Post Autologous PBSCT day + 17  Keep platelets =/> 15K for history of amyloidosis   Chemotherapy induced grade 1 intestinal mucositis (diarrhea) - c. diff negative - now resolved   Chemotherapy induced grade 1 GI mucositis - now resolved   Engraftment noted, Zarxio and Cefepime discontinued     1. Infectious Disease:   acyclovir   Oral Tab/Cap 400 milliGRAM(s) Oral every 8 hours  clotrimazole Lozenge 1 Lozenge Oral five times a day  fluconAZOLE   Tablet 400 milliGRAM(s) Oral daily    2. VOD Prophylaxis: Actigall, Glutamine, Heparin (dosed at 100 units / kg / day)     3. GI Prophylaxis:    pantoprazole    Tablet 40 milliGRAM(s) Oral before breakfast     4. Mouthcare - NS / NaHCO3 rinses, Mycelex, Biotene; Skin care     5. GVHD prophylaxis - n/a     6. Transfuse & replete electrolytes prn   Hypophosphatemia: replace kphos 15mmol iv x 1     7. IV hydration, daily weights, strict I&O, prn diuresis     8. PO intake as tolerated, nutrition follow up as needed, MVI, folic acid     9. Antiemetics, anti-diarrhea medications:   loperamide 2 milliGRAM(s) Oral every 6 hours PRN  LORazepam   Injectable 0.5 milliGRAM(s) IV Push daily PRN  metoclopramide Injectable 10 milliGRAM(s) IV Push every 6 hours PRN  ondansetron Injectable 8 milliGRAM(s) IV Push every 8 hours PRN  aluminum hydroxide/magnesium hydroxide/simethicone Suspension 30 milliLiter(s) Oral every 4 hours PRN  bismuth subsalicylate Liquid 30 milliLiter(s) Oral every 4 hours PRN    10. OOB as tolerated, physical therapy consult if needed     11. Monitor coags / fibrinogen 2x week, vitamin K as needed     12. Monitor closely for clinical changes, monitor for fevers     13. Emotional support provided, plan of care discussed and questions addressed     14. Patient education done regarding  plan of care, restrictions and discharge planning     15. Continue regular social work input     I have written the above note for Dr. Goldberg who performed service with me in the room.   Larissa Manriquez NP-C ([phone])    I have seen and examined patient with NP, I agree with above note as scribed.

## 2020-07-19 NOTE — PROGRESS NOTE ADULT - ATTENDING COMMENTS
59 year old male with history of amyloidosis treated with CyBorD, now admitted for autologous pbsct with Melphalan preparative regimen   s/p HPC transplant on 7/2/20, now day + 16...slow movement upward    -WBC uptrending significantly today, if continues tomorrow will d/c Zarxio.   -IV hydration, strict I&O, daily weight, Lasix to keep O > I  -VOD prophylaxis- Actigall, low dose heparin gtt & glutamine supplementation  -ID- Neutropenic fevers- on Cefepime; continue Fluconazole / Acyclovir prophylaxis. Has been afebrile since 7/12 and without neutropenia at this point, will discontinue cefepime today 7/18  -GI mucositis secondary to antineoplastic chemotherapy- continue PPI, add Carafate; Imodium for diarrhea. Improved, as per pt  -Antiemetics  -Supplement lytes prn  - for this weekend  transfuse if plts less than 15.....now that counts are recovering and GI tract should be healing.....tx rbc as needed  -Mouth care, skin care  -OOB/ambulate  - d/c planning for early next week 59 year old male with history of amyloidosis treated with CyBorD, now admitted for autologous pbsct with Melphalan preparative regimen   s/p HPC transplant on 7/2/20, now day + 17.    -WBC uptrending significantly today, now over 9k. Will d/c Zarxio.   -IV hydration, strict I&O, daily weight, Lasix to keep O > I  -VOD prophylaxis- Actigall, low dose heparin gtt & glutamine supplementation  -ID- Neutropenic fevers- on Cefepime; continue Fluconazole / Acyclovir prophylaxis. Has been afebrile since 7/12 and without neutropenia at this point, will discontinue cefepime today 7/18  -GI mucositis secondary to antineoplastic chemotherapy- continue PPI, add Carafate; Imodium for diarrhea. Improved, as per pt  -Antiemetics  -Supplement lytes prn  - for this weekend  transfuse if plts less than 15.....now that counts are recovering and GI tract should be healing.....tx rbc as needed  -Mouth care, skin care  -OOB/ambulate  - d/c planning for early next week

## 2020-07-20 ENCOUNTER — TRANSCRIPTION ENCOUNTER (OUTPATIENT)
Age: 60
End: 2020-07-20

## 2020-07-20 VITALS — TEMPERATURE: 98 F

## 2020-07-20 PROBLEM — B19.20 UNSPECIFIED VIRAL HEPATITIS C WITHOUT HEPATIC COMA: Chronic | Status: ACTIVE | Noted: 2020-06-29

## 2020-07-20 PROBLEM — D47.01 CUTANEOUS MASTOCYTOSIS: Chronic | Status: ACTIVE | Noted: 2020-06-29

## 2020-07-20 PROBLEM — E85.9 AMYLOIDOSIS, UNSPECIFIED: Chronic | Status: ACTIVE | Noted: 2020-06-29

## 2020-07-20 LAB
ALBUMIN SERPL ELPH-MCNC: 3.5 G/DL — SIGNIFICANT CHANGE UP (ref 3.3–5)
ALP SERPL-CCNC: 73 U/L — SIGNIFICANT CHANGE UP (ref 40–120)
ALT FLD-CCNC: 12 U/L — SIGNIFICANT CHANGE UP (ref 10–45)
ANION GAP SERPL CALC-SCNC: 10 MMOL/L — SIGNIFICANT CHANGE UP (ref 5–17)
APTT BLD: 29.5 SEC — SIGNIFICANT CHANGE UP (ref 27.5–35.5)
AST SERPL-CCNC: 22 U/L — SIGNIFICANT CHANGE UP (ref 10–40)
BASOPHILS # BLD AUTO: 0.14 K/UL — SIGNIFICANT CHANGE UP (ref 0–0.2)
BASOPHILS NFR BLD AUTO: 0.9 % — SIGNIFICANT CHANGE UP (ref 0–2)
BILIRUB SERPL-MCNC: 0.1 MG/DL — LOW (ref 0.2–1.2)
BLASTS # FLD: 1.7 % — HIGH (ref 0–0)
BLD GP AB SCN SERPL QL: NEGATIVE — SIGNIFICANT CHANGE UP
BUN SERPL-MCNC: 7 MG/DL — SIGNIFICANT CHANGE UP (ref 7–23)
CALCIUM SERPL-MCNC: 9 MG/DL — SIGNIFICANT CHANGE UP (ref 8.4–10.5)
CHLORIDE SERPL-SCNC: 105 MMOL/L — SIGNIFICANT CHANGE UP (ref 96–108)
CO2 SERPL-SCNC: 26 MMOL/L — SIGNIFICANT CHANGE UP (ref 22–31)
CREAT SERPL-MCNC: 0.74 MG/DL — SIGNIFICANT CHANGE UP (ref 0.5–1.3)
EOSINOPHIL # BLD AUTO: 0 K/UL — SIGNIFICANT CHANGE UP (ref 0–0.5)
EOSINOPHIL NFR BLD AUTO: 0 % — SIGNIFICANT CHANGE UP (ref 0–6)
FIBRINOGEN PPP-MCNC: 411 MG/DL — SIGNIFICANT CHANGE UP (ref 350–510)
GLUCOSE SERPL-MCNC: 88 MG/DL — SIGNIFICANT CHANGE UP (ref 70–99)
HCT VFR BLD CALC: 23.4 % — LOW (ref 39–50)
HGB BLD-MCNC: 7.8 G/DL — LOW (ref 13–17)
INR BLD: 1.09 RATIO — SIGNIFICANT CHANGE UP (ref 0.88–1.16)
LDH SERPL L TO P-CCNC: 484 U/L — HIGH (ref 50–242)
LYMPHOCYTES # BLD AUTO: 0.27 K/UL — LOW (ref 1–3.3)
LYMPHOCYTES # BLD AUTO: 1.7 % — LOW (ref 13–44)
MAGNESIUM SERPL-MCNC: 1.8 MG/DL — SIGNIFICANT CHANGE UP (ref 1.6–2.6)
MANUAL SMEAR VERIFICATION: SIGNIFICANT CHANGE UP
MCHC RBC-ENTMCNC: 25.8 PG — LOW (ref 27–34)
MCHC RBC-ENTMCNC: 33.3 GM/DL — SIGNIFICANT CHANGE UP (ref 32–36)
MCV RBC AUTO: 77.5 FL — LOW (ref 80–100)
METAMYELOCYTES # FLD: 2.6 % — HIGH (ref 0–0)
MONOCYTES # BLD AUTO: 1.53 K/UL — HIGH (ref 0–0.9)
MONOCYTES NFR BLD AUTO: 9.6 % — SIGNIFICANT CHANGE UP (ref 2–14)
MYELOCYTES NFR BLD: 9.6 % — HIGH (ref 0–0)
NEUTROPHILS # BLD AUTO: 10.82 K/UL — HIGH (ref 1.8–7.4)
NEUTROPHILS NFR BLD AUTO: 57.4 % — SIGNIFICANT CHANGE UP (ref 43–77)
NEUTS BAND # BLD: 10.4 % — HIGH (ref 0–8)
PHOSPHATE SERPL-MCNC: 2.9 MG/DL — SIGNIFICANT CHANGE UP (ref 2.5–4.5)
PLAT MORPH BLD: NORMAL — SIGNIFICANT CHANGE UP
PLATELET # BLD AUTO: 31 K/UL — LOW (ref 150–400)
POTASSIUM SERPL-MCNC: 3.4 MMOL/L — LOW (ref 3.5–5.3)
POTASSIUM SERPL-SCNC: 3.4 MMOL/L — LOW (ref 3.5–5.3)
PROMYELOCYTES # FLD: 6.1 % — HIGH (ref 0–0)
PROT SERPL-MCNC: 5.4 G/DL — LOW (ref 6–8.3)
PROTHROM AB SERPL-ACNC: 12.9 SEC — SIGNIFICANT CHANGE UP (ref 10.6–13.6)
RBC # BLD: 3.02 M/UL — LOW (ref 4.2–5.8)
RBC # FLD: 15.9 % — HIGH (ref 10.3–14.5)
RBC BLD AUTO: SIGNIFICANT CHANGE UP
RH IG SCN BLD-IMP: POSITIVE — SIGNIFICANT CHANGE UP
SODIUM SERPL-SCNC: 141 MMOL/L — SIGNIFICANT CHANGE UP (ref 135–145)
WBC # BLD: 15.96 K/UL — HIGH (ref 3.8–10.5)
WBC # FLD AUTO: 15.96 K/UL — HIGH (ref 3.8–10.5)

## 2020-07-20 PROCEDURE — 86803 HEPATITIS C AB TEST: CPT

## 2020-07-20 PROCEDURE — 36430 TRANSFUSION BLD/BLD COMPNT: CPT

## 2020-07-20 PROCEDURE — 76937 US GUIDE VASCULAR ACCESS: CPT

## 2020-07-20 PROCEDURE — 81003 URINALYSIS AUTO W/O SCOPE: CPT

## 2020-07-20 PROCEDURE — 80053 COMPREHEN METABOLIC PANEL: CPT

## 2020-07-20 PROCEDURE — 85730 THROMBOPLASTIN TIME PARTIAL: CPT

## 2020-07-20 PROCEDURE — 85610 PROTHROMBIN TIME: CPT

## 2020-07-20 PROCEDURE — 82785 ASSAY OF IGE: CPT

## 2020-07-20 PROCEDURE — 83735 ASSAY OF MAGNESIUM: CPT

## 2020-07-20 PROCEDURE — 86900 BLOOD TYPING SEROLOGIC ABO: CPT

## 2020-07-20 PROCEDURE — 87521 HEPATITIS C PROBE&RVRS TRNSC: CPT

## 2020-07-20 PROCEDURE — 86769 SARS-COV-2 COVID-19 ANTIBODY: CPT

## 2020-07-20 PROCEDURE — 99291 CRITICAL CARE FIRST HOUR: CPT

## 2020-07-20 PROCEDURE — 85384 FIBRINOGEN ACTIVITY: CPT

## 2020-07-20 PROCEDURE — 82784 ASSAY IGA/IGD/IGG/IGM EACH: CPT

## 2020-07-20 PROCEDURE — P9037: CPT

## 2020-07-20 PROCEDURE — 86901 BLOOD TYPING SEROLOGIC RH(D): CPT

## 2020-07-20 PROCEDURE — C1769: CPT

## 2020-07-20 PROCEDURE — 38207 CRYOPRESERVE STEM CELLS: CPT

## 2020-07-20 PROCEDURE — 36556 INSERT NON-TUNNEL CV CATH: CPT

## 2020-07-20 PROCEDURE — P9040: CPT

## 2020-07-20 PROCEDURE — 85045 AUTOMATED RETICULOCYTE COUNT: CPT

## 2020-07-20 PROCEDURE — U0003: CPT

## 2020-07-20 PROCEDURE — 38209 WASH HARVEST STEM CELLS: CPT

## 2020-07-20 PROCEDURE — 87324 CLOSTRIDIUM AG IA: CPT

## 2020-07-20 PROCEDURE — 82150 ASSAY OF AMYLASE: CPT

## 2020-07-20 PROCEDURE — 71045 X-RAY EXAM CHEST 1 VIEW: CPT

## 2020-07-20 PROCEDURE — 84100 ASSAY OF PHOSPHORUS: CPT

## 2020-07-20 PROCEDURE — 93005 ELECTROCARDIOGRAM TRACING: CPT

## 2020-07-20 PROCEDURE — 82248 BILIRUBIN DIRECT: CPT

## 2020-07-20 PROCEDURE — C1751: CPT

## 2020-07-20 PROCEDURE — 86850 RBC ANTIBODY SCREEN: CPT

## 2020-07-20 PROCEDURE — 77001 FLUOROGUIDE FOR VEIN DEVICE: CPT

## 2020-07-20 PROCEDURE — 86923 COMPATIBILITY TEST ELECTRIC: CPT

## 2020-07-20 PROCEDURE — 82955 ASSAY OF G6PD ENZYME: CPT

## 2020-07-20 PROCEDURE — 83615 LACTATE (LD) (LDH) ENZYME: CPT

## 2020-07-20 PROCEDURE — 87449 NOS EACH ORGANISM AG IA: CPT

## 2020-07-20 PROCEDURE — 87040 BLOOD CULTURE FOR BACTERIA: CPT

## 2020-07-20 PROCEDURE — 85027 COMPLETE CBC AUTOMATED: CPT

## 2020-07-20 PROCEDURE — 83690 ASSAY OF LIPASE: CPT

## 2020-07-20 PROCEDURE — 87086 URINE CULTURE/COLONY COUNT: CPT

## 2020-07-20 RX ORDER — PROCHLORPERAZINE MALEATE 10 MG/1
10 TABLET ORAL EVERY 6 HOURS
Qty: 28 | Refills: 3 | Status: DISCONTINUED | COMMUNITY
Start: 2019-11-14 | End: 2020-07-20

## 2020-07-20 RX ORDER — FAMOTIDINE 20 MG/1
20 TABLET, FILM COATED ORAL
Qty: 90 | Refills: 1 | Status: DISCONTINUED | COMMUNITY
Start: 2019-11-14 | End: 2020-07-20

## 2020-07-20 RX ORDER — ACYCLOVIR 400 MG/1
400 TABLET ORAL
Qty: 180 | Refills: 1 | Status: DISCONTINUED | COMMUNITY
Start: 2019-11-14 | End: 2020-07-20

## 2020-07-20 RX ORDER — MULTIVITAMIN
TABLET ORAL DAILY
Qty: 30 | Refills: 0 | Status: ACTIVE | COMMUNITY
Start: 2020-07-20

## 2020-07-20 RX ORDER — FILGRASTIM-SNDZ 480 UG/.8ML
480 INJECTION, SOLUTION INTRAVENOUS; SUBCUTANEOUS
Qty: 5 | Refills: 0 | Status: DISCONTINUED | COMMUNITY
Start: 2020-02-28 | End: 2020-07-20

## 2020-07-20 RX ORDER — CYCLOPHOSPHAMIDE 50 MG/1
50 CAPSULE ORAL
Qty: 48 | Refills: 3 | Status: DISCONTINUED | COMMUNITY
Start: 2019-11-14 | End: 2020-07-20

## 2020-07-20 RX ORDER — FILGRASTIM-SNDZ 300 UG/.5ML
300 INJECTION, SOLUTION INTRAVENOUS; SUBCUTANEOUS
Qty: 5 | Refills: 0 | Status: DISCONTINUED | COMMUNITY
Start: 2020-02-28 | End: 2020-07-20

## 2020-07-20 RX ORDER — DEXAMETHASONE 4 MG/1
4 TABLET ORAL
Qty: 120 | Refills: 3 | Status: DISCONTINUED | COMMUNITY
Start: 2019-11-14 | End: 2020-07-20

## 2020-07-20 RX ORDER — POTASSIUM CHLORIDE 20 MEQ
40 PACKET (EA) ORAL ONCE
Refills: 0 | Status: COMPLETED | OUTPATIENT
Start: 2020-07-20 | End: 2020-07-20

## 2020-07-20 RX ADMIN — Medication 10 MILLILITER(S): at 16:17

## 2020-07-20 RX ADMIN — Medication 1 LOZENGE: at 16:17

## 2020-07-20 RX ADMIN — Medication 50 MILLIGRAM(S): at 11:06

## 2020-07-20 RX ADMIN — PANTOPRAZOLE SODIUM 40 MILLIGRAM(S): 20 TABLET, DELAYED RELEASE ORAL at 06:32

## 2020-07-20 RX ADMIN — Medication 5 MILLILITER(S): at 09:00

## 2020-07-20 RX ADMIN — URSODIOL 300 MILLIGRAM(S): 250 TABLET, FILM COATED ORAL at 08:50

## 2020-07-20 RX ADMIN — Medication 1 MILLIGRAM(S): at 12:19

## 2020-07-20 RX ADMIN — Medication 1 TABLET(S): at 12:19

## 2020-07-20 RX ADMIN — Medication 5 MILLILITER(S): at 16:17

## 2020-07-20 RX ADMIN — Medication 5 MILLILITER(S): at 12:19

## 2020-07-20 RX ADMIN — FLUCONAZOLE 400 MILLIGRAM(S): 150 TABLET ORAL at 12:19

## 2020-07-20 RX ADMIN — Medication 40 MILLIEQUIVALENT(S): at 11:05

## 2020-07-20 RX ADMIN — Medication 10 MILLILITER(S): at 12:18

## 2020-07-20 RX ADMIN — Medication 10 MILLILITER(S): at 08:50

## 2020-07-20 RX ADMIN — Medication 1 LOZENGE: at 09:00

## 2020-07-20 RX ADMIN — Medication 1 LOZENGE: at 12:18

## 2020-07-20 RX ADMIN — Medication 1 APPLICATION(S): at 13:36

## 2020-07-20 RX ADMIN — CHLORHEXIDINE GLUCONATE 1 APPLICATION(S): 213 SOLUTION TOPICAL at 10:00

## 2020-07-20 RX ADMIN — Medication 400 MILLIGRAM(S): at 06:33

## 2020-07-20 RX ADMIN — Medication 400 MILLIGRAM(S): at 14:56

## 2020-07-20 NOTE — DISCHARGE NOTE NURSING/CASE MANAGEMENT/SOCIAL WORK - PATIENT PORTAL LINK FT
You can access the FollowMyHealth Patient Portal offered by [facility] by registering at the following website: [email or web address]. By joining [facility]’s FollowMyHealth portal, you will also be able to view your health information using other applications (apps) compatible with our system.

## 2020-07-20 NOTE — CHART NOTE - NSCHARTNOTEFT_GEN_A_CORE
Nutrition Follow Up Note  Patient seen for: LOS follow up     Interim events noted, chart reviewed. Pt c amyloidosis, S/P autologous PBSCT day+18.     Source: pt     Diet : Diet, Regular:   GlutaSolve(Glutamine) 15gm pkg     Qty per Day:  1  Supplement Feeding Modality:  Oral  Ensure Enlive Cans or Servings Per Day:  2       Frequency:  Daily (07-10-20 @ 06:43)    Patient reports: he has been eating better, consuming 50-75% of most meals and taking Ensure Enlive at least once daily. Pt reports he has been choosing Prot dense foods. Reports BMs are improving. Pt received his discharge education booklet, pt has reviewed food safety information, declined any specific questions at this time, agreeable to review of education.       Daily Weight: 6/29: 188.9-> 7/14: 180.9->7/19: 184.3 pounds, wt starting to increase, would continue to monitor     Pertinent Medications: MEDICATIONS  (STANDING):  acyclovir   Oral Tab/Cap 400 milliGRAM(s) Oral every 8 hours  Biotene Dry Mouth Oral Rinse 5 milliLiter(s) Swish and Spit five times a day  chlorhexidine 4% Liquid 1 Application(s) Topical <User Schedule>  clotrimazole Lozenge 1 Lozenge Oral five times a day  fluconAZOLE   Tablet 400 milliGRAM(s) Oral daily  folic acid 1 milliGRAM(s) Oral daily  heparin  Infusion 357 Unit(s)/Hr (3.57 mL/Hr) IV Continuous <Continuous>  hydrocortisone 1% Cream 1 Application(s) Topical daily  lidocaine/prilocaine Cream 1 Application(s) Topical daily  multivitamin 1 Tablet(s) Oral daily  pantoprazole    Tablet 40 milliGRAM(s) Oral before breakfast  potassium chloride    Tablet ER 40 milliEquivalent(s) Oral once  sodium bicarbonate Mouth Rinse 10 milliLiter(s) Swish and Spit five times a day  sodium chloride 0.9%. 1000 milliLiter(s) (20 mL/Hr) IV Continuous <Continuous>  sucralfate suspension 1 Gram(s) Oral four times a day  ursodiol Capsule 300 milliGRAM(s) Oral two times a day with meals    MEDICATIONS  (PRN):  acetaminophen   Tablet .. 650 milliGRAM(s) Oral every 6 hours PRN Temp greater or equal to 38C (100.4F), Mild Pain (1 - 3)  acetaminophen   Tablet .. 650 milliGRAM(s) Oral every 6 hours PRN Temp greater or equal to 38C (100.4F), Mild Pain (1 - 3)  aluminum hydroxide/magnesium hydroxide/simethicone Suspension 30 milliLiter(s) Oral every 4 hours PRN Dyspepsia  AQUAPHOR (petrolatum Ointment) 1 Application(s) Topical two times a day PRN dry/itchy skin  bismuth subsalicylate Liquid 30 milliLiter(s) Oral every 4 hours PRN nausea / diarrhea / upset stomach  furosemide   Injectable 20 milliGRAM(s) IV Push every 24 hours PRN If urine output is <100mL/hr for 2 hours  hydrocortisone sodium succinate Injectable 50 milliGRAM(s) IV Push daily PRN pre transfusion  loperamide 2 milliGRAM(s) Oral every 6 hours PRN Diarrhea  LORazepam   Injectable 0.5 milliGRAM(s) IV Push every 8 hours PRN anxiety, nausea, and/or vomiting  metoclopramide Injectable 10 milliGRAM(s) IV Push every 6 hours PRN Nausea and/or Vomiting  ondansetron Injectable 8 milliGRAM(s) IV Push every 8 hours PRN Nausea and/or Vomiting  sodium chloride 0.9% lock flush 10 milliLiter(s) IV Push every 1 hour PRN Pre/post blood products, medications, blood draw, and to maintain line patency    Pertinent Labs: 07-20 @ 07:08: Na 141, BUN 7, Cr 0.74, BG 88, K+ 3.4<L>, Phos 2.9, Mg 1.8, Alk Phos 73, ALT/SGPT 12, AST/SGOT 22, HbA1c --    Finger Sticks: None pertinent to address at this time.       Skin per nursing documentation: no pressure injuries   Edema: none at this time     Estimated Needs:   [x] no change since previous assessment      Previous Nutrition Diagnosis: mild malnutrition   Nutrition Diagnosis continues at this time, care plan in progress, addressed c improving PO intake and supplements     New Nutrition Diagnosis: none at this time       Recommend  1) Continue c current diet.   2) Continue w/ Ensure Enlive. Pt currently going through what he has in the fridge.   3) Reviewed transplant food safety precautions and answered all questions as able.     Monitoring and Evaluation:     Continue to monitor Nutritional intake, Tolerance to diet prescription, weights, labs, skin integrity    RD remains available upon request and will follow up per protocol  .fa

## 2020-07-20 NOTE — CHART NOTE - NSCHARTNOTESELECT_GEN_ALL_CORE
BMTU/Event Note
Nutrition Services
Event Note
Event Note
Nutrition Services
TLcremoval/Event Note

## 2020-07-20 NOTE — PHARMACOTHERAPY INTERVENTION NOTE - COMMENTS
Allergies    No Known Allergies    Intolerances        MEDICATIONS  (STANDING):  acyclovir   Oral Tab/Cap 400 milliGRAM(s) Oral every 8 hours  Biotene Dry Mouth Oral Rinse 5 milliLiter(s) Swish and Spit five times a day  chlorhexidine 4% Liquid 1 Application(s) Topical <User Schedule>  clotrimazole Lozenge 1 Lozenge Oral five times a day  fluconAZOLE   Tablet 400 milliGRAM(s) Oral daily  folic acid 1 milliGRAM(s) Oral daily  hydrocortisone 1% Cream 1 Application(s) Topical daily  lidocaine/prilocaine Cream 1 Application(s) Topical daily  multivitamin 1 Tablet(s) Oral daily  pantoprazole    Tablet 40 milliGRAM(s) Oral before breakfast  sodium bicarbonate Mouth Rinse 10 milliLiter(s) Swish and Spit five times a day  sodium chloride 0.9%. 1000 milliLiter(s) (20 mL/Hr) IV Continuous <Continuous>  sucralfate suspension 1 Gram(s) Oral four times a day  ursodiol Capsule 300 milliGRAM(s) Oral two times a day with meals    MEDICATIONS  (PRN):  acetaminophen   Tablet .. 650 milliGRAM(s) Oral every 6 hours PRN Temp greater or equal to 38C (100.4F), Mild Pain (1 - 3)  acetaminophen   Tablet .. 650 milliGRAM(s) Oral every 6 hours PRN Temp greater or equal to 38C (100.4F), Mild Pain (1 - 3)  aluminum hydroxide/magnesium hydroxide/simethicone Suspension 30 milliLiter(s) Oral every 4 hours PRN Dyspepsia  AQUAPHOR (petrolatum Ointment) 1 Application(s) Topical two times a day PRN dry/itchy skin  bismuth subsalicylate Liquid 30 milliLiter(s) Oral every 4 hours PRN nausea / diarrhea / upset stomach  furosemide   Injectable 20 milliGRAM(s) IV Push every 24 hours PRN If urine output is <100mL/hr for 2 hours  hydrocortisone sodium succinate Injectable 50 milliGRAM(s) IV Push daily PRN pre transfusion  loperamide 2 milliGRAM(s) Oral every 6 hours PRN Diarrhea  LORazepam   Injectable 0.5 milliGRAM(s) IV Push every 8 hours PRN anxiety, nausea, and/or vomiting  metoclopramide Injectable 10 milliGRAM(s) IV Push every 6 hours PRN Nausea and/or Vomiting  ondansetron Injectable 8 milliGRAM(s) IV Push every 8 hours PRN Nausea and/or Vomiting  sodium chloride 0.9% lock flush 10 milliLiter(s) IV Push every 1 hour PRN Pre/post blood products, medications, blood draw, and to maintain line patency    Recipient of Education:  [ x ]Patient  [  ]Family, please specify ___________  [  ]Other, please specify ____________    Readiness to Learn:  [ x ]Ready  [  ]Not ready: cognitive  [  ]Not ready: physical  [  ]Not ready: emotional  Comment(s):    Barriers to Information Exhange:  [ x ]None identified  [  ]Vision  [  ]Hearing  [  ]Language  [  ]Literacy  [  ]Memory and Learning  [  ]Culture/Confucianism  [  ]Other, please specify ____________  Comment(s):    Patient Education Topics:  [  ]Anticoagulation  [  ]Heart Failure (HF)  [  ]Chronic Obstructive Pulmonary Disease (COPD)  [  ]Diabetes Mellitus (DM)  [  ]Stroke  [ x ]Other, please specify __Autologous stem cell transplant discharge education __________  Comment(s):    Medication Counseling Points:  [ x ]Medications Reviewed  [ x ]Medication Schedule  [  ]Precautions  [ x ]Side Effects  [ x ]Indication for Use  [ x ]Drug/Drug Interactions  [ x ]Drug/Food Interactions  [  ]Other, please specify ____________  Comment(s):    Teaching Method:  [ x ]Verbal Instruction  [ x ]Written Material, please specify __Medication chart, auto discharge booklet ____________  [  ]Skill Demonstration  [  ]Teach Back (Patient repeats in own words)  [  ]Ask Me 3  [  ]Computer/Internet  [  ]Other, please specify ____________  Comment(s):    Educational Evaluation:  [ x ]Meets goals/outcomes  [  ]Partially meets - needs review/practice  [  ]Unable to meet - needs instruction  [  ]Reinforced previously met goal  [  ]Patient declines instruction  [  ]Not applicable  Comment(s):    Time spent: __15___minutes

## 2020-07-20 NOTE — CHART NOTE - NSCHARTNOTEFT_GEN_A_CORE
PROCEDURE NOTE:  Central Line removal    Site:    [R]    IJ Triple Lumen Catheter placement    Last INR, aPTT, Platelets reviewed.Explained the procedure. Placed in Trendelenburg. Catheter removed and tip intact. . Pressure applied for greater than 5 mins, no hematoma or bleeding noted. Patient tolerated procedure well. A dry sterile dressing applied, instructed to keep patient supine for 30-45 mins.

## 2020-07-20 NOTE — PROGRESS NOTE ADULT - ATTENDING COMMENTS
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    -WBC uptrending significantly today, now over 9k. Will d/c Zarxio.   -IV hydration, strict I&O, daily weight, Lasix to keep O > I  -VOD prophylaxis- Actigall, low dose heparin gtt & glutamine supplementation  -ID- Neutropenic fevers- on Cefepime; continue Fluconazole / Acyclovir prophylaxis. Has been afebrile since 7/12 and without neutropenia at this point, will discontinue cefepime today 7/18  -GI mucositis secondary to antineoplastic chemotherapy- continue PPI, add Carafate; Imodium for diarrhea. 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    -WBC uptrending significantly today, now over 9k. d/c Zarxio. plt stable at 31..no transfusions this weekend  -IV hydration, strict I&O, daily weight, Lasix to keep O > I  -VOD prophylaxis- Actigall, low dose heparin gtt & glutamine supplementation  -ID- Neutropenic fevers- on Cefepime; continue Fluconazole / Acyclovir prophylaxis. Has been afebrile since 7/12 and without neutropenia at this point, will discontinue cefepime  7/18  -GI mucositis secondary to antineoplastic chemotherapy- continue PPI, add Carafate; Imodium for diarrhea. Improved, as per pt  -Antiemetics  -Supplement lytes prn  - for this weekend  transfuse if plts less than 15.....now that counts are recovering and GI tract should be healing.....tx rbc as needed  -Mouth care, skin care  -OOB/ambulate  - d/c planning for today..instruction given

## 2020-07-20 NOTE — PROGRESS NOTE ADULT - SUBJECTIVE AND OBJECTIVE BOX
Chief Complaint: Autologous pbsct with high dose melphalan prep regimen for treatment of amyloidosis    S: Patient seen and examined with [facility] Transplant Team:   no acute complaints     Denies mouth / tongue / throat pain, dyspnea, cough, nausea, vomiting, diarrhea, abdominal pain       O: Vitals:   Vital Signs Last 24 Hrs  T(C): 37.3 (2020 06:00), Max: 37.4 (2020 14:03)  T(F): 99.1 (2020 06:00), Max: 99.3 (2020 14:03)  HR: 79 (2020 06:00) (79 - 90)  BP: 115/67 (2020 06:00) (100/62 - 117/75)  BP(mean): --  RR: 18 (2020 06:00) (18 - 18)  SpO2: 97% (2020 06:00) (96% - 98%)    Admit weight: 85.7 kg   Daily Weight in k.6 (2020 09:27)  Today's weight:     Intake / Output:   07-19 @ 07:01  -  07-20 @ 07:00  --------------------------------------------------------  IN: 1682.8 mL / OUT: 2200 mL / NET: -517.2 mL      PE:   HEENT: Oropharynx: no erythema or ulcerations  Oral Mucositis:               -                                         Grade: n/a  CVS: S1, S2 RRR   Lungs: CTA throughout bilaterally   Abdomen: + BS x 4: mildly hyperactive, soft, NT, ND   Extremities: + chronic trace LE edema; L>R   Gastric Mucositis:      -                                             Grade: n/a - resolved   Intestinal Mucositis:    -                                        Grade: n/a  Skin: hyperpigmentation on back, neck, chest, abdomen likely secondary to [place]epiv[place]   TLC: CDI  Neuro: A&O x 3  Pain: denies           Labs:              Cultures:   Culture - Blood (20 @ 00:37)    Specimen Source: .Blood Blood-Catheter    Culture Results:   No Growth Final    Radiology:   < from: Xray Chest 1 View- PORTABLE-Urgent (20 @ 21:05) >  IMPRESSION:  1.  The lungs are clear        Meds:   Antimicrobials:   acyclovir   Oral Tab/Cap 400 milliGRAM(s) Oral every 8 hours  clotrimazole Lozenge 1 Lozenge Oral five times a day  fluconAZOLE   Tablet 400 milliGRAM(s) Oral daily      Heme / Onc:   heparin  Infusion 357 Unit(s)/Hr IV Continuous <Continuous>      GI:  aluminum hydroxide/magnesium hydroxide/simethicone Suspension 30 milliLiter(s) Oral every 4 hours PRN  bismuth subsalicylate Liquid 30 milliLiter(s) Oral every 4 hours PRN  loperamide 2 milliGRAM(s) Oral every 6 hours PRN  pantoprazole    Tablet 40 milliGRAM(s) Oral before breakfast  sodium bicarbonate Mouth Rinse 10 milliLiter(s) Swish and Spit five times a day  sucralfate suspension 1 Gram(s) Oral four times a day  ursodiol Capsule 300 milliGRAM(s) Oral two times a day with meals      Cardiovascular:   furosemide   Injectable 20 milliGRAM(s) IV Push every 24 hours PRN      Other medications:   Biotene Dry Mouth Oral Rinse 5 milliLiter(s) Swish and Spit five times a day  chlorhexidine 4% Liquid 1 Application(s) Topical <User Schedule>  folic acid 1 milliGRAM(s) Oral daily  hydrocortisone 1% Cream 1 Application(s) Topical daily  lidocaine/prilocaine Cream 1 Application(s) Topical daily  multivitamin 1 Tablet(s) Oral daily  sodium chloride 0.9%. 1000 milliLiter(s) IV Continuous <Continuous>      PRN:   acetaminophen   Tablet .. 650 milliGRAM(s) Oral every 6 hours PRN  acetaminophen   Tablet .. 650 milliGRAM(s) Oral every 6 hours PRN  aluminum hydroxide/magnesium hydroxide/simethicone Suspension 30 milliLiter(s) Oral every 4 hours PRN  AQUAPHOR (petrolatum Ointment) 1 Application(s) Topical two times a day PRN  bismuth subsalicylate Liquid 30 milliLiter(s) Oral every 4 hours PRN  furosemide   Injectable 20 milliGRAM(s) IV Push every 24 hours PRN  hydrocortisone sodium succinate Injectable 50 milliGRAM(s) IV Push daily PRN  loperamide 2 milliGRAM(s) Oral every 6 hours PRN  LORazepam   Injectable 0.5 milliGRAM(s) IV Push every 8 hours PRN  metoclopramide Injectable 10 milliGRAM(s) IV Push every 6 hours PRN  ondansetron Injectable 8 milliGRAM(s) IV Push every 8 hours PRN  sodium chloride 0.9% lock flush 10 milliLiter(s) IV Push every 1 hour PRN          A/P: 59 year old male with a history of amyloidosis  Post Autologous PBSCT day + 18  Keep platelets =/> 15K for history of amyloidosis   Chemotherapy induced grade 1 intestinal mucositis (diarrhea) - c. diff negative - now resolved   Chemotherapy induced grade 1 GI mucositis - now resolved   Engraftment noted, Zarxio and Cefepime discontinued on     1. Infectious Disease:   acyclovir   Oral Tab/Cap 400 milliGRAM(s) Oral every 8 hours  clotrimazole Lozenge 1 Lozenge Oral five times a day  fluconAZOLE   Tablet 400 milliGRAM(s) Oral daily    2. VOD Prophylaxis: Actigall, Glutamine, Heparin (dosed at 100 units / kg / day)     3. GI Prophylaxis:    pantoprazole    Tablet 40 milliGRAM(s) Oral before breakfast     4. Mouthcare - NS / NaHCO3 rinses, Mycelex, Biotene; Skin care     5. GVHD prophylaxis - n/a     6. Transfuse & replete electrolytes prn       7. IV hydration, daily weights, strict I&O, prn diuresis     8. PO intake as tolerated, nutrition follow up as needed, MVI, folic acid     9. Antiemetics, anti-diarrhea medications:   loperamide 2 milliGRAM(s) Oral every 6 hours PRN  LORazepam   Injectable 0.5 milliGRAM(s) IV Push daily PRN  metoclopramide Injectable 10 milliGRAM(s) IV Push every 6 hours PRN  ondansetron Injectable 8 milliGRAM(s) IV Push every 8 hours PRN  aluminum hydroxide/magnesium hydroxide/simethicone Suspension 30 milliLiter(s) Oral every 4 hours PRN  bismuth subsalicylate Liquid 30 milliLiter(s) Oral every 4 hours PRN    10. OOB as tolerated, physical therapy consult if needed     11. Monitor coags / fibrinogen 2x week, vitamin K as needed     12. Monitor closely for clinical changes, monitor for fevers     13. Emotional support provided, plan of care discussed and questions addressed     14. Patient education done regarding  plan of care, restrictions and discharge planning     15. Continue regular social work input     I have written the above note for Dr. Roman who performed service with me in the room.   Kandice Woodward PA-C ([phone])    I have seen and examined patient with PA, I agree with above note as scribed. Chief Complaint: Autologous pbsct with high dose melphalan prep regimen for treatment of amyloidosis    S: Patient seen and examined with [facility]PC Transplant Team:   no acute complaints     Denies mouth / tongue / throat pain, dyspnea, cough, nausea, vomiting, diarrhea, abdominal pain       O: Vitals:   Vital Signs Last 24 Hrs  T(C): 37.3 (2020 06:00), Max: 37.4 (2020 14:03)  T(F): 99.1 (2020 06:00), Max: 99.3 (2020 14:03)  HR: 79 (2020 06:00) (79 - 90)  BP: 115/67 (2020 06:00) (100/62 - 117/75)  BP(mean): --  RR: 18 (2020 06:00) (18 - 18)  SpO2: 97% (2020 06:00) (96% - 98%)    Admit weight: 85.7 kg   Daily Weight in k.6 (2020 09:27)  Today's weight:     Intake / Output:   07-19 @ 07:01  -  07-20 @ 07:00  --------------------------------------------------------  IN: 1682.8 mL / OUT: 2200 mL / NET: -517.2 mL      PE:   HEENT: Oropharynx: no erythema or ulcerations  Oral Mucositis:               -                                         Grade: n/a  CVS: S1, S2 RRR   Lungs: CTA throughout bilaterally   Abdomen: + BS x 4: mildly hyperactive, soft, NT, ND   Extremities: + chronic trace LE edema; L>R   Gastric Mucositis:      -                                             Grade: n/a - resolved   Intestinal Mucositis:    -                                        Grade: n/a  Skin: hyperpigmentation on back, neck, chest, abdomen likely secondary to [place]   TLC: CDI  Neuro: A&O x 3  Pain: denies     Labs:                            7.8    15.96 )-----------( 31       ( 2020 07:08 )             23.4       07-20    141  |  105  |  7   ----------------------------<  88  3.4<L>   |  26  |  0.74    Ca    9.0      2020 07:08  Phos  2.9     07-20  Mg     1.8     07-20    TPro  5.4<L>  /  Alb  3.5  /  TBili  0.1<L>  /  DBili  x   /  AST  22  /  ALT  12  /  AlkPhos  73  07-20    PT/INR - ( 2020 07:08 )   PT: 12.9 sec;   INR: 1.09 ratio         PTT - ( 2020 07:08 )  PTT:29.5 sec    Cultures:   Culture - Blood (20 @ 00:37)    Specimen Source: .Blood Blood-Catheter    Culture Results:   No Growth Final    Radiology:   < from: Xray Chest 1 View- PORTABLE-Urgent (20 @ 21:05) >  IMPRESSION:  1.  The lungs are clear        Meds:   Antimicrobials:   acyclovir   Oral Tab/Cap 400 milliGRAM(s) Oral every 8 hours  clotrimazole Lozenge 1 Lozenge Oral five times a day  fluconAZOLE   Tablet 400 milliGRAM(s) Oral daily      Heme / Onc:   heparin  Infusion 357 Unit(s)/Hr IV Continuous <Continuous>      GI:  aluminum hydroxide/magnesium hydroxide/simethicone Suspension 30 milliLiter(s) Oral every 4 hours PRN  bismuth subsalicylate Liquid 30 milliLiter(s) Oral every 4 hours PRN  loperamide 2 milliGRAM(s) Oral every 6 hours PRN  pantoprazole    Tablet 40 milliGRAM(s) Oral before breakfast  sodium bicarbonate Mouth Rinse 10 milliLiter(s) Swish and Spit five times a day  sucralfate suspension 1 Gram(s) Oral four times a day  ursodiol Capsule 300 milliGRAM(s) Oral two times a day with meals      Cardiovascular:   furosemide   Injectable 20 milliGRAM(s) IV Push every 24 hours PRN      Other medications:   Biotene Dry Mouth Oral Rinse 5 milliLiter(s) Swish and Spit five times a day  chlorhexidine 4% Liquid 1 Application(s) Topical <User Schedule>  folic acid 1 milliGRAM(s) Oral daily  hydrocortisone 1% Cream 1 Application(s) Topical daily  lidocaine/prilocaine Cream 1 Application(s) Topical daily  multivitamin 1 Tablet(s) Oral daily  sodium chloride 0.9%. 1000 milliLiter(s) IV Continuous <Continuous>      PRN:   acetaminophen   Tablet .. 650 milliGRAM(s) Oral every 6 hours PRN  acetaminophen   Tablet .. 650 milliGRAM(s) Oral every 6 hours PRN  aluminum hydroxide/magnesium hydroxide/simethicone Suspension 30 milliLiter(s) Oral every 4 hours PRN  AQUAPHOR (petrolatum Ointment) 1 Application(s) Topical two times a day PRN  bismuth subsalicylate Liquid 30 milliLiter(s) Oral every 4 hours PRN  furosemide   Injectable 20 milliGRAM(s) IV Push every 24 hours PRN  hydrocortisone sodium succinate Injectable 50 milliGRAM(s) IV Push daily PRN  loperamide 2 milliGRAM(s) Oral every 6 hours PRN  LORazepam   Injectable 0.5 milliGRAM(s) IV Push every 8 hours PRN  metoclopramide Injectable 10 milliGRAM(s) IV Push every 6 hours PRN  ondansetron Injectable 8 milliGRAM(s) IV Push every 8 hours PRN  sodium chloride 0.9% lock flush 10 milliLiter(s) IV Push every 1 hour PRN          A/P: 59 year old male with a history of amyloidosis  Post Autologous PBSCT day + 18  Keep platelets =/> 15K for history of amyloidosis   Chemotherapy induced grade 1 intestinal mucositis (diarrhea) - c. diff negative - now resolved   Chemotherapy induced grade 1 GI mucositis - now resolved   Engraftment noted, Zarxio and Cefepime discontinued on     1. Infectious Disease:   acyclovir   Oral Tab/Cap 400 milliGRAM(s) Oral every 8 hours  clotrimazole Lozenge 1 Lozenge Oral five times a day  fluconAZOLE   Tablet 400 milliGRAM(s) Oral daily    2. VOD Prophylaxis: Actigall, Glutamine, Heparin (dosed at 100 units / kg / day)     3. GI Prophylaxis:    pantoprazole    Tablet 40 milliGRAM(s) Oral before breakfast     4. Mouthcare - NS / NaHCO3 rinses, Mycelex, Biotene; Skin care     5. GVHD prophylaxis - n/a     6. Transfuse & replete electrolytes prn   KCL 40 mg PO x1  1 U P RBC in preparation for discharge     7. IV hydration, daily weights, strict I&O, prn diuresis     8. PO intake as tolerated, nutrition follow up as needed, MVI, folic acid     9. Antiemetics, anti-diarrhea medications:   loperamide 2 milliGRAM(s) Oral every 6 hours PRN  LORazepam   Injectable 0.5 milliGRAM(s) IV Push daily PRN  metoclopramide Injectable 10 milliGRAM(s) IV Push every 6 hours PRN  ondansetron Injectable 8 milliGRAM(s) IV Push every 8 hours PRN  aluminum hydroxide/magnesium hydroxide/simethicone Suspension 30 milliLiter(s) Oral every 4 hours PRN  bismuth subsalicylate Liquid 30 milliLiter(s) Oral every 4 hours PRN    10. OOB as tolerated, physical therapy consult if needed     11. Monitor coags / fibrinogen 2x week, vitamin K as needed     12. Monitor closely for clinical changes, monitor for fevers     13. Emotional support provided, plan of care discussed and questions addressed     14. Patient education done regarding  restrictions and discharge planning     15. Continue regular social work input     I have written the above note for Dr. Roman who performed service with me in the room.   Kandice Woodward PA-C ([phone])    I have seen and examined patient with PA, I agree with above note as scribed. Chief Complaint: Autologous pbsct with high dose melphalan prep regimen for treatment of amyloidosis    S: Patient seen and examined with [facility]PC Transplant Team:   no acute complaints   patient states he feels well.     Denies mouth / tongue / throat pain, dyspnea, cough, nausea, vomiting, diarrhea, abdominal pain       O: Vitals:   Vital Signs Last 24 Hrs  T(C): 37.3 (2020 06:00), Max: 37.4 (2020 14:03)  T(F): 99.1 (2020 06:00), Max: 99.3 (2020 14:03)  HR: 79 (2020 06:00) (79 - 90)  BP: 115/67 (2020 06:00) (100/62 - 117/75)  BP(mean): --  RR: 18 (2020 06:00) (18 - 18)  SpO2: 97% (2020 06:00) (96% - 98%)    Admit weight: 85.7 kg   Daily Weight in k.6 (2020 09:27)  Today's weight:     Intake / Output:   07-19 @ 07:01  -  07-20 @ 07:00  --------------------------------------------------------  IN: 1682.8 mL / OUT: 2200 mL / NET: -517.2 mL      PE:   HEENT: Oropharynx: no erythema or ulcerations  Oral Mucositis:               -                                         Grade: n/a  CVS: S1, S2 RRR   Lungs: CTA throughout bilaterally   Abdomen: + BS x 4: mildly hyperactive, soft, NT, ND   Extremities: + chronic trace LE edema; L>R   Gastric Mucositis:      -                                             Grade: n/a - resolved   Intestinal Mucositis:    -                                        Grade: n/a  Skin: hyperpigmentation on back, neck, chest, abdomen likely secondary to [place]   TLC: CDI  Neuro: A&O x 3  Pain: denies     Labs:                            7.8    15.96 )-----------( 31       ( 2020 07:08 )             23.4       07-20    141  |  105  |  7   ----------------------------<  88  3.4<L>   |  26  |  0.74    Ca    9.0      2020 07:08  Phos  2.9     07-20  Mg     1.8     07-20    TPro  5.4<L>  /  Alb  3.5  /  TBili  0.1<L>  /  DBili  x   /  AST  22  /  ALT  12  /  AlkPhos  73  07-20    PT/INR - ( 2020 07:08 )   PT: 12.9 sec;   INR: 1.09 ratio         PTT - ( 2020 07:08 )  PTT:29.5 sec    Cultures:   Culture - Blood (20 @ 00:37)    Specimen Source: .Blood Blood-Catheter    Culture Results:   No Growth Final    Radiology:   < from: Xray Chest 1 View- PORTABLE-Urgent (20 @ 21:05) >  IMPRESSION:  1.  The lungs are clear      Meds:   Antimicrobials:   acyclovir   Oral Tab/Cap 400 milliGRAM(s) Oral every 8 hours  clotrimazole Lozenge 1 Lozenge Oral five times a day  fluconAZOLE   Tablet 400 milliGRAM(s) Oral daily      Heme / Onc:   heparin  Infusion 357 Unit(s)/Hr IV Continuous <Continuous>      GI:  aluminum hydroxide/magnesium hydroxide/simethicone Suspension 30 milliLiter(s) Oral every 4 hours PRN  bismuth subsalicylate Liquid 30 milliLiter(s) Oral every 4 hours PRN  loperamide 2 milliGRAM(s) Oral every 6 hours PRN  pantoprazole    Tablet 40 milliGRAM(s) Oral before breakfast  sodium bicarbonate Mouth Rinse 10 milliLiter(s) Swish and Spit five times a day  sucralfate suspension 1 Gram(s) Oral four times a day  ursodiol Capsule 300 milliGRAM(s) Oral two times a day with meals      Cardiovascular:   furosemide   Injectable 20 milliGRAM(s) IV Push every 24 hours PRN      Other medications:   Biotene Dry Mouth Oral Rinse 5 milliLiter(s) Swish and Spit five times a day  chlorhexidine 4% Liquid 1 Application(s) Topical <User Schedule>  folic acid 1 milliGRAM(s) Oral daily  hydrocortisone 1% Cream 1 Application(s) Topical daily  lidocaine/prilocaine Cream 1 Application(s) Topical daily  multivitamin 1 Tablet(s) Oral daily  sodium chloride 0.9%. 1000 milliLiter(s) IV Continuous <Continuous>      PRN:   acetaminophen   Tablet .. 650 milliGRAM(s) Oral every 6 hours PRN  acetaminophen   Tablet .. 650 milliGRAM(s) Oral every 6 hours PRN  aluminum hydroxide/magnesium hydroxide/simethicone Suspension 30 milliLiter(s) Oral every 4 hours PRN  AQUAPHOR (petrolatum Ointment) 1 Application(s) Topical two times a day PRN  bismuth subsalicylate Liquid 30 milliLiter(s) Oral every 4 hours PRN  furosemide   Injectable 20 milliGRAM(s) IV Push every 24 hours PRN  hydrocortisone sodium succinate Injectable 50 milliGRAM(s) IV Push daily PRN  loperamide 2 milliGRAM(s) Oral every 6 hours PRN  LORazepam   Injectable 0.5 milliGRAM(s) IV Push every 8 hours PRN  metoclopramide Injectable 10 milliGRAM(s) IV Push every 6 hours PRN  ondansetron Injectable 8 milliGRAM(s) IV Push every 8 hours PRN  sodium chloride 0.9% lock flush 10 milliLiter(s) IV Push every 1 hour PRN          A/P: 59 year old male with a history of amyloidosis  Post Autologous PBSCT day + 18  Keep platelets =/> 15K for history of amyloidosis   Chemotherapy induced grade 1 intestinal mucositis (diarrhea) - c. diff negative - now resolved   Chemotherapy induced grade 1 GI mucositis - now resolved   Engraftment noted, Zarxio and Cefepime discontinued on     1. Infectious Disease:   acyclovir   Oral Tab/Cap 400 milliGRAM(s) Oral every 8 hours  clotrimazole Lozenge 1 Lozenge Oral five times a day  fluconAZOLE   Tablet 400 milliGRAM(s) Oral daily    2. VOD Prophylaxis: Actigall, Glutamine, Heparin (dosed at 100 units / kg / day)     3. GI Prophylaxis:    pantoprazole    Tablet 40 milliGRAM(s) Oral before breakfast     4. Mouthcare - NS / NaHCO3 rinses, Mycelex, Biotene; Skin care     5. GVHD prophylaxis - n/a     6. Transfuse & replete electrolytes prn   KCL 40 mg PO x1  1 U P RBC in preparation for discharge     7. IV hydration, daily weights, strict I&O, prn diuresis     8. PO intake as tolerated, nutrition follow up as needed, MVI, folic acid     9. Antiemetics, anti-diarrhea medications:   loperamide 2 milliGRAM(s) Oral every 6 hours PRN  LORazepam   Injectable 0.5 milliGRAM(s) IV Push daily PRN  metoclopramide Injectable 10 milliGRAM(s) IV Push every 6 hours PRN  ondansetron Injectable 8 milliGRAM(s) IV Push every 8 hours PRN  aluminum hydroxide/magnesium hydroxide/simethicone Suspension 30 milliLiter(s) Oral every 4 hours PRN  bismuth subsalicylate Liquid 30 milliLiter(s) Oral every 4 hours PRN    10. OOB as tolerated, physical therapy consult if needed     11. Monitor coags / fibrinogen 2x week, vitamin K as needed     12. Monitor closely for clinical changes, monitor for fevers     13. Emotional support provided, plan of care discussed and questions addressed     14. Patient education done regarding  restrictions and discharge planning     15. Continue regular social work input     I have written the above note for Dr. Roman who performed service with me in the room.   Kandice Woodward PA-C ([phone])    I have seen and examined patient with PA, I agree with above note as scribed. Chief Complaint: Autologous pbsct with high dose melphalan prep regimen for treatment of amyloidosis    S: Patient seen and examined with HPC Transplant Team:   no acute complaints   patient states he feels well.     Denies mouth / tongue / throat pain, dyspnea, cough, nausea, vomiting, diarrhea, abdominal pain       O: Vitals:   Vital Signs Last 24 Hrs  T(C): 37.3 (2020 06:00), Max: 37.4 (2020 14:03)  T(F): 99.1 (2020 06:00), Max: 99.3 (2020 14:03)  HR: 79 (2020 06:00) (79 - 90)  BP: 115/67 (2020 06:00) (100/62 - 117/75)  BP(mean): --  RR: 18 (2020 06:00) (18 - 18)  SpO2: 97% (2020 06:00) (96% - 98%)    Admit weight: 85.7 kg   Daily Weight in k.6 (2020 09:27)  Today's weight: 82.6 kg     Intake / Output:   -19 @ 07:01  -  07-20 @ 07:00  --------------------------------------------------------  IN: 1682.8 mL / OUT: 2200 mL / NET: -517.2 mL      PE:   HEENT: Oropharynx: no erythema or ulcerations  Oral Mucositis:               -                                         Grade: n/a  CVS: S1, S2 RRR   Lungs: CTA throughout bilaterally   Abdomen: + BS x 4: mildly hyperactive, soft, NT, ND   Extremities: + chronic trace LE edema; L>R   Gastric Mucositis:      -                                             Grade: n/a - resolved   Intestinal Mucositis:    -                                        Grade: n/a  Skin: hyperpigmentation on back, neck, chest, abdomen likely secondary to Kepiv[place]   TLC: CDI  Neuro: A&O x 3  Pain: denies     Labs:                            7.8    15.96 )-----------( 31       ( 2020 07:08 )             23.4       07-20    141  |  105  |  7   ----------------------------<  88  3.4<L>   |  26  |  0.74    Ca    9.0      2020 07:08  Phos  2.9     07-20  Mg     1.8     07-20    TPro  5.4<L>  /  Alb  3.5  /  TBili  0.1<L>  /  DBili  x   /  AST  22  /  ALT  12  /  AlkPhos  73  07-20    PT/INR - ( 2020 07:08 )   PT: 12.9 sec;   INR: 1.09 ratio         PTT - ( 2020 07:08 )  PTT:29.5 sec    Cultures:   Culture - Blood (20 @ 00:37)    Specimen Source: .Blood Blood-Catheter    Culture Results:   No Growth Final    Radiology:   < from: Xray Chest 1 View- PORTABLE-Urgent (20 @ 21:05) >  IMPRESSION:  1.  The lungs are clear      Meds:   Antimicrobials:   acyclovir   Oral Tab/Cap 400 milliGRAM(s) Oral every 8 hours  clotrimazole Lozenge 1 Lozenge Oral five times a day  fluconAZOLE   Tablet 400 milliGRAM(s) Oral daily      Heme / Onc:   heparin  Infusion 357 Unit(s)/Hr IV Continuous <Continuous>      GI:  aluminum hydroxide/magnesium hydroxide/simethicone Suspension 30 milliLiter(s) Oral every 4 hours PRN  bismuth subsalicylate Liquid 30 milliLiter(s) Oral every 4 hours PRN  loperamide 2 milliGRAM(s) Oral every 6 hours PRN  pantoprazole    Tablet 40 milliGRAM(s) Oral before breakfast  sodium bicarbonate Mouth Rinse 10 milliLiter(s) Swish and Spit five times a day  sucralfate suspension 1 Gram(s) Oral four times a day  ursodiol Capsule 300 milliGRAM(s) Oral two times a day with meals      Cardiovascular:   furosemide   Injectable 20 milliGRAM(s) IV Push every 24 hours PRN      Other medications:   Biotene Dry Mouth Oral Rinse 5 milliLiter(s) Swish and Spit five times a day  chlorhexidine 4% Liquid 1 Application(s) Topical <User Schedule>  folic acid 1 milliGRAM(s) Oral daily  hydrocortisone 1% Cream 1 Application(s) Topical daily  lidocaine/prilocaine Cream 1 Application(s) Topical daily  multivitamin 1 Tablet(s) Oral daily  sodium chloride 0.9%. 1000 milliLiter(s) IV Continuous <Continuous>      PRN:   acetaminophen   Tablet .. 650 milliGRAM(s) Oral every 6 hours PRN  acetaminophen   Tablet .. 650 milliGRAM(s) Oral every 6 hours PRN  aluminum hydroxide/magnesium hydroxide/simethicone Suspension 30 milliLiter(s) Oral every 4 hours PRN  AQUAPHOR (petrolatum Ointment) 1 Application(s) Topical two times a day PRN  bismuth subsalicylate Liquid 30 milliLiter(s) Oral every 4 hours PRN  furosemide   Injectable 20 milliGRAM(s) IV Push every 24 hours PRN  hydrocortisone sodium succinate Injectable 50 milliGRAM(s) IV Push daily PRN  loperamide 2 milliGRAM(s) Oral every 6 hours PRN  LORazepam   Injectable 0.5 milliGRAM(s) IV Push every 8 hours PRN  metoclopramide Injectable 10 milliGRAM(s) IV Push every 6 hours PRN  ondansetron Injectable 8 milliGRAM(s) IV Push every 8 hours PRN  sodium chloride 0.9% lock flush 10 milliLiter(s) IV Push every 1 hour PRN          A/P: 59 year old male with a history of amyloidosis  Post Autologous PBSCT day + 18  Keep platelets =/> 15K for history of amyloidosis   Chemotherapy induced grade 1 intestinal mucositis (diarrhea) - c. diff negative - now resolved   Chemotherapy induced grade 1 GI mucositis - now resolved   Engraftment noted, Zarxio and Cefepime discontinued on : discharge planing     1. Infectious Disease:   acyclovir   Oral Tab/Cap 400 milliGRAM(s) Oral every 8 hours  clotrimazole Lozenge 1 Lozenge Oral five times a day  fluconAZOLE   Tablet 400 milliGRAM(s) Oral daily    2. VOD Prophylaxis: Actigall, Glutamine, Heparin (dosed at 100 units / kg / day)     3. GI Prophylaxis:    pantoprazole    Tablet 40 milliGRAM(s) Oral before breakfast     4. Mouthcare - NS / NaHCO3 rinses, Mycelex, Biotene; Skin care     5. GVHD prophylaxis - n/a     6. Transfuse & replete electrolytes prn   KCL 40 mg PO x1  1 U P RBC in preparation for discharge     7. IV hydration, daily weights, strict I&O, prn diuresis     8. PO intake as tolerated, nutrition follow up as needed, MVI, folic acid     9. Antiemetics, anti-diarrhea medications:   loperamide 2 milliGRAM(s) Oral every 6 hours PRN  LORazepam   Injectable 0.5 milliGRAM(s) IV Push daily PRN  metoclopramide Injectable 10 milliGRAM(s) IV Push every 6 hours PRN  ondansetron Injectable 8 milliGRAM(s) IV Push every 8 hours PRN  aluminum hydroxide/magnesium hydroxide/simethicone Suspension 30 milliLiter(s) Oral every 4 hours PRN  bismuth subsalicylate Liquid 30 milliLiter(s) Oral every 4 hours PRN    10. OOB as tolerated, physical therapy consult if needed     11. Monitor coags / fibrinogen 2x week, vitamin K as needed     12. Monitor closely for clinical changes, monitor for fevers     13. Emotional support provided, plan of care discussed and questions addressed     14. Patient education done regarding  restrictions and discharge planning     15. Continue regular social work input     I have written the above note for Dr. Roman who performed service with me in the room.   Kandice Woodward PA-C ([phone])    I have seen and examined patient with PA, I agree with above note as scribed.

## 2020-07-20 NOTE — PROGRESS NOTE ADULT - REASON FOR ADMISSION
Autologous pbsct with high dose melphalan prep regimen for treatment of amyloidosis

## 2020-07-21 ENCOUNTER — OUTPATIENT (OUTPATIENT)
Dept: OUTPATIENT SERVICES | Facility: HOSPITAL | Age: 60
LOS: 1 days | End: 2020-07-21

## 2020-07-21 ENCOUNTER — OUTPATIENT (OUTPATIENT)
Dept: OUTPATIENT SERVICES | Facility: HOSPITAL | Age: 60
LOS: 1 days | Discharge: HOME | End: 2020-07-21

## 2020-07-21 DIAGNOSIS — E85.9 AMYLOIDOSIS, UNSPECIFIED: ICD-10-CM

## 2020-07-21 LAB — SARS-COV-2 RNA SPEC QL NAA+PROBE: SIGNIFICANT CHANGE UP

## 2020-07-23 ENCOUNTER — RESULT REVIEW (OUTPATIENT)
Age: 60
End: 2020-07-23

## 2020-07-23 ENCOUNTER — APPOINTMENT (OUTPATIENT)
Dept: HEMATOLOGY ONCOLOGY | Facility: CLINIC | Age: 60
End: 2020-07-23
Payer: COMMERCIAL

## 2020-07-23 ENCOUNTER — APPOINTMENT (OUTPATIENT)
Dept: INFUSION THERAPY | Facility: HOSPITAL | Age: 60
End: 2020-07-23

## 2020-07-23 VITALS
OXYGEN SATURATION: 99 % | SYSTOLIC BLOOD PRESSURE: 120 MMHG | HEART RATE: 69 BPM | RESPIRATION RATE: 16 BRPM | BODY MASS INDEX: 26.79 KG/M2 | TEMPERATURE: 99.7 F | WEIGHT: 181.44 LBS | DIASTOLIC BLOOD PRESSURE: 81 MMHG

## 2020-07-23 LAB
ACANTHOCYTES BLD QL SMEAR: SLIGHT — SIGNIFICANT CHANGE UP
ANISOCYTOSIS BLD QL: SLIGHT — SIGNIFICANT CHANGE UP
BASOPHILS # BLD AUTO: 0 K/UL — SIGNIFICANT CHANGE UP (ref 0–0.2)
BASOPHILS NFR BLD AUTO: 0 % — SIGNIFICANT CHANGE UP (ref 0–2)
DACRYOCYTES BLD QL SMEAR: SLIGHT — SIGNIFICANT CHANGE UP
EOSINOPHIL # BLD AUTO: 0 K/UL — SIGNIFICANT CHANGE UP (ref 0–0.5)
EOSINOPHIL NFR BLD AUTO: 0 % — SIGNIFICANT CHANGE UP (ref 0–6)
HCT VFR BLD CALC: 27.9 % — LOW (ref 39–50)
HGB BLD-MCNC: 9.1 G/DL — LOW (ref 13–17)
HYPOCHROMIA BLD QL: SLIGHT — SIGNIFICANT CHANGE UP
LYMPHOCYTES # BLD AUTO: 1.34 K/UL — SIGNIFICANT CHANGE UP (ref 1–3.3)
LYMPHOCYTES # BLD AUTO: 8 % — LOW (ref 13–44)
MACROCYTES BLD QL: SLIGHT — SIGNIFICANT CHANGE UP
MCHC RBC-ENTMCNC: 26.1 PG — LOW (ref 27–34)
MCHC RBC-ENTMCNC: 32.6 GM/DL — SIGNIFICANT CHANGE UP (ref 32–36)
MCV RBC AUTO: 79.9 FL — LOW (ref 80–100)
MICROCYTES BLD QL: SLIGHT — SIGNIFICANT CHANGE UP
MONOCYTES # BLD AUTO: 2.17 K/UL — HIGH (ref 0–0.9)
MONOCYTES NFR BLD AUTO: 13 % — SIGNIFICANT CHANGE UP (ref 2–14)
MYELOCYTES NFR BLD: 5 % — HIGH (ref 0–0)
NEUTROPHILS # BLD AUTO: 12.2 K/UL — HIGH (ref 1.8–7.4)
NEUTROPHILS NFR BLD AUTO: 70 % — SIGNIFICANT CHANGE UP (ref 43–77)
NEUTS BAND # BLD: 3 % — SIGNIFICANT CHANGE UP (ref 0–8)
NRBC # BLD: 0 /100 — SIGNIFICANT CHANGE UP (ref 0–0)
NRBC # BLD: SIGNIFICANT CHANGE UP /100 WBCS (ref 0–0)
OVALOCYTES BLD QL SMEAR: SLIGHT — SIGNIFICANT CHANGE UP
PLAT MORPH BLD: NORMAL — SIGNIFICANT CHANGE UP
PLATELET # BLD AUTO: 58 K/UL — LOW (ref 150–400)
POIKILOCYTOSIS BLD QL AUTO: SLIGHT — SIGNIFICANT CHANGE UP
POLYCHROMASIA BLD QL SMEAR: SLIGHT — SIGNIFICANT CHANGE UP
RBC # BLD: 3.49 M/UL — LOW (ref 4.2–5.8)
RBC # FLD: 17.5 % — HIGH (ref 10.3–14.5)
RBC BLD AUTO: ABNORMAL
VARIANT LYMPHS # BLD: 1 % — SIGNIFICANT CHANGE UP (ref 0–6)
WBC # BLD: 16.71 K/UL — HIGH (ref 3.8–10.5)
WBC # FLD AUTO: 16.71 K/UL — HIGH (ref 3.8–10.5)

## 2020-07-23 PROCEDURE — 99213 OFFICE O/P EST LOW 20 MIN: CPT

## 2020-07-24 LAB
ALBUMIN SERPL ELPH-MCNC: 4.2 G/DL
ALP BLD-CCNC: 79 U/L
ALT SERPL-CCNC: 14 U/L
ANION GAP SERPL CALC-SCNC: 12 MMOL/L
AST SERPL-CCNC: 23 U/L
BILIRUB SERPL-MCNC: 0.2 MG/DL
BUN SERPL-MCNC: 10 MG/DL
CALCIUM SERPL-MCNC: 8.8 MG/DL
CHLORIDE SERPL-SCNC: 105 MMOL/L
CO2 SERPL-SCNC: 28 MMOL/L
CREAT SERPL-MCNC: 0.85 MG/DL
GLUCOSE SERPL-MCNC: 102 MG/DL
LDH SERPL-CCNC: 526 U/L
MAGNESIUM SERPL-MCNC: 1.9 MG/DL
POTASSIUM SERPL-SCNC: 4.7 MMOL/L
PROT SERPL-MCNC: 5.8 G/DL
SODIUM SERPL-SCNC: 146 MMOL/L

## 2020-07-25 NOTE — ASSESSMENT
[FreeTextEntry1] : Mr. Saba is a 59 year old male with a medical history of amyloidosis, hepatitis C, dyspepsia, hematuria, colitis, Raynaud's phenomenon, and cutaneous mastocytosis. Status post autologous peripheral blood stem cell transplant on 7/2/20.\par \par 1) Amyloidosis\par - Status post AUTO PBSCT on 7/2/20\par \par 2) Heme\par -Counts stable. No indication for transfusions. Cancel possible platelet appointment for today and the next two weeks.\par  7/23/20: WBC  16.71  H/H 9.1/27.9  PLT 58  ANC 12.20\par -Continue MV and folic acid daily\par \par 3) ID\par Continue ppx:\par Atovaquone 750 mg/5ml BID\par Acyclovir 400 mg TID\par Fluconazole 200 mg - take two tablets daily\par \par 4) GI\par Continue ppx:\par Protonix 40 mg daily\par Zofran/Reglan PRN\par \par 5) Other\par Continue post transplant diet and crowd control restrictions. \par Patient is aware to call office immediately if develops fever, chills, severe nausea, vomiting, diarrhea or rash.\par Questions and concerns addressed. Reassurance provided.\par \par 6) Plan\par Continue weekly appointments at this time\par Follow up appt with NP in one week

## 2020-07-25 NOTE — HISTORY OF PRESENT ILLNESS
[de-identified] : 60 y/o male with a hx of Amyloidosis,  Hepatitis C,  dyspepsia, hematuria, rectal bleeding, and Raynaud's phenomenon, cutaneous mastocytosis, presents for an initial Auto PSCT consultation. He is referred by Dr. Joel Wells. \par \par He initially experienced joint pain in 2014 and managed sx with Advil. In 2016, LFT was elevated and was dx with Hepatitis C which was treated with Harvoni. Patient has a hx of hematuria and bloody stool. Biopsies of the colon revealed chronic colitis and erosion. He was pleased on steroids and Mesalamine and started Humira in Mid-August. After first dose of Humira, left knee swelled up ---alleviated by Advil. After second dose of Humira, ankles swelled up and steroids were prescribed. Patient was also placed on Famotidine for early satiety and dyspepsia. His renal function is now normal and no longer experiences bloody stools........Progression in dyspepsia led to an upper endoscopy with Dr Parks on 10/30/19. Biopsy of the second portion of the duodenum revealed amyloidosis confirmed with Congo red statin. IgM immunostain is positive in areas of amyloid deposition, with kappa LC predominance. The stomach was noted to have impaired distensibility on endoscopy.Renal function has been normal, with BUN 9/creatinine 0.9.Cystoscopy was performed which showed prominent blood vessels.  [de-identified] : Mr. Saba is a 59 year old male with a medical history of amyloidosis, hepatitis C, dyspepsia, hematuria, colitis, Raynaud's phenomenon, and cutaneous mastocytosis who was admitted [facility] for an autologous peripheral blood stem cell transplant. He was initially referred by Dr. Joel Wells. He initially experienced joint pain in 2014, and his symptoms were managed with Advil. In 2016, his LFTs were elevated and he was diagnosed with hepatitis C which he was treated with Harvoni. He also \par experienced hematuria and bloody stool. A biopsy of his colon showed chronic colitis with erosions. He was treated with steroids and Mesalamine, and was later started on Humira in mid August 2016. After his first dose of Humira, he experienced left knee swelling, alleviated with Advil. After the second dose of Humira, his ankles swelled, and he was placed on steroids. He also was started on Famotidine for early satiety and dyspepsia. The bloody stool subsided. The dyspepsia progressed, and an upper endoscopy was performed by Dr. Parks on 10/30/19. A biopsy of the second portion of the duodenum showed amyloidosis confirmed with congo red stain, IgM immunostain was positive in areas of amyloid deposition, with kappa LC predominance. The stomach was noted to have impaired distensibility on endoscopy. His renal function remained normal. In \par 02/20 cystoscopy was performed for hematuria and showed prominent blood vessels. The amyloidosis was treated with CyBorD x 7 cycles. \par \par On 06/29/20 Mr. Saba was admitted to [facility] for an for an autologous peripheral blood stem cell transplant with high dose Melphalan prep regimen for treatment of Amyloidosis. He had no complaint at the time of his admission. \par \par Upon admission, a TLC was placed in IR. Mr. Saba received IV hydration, pain management, anxiolysis, antiemetics, nutritional support, and antibacterial / antiviral / antifungal / GI / PCP and VOD (SOS) prophylaxis. When his ANC dropped below 1000, he was started on prophylactic Ciprofloxacin. Labs were monitored on a daily basis, and he received electrolyte repletion and transfusional support as needed. Initially, his platelets were kelp at a goal of 40K for his history of amyloidosis, however post engraftment the platelets goal was kept above 15K. As of note Mr. Saba was started on Acyclovir prior \par to his admission, hence Acyclovir was continued. \par \par On 07/02/20, after pre-medication Mr. Saba received 316 mL of: Autologous, mobilized, plasma reduced, pooled, thawed, washed, HCP apheresis over \par approximately 1 hr. Cell counts as follows \par Total MNC( x10^8/kg)=8.32 \par CD34+cells ( x10^6 kg)=9.96 \par Cell Viability (%)=65 \par Mr. Saba tolerated the infusion well with no adverse resections noted. \par \par While admitted, he had pancytopenia related to high dose chemotherapy prep regiment. He also developed neutropenic fevers. When he became febrile, blood and urine cultures were sent and CXR completed. Ciprofloxacin was changed to cefepime for a broadened anti-microbial coverage. Infectious work up was negative with, negative blood/urine cultures and CXR showing clear lungs. \par \par He also had a drug induced rash secondary to Kepivance which resolved with no intervention. His hospital stay was also complicated by chemotherapy induced grade 1 intestinal mucositis with diarrhea, c. diff sample was sent off and resulted negative, his symptoms resolved with Imodium. Additionally, he experienced chemotherapy induced grade 1 GI mucositis with nausea, he was treated with anti-emetics as well as prn Carafate and Maalox, his symptoms then resolved. \par \par Early engraftment was noticed on 7/15, Zarxio was increased from 480 micrograms to 600 micrograms SQ daily. Cefepime was continued until post engraftment. On 7/18, given count recovery no fevers an negative infectious work up, negative blood/urine cultures and CXR with clear lungs, Cefepime was discontinued. \par Zarxio was discontinued on 7/19. Nasopharyngeal  Surveillance COVID swabs were preformed weekly and all resulted negative, with the most recent collected on 7/20. \par \par Currently, Mr. Saba is ready and stable for discharge with a follow up appointments at [facility]. \par \par On 7/23/20, patient presents for first post transplant visit. Today is day +21 of AUTO PBSCT. Overall patient is well and offers no acute concerns. Denies fever, chills, nausea, vomiting, diarrhea, rash, mouth sores, dysuria or any signs of active bleeding. Denies SOB, chest pain or B/L LE edema. Remains compliant with post transplant diet and crowd restrictions. Remains compliant with post transplant medications. \par \par \par \par

## 2020-07-27 ENCOUNTER — APPOINTMENT (OUTPATIENT)
Dept: INFUSION THERAPY | Facility: HOSPITAL | Age: 60
End: 2020-07-27

## 2020-07-30 ENCOUNTER — APPOINTMENT (OUTPATIENT)
Dept: HEMATOLOGY ONCOLOGY | Facility: CLINIC | Age: 60
End: 2020-07-30
Payer: COMMERCIAL

## 2020-07-30 ENCOUNTER — RESULT REVIEW (OUTPATIENT)
Age: 60
End: 2020-07-30

## 2020-07-30 ENCOUNTER — APPOINTMENT (OUTPATIENT)
Dept: INFUSION THERAPY | Facility: HOSPITAL | Age: 60
End: 2020-07-30

## 2020-07-30 VITALS
TEMPERATURE: 98.2 F | DIASTOLIC BLOOD PRESSURE: 74 MMHG | HEART RATE: 79 BPM | SYSTOLIC BLOOD PRESSURE: 111 MMHG | BODY MASS INDEX: 25.56 KG/M2 | RESPIRATION RATE: 16 BRPM | WEIGHT: 173.06 LBS | OXYGEN SATURATION: 99 %

## 2020-07-30 DIAGNOSIS — Z01.818 ENCOUNTER FOR OTHER PREPROCEDURAL EXAMINATION: ICD-10-CM

## 2020-07-30 LAB
ANISOCYTOSIS BLD QL: SLIGHT — SIGNIFICANT CHANGE UP
BASOPHILS # BLD AUTO: 0.05 K/UL — SIGNIFICANT CHANGE UP (ref 0–0.2)
BASOPHILS NFR BLD AUTO: 1 % — SIGNIFICANT CHANGE UP (ref 0–2)
DACRYOCYTES BLD QL SMEAR: SLIGHT — SIGNIFICANT CHANGE UP
ELLIPTOCYTES BLD QL SMEAR: SLIGHT — SIGNIFICANT CHANGE UP
EOSINOPHIL # BLD AUTO: 0 K/UL — SIGNIFICANT CHANGE UP (ref 0–0.5)
EOSINOPHIL NFR BLD AUTO: 0 % — SIGNIFICANT CHANGE UP (ref 0–6)
HCT VFR BLD CALC: 34.2 % — LOW (ref 39–50)
HGB BLD-MCNC: 11.3 G/DL — LOW (ref 13–17)
HYPOCHROMIA BLD QL: SLIGHT — SIGNIFICANT CHANGE UP
LYMPHOCYTES # BLD AUTO: 1.06 K/UL — SIGNIFICANT CHANGE UP (ref 1–3.3)
LYMPHOCYTES # BLD AUTO: 21 % — SIGNIFICANT CHANGE UP (ref 13–44)
MACROCYTES BLD QL: SLIGHT — SIGNIFICANT CHANGE UP
MCHC RBC-ENTMCNC: 25.7 PG — LOW (ref 27–34)
MCHC RBC-ENTMCNC: 33 GM/DL — SIGNIFICANT CHANGE UP (ref 32–36)
MCV RBC AUTO: 77.7 FL — LOW (ref 80–100)
MICROCYTES BLD QL: SLIGHT — SIGNIFICANT CHANGE UP
MONOCYTES # BLD AUTO: 1.16 K/UL — HIGH (ref 0–0.9)
MONOCYTES NFR BLD AUTO: 23 % — HIGH (ref 2–14)
MYELOCYTES NFR BLD: 3 % — HIGH (ref 0–0)
NEUTROPHILS # BLD AUTO: 2.62 K/UL — SIGNIFICANT CHANGE UP (ref 1.8–7.4)
NEUTROPHILS NFR BLD AUTO: 51 % — SIGNIFICANT CHANGE UP (ref 43–77)
NEUTS BAND # BLD: 1 % — SIGNIFICANT CHANGE UP (ref 0–8)
NRBC # BLD: 0 /100 — SIGNIFICANT CHANGE UP (ref 0–0)
NRBC # BLD: SIGNIFICANT CHANGE UP /100 WBCS (ref 0–0)
PLAT MORPH BLD: NORMAL — SIGNIFICANT CHANGE UP
PLATELET # BLD AUTO: 257 K/UL — SIGNIFICANT CHANGE UP (ref 150–400)
POIKILOCYTOSIS BLD QL AUTO: SLIGHT — SIGNIFICANT CHANGE UP
POLYCHROMASIA BLD QL SMEAR: SLIGHT — SIGNIFICANT CHANGE UP
RBC # BLD: 4.4 M/UL — SIGNIFICANT CHANGE UP (ref 4.2–5.8)
RBC # FLD: 19.6 % — HIGH (ref 10.3–14.5)
RBC BLD AUTO: ABNORMAL
WBC # BLD: 5.04 K/UL — SIGNIFICANT CHANGE UP (ref 3.8–10.5)
WBC # FLD AUTO: 5.04 K/UL — SIGNIFICANT CHANGE UP (ref 3.8–10.5)

## 2020-07-30 PROCEDURE — 99214 OFFICE O/P EST MOD 30 MIN: CPT

## 2020-07-31 PROBLEM — Z01.818 PRE-TRANSPLANT EVALUATION FOR STEM CELL TRANSPLANT: Status: RESOLVED | Noted: 2020-02-10 | Resolved: 2020-07-31

## 2020-07-31 LAB
ALBUMIN SERPL ELPH-MCNC: 4.3 G/DL
ALP BLD-CCNC: 58 U/L
ALT SERPL-CCNC: 11 U/L
ANION GAP SERPL CALC-SCNC: 12 MMOL/L
AST SERPL-CCNC: 17 U/L
BILIRUB SERPL-MCNC: 0.2 MG/DL
BUN SERPL-MCNC: 12 MG/DL
CALCIUM SERPL-MCNC: 8.9 MG/DL
CHLORIDE SERPL-SCNC: 105 MMOL/L
CO2 SERPL-SCNC: 25 MMOL/L
CREAT SERPL-MCNC: 0.84 MG/DL
GLUCOSE SERPL-MCNC: 91 MG/DL
LDH SERPL-CCNC: 318 U/L
MAGNESIUM SERPL-MCNC: 2.1 MG/DL
POTASSIUM SERPL-SCNC: 4.4 MMOL/L
PROT SERPL-MCNC: 6 G/DL
SODIUM SERPL-SCNC: 142 MMOL/L

## 2020-07-31 NOTE — REASON FOR VISIT
[Follow-Up Visit] : a follow-up visit for [Other: _____] : [unfilled] [FreeTextEntry2] : Amyloidosis s/p AUTO PBSCT on 7/2/20

## 2020-07-31 NOTE — ASSESSMENT
[FreeTextEntry1] : Mr. Saba is a 60 year old male with a medical history of amyloidosis, hepatitis C, dyspepsia, hematuria, colitis, Raynaud's phenomenon, and cutaneous mastocytosis. Status post autologous peripheral blood stem cell transplant on 7/2/20.\par \par 1) Amyloidosis\par S/p AUTO PBSCT on 7/2/20\par \par 2) Heme\par Thalassemia train\par Counts stable, no indication for transfusion\par    7/30/20:   WBC 5.04   ANC 2.62   Hgb 11.3   \par Continue MV and folic acid daily\par \par 3) ID\par Continue ppx:\par - Atovaquone 750 mg BID\par - Acyclovir 400 mg TID\par - Fluconazole 400mg daily \par Post transplant restrictions reviewed\par \par 4) GI\par Continue ppx:\par - Protonix 40 mg daily\par PRN Zofran/Reglan for nausea/vomiting\par \par 5) Plan\par Pt educated regarding plan of care, all questions/concerns addressed\par Instructed to contact our office if develops fever or new/worsening symptoms\par Continue weekly visits at this time\par F/u with NP on 8/6/20

## 2020-07-31 NOTE — HISTORY OF PRESENT ILLNESS
[de-identified] : 60 y/o male with a hx of Amyloidosis,  Hepatitis C,  dyspepsia, hematuria, rectal bleeding, and Raynaud's phenomenon, cutaneous mastocytosis, presents for an initial Auto PSCT consultation. He is referred by Dr. Joel Wells. \par \par He initially experienced joint pain in 2014 and managed sx with Advil. In 2016, LFT was elevated and was dx with Hepatitis C which was treated with Harvoni. Patient has a hx of hematuria and bloody stool. Biopsies of the colon revealed chronic colitis and erosion. He was pleased on steroids and Mesalamine and started Humira in Mid-August. After first dose of Humira, left knee swelled up ---alleviated by Advil. After second dose of Humira, ankles swelled up and steroids were prescribed. Patient was also placed on Famotidine for early satiety and dyspepsia. His renal function is now normal and no longer experiences bloody stools........Progression in dyspepsia led to an upper endoscopy with Dr Parks on 10/30/19. Biopsy of the second portion of the duodenum revealed amyloidosis confirmed with [place] red statin. IgM immunostain is positive in areas of amyloid deposition, with kappa LC predominance. The stomach was noted to have impaired distensibility on endoscopy.Renal function has been normal, with BUN 9/creatinine 0.9.Cystoscopy was performed which showed prominent blood vessels. \par \par On 06/29/20 Mr. Saba was admitted to [facility] for an for an autologous peripheral blood stem cell transplant with high dose Melphalan prep regimen for treatment of Amyloidosis. He had no complaint at the time of his admission. \par \par Upon admission, a TLC was placed in IR. Mr. Saba received IV hydration, pain management, anxiolysis, antiemetics, nutritional support, and antibacterial / antiviral / antifungal / GI / PCP and VOD (SOS) prophylaxis. When his ANC dropped below 1000, he was started on prophylactic Ciprofloxacin. Labs were monitored on a daily basis, and he received electrolyte repletion and transfusional support as needed. Initially, his platelets were kelp at a goal of 40K for his history of amyloidosis, however post engraftment the platelets goal was kept above 15K. As of note Mr. Saba was started on Acyclovir prior \par to his admission, hence Acyclovir was continued. \par \par On 07/02/20, after pre-medication Mr. Saba received 316 mL of: Autologous, mobilized, plasma reduced, pooled, thawed, washed, HCP apheresis over \par approximately 1 hr. Cell counts as follows \par Total MNC( x10^8/kg)=8.32 \par CD34+cells ( x10^6 kg)=9.96 \par Cell Viability (%)=65 \par Mr. Saba tolerated the infusion well with no adverse resections noted. \par \par While admitted, he had pancytopenia related to high dose chemotherapy prep regiment. He also developed neutropenic fevers. When he became febrile, blood and urine cultures were sent and CXR completed. Ciprofloxacin was changed to cefepime for a broadened anti-microbial coverage. Infectious work up was negative with, negative blood/urine cultures and CXR showing clear lungs. \par \par He also had a drug induced rash secondary to Kepivance which resolved with no intervention. His hospital stay was also complicated by chemotherapy induced grade 1 intestinal mucositis with diarrhea, c. diff sample was sent off and resulted negative, his symptoms resolved with Imodium. Additionally, he experienced chemotherapy induced grade 1 GI mucositis with nausea, he was treated with anti-emetics as well as prn Carafate and Maalox, his symptoms then resolved. \par \par Early engraftment was noticed on 7/15, Zarxio was increased from 480 micrograms to 600 micrograms SQ daily. Cefepime was continued until post engraftment. On 7/18, given count recovery no fevers an negative infectious work up, negative blood/urine cultures and CXR with clear lungs, Cefepime was discontinued. \par Zarxio was discontinued on 7/19. Nasopharyngeal  Surveillance COVID swabs were preformed weekly and all resulted negative, with the most recent collected on 7/20. \par \par Currently, Mr. Saba is ready and stable for discharge with a follow up appointments at [facility].  [de-identified] : On 7/30/20 visit, day +28 of ASCT today. Overall continues to feel well. Remains active, walking daily. Reports adequate PO intake and hydration. No other complaints. Compliant with post transplant meds and restrictions. Denies fever, chills, headache, dizziness, blurred vision, mucositis/odynophagia, chest pain, SOB, cough, nausea/vomiting, diarrhea/constipation, abdominal pain, dysuria, LE edema, rash, bleeding, pain\par

## 2020-07-31 NOTE — PHYSICAL EXAM
[Restricted in physically strenuous activity but ambulatory and able to carry out work of a light or sedentary nature] : Status 1- Restricted in physically strenuous activity but ambulatory and able to carry out work of a light or sedentary nature, e.g., light house work, office work [de-identified] : +BS; abdomen soft, non-distended, non-tender [de-identified] : no edema [Normal] : affect appropriate

## 2020-08-03 ENCOUNTER — APPOINTMENT (OUTPATIENT)
Dept: INFUSION THERAPY | Facility: HOSPITAL | Age: 60
End: 2020-08-03

## 2020-08-06 ENCOUNTER — APPOINTMENT (OUTPATIENT)
Dept: INFUSION THERAPY | Facility: HOSPITAL | Age: 60
End: 2020-08-06

## 2020-08-06 ENCOUNTER — RESULT REVIEW (OUTPATIENT)
Age: 60
End: 2020-08-06

## 2020-08-06 ENCOUNTER — APPOINTMENT (OUTPATIENT)
Dept: HEMATOLOGY ONCOLOGY | Facility: CLINIC | Age: 60
End: 2020-08-06
Payer: COMMERCIAL

## 2020-08-06 VITALS
DIASTOLIC BLOOD PRESSURE: 77 MMHG | WEIGHT: 174.14 LBS | OXYGEN SATURATION: 100 % | RESPIRATION RATE: 16 BRPM | BODY MASS INDEX: 25.72 KG/M2 | SYSTOLIC BLOOD PRESSURE: 113 MMHG | HEART RATE: 74 BPM | TEMPERATURE: 98.7 F

## 2020-08-06 LAB
BASOPHILS # BLD AUTO: 0.06 K/UL — SIGNIFICANT CHANGE UP (ref 0–0.2)
BASOPHILS NFR BLD AUTO: 1.1 % — SIGNIFICANT CHANGE UP (ref 0–2)
EOSINOPHIL # BLD AUTO: 0.02 K/UL — SIGNIFICANT CHANGE UP (ref 0–0.5)
EOSINOPHIL NFR BLD AUTO: 0.4 % — SIGNIFICANT CHANGE UP (ref 0–6)
HCT VFR BLD CALC: 31.4 % — LOW (ref 39–50)
HGB BLD-MCNC: 10.3 G/DL — LOW (ref 13–17)
IMM GRANULOCYTES NFR BLD AUTO: 0.4 % — SIGNIFICANT CHANGE UP (ref 0–1.5)
LYMPHOCYTES # BLD AUTO: 2.41 K/UL — SIGNIFICANT CHANGE UP (ref 1–3.3)
LYMPHOCYTES # BLD AUTO: 44.5 % — HIGH (ref 13–44)
MCHC RBC-ENTMCNC: 25.3 PG — LOW (ref 27–34)
MCHC RBC-ENTMCNC: 32.8 GM/DL — SIGNIFICANT CHANGE UP (ref 32–36)
MCV RBC AUTO: 77.1 FL — LOW (ref 80–100)
MONOCYTES # BLD AUTO: 1.4 K/UL — HIGH (ref 0–0.9)
MONOCYTES NFR BLD AUTO: 25.9 % — HIGH (ref 2–14)
NEUTROPHILS # BLD AUTO: 1.5 K/UL — LOW (ref 1.8–7.4)
NEUTROPHILS NFR BLD AUTO: 27.7 % — LOW (ref 43–77)
NRBC # BLD: 0 /100 WBCS — SIGNIFICANT CHANGE UP (ref 0–0)
PLATELET # BLD AUTO: 185 K/UL — SIGNIFICANT CHANGE UP (ref 150–400)
RBC # BLD: 4.07 M/UL — LOW (ref 4.2–5.8)
RBC # FLD: 19.1 % — HIGH (ref 10.3–14.5)
WBC # BLD: 5.41 K/UL — SIGNIFICANT CHANGE UP (ref 3.8–10.5)
WBC # FLD AUTO: 5.41 K/UL — SIGNIFICANT CHANGE UP (ref 3.8–10.5)

## 2020-08-06 PROCEDURE — 99214 OFFICE O/P EST MOD 30 MIN: CPT

## 2020-08-07 LAB
ALBUMIN SERPL ELPH-MCNC: 4.2 G/DL
ALP BLD-CCNC: 53 U/L
ALT SERPL-CCNC: 15 U/L
ANION GAP SERPL CALC-SCNC: 12 MMOL/L
AST SERPL-CCNC: 25 U/L
BILIRUB SERPL-MCNC: 0.2 MG/DL
BUN SERPL-MCNC: 12 MG/DL
CALCIUM SERPL-MCNC: 9.3 MG/DL
CHLORIDE SERPL-SCNC: 105 MMOL/L
CO2 SERPL-SCNC: 25 MMOL/L
CREAT SERPL-MCNC: 0.85 MG/DL
GLUCOSE SERPL-MCNC: 88 MG/DL
LDH SERPL-CCNC: 297 U/L
MAGNESIUM SERPL-MCNC: 2 MG/DL
POTASSIUM SERPL-SCNC: 4.8 MMOL/L
PROT SERPL-MCNC: 6.3 G/DL
SODIUM SERPL-SCNC: 142 MMOL/L

## 2020-08-09 NOTE — ASSESSMENT
[FreeTextEntry1] : Mr. Saba is a 60 year old male with a medical history of amyloidosis, hepatitis C, dyspepsia, hematuria, colitis, Raynaud's phenomenon, and cutaneous mastocytosis. Status post autologous peripheral blood stem cell transplant on 7/2/20.\par \par 1) Amyloidosis\par S/p AUTO PBSCT on 7/2/20\par Has f/u with amyloid cardiologist for repeat ECHO on Mon 8/10/20 \par \par 2) Heme\par Thalassemia train\par Counts stable, no indication for transfusion\par    8/6/20:   WBC 5.41   ANC 1.5   Hgb 10.3    \par Continue MV and folic acid daily\par \par 3) ID\par Continue ppx:\par - Atovaquone 750 mg BID\par - Acyclovir 400 mg TID\par - Fluconazole 400mg daily \par Post transplant restrictions reviewed\par \par Post transplant re-vaccination to start 1yt post transplant\par - 12mo post transplant vaccines - due July 2021\par - 14mo post transplant vaccines - due September 2021\par - 24mo post transplant vaccines - due July 2022\par \par 4) GI\par Continue ppx:\par - Protonix 40 mg daily\par PRN Zofran/Reglan for nausea/vomiting\par \par 5) Plan\par Pt educated regarding plan of care, all questions/concerns addressed\par Instructed to contact our office if develops fever or new/worsening symptoms\par Continue weekly visits at this time\par F/u with NP on 8/13/20

## 2020-08-09 NOTE — PHYSICAL EXAM
[Restricted in physically strenuous activity but ambulatory and able to carry out work of a light or sedentary nature] : Status 1- Restricted in physically strenuous activity but ambulatory and able to carry out work of a light or sedentary nature, e.g., light house work, office work [Normal] : affect appropriate [de-identified] : no edema [de-identified] : +BS; abdomen soft, non-distended, non-tender

## 2020-08-09 NOTE — HISTORY OF PRESENT ILLNESS
[de-identified] : 58 y/o male with a hx of Amyloidosis,  Hepatitis C,  dyspepsia, hematuria, rectal bleeding, and Raynaud's phenomenon, cutaneous mastocytosis, presents for an initial Auto PSCT consultation. He is referred by Dr. Joel Wells. \par \par He initially experienced joint pain in 2014 and managed sx with Advil. In 2016, LFT was elevated and was dx with Hepatitis C which was treated with Harvoni. Patient has a hx of hematuria and bloody stool. Biopsies of the colon revealed chronic colitis and erosion. He was pleased on steroids and Mesalamine and started Humira in Mid-August. After first dose of Humira, left knee swelled up ---alleviated by Advil. After second dose of Humira, ankles swelled up and steroids were prescribed. Patient was also placed on Famotidine for early satiety and dyspepsia. His renal function is now normal and no longer experiences bloody stools........Progression in dyspepsia led to an upper endoscopy with Dr Parks on 10/30/19. Biopsy of the second portion of the duodenum revealed amyloidosis confirmed with Congo red statin. IgM immunostain is positive in areas of amyloid deposition, with kappa LC predominance. The stomach was noted to have impaired distensibility on endoscopy.Renal function has been normal, with BUN 9/creatinine 0.9.Cystoscopy was performed which showed prominent blood vessels. \par \par On 06/29/20 Mr. Saba was admitted to [facility] for an for an autologous peripheral blood stem cell transplant with high dose Melphalan prep regimen for treatment of Amyloidosis. He had no complaint at the time of his admission. \par \par Upon admission, a TLC was placed in IR. Mr. Saba received IV hydration, pain management, anxiolysis, antiemetics, nutritional support, and antibacterial / antiviral / antifungal / GI / PCP and VOD (SOS) prophylaxis. When his ANC dropped below 1000, he was started on prophylactic Ciprofloxacin. Labs were monitored on a daily basis, and he received electrolyte repletion and transfusional support as needed. Initially, his platelets were kelp at a goal of 40K for his history of amyloidosis, however post engraftment the platelets goal was kept above 15K. As of note Mr. Saba was started on Acyclovir prior \par to his admission, hence Acyclovir was continued. \par \par On 07/02/20, after pre-medication Mr. Saba received 316 mL of: Autologous, mobilized, plasma reduced, pooled, thawed, washed, HCP apheresis over \par approximately 1 hr. Cell counts as follows \par Total MNC( x10^8/kg)=8.32 \par CD34+cells ( x10^6 kg)=9.96 \par Cell Viability (%)=65 \par Mr. Saba tolerated the infusion well with no adverse resections noted. \par \par While admitted, he had pancytopenia related to high dose chemotherapy prep regiment. He also developed neutropenic fevers. When he became febrile, blood and urine cultures were sent and CXR completed. Ciprofloxacin was changed to cefepime for a broadened anti-microbial coverage. Infectious work up was negative with, negative blood/urine cultures and CXR showing clear lungs. \par \par He also had a drug induced rash secondary to Kepivance which resolved with no intervention. His hospital stay was also complicated by chemotherapy induced grade 1 intestinal mucositis with diarrhea, c. diff sample was sent off and resulted negative, his symptoms resolved with Imodium. Additionally, he experienced chemotherapy induced grade 1 GI mucositis with nausea, he was treated with anti-emetics as well as prn Carafate and Maalox, his symptoms then resolved. \par \par Early engraftment was noticed on 7/15, Zarxio was increased from 480 micrograms to 600 micrograms SQ daily. Cefepime was continued until post engraftment. On 7/18, given count recovery no fevers an negative infectious work up, negative blood/urine cultures and CXR with clear lungs, Cefepime was discontinued. \par Zarxio was discontinued on 7/19. Nasopharyngeal  Surveillance COVID swabs were preformed weekly and all resulted negative, with the most recent collected on 7/20. \par \par Currently, Mr. Saba is ready and stable for discharge with a follow up appointments at [facility].  [de-identified] : On 8/6/20 visit, day +35 of ASCT today. Overall feeling well and remains active. Reports adequate PO intake. No complaints today. Compliant with post transplant meds an restrictions. Denies fever, chills, headache, dizziness, blurred vision, mucositis/odynophagia, chest pain, SOB, cough, nausea/vomiting, diarrhea/constipation, abdominal pain, dysuria, LE edema, rash, bleeding, pain\par

## 2020-08-13 ENCOUNTER — RESULT REVIEW (OUTPATIENT)
Age: 60
End: 2020-08-13

## 2020-08-13 ENCOUNTER — APPOINTMENT (OUTPATIENT)
Dept: HEMATOLOGY ONCOLOGY | Facility: CLINIC | Age: 60
End: 2020-08-13
Payer: COMMERCIAL

## 2020-08-13 VITALS
DIASTOLIC BLOOD PRESSURE: 68 MMHG | SYSTOLIC BLOOD PRESSURE: 110 MMHG | HEART RATE: 85 BPM | OXYGEN SATURATION: 99 % | RESPIRATION RATE: 16 BRPM | BODY MASS INDEX: 25.46 KG/M2 | WEIGHT: 172.4 LBS | TEMPERATURE: 98.6 F

## 2020-08-13 LAB
BASOPHILS # BLD AUTO: 0.07 K/UL — SIGNIFICANT CHANGE UP (ref 0–0.2)
BASOPHILS NFR BLD AUTO: 1 % — SIGNIFICANT CHANGE UP (ref 0–2)
EOSINOPHIL # BLD AUTO: 0.25 K/UL — SIGNIFICANT CHANGE UP (ref 0–0.5)
EOSINOPHIL NFR BLD AUTO: 3.6 % — SIGNIFICANT CHANGE UP (ref 0–6)
HCT VFR BLD CALC: 34.3 % — LOW (ref 39–50)
HGB BLD-MCNC: 10.9 G/DL — LOW (ref 13–17)
IMM GRANULOCYTES NFR BLD AUTO: 0.6 % — SIGNIFICANT CHANGE UP (ref 0–1.5)
LYMPHOCYTES # BLD AUTO: 3.1 K/UL — SIGNIFICANT CHANGE UP (ref 1–3.3)
LYMPHOCYTES # BLD AUTO: 44.7 % — HIGH (ref 13–44)
MCHC RBC-ENTMCNC: 25.3 PG — LOW (ref 27–34)
MCHC RBC-ENTMCNC: 31.8 GM/DL — LOW (ref 32–36)
MCV RBC AUTO: 79.6 FL — LOW (ref 80–100)
MONOCYTES # BLD AUTO: 1.29 K/UL — HIGH (ref 0–0.9)
MONOCYTES NFR BLD AUTO: 18.6 % — HIGH (ref 2–14)
NEUTROPHILS # BLD AUTO: 2.18 K/UL — SIGNIFICANT CHANGE UP (ref 1.8–7.4)
NEUTROPHILS NFR BLD AUTO: 31.5 % — LOW (ref 43–77)
NRBC # BLD: 0 /100 WBCS — SIGNIFICANT CHANGE UP (ref 0–0)
PLATELET # BLD AUTO: 131 K/UL — LOW (ref 150–400)
RBC # BLD: 4.31 M/UL — SIGNIFICANT CHANGE UP (ref 4.2–5.8)
RBC # FLD: 18.8 % — HIGH (ref 10.3–14.5)
WBC # BLD: 6.93 K/UL — SIGNIFICANT CHANGE UP (ref 3.8–10.5)
WBC # FLD AUTO: 6.93 K/UL — SIGNIFICANT CHANGE UP (ref 3.8–10.5)

## 2020-08-13 PROCEDURE — 99212 OFFICE O/P EST SF 10 MIN: CPT

## 2020-08-13 RX ORDER — PANTOPRAZOLE SODIUM 40 MG/1
40 TABLET, DELAYED RELEASE ORAL DAILY
Qty: 30 | Refills: 0 | Status: DISCONTINUED | COMMUNITY
Start: 2020-07-20 | End: 2020-08-13

## 2020-08-13 RX ORDER — FLUCONAZOLE 200 MG/1
200 TABLET ORAL
Qty: 30 | Refills: 0 | Status: DISCONTINUED | COMMUNITY
Start: 2020-07-20 | End: 2020-08-13

## 2020-08-14 LAB
ALBUMIN SERPL ELPH-MCNC: 4.3 G/DL
ALP BLD-CCNC: 55 U/L
ALT SERPL-CCNC: 23 U/L
ANION GAP SERPL CALC-SCNC: 10 MMOL/L
AST SERPL-CCNC: 25 U/L
BILIRUB SERPL-MCNC: 0.3 MG/DL
BUN SERPL-MCNC: 14 MG/DL
CALCIUM SERPL-MCNC: 9.3 MG/DL
CHLORIDE SERPL-SCNC: 106 MMOL/L
CO2 SERPL-SCNC: 25 MMOL/L
CREAT SERPL-MCNC: 0.91 MG/DL
GLUCOSE SERPL-MCNC: 91 MG/DL
LDH SERPL-CCNC: 264 U/L
MAGNESIUM SERPL-MCNC: 1.9 MG/DL
POTASSIUM SERPL-SCNC: 4.8 MMOL/L
PROT SERPL-MCNC: 6.3 G/DL
SODIUM SERPL-SCNC: 141 MMOL/L

## 2020-08-17 NOTE — ASSESSMENT
[FreeTextEntry1] : Mr. Saba is a 60 year old male with a medical history of amyloidosis, hepatitis C, dyspepsia, hematuria, colitis, Raynaud's phenomenon, and cutaneous mastocytosis. Status post autologous peripheral blood stem cell transplant on 7/2/20.\par \par 1) Amyloidosis\par S/p AUTO PBSCT on 7/2/20\par Echo completed on 8/10/20. Impression: LV global wall motion is normal, LV ejection fraction is normal ( 65%). Aortic arch is normal in size. There is no significant valvular disease. The right atrial pressure is normal. There is no pulmonary hypertension. There is no pericardial effusion. Left pleural effusion is present.\par \par 2) Heme\par Thalassemia train\par Counts stable, no indication for transfusion\par    8/13/20:   WBC 6.93  ANC 2.18   Hgb 10.9      \par Continue MV and folic acid daily\par \par 3) ID\par Continue ppx:\par - Atovaquone 750 mg BID\par - Acyclovir 400 mg TID\par - Fluconazole 400 mg daily- discontinue as of 8/13/20\par Post transplant restrictions reviewed\par \par Post transplant re-vaccination to start 1yt post transplant\par - 12 months post transplant vaccines - due July 2021\par - 14 months post transplant vaccines - due September 2021\par - 24 months post transplant vaccines - due July 2022\par \par 4) GI\par Continue ppx:\par - Protonix 40 mg daily- discontinue as of 8/13/20\par PRN Zofran/Reglan for nausea/vomiting\par \par 5) Plan\par Pt educated regarding plan of care, all questions/concerns addressed\par Instructed to contact our office if develops fever or new/worsening symptoms\par Continue weekly visits at this time\par F/u with MD Roman in one week

## 2020-08-17 NOTE — PHYSICAL EXAM
[Restricted in physically strenuous activity but ambulatory and able to carry out work of a light or sedentary nature] : Status 1- Restricted in physically strenuous activity but ambulatory and able to carry out work of a light or sedentary nature, e.g., light house work, office work [Normal] : no JVD, no calf tenderness, venous stasis changes, varices [de-identified] : no edema [de-identified] : +BS; abdomen soft, non-distended, non-tender

## 2020-08-17 NOTE — HISTORY OF PRESENT ILLNESS
[de-identified] : 60 y/o male with a hx of Amyloidosis,  Hepatitis C,  dyspepsia, hematuria, rectal bleeding, and Raynaud's phenomenon, cutaneous mastocytosis, presents for an initial Auto PSCT consultation. He is referred by Dr. Joel Wells. \par \par He initially experienced joint pain in 2014 and managed sx with Advil. In 2016, LFT was elevated and was dx with Hepatitis C which was treated with Harvoni. Patient has a hx of hematuria and bloody stool. Biopsies of the colon revealed chronic colitis and erosion. He was pleased on steroids and Mesalamine and started Humira in Mid-August. After first dose of Humira, left knee swelled up ---alleviated by Advil. After second dose of Humira, ankles swelled up and steroids were prescribed. Patient was also placed on Famotidine for early satiety and dyspepsia. His renal function is now normal and no longer experiences bloody stools........Progression in dyspepsia led to an upper endoscopy with Dr Parks on 10/30/19. Biopsy of the second portion of the duodenum revealed amyloidosis confirmed with [place] red statin. IgM immunostain is positive in areas of amyloid deposition, with kappa LC predominance. The stomach was noted to have impaired distensibility on endoscopy.Renal function has been normal, with BUN 9/creatinine 0.9.Cystoscopy was performed which showed prominent blood vessels. \par \par On 06/29/20 Mr. Saba was admitted to [facility] for an for an autologous peripheral blood stem cell transplant with high dose Melphalan prep regimen for treatment of Amyloidosis. He had no complaint at the time of his admission. \par \par Upon admission, a TLC was placed in IR. Mr. Saba received IV hydration, pain management, anxiolysis, antiemetics, nutritional support, and antibacterial / antiviral / antifungal / GI / PCP and VOD (SOS) prophylaxis. When his ANC dropped below 1000, he was started on prophylactic Ciprofloxacin. Labs were monitored on a daily basis, and he received electrolyte repletion and transfusional support as needed. Initially, his platelets were kelp at a goal of 40K for his history of amyloidosis, however post engraftment the platelets goal was kept above 15K. As of note Mr. Saba was started on Acyclovir prior \par to his admission, hence Acyclovir was continued. \par \par On 07/02/20, after pre-medication Mr. Saba received 316 mL of: Autologous, mobilized, plasma reduced, pooled, thawed, washed, HCP apheresis over \par approximately 1 hr. Cell counts as follows \par Total MNC( x10^8/kg)=8.32 \par CD34+cells ( x10^6 kg)=9.96 \par Cell Viability (%)=65 \par Mr. Saba tolerated the infusion well with no adverse resections noted. \par \par While admitted, he had pancytopenia related to high dose chemotherapy prep regiment. He also developed neutropenic fevers. When he became febrile, blood and urine cultures were sent and CXR completed. Ciprofloxacin was changed to cefepime for a broadened anti-microbial coverage. Infectious work up was negative with, negative blood/urine cultures and CXR showing clear lungs. \par \par He also had a drug induced rash secondary to Kepivance which resolved with no intervention. His hospital stay was also complicated by chemotherapy induced grade 1 intestinal mucositis with diarrhea, c. diff sample was sent off and resulted negative, his symptoms resolved with Imodium. Additionally, he experienced chemotherapy induced grade 1 GI mucositis with nausea, he was treated with anti-emetics as well as prn Carafate and Maalox, his symptoms then resolved. \par \par Early engraftment was noticed on 7/15, Zarxio was increased from 480 micrograms to 600 micrograms SQ daily. Cefepime was continued until post engraftment. On 7/18, given count recovery no fevers an negative infectious work up, negative blood/urine cultures and CXR with clear lungs, Cefepime was discontinued. \par Zarxio was discontinued on 7/19. Nasopharyngeal  Surveillance COVID swabs were preformed weekly and all resulted negative, with the most recent collected on 7/20. \par \par Currently, Mr. Saba is ready and stable for discharge with a follow up appointments at [facility].  [de-identified] : On 8/6/20 visit, day +35 of ASCT today. Overall feeling well and remains active. Reports adequate PO intake. No complaints today. Compliant with post transplant meds an restrictions. Denies fever, chills, headache, dizziness, blurred vision, mucositis/odynophagia, chest pain, SOB, cough, nausea/vomiting, diarrhea/constipation, abdominal pain, dysuria, LE edema, rash, bleeding, pain\par \par On 8/13/20 visit, day + 42 of Auto PBSCT. Overall feeling well and offers no acute concerns. Denies fever, chills, nausea, vomiting, diarrhea, rash, mouth sores, dysuria or any signs of active bleeding. Denies SOB, chest pain or B/L LE edema. Had an echocardiogram on 8/10/20. Remains compliant with post transplant diet and crowd restrictions. Remains compliant with post transplant medications.

## 2020-08-20 ENCOUNTER — RESULT REVIEW (OUTPATIENT)
Age: 60
End: 2020-08-20

## 2020-08-20 ENCOUNTER — APPOINTMENT (OUTPATIENT)
Dept: HEMATOLOGY ONCOLOGY | Facility: CLINIC | Age: 60
End: 2020-08-20
Payer: COMMERCIAL

## 2020-08-20 VITALS
RESPIRATION RATE: 16 BRPM | BODY MASS INDEX: 26.7 KG/M2 | SYSTOLIC BLOOD PRESSURE: 113 MMHG | TEMPERATURE: 98.3 F | HEART RATE: 72 BPM | WEIGHT: 174.16 LBS | OXYGEN SATURATION: 96 % | DIASTOLIC BLOOD PRESSURE: 76 MMHG | HEIGHT: 67.72 IN

## 2020-08-20 DIAGNOSIS — Z82.49 FAMILY HISTORY OF ISCHEMIC HEART DISEASE AND OTHER DISEASES OF THE CIRCULATORY SYSTEM: ICD-10-CM

## 2020-08-20 LAB
BASOPHILS # BLD AUTO: 0.05 K/UL — SIGNIFICANT CHANGE UP (ref 0–0.2)
BASOPHILS NFR BLD AUTO: 0.8 % — SIGNIFICANT CHANGE UP (ref 0–2)
EOSINOPHIL # BLD AUTO: 0.33 K/UL — SIGNIFICANT CHANGE UP (ref 0–0.5)
EOSINOPHIL NFR BLD AUTO: 5.3 % — SIGNIFICANT CHANGE UP (ref 0–6)
HCT VFR BLD CALC: 32.6 % — LOW (ref 39–50)
HGB BLD-MCNC: 10.4 G/DL — LOW (ref 13–17)
IMM GRANULOCYTES NFR BLD AUTO: 0.3 % — SIGNIFICANT CHANGE UP (ref 0–1.5)
LYMPHOCYTES # BLD AUTO: 2.31 K/UL — SIGNIFICANT CHANGE UP (ref 1–3.3)
LYMPHOCYTES # BLD AUTO: 37.4 % — SIGNIFICANT CHANGE UP (ref 13–44)
MCHC RBC-ENTMCNC: 25.2 PG — LOW (ref 27–34)
MCHC RBC-ENTMCNC: 31.9 GM/DL — LOW (ref 32–36)
MCV RBC AUTO: 79.1 FL — LOW (ref 80–100)
MONOCYTES # BLD AUTO: 0.81 K/UL — SIGNIFICANT CHANGE UP (ref 0–0.9)
MONOCYTES NFR BLD AUTO: 13.1 % — SIGNIFICANT CHANGE UP (ref 2–14)
NEUTROPHILS # BLD AUTO: 2.65 K/UL — SIGNIFICANT CHANGE UP (ref 1.8–7.4)
NEUTROPHILS NFR BLD AUTO: 43.1 % — SIGNIFICANT CHANGE UP (ref 43–77)
NRBC # BLD: 0 /100 WBCS — SIGNIFICANT CHANGE UP (ref 0–0)
PLATELET # BLD AUTO: 109 K/UL — LOW (ref 150–400)
RBC # BLD: 4.12 M/UL — LOW (ref 4.2–5.8)
RBC # FLD: 18.3 % — HIGH (ref 10.3–14.5)
WBC # BLD: 6.17 K/UL — SIGNIFICANT CHANGE UP (ref 3.8–10.5)
WBC # FLD AUTO: 6.17 K/UL — SIGNIFICANT CHANGE UP (ref 3.8–10.5)

## 2020-08-20 PROCEDURE — 99214 OFFICE O/P EST MOD 30 MIN: CPT

## 2020-08-21 PROBLEM — Z82.49 FAMILY HISTORY OF HYPERTENSION: Status: ACTIVE | Noted: 2017-11-29

## 2020-08-21 LAB
ALBUMIN SERPL ELPH-MCNC: 4.2 G/DL
ALP BLD-CCNC: 49 U/L
ALT SERPL-CCNC: 17 U/L
ANION GAP SERPL CALC-SCNC: 12 MMOL/L
AST SERPL-CCNC: 21 U/L
BILIRUB SERPL-MCNC: 0.4 MG/DL
BUN SERPL-MCNC: 13 MG/DL
CALCIUM SERPL-MCNC: 8.8 MG/DL
CHLORIDE SERPL-SCNC: 107 MMOL/L
CO2 SERPL-SCNC: 24 MMOL/L
CREAT SERPL-MCNC: 0.85 MG/DL
GLUCOSE SERPL-MCNC: 89 MG/DL
LDH SERPL-CCNC: 236 U/L
MAGNESIUM SERPL-MCNC: 1.9 MG/DL
POTASSIUM SERPL-SCNC: 4 MMOL/L
PROT SERPL-MCNC: 6.2 G/DL
SODIUM SERPL-SCNC: 143 MMOL/L

## 2020-08-21 NOTE — HISTORY OF PRESENT ILLNESS
[de-identified] : 1 y/o male with a hx of Amyloidosis,  Hepatitis C,  dyspepsia, hematuria, rectal bleeding, and Raynaud's phenomenon, cutaneous mastocytosis, presents for an initial Auto PSCT consultation. He is referred by Dr. Joel Wells. \par \par He initially experienced joint pain in 2014 and managed sx with Advil. In 2016, LFT was elevated and was dx with Hepatitis C which was treated with Harvoni. Patient has a hx of hematuria and bloody stool. Biopsies of the colon revealed chronic colitis and erosion. He was pleased on steroids and Mesalamine and started Humira in Mid-August. After first dose of Humira, left knee swelled up ---alleviated by Advil. After second dose of Humira, ankles swelled up and steroids were prescribed. Patient was also placed on Famotidine for early satiety and dyspepsia. His renal function is now normal and no longer experiences bloody stools........Progression in dyspepsia led to an upper endoscopy with Dr Parks on 10/30/19. Biopsy of the second portion of the duodenum revealed amyloidosis confirmed with [place] red statin. IgM immunostain is positive in areas of amyloid deposition, with kappa LC predominance. The stomach was noted to have impaired distensibility on endoscopy.Renal function has been normal, with BUN 9/creatinine 0.9.Cystoscopy was performed which showed prominent blood vessels. \par \par On 06/29/20 Mr. Saba was admitted to [facility] for an for an autologous peripheral blood stem cell transplant with high dose Melphalan prep regimen for treatment of Amyloidosis. He had no complaint at the time of his admission. \par \par Upon admission, a TLC was placed in IR. Mr. Saba received IV hydration, pain management, anxiolysis, antiemetics, nutritional support, and antibacterial / antiviral / antifungal / GI / PCP and VOD (SOS) prophylaxis. When his ANC dropped below 1000, he was started on prophylactic Ciprofloxacin. Labs were monitored on a daily basis, and he received electrolyte repletion and transfusional support as needed. Initially, his platelets were kelp at a goal of 40K for his history of amyloidosis, however post engraftment the platelets goal was kept above 15K. As of note Mr. Saba was started on Acyclovir prior \par to his admission, hence Acyclovir was continued. \par \par On 07/02/20, after pre-medication Mr. Saba received 316 mL of: Autologous, mobilized, plasma reduced, pooled, thawed, washed, HCP apheresis over \par approximately 1 hr. Cell counts as follows \par Total MNC( x10^8/kg)=8.32 \par CD34+cells ( x10^6 kg)=9.96 \par Cell Viability (%)=65 \par Mr. Saba tolerated the infusion well with no adverse resections noted. \par \par While admitted, he had pancytopenia related to high dose chemotherapy prep regiment. He also developed neutropenic fevers. When he became febrile, blood and urine cultures were sent and CXR completed. Ciprofloxacin was changed to cefepime for a broadened anti-microbial coverage. Infectious work up was negative with, negative blood/urine cultures and CXR showing clear lungs. \par \par He also had a drug induced rash secondary to Kepivance which resolved with no intervention. His hospital stay was also complicated by chemotherapy induced grade 1 intestinal mucositis with diarrhea, c. diff sample was sent off and resulted negative, his symptoms resolved with Imodium. Additionally, he experienced chemotherapy induced grade 1 GI mucositis with nausea, he was treated with anti-emetics as well as prn Carafate and Maalox, his symptoms then resolved. \par \par Early engraftment was noticed on 7/15, Zarxio was increased from 480 micrograms to 600 micrograms SQ daily. Cefepime was continued until post engraftment. On 7/18, given count recovery no fevers an negative infectious work up, negative blood/urine cultures and CXR with clear lungs, Cefepime was discontinued. \par Zarxio was discontinued on 7/19. Nasopharyngeal  Surveillance COVID swabs were preformed weekly and all resulted negative, with the most recent collected on 7/20. \par \par Currently, Mr. Saba is ready and stable for discharge with a follow up appointments at [facility].  [de-identified] : On 8/6/20 visit, day +35 of ASCT today. Overall feeling well and remains active. Reports adequate PO intake. No complaints today. Compliant with post transplant meds an restrictions. Denies fever, chills, headache, dizziness, blurred vision, mucositis/odynophagia, chest pain, SOB, cough, nausea/vomiting, diarrhea/constipation, abdominal pain, dysuria, LE edema, rash, bleeding, pain\par \par On 8/13/20 visit, day + 42 of Auto PBSCT. Overall feeling well and offers no acute concerns. Denies fever, chills, nausea, vomiting, diarrhea, rash, mouth sores, dysuria or any signs of active bleeding. Denies SOB, chest pain or B/L LE edema. Had an echocardiogram on 8/10/20. Remains compliant with post transplant diet and crowd restrictions. Remains compliant with post transplant medications. \par \par Today pt looks and feels well...recent echo with improvement

## 2020-08-21 NOTE — PHYSICAL EXAM
[Restricted in physically strenuous activity but ambulatory and able to carry out work of a light or sedentary nature] : Status 1- Restricted in physically strenuous activity but ambulatory and able to carry out work of a light or sedentary nature, e.g., light house work, office work [Normal] : affect appropriate [de-identified] : no edema [de-identified] : +BS; abdomen soft, non-distended, non-tender

## 2020-08-21 NOTE — ASSESSMENT
[FreeTextEntry1] : Mr. Saba is a 60 year old male with a medical history of amyloidosis, hepatitis C, dyspepsia, hematuria, colitis, Raynaud's phenomenon, and cutaneous mastocytosis. Status post autologous peripheral blood stem cell transplant on 7/2/20.\par \par 1) Amyloidosis\par S/p AUTO PBSCT on 7/2/20\par Echo completed on 8/10/20. Impression: LV global wall motion is normal, LV ejection fraction is normal ( 65%). Aortic arch is normal in size. There is no significant valvular disease. The right atrial pressure is normal. There is no pulmonary hypertension. There is no pericardial effusion. Left pleural effusion is present.\par \par 2) Heme\par Thalassemia train\par Counts stable, no indication for transfusion\par    8/13/20:   WBC 6.93  ANC 2.18   Hgb 10.9      ..plt 109 today...will follow\par Continue MV and folic acid daily\par \par 3) ID\par Continue ppx:\par - Atovaquone 750 mg BID\par - Acyclovir 400 mg TID\par - Fluconazole 400 mg daily- discontinue as of 8/13/20\par Post transplant restrictions reviewed\par \par Post transplant re-vaccination to start 1yt post transplant\par - 12 months post transplant vaccines - due July 2021\par - 14 months post transplant vaccines - due September 2021\par - 24 months post transplant vaccines - due July 2022\par \par 4) GI\par Continue ppx:\par - Protonix 40 mg daily- discontinue as of 8/13/20\par PRN Zofran/Reglan for nausea/vomiting\par \par 5) Plan\par Pt educated regarding plan of care, all questions/concerns addressed\par Instructed to contact our office if develops fever or new/worsening symptoms\par Continue weekly visits at this time ..if cbc is stable next week with spread out visits to every 2 weeks

## 2020-08-22 ENCOUNTER — OUTPATIENT (OUTPATIENT)
Dept: OUTPATIENT SERVICES | Facility: HOSPITAL | Age: 60
LOS: 1 days | Discharge: HOME | End: 2020-08-22

## 2020-08-22 DIAGNOSIS — E85.9 AMYLOIDOSIS, UNSPECIFIED: ICD-10-CM

## 2020-08-28 ENCOUNTER — RESULT REVIEW (OUTPATIENT)
Age: 60
End: 2020-08-28

## 2020-08-28 ENCOUNTER — APPOINTMENT (OUTPATIENT)
Dept: HEMATOLOGY ONCOLOGY | Facility: CLINIC | Age: 60
End: 2020-08-28
Payer: COMMERCIAL

## 2020-08-28 VITALS
HEART RATE: 80 BPM | OXYGEN SATURATION: 99 % | HEIGHT: 68.5 IN | BODY MASS INDEX: 26.29 KG/M2 | WEIGHT: 175.49 LBS | SYSTOLIC BLOOD PRESSURE: 137 MMHG | RESPIRATION RATE: 16 BRPM | TEMPERATURE: 98.5 F | DIASTOLIC BLOOD PRESSURE: 78 MMHG

## 2020-08-28 LAB
ALBUMIN SERPL ELPH-MCNC: 4.1 G/DL
ALP BLD-CCNC: 53 U/L
ALT SERPL-CCNC: 15 U/L
ANION GAP SERPL CALC-SCNC: 8 MMOL/L
AST SERPL-CCNC: 19 U/L
BASOPHILS # BLD AUTO: 0.02 K/UL — SIGNIFICANT CHANGE UP (ref 0–0.2)
BASOPHILS NFR BLD AUTO: 0.4 % — SIGNIFICANT CHANGE UP (ref 0–2)
BILIRUB SERPL-MCNC: 0.4 MG/DL
BUN SERPL-MCNC: 12 MG/DL
CALCIUM SERPL-MCNC: 8.9 MG/DL
CHLORIDE SERPL-SCNC: 108 MMOL/L
CO2 SERPL-SCNC: 26 MMOL/L
CREAT SERPL-MCNC: 0.85 MG/DL
EOSINOPHIL # BLD AUTO: 0.24 K/UL — SIGNIFICANT CHANGE UP (ref 0–0.5)
EOSINOPHIL NFR BLD AUTO: 4.7 % — SIGNIFICANT CHANGE UP (ref 0–6)
GLUCOSE SERPL-MCNC: 77 MG/DL
HCT VFR BLD CALC: 31.2 % — LOW (ref 39–50)
HGB BLD-MCNC: 10 G/DL — LOW (ref 13–17)
IMM GRANULOCYTES NFR BLD AUTO: 0.2 % — SIGNIFICANT CHANGE UP (ref 0–1.5)
LDH SERPL-CCNC: 189 U/L
LYMPHOCYTES # BLD AUTO: 2.18 K/UL — SIGNIFICANT CHANGE UP (ref 1–3.3)
LYMPHOCYTES # BLD AUTO: 42.9 % — SIGNIFICANT CHANGE UP (ref 13–44)
MAGNESIUM SERPL-MCNC: 2 MG/DL
MCHC RBC-ENTMCNC: 25.5 PG — LOW (ref 27–34)
MCHC RBC-ENTMCNC: 32.1 GM/DL — SIGNIFICANT CHANGE UP (ref 32–36)
MCV RBC AUTO: 79.6 FL — LOW (ref 80–100)
MONOCYTES # BLD AUTO: 0.56 K/UL — SIGNIFICANT CHANGE UP (ref 0–0.9)
MONOCYTES NFR BLD AUTO: 11 % — SIGNIFICANT CHANGE UP (ref 2–14)
NEUTROPHILS # BLD AUTO: 2.07 K/UL — SIGNIFICANT CHANGE UP (ref 1.8–7.4)
NEUTROPHILS NFR BLD AUTO: 40.8 % — LOW (ref 43–77)
NRBC # BLD: 0 /100 WBCS — SIGNIFICANT CHANGE UP (ref 0–0)
PLATELET # BLD AUTO: 124 K/UL — LOW (ref 150–400)
POTASSIUM SERPL-SCNC: 4.7 MMOL/L
PROT SERPL-MCNC: 5.9 G/DL
RBC # BLD: 3.92 M/UL — LOW (ref 4.2–5.8)
RBC # FLD: 17.8 % — HIGH (ref 10.3–14.5)
SODIUM SERPL-SCNC: 142 MMOL/L
WBC # BLD: 5.08 K/UL — SIGNIFICANT CHANGE UP (ref 3.8–10.5)
WBC # FLD AUTO: 5.08 K/UL — SIGNIFICANT CHANGE UP (ref 3.8–10.5)

## 2020-08-28 PROCEDURE — 99214 OFFICE O/P EST MOD 30 MIN: CPT

## 2020-08-30 NOTE — HISTORY OF PRESENT ILLNESS
[de-identified] : 1 y/o male with a hx of Amyloidosis,  Hepatitis C,  dyspepsia, hematuria, rectal bleeding, and Raynaud's phenomenon, cutaneous mastocytosis, presents for an initial Auto PSCT consultation. He is referred by Dr. Joel Wells. \par \par He initially experienced joint pain in 2014 and managed sx with Advil. In 2016, LFT was elevated and was dx with Hepatitis C which was treated with Harvoni. Patient has a hx of hematuria and bloody stool. Biopsies of the colon revealed chronic colitis and erosion. He was pleased on steroids and Mesalamine and started Humira in Mid-August. After first dose of Humira, left knee swelled up ---alleviated by Advil. After second dose of Humira, ankles swelled up and steroids were prescribed. Patient was also placed on Famotidine for early satiety and dyspepsia. His renal function is now normal and no longer experiences bloody stools........Progression in dyspepsia led to an upper endoscopy with Dr Parks on 10/30/19. Biopsy of the second portion of the duodenum revealed amyloidosis confirmed with [place] red statin. IgM immunostain is positive in areas of amyloid deposition, with kappa LC predominance. The stomach was noted to have impaired distensibility on endoscopy.Renal function has been normal, with BUN 9/creatinine 0.9.Cystoscopy was performed which showed prominent blood vessels. \par \par On 06/29/20 Mr. Saba was admitted to [facility] for an for an autologous peripheral blood stem cell transplant with high dose Melphalan prep regimen for treatment of Amyloidosis. He had no complaint at the time of his admission. \par \par Upon admission, a TLC was placed in IR. Mr. Saba received IV hydration, pain management, anxiolysis, antiemetics, nutritional support, and antibacterial / antiviral / antifungal / GI / PCP and VOD (SOS) prophylaxis. When his ANC dropped below 1000, he was started on prophylactic Ciprofloxacin. Labs were monitored on a daily basis, and he received electrolyte repletion and transfusional support as needed. Initially, his platelets were kelp at a goal of 40K for his history of amyloidosis, however post engraftment the platelets goal was kept above 15K. As of note Mr. Saba was started on Acyclovir prior \par to his admission, hence Acyclovir was continued. \par \par On 07/02/20, after pre-medication Mr. Saba received 316 mL of: Autologous, mobilized, plasma reduced, pooled, thawed, washed, HCP apheresis over \par approximately 1 hr. Cell counts as follows \par Total MNC( x10^8/kg)=8.32 \par CD34+cells ( x10^6 kg)=9.96 \par Cell Viability (%)=65 \par Mr. Saba tolerated the infusion well with no adverse resections noted. \par \par While admitted, he had pancytopenia related to high dose chemotherapy prep regiment. He also developed neutropenic fevers. When he became febrile, blood and urine cultures were sent and CXR completed. Ciprofloxacin was changed to cefepime for a broadened anti-microbial coverage. Infectious work up was negative with, negative blood/urine cultures and CXR showing clear lungs. \par \par He also had a drug induced rash secondary to Kepivance which resolved with no intervention. His hospital stay was also complicated by chemotherapy induced grade 1 intestinal mucositis with diarrhea, c. diff sample was sent off and resulted negative, his symptoms resolved with Imodium. Additionally, he experienced chemotherapy induced grade 1 GI mucositis with nausea, he was treated with anti-emetics as well as prn Carafate and Maalox, his symptoms then resolved. \par \par Early engraftment was noticed on 7/15, Zarxio was increased from 480 micrograms to 600 micrograms SQ daily. Cefepime was continued until post engraftment. On 7/18, given count recovery no fevers an negative infectious work up, negative blood/urine cultures and CXR with clear lungs, Cefepime was discontinued. \par Zarxio was discontinued on 7/19. Nasopharyngeal  Surveillance COVID swabs were preformed weekly and all resulted negative, with the most recent collected on 7/20. \par \par Currently, Mr. Saba is ready and stable for discharge with a follow up appointments at [facility].  [de-identified] : On 8/28/20 visit, day +57 of SCT today. Continues to feel well with good energy and adequate PO intake. No complaints. Compliant with post transplant meds and restrictions. Denies fever, chills, headache, dizziness, blurred vision, mucositis/odynophagia, chest pain, SOB, cough, nausea/vomiting, diarrhea/constipation, abdominal pain, dysuria, LE edema, rash, bleeding, pain\par

## 2020-08-30 NOTE — PHYSICAL EXAM
[Restricted in physically strenuous activity but ambulatory and able to carry out work of a light or sedentary nature] : Status 1- Restricted in physically strenuous activity but ambulatory and able to carry out work of a light or sedentary nature, e.g., light house work, office work [Normal] : affect appropriate [de-identified] : no edema [de-identified] : +BS; abdomen soft, non-distended, non-tender

## 2020-08-30 NOTE — ASSESSMENT
[FreeTextEntry1] : Mr. Saba is a 60 year old male with a medical history of amyloidosis, hepatitis C, dyspepsia, hematuria, colitis, Raynaud's phenomenon, and cutaneous mastocytosis. Status post autologous peripheral blood stem cell transplant on 7/2/20.\par \par 1) Amyloidosis\par S/p AUTO PBSCT on 7/2/20\par 8/10/20 ECHO: LV global wall motion is normal, LV ejection fraction is normal (65%). Aortic arch is normal in size. There is no significant valvular disease. The right atrial pressure is normal. There is no pulmonary hypertension. There is no pericardial effusion. Left pleural effusion is present.\par \par 2) Heme\par Thalassemia train\par Counts stable, no indication for transfusion\par    8/28/20:   WBC 5.08   ANC 2.07   Hgb 10   \par Continue MV and folic acid daily\par \par 3) ID\par Continue ppx:\par - Atovaquone 750 mg BID\par - Acyclovir 400 mg TID\par Fluconazole discontinued 8/13/20\par Post transplant restrictions reviewed. As of 8/28/20, may eat takeout once a week from a reputable place \par \par Post transplant re-vaccination to start 1yt post transplant\par - 12 months post transplant vaccines - due July 2021\par - 14 months post transplant vaccines - due September 2021\par - 24 months post transplant vaccines - due July 2022\par \par 4) GI\par Continue ppx:\par Protonix discontinued 8/13/20\par PRN Zofran/Reglan for nausea/vomiting\par \par 5) Plan\par Pt educated regarding plan of care, all questions/concerns addressed\par Instructed to contact our office if develops fever or new/worsening symptoms\par F/u every 2wks\par F/u with NP on 9/11/20

## 2020-09-03 LAB — HLA-A,B SEROLOGICPLT RESULT: SIGNIFICANT CHANGE UP

## 2020-09-11 ENCOUNTER — RESULT REVIEW (OUTPATIENT)
Age: 60
End: 2020-09-11

## 2020-09-11 ENCOUNTER — APPOINTMENT (OUTPATIENT)
Dept: HEMATOLOGY ONCOLOGY | Facility: CLINIC | Age: 60
End: 2020-09-11
Payer: COMMERCIAL

## 2020-09-11 VITALS
SYSTOLIC BLOOD PRESSURE: 117 MMHG | TEMPERATURE: 98.2 F | DIASTOLIC BLOOD PRESSURE: 76 MMHG | OXYGEN SATURATION: 99 % | HEART RATE: 70 BPM | RESPIRATION RATE: 16 BRPM | BODY MASS INDEX: 26.75 KG/M2 | WEIGHT: 178.55 LBS

## 2020-09-11 LAB
BASOPHILS # BLD AUTO: 0.01 K/UL — SIGNIFICANT CHANGE UP (ref 0–0.2)
BASOPHILS NFR BLD AUTO: 0.2 % — SIGNIFICANT CHANGE UP (ref 0–2)
EOSINOPHIL # BLD AUTO: 0.12 K/UL — SIGNIFICANT CHANGE UP (ref 0–0.5)
EOSINOPHIL NFR BLD AUTO: 2.4 % — SIGNIFICANT CHANGE UP (ref 0–6)
HCT VFR BLD CALC: 30.1 % — LOW (ref 39–50)
HGB BLD-MCNC: 9.5 G/DL — LOW (ref 13–17)
IMM GRANULOCYTES NFR BLD AUTO: 0.2 % — SIGNIFICANT CHANGE UP (ref 0–1.5)
LYMPHOCYTES # BLD AUTO: 2.5 K/UL — SIGNIFICANT CHANGE UP (ref 1–3.3)
LYMPHOCYTES # BLD AUTO: 50.1 % — HIGH (ref 13–44)
MCHC RBC-ENTMCNC: 25.4 PG — LOW (ref 27–34)
MCHC RBC-ENTMCNC: 31.6 G/DL — LOW (ref 32–36)
MCV RBC AUTO: 80.5 FL — SIGNIFICANT CHANGE UP (ref 80–100)
MONOCYTES # BLD AUTO: 0.43 K/UL — SIGNIFICANT CHANGE UP (ref 0–0.9)
MONOCYTES NFR BLD AUTO: 8.6 % — SIGNIFICANT CHANGE UP (ref 2–14)
NEUTROPHILS # BLD AUTO: 1.92 K/UL — SIGNIFICANT CHANGE UP (ref 1.8–7.4)
NEUTROPHILS NFR BLD AUTO: 38.5 % — LOW (ref 43–77)
NRBC # BLD: 0 /100 WBCS — SIGNIFICANT CHANGE UP (ref 0–0)
PLATELET # BLD AUTO: 139 K/UL — LOW (ref 150–400)
RBC # BLD: 3.74 M/UL — LOW (ref 4.2–5.8)
RBC # FLD: 16.8 % — HIGH (ref 10.3–14.5)
WBC # BLD: 4.99 K/UL — SIGNIFICANT CHANGE UP (ref 3.8–10.5)
WBC # FLD AUTO: 4.99 K/UL — SIGNIFICANT CHANGE UP (ref 3.8–10.5)

## 2020-09-11 PROCEDURE — 99214 OFFICE O/P EST MOD 30 MIN: CPT

## 2020-09-14 LAB
ALBUMIN SERPL ELPH-MCNC: 4.1 G/DL
ALP BLD-CCNC: 51 U/L
ALT SERPL-CCNC: 14 U/L
ANION GAP SERPL CALC-SCNC: 10 MMOL/L
AST SERPL-CCNC: 16 U/L
BILIRUB SERPL-MCNC: 0.5 MG/DL
BUN SERPL-MCNC: 12 MG/DL
CALCIUM SERPL-MCNC: 8.7 MG/DL
CHLORIDE SERPL-SCNC: 109 MMOL/L
CO2 SERPL-SCNC: 24 MMOL/L
CREAT SERPL-MCNC: 0.92 MG/DL
GLUCOSE SERPL-MCNC: 86 MG/DL
LDH SERPL-CCNC: 168 U/L
MAGNESIUM SERPL-MCNC: 2.1 MG/DL
POTASSIUM SERPL-SCNC: 5 MMOL/L
PROT SERPL-MCNC: 6 G/DL
SODIUM SERPL-SCNC: 144 MMOL/L

## 2020-09-14 NOTE — PHYSICAL EXAM
[Restricted in physically strenuous activity but ambulatory and able to carry out work of a light or sedentary nature] : Status 1- Restricted in physically strenuous activity but ambulatory and able to carry out work of a light or sedentary nature, e.g., light house work, office work [Normal] : affect appropriate [de-identified] : no edema [de-identified] : +BS; abdomen soft, non-distended, non-tender

## 2020-09-14 NOTE — HISTORY OF PRESENT ILLNESS
[de-identified] : 1 y/o male with a hx of Amyloidosis,  Hepatitis C,  dyspepsia, hematuria, rectal bleeding, and Raynaud's phenomenon, cutaneous mastocytosis, presents for an initial Auto PSCT consultation. He is referred by Dr. Joel Wells. \par \par He initially experienced joint pain in 2014 and managed sx with Advil. In 2016, LFT was elevated and was dx with Hepatitis C which was treated with Harvoni. Patient has a hx of hematuria and bloody stool. Biopsies of the colon revealed chronic colitis and erosion. He was pleased on steroids and Mesalamine and started Humira in Mid-August. After first dose of Humira, left knee swelled up ---alleviated by Advil. After second dose of Humira, ankles swelled up and steroids were prescribed. Patient was also placed on Famotidine for early satiety and dyspepsia. His renal function is now normal and no longer experiences bloody stools........Progression in dyspepsia led to an upper endoscopy with Dr Parks on 10/30/19. Biopsy of the second portion of the duodenum revealed amyloidosis confirmed with [place] red statin. IgM immunostain is positive in areas of amyloid deposition, with kappa LC predominance. The stomach was noted to have impaired distensibility on endoscopy.Renal function has been normal, with BUN 9/creatinine 0.9.Cystoscopy was performed which showed prominent blood vessels. \par \par On 06/29/20 Mr. Saba was admitted to [facility] for an for an autologous peripheral blood stem cell transplant with high dose Melphalan prep regimen for treatment of Amyloidosis. He had no complaint at the time of his admission. \par \par Upon admission, a TLC was placed in IR. Mr. Saba received IV hydration, pain management, anxiolysis, antiemetics, nutritional support, and antibacterial / antiviral / antifungal / GI / PCP and VOD (SOS) prophylaxis. When his ANC dropped below 1000, he was started on prophylactic Ciprofloxacin. Labs were monitored on a daily basis, and he received electrolyte repletion and transfusional support as needed. Initially, his platelets were kelp at a goal of 40K for his history of amyloidosis, however post engraftment the platelets goal was kept above 15K. As of note Mr. Saba was started on Acyclovir prior \par to his admission, hence Acyclovir was continued. \par \par On 07/02/20, after pre-medication Mr. Saba received 316 mL of: Autologous, mobilized, plasma reduced, pooled, thawed, washed, HCP apheresis over \par approximately 1 hr. Cell counts as follows \par Total MNC( x10^8/kg)=8.32 \par CD34+cells ( x10^6 kg)=9.96 \par Cell Viability (%)=65 \par Mr. Saba tolerated the infusion well with no adverse resections noted. \par \par While admitted, he had pancytopenia related to high dose chemotherapy prep regiment. He also developed neutropenic fevers. When he became febrile, blood and urine cultures were sent and CXR completed. Ciprofloxacin was changed to cefepime for a broadened anti-microbial coverage. Infectious work up was negative with, negative blood/urine cultures and CXR showing clear lungs. \par \par He also had a drug induced rash secondary to Kepivance which resolved with no intervention. His hospital stay was also complicated by chemotherapy induced grade 1 intestinal mucositis with diarrhea, c. diff sample was sent off and resulted negative, his symptoms resolved with Imodium. Additionally, he experienced chemotherapy induced grade 1 GI mucositis with nausea, he was treated with anti-emetics as well as prn Carafate and Maalox, his symptoms then resolved. \par \par Early engraftment was noticed on 7/15, Zarxio was increased from 480 micrograms to 600 micrograms SQ daily. Cefepime was continued until post engraftment. On 7/18, given count recovery no fevers an negative infectious work up, negative blood/urine cultures and CXR with clear lungs, Cefepime was discontinued. \par Zarxio was discontinued on 7/19. Nasopharyngeal  Surveillance COVID swabs were preformed weekly and all resulted negative, with the most recent collected on 7/20. \par \par Currently, Mr. Saba is ready and stable for discharge with a follow up appointments at [facility].  [de-identified] : On 9/11/20 visit, day +71 of ASCT today. Overall feeling well. Energy is improved, currently working out at least 5 days per week. Reports adequate PO intake and hydration. Compliant with post transplant meds and restrictions. Denies fever, chills, headache, dizziness, blurred vision, mucositis/odynophagia, chest pain, SOB, cough, nausea/vomiting, diarrhea/constipation, abdominal pain, dysuria, LE edema, rash, bleeding, pain\par

## 2020-09-14 NOTE — ASSESSMENT
[FreeTextEntry1] : Mr. Saba is a 60 year old male with a medical history of amyloidosis, hepatitis C, dyspepsia, hematuria, colitis, Raynaud's phenomenon, and cutaneous mastocytosis. Status post autologous peripheral blood stem cell transplant on 7/2/20.\par \par 1) Amyloidosis\par S/p AUTO PBSCT on 7/2/20\par 8/10/20 ECHO: LV global wall motion is normal, LV ejection fraction is normal (65%). Aortic arch is normal in size. There is no significant valvular disease. The right atrial pressure is normal. There is no pulmonary hypertension. There is no pericardial effusion. Left pleural effusion is present.\par \par 2) Heme\par Thalassemia train\par Counts overall stable though slight drop in Hgb noted today, no indication for transfusion\par    9/11/20:   WBC 4.99   ANC 1.92   Hgb 9.5   \par Continue MV and folic acid daily\par Instructed not to exercise too aggressively as it may be a stressor on the body and affect counts \par \par 3) ID\par Continue ppx:\par - Atovaquone 750 mg BID\par - Acyclovir 400 mg TID\par Fluconazole discontinued 8/13/20\par Post transplant restrictions reviewed. As of 8/28/20, may eat takeout once a week from a reputable place \par \par Post transplant re-vaccination to start 1yt post transplant\par - 12 months post transplant vaccines - due July 2021\par - 14 months post transplant vaccines - due September 2021\par - 24 months post transplant vaccines - due July 2022\par \par 4) GI\par Continue ppx:\par Protonix discontinued 8/13/20\par PRN Zofran/Reglan for nausea/vomiting\par \par 5) Plan\par Pt educated regarding plan of care, all questions/concerns addressed\par Instructed to contact our office if develops fever or new/worsening symptoms\par F/u with NP on 9/22/20

## 2020-09-21 ENCOUNTER — OUTPATIENT (OUTPATIENT)
Dept: OUTPATIENT SERVICES | Facility: HOSPITAL | Age: 60
LOS: 1 days | Discharge: HOME | End: 2020-09-21

## 2020-09-21 DIAGNOSIS — E85.9 AMYLOIDOSIS, UNSPECIFIED: ICD-10-CM

## 2020-09-25 ENCOUNTER — RESULT REVIEW (OUTPATIENT)
Age: 60
End: 2020-09-25

## 2020-09-25 ENCOUNTER — APPOINTMENT (OUTPATIENT)
Dept: HEMATOLOGY ONCOLOGY | Facility: CLINIC | Age: 60
End: 2020-09-25
Payer: COMMERCIAL

## 2020-09-25 VITALS
SYSTOLIC BLOOD PRESSURE: 116 MMHG | DIASTOLIC BLOOD PRESSURE: 76 MMHG | RESPIRATION RATE: 16 BRPM | HEART RATE: 64 BPM | WEIGHT: 181 LBS | OXYGEN SATURATION: 100 % | BODY MASS INDEX: 27.12 KG/M2 | TEMPERATURE: 97.4 F

## 2020-09-25 LAB
BASOPHILS # BLD AUTO: 0.02 K/UL — SIGNIFICANT CHANGE UP (ref 0–0.2)
BASOPHILS NFR BLD AUTO: 0.5 % — SIGNIFICANT CHANGE UP (ref 0–2)
EOSINOPHIL # BLD AUTO: 0.04 K/UL — SIGNIFICANT CHANGE UP (ref 0–0.5)
EOSINOPHIL NFR BLD AUTO: 1 % — SIGNIFICANT CHANGE UP (ref 0–6)
HCT VFR BLD CALC: 29.8 % — LOW (ref 39–50)
HGB BLD-MCNC: 9.6 G/DL — LOW (ref 13–17)
IMM GRANULOCYTES NFR BLD AUTO: 0.2 % — SIGNIFICANT CHANGE UP (ref 0–1.5)
LYMPHOCYTES # BLD AUTO: 1.73 K/UL — SIGNIFICANT CHANGE UP (ref 1–3.3)
LYMPHOCYTES # BLD AUTO: 43 % — SIGNIFICANT CHANGE UP (ref 13–44)
MCHC RBC-ENTMCNC: 26 PG — LOW (ref 27–34)
MCHC RBC-ENTMCNC: 32.2 G/DL — SIGNIFICANT CHANGE UP (ref 32–36)
MCV RBC AUTO: 80.8 FL — SIGNIFICANT CHANGE UP (ref 80–100)
MONOCYTES # BLD AUTO: 0.44 K/UL — SIGNIFICANT CHANGE UP (ref 0–0.9)
MONOCYTES NFR BLD AUTO: 10.9 % — SIGNIFICANT CHANGE UP (ref 2–14)
NEUTROPHILS # BLD AUTO: 1.78 K/UL — LOW (ref 1.8–7.4)
NEUTROPHILS NFR BLD AUTO: 44.4 % — SIGNIFICANT CHANGE UP (ref 43–77)
NRBC # BLD: 0 /100 WBCS — SIGNIFICANT CHANGE UP (ref 0–0)
PLATELET # BLD AUTO: 110 K/UL — LOW (ref 150–400)
RBC # BLD: 3.69 M/UL — LOW (ref 4.2–5.8)
RBC # FLD: 16 % — HIGH (ref 10.3–14.5)
WBC # BLD: 4.02 K/UL — SIGNIFICANT CHANGE UP (ref 3.8–10.5)
WBC # FLD AUTO: 4.02 K/UL — SIGNIFICANT CHANGE UP (ref 3.8–10.5)

## 2020-09-25 PROCEDURE — 99214 OFFICE O/P EST MOD 30 MIN: CPT

## 2020-09-27 NOTE — HISTORY OF PRESENT ILLNESS
[de-identified] : 1 y/o male with a hx of Amyloidosis,  Hepatitis C,  dyspepsia, hematuria, rectal bleeding, and Raynaud's phenomenon, cutaneous mastocytosis, presents for an initial Auto PSCT consultation. He is referred by Dr. Joel Wells. \par \par He initially experienced joint pain in 2014 and managed sx with Advil. In 2016, LFT was elevated and was dx with Hepatitis C which was treated with Harvoni. Patient has a hx of hematuria and bloody stool. Biopsies of the colon revealed chronic colitis and erosion. He was pleased on steroids and Mesalamine and started Humira in Mid-August. After first dose of Humira, left knee swelled up ---alleviated by Advil. After second dose of Humira, ankles swelled up and steroids were prescribed. Patient was also placed on Famotidine for early satiety and dyspepsia. His renal function is now normal and no longer experiences bloody stools........Progression in dyspepsia led to an upper endoscopy with Dr Parks on 10/30/19. Biopsy of the second portion of the duodenum revealed amyloidosis confirmed with [place] red statin. IgM immunostain is positive in areas of amyloid deposition, with kappa LC predominance. The stomach was noted to have impaired distensibility on endoscopy.Renal function has been normal, with BUN 9/creatinine 0.9.Cystoscopy was performed which showed prominent blood vessels. \par \par On 06/29/20 Mr. Saba was admitted to [facility] for an for an autologous peripheral blood stem cell transplant with high dose Melphalan prep regimen for treatment of Amyloidosis. He had no complaint at the time of his admission. \par \par Upon admission, a TLC was placed in IR. Mr. Saba received IV hydration, pain management, anxiolysis, antiemetics, nutritional support, and antibacterial / antiviral / antifungal / GI / PCP and VOD (SOS) prophylaxis. When his ANC dropped below 1000, he was started on prophylactic Ciprofloxacin. Labs were monitored on a daily basis, and he received electrolyte repletion and transfusional support as needed. Initially, his platelets were kelp at a goal of 40K for his history of amyloidosis, however post engraftment the platelets goal was kept above 15K. As of note Mr. Saba was started on Acyclovir prior \par to his admission, hence Acyclovir was continued. \par \par On 07/02/20, after pre-medication Mr. Saba received 316 mL of: Autologous, mobilized, plasma reduced, pooled, thawed, washed, HCP apheresis over \par approximately 1 hr. Cell counts as follows \par Total MNC( x10^8/kg)=8.32 \par CD34+cells ( x10^6 kg)=9.96 \par Cell Viability (%)=65 \par Mr. Saba tolerated the infusion well with no adverse resections noted. \par \par While admitted, he had pancytopenia related to high dose chemotherapy prep regiment. He also developed neutropenic fevers. When he became febrile, blood and urine cultures were sent and CXR completed. Ciprofloxacin was changed to cefepime for a broadened anti-microbial coverage. Infectious work up was negative with, negative blood/urine cultures and CXR showing clear lungs. \par \par He also had a drug induced rash secondary to Kepivance which resolved with no intervention. His hospital stay was also complicated by chemotherapy induced grade 1 intestinal mucositis with diarrhea, c. diff sample was sent off and resulted negative, his symptoms resolved with Imodium. Additionally, he experienced chemotherapy induced grade 1 GI mucositis with nausea, he was treated with anti-emetics as well as prn Carafate and Maalox, his symptoms then resolved. \par \par Early engraftment was noticed on 7/15, Zarxio was increased from 480 micrograms to 600 micrograms SQ daily. Cefepime was continued until post engraftment. On 7/18, given count recovery no fevers an negative infectious work up, negative blood/urine cultures and CXR with clear lungs, Cefepime was discontinued. \par Zarxio was discontinued on 7/19. Nasopharyngeal  Surveillance COVID swabs were preformed weekly and all resulted negative, with the most recent collected on 7/20. \par \par Currently, Mr. Saba is ready and stable for discharge with a follow up appointments at [facility].  [de-identified] : On 9/25/20, day +85 of ASCT today. Overall feeling well. His mother passed away this past week, she had been on hospice for several months. Reports adequate PO intake and hydration. Continues to exercise 4-5 days/wk. No complaints. Compliant with post transplant meds and restrictions. Denies fever, chills, headache, dizziness, blurred vision, mucositis/odynophagia, chest pain, SOB, cough, nausea/vomiting, diarrhea/constipation, abdominal pain, dysuria, LE edema, rash, bleeding, pain\par

## 2020-09-27 NOTE — ASSESSMENT
[FreeTextEntry1] : Mr. Saba is a 60 year old male with a medical history of amyloidosis, hepatitis C, dyspepsia, hematuria, colitis, Raynaud's phenomenon, and cutaneous mastocytosis. Status post autologous peripheral blood stem cell transplant on 7/2/20.\par \par 1) Amyloidosis\par S/p AUTO PBSCT on 7/2/20\par 8/10/20 ECHO: LV global wall motion is normal, LV ejection fraction is normal (65%). Aortic arch is normal in size. There is no significant valvular disease. The right atrial pressure is normal. There is no pulmonary hypertension. There is no pericardial effusion. Left pleural effusion is present.\par Obtain PET scan 4mo post transplant, November 2020\par Will order pro-BNP for next visit per pt request \par \par 2) Heme\par Thalassemia train\par Stable anemia though mild drop in WBC and PLT noted today, no indication for transfusion\par    9/25/20:   WBC 4.02   ANC 1.78   Hgb 9.6   \par Continue MV and folic acid daily\par Monitor counts closely \par Instructed not to exercise too aggressively as it may be a stressor on the body and affect counts \par \par 3) ID\par Continue ppx:\par - Atovaquone 750 mg BID\par - Acyclovir 400 mg TID\par Fluconazole discontinued 8/13/20\par Post transplant restrictions reviewed. As of 8/28/20, may eat takeout once a week from a reputable place \par \par Post transplant re-vaccination to start 1yr post transplant\par - 12 months post transplant vaccines - due July 2021\par - 14 months post transplant vaccines - due September 2021\par - 24 months post transplant vaccines - due July 2022\par \par 4) GI\par Continue ppx:\par Protonix discontinued 8/13/20\par PRN Zofran/Reglan for nausea/vomiting\par \par 5) Plan\par Stable anemia with mild drop in WBC and PLT noted today, monitor counts closely \par Pt educated regarding plan of care, all questions/concerns addressed\par Instructed to contact our office if develops fever or new/worsening symptoms\par F/u with NP on 10/6/20

## 2020-09-27 NOTE — PHYSICAL EXAM
[Restricted in physically strenuous activity but ambulatory and able to carry out work of a light or sedentary nature] : Status 1- Restricted in physically strenuous activity but ambulatory and able to carry out work of a light or sedentary nature, e.g., light house work, office work [Normal] : affect appropriate [de-identified] : no edema [de-identified] : +BS; abdomen soft, non-distended, non-tender

## 2020-09-28 LAB
ALBUMIN SERPL ELPH-MCNC: 4 G/DL
ALP BLD-CCNC: 60 U/L
ALT SERPL-CCNC: 12 U/L
ANION GAP SERPL CALC-SCNC: 11 MMOL/L
AST SERPL-CCNC: 18 U/L
BILIRUB SERPL-MCNC: 0.4 MG/DL
BUN SERPL-MCNC: 13 MG/DL
CALCIUM SERPL-MCNC: 8.5 MG/DL
CHLORIDE SERPL-SCNC: 107 MMOL/L
CO2 SERPL-SCNC: 24 MMOL/L
CREAT SERPL-MCNC: 0.91 MG/DL
GLUCOSE SERPL-MCNC: 86 MG/DL
LDH SERPL-CCNC: 158 U/L
MAGNESIUM SERPL-MCNC: 2.1 MG/DL
POTASSIUM SERPL-SCNC: 4.8 MMOL/L
PROT SERPL-MCNC: 5.8 G/DL
SODIUM SERPL-SCNC: 142 MMOL/L

## 2020-10-06 ENCOUNTER — APPOINTMENT (OUTPATIENT)
Dept: HEMATOLOGY ONCOLOGY | Facility: CLINIC | Age: 60
End: 2020-10-06
Payer: COMMERCIAL

## 2020-10-06 ENCOUNTER — RESULT REVIEW (OUTPATIENT)
Age: 60
End: 2020-10-06

## 2020-10-06 VITALS
DIASTOLIC BLOOD PRESSURE: 79 MMHG | RESPIRATION RATE: 16 BRPM | SYSTOLIC BLOOD PRESSURE: 122 MMHG | TEMPERATURE: 97.1 F | BODY MASS INDEX: 27.22 KG/M2 | OXYGEN SATURATION: 100 % | WEIGHT: 181.66 LBS | HEART RATE: 62 BPM

## 2020-10-06 LAB
ALBUMIN SERPL ELPH-MCNC: 4.2 G/DL
ALP BLD-CCNC: 62 U/L
ALT SERPL-CCNC: 13 U/L
ANION GAP SERPL CALC-SCNC: 6 MMOL/L
AST SERPL-CCNC: 16 U/L
B2 MICROGLOB SERPL-MCNC: 2.8 MG/L
BASOPHILS # BLD AUTO: 0.02 K/UL — SIGNIFICANT CHANGE UP (ref 0–0.2)
BASOPHILS NFR BLD AUTO: 0.5 % — SIGNIFICANT CHANGE UP (ref 0–2)
BILIRUB SERPL-MCNC: 0.7 MG/DL
BUN SERPL-MCNC: 10 MG/DL
CALCIUM SERPL-MCNC: 8.7 MG/DL
CHLORIDE SERPL-SCNC: 108 MMOL/L
CO2 SERPL-SCNC: 27 MMOL/L
CREAT SERPL-MCNC: 0.86 MG/DL
EOSINOPHIL # BLD AUTO: 0.04 K/UL — SIGNIFICANT CHANGE UP (ref 0–0.5)
EOSINOPHIL NFR BLD AUTO: 1 % — SIGNIFICANT CHANGE UP (ref 0–6)
GLUCOSE SERPL-MCNC: 95 MG/DL
HCT VFR BLD CALC: 30.7 % — LOW (ref 39–50)
HGB BLD-MCNC: 9.8 G/DL — LOW (ref 13–17)
IMM GRANULOCYTES NFR BLD AUTO: 0.3 % — SIGNIFICANT CHANGE UP (ref 0–1.5)
LDH SERPL-CCNC: 167 U/L
LYMPHOCYTES # BLD AUTO: 2.23 K/UL — SIGNIFICANT CHANGE UP (ref 1–3.3)
LYMPHOCYTES # BLD AUTO: 56.7 % — HIGH (ref 13–44)
MAGNESIUM SERPL-MCNC: 2.1 MG/DL
MCHC RBC-ENTMCNC: 25.7 PG — LOW (ref 27–34)
MCHC RBC-ENTMCNC: 31.9 G/DL — LOW (ref 32–36)
MCV RBC AUTO: 80.4 FL — SIGNIFICANT CHANGE UP (ref 80–100)
MONOCYTES # BLD AUTO: 0.4 K/UL — SIGNIFICANT CHANGE UP (ref 0–0.9)
MONOCYTES NFR BLD AUTO: 10.2 % — SIGNIFICANT CHANGE UP (ref 2–14)
NEUTROPHILS # BLD AUTO: 1.23 K/UL — LOW (ref 1.8–7.4)
NEUTROPHILS NFR BLD AUTO: 31.3 % — LOW (ref 43–77)
NRBC # BLD: 0 /100 WBCS — SIGNIFICANT CHANGE UP (ref 0–0)
NT-PROBNP SERPL-MCNC: 357 PG/ML
PLATELET # BLD AUTO: 128 K/UL — LOW (ref 150–400)
POTASSIUM SERPL-SCNC: 5.1 MMOL/L
PROT SERPL-MCNC: 5.9 G/DL
RBC # BLD: 3.82 M/UL — LOW (ref 4.2–5.8)
RBC # FLD: 15.7 % — HIGH (ref 10.3–14.5)
SODIUM SERPL-SCNC: 141 MMOL/L
WBC # BLD: 3.93 K/UL — SIGNIFICANT CHANGE UP (ref 3.8–10.5)
WBC # FLD AUTO: 3.93 K/UL — SIGNIFICANT CHANGE UP (ref 3.8–10.5)

## 2020-10-06 PROCEDURE — 99212 OFFICE O/P EST SF 10 MIN: CPT

## 2020-10-09 NOTE — ASSESSMENT
[FreeTextEntry1] : Mr. Saba is a 60 year old male with a medical history of amyloidosis, hepatitis C, dyspepsia, hematuria, colitis, Raynaud's phenomenon, and cutaneous mastocytosis. Status post autologous peripheral blood stem cell transplant on 7/2/20.\par \par 1) Amyloidosis\par S/p AUTO PBSCT on 7/2/20\par 8/10/20 ECHO: LV global wall motion is normal, LV ejection fraction is normal (65%). Aortic arch is normal in size. There is no significant valvular disease. The right atrial pressure is normal. There is no pulmonary hypertension. There is no pericardial effusion. Left pleural effusion is present.\par Will post pone PET  CT scan as per Dr Roman\par Pro BNP, Immunoelectrophoresis sent on 10/6/20- follow up on results\par \par 2) Heme\par Thalassemia train\par Stable anemia though mild drop in WBC and PLT noted today, no indication for transfusion\par    10/6/20:  WBC 3.93   ANC 1.23   Hgb 9.8   \par Continue MV and folic acid daily\par Monitor counts closely\par \par 3) ID\par Continue ppx:\par - Atovaquone 750 mg BID\par - Acyclovir 400 mg TID\par Fluconazole discontinued 8/13/20\par Post transplant restrictions reviewed. As of 8/28/20, may eat takeout once a week from a reputable place \par \par Post transplant re-vaccination to start 1yr post transplant\par - 12 months post transplant vaccines - due July 2021\par - 14 months post transplant vaccines - due September 2021\par - 24 months post transplant vaccines - due July 2022\par \par 4) GI\par Continue ppx:\par Protonix discontinued 8/13/20\par PRN Zofran/Reglan for nausea/vomiting\par \par 5) Plan\par Stable anemia. Monitor counts closely \par Pt educated regarding plan of care, all questions/concerns addressed\par Instructed to contact our office if develops fever or new/worsening symptoms\par F/u with NP on 10/22/20

## 2020-10-09 NOTE — PHYSICAL EXAM
[Restricted in physically strenuous activity but ambulatory and able to carry out work of a light or sedentary nature] : Status 1- Restricted in physically strenuous activity but ambulatory and able to carry out work of a light or sedentary nature, e.g., light house work, office work [Normal] : affect appropriate [de-identified] : no edema [de-identified] : +BS; abdomen soft, non-distended, non-tender

## 2020-10-09 NOTE — HISTORY OF PRESENT ILLNESS
[de-identified] : 1 y/o male with a hx of Amyloidosis,  Hepatitis C,  dyspepsia, hematuria, rectal bleeding, and Raynaud's phenomenon, cutaneous mastocytosis, presents for an initial Auto PSCT consultation. He is referred by Dr. Joel Wells. \par \par He initially experienced joint pain in 2014 and managed sx with Advil. In 2016, LFT was elevated and was dx with Hepatitis C which was treated with Harvoni. Patient has a hx of hematuria and bloody stool. Biopsies of the colon revealed chronic colitis and erosion. He was pleased on steroids and Mesalamine and started Humira in Mid-August. After first dose of Humira, left knee swelled up ---alleviated by Advil. After second dose of Humira, ankles swelled up and steroids were prescribed. Patient was also placed on Famotidine for early satiety and dyspepsia. His renal function is now normal and no longer experiences bloody stools........Progression in dyspepsia led to an upper endoscopy with Dr Parks on 10/30/19. Biopsy of the second portion of the duodenum revealed amyloidosis confirmed with [place] red statin. IgM immunostain is positive in areas of amyloid deposition, with kappa LC predominance. The stomach was noted to have impaired distensibility on endoscopy.Renal function has been normal, with BUN 9/creatinine 0.9.Cystoscopy was performed which showed prominent blood vessels. \par \par On 06/29/20 Mr. Saba was admitted to [facility] for an for an autologous peripheral blood stem cell transplant with high dose Melphalan prep regimen for treatment of Amyloidosis. He had no complaint at the time of his admission. \par \par Upon admission, a TLC was placed in IR. Mr. Saba received IV hydration, pain management, anxiolysis, antiemetics, nutritional support, and antibacterial / antiviral / antifungal / GI / PCP and VOD (SOS) prophylaxis. When his ANC dropped below 1000, he was started on prophylactic Ciprofloxacin. Labs were monitored on a daily basis, and he received electrolyte repletion and transfusional support as needed. Initially, his platelets were kelp at a goal of 40K for his history of amyloidosis, however post engraftment the platelets goal was kept above 15K. As of note Mr. Saba was started on Acyclovir prior \par to his admission, hence Acyclovir was continued. \par \par On 07/02/20, after pre-medication Mr. Saba received 316 mL of: Autologous, mobilized, plasma reduced, pooled, thawed, washed, HCP apheresis over \par approximately 1 hr. Cell counts as follows \par Total MNC( x10^8/kg)=8.32 \par CD34+cells ( x10^6 kg)=9.96 \par Cell Viability (%)=65 \par Mr. Saba tolerated the infusion well with no adverse resections noted. \par \par While admitted, he had pancytopenia related to high dose chemotherapy prep regiment. He also developed neutropenic fevers. When he became febrile, blood and urine cultures were sent and CXR completed. Ciprofloxacin was changed to cefepime for a broadened anti-microbial coverage. Infectious work up was negative with, negative blood/urine cultures and CXR showing clear lungs. \par \par He also had a drug induced rash secondary to Kepivance which resolved with no intervention. His hospital stay was also complicated by chemotherapy induced grade 1 intestinal mucositis with diarrhea, c. diff sample was sent off and resulted negative, his symptoms resolved with Imodium. Additionally, he experienced chemotherapy induced grade 1 GI mucositis with nausea, he was treated with anti-emetics as well as prn Carafate and Maalox, his symptoms then resolved. \par \par Early engraftment was noticed on 7/15, Zarxio was increased from 480 micrograms to 600 micrograms SQ daily. Cefepime was continued until post engraftment. On 7/18, given count recovery no fevers an negative infectious work up, negative blood/urine cultures and CXR with clear lungs, Cefepime was discontinued. \par Zarxio was discontinued on 7/19. Nasopharyngeal  Surveillance COVID swabs were preformed weekly and all resulted negative, with the most recent collected on 7/20. \par \par Currently, Mr. Saba is ready and stable for discharge with a follow up appointments at [facility].  [de-identified] : On 9/25/20, day +85 of ASCT today. Overall feeling well. His mother passed away this past week, she had been on hospice for several months. Reports adequate PO intake and hydration. Continues to exercise 4-5 days/wk. No complaints. Compliant with post transplant meds and restrictions. Denies fever, chills, headache, dizziness, blurred vision, mucositis/odynophagia, chest pain, SOB, cough, nausea/vomiting, diarrhea/constipation, abdominal pain, dysuria, LE edema, rash, bleeding, pain\par \par On 10/6/20, patient presents for a follow up visit. Offers no acute concerns. Denies fever, chills, nausea, vomiting, diarrhea, rash, mouth sores, dysuria or any signs of active bleeding. Denies SOB, chest pain or B/L LE edema. Remains compliant with post transplant medications.

## 2020-10-16 LAB
ALBUMIN MFR SERPL ELPH: 64.3 %
ALBUMIN SERPL-MCNC: 3.8 G/DL
ALBUMIN/GLOB SERPL: 1.8 RATIO
ALPHA1 GLOB MFR SERPL ELPH: 3.8 %
ALPHA1 GLOB SERPL ELPH-MCNC: 0.2 G/DL
ALPHA2 GLOB MFR SERPL ELPH: 9.7 %
ALPHA2 GLOB SERPL ELPH-MCNC: 0.6 G/DL
B-GLOBULIN MFR SERPL ELPH: 8.3 %
B-GLOBULIN SERPL ELPH-MCNC: 0.5 G/DL
DEPRECATED KAPPA LC FREE/LAMBDA SER: 4.94 RATIO
GAMMA GLOB FLD ELPH-MCNC: 0.8 G/DL
GAMMA GLOB MFR SERPL ELPH: 13.9 %
IGA SER QL IEP: 67 MG/DL
IGG SER QL IEP: 937 MG/DL
IGM SER QL IEP: 26 MG/DL
INTERPRETATION SERPL IEP-IMP: NORMAL
KAPPA LC CSF-MCNC: 0.66 MG/DL
KAPPA LC SERPL-MCNC: 3.26 MG/DL
M PROTEIN MFR SERPL ELPH: NORMAL
M PROTEIN SPEC IFE-MCNC: NORMAL
MONOCLON BAND OBS SERPL: NORMAL
PROT SERPL-MCNC: 5.9 G/DL
PROT SERPL-MCNC: 5.9 G/DL

## 2020-10-17 ENCOUNTER — OUTPATIENT (OUTPATIENT)
Dept: OUTPATIENT SERVICES | Facility: HOSPITAL | Age: 60
LOS: 1 days | Discharge: HOME | End: 2020-10-17

## 2020-10-17 DIAGNOSIS — E85.9 AMYLOIDOSIS, UNSPECIFIED: ICD-10-CM

## 2020-10-17 NOTE — PROGRESS NOTE ADULT - SUBJECTIVE AND OBJECTIVE BOX
HPC Transplant Team                                                      Critical / Counseling Time Provided: 30 minutes                                                                                                                                                        Chief Complaint: Autologous pbsct with high dose melphalan prep regimen for treatment of amyloidosis    S: Patient seen and examined with HPC Transplant Team:   +generalized weakness   +occasional nausea   +loose stool    O: Vitals:   Vital Signs Last 24 Hrs  T(C): 37.1 (2020 06:00), Max: 37.5 (2020 17:20)  T(F): 98.8 (2020 06:00), Max: 99.5 (2020 17:20)  HR: 68 (2020 06:00) (68 - 89)  BP: 112/68 (2020 06:00) (100/58 - 116/70)  BP(mean): --  RR: 18 (2020 06:00) (18 - 18)  SpO2: 97% (2020 21:11) (97% - 99%)    Admit weight: 85.7kg  Daily Weight in k (2020 09:30)  Today's weight:    Intake / Output:    @ 07:01  -   @ 07:00  --------------------------------------------------------  IN: 1762.6 mL / OUT: 2800 mL / NET: -1037.4 mL    PE:   Oropharynx: no erythema or ulcerations   Oral Mucositis:               -                                         Grade: n/a  CVS: S1, S2 RRR   Lungs: CTA throughout bilaterally   Abdomen: + BS x 4, soft, NT, ND   Extremities: + mild chronic LE edema  Gastric Mucositis:      -                                            Grade: n/a  Intestinal Mucositis:    -                                          Grade: n/a  Skin: patchy erythematous maculopapular on rash posterior lower left back, anterior neck, anterior lower abdomen 2/2 [place]   TLC: CDI  Neuro: A&O x 3  Pain: denies     Labs:         141  |  107  |  11  ----------------------------<  92  3.9   |  25  |  0.64    Ca    8.1<L>      2020 06:53  Phos  4.0     07-07  Mg     2.0     -    TPro  4.9<L>  /  Alb  3.3  /  TBili  0.3  /  DBili  x   /  AST  15  /  ALT  14  /  AlkPhos  48  -    PT/INR - ( 2020 06:50 )   PT: 12.0 sec;   INR: 1.05 ratio         PTT - ( 2020 06:50 )  PTT:28.0 sec  LIVER FUNCTIONS - ( 2020 06:53 )  Alb: 3.3 g/dL / Pro: 4.9 g/dL / ALK PHOS: 48 U/L / ALT: 14 U/L / AST: 15 U/L / GGT: x           Lactate Dehydrogenase, Serum: 138 U/L ( @ 06:53)    Meds:   Antimicrobials:   acyclovir   Oral Tab/Cap 400 milliGRAM(s) Oral every 8 hours  ciprofloxacin     Tablet 500 milliGRAM(s) Oral every 12 hours  clotrimazole Lozenge 1 Lozenge Oral five times a day  fluconAZOLE   Tablet 400 milliGRAM(s) Oral daily      Heme / Onc:   heparin  Infusion 357 Unit(s)/Hr IV Continuous <Continuous>      GI:  magnesium hydroxide Suspension 30 milliLiter(s) Oral daily PRN  pantoprazole    Tablet 40 milliGRAM(s) Oral before breakfast  polyethylene glycol 3350 17 Gram(s) Oral daily PRN  senna 1 Tablet(s) Oral at bedtime PRN  sodium bicarbonate Mouth Rinse 10 milliLiter(s) Swish and Spit five times a day  ursodiol Capsule 300 milliGRAM(s) Oral two times a day with meals      Cardiovascular:   furosemide   Injectable 20 milliGRAM(s) IV Push every 24 hours PRN      Immunologic:   filgrastim-sndz (ZARXIO) Injectable 480 MICROGram(s) SubCutaneous every 24 hours      Other medications:   Biotene Dry Mouth Oral Rinse 5 milliLiter(s) Swish and Spit five times a day  chlorhexidine 4% Liquid 1 Application(s) Topical <User Schedule>  folic acid 1 milliGRAM(s) Oral daily  hydrocortisone 1% Cream 1 Application(s) Topical daily  lidocaine/prilocaine Cream 1 Application(s) Topical daily  LORazepam   Injectable 1 milliGRAM(s) IV Push every 24 hours  multivitamin 1 Tablet(s) Oral daily  sodium chloride 0.9%. 1000 milliLiter(s) IV Continuous <Continuous>      PRN:   acetaminophen   Tablet .. 650 milliGRAM(s) Oral every 6 hours PRN  acetaminophen   Tablet .. 650 milliGRAM(s) Oral every 6 hours PRN  AQUAPHOR (petrolatum Ointment) 1 Application(s) Topical two times a day PRN  diphenhydrAMINE 25 milliGRAM(s) Oral once PRN  diphenhydrAMINE   Injectable 25 milliGRAM(s) IV Push every 4 hours PRN  furosemide   Injectable 20 milliGRAM(s) IV Push every 24 hours PRN  magnesium hydroxide Suspension 30 milliLiter(s) Oral daily PRN  metoclopramide Injectable 10 milliGRAM(s) IV Push every 6 hours PRN  polyethylene glycol 3350 17 Gram(s) Oral daily PRN  senna 1 Tablet(s) Oral at bedtime PRN  sodium chloride 0.9% lock flush 10 milliLiter(s) IV Push every 1 hour PRN    A/P:   59 year old male with a history of amyloidosis  Post Autologous PBSCT day + 5  Strict I&O, prn diuresis, daily weights   Keep platelets =/> 40K for history of amyloidosis   Neutropenic 20- started on cipro, if T >/= 38C, pan cx, CXR and change cipro to cefepime 2g IV q 8 hours     1. Infectious Disease:   acyclovir   Oral Tab/Cap 400 milliGRAM(s) Oral every 8 hours  ciprofloxacin     Tablet 500 milliGRAM(s) Oral every 12 hours  clotrimazole Lozenge 1 Lozenge Oral five times a day  fluconAZOLE   Tablet 400 milliGRAM(s) Oral daily    2. VOD Prophylaxis: Actigall, Glutamine, Heparin (dosed at 100 units / kg / day)     3. GI Prophylaxis:    pantoprazole    Tablet 40 milliGRAM(s) Oral before breakfast    4. Mouthcare - NS / NaHCO3 rinses, Mycelex, Biotene; Skin care     5. GVHD prophylaxis - n/a     6. Transfuse & replete electrolytes prn     7. IV hydration, daily weights, strict I&O, prn diuresis     8. PO intake as tolerated, nutrition follow up as needed, MVI, folic acid     9. Antiemetics, anti-diarrhea medications:   metoclopramide Injectable 10 milliGRAM(s) IV Push every 6 hours PRN  LORazepam   Injectable 1 milliGRAM(s) IV Push every 24 hours    10. OOB as tolerated, physical therapy consult if needed     11. Monitor coags / fibrinogen 2x week, vitamin K as needed     12. Monitor closely for clinical changes, monitor for fevers     13. Emotional support provided, plan of care discussed and questions addressed     14. Patient education done regarding plan of care, restrictions and discharge planning     15. Continue regular social work input     I have written the above note for Dr. Loja who performed service with me in the room.   Brinda Garrett NP-C ([phone])    I have seen and examined patient with NP, I agree with above note as scribed. HPC Transplant Team                                                      Critical / Counseling Time Provided: 30 minutes                                                                                                                                                        Chief Complaint: Autologous pbsct with high dose melphalan prep regimen for treatment of amyloidosis    S: Patient seen and examined with HPC Transplant Team:   +generalized weakness   +occasional nausea   +loose stool    O: Vitals:   Vital Signs Last 24 Hrs  T(C): 37.1 (2020 06:00), Max: 37.5 (2020 17:20)  T(F): 98.8 (2020 06:00), Max: 99.5 (2020 17:20)  HR: 68 (2020 06:00) (68 - 89)  BP: 112/68 (2020 06:00) (100/58 - 116/70)  BP(mean): --  RR: 18 (2020 06:00) (18 - 18)  SpO2: 97% (2020 21:11) (97% - 99%)    Admit weight: 85.7kg  Daily Weight in k (2020 09:30)  Today's weight:    Intake / Output:   06 @ 07:01  -  -07 @ 07:00  --------------------------------------------------------  IN: 1762.6 mL / OUT: 2800 mL / NET: -1037.4 mL    PE:   Oropharynx: no erythema or ulcerations   Oral Mucositis:               -                                         Grade: n/a  CVS: S1, S2 RRR   Lungs: CTA throughout bilaterally   Abdomen: + BS x 4, soft, NT, ND   Extremities: + mild chronic LE edema  Gastric Mucositis:      -                                            Grade: n/a  Intestinal Mucositis:    -                                          Grade: n/a  Skin: patchy erythematous maculopapular on rash posterior lower left back, anterior neck, anterior lower abdomen 2/2 [place]   TLC: CDI  Neuro: A&O x 3  Pain: denies     Labs:                         7.4    <0.10 )-----------( 38       ( 2020 06:53 )             23.0       07-07    141  |  107  |  11  ----------------------------<  92  3.9   |  25  |  0.64    Ca    8.1<L>      2020 06:53  Phos  4.0     07-  Mg     2.0     07-07    TPro  4.9<L>  /  Alb  3.3  /  TBili  0.3  /  DBili  x   /  AST  15  /  ALT  14  /  AlkPhos  48  07-07    PT/INR - ( 2020 06:50 )   PT: 12.0 sec;   INR: 1.05 ratio         PTT - ( 2020 06:50 )  PTT:28.0 sec  LIVER FUNCTIONS - ( 2020 06:53 )  Alb: 3.3 g/dL / Pro: 4.9 g/dL / ALK PHOS: 48 U/L / ALT: 14 U/L / AST: 15 U/L / GGT: x           Lactate Dehydrogenase, Serum: 138 U/L ( @ 06:53)    Meds:   Antimicrobials:   acyclovir   Oral Tab/Cap 400 milliGRAM(s) Oral every 8 hours  ciprofloxacin     Tablet 500 milliGRAM(s) Oral every 12 hours  clotrimazole Lozenge 1 Lozenge Oral five times a day  fluconAZOLE   Tablet 400 milliGRAM(s) Oral daily      Heme / Onc:   heparin  Infusion 357 Unit(s)/Hr IV Continuous <Continuous>      GI:  magnesium hydroxide Suspension 30 milliLiter(s) Oral daily PRN  pantoprazole    Tablet 40 milliGRAM(s) Oral before breakfast  polyethylene glycol 3350 17 Gram(s) Oral daily PRN  senna 1 Tablet(s) Oral at bedtime PRN  sodium bicarbonate Mouth Rinse 10 milliLiter(s) Swish and Spit five times a day  ursodiol Capsule 300 milliGRAM(s) Oral two times a day with meals      Cardiovascular:   furosemide   Injectable 20 milliGRAM(s) IV Push every 24 hours PRN      Immunologic:   filgrastim-sndz (ZARXIO) Injectable 480 MICROGram(s) SubCutaneous every 24 hours      Other medications:   Biotene Dry Mouth Oral Rinse 5 milliLiter(s) Swish and Spit five times a day  chlorhexidine 4% Liquid 1 Application(s) Topical <User Schedule>  folic acid 1 milliGRAM(s) Oral daily  hydrocortisone 1% Cream 1 Application(s) Topical daily  lidocaine/prilocaine Cream 1 Application(s) Topical daily  LORazepam   Injectable 1 milliGRAM(s) IV Push every 24 hours  multivitamin 1 Tablet(s) Oral daily  sodium chloride 0.9%. 1000 milliLiter(s) IV Continuous <Continuous>      PRN:   acetaminophen   Tablet .. 650 milliGRAM(s) Oral every 6 hours PRN  acetaminophen   Tablet .. 650 milliGRAM(s) Oral every 6 hours PRN  AQUAPHOR (petrolatum Ointment) 1 Application(s) Topical two times a day PRN  diphenhydrAMINE 25 milliGRAM(s) Oral once PRN  diphenhydrAMINE   Injectable 25 milliGRAM(s) IV Push every 4 hours PRN  furosemide   Injectable 20 milliGRAM(s) IV Push every 24 hours PRN  magnesium hydroxide Suspension 30 milliLiter(s) Oral daily PRN  metoclopramide Injectable 10 milliGRAM(s) IV Push every 6 hours PRN  polyethylene glycol 3350 17 Gram(s) Oral daily PRN  senna 1 Tablet(s) Oral at bedtime PRN  sodium chloride 0.9% lock flush 10 milliLiter(s) IV Push every 1 hour PRN    A/P:   59 year old male with a history of amyloidosis  Post Autologous PBSCT day + 5  Strict I&O, prn diuresis, daily weights   Keep platelets =/> 40K for history of amyloidosis   Neutropenic 20- started on cipro, if T >/= 38C, pan cx, CXR and change cipro to cefepime 2g IV q 8 hours     1. Infectious Disease:   acyclovir   Oral Tab/Cap 400 milliGRAM(s) Oral every 8 hours  ciprofloxacin     Tablet 500 milliGRAM(s) Oral every 12 hours  clotrimazole Lozenge 1 Lozenge Oral five times a day  fluconAZOLE   Tablet 400 milliGRAM(s) Oral daily    2. VOD Prophylaxis: Actigall, Glutamine, Heparin (dosed at 100 units / kg / day)     3. GI Prophylaxis:    pantoprazole    Tablet 40 milliGRAM(s) Oral before breakfast    4. Mouthcare - NS / NaHCO3 rinses, Mycelex, Biotene; Skin care     5. GVHD prophylaxis - n/a     6. Transfuse & replete electrolytes prn     7. IV hydration, daily weights, strict I&O, prn diuresis     8. PO intake as tolerated, nutrition follow up as needed, MVI, folic acid     9. Antiemetics, anti-diarrhea medications:   metoclopramide Injectable 10 milliGRAM(s) IV Push every 6 hours PRN  LORazepam   Injectable 1 milliGRAM(s) IV Push every 24 hours    10. OOB as tolerated, physical therapy consult if needed     11. Monitor coags / fibrinogen 2x week, vitamin K as needed     12. Monitor closely for clinical changes, monitor for fevers     13. Emotional support provided, plan of care discussed and questions addressed     14. Patient education done regarding plan of care, restrictions and discharge planning     15. Continue regular social work input     I have written the above note for Dr. Loja who performed service with me in the room.   Brinda Garrett NP-C ([phone])    I have seen and examined patient with NP, I agree with above note as scribed. HPC Transplant Team                                                      Critical / Counseling Time Provided: 30 minutes                                                                                                                                                        Chief Complaint: Autologous pbsct with high dose melphalan prep regimen for treatment of amyloidosis    S: Patient seen and examined with HPC Transplant Team:   +generalized weakness   +occasional nausea   +loose stool    Denies: mouth/throat pain, dyspnea, cough, abdominal pain, vomiting, diarrhea, dizziness     O: Vitals:   Vital Signs Last 24 Hrs  T(C): 37.1 (2020 06:00), Max: 37.5 (2020 17:20)  T(F): 98.8 (2020 06:00), Max: 99.5 (2020 17:20)  HR: 68 (2020 06:00) (68 - 89)  BP: 112/68 (2020 06:00) (100/58 - 116/70)  BP(mean): --  RR: 18 (2020 06:00) (18 - 18)  SpO2: 97% (2020 21:11) (97% - 99%)    Admit weight: 85.7kg  Daily Weight in k (2020 09:30)  Today's weight:83.5 kg     Intake / Output:    @ 07:01  -  -07 @ 07:00  --------------------------------------------------------  IN: 1762.6 mL / OUT: 2800 mL / NET: -1037.4 mL    PE:   Oropharynx: no erythema or ulcerations   Oral Mucositis:               -                                         Grade: n/a  CVS: S1, S2 RRR   Lungs: CTA throughout bilaterally   Abdomen: + BS x 4, soft, NT, ND   Extremities: + mild chronic LE edema  Gastric Mucositis:      -                                            Grade: n/a  Intestinal Mucositis:    -                                          Grade: n/a  Skin: patchy erythematous maculopapular on rash posterior lower left back, anterior neck, anterior lower abdomen 2/2 [place]   TLC: CDI  Neuro: A&O x 3  Pain: denies     Labs:                         7.4    <0.10 )-----------( 38       ( 2020 06:53 )             23.0       07-07    141  |  107  |  11  ----------------------------<  92  3.9   |  25  |  0.64    Ca    8.1<L>      2020 06:53  Phos  4.0     -  Mg     2.0         TPro  4.9<L>  /  Alb  3.3  /  TBili  0.3  /  DBili  x   /  AST  15  /  ALT  14  /  AlkPhos  48  -    PT/INR - ( 2020 06:50 )   PT: 12.0 sec;   INR: 1.05 ratio         PTT - ( 2020 06:50 )  PTT:28.0 sec  LIVER FUNCTIONS - ( 2020 06:53 )  Alb: 3.3 g/dL / Pro: 4.9 g/dL / ALK PHOS: 48 U/L / ALT: 14 U/L / AST: 15 U/L / GGT: x           Lactate Dehydrogenase, Serum: 138 U/L ( @ 06:53)    Meds:   Antimicrobials:   acyclovir   Oral Tab/Cap 400 milliGRAM(s) Oral every 8 hours  ciprofloxacin     Tablet 500 milliGRAM(s) Oral every 12 hours  clotrimazole Lozenge 1 Lozenge Oral five times a day  fluconAZOLE   Tablet 400 milliGRAM(s) Oral daily      Heme / Onc:   heparin  Infusion 357 Unit(s)/Hr IV Continuous <Continuous>      GI:  magnesium hydroxide Suspension 30 milliLiter(s) Oral daily PRN  pantoprazole    Tablet 40 milliGRAM(s) Oral before breakfast  polyethylene glycol 3350 17 Gram(s) Oral daily PRN  senna 1 Tablet(s) Oral at bedtime PRN  sodium bicarbonate Mouth Rinse 10 milliLiter(s) Swish and Spit five times a day  ursodiol Capsule 300 milliGRAM(s) Oral two times a day with meals      Cardiovascular:   furosemide   Injectable 20 milliGRAM(s) IV Push every 24 hours PRN      Immunologic:   filgrastim-sndz (ZARXIO) Injectable 480 MICROGram(s) SubCutaneous every 24 hours      Other medications:   Biotene Dry Mouth Oral Rinse 5 milliLiter(s) Swish and Spit five times a day  chlorhexidine 4% Liquid 1 Application(s) Topical <User Schedule>  folic acid 1 milliGRAM(s) Oral daily  hydrocortisone 1% Cream 1 Application(s) Topical daily  lidocaine/prilocaine Cream 1 Application(s) Topical daily  LORazepam   Injectable 1 milliGRAM(s) IV Push every 24 hours  multivitamin 1 Tablet(s) Oral daily  sodium chloride 0.9%. 1000 milliLiter(s) IV Continuous <Continuous>      PRN:   acetaminophen   Tablet .. 650 milliGRAM(s) Oral every 6 hours PRN  acetaminophen   Tablet .. 650 milliGRAM(s) Oral every 6 hours PRN  AQUAPHOR (petrolatum Ointment) 1 Application(s) Topical two times a day PRN  diphenhydrAMINE 25 milliGRAM(s) Oral once PRN  diphenhydrAMINE   Injectable 25 milliGRAM(s) IV Push every 4 hours PRN  furosemide   Injectable 20 milliGRAM(s) IV Push every 24 hours PRN  magnesium hydroxide Suspension 30 milliLiter(s) Oral daily PRN  metoclopramide Injectable 10 milliGRAM(s) IV Push every 6 hours PRN  polyethylene glycol 3350 17 Gram(s) Oral daily PRN  senna 1 Tablet(s) Oral at bedtime PRN  sodium chloride 0.9% lock flush 10 milliLiter(s) IV Push every 1 hour PRN    A/P:   59 year old male with a history of amyloidosis  Post Autologous PBSCT day + 5  Strict I&O, prn diuresis, daily weights   Keep platelets =/> 40K for history of amyloidosis   Neutropenic 20- started on cipro, if T >/= 38C, pan cx, CXR and change cipro to cefepime 2g IV q 8 hours     1. Infectious Disease:   acyclovir   Oral Tab/Cap 400 milliGRAM(s) Oral every 8 hours  ciprofloxacin     Tablet 500 milliGRAM(s) Oral every 12 hours  clotrimazole Lozenge 1 Lozenge Oral five times a day  fluconAZOLE   Tablet 400 milliGRAM(s) Oral daily    2. VOD Prophylaxis: Actigall, Glutamine, Heparin (dosed at 100 units / kg / day)     3. GI Prophylaxis:    pantoprazole    Tablet 40 milliGRAM(s) Oral before breakfast    4. Mouthcare - NS / NaHCO3 rinses, Mycelex, Biotene; Skin care     5. GVHD prophylaxis - n/a     6. Transfuse & replete electrolytes prn     7. IV hydration, daily weights, strict I&O, prn diuresis     8. PO intake as tolerated, nutrition follow up as needed, MVI, folic acid     9. Antiemetics, anti-diarrhea medications:   metoclopramide Injectable 10 milliGRAM(s) IV Push every 6 hours PRN  LORazepam   Injectable 1 milliGRAM(s) IV Push every 24 hours    10. OOB as tolerated, physical therapy consult if needed     11. Monitor coags / fibrinogen 2x week, vitamin K as needed     12. Monitor closely for clinical changes, monitor for fevers     13. Emotional support provided, plan of care discussed and questions addressed     14. Patient education done regarding plan of care, restrictions and discharge planning     15. Continue regular social work input     I have written the above note for Dr. Loja who performed service with me in the room.   Brinda Garrett NP-C   Kandice Woodward PABRANDAN  ([phone])    I have seen and examined patient with NP/PA, I agree with above note as scribed. No Exposure HPC Transplant Team                                                      Critical / Counseling Time Provided: 30 minutes                                                                                                                                                        Chief Complaint: Autologous pbsct with high dose melphalan prep regimen for treatment of amyloidosis    S: Patient seen and examined with HPC Transplant Team:   +generalized weakness   +occasional nausea   +loose stool    Denies: mouth/throat pain, dyspnea, cough, abdominal pain, vomiting, diarrhea, dizziness     O: Vitals:   Vital Signs Last 24 Hrs  T(C): 37.1 (2020 06:00), Max: 37.5 (2020 17:20)  T(F): 98.8 (2020 06:00), Max: 99.5 (2020 17:20)  HR: 68 (2020 06:00) (68 - 89)  BP: 112/68 (2020 06:00) (100/58 - 116/70)  BP(mean): --  RR: 18 (2020 06:00) (18 - 18)  SpO2: 97% (2020 21:11) (97% - 99%)    Admit weight: 85.7kg  Daily Weight in k (2020 09:30)  Today's weight:83.5 kg     Intake / Output:    @ 07:01  -  -07 @ 07:00  --------------------------------------------------------  IN: 1762.6 mL / OUT: 2800 mL / NET: -1037.4 mL    PE:   Oropharynx: no erythema or ulcerations   Oral Mucositis:               -                                         Grade: n/a  CVS: S1, S2 RRR   Lungs: CTA throughout bilaterally   Abdomen: + BS x 4, soft, NT, ND   Extremities: + mild chronic LE edema  Gastric Mucositis:      -                                            Grade: n/a  Intestinal Mucositis:    -                                          Grade: n/a  Skin: patchy erythematous maculopapular rash on back, neck, abdomen 2/2 [place]   TLC: CDI  Neuro: A&O x 3  Pain: denies     Labs:                         7.4    <0.10 )-----------( 38       ( 2020 06:53 )             23.0       07-07    141  |  107  |  11  ----------------------------<  92  3.9   |  25  |  0.64    Ca    8.1<L>      2020 06:53  Phos  4.0     -  Mg     2.0     -    TPro  4.9<L>  /  Alb  3.3  /  TBili  0.3  /  DBili  x   /  AST  15  /  ALT  14  /  AlkPhos  48  07-    PT/INR - ( 2020 06:50 )   PT: 12.0 sec;   INR: 1.05 ratio         PTT - ( 2020 06:50 )  PTT:28.0 sec  LIVER FUNCTIONS - ( 2020 06:53 )  Alb: 3.3 g/dL / Pro: 4.9 g/dL / ALK PHOS: 48 U/L / ALT: 14 U/L / AST: 15 U/L / GGT: x           Lactate Dehydrogenase, Serum: 138 U/L ( @ 06:53)    Meds:   Antimicrobials:   acyclovir   Oral Tab/Cap 400 milliGRAM(s) Oral every 8 hours  ciprofloxacin     Tablet 500 milliGRAM(s) Oral every 12 hours  clotrimazole Lozenge 1 Lozenge Oral five times a day  fluconAZOLE   Tablet 400 milliGRAM(s) Oral daily      Heme / Onc:   heparin  Infusion 357 Unit(s)/Hr IV Continuous <Continuous>      GI:  magnesium hydroxide Suspension 30 milliLiter(s) Oral daily PRN  pantoprazole    Tablet 40 milliGRAM(s) Oral before breakfast  polyethylene glycol 3350 17 Gram(s) Oral daily PRN  senna 1 Tablet(s) Oral at bedtime PRN  sodium bicarbonate Mouth Rinse 10 milliLiter(s) Swish and Spit five times a day  ursodiol Capsule 300 milliGRAM(s) Oral two times a day with meals      Cardiovascular:   furosemide   Injectable 20 milliGRAM(s) IV Push every 24 hours PRN      Immunologic:   filgrastim-sndz (ZARXIO) Injectable 480 MICROGram(s) SubCutaneous every 24 hours      Other medications:   Biotene Dry Mouth Oral Rinse 5 milliLiter(s) Swish and Spit five times a day  chlorhexidine 4% Liquid 1 Application(s) Topical <User Schedule>  folic acid 1 milliGRAM(s) Oral daily  hydrocortisone 1% Cream 1 Application(s) Topical daily  lidocaine/prilocaine Cream 1 Application(s) Topical daily  LORazepam   Injectable 1 milliGRAM(s) IV Push every 24 hours  multivitamin 1 Tablet(s) Oral daily  sodium chloride 0.9%. 1000 milliLiter(s) IV Continuous <Continuous>      PRN:   acetaminophen   Tablet .. 650 milliGRAM(s) Oral every 6 hours PRN  acetaminophen   Tablet .. 650 milliGRAM(s) Oral every 6 hours PRN  AQUAPHOR (petrolatum Ointment) 1 Application(s) Topical two times a day PRN  diphenhydrAMINE 25 milliGRAM(s) Oral once PRN  diphenhydrAMINE   Injectable 25 milliGRAM(s) IV Push every 4 hours PRN  furosemide   Injectable 20 milliGRAM(s) IV Push every 24 hours PRN  magnesium hydroxide Suspension 30 milliLiter(s) Oral daily PRN  metoclopramide Injectable 10 milliGRAM(s) IV Push every 6 hours PRN  polyethylene glycol 3350 17 Gram(s) Oral daily PRN  senna 1 Tablet(s) Oral at bedtime PRN  sodium chloride 0.9% lock flush 10 milliLiter(s) IV Push every 1 hour PRN    A/P:   59 year old male with a history of amyloidosis  Post Autologous PBSCT day + 5  Strict I&O, prn diuresis, daily weights   Keep platelets =/> 40K for history of amyloidosis   Neutropenic 20- started on cipro, if T >/= 38C, pan cx, CXR and change cipro to cefepime 2g IV q 8 hours     1. Infectious Disease:   acyclovir   Oral Tab/Cap 400 milliGRAM(s) Oral every 8 hours  ciprofloxacin     Tablet 500 milliGRAM(s) Oral every 12 hours  clotrimazole Lozenge 1 Lozenge Oral five times a day  fluconAZOLE   Tablet 400 milliGRAM(s) Oral daily    2. VOD Prophylaxis: Actigall, Glutamine, Heparin (dosed at 100 units / kg / day)     3. GI Prophylaxis:    pantoprazole    Tablet 40 milliGRAM(s) Oral before breakfast    4. Mouth care - NS / NaHCO3 rinses, Mycelex, Biotene; Skin care     5. GVHD prophylaxis - n/a     6. Transfuse & replete electrolytes prn     7. IV hydration, daily weights, strict I&O, prn diuresis     8. PO intake as tolerated, nutrition follow up as needed, MVI, folic acid     9. Antiemetics, anti-diarrhea medications:   metoclopramide Injectable 10 milliGRAM(s) IV Push every 6 hours PRN  LORazepam   Injectable 1 milliGRAM(s) IV Push every 24 hours    10. OOB as tolerated, physical therapy consult if needed     11. Monitor coags / fibrinogen 2x week, vitamin K as needed     12. Monitor closely for clinical changes, monitor for fevers     13. Emotional support provided, plan of care discussed and questions addressed     14. Patient education done regarding plan of care, restrictions and discharge planning     15. Continue regular social work input     I have written the above note for Dr. Loja who performed service with me in the room.   Brinda Garrett NP-C   Kandice Woodward PABRANADN  ([phone])    I have seen and examined patient with NP/PA, I agree with above note as scribed.

## 2020-10-22 ENCOUNTER — APPOINTMENT (OUTPATIENT)
Dept: HEMATOLOGY ONCOLOGY | Facility: CLINIC | Age: 60
End: 2020-10-22
Payer: COMMERCIAL

## 2020-10-22 ENCOUNTER — RESULT REVIEW (OUTPATIENT)
Age: 60
End: 2020-10-22

## 2020-10-22 VITALS
DIASTOLIC BLOOD PRESSURE: 78 MMHG | HEART RATE: 71 BPM | SYSTOLIC BLOOD PRESSURE: 132 MMHG | WEIGHT: 179.65 LBS | RESPIRATION RATE: 16 BRPM | BODY MASS INDEX: 26.92 KG/M2 | TEMPERATURE: 98.1 F | OXYGEN SATURATION: 98 %

## 2020-10-22 LAB
ALBUMIN SERPL ELPH-MCNC: 4.2 G/DL
ALP BLD-CCNC: 65 U/L
ALT SERPL-CCNC: 11 U/L
ANION GAP SERPL CALC-SCNC: 12 MMOL/L
AST SERPL-CCNC: 13 U/L
BASOPHILS # BLD AUTO: 0.01 K/UL — SIGNIFICANT CHANGE UP (ref 0–0.2)
BASOPHILS NFR BLD AUTO: 0.2 % — SIGNIFICANT CHANGE UP (ref 0–2)
BILIRUB SERPL-MCNC: 0.4 MG/DL
BUN SERPL-MCNC: 15 MG/DL
CALCIUM SERPL-MCNC: 8.8 MG/DL
CHLORIDE SERPL-SCNC: 104 MMOL/L
CO2 SERPL-SCNC: 24 MMOL/L
CREAT SERPL-MCNC: 0.86 MG/DL
EOSINOPHIL # BLD AUTO: 0.04 K/UL — SIGNIFICANT CHANGE UP (ref 0–0.5)
EOSINOPHIL NFR BLD AUTO: 0.8 % — SIGNIFICANT CHANGE UP (ref 0–6)
GLUCOSE SERPL-MCNC: 96 MG/DL
HCT VFR BLD CALC: 32.8 % — LOW (ref 39–50)
HGB BLD-MCNC: 10.6 G/DL — LOW (ref 13–17)
IMM GRANULOCYTES NFR BLD AUTO: 0.2 % — SIGNIFICANT CHANGE UP (ref 0–1.5)
LDH SERPL-CCNC: 154 U/L
LYMPHOCYTES # BLD AUTO: 2.96 K/UL — SIGNIFICANT CHANGE UP (ref 1–3.3)
LYMPHOCYTES # BLD AUTO: 59.1 % — HIGH (ref 13–44)
MAGNESIUM SERPL-MCNC: 2 MG/DL
MCHC RBC-ENTMCNC: 25.9 PG — LOW (ref 27–34)
MCHC RBC-ENTMCNC: 32.3 G/DL — SIGNIFICANT CHANGE UP (ref 32–36)
MCV RBC AUTO: 80.2 FL — SIGNIFICANT CHANGE UP (ref 80–100)
MONOCYTES # BLD AUTO: 0.5 K/UL — SIGNIFICANT CHANGE UP (ref 0–0.9)
MONOCYTES NFR BLD AUTO: 10 % — SIGNIFICANT CHANGE UP (ref 2–14)
NEUTROPHILS # BLD AUTO: 1.49 K/UL — LOW (ref 1.8–7.4)
NEUTROPHILS NFR BLD AUTO: 29.7 % — LOW (ref 43–77)
NRBC # BLD: 0 /100 WBCS — SIGNIFICANT CHANGE UP (ref 0–0)
PLATELET # BLD AUTO: 109 K/UL — LOW (ref 150–400)
POTASSIUM SERPL-SCNC: 4.6 MMOL/L
PROT SERPL-MCNC: 6 G/DL
RBC # BLD: 4.09 M/UL — LOW (ref 4.2–5.8)
RBC # FLD: 14.6 % — HIGH (ref 10.3–14.5)
SODIUM SERPL-SCNC: 139 MMOL/L
WBC # BLD: 5.01 K/UL — SIGNIFICANT CHANGE UP (ref 3.8–10.5)
WBC # FLD AUTO: 5.01 K/UL — SIGNIFICANT CHANGE UP (ref 3.8–10.5)

## 2020-10-22 PROCEDURE — 99214 OFFICE O/P EST MOD 30 MIN: CPT

## 2020-10-22 NOTE — ASSESSMENT
[FreeTextEntry1] : Mr. Saba is a 60 year old male with a medical history of amyloidosis, hepatitis C, dyspepsia, hematuria, colitis, Raynaud's phenomenon, and cutaneous mastocytosis. Status post autologous peripheral blood stem cell transplant on 7/2/20.\par \par 1) Amyloidosis\par S/p AUTO PBSCT on 7/2/20\par 8/10/20 ECHO: LV global wall motion is normal, LV ejection fraction is normal (65%). Aortic arch is normal in size. There is no significant valvular disease. The right atrial pressure is normal. There is no pulmonary hypertension. There is no pericardial effusion. Left pleural effusion is present.\par Will post pone PET  CT scan as per Dr Roman\par Pro BNP, Immunoelectrophoresis sent on 10/6/20\par \par 2) Heme\par Thalassemia train\par Stable anemia though mild drop in PLT noted today, no indication for transfusion\par    10/22/20:  WBC 5.01   ANC 1.49  Hgb 10.6  \par Continue MV and folic acid daily\par Monitor counts closely\par \par 3) ID\par Continue ppx:\par - Atovaquone 750 mg BID\par - Acyclovir 400 mg TID\par Fluconazole discontinued 8/13/20\par Post transplant restrictions reviewed. As of 8/28/20, may eat takeout once a week from a reputable place \par \par Post transplant re-vaccination to start 1yr post transplant\par - 12 months post transplant vaccines - due July 2021\par - 14 months post transplant vaccines - due September 2021\par - 24 months post transplant vaccines - due July 2022\par -Will schedule flu vaccine on 11/6/20\par \par 4) GI\par Continue ppx:\par Protonix discontinued 8/13/20\par PRN Zofran/Reglan for nausea/vomiting\par \par 5) Plan\par Stable anemia. Monitor counts closely \par Pt educated regarding plan of care, all questions/concerns addressed\par Instructed to contact our office if develops fever or new/worsening symptoms\par Will schedule flu vaccine on 11/6/20.\par F/U CMP, LDH, MG, Hep C PCR\par F/u with NP on 11/6/20\par F/U with MD Roman on 11/18/20

## 2020-10-22 NOTE — HISTORY OF PRESENT ILLNESS
[de-identified] : 1 y/o male with a hx of Amyloidosis,  Hepatitis C,  dyspepsia, hematuria, rectal bleeding, and Raynaud's phenomenon, cutaneous mastocytosis, presents for an initial Auto PSCT consultation. He is referred by Dr. Joel Wells. \par \par He initially experienced joint pain in 2014 and managed sx with Advil. In 2016, LFT was elevated and was dx with Hepatitis C which was treated with Harvoni. Patient has a hx of hematuria and bloody stool. Biopsies of the colon revealed chronic colitis and erosion. He was pleased on steroids and Mesalamine and started Humira in Mid-August. After first dose of Humira, left knee swelled up ---alleviated by Advil. After second dose of Humira, ankles swelled up and steroids were prescribed. Patient was also placed on Famotidine for early satiety and dyspepsia. His renal function is now normal and no longer experiences bloody stools........Progression in dyspepsia led to an upper endoscopy with Dr Parks on 10/30/19. Biopsy of the second portion of the duodenum revealed amyloidosis confirmed with [place] red statin. IgM immunostain is positive in areas of amyloid deposition, with kappa LC predominance. The stomach was noted to have impaired distensibility on endoscopy.Renal function has been normal, with BUN 9/creatinine 0.9.Cystoscopy was performed which showed prominent blood vessels. \par \par On 06/29/20 Mr. Saba was admitted to [facility] for an for an autologous peripheral blood stem cell transplant with high dose Melphalan prep regimen for treatment of Amyloidosis. He had no complaint at the time of his admission. \par \par Upon admission, a TLC was placed in IR. Mr. Saba received IV hydration, pain management, anxiolysis, antiemetics, nutritional support, and antibacterial / antiviral / antifungal / GI / PCP and VOD (SOS) prophylaxis. When his ANC dropped below 1000, he was started on prophylactic Ciprofloxacin. Labs were monitored on a daily basis, and he received electrolyte repletion and transfusional support as needed. Initially, his platelets were kelp at a goal of 40K for his history of amyloidosis, however post engraftment the platelets goal was kept above 15K. As of note Mr. Saba was started on Acyclovir prior \par to his admission, hence Acyclovir was continued. \par \par On 07/02/20, after pre-medication Mr. Saba received 316 mL of: Autologous, mobilized, plasma reduced, pooled, thawed, washed, HCP apheresis over \par approximately 1 hr. Cell counts as follows \par Total MNC( x10^8/kg)=8.32 \par CD34+cells ( x10^6 kg)=9.96 \par Cell Viability (%)=65 \par Mr. Saba tolerated the infusion well with no adverse resections noted. \par \par While admitted, he had pancytopenia related to high dose chemotherapy prep regiment. He also developed neutropenic fevers. When he became febrile, blood and urine cultures were sent and CXR completed. Ciprofloxacin was changed to cefepime for a broadened anti-microbial coverage. Infectious work up was negative with, negative blood/urine cultures and CXR showing clear lungs. \par \par He also had a drug induced rash secondary to Kepivance which resolved with no intervention. His hospital stay was also complicated by chemotherapy induced grade 1 intestinal mucositis with diarrhea, c. diff sample was sent off and resulted negative, his symptoms resolved with Imodium. Additionally, he experienced chemotherapy induced grade 1 GI mucositis with nausea, he was treated with anti-emetics as well as prn Carafate and Maalox, his symptoms then resolved. \par \par Early engraftment was noticed on 7/15, Zarxio was increased from 480 micrograms to 600 micrograms SQ daily. Cefepime was continued until post engraftment. On 7/18, given count recovery no fevers an negative infectious work up, negative blood/urine cultures and CXR with clear lungs, Cefepime was discontinued. \par Zarxio was discontinued on 7/19. Nasopharyngeal  Surveillance COVID swabs were preformed weekly and all resulted negative, with the most recent collected on 7/20. \par \par Currently, Mr. Saba is ready and stable for discharge with a follow up appointments at [facility], [place].  [de-identified] : On 9/25/20, day +85 of ASCT today. Overall feeling well. His mother passed away this past week, she had been on hospice for several months. Reports adequate PO intake and hydration. Continues to exercise 4-5 days/wk. No complaints. Compliant with post transplant meds and restrictions. Denies fever, chills, headache, dizziness, blurred vision, mucositis/odynophagia, chest pain, SOB, cough, nausea/vomiting, diarrhea/constipation, abdominal pain, dysuria, LE edema, rash, bleeding, pain\par \par On 10/6/20, patient presents for a follow up visit. Offers no acute concerns. Denies fever, chills, nausea, vomiting, diarrhea, rash, mouth sores, dysuria or any signs of active bleeding. Denies SOB, chest pain or B/L LE edema. Remains compliant with post transplant medications. \par \par On 10/22/20 visit, day + 112 of ASCT today. Overall patient is well and offers no acute concerns. States had night sweats for two nights that has resolved at this time. Denies fever, chills, nausea, vomiting, diarrhea, rash, mouth sores, dysuria or any signs of active bleeding. Denies SOB, chest pain or B/L LE edema. Remains compliant with post transplant diet and crowd restrictions. Remains compliant with post transplant medications.

## 2020-10-22 NOTE — REVIEW OF SYSTEMS
[Negative] : Allergic/Immunologic [Fever] : no fever [Chills] : no chills [Night Sweats] : night sweats [Fatigue] : no fatigue [FreeTextEntry2] : Night sweats for two nights

## 2020-10-25 LAB
HCV RNA SERPL NAA DL=5-ACNC: NOT DETECTED
HCV RNA SERPL NAA+PROBE-LOG IU: NOT DETECTED LOG10IU/ML

## 2020-11-06 ENCOUNTER — APPOINTMENT (OUTPATIENT)
Dept: INFUSION THERAPY | Facility: HOSPITAL | Age: 60
End: 2020-11-06
Payer: COMMERCIAL

## 2020-11-06 ENCOUNTER — RESULT REVIEW (OUTPATIENT)
Age: 60
End: 2020-11-06

## 2020-11-06 ENCOUNTER — APPOINTMENT (OUTPATIENT)
Dept: HEMATOLOGY ONCOLOGY | Facility: CLINIC | Age: 60
End: 2020-11-06
Payer: COMMERCIAL

## 2020-11-06 VITALS
WEIGHT: 179.68 LBS | HEIGHT: 69.69 IN | DIASTOLIC BLOOD PRESSURE: 83 MMHG | SYSTOLIC BLOOD PRESSURE: 132 MMHG | RESPIRATION RATE: 16 BRPM | HEART RATE: 72 BPM | TEMPERATURE: 97.4 F | BODY MASS INDEX: 26.01 KG/M2

## 2020-11-06 LAB
ALBUMIN SERPL ELPH-MCNC: 4.3 G/DL
ALP BLD-CCNC: 69 U/L
ALT SERPL-CCNC: 12 U/L
ANION GAP SERPL CALC-SCNC: 9 MMOL/L
AST SERPL-CCNC: 15 U/L
BASOPHILS # BLD AUTO: 0.02 K/UL — SIGNIFICANT CHANGE UP (ref 0–0.2)
BASOPHILS NFR BLD AUTO: 0.4 % — SIGNIFICANT CHANGE UP (ref 0–2)
BILIRUB SERPL-MCNC: 0.4 MG/DL
BUN SERPL-MCNC: 14 MG/DL
CALCIUM SERPL-MCNC: 8.9 MG/DL
CHLORIDE SERPL-SCNC: 108 MMOL/L
CO2 SERPL-SCNC: 26 MMOL/L
CREAT SERPL-MCNC: 0.84 MG/DL
EOSINOPHIL # BLD AUTO: 0.05 K/UL — SIGNIFICANT CHANGE UP (ref 0–0.5)
EOSINOPHIL NFR BLD AUTO: 1 % — SIGNIFICANT CHANGE UP (ref 0–6)
GLUCOSE SERPL-MCNC: 102 MG/DL
HCT VFR BLD CALC: 33.8 % — LOW (ref 39–50)
HGB BLD-MCNC: 11 G/DL — LOW (ref 13–17)
IMM GRANULOCYTES NFR BLD AUTO: 0.2 % — SIGNIFICANT CHANGE UP (ref 0–1.5)
LDH SERPL-CCNC: 161 U/L
LYMPHOCYTES # BLD AUTO: 2.91 K/UL — SIGNIFICANT CHANGE UP (ref 1–3.3)
LYMPHOCYTES # BLD AUTO: 55.4 % — HIGH (ref 13–44)
MAGNESIUM SERPL-MCNC: 2.1 MG/DL
MCHC RBC-ENTMCNC: 25.4 PG — LOW (ref 27–34)
MCHC RBC-ENTMCNC: 32.5 G/DL — SIGNIFICANT CHANGE UP (ref 32–36)
MCV RBC AUTO: 78.1 FL — LOW (ref 80–100)
MONOCYTES # BLD AUTO: 0.47 K/UL — SIGNIFICANT CHANGE UP (ref 0–0.9)
MONOCYTES NFR BLD AUTO: 9 % — SIGNIFICANT CHANGE UP (ref 2–14)
NEUTROPHILS # BLD AUTO: 1.79 K/UL — LOW (ref 1.8–7.4)
NEUTROPHILS NFR BLD AUTO: 34 % — LOW (ref 43–77)
NRBC # BLD: 0 /100 WBCS — SIGNIFICANT CHANGE UP (ref 0–0)
PLATELET # BLD AUTO: 134 K/UL — LOW (ref 150–400)
POTASSIUM SERPL-SCNC: 4.7 MMOL/L
PROT SERPL-MCNC: 6.1 G/DL
RBC # BLD: 4.33 M/UL — SIGNIFICANT CHANGE UP (ref 4.2–5.8)
RBC # FLD: 14.6 % — HIGH (ref 10.3–14.5)
SODIUM SERPL-SCNC: 143 MMOL/L
WBC # BLD: 5.25 K/UL — SIGNIFICANT CHANGE UP (ref 3.8–10.5)
WBC # FLD AUTO: 5.25 K/UL — SIGNIFICANT CHANGE UP (ref 3.8–10.5)

## 2020-11-06 PROCEDURE — 99072 ADDL SUPL MATRL&STAF TM PHE: CPT

## 2020-11-06 PROCEDURE — G0008: CPT

## 2020-11-06 PROCEDURE — 99214 OFFICE O/P EST MOD 30 MIN: CPT

## 2020-11-08 NOTE — ASSESSMENT
[FreeTextEntry1] : Mr. Saba is a 60 year old male with a medical history of amyloidosis, hepatitis C, dyspepsia, hematuria, colitis, Raynaud's phenomenon, and cutaneous mastocytosis. Status post autologous peripheral blood stem cell transplant on 7/2/20.\par \par 1) Amyloidosis\par Cardiac, GI, and ?renal involvement of amyloid \par S/p AUTO PBSCT on 7/2/20\par 8/10/20 ECHO: LV global wall motion is normal, LV ejection fraction is normal (65%). Aortic arch is normal in size. There is no significant valvular disease. The right atrial pressure is normal. There is no pulmonary hypertension. There is no pericardial effusion. Left pleural effusion is present.\par 10/6/20 BNP = 357 (prev 409 on 4/27/20)\par Will postpone PET scan for now, poss arrange for December/January \par \par 2) Heme\par Thalassemia trait\par Counts improving, no indication for transfusion\par    11/6/20:   WBC 5.25   ANC 1.79   Hgb 11   \par Continue MV and folic acid daily\par Monitor counts closely\par \par 3) ID\par Continue ppx:\par - Atovaquone 750 mg BID\par - Acyclovir 400 mg TID\par Fluconazole discontinued 8/13/20\par Post transplant restrictions reviewed. As of 8/28/20, may eat takeout once a week from a reputable place \par \par Post transplant re-vaccination to start 1yr post transplant\par - 12 months post transplant vaccines - due July 2021\par - 14 months post transplant vaccines - due September 2021\par - 24 months post transplant vaccines - due July 2022\par \par 4) GI\par Continue ppx:\par Protonix discontinued 8/13/20\par PRN Zofran/Reglan for nausea/vomiting\par \par 5) Plan\par Pt educated regarding plan of care, all questions/concerns addressed\par Instructed to contact our office if develops fever or new/worsening symptoms\par F/u with Dr. Roman on 11/18/20

## 2020-11-08 NOTE — PHYSICAL EXAM
[Restricted in physically strenuous activity but ambulatory and able to carry out work of a light or sedentary nature] : Status 1- Restricted in physically strenuous activity but ambulatory and able to carry out work of a light or sedentary nature, e.g., light house work, office work [Normal] : affect appropriate [de-identified] : no edema [de-identified] : +BS; abdomen soft, non-distended, non-tender

## 2020-11-08 NOTE — HISTORY OF PRESENT ILLNESS
[de-identified] : 1 y/o male with a hx of Amyloidosis,  Hepatitis C,  dyspepsia, hematuria, rectal bleeding, and Raynaud's phenomenon, cutaneous mastocytosis, presents for an initial Auto PSCT consultation. He is referred by Dr. Joel Wells. \par \par He initially experienced joint pain in 2014 and managed sx with Advil. In 2016, LFT was elevated and was dx with Hepatitis C which was treated with Harvoni. Patient has a hx of hematuria and bloody stool. Biopsies of the colon revealed chronic colitis and erosion. He was pleased on steroids and Mesalamine and started Humira in Mid-August. After first dose of Humira, left knee swelled up ---alleviated by Advil. After second dose of Humira, ankles swelled up and steroids were prescribed. Patient was also placed on Famotidine for early satiety and dyspepsia. His renal function is now normal and no longer experiences bloody stools........Progression in dyspepsia led to an upper endoscopy with Dr Parks on 10/30/19. Biopsy of the second portion of the duodenum revealed amyloidosis confirmed with [place] red statin. IgM immunostain is positive in areas of amyloid deposition, with kappa LC predominance. The stomach was noted to have impaired distensibility on endoscopy.Renal function has been normal, with BUN 9/creatinine 0.9.Cystoscopy was performed which showed prominent blood vessels. \par \par On 06/29/20 Mr. Saba was admitted to [facility] for an for an autologous peripheral blood stem cell transplant with high dose Melphalan prep regimen for treatment of Amyloidosis. He had no complaint at the time of his admission. \par \par Upon admission, a TLC was placed in IR. Mr. Saba received IV hydration, pain management, anxiolysis, antiemetics, nutritional support, and antibacterial / antiviral / antifungal / GI / PCP and VOD (SOS) prophylaxis. When his ANC dropped below 1000, he was started on prophylactic Ciprofloxacin. Labs were monitored on a daily basis, and he received electrolyte repletion and transfusional support as needed. Initially, his platelets were kelp at a goal of 40K for his history of amyloidosis, however post engraftment the platelets goal was kept above 15K. As of note Mr. Saba was started on Acyclovir prior \par to his admission, hence Acyclovir was continued. \par \par On 07/02/20, after pre-medication Mr. Saba received 316 mL of: Autologous, mobilized, plasma reduced, pooled, thawed, washed, HCP apheresis over \par approximately 1 hr. Cell counts as follows \par Total MNC( x10^8/kg)=8.32 \par CD34+cells ( x10^6 kg)=9.96 \par Cell Viability (%)=65 \par Mr. Saba tolerated the infusion well with no adverse resections noted. \par \par While admitted, he had pancytopenia related to high dose chemotherapy prep regiment. He also developed neutropenic fevers. When he became febrile, blood and urine cultures were sent and CXR completed. Ciprofloxacin was changed to cefepime for a broadened anti-microbial coverage. Infectious work up was negative with, negative blood/urine cultures and CXR showing clear lungs. \par \par He also had a drug induced rash secondary to Kepivance which resolved with no intervention. His hospital stay was also complicated by chemotherapy induced grade 1 intestinal mucositis with diarrhea, c. diff sample was sent off and resulted negative, his symptoms resolved with Imodium. Additionally, he experienced chemotherapy induced grade 1 GI mucositis with nausea, he was treated with anti-emetics as well as prn Carafate and Maalox, his symptoms then resolved. \par \par Early engraftment was noticed on 7/15, Zarxio was increased from 480 micrograms to 600 micrograms SQ daily. Cefepime was continued until post engraftment. On 7/18, given count recovery no fevers an negative infectious work up, negative blood/urine cultures and CXR with clear lungs, Cefepime was discontinued. \par Zarxio was discontinued on 7/19. Nasopharyngeal  Surveillance COVID swabs were preformed weekly and all resulted negative, with the most recent collected on 7/20. \par \par Currently, Mr. Saba is ready and stable for discharge with a follow up appointments at [facility].  [de-identified] : On 11/6/20 visit, day +127 of ASCT today. Overall continues to feel well. Reports adequate PO intake and hydration. No complaints. Planning to return to work on 11/16. Compliant with post transplant meds and restrictions. Denies fever, chills, headache, dizziness, blurred vision, mucositis/odynophagia, chest pain, SOB, cough, nausea/vomiting, diarrhea/constipation, abdominal pain, dysuria, LE edema, rash, bleeding, pain\par

## 2020-11-13 ENCOUNTER — OUTPATIENT (OUTPATIENT)
Dept: OUTPATIENT SERVICES | Facility: HOSPITAL | Age: 60
LOS: 1 days | Discharge: HOME | End: 2020-11-13

## 2020-11-13 DIAGNOSIS — E85.9 AMYLOIDOSIS, UNSPECIFIED: ICD-10-CM

## 2020-11-18 ENCOUNTER — RESULT REVIEW (OUTPATIENT)
Age: 60
End: 2020-11-18

## 2020-11-18 ENCOUNTER — APPOINTMENT (OUTPATIENT)
Dept: HEMATOLOGY ONCOLOGY | Facility: CLINIC | Age: 60
End: 2020-11-18
Payer: COMMERCIAL

## 2020-11-18 VITALS
TEMPERATURE: 97.2 F | SYSTOLIC BLOOD PRESSURE: 145 MMHG | OXYGEN SATURATION: 98 % | BODY MASS INDEX: 26.04 KG/M2 | DIASTOLIC BLOOD PRESSURE: 85 MMHG | WEIGHT: 179.88 LBS | RESPIRATION RATE: 16 BRPM | HEART RATE: 65 BPM

## 2020-11-18 DIAGNOSIS — D56.3 THALASSEMIA MINOR: ICD-10-CM

## 2020-11-18 LAB
ALBUMIN SERPL ELPH-MCNC: 4.2 G/DL
ALP BLD-CCNC: 65 U/L
ALT SERPL-CCNC: 13 U/L
ANION GAP SERPL CALC-SCNC: 10 MMOL/L
AST SERPL-CCNC: 17 U/L
BASOPHILS # BLD AUTO: 0.01 K/UL — SIGNIFICANT CHANGE UP (ref 0–0.2)
BASOPHILS NFR BLD AUTO: 0.2 % — SIGNIFICANT CHANGE UP (ref 0–2)
BILIRUB SERPL-MCNC: 0.5 MG/DL
BUN SERPL-MCNC: 15 MG/DL
CALCIUM SERPL-MCNC: 9 MG/DL
CHLORIDE SERPL-SCNC: 105 MMOL/L
CO2 SERPL-SCNC: 25 MMOL/L
CREAT SERPL-MCNC: 0.81 MG/DL
EOSINOPHIL # BLD AUTO: 0.04 K/UL — SIGNIFICANT CHANGE UP (ref 0–0.5)
EOSINOPHIL NFR BLD AUTO: 0.7 % — SIGNIFICANT CHANGE UP (ref 0–6)
GLUCOSE SERPL-MCNC: 97 MG/DL
HCT VFR BLD CALC: 33.8 % — LOW (ref 39–50)
HGB BLD-MCNC: 11.1 G/DL — LOW (ref 13–17)
IMM GRANULOCYTES NFR BLD AUTO: 0.2 % — SIGNIFICANT CHANGE UP (ref 0–1.5)
LDH SERPL-CCNC: 162 U/L
LYMPHOCYTES # BLD AUTO: 2.49 K/UL — SIGNIFICANT CHANGE UP (ref 1–3.3)
LYMPHOCYTES # BLD AUTO: 46 % — HIGH (ref 13–44)
MAGNESIUM SERPL-MCNC: 2 MG/DL
MCHC RBC-ENTMCNC: 25.4 PG — LOW (ref 27–34)
MCHC RBC-ENTMCNC: 32.8 G/DL — SIGNIFICANT CHANGE UP (ref 32–36)
MCV RBC AUTO: 77.3 FL — LOW (ref 80–100)
MONOCYTES # BLD AUTO: 0.48 K/UL — SIGNIFICANT CHANGE UP (ref 0–0.9)
MONOCYTES NFR BLD AUTO: 8.9 % — SIGNIFICANT CHANGE UP (ref 2–14)
NEUTROPHILS # BLD AUTO: 2.38 K/UL — SIGNIFICANT CHANGE UP (ref 1.8–7.4)
NEUTROPHILS NFR BLD AUTO: 44 % — SIGNIFICANT CHANGE UP (ref 43–77)
NRBC # BLD: 0 /100 WBCS — SIGNIFICANT CHANGE UP (ref 0–0)
PLATELET # BLD AUTO: 122 K/UL — LOW (ref 150–400)
POTASSIUM SERPL-SCNC: 4.1 MMOL/L
PROT SERPL-MCNC: 6.1 G/DL
RBC # BLD: 4.37 M/UL — SIGNIFICANT CHANGE UP (ref 4.2–5.8)
RBC # FLD: 14.6 % — HIGH (ref 10.3–14.5)
SODIUM SERPL-SCNC: 139 MMOL/L
WBC # BLD: 5.41 K/UL — SIGNIFICANT CHANGE UP (ref 3.8–10.5)
WBC # FLD AUTO: 5.41 K/UL — SIGNIFICANT CHANGE UP (ref 3.8–10.5)

## 2020-11-18 PROCEDURE — 99214 OFFICE O/P EST MOD 30 MIN: CPT

## 2020-11-23 NOTE — HISTORY OF PRESENT ILLNESS
[de-identified] : 59 y/o male with a hx of Amyloidosis,  Hepatitis C,  dyspepsia, hematuria, rectal bleeding, and Raynaud's phenomenon, cutaneous mastocytosis, presents for an initial Auto PSCT consultation. He is referred by Dr. Joel Wells. \par \par He initially experienced joint pain in 2014 and managed sx with Advil. In 2016, LFT was elevated and was dx with Hepatitis C which was treated with Harvoni. Patient has a hx of hematuria and bloody stool. Biopsies of the colon revealed chronic colitis and erosion. He was pleased on steroids and Mesalamine and started Humira in Mid-August. After first dose of Humira, left knee swelled up ---alleviated by Advil. After second dose of Humira, ankles swelled up and steroids were prescribed. Patient was also placed on Famotidine for early satiety and dyspepsia. His renal function is now normal and no longer experiences bloody stools........Progression in dyspepsia led to an upper endoscopy with Dr Parks on 10/30/19. Biopsy of the second portion of the duodenum revealed amyloidosis confirmed with [place] red statin. IgM immunostain is positive in areas of amyloid deposition, with kappa LC predominance. The stomach was noted to have impaired distensibility on endoscopy.Renal function has been normal, with BUN 9/creatinine 0.9.Cystoscopy was performed which showed prominent blood vessels. \par \par On 06/29/20 Mr. Saba was admitted to [facility] for an for an autologous peripheral blood stem cell transplant with high dose Melphalan prep regimen for treatment of Amyloidosis. He had no complaint at the time of his admission. \par \par Upon admission, a TLC was placed in IR. Mr. Saab received IV hydration, pain management, anxiolysis, antiemetics, nutritional support, and antibacterial / antiviral / antifungal / GI / PCP and VOD (SOS) prophylaxis. When his ANC dropped below 1000, he was started on prophylactic Ciprofloxacin. Labs were monitored on a daily basis, and he received electrolyte repletion and transfusional support as needed. Initially, his platelets were kelp at a goal of 40K for his history of amyloidosis, however post engraftment the platelets goal was kept above 15K. As of note Mr. Saba was started on Acyclovir prior \par to his admission, hence Acyclovir was continued. \par \par On 07/02/20, after pre-medication Mr. Saba received 316 mL of: Autologous, mobilized, plasma reduced, pooled, thawed, washed, HCP apheresis over \par approximately 1 hr. Cell counts as follows \par Total MNC( x10^8/kg)=8.32 \par CD34+cells ( x10^6 kg)=9.96 \par Cell Viability (%)=65 \par Mr. Saba tolerated the infusion well with no adverse resections noted. \par \par While admitted, he had pancytopenia related to high dose chemotherapy prep regiment. He also developed neutropenic fevers. When he became febrile, blood and urine cultures were sent and CXR completed. Ciprofloxacin was changed to cefepime for a broadened anti-microbial coverage. Infectious work up was negative with, negative blood/urine cultures and CXR showing clear lungs. \par \par He also had a drug induced rash secondary to Kepivance which resolved with no intervention. His hospital stay was also complicated by chemotherapy induced grade 1 intestinal mucositis with diarrhea, c. diff sample was sent off and resulted negative, his symptoms resolved with Imodium. Additionally, he experienced chemotherapy induced grade 1 GI mucositis with nausea, he was treated with anti-emetics as well as prn Carafate and Maalox, his symptoms then resolved. \par \par Early engraftment was noticed on 7/15, Zarxio was increased from 480 micrograms to 600 micrograms SQ daily. Cefepime was continued until post engraftment. On 7/18, given count recovery no fevers an negative infectious work up, negative blood/urine cultures and CXR with clear lungs, Cefepime was discontinued. \par Zarxio was discontinued on 7/19. Nasopharyngeal  Surveillance COVID swabs were preformed weekly and all resulted negative, with the most recent collected on 7/20. \par \par Currently, Mr. Saba is ready and stable for discharge with a follow up appointments at [facility].  [de-identified] : Here for f/u s/p auto transplant.  Overall continues to feel well. Reports adequate PO intake and hydration. No complaints. He returned  to work on 11/16. Compliant with post transplant meds and restrictions. Denies fever, chills, headache, dizziness, blurred vision, mucositis/odynophagia, chest pain, SOB, cough, nausea/vomiting, diarrhea/constipation, abdominal pain, dysuria, LE edema, rash, bleeding, pain\par

## 2020-11-23 NOTE — PHYSICAL EXAM
[Restricted in physically strenuous activity but ambulatory and able to carry out work of a light or sedentary nature] : Status 1- Restricted in physically strenuous activity but ambulatory and able to carry out work of a light or sedentary nature, e.g., light house work, office work [Normal] : affect appropriate [de-identified] : no edema [de-identified] : +BS; abdomen soft, non-distended, non-tender

## 2020-11-23 NOTE — ASSESSMENT
[FreeTextEntry1] : Mr. Saba is a 60 year old male with a medical history of amyloidosis, hepatitis C, dyspepsia, hematuria, colitis, Raynaud's phenomenon, and cutaneous mastocytosis. Status post autologous peripheral blood stem cell transplant on 7/2/20.\par \par 1) Amyloidosis\par Cardiac, GI, and ?renal involvement of amyloid \par S/p AUTO PBSCT on 7/2/20 with excellent clinical response\par 8/10/20 ECHO: LV global wall motion is normal, LV ejection fraction is normal (65%). Aortic arch is normal in size. There is no significant valvular disease. The right atrial pressure is normal. There is no pulmonary hypertension. There is no pericardial effusion. Left pleural effusion is present.\par 10/6/20 BNP = 357 (prev 409 on 4/27/20)\par Will postpone PET scan for now, poss arrange for December/January \par \par 2) Heme\par Thalassemia trait\par Counts improving, no indication for transfusion\par Continue MV and folic acid daily\par Monitor counts \par \par 3) ID\par Continue ppx:\par - Atovaquone 750 mg BID\par - Acyclovir 400 mg TID\par Fluconazole discontinued 8/13/20\par Post transplant restrictions reviewed. As of 8/28/20, may eat takeout once a week from a reputable place ..covid caution stressed\par \par Post transplant re-vaccination to start 1yr post transplant\par - 12 months post transplant vaccines - due July 2021\par - 14 months post transplant vaccines - due September 2021\par - 24 months post transplant vaccines - due July 2022\par \par 4) GI\par Continue ppx:\par Protonix discontinued 8/13/20\par PRN Zofran/Reglan for nausea/vomiting\par consider f/u egd\par \par 5) Plan\par Pt educated regarding plan of care, all questions/concerns addressed..Maintenance discussed...\par Instructed to contact our office if develops fever or new/worsening symptoms\par F/u 2 to 4 weeks\par

## 2020-12-04 ENCOUNTER — APPOINTMENT (OUTPATIENT)
Dept: HEMATOLOGY ONCOLOGY | Facility: CLINIC | Age: 60
End: 2020-12-04

## 2020-12-16 ENCOUNTER — OUTPATIENT (OUTPATIENT)
Dept: OUTPATIENT SERVICES | Facility: HOSPITAL | Age: 60
LOS: 1 days | Discharge: HOME | End: 2020-12-16

## 2020-12-16 DIAGNOSIS — E85.9 AMYLOIDOSIS, UNSPECIFIED: ICD-10-CM

## 2020-12-18 ENCOUNTER — RESULT REVIEW (OUTPATIENT)
Age: 60
End: 2020-12-18

## 2020-12-18 ENCOUNTER — APPOINTMENT (OUTPATIENT)
Dept: HEMATOLOGY ONCOLOGY | Facility: CLINIC | Age: 60
End: 2020-12-18
Payer: COMMERCIAL

## 2020-12-18 VITALS
RESPIRATION RATE: 16 BRPM | DIASTOLIC BLOOD PRESSURE: 83 MMHG | BODY MASS INDEX: 26.59 KG/M2 | HEART RATE: 72 BPM | TEMPERATURE: 97.3 F | OXYGEN SATURATION: 97 % | WEIGHT: 183.62 LBS | SYSTOLIC BLOOD PRESSURE: 133 MMHG

## 2020-12-18 DIAGNOSIS — Z78.9 OTHER SPECIFIED HEALTH STATUS: ICD-10-CM

## 2020-12-18 LAB
ALBUMIN SERPL ELPH-MCNC: 4.3 G/DL
ALP BLD-CCNC: 67 U/L
ALT SERPL-CCNC: 16 U/L
ANION GAP SERPL CALC-SCNC: 9 MMOL/L
AST SERPL-CCNC: 14 U/L
BASOPHILS # BLD AUTO: 0.01 K/UL — SIGNIFICANT CHANGE UP (ref 0–0.2)
BASOPHILS NFR BLD AUTO: 0.2 % — SIGNIFICANT CHANGE UP (ref 0–2)
BILIRUB SERPL-MCNC: 0.6 MG/DL
BUN SERPL-MCNC: 17 MG/DL
CALCIUM SERPL-MCNC: 9 MG/DL
CHLORIDE SERPL-SCNC: 107 MMOL/L
CO2 SERPL-SCNC: 23 MMOL/L
CREAT SERPL-MCNC: 0.99 MG/DL
EOSINOPHIL # BLD AUTO: 0.07 K/UL — SIGNIFICANT CHANGE UP (ref 0–0.5)
EOSINOPHIL NFR BLD AUTO: 1.4 % — SIGNIFICANT CHANGE UP (ref 0–6)
GLUCOSE SERPL-MCNC: 91 MG/DL
HCT VFR BLD CALC: 32.2 % — LOW (ref 39–50)
HGB BLD-MCNC: 10.3 G/DL — LOW (ref 13–17)
IMM GRANULOCYTES NFR BLD AUTO: 0.2 % — SIGNIFICANT CHANGE UP (ref 0–1.5)
LDH SERPL-CCNC: 165 U/L
LYMPHOCYTES # BLD AUTO: 2.78 K/UL — SIGNIFICANT CHANGE UP (ref 1–3.3)
LYMPHOCYTES # BLD AUTO: 54.4 % — HIGH (ref 13–44)
MAGNESIUM SERPL-MCNC: 2 MG/DL
MCHC RBC-ENTMCNC: 24.6 PG — LOW (ref 27–34)
MCHC RBC-ENTMCNC: 32 G/DL — SIGNIFICANT CHANGE UP (ref 32–36)
MCV RBC AUTO: 76.8 FL — LOW (ref 80–100)
MONOCYTES # BLD AUTO: 0.37 K/UL — SIGNIFICANT CHANGE UP (ref 0–0.9)
MONOCYTES NFR BLD AUTO: 7.2 % — SIGNIFICANT CHANGE UP (ref 2–14)
NEUTROPHILS # BLD AUTO: 1.87 K/UL — SIGNIFICANT CHANGE UP (ref 1.8–7.4)
NEUTROPHILS NFR BLD AUTO: 36.6 % — LOW (ref 43–77)
NRBC # BLD: 0 /100 WBCS — SIGNIFICANT CHANGE UP (ref 0–0)
PLATELET # BLD AUTO: 131 K/UL — LOW (ref 150–400)
POTASSIUM SERPL-SCNC: 4.9 MMOL/L
PROT SERPL-MCNC: 6.2 G/DL
RBC # BLD: 4.19 M/UL — LOW (ref 4.2–5.8)
RBC # FLD: 15.1 % — HIGH (ref 10.3–14.5)
SODIUM SERPL-SCNC: 140 MMOL/L
WBC # BLD: 5.11 K/UL — SIGNIFICANT CHANGE UP (ref 3.8–10.5)
WBC # FLD AUTO: 5.11 K/UL — SIGNIFICANT CHANGE UP (ref 3.8–10.5)

## 2020-12-18 PROCEDURE — 99214 OFFICE O/P EST MOD 30 MIN: CPT

## 2020-12-18 PROCEDURE — 99072 ADDL SUPL MATRL&STAF TM PHE: CPT

## 2020-12-19 LAB
B2 MICROGLOB SERPL-MCNC: 2 MG/L
DEPRECATED KAPPA LC FREE/LAMBDA SER: 3.79 RATIO
KAPPA LC CSF-MCNC: 0.61 MG/DL
KAPPA LC SERPL-MCNC: 2.31 MG/DL

## 2020-12-20 PROBLEM — Z78.9 CURRENT NON-SMOKER: Status: ACTIVE | Noted: 2020-07-31

## 2020-12-20 NOTE — PHYSICAL EXAM
[Restricted in physically strenuous activity but ambulatory and able to carry out work of a light or sedentary nature] : Status 1- Restricted in physically strenuous activity but ambulatory and able to carry out work of a light or sedentary nature, e.g., light house work, office work [Normal] : affect appropriate [de-identified] : no edema [de-identified] : +BS; abdomen soft, non-distended, non-tender

## 2020-12-20 NOTE — ASSESSMENT
[FreeTextEntry1] : Mr. Saba is a 60 year old male with a medical history of amyloidosis, hepatitis C, dyspepsia, hematuria, colitis, Raynaud's phenomenon, and cutaneous mastocytosis. Status post autologous peripheral blood stem cell transplant on 7/2/20.\par \par 1) Amyloidosis\par Cardiac, GI, and ?renal involvement of amyloid \par S/p AUTO PBSCT on 7/2/20 with excellent clinical response\par 8/10/20 ECHO: LV global wall motion is normal, LV ejection fraction is normal (65%). Aortic arch is normal in size. There is no significant valvular disease. The right atrial pressure is normal. There is no pulmonary hypertension. There is no pericardial effusion. Left pleural effusion is present.\par 10/6/20 BNP = 357 (prev 409 on 4/27/20)\par Will postpone PET scan for now, arrange for early January 2021\par F/u myeloma markers sent today, will repeat BNP at next office visit, his next appt with cardiology is Feb 2021. To consider f/u EGD pending PET scan results \par \par 2) Heme\par Thalassemia trait\par Counts stable, no indication for transfusion\par    12/18/20:   WBC 5.11   ANC 1.87   Hgb 10.3   \par Continue MV and folic acid daily\par Monitor counts \par \par 3) ID\par Continue ppx:\par - Atovaquone 750 mg BID - can stop in January 2021\par - Acyclovir 400 mg TID\par Fluconazole discontinued 8/13/20\par Post transplant restrictions reviewed. As of 8/28/20, may eat takeout once a week from a reputable place. Covid pre-cautions reviewed and reinforced \par \par Post transplant re-vaccination to start 1yr post transplant\par - 12 months post transplant vaccines - due July 2021\par - 14 months post transplant vaccines - due September 2021\par - 24 months post transplant vaccines - due July 2022\par \par 4) GI\par Continue ppx:\par Protonix discontinued 8/13/20\par PRN Zofran/Reglan for nausea/vomiting\par \par 5) Plan\par Pt educated regarding plan of care, all questions/concerns addressed..Maintenance discussed.\par Instructed to contact our office if develops fever or new/worsening symptoms\par F/u in 4wks with Dr. Roman on 1/12/21 \par

## 2020-12-20 NOTE — HISTORY OF PRESENT ILLNESS
[de-identified] : 59 y/o male with a hx of Amyloidosis,  Hepatitis C,  dyspepsia, hematuria, rectal bleeding, and Raynaud's phenomenon, cutaneous mastocytosis, presents for an initial Auto PSCT consultation. He is referred by Dr. Joel Wells. \par \par He initially experienced joint pain in 2014 and managed sx with Advil. In 2016, LFT was elevated and was dx with Hepatitis C which was treated with Harvoni. Patient has a hx of hematuria and bloody stool. Biopsies of the colon revealed chronic colitis and erosion. He was pleased on steroids and Mesalamine and started Humira in Mid-August. After first dose of Humira, left knee swelled up ---alleviated by Advil. After second dose of Humira, ankles swelled up and steroids were prescribed. Patient was also placed on Famotidine for early satiety and dyspepsia. His renal function is now normal and no longer experiences bloody stools........Progression in dyspepsia led to an upper endoscopy with Dr Parks on 10/30/19. Biopsy of the second portion of the duodenum revealed amyloidosis confirmed with [place] red statin. IgM immunostain is positive in areas of amyloid deposition, with kappa LC predominance. The stomach was noted to have impaired distensibility on endoscopy.Renal function has been normal, with BUN 9/creatinine 0.9.Cystoscopy was performed which showed prominent blood vessels. \par \par On 06/29/20 Mr. Saba was admitted to [facility] for an for an autologous peripheral blood stem cell transplant with high dose Melphalan prep regimen for treatment of Amyloidosis. He had no complaint at the time of his admission. \par \par Upon admission, a TLC was placed in IR. Mr. Saba received IV hydration, pain management, anxiolysis, antiemetics, nutritional support, and antibacterial / antiviral / antifungal / GI / PCP and VOD (SOS) prophylaxis. When his ANC dropped below 1000, he was started on prophylactic Ciprofloxacin. Labs were monitored on a daily basis, and he received electrolyte repletion and transfusional support as needed. Initially, his platelets were kelp at a goal of 40K for his history of amyloidosis, however post engraftment the platelets goal was kept above 15K. As of note Mr. Saba was started on Acyclovir prior \par to his admission, hence Acyclovir was continued. \par \par On 07/02/20, after pre-medication Mr. Saba received 316 mL of: Autologous, mobilized, plasma reduced, pooled, thawed, washed, HCP apheresis over \par approximately 1 hr. Cell counts as follows \par Total MNC( x10^8/kg)=8.32 \par CD34+cells ( x10^6 kg)=9.96 \par Cell Viability (%)=65 \par Mr. Saba tolerated the infusion well with no adverse resections noted. \par \par While admitted, he had pancytopenia related to high dose chemotherapy prep regiment. He also developed neutropenic fevers. When he became febrile, blood and urine cultures were sent and CXR completed. Ciprofloxacin was changed to cefepime for a broadened anti-microbial coverage. Infectious work up was negative with, negative blood/urine cultures and CXR showing clear lungs. \par \par He also had a drug induced rash secondary to Kepivance which resolved with no intervention. His hospital stay was also complicated by chemotherapy induced grade 1 intestinal mucositis with diarrhea, c. diff sample was sent off and resulted negative, his symptoms resolved with Imodium. Additionally, he experienced chemotherapy induced grade 1 GI mucositis with nausea, he was treated with anti-emetics as well as prn Carafate and Maalox, his symptoms then resolved. \par \par Early engraftment was noticed on 7/15, Zarxio was increased from 480 micrograms to 600 micrograms SQ daily. Cefepime was continued until post engraftment. On 7/18, given count recovery no fevers an negative infectious work up, negative blood/urine cultures and CXR with clear lungs, Cefepime was discontinued. \par Zarxio was discontinued on 7/19. Nasopharyngeal  Surveillance COVID swabs were preformed weekly and all resulted negative, with the most recent collected on 7/20. \par \par Currently, Mr. Saba is ready and stable for discharge with a follow up appointments at [facility].  [de-identified] : On 12/18/20 visit, day +169 of ASCT today. Overall continues to feel well after returning to work last month. No complaints. Compliant with post transplant meds and restrictions. Denies fever, chills, headache, dizziness, blurred vision, mucositis/odynophagia, chest pain, SOB, cough, nausea/vomiting, diarrhea/constipation, abdominal pain, dysuria, LE edema, rash, bleeding, pain\par

## 2020-12-30 ENCOUNTER — APPOINTMENT (OUTPATIENT)
Dept: HEMATOLOGY ONCOLOGY | Facility: CLINIC | Age: 60
End: 2020-12-30

## 2021-01-04 LAB
ALBUMIN MFR SERPL ELPH: 66 %
ALBUMIN SERPL-MCNC: 4.1 G/DL
ALBUMIN/GLOB SERPL: 2 RATIO
ALPHA1 GLOB MFR SERPL ELPH: 3.7 %
ALPHA1 GLOB SERPL ELPH-MCNC: 0.2 G/DL
ALPHA2 GLOB MFR SERPL ELPH: 10 %
ALPHA2 GLOB SERPL ELPH-MCNC: 0.6 G/DL
B-GLOBULIN MFR SERPL ELPH: 8.5 %
B-GLOBULIN SERPL ELPH-MCNC: 0.5 G/DL
DEPRECATED KAPPA LC FREE/LAMBDA SER: 3.79 RATIO
GAMMA GLOB FLD ELPH-MCNC: 0.7 G/DL
GAMMA GLOB MFR SERPL ELPH: 11.8 %
IGA SER QL IEP: 42 MG/DL
IGG SER QL IEP: 847 MG/DL
IGM SER QL IEP: 29 MG/DL
INTERPRETATION SERPL IEP-IMP: NORMAL
KAPPA LC CSF-MCNC: 0.61 MG/DL
KAPPA LC SERPL-MCNC: 2.31 MG/DL
M PROTEIN MFR SERPL ELPH: NORMAL
M PROTEIN SPEC IFE-MCNC: NORMAL
MONOCLON BAND OBS SERPL: NORMAL
PROT SERPL-MCNC: 6.2 G/DL
PROT SERPL-MCNC: 6.2 G/DL

## 2021-01-05 ENCOUNTER — APPOINTMENT (OUTPATIENT)
Dept: NUCLEAR MEDICINE | Facility: CLINIC | Age: 61
End: 2021-01-05
Payer: COMMERCIAL

## 2021-01-05 ENCOUNTER — OUTPATIENT (OUTPATIENT)
Dept: OUTPATIENT SERVICES | Facility: HOSPITAL | Age: 61
LOS: 1 days | End: 2021-01-05

## 2021-01-05 DIAGNOSIS — E85.9 AMYLOIDOSIS, UNSPECIFIED: ICD-10-CM

## 2021-01-05 PROCEDURE — 78815 PET IMAGE W/CT SKULL-THIGH: CPT | Mod: 26,PI

## 2021-01-12 ENCOUNTER — APPOINTMENT (OUTPATIENT)
Dept: HEMATOLOGY ONCOLOGY | Facility: CLINIC | Age: 61
End: 2021-01-12
Payer: COMMERCIAL

## 2021-01-12 ENCOUNTER — RESULT REVIEW (OUTPATIENT)
Age: 61
End: 2021-01-12

## 2021-01-12 VITALS
OXYGEN SATURATION: 98 % | SYSTOLIC BLOOD PRESSURE: 126 MMHG | BODY MASS INDEX: 26.43 KG/M2 | TEMPERATURE: 97.4 F | WEIGHT: 182.54 LBS | HEART RATE: 85 BPM | DIASTOLIC BLOOD PRESSURE: 81 MMHG | RESPIRATION RATE: 16 BRPM

## 2021-01-12 DIAGNOSIS — Z82.0 FAMILY HISTORY OF EPILEPSY AND OTHER DISEASES OF THE NERVOUS SYSTEM: ICD-10-CM

## 2021-01-12 DIAGNOSIS — Z78.9 OTHER SPECIFIED HEALTH STATUS: ICD-10-CM

## 2021-01-12 LAB
BASOPHILS # BLD AUTO: 0.01 K/UL — SIGNIFICANT CHANGE UP (ref 0–0.2)
BASOPHILS NFR BLD AUTO: 0.2 % — SIGNIFICANT CHANGE UP (ref 0–2)
EOSINOPHIL # BLD AUTO: 0.03 K/UL — SIGNIFICANT CHANGE UP (ref 0–0.5)
EOSINOPHIL NFR BLD AUTO: 0.7 % — SIGNIFICANT CHANGE UP (ref 0–6)
HCT VFR BLD CALC: 33.3 % — LOW (ref 39–50)
HGB BLD-MCNC: 10.7 G/DL — LOW (ref 13–17)
IMM GRANULOCYTES NFR BLD AUTO: 0.5 % — SIGNIFICANT CHANGE UP (ref 0–1.5)
LYMPHOCYTES # BLD AUTO: 1.5 K/UL — SIGNIFICANT CHANGE UP (ref 1–3.3)
LYMPHOCYTES # BLD AUTO: 35.6 % — SIGNIFICANT CHANGE UP (ref 13–44)
MCHC RBC-ENTMCNC: 24.8 PG — LOW (ref 27–34)
MCHC RBC-ENTMCNC: 32.1 G/DL — SIGNIFICANT CHANGE UP (ref 32–36)
MCV RBC AUTO: 77.1 FL — LOW (ref 80–100)
MONOCYTES # BLD AUTO: 0.47 K/UL — SIGNIFICANT CHANGE UP (ref 0–0.9)
MONOCYTES NFR BLD AUTO: 11.2 % — SIGNIFICANT CHANGE UP (ref 2–14)
NEUTROPHILS # BLD AUTO: 2.18 K/UL — SIGNIFICANT CHANGE UP (ref 1.8–7.4)
NEUTROPHILS NFR BLD AUTO: 51.8 % — SIGNIFICANT CHANGE UP (ref 43–77)
NRBC # BLD: 0 /100 WBCS — SIGNIFICANT CHANGE UP (ref 0–0)
PLATELET # BLD AUTO: 142 K/UL — LOW (ref 150–400)
RBC # BLD: 4.32 M/UL — SIGNIFICANT CHANGE UP (ref 4.2–5.8)
RBC # FLD: 15.9 % — HIGH (ref 10.3–14.5)
WBC # BLD: 4.21 K/UL — SIGNIFICANT CHANGE UP (ref 3.8–10.5)
WBC # FLD AUTO: 4.21 K/UL — SIGNIFICANT CHANGE UP (ref 3.8–10.5)

## 2021-01-12 PROCEDURE — 99214 OFFICE O/P EST MOD 30 MIN: CPT

## 2021-01-12 PROCEDURE — 99072 ADDL SUPL MATRL&STAF TM PHE: CPT

## 2021-01-13 ENCOUNTER — OUTPATIENT (OUTPATIENT)
Dept: OUTPATIENT SERVICES | Facility: HOSPITAL | Age: 61
LOS: 1 days | Discharge: HOME | End: 2021-01-13

## 2021-01-13 DIAGNOSIS — E85.9 AMYLOIDOSIS, UNSPECIFIED: ICD-10-CM

## 2021-01-14 ENCOUNTER — RESULT REVIEW (OUTPATIENT)
Age: 61
End: 2021-01-14

## 2021-01-14 ENCOUNTER — APPOINTMENT (OUTPATIENT)
Dept: HEMATOLOGY ONCOLOGY | Facility: CLINIC | Age: 61
End: 2021-01-14

## 2021-01-14 PROBLEM — Z82.0 FAMILY HISTORY OF SLEEP APNEA: Status: ACTIVE | Noted: 2017-11-29

## 2021-01-14 LAB
ACANTHOCYTES BLD QL SMEAR: SLIGHT — SIGNIFICANT CHANGE UP
ANISOCYTOSIS BLD QL: SLIGHT — SIGNIFICANT CHANGE UP
BASOPHILS # BLD AUTO: 0 K/UL — SIGNIFICANT CHANGE UP (ref 0–0.2)
BASOPHILS NFR BLD AUTO: 0.4 % — SIGNIFICANT CHANGE UP (ref 0–2)
BURR CELLS BLD QL SMEAR: PRESENT — SIGNIFICANT CHANGE UP
DACRYOCYTES BLD QL SMEAR: SLIGHT — SIGNIFICANT CHANGE UP
ELLIPTOCYTES BLD QL SMEAR: SLIGHT — SIGNIFICANT CHANGE UP
EOSINOPHIL # BLD AUTO: 0.1 K/UL — SIGNIFICANT CHANGE UP (ref 0–0.5)
EOSINOPHIL NFR BLD AUTO: 1 % — SIGNIFICANT CHANGE UP (ref 0–6)
HCT VFR BLD CALC: 36.1 % — LOW (ref 39–50)
HGB BLD-MCNC: 11.1 G/DL — LOW (ref 13–17)
HYPOCHROMIA BLD QL: SLIGHT — SIGNIFICANT CHANGE UP
LG PLATELETS BLD QL AUTO: SLIGHT — SIGNIFICANT CHANGE UP
LYMPHOCYTES # BLD AUTO: 2.3 K/UL — SIGNIFICANT CHANGE UP (ref 1–3.3)
LYMPHOCYTES # BLD AUTO: 52 % — HIGH (ref 13–44)
MACROCYTES BLD QL: SLIGHT — SIGNIFICANT CHANGE UP
MCHC RBC-ENTMCNC: 24.7 PG — LOW (ref 27–34)
MCHC RBC-ENTMCNC: 30.6 G/DL — LOW (ref 32–36)
MCV RBC AUTO: 80.8 FL — SIGNIFICANT CHANGE UP (ref 80–100)
MICROCYTES BLD QL: SLIGHT — SIGNIFICANT CHANGE UP
MONOCYTES # BLD AUTO: 0.5 K/UL — SIGNIFICANT CHANGE UP (ref 0–0.9)
MONOCYTES NFR BLD AUTO: 7 % — SIGNIFICANT CHANGE UP (ref 2–14)
NEUTROPHILS # BLD AUTO: 2.1 K/UL — SIGNIFICANT CHANGE UP (ref 1.8–7.4)
NEUTROPHILS NFR BLD AUTO: 40 % — LOW (ref 43–77)
OVALOCYTES BLD QL SMEAR: SLIGHT — SIGNIFICANT CHANGE UP
PLAT MORPH BLD: NORMAL — SIGNIFICANT CHANGE UP
PLATELET # BLD AUTO: 133 K/UL — LOW (ref 150–400)
POIKILOCYTOSIS BLD QL AUTO: SLIGHT — SIGNIFICANT CHANGE UP
POLYCHROMASIA BLD QL SMEAR: SLIGHT — SIGNIFICANT CHANGE UP
RBC # BLD: 4.48 M/UL — SIGNIFICANT CHANGE UP (ref 4.2–5.8)
RBC # FLD: 14.4 % — SIGNIFICANT CHANGE UP (ref 10.3–14.5)
RBC BLD AUTO: ABNORMAL
SCHISTOCYTES BLD QL AUTO: SLIGHT — SIGNIFICANT CHANGE UP
SMUDGE CELLS # BLD: PRESENT — SIGNIFICANT CHANGE UP
VARIANT LYMPHS # BLD: 1 % — SIGNIFICANT CHANGE UP (ref 0–6)
WBC # BLD: 5 K/UL — SIGNIFICANT CHANGE UP (ref 3.8–10.5)
WBC # FLD AUTO: 5 K/UL — SIGNIFICANT CHANGE UP (ref 3.8–10.5)

## 2021-01-14 NOTE — HISTORY OF PRESENT ILLNESS
[de-identified] : 59 y/o male with a hx of Amyloidosis,  Hepatitis C,  dyspepsia, hematuria, rectal bleeding, and Raynaud's phenomenon, cutaneous mastocytosis, presents for an initial Auto PSCT consultation. He is referred by Dr. Joel Wells. \par \par He initially experienced joint pain in 2014 and managed sx with Advil. In 2016, LFT was elevated and was dx with Hepatitis C which was treated with Harvoni. Patient has a hx of hematuria and bloody stool. Biopsies of the colon revealed chronic colitis and erosion. He was pleased on steroids and Mesalamine and started Humira in Mid-August. After first dose of Humira, left knee swelled up ---alleviated by Advil. After second dose of Humira, ankles swelled up and steroids were prescribed. Patient was also placed on Famotidine for early satiety and dyspepsia. His renal function is now normal and no longer experiences bloody stools........Progression in dyspepsia led to an upper endoscopy with Dr Parks on 10/30/19. Biopsy of the second portion of the duodenum revealed amyloidosis confirmed with [place] red statin. IgM immunostain is positive in areas of amyloid deposition, with kappa LC predominance. The stomach was noted to have impaired distensibility on endoscopy.Renal function has been normal, with BUN 9/creatinine 0.9.Cystoscopy was performed which showed prominent blood vessels. \par \par On 06/29/20 Mr. Saba was admitted to [facility] for an for an autologous peripheral blood stem cell transplant with high dose Melphalan prep regimen for treatment of Amyloidosis. He had no complaint at the time of his admission. \par \par Upon admission, a TLC was placed in IR. Mr. Saba received IV hydration, pain management, anxiolysis, antiemetics, nutritional support, and antibacterial / antiviral / antifungal / GI / PCP and VOD (SOS) prophylaxis. When his ANC dropped below 1000, he was started on prophylactic Ciprofloxacin. Labs were monitored on a daily basis, and he received electrolyte repletion and transfusional support as needed. Initially, his platelets were kelp at a goal of 40K for his history of amyloidosis, however post engraftment the platelets goal was kept above 15K. As of note Mr. Saba was started on Acyclovir prior \par to his admission, hence Acyclovir was continued. \par \par On 07/02/20, after pre-medication Mr. Saba received 316 mL of: Autologous, mobilized, plasma reduced, pooled, thawed, washed, HCP apheresis over \par approximately 1 hr. Cell counts as follows \par Total MNC( x10^8/kg)=8.32 \par CD34+cells ( x10^6 kg)=9.96 \par Cell Viability (%)=65 \par Mr. Saba tolerated the infusion well with no adverse resections noted. \par \par While admitted, he had pancytopenia related to high dose chemotherapy prep regiment. He also developed neutropenic fevers. When he became febrile, blood and urine cultures were sent and CXR completed. Ciprofloxacin was changed to cefepime for a broadened anti-microbial coverage. Infectious work up was negative with, negative blood/urine cultures and CXR showing clear lungs. \par \par He also had a drug induced rash secondary to Kepivance which resolved with no intervention. His hospital stay was also complicated by chemotherapy induced grade 1 intestinal mucositis with diarrhea, c. diff sample was sent off and resulted negative, his symptoms resolved with Imodium. Additionally, he experienced chemotherapy induced grade 1 GI mucositis with nausea, he was treated with anti-emetics as well as prn Carafate and Maalox, his symptoms then resolved. \par \par Early engraftment was noticed on 7/15, Zarxio was increased from 480 micrograms to 600 micrograms SQ daily. Cefepime was continued until post engraftment. On 7/18, given count recovery no fevers an negative infectious work up, negative blood/urine cultures and CXR with clear lungs, Cefepime was discontinued. \par Zarxio was discontinued on 7/19. Nasopharyngeal  Surveillance COVID swabs were preformed weekly and all resulted negative, with the most recent collected on 7/20. \par \par Currently, Mr. Saba is ready and stable for discharge with a follow up appointments at [facility].  [de-identified] :  Overall continues to feel well after returning to work ...No complaints. Compliant with post transplant meds and restrictions. Denies fever, chills, headache, dizziness, blurred vision, mucositis/odynophagia, chest pain, SOB, cough, nausea/vomiting, diarrhea/constipation, abdominal pain, dysuria, LE edema, rash, bleeding, pain\par

## 2021-01-14 NOTE — PHYSICAL EXAM
[Fully active, able to carry on all pre-disease performance without restriction] : Status 0 - Fully active, able to carry on all pre-disease performance without restriction [Normal] : affect appropriate [de-identified] : no edema [de-identified] : +BS; abdomen soft, non-distended, non-tender

## 2021-01-14 NOTE — ASSESSMENT
[FreeTextEntry1] : Mr. Saba is a 60 year old male with a medical history of amyloidosis, hepatitis C, dyspepsia, hematuria, colitis, Raynaud's phenomenon, and cutaneous mastocytosis. Status post autologous peripheral blood stem cell transplant on 7/2/20.\par \par 1) Amyloidosis\par Cardiac, GI, and ?renal involvement of amyloid \par S/p AUTO PBSCT on 7/2/20 with excellent clinical response\par 8/10/20 ECHO: LV global wall motion is normal, LV ejection fraction is normal (65%). Aortic arch is normal in size. There is no significant valvular disease. The right atrial pressure is normal. There is no pulmonary hypertension. There is no pericardial effusion. Left pleural effusion is present.\par 10/6/20 BNP = 357 (prev 409 on 4/27/20)\par Will postpone PET scan for now, arrange for early January 2021\par F/u myeloma markers sent today, will repeat BNP at next office visit, his next appt with cardiology is Feb 2021. CT/PET results discussed.....marked improvement...To consider f/u EGD / colonoscopy\par \par 2) Heme\par Thalassemia trait\par Counts stable, no indication for transfusion\par    12/18/20:   WBC 5.11   ANC 1.87   Hgb 10.3   \par Continue MV and folic acid daily\par Monitor counts \par \par 3) ID\par Continue ppx:\par - Atovaquone 750 mg BID - can stop in January 2021\par - Acyclovir 400 mg TID\par Fluconazole discontinued 8/13/20\par Post transplant restrictions reviewed. As of 8/28/20, may eat takeout once a week from a reputable place. Covid pre-cautions reviewed and reinforced \par \par Post transplant re-vaccination to start 1yr post transplant\par - 12 months post transplant vaccines - due July 2021\par - 14 months post transplant vaccines - due September 2021\par - 24 months post transplant vaccines - due July 2022\par \par 4) GI\par Continue ppx:\par Protonix discontinued 8/13/20\par PRN Zofran/Reglan for nausea/vomiting\par \par 5) Plan\par Pt educated regarding plan of care, all questions/concerns addressed..Maintenance discussed. Less clear with amyloid...will f/u and discuss with Dr Wells...\par Instructed to contact our office if develops fever or new/worsening symptoms\par F/u in 10 wks with Dr. Roman \par

## 2021-01-19 LAB — NT-PROBNP SERPL-MCNC: 181 PG/ML

## 2021-02-22 ENCOUNTER — NON-APPOINTMENT (OUTPATIENT)
Age: 61
End: 2021-02-22

## 2021-02-22 ENCOUNTER — APPOINTMENT (OUTPATIENT)
Dept: FAMILY MEDICINE | Facility: CLINIC | Age: 61
End: 2021-02-22
Payer: COMMERCIAL

## 2021-02-22 VITALS
TEMPERATURE: 97.6 F | OXYGEN SATURATION: 98 % | RESPIRATION RATE: 12 BRPM | DIASTOLIC BLOOD PRESSURE: 74 MMHG | SYSTOLIC BLOOD PRESSURE: 122 MMHG | HEART RATE: 82 BPM | BODY MASS INDEX: 27.2 KG/M2 | WEIGHT: 190 LBS | HEIGHT: 70 IN

## 2021-02-22 DIAGNOSIS — Z00.00 ENCOUNTER FOR GENERAL ADULT MEDICAL EXAMINATION W/OUT ABNORMAL FINDINGS: ICD-10-CM

## 2021-02-22 PROCEDURE — 36415 COLL VENOUS BLD VENIPUNCTURE: CPT

## 2021-02-22 PROCEDURE — 99396 PREV VISIT EST AGE 40-64: CPT | Mod: 25

## 2021-02-22 PROCEDURE — 93000 ELECTROCARDIOGRAM COMPLETE: CPT

## 2021-02-22 PROCEDURE — 99072 ADDL SUPL MATRL&STAF TM PHE: CPT

## 2021-02-22 RX ORDER — ONDANSETRON 8 MG/1
8 TABLET ORAL
Qty: 30 | Refills: 5 | Status: DISCONTINUED | COMMUNITY
Start: 2019-11-14 | End: 2021-02-22

## 2021-02-22 RX ORDER — METOCLOPRAMIDE 10 MG/1
10 TABLET ORAL
Qty: 15 | Refills: 0 | Status: DISCONTINUED | COMMUNITY
Start: 2020-07-20 | End: 2021-02-22

## 2021-02-22 NOTE — HEALTH RISK ASSESSMENT
[Good] : ~his/her~  mood as  good [Yes] : Yes [2 - 4 times a month (2 pts)] : 2-4 times a month (2 points) [1 or 2 (0 pts)] : 1 or 2 (0 points) [Never (0 pts)] : Never (0 points) [No] : In the past 12 months have you used drugs other than those required for medical reasons? No [No falls in past year] : Patient reported no falls in the past year [Patient reported colonoscopy was abnormal] : Patient reported colonoscopy was abnormal [None] : None [With Family] : lives with family [Employed] : employed [Graduate School] : graduate school [] :  [Sexually Active] : sexually active [Feels Safe at Home] : Feels safe at home [Smoke Detector] : smoke detector [Carbon Monoxide Detector] : carbon monoxide detector [Seat Belt] :  uses seat belt [] : No [de-identified] : Exercises 5 days a week [de-identified] : Regular diet [Change in mental status noted] : No change in mental status noted [Language] : denies difficulty with language [High Risk Behavior] : no high risk behavior [Reports changes in dental health] : Reports no changes in dental health [ColonoscopyDate] : 8/2019 [ColonoscopyComments] : Hemorrhagic Colitis

## 2021-02-22 NOTE — ASSESSMENT
[FreeTextEntry1] : Patient here for comprehensive exam. Patient had Stem cell transplant 7/2020. Since then, no hospitalizations and patient has been doing great without transfusions. \par \par Medications done \par Blood work done\par EKG done today

## 2021-02-22 NOTE — HISTORY OF PRESENT ILLNESS
[FreeTextEntry1] : Patient here for comprehensive exam. Patient had Stem cell transplant 7/2020. Since then, no hospitalizations and patient has been doing great without transfusions.  [de-identified] : States he feels well, eating well, exercising and back to work 12 hour days x 3 months now.

## 2021-02-22 NOTE — COUNSELING
[Fall prevention counseling provided] : Fall prevention counseling provided [Use proper foot wear] : Use proper foot wear [Behavioral health counseling provided] : Behavioral health counseling provided [AUDIT-C Screening administered and reviewed] : AUDIT-C Screening administered and reviewed [None] : None [Good understanding] : Patient has a good understanding of lifestyle changes and steps needed to achieve self management goal [FreeTextEntry2] : Non-smoker

## 2021-02-23 LAB
ALBUMIN SERPL ELPH-MCNC: 4 G/DL
ALP BLD-CCNC: 57 U/L
ALT SERPL-CCNC: 11 U/L
ANION GAP SERPL CALC-SCNC: 8 MMOL/L
APPEARANCE: CLEAR
AST SERPL-CCNC: 12 U/L
BASOPHILS # BLD AUTO: 0.02 K/UL
BASOPHILS NFR BLD AUTO: 0.5 %
BILIRUB SERPL-MCNC: 0.5 MG/DL
BILIRUBIN URINE: NEGATIVE
BLOOD URINE: NEGATIVE
BUN SERPL-MCNC: 17 MG/DL
CALCIUM SERPL-MCNC: 8.8 MG/DL
CHLORIDE SERPL-SCNC: 107 MMOL/L
CHOLEST SERPL-MCNC: 149 MG/DL
CO2 SERPL-SCNC: 27 MMOL/L
COLOR: NORMAL
CREAT SERPL-MCNC: 0.96 MG/DL
EOSINOPHIL # BLD AUTO: 0.04 K/UL
EOSINOPHIL NFR BLD AUTO: 1 %
ERYTHROCYTE [SEDIMENTATION RATE] IN BLOOD BY WESTERGREN METHOD: 3 MM/HR
ESTIMATED AVERAGE GLUCOSE: 97 MG/DL
FERRITIN SERPL-MCNC: 136 NG/ML
FOLATE SERPL-MCNC: >20 NG/ML
GLUCOSE QUALITATIVE U: NEGATIVE
GLUCOSE SERPL-MCNC: 85 MG/DL
HBA1C MFR BLD HPLC: 5 %
HCT VFR BLD CALC: 31.7 %
HDLC SERPL-MCNC: 48 MG/DL
HGB BLD-MCNC: 9.8 G/DL
IMM GRANULOCYTES NFR BLD AUTO: 0.2 %
IRON SATN MFR SERPL: 53 %
IRON SERPL-MCNC: 117 UG/DL
KETONES URINE: NEGATIVE
LDLC SERPL CALC-MCNC: 91 MG/DL
LEUKOCYTE ESTERASE URINE: NEGATIVE
LYMPHOCYTES # BLD AUTO: 2.01 K/UL
LYMPHOCYTES NFR BLD AUTO: 49.8 %
MAN DIFF?: NORMAL
MCHC RBC-ENTMCNC: 24.8 PG
MCHC RBC-ENTMCNC: 30.9 GM/DL
MCV RBC AUTO: 80.3 FL
MONOCYTES # BLD AUTO: 0.38 K/UL
MONOCYTES NFR BLD AUTO: 9.4 %
NEUTROPHILS # BLD AUTO: 1.58 K/UL
NEUTROPHILS NFR BLD AUTO: 39.1 %
NITRITE URINE: NEGATIVE
NONHDLC SERPL-MCNC: 101 MG/DL
PH URINE: 7.5
PLATELET # BLD AUTO: 128 K/UL
POTASSIUM SERPL-SCNC: 4.6 MMOL/L
PROT SERPL-MCNC: 5.8 G/DL
PROTEIN URINE: NEGATIVE
PSA SERPL-MCNC: 0.33 NG/ML
RBC # BLD: 3.95 M/UL
RBC # FLD: 16.4 %
SARS-COV-2 IGG SERPL IA-ACNC: 0.13 INDEX
SARS-COV-2 IGG SERPL QL IA: NEGATIVE
SODIUM SERPL-SCNC: 142 MMOL/L
SPECIFIC GRAVITY URINE: 1.02
TESTOST SERPL-MCNC: 661 NG/DL
TIBC SERPL-MCNC: 222 UG/DL
TRIGL SERPL-MCNC: 48 MG/DL
TSH SERPL-ACNC: 3.3 UIU/ML
UIBC SERPL-MCNC: 105 UG/DL
UROBILINOGEN URINE: NORMAL
VIT B12 SERPL-MCNC: 166 PG/ML
WBC # FLD AUTO: 4.04 K/UL

## 2021-02-24 LAB
24R-OH-CALCIDIOL SERPL-MCNC: 79 PG/ML
FSH SERPL-MCNC: 26.6 IU/L

## 2021-03-01 LAB — ESTROGEN SERPL-MCNC: 78 PG/ML

## 2021-04-13 ENCOUNTER — OUTPATIENT (OUTPATIENT)
Dept: OUTPATIENT SERVICES | Facility: HOSPITAL | Age: 61
LOS: 1 days | Discharge: HOME | End: 2021-04-13

## 2021-04-13 DIAGNOSIS — E85.9 AMYLOIDOSIS, UNSPECIFIED: ICD-10-CM

## 2021-04-15 ENCOUNTER — RESULT REVIEW (OUTPATIENT)
Age: 61
End: 2021-04-15

## 2021-04-15 ENCOUNTER — APPOINTMENT (OUTPATIENT)
Dept: HEMATOLOGY ONCOLOGY | Facility: CLINIC | Age: 61
End: 2021-04-15
Payer: COMMERCIAL

## 2021-04-15 VITALS
HEIGHT: 68.74 IN | SYSTOLIC BLOOD PRESSURE: 130 MMHG | DIASTOLIC BLOOD PRESSURE: 79 MMHG | BODY MASS INDEX: 28.41 KG/M2 | RESPIRATION RATE: 15 BRPM | OXYGEN SATURATION: 97 % | WEIGHT: 191.8 LBS | TEMPERATURE: 97.3 F | HEART RATE: 72 BPM

## 2021-04-15 DIAGNOSIS — I78.1 NEVUS, NON-NEOPLASTIC: ICD-10-CM

## 2021-04-15 LAB
ALBUMIN SERPL ELPH-MCNC: 4.4 G/DL
ALP BLD-CCNC: 56 U/L
ALT SERPL-CCNC: 14 U/L
ANION GAP SERPL CALC-SCNC: 7 MMOL/L
AST SERPL-CCNC: 12 U/L
BASOPHILS # BLD AUTO: 0.01 K/UL — SIGNIFICANT CHANGE UP (ref 0–0.2)
BASOPHILS NFR BLD AUTO: 0.2 % — SIGNIFICANT CHANGE UP (ref 0–2)
BILIRUB SERPL-MCNC: 0.3 MG/DL
BUN SERPL-MCNC: 15 MG/DL
CALCIUM SERPL-MCNC: 8.9 MG/DL
CHLORIDE SERPL-SCNC: 108 MMOL/L
CO2 SERPL-SCNC: 26 MMOL/L
CREAT SERPL-MCNC: 0.88 MG/DL
EOSINOPHIL # BLD AUTO: 0.04 K/UL — SIGNIFICANT CHANGE UP (ref 0–0.5)
EOSINOPHIL NFR BLD AUTO: 1 % — SIGNIFICANT CHANGE UP (ref 0–6)
GLUCOSE SERPL-MCNC: 71 MG/DL
HCT VFR BLD CALC: 32.9 % — LOW (ref 39–50)
HGB BLD-MCNC: 10.7 G/DL — LOW (ref 13–17)
IMM GRANULOCYTES NFR BLD AUTO: 0.2 % — SIGNIFICANT CHANGE UP (ref 0–1.5)
LDH SERPL-CCNC: 158 U/L
LYMPHOCYTES # BLD AUTO: 1.89 K/UL — SIGNIFICANT CHANGE UP (ref 1–3.3)
LYMPHOCYTES # BLD AUTO: 46 % — HIGH (ref 13–44)
MAGNESIUM SERPL-MCNC: 2 MG/DL
MCHC RBC-ENTMCNC: 25.2 PG — LOW (ref 27–34)
MCHC RBC-ENTMCNC: 32.5 G/DL — SIGNIFICANT CHANGE UP (ref 32–36)
MCV RBC AUTO: 77.4 FL — LOW (ref 80–100)
MONOCYTES # BLD AUTO: 0.35 K/UL — SIGNIFICANT CHANGE UP (ref 0–0.9)
MONOCYTES NFR BLD AUTO: 8.5 % — SIGNIFICANT CHANGE UP (ref 2–14)
NEUTROPHILS # BLD AUTO: 1.81 K/UL — SIGNIFICANT CHANGE UP (ref 1.8–7.4)
NEUTROPHILS NFR BLD AUTO: 44.1 % — SIGNIFICANT CHANGE UP (ref 43–77)
NRBC # BLD: 0 /100 WBCS — SIGNIFICANT CHANGE UP (ref 0–0)
PLATELET # BLD AUTO: 135 K/UL — LOW (ref 150–400)
POTASSIUM SERPL-SCNC: 4.8 MMOL/L
PROT SERPL-MCNC: 5.8 G/DL
RBC # BLD: 4.25 M/UL — SIGNIFICANT CHANGE UP (ref 4.2–5.8)
RBC # FLD: 15 % — HIGH (ref 10.3–14.5)
SODIUM SERPL-SCNC: 141 MMOL/L
WBC # BLD: 4.11 K/UL — SIGNIFICANT CHANGE UP (ref 3.8–10.5)
WBC # FLD AUTO: 4.11 K/UL — SIGNIFICANT CHANGE UP (ref 3.8–10.5)

## 2021-04-15 PROCEDURE — 99072 ADDL SUPL MATRL&STAF TM PHE: CPT

## 2021-04-15 PROCEDURE — 99214 OFFICE O/P EST MOD 30 MIN: CPT

## 2021-04-16 LAB
ALBUMIN MFR SERPL ELPH: 64 %
ALBUMIN SERPL-MCNC: 3.7 G/DL
ALBUMIN/GLOB SERPL: 1.8 RATIO
ALPHA1 GLOB MFR SERPL ELPH: 4.1 %
ALPHA1 GLOB SERPL ELPH-MCNC: 0.2 G/DL
ALPHA2 GLOB MFR SERPL ELPH: 11.7 %
ALPHA2 GLOB SERPL ELPH-MCNC: 0.7 G/DL
B-GLOBULIN MFR SERPL ELPH: 9.4 %
B-GLOBULIN SERPL ELPH-MCNC: 0.5 G/DL
DEPRECATED KAPPA LC FREE/LAMBDA SER: 4.09 RATIO
GAMMA GLOB FLD ELPH-MCNC: 0.6 G/DL
GAMMA GLOB MFR SERPL ELPH: 10.8 %
IGA SER QL IEP: 42 MG/DL
IGG SER QL IEP: 671 MG/DL
IGM SER QL IEP: 33 MG/DL
INTERPRETATION SERPL IEP-IMP: NORMAL
KAPPA LC CSF-MCNC: 0.64 MG/DL
KAPPA LC SERPL-MCNC: 2.62 MG/DL
M PROTEIN SPEC IFE-MCNC: NORMAL
PROT SERPL-MCNC: 5.8 G/DL
PROT SERPL-MCNC: 5.8 G/DL

## 2021-04-17 NOTE — PHYSICAL EXAM
[Fully active, able to carry on all pre-disease performance without restriction] : Status 0 - Fully active, able to carry on all pre-disease performance without restriction [Normal] : affect appropriate [de-identified] : no edema [de-identified] : +BS; abdomen soft, non-distended, non-tender

## 2021-04-17 NOTE — HISTORY OF PRESENT ILLNESS
[de-identified] : 59 y/o male with a hx of Amyloidosis,  Hepatitis C,  dyspepsia, hematuria, rectal bleeding, and Raynaud's phenomenon, cutaneous mastocytosis, presents for an initial Auto PSCT consultation. He is referred by Dr. Joel Wells. \par \par He initially experienced joint pain in 2014 and managed sx with Advil. In 2016, LFT was elevated and was dx with Hepatitis C which was treated with Harvoni. Patient has a hx of hematuria and bloody stool. Biopsies of the colon revealed chronic colitis and erosion. He was pleased on steroids and Mesalamine and started Humira in Mid-August. After first dose of Humira, left knee swelled up ---alleviated by Advil. After second dose of Humira, ankles swelled up and steroids were prescribed. Patient was also placed on Famotidine for early satiety and dyspepsia. His renal function is now normal and no longer experiences bloody stools........Progression in dyspepsia led to an upper endoscopy with Dr Parks on 10/30/19. Biopsy of the second portion of the duodenum revealed amyloidosis confirmed with [place] red statin. IgM immunostain is positive in areas of amyloid deposition, with kappa LC predominance. The stomach was noted to have impaired distensibility on endoscopy.Renal function has been normal, with BUN 9/creatinine 0.9.Cystoscopy was performed which showed prominent blood vessels. \par \par On 06/29/20 Mr. Saba was admitted to [facility] for an for an autologous peripheral blood stem cell transplant with high dose Melphalan prep regimen for treatment of Amyloidosis. He had no complaint at the time of his admission. \par \par Upon admission, a TLC was placed in IR. Mr. Saba received IV hydration, pain management, anxiolysis, antiemetics, nutritional support, and antibacterial / antiviral / antifungal / GI / PCP and VOD (SOS) prophylaxis. When his ANC dropped below 1000, he was started on prophylactic Ciprofloxacin. Labs were monitored on a daily basis, and he received electrolyte repletion and transfusional support as needed. Initially, his platelets were kelp at a goal of 40K for his history of amyloidosis, however post engraftment the platelets goal was kept above 15K. As of note Mr. Saba was started on Acyclovir prior \par to his admission, hence Acyclovir was continued. \par \par On 07/02/20, after pre-medication Mr. Saba received 316 mL of: Autologous, mobilized, plasma reduced, pooled, thawed, washed, HCP apheresis over \par approximately 1 hr. Cell counts as follows \par Total MNC( x10^8/kg)=8.32 \par CD34+cells ( x10^6 kg)=9.96 \par Cell Viability (%)=65 \par Mr. Sbaa tolerated the infusion well with no adverse resections noted. \par \par While admitted, he had pancytopenia related to high dose chemotherapy prep regiment. He also developed neutropenic fevers. When he became febrile, blood and urine cultures were sent and CXR completed. Ciprofloxacin was changed to cefepime for a broadened anti-microbial coverage. Infectious work up was negative with, negative blood/urine cultures and CXR showing clear lungs. \par \par He also had a drug induced rash secondary to Kepivance which resolved with no intervention. His hospital stay was also complicated by chemotherapy induced grade 1 intestinal mucositis with diarrhea, c. diff sample was sent off and resulted negative, his symptoms resolved with Imodium. Additionally, he experienced chemotherapy induced grade 1 GI mucositis with nausea, he was treated with anti-emetics as well as prn Carafate and Maalox, his symptoms then resolved. \par \par Early engraftment was noticed on 7/15, Zarxio was increased from 480 micrograms to 600 micrograms SQ daily. Cefepime was continued until post engraftment. On 7/18, given count recovery no fevers an negative infectious work up, negative blood/urine cultures and CXR with clear lungs, Cefepime was discontinued. \par Zarxio was discontinued on 7/19. Nasopharyngeal  Surveillance COVID swabs were preformed weekly and all resulted negative, with the most recent collected on 7/20. \par \par Currently, Mr. Saba is ready and stable for discharge with a follow up appointments at [facility].  [de-identified] :  Overall continues to feel well after returning to work ...No complaints. Compliant with post transplant meds and restrictions. Denies fever, chills, headache, dizziness, blurred vision, mucositis/odynophagia, chest pain, SOB, cough, nausea/vomiting, diarrhea/constipation, abdominal pain, dysuria, LE edema, rash, bleeding, pain\par

## 2021-04-17 NOTE — ASSESSMENT
[FreeTextEntry1] : Mr. Saba is a 60 year old male with a medical history of amyloidosis, hepatitis C, dyspepsia, hematuria, colitis, Raynaud's phenomenon, and cutaneous mastocytosis. Status post autologous peripheral blood stem cell transplant on 7/2/20. Doing very well.\par \par 1) Amyloidosis\par Cardiac, GI, and ?renal involvement of amyloid \par S/p AUTO PBSCT on 7/2/20 with excellent clinical response\par 8/10/20 ECHO: LV global wall motion is normal, LV ejection fraction is normal (65%). Aortic arch is normal in size. There is no significant valvular disease. The right atrial pressure is normal. There is no pulmonary hypertension. There is no pericardial effusion. Left pleural effusion is present.\par 10/6/20 BNP = 357 (prev 409 on 4/27/20)\par Will postpone PET scan for now, arrange for early January 2021\par F/u myeloma markers sent today, will repeat BNP at next office visit, his next appt with cardiology is Feb 2021. CT/PET results discussed.....marked improvement...To consider f/u EGD / colonoscopy...spep, quants, sflc sent...\par \par 2) Heme\par Thalassemia trait\par Counts stable, no indication for transfusion\par on b12\par Continue MV ..stopped folic acid daily\par Monitor counts \par \par 3) ID\par Continue ppx:\par - Atovaquone 750 mg BID - can stop in January 2021\par - Acyclovir 400 mg TID\par Fluconazole discontinued 8/13/20\par Post transplant restrictions reviewed. As of 8/28/20, may eat takeout once a week from a reputable place. Covid pre-cautions reviewed and reinforced \par \par Post transplant re-vaccination to start 1yr post transplant\par - 12 months post transplant vaccines - due July 2021\par - 14 months post transplant vaccines - due September 2021\par - 24 months post transplant vaccines - due July 2022\par \par 4) GI\par Continue ppx:\par Protonix discontinued 8/13/20\par PRN Zofran/Reglan for nausea/vomiting\par \par 5) Plan\par Pt educated regarding plan of care, all questions/concerns addressed..Maintenance discussed. 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

## 2021-07-10 ENCOUNTER — OUTPATIENT (OUTPATIENT)
Dept: OUTPATIENT SERVICES | Facility: HOSPITAL | Age: 61
LOS: 1 days | Discharge: HOME | End: 2021-07-10

## 2021-07-10 DIAGNOSIS — E85.9 AMYLOIDOSIS, UNSPECIFIED: ICD-10-CM

## 2021-07-13 ENCOUNTER — LABORATORY RESULT (OUTPATIENT)
Age: 61
End: 2021-07-13

## 2021-07-13 ENCOUNTER — RESULT REVIEW (OUTPATIENT)
Age: 61
End: 2021-07-13

## 2021-07-13 ENCOUNTER — APPOINTMENT (OUTPATIENT)
Dept: INFUSION THERAPY | Facility: HOSPITAL | Age: 61
End: 2021-07-13

## 2021-07-13 ENCOUNTER — APPOINTMENT (OUTPATIENT)
Dept: HEMATOLOGY ONCOLOGY | Facility: CLINIC | Age: 61
End: 2021-07-13
Payer: COMMERCIAL

## 2021-07-13 VITALS
SYSTOLIC BLOOD PRESSURE: 122 MMHG | OXYGEN SATURATION: 98 % | RESPIRATION RATE: 16 BRPM | HEIGHT: 69.84 IN | TEMPERATURE: 97 F | HEART RATE: 62 BPM | DIASTOLIC BLOOD PRESSURE: 78 MMHG | WEIGHT: 193.54 LBS | BODY MASS INDEX: 28.02 KG/M2

## 2021-07-13 DIAGNOSIS — M35.9 SYSTEMIC INVOLVEMENT OF CONNECTIVE TISSUE, UNSPECIFIED: ICD-10-CM

## 2021-07-13 LAB
BASOPHILS # BLD AUTO: 0.01 K/UL — SIGNIFICANT CHANGE UP (ref 0–0.2)
BASOPHILS NFR BLD AUTO: 0.2 % — SIGNIFICANT CHANGE UP (ref 0–2)
EOSINOPHIL # BLD AUTO: 0.09 K/UL — SIGNIFICANT CHANGE UP (ref 0–0.5)
EOSINOPHIL NFR BLD AUTO: 2.1 % — SIGNIFICANT CHANGE UP (ref 0–6)
HCT VFR BLD CALC: 32.9 % — LOW (ref 39–50)
HGB BLD-MCNC: 10.5 G/DL — LOW (ref 13–17)
IMM GRANULOCYTES NFR BLD AUTO: 0.2 % — SIGNIFICANT CHANGE UP (ref 0–1.5)
LYMPHOCYTES # BLD AUTO: 1.7 K/UL — SIGNIFICANT CHANGE UP (ref 1–3.3)
LYMPHOCYTES # BLD AUTO: 39.3 % — SIGNIFICANT CHANGE UP (ref 13–44)
MCHC RBC-ENTMCNC: 24.5 PG — LOW (ref 27–34)
MCHC RBC-ENTMCNC: 31.9 G/DL — LOW (ref 32–36)
MCV RBC AUTO: 76.9 FL — LOW (ref 80–100)
MONOCYTES # BLD AUTO: 0.43 K/UL — SIGNIFICANT CHANGE UP (ref 0–0.9)
MONOCYTES NFR BLD AUTO: 9.9 % — SIGNIFICANT CHANGE UP (ref 2–14)
NEUTROPHILS # BLD AUTO: 2.09 K/UL — SIGNIFICANT CHANGE UP (ref 1.8–7.4)
NEUTROPHILS NFR BLD AUTO: 48.3 % — SIGNIFICANT CHANGE UP (ref 43–77)
NRBC # BLD: 0 /100 WBCS — SIGNIFICANT CHANGE UP (ref 0–0)
PLATELET # BLD AUTO: 130 K/UL — LOW (ref 150–400)
RBC # BLD: 4.28 M/UL — SIGNIFICANT CHANGE UP (ref 4.2–5.8)
RBC # FLD: 15.7 % — HIGH (ref 10.3–14.5)
WBC # BLD: 4.33 K/UL — SIGNIFICANT CHANGE UP (ref 3.8–10.5)
WBC # FLD AUTO: 4.33 K/UL — SIGNIFICANT CHANGE UP (ref 3.8–10.5)

## 2021-07-13 PROCEDURE — 99214 OFFICE O/P EST MOD 30 MIN: CPT

## 2021-07-13 NOTE — ASSESSMENT
[FreeTextEntry1] : Mr. Saba is a 60 year old male with a medical history of amyloidosis, hepatitis C, dyspepsia, hematuria, colitis, Raynaud's phenomenon, and cutaneous mastocytosis. Status post autologous peripheral blood stem cell transplant on 7/2/20. Doing very well.\par \par 1) Amyloidosis\par Cardiac, GI, and ?renal involvement of amyloid \par S/p AUTO PBSCT on 7/2/20 with excellent clinical response\par 8/10/20 ECHO: LV global wall motion is normal, LV ejection fraction is normal (65%). Aortic arch is normal in size. There is no significant valvular disease. The right atrial pressure is normal. There is no pulmonary hypertension. There is no pericardial effusion. Left pleural effusion is present.\par 10/6/20 BNP = 357 (prev 409 on 4/27/20)\par Will postpone PET scan for now, arrange for early January 2021\par F/u myeloma markers sent today, will repeat BNP at next office visit, he follows  with cardiology ..CT/PET results discussed.....marked improvement...To consider f/u EGD / colonoscopy...spep, quants, sflc sent...\par \par 2) Heme\par Thalassemia trait\par Counts stable, no indication for transfusion\par on b12\par Continue MV ..stopped folic acid daily\par Monitor counts \par \par 3) ID\par Continue ppx:\par - Atovaquone 750 mg BID - can stop in January 2021\par - Acyclovir 400 mg TID..stop as of today\par Fluconazole discontinued 8/13/20\par Post transplant restrictions reviewed. As of 8/28/20, may eat takeout once a week from a reputable place. Covid pre-cautions reviewed and reinforced \par \par Post transplant re-vaccination to start 1yr post transplant\par pt asks for hep A vaccine...and shingrix\par - 12 months post transplant vaccines - due July 2021\par - 14 months post transplant vaccines - due September 2021\par - 24 months post transplant vaccines - due July 2022\par \par 4) GI\par Continue ppx:\par Protonix discontinued 8/13/20\par PRN Zofran/Reglan for nausea/vomiting\par \par 5) Plan\par Pt educated regarding plan of care, all questions/concerns addressed..Maintenance discussed. 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

## 2021-07-13 NOTE — PHYSICAL EXAM
[Fully active, able to carry on all pre-disease performance without restriction] : Status 0 - Fully active, able to carry on all pre-disease performance without restriction [Normal] : normal spine exam without palpable tenderness, no kyphosis or scoliosis [de-identified] : no edema [de-identified] : +BS; abdomen soft, non-distended, non-tender

## 2021-07-13 NOTE — HISTORY OF PRESENT ILLNESS
[de-identified] : 59 y/o male with a hx of Amyloidosis,  Hepatitis C,  dyspepsia, hematuria, rectal bleeding, and Raynaud's phenomenon, cutaneous mastocytosis, presents for f/u s/p Auto PSCT  He is referred by Dr. Joel Wells. \par \par He initially experienced joint pain in 2014 and managed sx with Advil. In 2016, LFT was elevated and was dx with Hepatitis C which was treated with Harvoni. Patient has a hx of hematuria and bloody stool. Biopsies of the colon revealed chronic colitis and erosion. He was pleased on steroids and Mesalamine and started Humira in Mid-August. After first dose of Humira, left knee swelled up ---alleviated by Advil. After second dose of Humira, ankles swelled up and steroids were prescribed. Patient was also placed on Famotidine for early satiety and dyspepsia. His renal function is now normal and no longer experiences bloody stools........Progression in dyspepsia led to an upper endoscopy with Dr Parks on 10/30/19. Biopsy of the second portion of the duodenum revealed amyloidosis confirmed with [place] red statin. IgM immunostain is positive in areas of amyloid deposition, with kappa LC predominance. The stomach was noted to have impaired distensibility on endoscopy.Renal function has been normal, with BUN 9/creatinine 0.9.Cystoscopy was performed which showed prominent blood vessels. \par \par On 06/29/20 Mr. Saba was admitted to [facility] for an for an autologous peripheral blood stem cell transplant with high dose Melphalan prep regimen for treatment of Amyloidosis. He had no complaint at the time of his admission. \par \par Upon admission, a TLC was placed in IR. Mr. Saba received IV hydration, pain management, anxiolysis, antiemetics, nutritional support, and antibacterial / antiviral / antifungal / GI / PCP and VOD (SOS) prophylaxis. When his ANC dropped below 1000, he was started on prophylactic Ciprofloxacin. Labs were monitored on a daily basis, and he received electrolyte repletion and transfusional support as needed. Initially, his platelets were kelp at a goal of 40K for his history of amyloidosis, however post engraftment the platelets goal was kept above 15K. As of note Mr. Saba was started on Acyclovir prior \par to his admission, hence Acyclovir was continued. \par \par On 07/02/20, after pre-medication Mr. Saba received 316 mL of: Autologous, mobilized, plasma reduced, pooled, thawed, washed, HCP apheresis over \par approximately 1 hr. Cell counts as follows \par Total MNC( x10^8/kg)=8.32 \par CD34+cells ( x10^6 kg)=9.96 \par Cell Viability (%)=65 \par Mr. Saba tolerated the infusion well with no adverse resections noted. \par \par While admitted, he had pancytopenia related to high dose chemotherapy prep regiment. He also developed neutropenic fevers. When he became febrile, blood and urine cultures were sent and CXR completed. Ciprofloxacin was changed to cefepime for a broadened anti-microbial coverage. Infectious work up was negative with, negative blood/urine cultures and CXR showing clear lungs. \par \par He also had a drug induced rash secondary to Kepivance which resolved with no intervention. His hospital stay was also complicated by chemotherapy induced grade 1 intestinal mucositis with diarrhea, c. diff sample was sent off and resulted negative, his symptoms resolved with Imodium. Additionally, he experienced chemotherapy induced grade 1 GI mucositis with nausea, he was treated with anti-emetics as well as prn Carafate and Maalox, his symptoms then resolved. \par \par Early engraftment was noticed on 7/15, Zarxio was increased from 480 micrograms to 600 micrograms SQ daily. Cefepime was continued until post engraftment. On 7/18, given count recovery no fevers an negative infectious work up, negative blood/urine cultures and CXR with clear lungs, Cefepime was discontinued. \par Zarxio was discontinued on 7/19. Nasopharyngeal  Surveillance COVID swabs were preformed weekly and all resulted negative, with the most recent collected on 7/20. \par \par Currently, Mr. Saba is ready and stable for discharge with a follow up appointments at [facility].  [de-identified] :  Overall continues to feel well after returning to work ...No complaints. Compliant with post transplant meds and restrictions. Denies fever, chills, headache, dizziness, blurred vision, mucositis/odynophagia, chest pain, SOB, cough, nausea/vomiting, diarrhea/constipation, abdominal pain, dysuria, LE edema, rash, bleeding, pain\par

## 2021-07-14 LAB
ALBUMIN SERPL ELPH-MCNC: 4.4 G/DL
ALP BLD-CCNC: 59 U/L
ALT SERPL-CCNC: 13 U/L
ANION GAP SERPL CALC-SCNC: 10 MMOL/L
AST SERPL-CCNC: 13 U/L
BILIRUB SERPL-MCNC: 0.5 MG/DL
BUN SERPL-MCNC: 15 MG/DL
CALCIUM SERPL-MCNC: 9.2 MG/DL
CHLORIDE SERPL-SCNC: 105 MMOL/L
CO2 SERPL-SCNC: 25 MMOL/L
CREAT SERPL-MCNC: 1 MG/DL
DEPRECATED KAPPA LC FREE/LAMBDA SER: 4.85 RATIO
FERRITIN SERPL-MCNC: 150 NG/ML
GLUCOSE SERPL-MCNC: 89 MG/DL
HEPATITIS A IGG ANTIBODY: REACTIVE
KAPPA LC CSF-MCNC: 0.53 MG/DL
KAPPA LC SERPL-MCNC: 2.57 MG/DL
LDH SERPL-CCNC: 167 U/L
MAGNESIUM SERPL-MCNC: 2.1 MG/DL
POTASSIUM SERPL-SCNC: 4.8 MMOL/L
PROT SERPL-MCNC: 5.9 G/DL
SODIUM SERPL-SCNC: 140 MMOL/L
T3 SERPL-MCNC: 88 NG/DL
T4 SERPL-MCNC: 7 UG/DL
TESTOST SERPL-MCNC: 419 NG/DL
TSH SERPL-ACNC: 2.45 UIU/ML
VIT B12 SERPL-MCNC: 460 PG/ML

## 2021-07-19 LAB
ALBUMIN MFR SERPL ELPH: 65.9 %
ALBUMIN SERPL-MCNC: 3.9 G/DL
ALBUMIN/GLOB SERPL: 2 RATIO
ALPHA1 GLOB MFR SERPL ELPH: 4.2 %
ALPHA1 GLOB SERPL ELPH-MCNC: 0.2 G/DL
ALPHA2 GLOB MFR SERPL ELPH: 11.1 %
ALPHA2 GLOB SERPL ELPH-MCNC: 0.7 G/DL
B-GLOBULIN MFR SERPL ELPH: 9 %
B-GLOBULIN SERPL ELPH-MCNC: 0.5 G/DL
DEPRECATED KAPPA LC FREE/LAMBDA SER: 4.85 RATIO
GAMMA GLOB FLD ELPH-MCNC: 0.6 G/DL
GAMMA GLOB MFR SERPL ELPH: 9.8 %
IGA SER QL IEP: 52 MG/DL
IGG SER QL IEP: 634 MG/DL
IGM SER QL IEP: 37 MG/DL
INTERPRETATION SERPL IEP-IMP: NORMAL
KAPPA LC CSF-MCNC: 0.53 MG/DL
KAPPA LC SERPL-MCNC: 2.57 MG/DL
M PROTEIN SPEC IFE-MCNC: NORMAL
PROT SERPL-MCNC: 5.9 G/DL
PROT SERPL-MCNC: 5.9 G/DL

## 2021-07-20 ENCOUNTER — APPOINTMENT (OUTPATIENT)
Dept: INFUSION THERAPY | Facility: HOSPITAL | Age: 61
End: 2021-07-20
Payer: COMMERCIAL

## 2021-07-20 PROCEDURE — 90715 TDAP VACCINE 7 YRS/> IM: CPT

## 2021-07-20 PROCEDURE — 90472 IMMUNIZATION ADMIN EACH ADD: CPT

## 2021-07-20 PROCEDURE — 90733 MPSV4 VACCINE SUBQ: CPT

## 2021-07-20 PROCEDURE — 90713 POLIOVIRUS IPV SC/IM: CPT

## 2021-07-20 PROCEDURE — 90471 IMMUNIZATION ADMIN: CPT

## 2021-09-02 ENCOUNTER — OUTPATIENT (OUTPATIENT)
Dept: OUTPATIENT SERVICES | Facility: HOSPITAL | Age: 61
LOS: 1 days | Discharge: HOME | End: 2021-09-02

## 2021-09-02 DIAGNOSIS — E85.9 AMYLOIDOSIS, UNSPECIFIED: ICD-10-CM

## 2021-09-07 ENCOUNTER — APPOINTMENT (OUTPATIENT)
Dept: INFUSION THERAPY | Facility: HOSPITAL | Age: 61
End: 2021-09-07

## 2021-09-14 ENCOUNTER — RESULT REVIEW (OUTPATIENT)
Age: 61
End: 2021-09-14

## 2021-09-14 ENCOUNTER — APPOINTMENT (OUTPATIENT)
Dept: HEMATOLOGY ONCOLOGY | Facility: CLINIC | Age: 61
End: 2021-09-14
Payer: COMMERCIAL

## 2021-09-14 ENCOUNTER — APPOINTMENT (OUTPATIENT)
Dept: INFUSION THERAPY | Facility: HOSPITAL | Age: 61
End: 2021-09-14
Payer: COMMERCIAL

## 2021-09-14 VITALS
WEIGHT: 193.76 LBS | HEIGHT: 69.84 IN | RESPIRATION RATE: 16 BRPM | HEART RATE: 64 BPM | DIASTOLIC BLOOD PRESSURE: 75 MMHG | BODY MASS INDEX: 28.05 KG/M2 | OXYGEN SATURATION: 100 % | SYSTOLIC BLOOD PRESSURE: 120 MMHG | TEMPERATURE: 97 F

## 2021-09-14 LAB
ALBUMIN SERPL ELPH-MCNC: 4.2 G/DL
ALP BLD-CCNC: 64 U/L
ALT SERPL-CCNC: 11 U/L
ANION GAP SERPL CALC-SCNC: 12 MMOL/L
AST SERPL-CCNC: 11 U/L
BASOPHILS # BLD AUTO: 0.02 K/UL — SIGNIFICANT CHANGE UP (ref 0–0.2)
BASOPHILS NFR BLD AUTO: 0.4 % — SIGNIFICANT CHANGE UP (ref 0–2)
BILIRUB SERPL-MCNC: 0.3 MG/DL
BUN SERPL-MCNC: 17 MG/DL
CALCIUM SERPL-MCNC: 9 MG/DL
CHLORIDE SERPL-SCNC: 105 MMOL/L
CO2 SERPL-SCNC: 24 MMOL/L
CREAT SERPL-MCNC: 0.91 MG/DL
EOSINOPHIL # BLD AUTO: 0.12 K/UL — SIGNIFICANT CHANGE UP (ref 0–0.5)
EOSINOPHIL NFR BLD AUTO: 2.4 % — SIGNIFICANT CHANGE UP (ref 0–6)
GLUCOSE SERPL-MCNC: 75 MG/DL
HCT VFR BLD CALC: 35.4 % — LOW (ref 39–50)
HGB BLD-MCNC: 11.1 G/DL — LOW (ref 13–17)
IMM GRANULOCYTES NFR BLD AUTO: 0.2 % — SIGNIFICANT CHANGE UP (ref 0–1.5)
LDH SERPL-CCNC: 158 U/L
LYMPHOCYTES # BLD AUTO: 2.31 K/UL — SIGNIFICANT CHANGE UP (ref 1–3.3)
LYMPHOCYTES # BLD AUTO: 46.2 % — HIGH (ref 13–44)
MAGNESIUM SERPL-MCNC: 2.1 MG/DL
MCHC RBC-ENTMCNC: 24.4 PG — LOW (ref 27–34)
MCHC RBC-ENTMCNC: 31.4 G/DL — LOW (ref 32–36)
MCV RBC AUTO: 77.8 FL — LOW (ref 80–100)
MONOCYTES # BLD AUTO: 0.43 K/UL — SIGNIFICANT CHANGE UP (ref 0–0.9)
MONOCYTES NFR BLD AUTO: 8.6 % — SIGNIFICANT CHANGE UP (ref 2–14)
NEUTROPHILS # BLD AUTO: 2.11 K/UL — SIGNIFICANT CHANGE UP (ref 1.8–7.4)
NEUTROPHILS NFR BLD AUTO: 42.2 % — LOW (ref 43–77)
NRBC # BLD: 0 /100 WBCS — SIGNIFICANT CHANGE UP (ref 0–0)
PLATELET # BLD AUTO: 139 K/UL — LOW (ref 150–400)
POTASSIUM SERPL-SCNC: 5.1 MMOL/L
PROT SERPL-MCNC: 6.1 G/DL
RBC # BLD: 4.55 M/UL — SIGNIFICANT CHANGE UP (ref 4.2–5.8)
RBC # FLD: 15.2 % — HIGH (ref 10.3–14.5)
SODIUM SERPL-SCNC: 142 MMOL/L
WBC # BLD: 5 K/UL — SIGNIFICANT CHANGE UP (ref 3.8–10.5)
WBC # FLD AUTO: 5 K/UL — SIGNIFICANT CHANGE UP (ref 3.8–10.5)

## 2021-09-14 PROCEDURE — 90714 TD VACC NO PRESV 7 YRS+ IM: CPT

## 2021-09-14 PROCEDURE — 90472 IMMUNIZATION ADMIN EACH ADD: CPT

## 2021-09-14 PROCEDURE — 90713 POLIOVIRUS IPV SC/IM: CPT

## 2021-09-14 PROCEDURE — 90471 IMMUNIZATION ADMIN: CPT

## 2021-09-14 PROCEDURE — 99214 OFFICE O/P EST MOD 30 MIN: CPT | Mod: 25

## 2021-09-19 NOTE — HISTORY OF PRESENT ILLNESS
[de-identified] : 61 y/o male with a hx of Amyloidosis,  Hepatitis C,  dyspepsia, hematuria, rectal bleeding, and Raynaud's phenomenon, cutaneous mastocytosis, presents for f/u s/p Auto PSCT  He is referred by Dr. Joel Wells. \par \par He initially experienced joint pain in 2014 and managed sx with Advil. In 2016, LFT was elevated and was dx with Hepatitis C which was treated with Harvoni. Patient has a hx of hematuria and bloody stool. Biopsies of the colon revealed chronic colitis and erosion. He was pleased on steroids and Mesalamine and started Humira in Mid-August. After first dose of Humira, left knee swelled up ---alleviated by Advil. After second dose of Humira, ankles swelled up and steroids were prescribed. Patient was also placed on Famotidine for early satiety and dyspepsia. His renal function is now normal and no longer experiences bloody stools........Progression in dyspepsia led to an upper endoscopy with Dr Parks on 10/30/19. Biopsy of the second portion of the duodenum revealed amyloidosis confirmed with Congo red statin. IgM immunostain is positive in areas of amyloid deposition, with kappa LC predominance. The stomach was noted to have impaired distensibility on endoscopy.Renal function has been normal, with BUN 9/creatinine 0.9.Cystoscopy was performed which showed prominent blood vessels. \par \par On 06/29/20 Mr. Saba was admitted to [facility] for an for an autologous peripheral blood stem cell transplant with high dose Melphalan prep regimen for treatment of Amyloidosis. He had no complaint at the time of his admission. \par \par Upon admission, a TLC was placed in IR. Mr. Saba received IV hydration, pain management, anxiolysis, antiemetics, nutritional support, and antibacterial / antiviral / antifungal / GI / PCP and VOD (SOS) prophylaxis. When his ANC dropped below 1000, he was started on prophylactic Ciprofloxacin. Labs were monitored on a daily basis, and he received electrolyte repletion and transfusional support as needed. Initially, his platelets were kelp at a goal of 40K for his history of amyloidosis, however post engraftment the platelets goal was kept above 15K. As of note Mr. Saba was started on Acyclovir prior \par to his admission, hence Acyclovir was continued. \par \par On 07/02/20, after pre-medication Mr. Saba received 316 mL of: Autologous, mobilized, plasma reduced, pooled, thawed, washed, HCP apheresis over \par approximately 1 hr. Cell counts as follows \par Total MNC( x10^8/kg)=8.32 \par CD34+cells ( x10^6 kg)=9.96 \par Cell Viability (%)=65 \par Mr. Saba tolerated the infusion well with no adverse resections noted. \par \par While admitted, he had pancytopenia related to high dose chemotherapy prep regiment. He also developed neutropenic fevers. When he became febrile, blood and urine cultures were sent and CXR completed. Ciprofloxacin was changed to cefepime for a broadened anti-microbial coverage. Infectious work up was negative with, negative blood/urine cultures and CXR showing clear lungs. \par \par He also had a drug induced rash secondary to Kepivance which resolved with no intervention. His hospital stay was also complicated by chemotherapy induced grade 1 intestinal mucositis with diarrhea, c. diff sample was sent off and resulted negative, his symptoms resolved with Imodium. Additionally, he experienced chemotherapy induced grade 1 GI mucositis with nausea, he was treated with anti-emetics as well as prn Carafate and Maalox, his symptoms then resolved. \par \par Early engraftment was noticed on 7/15, Zarxio was increased from 480 micrograms to 600 micrograms SQ daily. Cefepime was continued until post engraftment. On 7/18, given count recovery no fevers an negative infectious work up, negative blood/urine cultures and CXR with clear lungs, Cefepime was discontinued. \par Zarxio was discontinued on 7/19. Nasopharyngeal  Surveillance COVID swabs were preformed weekly and all resulted negative, with the most recent collected on 7/20. \par \par Currently, Mr. Saba is ready and stable for discharge with a follow up appointments at [facility].  [de-identified] :  Overall continues to feel well after returning to work ...No complaints. Compliant with post transplant meds and covid  restrictions. Denies fever, chills, headache, dizziness, blurred vision, mucositis/odynophagia, chest pain, SOB, cough, nausea/vomiting, diarrhea/constipation, abdominal pain, dysuria, LE edema, rash, bleeding, pain\par

## 2021-09-19 NOTE — ASSESSMENT
[FreeTextEntry1] : Mr. Saba is a 61 year old male with a medical history of amyloidosis, hepatitis C, dyspepsia, hematuria, colitis, Raynaud's phenomenon, and cutaneous mastocytosis. Status post autologous peripheral blood stem cell transplant on 7/2/20. Doing very well.\par \par 1) Amyloidosis\par Cardiac, GI, and ?renal involvement of amyloid \par S/p AUTO PBSCT on 7/2/20 with excellent clinical response\par 8/10/20 ECHO: LV global wall motion is normal, LV ejection fraction is normal (65%). Aortic arch is normal in size. There is no significant valvular disease. The right atrial pressure is normal. There is no pulmonary hypertension. There is no pericardial effusion. Left pleural effusion is present.\par 10/6/20 BNP = 357 (prev 409 on 4/27/20)\par Will postpone PET scan for now, arrange for early January 2021\par F/u myeloma markers sent today, will repeat BNP at next office visit, he follows  with cardiology ..CT/PET results discussed.....marked improvement...To consider f/u EGD / colonoscopy...spep, quants, sflc sent...\par \par 2) Heme\par Thalassemia trait\par Counts stable, no indication for transfusion\par on b12\par Continue MV ..stopped folic acid daily\par Monitor counts \par \par 3) ID\par Continue ppx:\par - Atovaquone 750 mg BID - can stop in January 2021\par - Acyclovir 400 mg TID..stop \par Fluconazole discontinued 8/13/20\par Post transplant restrictions reviewed. As of 8/28/20, may eat takeout once a week from a reputable place. Covid pre-cautions reviewed and reinforced \par \par Post transplant re-vaccination to start 1yr post transplant\par pt asks for hep A vaccine...and shingrix\par - 12 months post transplant vaccines - due July 2021\par - 14 months post transplant vaccines - due September 2021\par - 24 months post transplant vaccines - due July 2022\par covid booster advised...as well as flu shot\par \par 4) GI\par Continue ppx:\par Protonix discontinued 8/13/20\par PRN Zofran/Reglan for nausea/vomiting\par \par 5) Plan\par Pt educated regarding plan of care, all questions/concerns addressed..Maintenance discussed. 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

## 2021-09-19 NOTE — PHYSICAL EXAM
[Fully active, able to carry on all pre-disease performance without restriction] : Status 0 - Fully active, able to carry on all pre-disease performance without restriction [Normal] : affect appropriate [de-identified] : no edema [de-identified] : +BS; abdomen soft, non-distended, non-tender

## 2021-12-02 NOTE — PROGRESS NOTE ADULT - SUBJECTIVE AND OBJECTIVE BOX
Pt has a vv appt tomorrow with Dr Mack Moser. HPI: 59M with PMH of HCV, amyloidosis, dyspepsia, hematuria, colitis, Raynaud's phenomenon, and cutaneous mastocytosis here for an autologous peripheral blood stem cell transplant with high dose melphalan preparative regimen. Diagnosed in 2016 with HCV, and subsequently in 2019 found to have amyloidosis. Was treated with CyBorD x 7 cycles. Palliative following for symptom management post-transplant.    INTERVAL EVENTS:  7/6: day #4 post transplant, states having fatigue, taste changes, appetite loss; no pain, minimal nausea    ADVANCE DIRECTIVES:    DNR  MOLST  [ ]  Living Will  [ ]   DECISION MAKER(s):  [ ] Health Care Proxy(s)  [ ] Surrogate(s)  [ ] Guardian           Name(s): Phone Number(s): spouse [phone]    BASELINE (I)ADL(s) (prior to admission):  [place]: [X ]Total  [ ] Moderate [ ]Dependent    Allergies    No Known Allergies    Intolerances    MEDICATIONS  (STANDING):  acyclovir   Oral Tab/Cap 400 milliGRAM(s) Oral every 8 hours  Biotene Dry Mouth Oral Rinse 5 milliLiter(s) Swish and Spit five times a day  chlorhexidine 4% Liquid 1 Application(s) Topical <User Schedule>  ciprofloxacin     Tablet 500 milliGRAM(s) Oral every 12 hours  clotrimazole Lozenge 1 Lozenge Oral five times a day  filgrastim-sndz (ZARXIO) Injectable 480 MICROGram(s) SubCutaneous every 24 hours  fluconAZOLE   Tablet 400 milliGRAM(s) Oral daily  folic acid 1 milliGRAM(s) Oral daily  heparin  Infusion 357 Unit(s)/Hr (3.57 mL/Hr) IV Continuous <Continuous>  hydrocortisone 1% Cream 1 Application(s) Topical daily  lidocaine/prilocaine Cream 1 Application(s) Topical daily  LORazepam   Injectable 1 milliGRAM(s) IV Push every 24 hours  multivitamin 1 Tablet(s) Oral daily  pantoprazole    Tablet 40 milliGRAM(s) Oral before breakfast  sodium bicarbonate Mouth Rinse 10 milliLiter(s) Swish and Spit five times a day  sodium chloride 0.9%. 1000 milliLiter(s) (20 mL/Hr) IV Continuous <Continuous>  ursodiol Capsule 300 milliGRAM(s) Oral two times a day with meals    MEDICATIONS  (PRN):  acetaminophen   Tablet .. 650 milliGRAM(s) Oral every 6 hours PRN Temp greater or equal to 38C (100.4F), Mild Pain (1 - 3)  acetaminophen   Tablet .. 650 milliGRAM(s) Oral every 6 hours PRN Temp greater or equal to 38C (100.4F), Mild Pain (1 - 3)  AQUAPHOR (petrolatum Ointment) 1 Application(s) Topical two times a day PRN dry/itchy skin  diphenhydrAMINE 25 milliGRAM(s) Oral once PRN Rash and/or Itching  diphenhydrAMINE   Injectable 25 milliGRAM(s) IV Push every 4 hours PRN Allergy symptoms  furosemide   Injectable 20 milliGRAM(s) IV Push every 24 hours PRN If urine output is <100mL/hr for 2 hours  magnesium hydroxide Suspension 30 milliLiter(s) Oral daily PRN Constipation  metoclopramide Injectable 10 milliGRAM(s) IV Push every 6 hours PRN Nausea and/or Vomiting  polyethylene glycol 3350 17 Gram(s) Oral daily PRN Constipation  senna 1 Tablet(s) Oral at bedtime PRN Constipation  sodium chloride 0.9% lock flush 10 milliLiter(s) IV Push every 1 hour PRN Pre/post blood products, medications, blood draw, and to maintain line patency    PRESENT SYMPTOMS: [ ]Unable to obtain due to poor mentation   Source if other than patient:  [ ]Family   [ ]Team     Pain: [ ]yes [ X]no  QOL impact -   Location -                    Aggravating factors -  Quality -  Radiation -  Timing-  Severity (0-10 scale):  Minimal acceptable level (0-10 scale):     CPOT:    https://www.[place].org/getattachment/[email or web address]-6980v8339u9c/Critical-Care-Pain-Observation-Tool-(CPOT)      PAIN AD Score:     http://geriatrictoolkit.[employer].edu/cog/painad.pdf (press ctrl +  left click to view)    Dyspnea:                           [ ]Mild [ ]Moderate [ ]Severe  Anxiety:                             [ ]Mild [ ]Moderate [ ]Severe  Fatigue:                             [ ]Mild [x]Moderate [ ]Severe  Nausea:                             [ xMild [ ]Moderate [ ]Severe  Loss of appetite:              [ ]Mild [x]Moderate [ ]Severe  Constipation:                    [ ]Mild [ ]Moderate [ ]Severe    Other Symptoms: taste changes  [X ]All other review of systems negative     Palliative Performance Status Version 2:         %    http://[email or web address]/files/news/[email or web address]_performance_[email or web address]_[email or web address].pdf  PHYSICAL EXAM:  Vital Signs Last 24 Hrs  T(C): 37.4 (06 Jul 2020 13:30), Max: 37.4 (06 Jul 2020 13:30)  T(F): 99.3 (06 Jul 2020 13:30), Max: 99.3 (06 Jul 2020 13:30)  HR: 79 (06 Jul 2020 13:30) (69 - 89)  BP: 112/68 (06 Jul 2020 13:30) (100/58 - 120/76)  BP(mean): --  RR: 18 (06 Jul 2020 13:30) (18 - 18)  SpO2: 99% (06 Jul 2020 13:30) (97% - 99%)    Limited exam for immunocompromised patient's safety given BMT & to limit infection risk during COVID-19 pandemic. Please refer to primary team's examination for today.  GENERAL:  [X ]Alert  [ ]Oriented x   [ ]Lethargic  [ ]Cachexia  [ ]Unarousable  [ X]Verbal  [ ]Non-Verbal  Behavioral:   [ ] Anxiety  [ ] Delirium [ ] Agitation [ ] Other  HEENT:  [X ]Normal   [ ]Dry mouth   [ ]ET Tube/Trach  [ ]Oral lesions  PULMONARY:   [ ]Clear [ ]Tachypnea  [ ]Audible excessive secretions   [ ]Rhonchi        [ ]Right [ ]Left [ ]Bilateral  [ ]Crackles        [ ]Right [ ]Left [ ]Bilateral  [ ]Wheezing     [ ]Right [ ]Left [ ]Bilateral  [ ]Diminished breath sounds [ ]right [ ]left [ ]bilateral  CARDIOVASCULAR:    [ ]Regular [ ]Irregular [ ]Tachy  [ ]Rogelio [ ]Murmur [ ]Other  GASTROINTESTINAL:  [ ]Soft  [ ]Distended   [ ]+BS  [ ]Non tender [ ]Tender  [ ]PEG [ ]OGT/ NGT  Last BM:     GENITOURINARY:  [ ]Normal [ ] Incontinent   [ ]Oliguria/Anuria   [ ]Aguilar  MUSCULOSKELETAL:   [X ]Normal   [ ]Weakness  [ ]Bed/Wheelchair bound [ ]Edema  NEUROLOGIC:   [X ]No focal deficits  [ ]Cognitive impairment  [ ]Dysphagia [ ]Dysarthria [ ]Paresis [ ]Other   SKIN:   [ ]Normal    [ ]Rash  [ ]Pressure ulcer(s)       Present on admission [ ]y [ ]n    CRITICAL CARE:  [ ] Shock Present  [ ]Septic [ ]Cardiogenic [ ]Neurologic [ ]Hypovolemic  [ ]  Vasopressors [ ]  Inotropes   [ ]Respiratory failure present [ ]Mechanical ventilation [ ]Non-invasive ventilatory support [ ]High flow  [ ]Acute  [ ]Chronic [ ]Hypoxic  [ ]Hypercarbic [ ]Other  [ ]Other organ failure     LABS: reviewed                                   8.2    0.28  )-----------( 57       ( 06 Jul 2020 06:49 )             25.4     07-06    141  |  106  |  15  ----------------------------<  94  3.8   |  27  |  0.67    Ca    8.2<L>      06 Jul 2020 06:50  Phos  4.2     07-06  Mg     2.0     07-06          RADIOLOGY & ADDITIONAL STUDIES:    PROTEIN CALORIE MALNUTRITION PRESENT: [ ]mild [ ]moderate [ ]severe [ ]underweight [ ]morbid obesity  https://www.andeal.org/vault/2440/web/files/ONC/Table_Clinical%20Characteristics%20to%20Document%20Malnutrition-White%20JV%20et%20al%977776.pdf    Height (cm): 175 (06-29-20 @ 10:46), 175 (03-26-20 @ 20:41), 180 (11-27-19 @ 16:59)  Weight (kg): 85.7 (06-29-20 @ 10:46), 83 (03-26-20 @ 20:41), 81 (12-02-19 @ 08:18)  BMI (kg/m2): 28 (06-29-20 @ 10:46), 27.1 (03-26-20 @ 20:41), 26.4 (03-26-20 @ 20:41)    [ ]PPSV2 < or = to 30% [ ]significant weight loss  [ ]poor nutritional intake  [ ]anasarca     Albumin, Serum: 3.7 g/dL (06-30-20 @ 07:06)   [ ]Artificial Nutrition      REFERRALS:   [ ]Chaplaincy  [ ]Hospice  [ ]Child Life  [ ]Social Work  [ ]Case management [ ]Holistic Therapy     Goals of Care Document:     ______________  Miguel Angel Albrecht MD   of Geriatric and Palliative Medicine  [facility]     Please page the following number for clinical matters between the hours of 9AM and 5PM   from Monday through Friday : [phone]    After 5PM and on weekends, please page: [phone]. The Geriatric and Palliative Medicine consult service has 24/7 coverage for medical recommendations, including for symptom management needs.

## 2022-01-11 ENCOUNTER — RESULT REVIEW (OUTPATIENT)
Age: 62
End: 2022-01-11

## 2022-01-11 ENCOUNTER — APPOINTMENT (OUTPATIENT)
Dept: HEMATOLOGY ONCOLOGY | Facility: CLINIC | Age: 62
End: 2022-01-11
Payer: COMMERCIAL

## 2022-01-11 ENCOUNTER — LABORATORY RESULT (OUTPATIENT)
Age: 62
End: 2022-01-11

## 2022-01-11 ENCOUNTER — OUTPATIENT (OUTPATIENT)
Dept: OUTPATIENT SERVICES | Facility: HOSPITAL | Age: 62
LOS: 1 days | Discharge: HOME | End: 2022-01-11

## 2022-01-11 VITALS
OXYGEN SATURATION: 96 % | SYSTOLIC BLOOD PRESSURE: 133 MMHG | RESPIRATION RATE: 17 BRPM | DIASTOLIC BLOOD PRESSURE: 84 MMHG | TEMPERATURE: 97.3 F | WEIGHT: 192.68 LBS | HEIGHT: 69.84 IN | BODY MASS INDEX: 27.9 KG/M2 | HEART RATE: 84 BPM

## 2022-01-11 DIAGNOSIS — E85.9 AMYLOIDOSIS, UNSPECIFIED: ICD-10-CM

## 2022-01-11 DIAGNOSIS — I73.00 RAYNAUD'S SYNDROME W/OUT GANGRENE: ICD-10-CM

## 2022-01-11 LAB
ALBUMIN SERPL ELPH-MCNC: 4.5 G/DL
ALP BLD-CCNC: 65 U/L
ALT SERPL-CCNC: 11 U/L
ANION GAP SERPL CALC-SCNC: 11 MMOL/L
AST SERPL-CCNC: 15 U/L
B2 MICROGLOB SERPL-MCNC: 2.1 MG/L
BASOPHILS # BLD AUTO: 0.02 K/UL — SIGNIFICANT CHANGE UP (ref 0–0.2)
BASOPHILS NFR BLD AUTO: 0.4 % — SIGNIFICANT CHANGE UP (ref 0–2)
BILIRUB SERPL-MCNC: 0.3 MG/DL
BUN SERPL-MCNC: 16 MG/DL
CALCIUM SERPL-MCNC: 9.4 MG/DL
CHLORIDE SERPL-SCNC: 106 MMOL/L
CO2 SERPL-SCNC: 25 MMOL/L
CREAT SERPL-MCNC: 0.85 MG/DL
EOSINOPHIL # BLD AUTO: 0.18 K/UL — SIGNIFICANT CHANGE UP (ref 0–0.5)
EOSINOPHIL NFR BLD AUTO: 3.2 % — SIGNIFICANT CHANGE UP (ref 0–6)
GLUCOSE SERPL-MCNC: 100 MG/DL
HCT VFR BLD CALC: 35.9 % — LOW (ref 39–50)
HGB BLD-MCNC: 11.4 G/DL — LOW (ref 13–17)
IMM GRANULOCYTES NFR BLD AUTO: 0.2 % — SIGNIFICANT CHANGE UP (ref 0–1.5)
LDH SERPL-CCNC: 172 U/L
LYMPHOCYTES # BLD AUTO: 2.11 K/UL — SIGNIFICANT CHANGE UP (ref 1–3.3)
LYMPHOCYTES # BLD AUTO: 37.5 % — SIGNIFICANT CHANGE UP (ref 13–44)
MAGNESIUM SERPL-MCNC: 2.3 MG/DL
MCHC RBC-ENTMCNC: 24.1 PG — LOW (ref 27–34)
MCHC RBC-ENTMCNC: 31.8 G/DL — LOW (ref 32–36)
MCV RBC AUTO: 75.7 FL — LOW (ref 80–100)
MONOCYTES # BLD AUTO: 0.54 K/UL — SIGNIFICANT CHANGE UP (ref 0–0.9)
MONOCYTES NFR BLD AUTO: 9.6 % — SIGNIFICANT CHANGE UP (ref 2–14)
NEUTROPHILS # BLD AUTO: 2.77 K/UL — SIGNIFICANT CHANGE UP (ref 1.8–7.4)
NEUTROPHILS NFR BLD AUTO: 49.1 % — SIGNIFICANT CHANGE UP (ref 43–77)
NRBC # BLD: 0 /100 WBCS — SIGNIFICANT CHANGE UP (ref 0–0)
PLATELET # BLD AUTO: 142 K/UL — LOW (ref 150–400)
POTASSIUM SERPL-SCNC: 5.2 MMOL/L
PROT SERPL-MCNC: 6.6 G/DL
RBC # BLD: 4.74 M/UL — SIGNIFICANT CHANGE UP (ref 4.2–5.8)
RBC # FLD: 15.6 % — HIGH (ref 10.3–14.5)
SODIUM SERPL-SCNC: 142 MMOL/L
WBC # BLD: 5.63 K/UL — SIGNIFICANT CHANGE UP (ref 3.8–10.5)
WBC # FLD AUTO: 5.63 K/UL — SIGNIFICANT CHANGE UP (ref 3.8–10.5)

## 2022-01-11 PROCEDURE — 99214 OFFICE O/P EST MOD 30 MIN: CPT

## 2022-01-11 NOTE — ASSESSMENT
[FreeTextEntry1] : Mr. Saba is a 61 year old male with a medical history of amyloidosis, hepatitis C, dyspepsia, hematuria, colitis, Raynaud's phenomenon, and cutaneous mastocytosis. Status post autologous peripheral blood stem cell transplant on 7/2/20. Doing very well.\par \par 1) Amyloidosis\par Cardiac, GI, and ?renal involvement of amyloid \par S/p AUTO PBSCT on 7/2/20 with excellent clinical response\par 8/10/20 ECHO: LV global wall motion is normal, LV ejection fraction is normal (65%). Aortic arch is normal in size. There is no significant valvular disease. The right atrial pressure is normal. There is no pulmonary hypertension. There is no pericardial effusion. Left pleural effusion is present.\par 10/6/20 BNP = 357 (prev 409 on 4/27/20)\par Will postpone PET scan for now, arrange for early January 2021\par F/u myeloma markers sent today, will repeat BNP at next office visit, he follows  with cardiology ..CT/PET results discussed.....marked improvement...To consider f/u EGD / colonoscopy...spep, quants, sflc sent...\par \par 2) Heme\par Thalassemia trait\par Counts stable, no indication for transfusion\par on b12\par Continue MV ..stopped folic acid daily\par Monitor counts \par \par 3) ID\par Continue ppx:\par - Atovaquone 750 mg BID - can stop in January 2021\par - Acyclovir 400 mg TID..stop \par Fluconazole discontinued 8/13/20\par Post transplant restrictions reviewed. As of 8/28/20, may eat takeout once a week from a reputable place. Covid pre-cautions reviewed and reinforced \par \par Post transplant re-vaccination to start 1yr post transplant\par pt asks for hep A vaccine...and shingrix\par - 12 months post transplant vaccines - due July 2021\par - 14 months post transplant vaccines - due September 2021\par - 24 months post transplant vaccines - due July 2022\par covid booster advised...as well as flu shot\par \par 4) GI\par Continue ppx:\par Protonix discontinued 8/13/20\par PRN Zofran/Reglan for nausea/vomiting\par \par 5) Plan\par Pt educated regarding plan of care, all questions/concerns addressed..Maintenance discussed. 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

## 2022-01-11 NOTE — HISTORY OF PRESENT ILLNESS
[de-identified] : 61 y/o male with a hx of Amyloidosis,  Hepatitis C,  dyspepsia, hematuria, rectal bleeding, and Raynaud's phenomenon, cutaneous mastocytosis, presents for f/u s/p Auto PSCT  He is referred by Dr. Joel Wells. \par \par He initially experienced joint pain in 2014 and managed sx with Advil. In 2016, LFT was elevated and was dx with Hepatitis C which was treated with Harvoni. Patient has a hx of hematuria and bloody stool. Biopsies of the colon revealed chronic colitis and erosion. He was pleased on steroids and Mesalamine and started Humira in Mid-August. After first dose of Humira, left knee swelled up ---alleviated by Advil. After second dose of Humira, ankles swelled up and steroids were prescribed. Patient was also placed on Famotidine for early satiety and dyspepsia. His renal function is now normal and no longer experiences bloody stools........Progression in dyspepsia led to an upper endoscopy with Dr Parks on 10/30/19. Biopsy of the second portion of the duodenum revealed amyloidosis confirmed with Congo red statin. IgM immunostain is positive in areas of amyloid deposition, with kappa LC predominance. The stomach was noted to have impaired distensibility on endoscopy.Renal function has been normal, with BUN 9/creatinine 0.9.Cystoscopy was performed which showed prominent blood vessels. \par \par On 06/29/20 Mr. Saba was admitted to [facility] for an for an autologous peripheral blood stem cell transplant with high dose Melphalan prep regimen for treatment of Amyloidosis. He had no complaint at the time of his admission. \par \par Upon admission, a TLC was placed in IR. Mr. Saba received IV hydration, pain management, anxiolysis, antiemetics, nutritional support, and antibacterial / antiviral / antifungal / GI / PCP and VOD (SOS) prophylaxis. When his ANC dropped below 1000, he was started on prophylactic Ciprofloxacin. Labs were monitored on a daily basis, and he received electrolyte repletion and transfusional support as needed. Initially, his platelets were kelp at a goal of 40K for his history of amyloidosis, however post engraftment the platelets goal was kept above 15K. As of note Mr. Saba was started on Acyclovir prior \par to his admission, hence Acyclovir was continued. \par \par On 07/02/20, after pre-medication Mr. Saba received 316 mL of: Autologous, mobilized, plasma reduced, pooled, thawed, washed, HCP apheresis over \par approximately 1 hr. Cell counts as follows \par Total MNC( x10^8/kg)=8.32 \par CD34+cells ( x10^6 kg)=9.96 \par Cell Viability (%)=65 \par Mr. Saba tolerated the infusion well with no adverse resections noted. \par \par While admitted, he had pancytopenia related to high dose chemotherapy prep regiment. He also developed neutropenic fevers. When he became febrile, blood and urine cultures were sent and CXR completed. Ciprofloxacin was changed to cefepime for a broadened anti-microbial coverage. Infectious work up was negative with, negative blood/urine cultures and CXR showing clear lungs. \par \par He also had a drug induced rash secondary to Kepivance which resolved with no intervention. His hospital stay was also complicated by chemotherapy induced grade 1 intestinal mucositis with diarrhea, c. diff sample was sent off and resulted negative, his symptoms resolved with Imodium. Additionally, he experienced chemotherapy induced grade 1 GI mucositis with nausea, he was treated with anti-emetics as well as prn Carafate and Maalox, his symptoms then resolved. \par \par Early engraftment was noticed on 7/15, Zarxio was increased from 480 micrograms to 600 micrograms SQ daily. Cefepime was continued until post engraftment. On 7/18, given count recovery no fevers an negative infectious work up, negative blood/urine cultures and CXR with clear lungs, Cefepime was discontinued. \par Zarxio was discontinued on 7/19. Nasopharyngeal  Surveillance COVID swabs were preformed weekly and all resulted negative, with the most recent collected on 7/20. \par \par Currently, Mr. Saba is ready and stable for discharge with a follow up appointments at [facility].  [de-identified] :  Overall continues to feel well after returning to work ...No complaints. Compliant with post transplant meds and covid  restrictions. Denies fever, chills, headache, dizziness, blurred vision, mucositis/odynophagia, chest pain, SOB, cough, nausea/vomiting, diarrhea/constipation, abdominal pain, dysuria, LE edema, rash, bleeding, pain\par received pfizer covid vaccine 12/21/20 1/11/21 9/16/21

## 2022-01-11 NOTE — PHYSICAL EXAM
[Fully active, able to carry on all pre-disease performance without restriction] : Status 0 - Fully active, able to carry on all pre-disease performance without restriction [Normal] : affect appropriate [de-identified] : no edema [de-identified] : +BS; abdomen soft, non-distended, non-tender

## 2022-01-12 LAB
CREAT 24H UR-MCNC: 1.7 G/24 H
CREAT 24H UR-MCNC: 1.7 G/24 H
CREAT ?TM UR-MCNC: 79 MG/DL
CREAT ?TM UR-MCNC: 79 MG/DL
DEPRECATED KAPPA LC FREE/LAMBDA SER: 4.26 RATIO
KAPPA LC CSF-MCNC: 0.85 MG/DL
KAPPA LC SERPL-MCNC: 3.62 MG/DL
PROT 24H UR-MRATE: 4 MG/DL
PROT ?TM UR-MCNC: 24 HR
PROT ?TM UR-MCNC: 24 HR
PROT UR-MCNC: 88 MG/24 H
SPECIMEN VOL 24H UR: 2200 ML
SPECIMEN VOL 24H UR: 2200 ML

## 2022-01-18 LAB
ALBUMIN MFR SERPL ELPH: 63.8 %
ALBUMIN SERPL-MCNC: 4.2 G/DL
ALBUMIN/GLOB SERPL: 1.8 RATIO
ALPHA1 GLOB MFR SERPL ELPH: 4 %
ALPHA1 GLOB SERPL ELPH-MCNC: 0.3 G/DL
ALPHA2 GLOB MFR SERPL ELPH: 12.3 %
ALPHA2 GLOB SERPL ELPH-MCNC: 0.8 G/DL
B-GLOBULIN MFR SERPL ELPH: 9.2 %
B-GLOBULIN SERPL ELPH-MCNC: 0.6 G/DL
DEPRECATED KAPPA LC FREE/LAMBDA SER: 4.26 RATIO
GAMMA GLOB FLD ELPH-MCNC: 0.7 G/DL
GAMMA GLOB MFR SERPL ELPH: 10.7 %
IGA SER QL IEP: 66 MG/DL
IGG SER QL IEP: 700 MG/DL
IGM SER QL IEP: 61 MG/DL
INTERPRETATION SERPL IEP-IMP: NORMAL
KAPPA LC 24H UR QL: NORMAL
KAPPA LC CSF-MCNC: 0.85 MG/DL
KAPPA LC SERPL-MCNC: 3.62 MG/DL
M PROTEIN SPEC IFE-MCNC: NORMAL
PROT SERPL-MCNC: 6.6 G/DL
PROT SERPL-MCNC: 6.6 G/DL

## 2022-05-20 NOTE — PROCEDURE NOTE - NSNUMOFLUMENS_VASC_A_CORE
[facility] NOTE    Drug Name: Capecitabine    Patient is being discontinued from [facility] due to:   Â· medication discontinued. Goals of care: Met   1. Ensure adherence  2. Minimize side effects  3. Maximize patientâs response to therapy    Follow up plan: No further follow up needed         Jack Castaneda PharmD. [facility] Coordinator  [id number] [place] [address]  T: [phone] F: [phone]  [email or web address]

## 2022-06-13 ENCOUNTER — APPOINTMENT (OUTPATIENT)
Dept: FAMILY MEDICINE | Facility: CLINIC | Age: 62
End: 2022-06-13
Payer: COMMERCIAL

## 2022-06-13 VITALS
OXYGEN SATURATION: 96 % | HEIGHT: 69.84 IN | BODY MASS INDEX: 27.8 KG/M2 | TEMPERATURE: 98 F | RESPIRATION RATE: 16 BRPM | DIASTOLIC BLOOD PRESSURE: 70 MMHG | WEIGHT: 192 LBS | SYSTOLIC BLOOD PRESSURE: 112 MMHG | HEART RATE: 63 BPM

## 2022-06-13 DIAGNOSIS — B18.2 CHRONIC VIRAL HEPATITIS C: ICD-10-CM

## 2022-06-13 DIAGNOSIS — Z87.898 PERSONAL HISTORY OF OTHER SPECIFIED CONDITIONS: ICD-10-CM

## 2022-06-13 PROCEDURE — G0442 ANNUAL ALCOHOL SCREEN 15 MIN: CPT

## 2022-06-13 PROCEDURE — 99396 PREV VISIT EST AGE 40-64: CPT | Mod: 25

## 2022-06-13 PROCEDURE — 36415 COLL VENOUS BLD VENIPUNCTURE: CPT

## 2022-06-13 RX ORDER — ATOVAQUONE 750 MG/5ML
750 SUSPENSION ORAL TWICE DAILY
Qty: 210 | Refills: 0 | Status: COMPLETED | COMMUNITY
Start: 2020-07-20 | End: 2022-06-13

## 2022-06-13 RX ORDER — ACYCLOVIR 400 MG/1
400 TABLET ORAL
Qty: 270 | Refills: 0 | Status: COMPLETED | COMMUNITY
Start: 2020-07-20 | End: 2022-06-13

## 2022-06-13 RX ORDER — FOLIC ACID 1 MG/1
1 TABLET ORAL DAILY
Qty: 90 | Refills: 0 | Status: COMPLETED | COMMUNITY
Start: 2020-07-20 | End: 2022-06-13

## 2022-06-13 RX ORDER — SILDENAFIL 100 MG/1
100 TABLET, FILM COATED ORAL
Qty: 60 | Refills: 0 | Status: ACTIVE | COMMUNITY
Start: 2021-07-21

## 2022-06-13 NOTE — ASSESSMENT
[FreeTextEntry1] : 60 y/o M with PMHx of anemia, amyloidosis, chronic hepatitis C, polyradiculopathy and thalassemia trait presents to the office for his annual exam. \par \par Pt care plan reviewed\par Medications reviewed\par Labs ordered\par \par \par RTC in 3 months

## 2022-06-13 NOTE — HEALTH RISK ASSESSMENT
[Good] : ~his/her~  mood as  good [Yes] : Yes [2 - 4 times a month (2 pts)] : 2-4 times a month (2 points) [1 or 2 (0 pts)] : 1 or 2 (0 points) [Never (0 pts)] : Never (0 points) [No] : In the past 12 months have you used drugs other than those required for medical reasons? No [No falls in past year] : Patient reported no falls in the past year [Patient reported colonoscopy was abnormal] : Patient reported colonoscopy was abnormal [None] : None [With Family] : lives with family [Employed] : employed [Graduate School] : graduate school [] :  [Sexually Active] : sexually active [Feels Safe at Home] : Feels safe at home [Smoke Detector] : smoke detector [Carbon Monoxide Detector] : carbon monoxide detector [Seat Belt] :  uses seat belt [Never] : Never [0] : 2) Feeling down, depressed, or hopeless: Not at all (0) [PHQ-2 Negative - No further assessment needed] : PHQ-2 Negative - No further assessment needed [Patient declined Low Dose CT Scan] : Patient declined Low Dose CT Scan [No Retinopathy] : No retinopathy [HIV test declined] : HIV test declined [Hepatitis C test declined] : Hepatitis C test declined [Fully functional (bathing, dressing, toileting, transferring, walking, feeding)] : Fully functional (bathing, dressing, toileting, transferring, walking, feeding) [Fully functional (using the telephone, shopping, preparing meals, housekeeping, doing laundry, using] : Fully functional and needs no help or supervision to perform IADLs (using the telephone, shopping, preparing meals, housekeeping, doing laundry, using transportation, managing medications and managing finances) [Reports normal functional visual acuity (ie: able to read med bottle)] : Reports normal functional visual acuity [Sunscreen] : uses sunscreen [Patient/Caregiver not ready to engage] : , patient/caregiver not ready to engage [I will adhere to the patient's wishes.] : I will adhere to the patient's wishes. [Time Spent: ___ minutes] : Time Spent: [unfilled] minutes [Audit-CScore] : 2 [de-identified] : Exercises 5 days a week [de-identified] : Regular diet [[facility]] : 9 [[id number]] : 0 [LowDoseCTScan] : 06/22 [EyeExamDate] : 06/22 [Change in mental status noted] : No change in mental status noted [Language] : denies difficulty with language [Behavior] : denies difficulty with behavior [Learning/Retaining New Information] : denies difficulty learning/retaining new information [Handling Complex Tasks] : denies difficulty handling complex tasks [Reasoning] : denies difficulty with reasoning [Spatial Ability and Orientation] : denies difficulty with spatial ability and orientation [High Risk Behavior] : no high risk behavior [Reports changes in hearing] : Reports no changes in hearing [Reports changes in vision] : Reports no changes in vision [Reports changes in dental health] : Reports no changes in dental health [Guns at Home] : no guns at home [Safety elements used in home] : no safety elements used in home [Travel to Developing Areas] : does not  travel to developing areas [TB Exposure] : is not being exposed to tuberculosis [Caregiver Concerns] : does not have caregiver concerns [ColonoscopyDate] : 8/2019 [ColonoscopyComments] : Hemorrhagic Colitis  [HIVDate] : 06/22 [HepatitisCDate] : 06/22 [AdvancecareDate] : 06/22

## 2022-06-13 NOTE — DATA REVIEWED
Compartment syndrome of lower extremity due to exertion, unspecified laterality  04/27/2017    Active  Nolan Gray
[FreeTextEntry1] : Previous results reviewed

## 2022-06-13 NOTE — COUNSELING
[Fall prevention counseling provided] : Fall prevention counseling provided [Use proper foot wear] : Use proper foot wear [Behavioral health counseling provided] : Behavioral health counseling provided [AUDIT-C Screening administered and reviewed] : AUDIT-C Screening administered and reviewed [None] : None [Good understanding] : Patient has a good understanding of lifestyle changes and steps needed to achieve self management goal [Adequate lighting] : Adequate lighting [No throw rugs] : No throw rugs [Sleep ___ hours/day] : Sleep [unfilled] hours/day [Engage in a relaxing activity] : Engage in a relaxing activity [Potential consequences of obesity discussed] : Potential consequences of obesity discussed [Benefits of weight loss discussed] : Benefits of weight loss discussed [Encouraged to increase physical activity] : Encouraged to increase physical activity [Encouraged to use exercise tracking device] : Encouraged to use exercise tracking device [Weigh Self Weekly] : weigh self weekly [Decrease Portions] : decrease portions [FreeTextEntry2] : Never Smoked

## 2022-06-13 NOTE — HISTORY OF PRESENT ILLNESS
[FreeTextEntry1] : 60 y/o M with PMHx of anemia, amyloidosis, chronic hepatitis C, polyradiculopathy and thalassemia trait presents to the office for his annual exam.  [de-identified] : 62 y/o M with PMHx of anemia, amyloidosis, chronic hepatitis C, polyradiculopathy and thalassemia trait presents to the office for his annual exam. Denies fever, chills, sweats, abdominal pain, N/V/D/C, cough, SOB, dizziness and chest pain.

## 2022-06-13 NOTE — REVIEW OF SYSTEMS
[Lower Ext Edema] : lower extremity edema [Poor Libido] : poor libido [Patient Intake Form Reviewed] : Patient intake form was reviewed [Negative] : Heme/Lymph [Fever] : no fever [Chills] : no chills [Fatigue] : no fatigue [Hot Flashes] : no hot flashes [Night Sweats] : no night sweats [Discharge] : no discharge [Pain] : no pain [Redness] : no redness [Dryness] : no dryness [Earache] : no earache [Hearing Loss] : no hearing loss [Nosebleeds] : no nosebleeds [Postnasal Drip] : no postnasal drip [Chest Pain] : no chest pain [Claudication] : no  leg claudication [Palpitations] : no palpitations [Shortness Of Breath] : no shortness of breath [Wheezing] : no wheezing [Nausea] : no nausea [Abdominal Pain] : no abdominal pain [Constipation] : no constipation [Diarrhea] : no diarrhea [Vomiting] : no vomiting [Heartburn] : no heartburn [Melena] : no melena [Dysuria] : no dysuria [Incontinence] : no incontinence [Hesitancy] : no hesitancy [Nocturia] : no nocturia [Hematuria] : no hematuria [Frequency] : no frequency [Impotence] : no impotency [Joint Stiffness] : no joint stiffness [Joint Pain] : no joint pain [Muscle Pain] : no muscle pain [Muscle Weakness] : no muscle weakness

## 2022-06-29 ENCOUNTER — OUTPATIENT (OUTPATIENT)
Dept: OUTPATIENT SERVICES | Facility: HOSPITAL | Age: 62
LOS: 1 days | Discharge: HOME | End: 2022-06-29

## 2022-06-29 DIAGNOSIS — E85.9 AMYLOIDOSIS, UNSPECIFIED: ICD-10-CM

## 2022-07-11 ENCOUNTER — APPOINTMENT (OUTPATIENT)
Dept: INFUSION THERAPY | Facility: HOSPITAL | Age: 62
End: 2022-07-11

## 2022-07-11 PROCEDURE — 90746 HEPB VACCINE 3 DOSE ADULT IM: CPT

## 2022-07-11 PROCEDURE — 90472 IMMUNIZATION ADMIN EACH ADD: CPT

## 2022-07-11 PROCEDURE — G0009: CPT

## 2022-07-11 PROCEDURE — 90647 HIB PRP-OMP VACC 3 DOSE IM: CPT

## 2022-07-11 PROCEDURE — 90732 PPSV23 VACC 2 YRS+ SUBQ/IM: CPT

## 2022-07-20 ENCOUNTER — RESULT REVIEW (OUTPATIENT)
Age: 62
End: 2022-07-20

## 2022-07-20 ENCOUNTER — APPOINTMENT (OUTPATIENT)
Dept: INFUSION THERAPY | Facility: HOSPITAL | Age: 62
End: 2022-07-20

## 2022-07-20 ENCOUNTER — APPOINTMENT (OUTPATIENT)
Dept: HEMATOLOGY ONCOLOGY | Facility: CLINIC | Age: 62
End: 2022-07-20

## 2022-07-20 VITALS
RESPIRATION RATE: 16 BRPM | DIASTOLIC BLOOD PRESSURE: 75 MMHG | TEMPERATURE: 96.7 F | HEART RATE: 72 BPM | OXYGEN SATURATION: 98 % | BODY MASS INDEX: 28 KG/M2 | WEIGHT: 194.21 LBS | SYSTOLIC BLOOD PRESSURE: 114 MMHG

## 2022-07-20 LAB
ALBUMIN SERPL ELPH-MCNC: 4.4 G/DL
ALP BLD-CCNC: 60 U/L
ALT SERPL-CCNC: 13 U/L
ANION GAP SERPL CALC-SCNC: 10 MMOL/L
AST SERPL-CCNC: 17 U/L
B2 MICROGLOB SERPL-MCNC: 1.9 MG/L
BASOPHILS # BLD AUTO: 0.02 K/UL — SIGNIFICANT CHANGE UP (ref 0–0.2)
BASOPHILS NFR BLD AUTO: 0.5 % — SIGNIFICANT CHANGE UP (ref 0–2)
BILIRUB SERPL-MCNC: 0.5 MG/DL
BUN SERPL-MCNC: 15 MG/DL
CALCIUM SERPL-MCNC: 8.9 MG/DL
CHLORIDE SERPL-SCNC: 108 MMOL/L
CO2 SERPL-SCNC: 25 MMOL/L
CREAT SERPL-MCNC: 0.82 MG/DL
DEPRECATED KAPPA LC FREE/LAMBDA SER: 6.85 RATIO
EGFR: 99 ML/MIN/1.73M2
EOSINOPHIL # BLD AUTO: 0.1 K/UL — SIGNIFICANT CHANGE UP (ref 0–0.5)
EOSINOPHIL NFR BLD AUTO: 2.5 % — SIGNIFICANT CHANGE UP (ref 0–6)
GLUCOSE SERPL-MCNC: 96 MG/DL
HCT VFR BLD CALC: 34.2 % — LOW (ref 39–50)
HGB BLD-MCNC: 11 G/DL — LOW (ref 13–17)
IMM GRANULOCYTES NFR BLD AUTO: 0.2 % — SIGNIFICANT CHANGE UP (ref 0–1.5)
KAPPA LC CSF-MCNC: 0.6 MG/DL
KAPPA LC SERPL-MCNC: 4.11 MG/DL
LDH SERPL-CCNC: 200 U/L
LYMPHOCYTES # BLD AUTO: 1.62 K/UL — SIGNIFICANT CHANGE UP (ref 1–3.3)
LYMPHOCYTES # BLD AUTO: 39.9 % — SIGNIFICANT CHANGE UP (ref 13–44)
MAGNESIUM SERPL-MCNC: 2.1 MG/DL
MCHC RBC-ENTMCNC: 24.2 PG — LOW (ref 27–34)
MCHC RBC-ENTMCNC: 32.2 G/DL — SIGNIFICANT CHANGE UP (ref 32–36)
MCV RBC AUTO: 75.2 FL — LOW (ref 80–100)
MONOCYTES # BLD AUTO: 0.31 K/UL — SIGNIFICANT CHANGE UP (ref 0–0.9)
MONOCYTES NFR BLD AUTO: 7.6 % — SIGNIFICANT CHANGE UP (ref 2–14)
NEUTROPHILS # BLD AUTO: 2 K/UL — SIGNIFICANT CHANGE UP (ref 1.8–7.4)
NEUTROPHILS NFR BLD AUTO: 49.3 % — SIGNIFICANT CHANGE UP (ref 43–77)
NRBC # BLD: 0 /100 WBCS — SIGNIFICANT CHANGE UP (ref 0–0)
PLATELET # BLD AUTO: 137 K/UL — LOW (ref 150–400)
POTASSIUM SERPL-SCNC: 4.9 MMOL/L
PROT SERPL-MCNC: 6.1 G/DL
RBC # BLD: 4.55 M/UL — SIGNIFICANT CHANGE UP (ref 4.2–5.8)
RBC # FLD: 15.8 % — HIGH (ref 10.3–14.5)
SODIUM SERPL-SCNC: 143 MMOL/L
WBC # BLD: 4.06 K/UL — SIGNIFICANT CHANGE UP (ref 3.8–10.5)
WBC # FLD AUTO: 4.06 K/UL — SIGNIFICANT CHANGE UP (ref 3.8–10.5)

## 2022-07-20 PROCEDURE — 90713 POLIOVIRUS IPV SC/IM: CPT

## 2022-07-20 PROCEDURE — 90472 IMMUNIZATION ADMIN EACH ADD: CPT

## 2022-07-20 PROCEDURE — 90714 TD VACC NO PRESV 7 YRS+ IM: CPT

## 2022-07-20 PROCEDURE — 99213 OFFICE O/P EST LOW 20 MIN: CPT | Mod: 25

## 2022-07-20 PROCEDURE — 90471 IMMUNIZATION ADMIN: CPT

## 2022-07-20 NOTE — ASSESSMENT
[FreeTextEntry1] : Mr. Saba is a 62 year old male with a medical history of amyloidosis, hepatitis C, dyspepsia, hematuria, colitis, Raynaud's phenomenon, and cutaneous mastocytosis. Status post autologous peripheral blood stem cell transplant on 7/2/20. Doing very well.\par \par 1) Amyloidosis\par Cardiac, GI, and ?renal involvement of amyloid \par S/p AUTO PBSCT on 7/2/20 with excellent clinical response\par 8/10/20 ECHO: LV global wall motion is normal, LV ejection fraction is normal (65%). Aortic arch is normal in size. There is no significant valvular disease. The right atrial pressure is normal. There is no pulmonary hypertension. There is no pericardial effusion. Left pleural effusion is present.\par 10/6/20 BNP = 357 (prev 409 on 4/27/20)\par Will postpone PET scan for now, arrange for early January 2021\par PET CT completed January 1/5/21\par F/u myeloma markers sent today\par \par 2) Heme\par Thalassemia trait\par Counts stable, no indication for transfusion\par 7/20/22: WBC 4.06 H/H 11/34.2   ANC 2.0\par Continue MV \par Monitor counts \par \par 3) ID\par Currently not on ppx \par \par Post transplant re-vaccination to start 1yr post transplant\par pt asks for hep A vaccine...and shingrix\par - 12 months post transplant vaccines - completed July 2021. Received Hepatitis A vaccine. \par - 14 months post transplant vaccines - completed September 2021\par - 24 months post transplant vaccines - Received week 1 last week. Today will receive week 2 and next week will receive MMR vaccine. Received Hepatitis A vaccine. \par Received Pfizer second booster on 3/15/22\par -Recommended Shingrix vaccine\par \par 4) GI\par Currently not on ppx\par \par 5) Plan\par Pt educated regarding plan of care, all questions/concerns addressed..Maintenance discussed. 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

## 2022-07-20 NOTE — PHYSICAL EXAM
[Fully active, able to carry on all pre-disease performance without restriction] : Status 0 - Fully active, able to carry on all pre-disease performance without restriction [Normal] : affect appropriate [de-identified] : no edema [de-identified] : +BS; abdomen soft, non-distended, non-tender

## 2022-07-20 NOTE — HISTORY OF PRESENT ILLNESS
[de-identified] : 61 y/o male with a hx of Amyloidosis,  Hepatitis C,  dyspepsia, hematuria, rectal bleeding, and Raynaud's phenomenon, cutaneous mastocytosis, presents for f/u s/p Auto PSCT  He is referred by Dr. Joel Wells. \par \par He initially experienced joint pain in 2014 and managed sx with Advil. In 2016, LFT was elevated and was dx with Hepatitis C which was treated with Harvoni. Patient has a hx of hematuria and bloody stool. Biopsies of the colon revealed chronic colitis and erosion. He was pleased on steroids and Mesalamine and started Humira in Mid-August. After first dose of Humira, left knee swelled up ---alleviated by Advil. After second dose of Humira, ankles swelled up and steroids were prescribed. Patient was also placed on Famotidine for early satiety and dyspepsia. His renal function is now normal and no longer experiences bloody stools........Progression in dyspepsia led to an upper endoscopy with Dr Parks on 10/30/19. Biopsy of the second portion of the duodenum revealed amyloidosis confirmed with [place] red statin. IgM immunostain is positive in areas of amyloid deposition, with kappa LC predominance. The stomach was noted to have impaired distensibility on endoscopy.Renal function has been normal, with BUN 9/creatinine 0.9.Cystoscopy was performed which showed prominent blood vessels. \par \par On 06/29/20 Mr. Saba was admitted to [facility] for an for an autologous peripheral blood stem cell transplant with high dose Melphalan prep regimen for treatment of Amyloidosis. He had no complaint at the time of his admission. \par \par Upon admission, a TLC was placed in IR. Mr. Saba received IV hydration, pain management, anxiolysis, antiemetics, nutritional support, and antibacterial / antiviral / antifungal / GI / PCP and VOD (SOS) prophylaxis. When his ANC dropped below 1000, he was started on prophylactic Ciprofloxacin. Labs were monitored on a daily basis, and he received electrolyte repletion and transfusional support as needed. Initially, his platelets were kelp at a goal of 40K for his history of amyloidosis, however post engraftment the platelets goal was kept above 15K. As of note Mr. Saba was started on Acyclovir prior \par to his admission, hence Acyclovir was continued. \par \par On 07/02/20, after pre-medication Mr. Saba received 316 mL of: Autologous, mobilized, plasma reduced, pooled, thawed, washed, HCP apheresis over \par approximately 1 hr. Cell counts as follows \par Total MNC( x10^8/kg)=8.32 \par CD34+cells ( x10^6 kg)=9.96 \par Cell Viability (%)=65 \par Mr. Saba tolerated the infusion well with no adverse resections noted. \par \par While admitted, he had pancytopenia related to high dose chemotherapy prep regiment. He also developed neutropenic fevers. When he became febrile, blood and urine cultures were sent and CXR completed. Ciprofloxacin was changed to cefepime for a broadened anti-microbial coverage. Infectious work up was negative with, negative blood/urine cultures and CXR showing clear lungs. \par \par He also had a drug induced rash secondary to Kepivance which resolved with no intervention. His hospital stay was also complicated by chemotherapy induced grade 1 intestinal mucositis with diarrhea, c. diff sample was sent off and resulted negative, his symptoms resolved with Imodium. Additionally, he experienced chemotherapy induced grade 1 GI mucositis with nausea, he was treated with anti-emetics as well as prn Carafate and Maalox, his symptoms then resolved. \par \par Early engraftment was noticed on 7/15, Zarxio was increased from 480 micrograms to 600 micrograms SQ daily. Cefepime was continued until post engraftment. On 7/18, given count recovery no fevers an negative infectious work up, negative blood/urine cultures and CXR with clear lungs, Cefepime was discontinued. \par Zarxio was discontinued on 7/19. Nasopharyngeal  Surveillance COVID swabs were preformed weekly and all resulted negative, with the most recent collected on 7/20. \par \par Currently, Mr. Saba is ready and stable for discharge with a follow up appointments at [facility].  [de-identified] :  Overall continues to feel well after returning to work ...No complaints. Compliant with post transplant meds and covid  restrictions. Denies fever, chills, headache, dizziness, blurred vision, mucositis/odynophagia, chest pain, SOB, cough, nausea/vomiting, diarrhea/constipation, abdominal pain, dysuria, LE edema, rash, bleeding, pain\par received pfizer covid vaccine 12/21/20 1/11/21 9/16/21, 3/15/22.\par \par On 7/20/22, presents for a follow up visit. Overall well with no acute concerns. Denies fever, chills, nausea, vomiting, diarrhea, rash, mouth sores, dysuria or any signs of active bleeding. Denies SOB, chest pain or B/L LE edema. Will receive week 2 of 24 month vaccines today. States last week developed left arm swelling after receiving vaccines which has resolved. Currently taking a multivitamin daily. \par \par

## 2022-07-25 ENCOUNTER — APPOINTMENT (OUTPATIENT)
Dept: INFUSION THERAPY | Facility: HOSPITAL | Age: 62
End: 2022-07-25

## 2022-07-25 PROCEDURE — 90707 MMR VACCINE SC: CPT

## 2022-07-25 PROCEDURE — 90471 IMMUNIZATION ADMIN: CPT

## 2022-07-27 LAB
ALBUMIN MFR SERPL ELPH: 62.9 %
ALBUMIN SERPL-MCNC: 3.8 G/DL
ALBUMIN/GLOB SERPL: 1.7 RATIO
ALPHA1 GLOB MFR SERPL ELPH: 4.2 %
ALPHA1 GLOB SERPL ELPH-MCNC: 0.3 G/DL
ALPHA2 GLOB MFR SERPL ELPH: 12.5 %
ALPHA2 GLOB SERPL ELPH-MCNC: 0.8 G/DL
B-GLOBULIN MFR SERPL ELPH: 9.3 %
B-GLOBULIN SERPL ELPH-MCNC: 0.6 G/DL
DEPRECATED KAPPA LC FREE/LAMBDA SER: 6.85 RATIO
GAMMA GLOB FLD ELPH-MCNC: 0.7 G/DL
GAMMA GLOB MFR SERPL ELPH: 11.1 %
IGA SER QL IEP: 66 MG/DL
IGG SER QL IEP: 716 MG/DL
IGM SER QL IEP: 61 MG/DL
INTERPRETATION SERPL IEP-IMP: NORMAL
KAPPA LC CSF-MCNC: 0.6 MG/DL
KAPPA LC SERPL-MCNC: 4.11 MG/DL
M PROTEIN SPEC IFE-MCNC: NORMAL
PROT SERPL-MCNC: 6.1 G/DL
PROT SERPL-MCNC: 6.1 G/DL

## 2023-01-12 ENCOUNTER — OUTPATIENT (OUTPATIENT)
Dept: OUTPATIENT SERVICES | Facility: HOSPITAL | Age: 63
LOS: 1 days | Discharge: HOME | End: 2023-01-12

## 2023-01-12 DIAGNOSIS — E85.9 AMYLOIDOSIS, UNSPECIFIED: ICD-10-CM

## 2023-01-18 ENCOUNTER — RESULT REVIEW (OUTPATIENT)
Age: 63
End: 2023-01-18

## 2023-01-18 ENCOUNTER — APPOINTMENT (OUTPATIENT)
Dept: HEMATOLOGY ONCOLOGY | Facility: CLINIC | Age: 63
End: 2023-01-18
Payer: COMMERCIAL

## 2023-01-18 VITALS
BODY MASS INDEX: 29.01 KG/M2 | HEART RATE: 63 BPM | HEIGHT: 69.84 IN | TEMPERATURE: 97.4 F | RESPIRATION RATE: 16 BRPM | WEIGHT: 200.4 LBS | OXYGEN SATURATION: 98 % | SYSTOLIC BLOOD PRESSURE: 115 MMHG | DIASTOLIC BLOOD PRESSURE: 78 MMHG

## 2023-01-18 LAB
BASOPHILS # BLD AUTO: 0.02 K/UL — SIGNIFICANT CHANGE UP (ref 0–0.2)
BASOPHILS NFR BLD AUTO: 0.5 % — SIGNIFICANT CHANGE UP (ref 0–2)
EOSINOPHIL # BLD AUTO: 0.05 K/UL — SIGNIFICANT CHANGE UP (ref 0–0.5)
EOSINOPHIL NFR BLD AUTO: 1.2 % — SIGNIFICANT CHANGE UP (ref 0–6)
HCT VFR BLD CALC: 36.1 % — LOW (ref 39–50)
HGB BLD-MCNC: 11.6 G/DL — LOW (ref 13–17)
IMM GRANULOCYTES NFR BLD AUTO: 0.2 % — SIGNIFICANT CHANGE UP (ref 0–0.9)
LYMPHOCYTES # BLD AUTO: 1.67 K/UL — SIGNIFICANT CHANGE UP (ref 1–3.3)
LYMPHOCYTES # BLD AUTO: 39.1 % — SIGNIFICANT CHANGE UP (ref 13–44)
MCHC RBC-ENTMCNC: 23.9 PG — LOW (ref 27–34)
MCHC RBC-ENTMCNC: 32.1 G/DL — SIGNIFICANT CHANGE UP (ref 32–36)
MCV RBC AUTO: 74.3 FL — LOW (ref 80–100)
MONOCYTES # BLD AUTO: 0.32 K/UL — SIGNIFICANT CHANGE UP (ref 0–0.9)
MONOCYTES NFR BLD AUTO: 7.5 % — SIGNIFICANT CHANGE UP (ref 2–14)
NEUTROPHILS # BLD AUTO: 2.2 K/UL — SIGNIFICANT CHANGE UP (ref 1.8–7.4)
NEUTROPHILS NFR BLD AUTO: 51.5 % — SIGNIFICANT CHANGE UP (ref 43–77)
NRBC # BLD: 0 /100 WBCS — SIGNIFICANT CHANGE UP (ref 0–0)
PLATELET # BLD AUTO: 138 K/UL — LOW (ref 150–400)
RBC # BLD: 4.86 M/UL — SIGNIFICANT CHANGE UP (ref 4.2–5.8)
RBC # FLD: 15.4 % — HIGH (ref 10.3–14.5)
WBC # BLD: 4.27 K/UL — SIGNIFICANT CHANGE UP (ref 3.8–10.5)
WBC # FLD AUTO: 4.27 K/UL — SIGNIFICANT CHANGE UP (ref 3.8–10.5)

## 2023-01-18 PROCEDURE — 99214 OFFICE O/P EST MOD 30 MIN: CPT

## 2023-01-19 LAB
ALBUMIN SERPL ELPH-MCNC: 4.5 G/DL
ALP BLD-CCNC: 57 U/L
ALT SERPL-CCNC: 10 U/L
ANION GAP SERPL CALC-SCNC: 12 MMOL/L
AST SERPL-CCNC: 13 U/L
B2 MICROGLOB SERPL-MCNC: 1.7 MG/L
BILIRUB SERPL-MCNC: 0.3 MG/DL
BUN SERPL-MCNC: 16 MG/DL
CALCIUM SERPL-MCNC: 9 MG/DL
CHLORIDE SERPL-SCNC: 104 MMOL/L
CO2 SERPL-SCNC: 26 MMOL/L
CREAT SERPL-MCNC: 0.98 MG/DL
DEPRECATED KAPPA LC FREE/LAMBDA SER: 10.68 RATIO
EGFR: 87 ML/MIN/1.73M2
GLUCOSE SERPL-MCNC: 88 MG/DL
KAPPA LC CSF-MCNC: 0.56 MG/DL
KAPPA LC SERPL-MCNC: 5.98 MG/DL
LDH SERPL-CCNC: 155 U/L
MAGNESIUM SERPL-MCNC: 2 MG/DL
POTASSIUM SERPL-SCNC: 4.2 MMOL/L
PROT SERPL-MCNC: 6.4 G/DL
SODIUM SERPL-SCNC: 142 MMOL/L

## 2023-01-19 NOTE — HISTORY OF PRESENT ILLNESS
[de-identified] : 61 y/o male with a hx of Amyloidosis,  Hepatitis C,  dyspepsia, hematuria, rectal bleeding, and Raynaud's phenomenon, cutaneous mastocytosis, presents for f/u s/p Auto PSCT  He is referred by Dr. Joel Wells. \par \par He initially experienced joint pain in 2014 and managed sx with Advil. In 2016, LFT was elevated and was dx with Hepatitis C which was treated with Harvoni. Patient has a hx of hematuria and bloody stool. Biopsies of the colon revealed chronic colitis and erosion. He was pleased on steroids and Mesalamine and started Humira in Mid-August. After first dose of Humira, left knee swelled up ---alleviated by Advil. After second dose of Humira, ankles swelled up and steroids were prescribed. Patient was also placed on Famotidine for early satiety and dyspepsia. His renal function is now normal and no longer experiences bloody stools........Progression in dyspepsia led to an upper endoscopy with Dr Parks on 10/30/19. Biopsy of the second portion of the duodenum revealed amyloidosis confirmed with [place] red statin. IgM immunostain is positive in areas of amyloid deposition, with kappa LC predominance. The stomach was noted to have impaired distensibility on endoscopy.Renal function has been normal, with BUN 9/creatinine 0.9.Cystoscopy was performed which showed prominent blood vessels. \par \par On 06/29/20 Mr. Saba was admitted to [facility] for an for an autologous peripheral blood stem cell transplant with high dose Melphalan prep regimen for treatment of Amyloidosis. He had no complaint at the time of his admission. \par \par Upon admission, a TLC was placed in IR. Mr. Saba received IV hydration, pain management, anxiolysis, antiemetics, nutritional support, and antibacterial / antiviral / antifungal / GI / PCP and VOD (SOS) prophylaxis. When his ANC dropped below 1000, he was started on prophylactic Ciprofloxacin. Labs were monitored on a daily basis, and he received electrolyte repletion and transfusional support as needed. Initially, his platelets were kelp at a goal of 40K for his history of amyloidosis, however post engraftment the platelets goal was kept above 15K. As of note Mr. Saba was started on Acyclovir prior \par to his admission, hence Acyclovir was continued. \par \par On 07/02/20, after pre-medication Mr. Saba received 316 mL of: Autologous, mobilized, plasma reduced, pooled, thawed, washed, HCP apheresis over \par approximately 1 hr. Cell counts as follows \par Total MNC( x10^8/kg)=8.32 \par CD34+cells ( x10^6 kg)=9.96 \par Cell Viability (%)=65 \par Mr. Saba tolerated the infusion well with no adverse resections noted. \par \par While admitted, he had pancytopenia related to high dose chemotherapy prep regiment. He also developed neutropenic fevers. When he became febrile, blood and urine cultures were sent and CXR completed. Ciprofloxacin was changed to cefepime for a broadened anti-microbial coverage. Infectious work up was negative with, negative blood/urine cultures and CXR showing clear lungs. \par \par He also had a drug induced rash secondary to Kepivance which resolved with no intervention. His hospital stay was also complicated by chemotherapy induced grade 1 intestinal mucositis with diarrhea, c. diff sample was sent off and resulted negative, his symptoms resolved with Imodium. Additionally, he experienced chemotherapy induced grade 1 GI mucositis with nausea, he was treated with anti-emetics as well as prn Carafate and Maalox, his symptoms then resolved. \par \par Early engraftment was noticed on 7/15, Zarxio was increased from 480 micrograms to 600 micrograms SQ daily. Cefepime was continued until post engraftment. On 7/18, given count recovery no fevers an negative infectious work up, negative blood/urine cultures and CXR with clear lungs, Cefepime was discontinued. \par Zarxio was discontinued on 7/19. Nasopharyngeal  Surveillance COVID swabs were preformed weekly and all resulted negative, with the most recent collected on 7/20. \par \par Currently, Mr. Saba is ready and stable for discharge with a follow up appointments at [facility].  [de-identified] :  Overall continues to feel well after returning to work ...No complaints. Compliant with post transplant meds and covid  restrictions. Denies fever, chills, headache, dizziness, blurred vision, mucositis/odynophagia, chest pain, SOB, cough, nausea/vomiting, diarrhea/constipation, abdominal pain, dysuria, LE edema, rash, bleeding, pain\par received pfizer covid vaccine 12/21/20 1/11/21 9/16/21, 3/15/22.\par \par On 1/18/23 , presents for a follow up visit. Overall well with no acute concerns. Denies fever, chills, nausea, vomiting, diarrhea, rash, mouth sores, dysuria or any signs of active bleeding. Denies SOB, chest pain or B/L LE edema.  Completed  24 month vaccines. States  developed left arm swelling after receiving vaccines which has resolved. Currently taking a multivitamin daily. and no other meds...\par \par

## 2023-01-19 NOTE — PHYSICAL EXAM
[Fully active, able to carry on all pre-disease performance without restriction] : Status 0 - Fully active, able to carry on all pre-disease performance without restriction [Normal] : affect appropriate [de-identified] : no edema [de-identified] : +BS; abdomen soft, non-distended, non-tender

## 2023-01-19 NOTE — ASSESSMENT
[FreeTextEntry1] : Mr. Saba is a 62 year old male with a medical history of amyloidosis, hepatitis C, dyspepsia, hematuria, colitis, Raynaud's phenomenon, and cutaneous mastocytosis. Status post autologous peripheral blood stem cell transplant on 7/2/20. Doing very well.\par \par 1) Amyloidosis\par Cardiac, GI, and ?renal involvement of amyloid \par S/p AUTO PBSCT on 7/2/20 with excellent clinical response\par 8/10/20 ECHO: LV global wall motion is normal, LV ejection fraction is normal (65%). Aortic arch is normal in size. There is no significant valvular disease. The right atrial pressure is normal. There is no pulmonary hypertension. There is no pericardial effusion. Left pleural effusion is present.\par 10/6/20 BNP = 357 (prev 409 on 4/27/20)\par follows with GI and cardiology\par consider f/u egd / colonoscopy\par  arrange for early January 2023\par PET CT completed January 1/5/21\par F/u myeloma markers sent today...free light chains slightly elevated\par bring 24 hr urine \par \par 2) Heme\par Thalassemia trait\par Counts stable, no indication for transfusion\par Continue MV \par Monitor counts \par \par 3) ID\par Currently not on ppx \par \par Post transplant re-vaccination to start 1yr post transplant\par pt asks for hep A vaccine...and shingrix\par - 12 months post transplant vaccines - completed July 2021. Received Hepatitis A vaccine. \par - 14 months post transplant vaccines - completed September 2021\par - 24 months post transplant vaccines - Received july 2022  Received Hepatitis A vaccine. \par Received Pfizer second booster on 3/15/22\par -Recommended Shingrix vaccine\par \par 4) GI\par Currently not on ppx\par \par 5) Plan\par Pt educated regarding plan of care, all questions/concerns addressed..Maintenance discussed. 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

## 2023-01-21 LAB
ALBUMIN MFR SERPL ELPH: 63.7 %
ALBUMIN SERPL-MCNC: 4.1 G/DL
ALBUMIN/GLOB SERPL: 1.8 RATIO
ALPHA1 GLOB MFR SERPL ELPH: 3.8 %
ALPHA1 GLOB SERPL ELPH-MCNC: 0.2 G/DL
ALPHA2 GLOB MFR SERPL ELPH: 11.6 %
ALPHA2 GLOB SERPL ELPH-MCNC: 0.7 G/DL
B-GLOBULIN MFR SERPL ELPH: 9.7 %
B-GLOBULIN SERPL ELPH-MCNC: 0.6 G/DL
DEPRECATED KAPPA LC FREE/LAMBDA SER: 10.68 RATIO
GAMMA GLOB FLD ELPH-MCNC: 0.7 G/DL
GAMMA GLOB MFR SERPL ELPH: 11.2 %
IGA SER QL IEP: 71 MG/DL
IGG SER QL IEP: 758 MG/DL
IGM SER QL IEP: 67 MG/DL
INTERPRETATION SERPL IEP-IMP: NORMAL
KAPPA LC CSF-MCNC: 0.56 MG/DL
KAPPA LC SERPL-MCNC: 5.98 MG/DL
M PROTEIN SPEC IFE-MCNC: NORMAL
PROT SERPL-MCNC: 6.4 G/DL
PROT SERPL-MCNC: 6.4 G/DL

## 2023-01-25 LAB
CREAT 24H UR-MCNC: 1 G/24 H
CREAT ?TM UR-MCNC: 82 MG/DL
PROT 24H UR-MRATE: 5 MG/DL
PROT ?TM UR-MCNC: 24 HR
PROT UR-MCNC: 62 MG/24 H
SPECIMEN VOL 24H UR: 1250 ML

## 2023-05-01 ENCOUNTER — LABORATORY RESULT (OUTPATIENT)
Age: 63
End: 2023-05-01

## 2023-05-01 ENCOUNTER — APPOINTMENT (OUTPATIENT)
Dept: FAMILY MEDICINE | Facility: CLINIC | Age: 63
End: 2023-05-01
Payer: COMMERCIAL

## 2023-05-01 VITALS
SYSTOLIC BLOOD PRESSURE: 118 MMHG | OXYGEN SATURATION: 98 % | DIASTOLIC BLOOD PRESSURE: 78 MMHG | BODY MASS INDEX: 28.67 KG/M2 | HEART RATE: 78 BPM | WEIGHT: 198 LBS | HEIGHT: 69.84 IN | TEMPERATURE: 98.3 F | RESPIRATION RATE: 15 BRPM

## 2023-05-01 PROCEDURE — 99214 OFFICE O/P EST MOD 30 MIN: CPT | Mod: 25

## 2023-05-01 PROCEDURE — 36415 COLL VENOUS BLD VENIPUNCTURE: CPT

## 2023-05-01 NOTE — COUNSELING
[Fall prevention counseling provided] : Fall prevention counseling provided [Adequate lighting] : Adequate lighting [No throw rugs] : No throw rugs [Use proper foot wear] : Use proper foot wear [Behavioral health counseling provided] : Behavioral health counseling provided [Sleep ___ hours/day] : Sleep [unfilled] hours/day [Engage in a relaxing activity] : Engage in a relaxing activity [AUDIT-C Screening administered and reviewed] : AUDIT-C Screening administered and reviewed [Potential consequences of obesity discussed] : Potential consequences of obesity discussed [Benefits of weight loss discussed] : Benefits of weight loss discussed [Encouraged to increase physical activity] : Encouraged to increase physical activity [Encouraged to use exercise tracking device] : Encouraged to use exercise tracking device [Weigh Self Weekly] : weigh self weekly [Decrease Portions] : decrease portions [None] : None [Good understanding] : Patient has a good understanding of lifestyle changes and steps needed to achieve self management goal [FreeTextEntry2] : Never Smoked

## 2023-05-01 NOTE — REVIEW OF SYSTEMS
[Patient Intake Form Reviewed] : Patient intake form was reviewed [Lower Ext Edema] : lower extremity edema [Poor Libido] : poor libido [Negative] : Heme/Lymph [Fever] : no fever [Chills] : no chills [Fatigue] : no fatigue [Hot Flashes] : no hot flashes [Night Sweats] : no night sweats [Discharge] : no discharge [Pain] : no pain [Redness] : no redness [Dryness] : no dryness [Earache] : no earache [Hearing Loss] : no hearing loss [Nosebleeds] : no nosebleeds [Postnasal Drip] : no postnasal drip [Chest Pain] : no chest pain [Palpitations] : no palpitations [Claudication] : no  leg claudication [Shortness Of Breath] : no shortness of breath [Wheezing] : no wheezing [Abdominal Pain] : no abdominal pain [Nausea] : no nausea [Constipation] : no constipation [Diarrhea] : no diarrhea [Vomiting] : no vomiting [Heartburn] : no heartburn [Melena] : no melena [Dysuria] : no dysuria [Incontinence] : no incontinence [Hesitancy] : no hesitancy [Nocturia] : no nocturia [Hematuria] : no hematuria [Frequency] : no frequency [Impotence] : no impotency [Joint Pain] : no joint pain [Joint Stiffness] : no joint stiffness [Muscle Pain] : no muscle pain [Muscle Weakness] : no muscle weakness

## 2023-05-01 NOTE — HISTORY OF PRESENT ILLNESS
[de-identified] : 60 y/o M with PMHx of anemia, amyloidosis, chronic hepatitis C, polyradiculopathy and thalassemia trait presents to the office for his annual exam. Denies fever, chills, sweats, abdominal pain, N/V/D/C, cough, SOB, dizziness and chest pain. He presents for new onset hypertension. [FreeTextEntry1] : 60 y/o M with PMHx of anemia, amyloidosis, chronic hepatitis C, polyradiculopathy and thalassemia trait presents to the office for HTN

## 2023-05-01 NOTE — HEALTH RISK ASSESSMENT
[Yes] : Yes [2 - 4 times a month (2 pts)] : 2-4 times a month (2 points) [1 or 2 (0 pts)] : 1 or 2 (0 points) [Never (0 pts)] : Never (0 points) [No] : In the past 12 months have you used drugs other than those required for medical reasons? No [No falls in past year] : Patient reported no falls in the past year [0] : 2) Feeling down, depressed, or hopeless: Not at all (0) [PHQ-2 Negative - No further assessment needed] : PHQ-2 Negative - No further assessment needed [Patient/Caregiver not ready to engage] : , patient/caregiver not ready to engage [I will adhere to the patient's wishes.] : I will adhere to the patient's wishes. [Time Spent: ___ minutes] : Time Spent: [unfilled] minutes [Good] : ~his/her~  mood as  good [Patient declined Low Dose CT Scan] : Patient declined Low Dose CT Scan [No Retinopathy] : No retinopathy [Patient reported colonoscopy was abnormal] : Patient reported colonoscopy was abnormal [HIV test declined] : HIV test declined [Hepatitis C test declined] : Hepatitis C test declined [None] : None [With Family] : lives with family [Employed] : employed [Graduate School] : graduate school [] :  [Sexually Active] : sexually active [Feels Safe at Home] : Feels safe at home [Fully functional (bathing, dressing, toileting, transferring, walking, feeding)] : Fully functional (bathing, dressing, toileting, transferring, walking, feeding) [Fully functional (using the telephone, shopping, preparing meals, housekeeping, doing laundry, using] : Fully functional and needs no help or supervision to perform IADLs (using the telephone, shopping, preparing meals, housekeeping, doing laundry, using transportation, managing medications and managing finances) [Reports normal functional visual acuity (ie: able to read med bottle)] : Reports normal functional visual acuity [Smoke Detector] : smoke detector [Carbon Monoxide Detector] : carbon monoxide detector [Seat Belt] :  uses seat belt [Sunscreen] : uses sunscreen [Never] : Never [Audit-CScore] : 2 [de-identified] : Exercises 5 days a week [de-identified] : Regular diet [[facility]] : 9 [[id number]] : 0 [AdvancecareDate] : 06/22 [LowDoseCTScan] : 06/22 [EyeExamDate] : 06/22 [Change in mental status noted] : No change in mental status noted [Language] : denies difficulty with language [Behavior] : denies difficulty with behavior [Learning/Retaining New Information] : denies difficulty learning/retaining new information [Handling Complex Tasks] : denies difficulty handling complex tasks [Reasoning] : denies difficulty with reasoning [Spatial Ability and Orientation] : denies difficulty with spatial ability and orientation [High Risk Behavior] : no high risk behavior [Reports changes in hearing] : Reports no changes in hearing [Reports changes in vision] : Reports no changes in vision [Reports changes in dental health] : Reports no changes in dental health [Guns at Home] : no guns at home [Safety elements used in home] : no safety elements used in home [Travel to Developing Areas] : does not  travel to developing areas [TB Exposure] : is not being exposed to tuberculosis [Caregiver Concerns] : does not have caregiver concerns [ColonoscopyDate] : 8/2019 [ColonoscopyComments] : Hemorrhagic Colitis  [HIVDate] : 06/22 [HepatitisCDate] : 06/22

## 2023-05-02 LAB
24R-OH-CALCIDIOL SERPL-MCNC: 52.7 PG/ML
ALBUMIN SERPL ELPH-MCNC: 4.7 G/DL
ALP BLD-CCNC: 63 U/L
ALT SERPL-CCNC: 14 U/L
ANION GAP SERPL CALC-SCNC: 13 MMOL/L
APPEARANCE: CLEAR
AST SERPL-CCNC: 18 U/L
BASOPHILS # BLD AUTO: 0.02 K/UL
BASOPHILS NFR BLD AUTO: 0.3 %
BILIRUB SERPL-MCNC: 0.6 MG/DL
BILIRUBIN URINE: NEGATIVE
BLOOD URINE: NEGATIVE
BUN SERPL-MCNC: 15 MG/DL
CALCIUM SERPL-MCNC: 9.6 MG/DL
CHLORIDE SERPL-SCNC: 106 MMOL/L
CHOLEST SERPL-MCNC: 180 MG/DL
CO2 SERPL-SCNC: 26 MMOL/L
COLOR: YELLOW
COVID-19 NUCLEOCAPSID  GAM ANTIBODY INTERPRETATION: POSITIVE
CREAT SERPL-MCNC: 0.89 MG/DL
EGFR: 96 ML/MIN/1.73M2
EOSINOPHIL # BLD AUTO: 0.05 K/UL
EOSINOPHIL NFR BLD AUTO: 0.8 %
ESTIMATED AVERAGE GLUCOSE: 111 MG/DL
FOLATE SERPL-MCNC: 18.4 NG/ML
GLUCOSE QUALITATIVE U: NEGATIVE MG/DL
GLUCOSE SERPL-MCNC: 87 MG/DL
HBA1C MFR BLD HPLC: 5.5 %
HCT VFR BLD CALC: 36.6 %
HDLC SERPL-MCNC: 62 MG/DL
HGB BLD-MCNC: 11.5 G/DL
IMM GRANULOCYTES NFR BLD AUTO: 0.2 %
KETONES URINE: NEGATIVE MG/DL
LDLC SERPL CALC-MCNC: 108 MG/DL
LEUKOCYTE ESTERASE URINE: NEGATIVE
LYMPHOCYTES # BLD AUTO: 2.24 K/UL
LYMPHOCYTES NFR BLD AUTO: 37.8 %
MAN DIFF?: NORMAL
MCHC RBC-ENTMCNC: 23.6 PG
MCHC RBC-ENTMCNC: 31.4 GM/DL
MCV RBC AUTO: 75.2 FL
MONOCYTES # BLD AUTO: 0.44 K/UL
MONOCYTES NFR BLD AUTO: 7.4 %
NEUTROPHILS # BLD AUTO: 3.17 K/UL
NEUTROPHILS NFR BLD AUTO: 53.5 %
NITRITE URINE: NEGATIVE
NONHDLC SERPL-MCNC: 118 MG/DL
PH URINE: 6
PLATELET # BLD AUTO: 167 K/UL
POTASSIUM SERPL-SCNC: 4.5 MMOL/L
PROT SERPL-MCNC: 6.7 G/DL
PROTEIN URINE: NEGATIVE MG/DL
PSA SERPL-MCNC: 0.53 NG/ML
PSA SERPL-MCNC: 0.54 NG/ML
RBC # BLD: 4.87 M/UL
RBC # FLD: 15.4 %
SARS-COV-2 AB SERPL QL IA: 18.9 INDEX
SODIUM SERPL-SCNC: 145 MMOL/L
SPECIFIC GRAVITY URINE: 1.01
TESTOST SERPL-MCNC: 631 NG/DL
TRIGL SERPL-MCNC: 52 MG/DL
TSH SERPL-ACNC: 2.22 UIU/ML
UROBILINOGEN URINE: 0.2 MG/DL
VIT B12 SERPL-MCNC: 498 PG/ML
WBC # FLD AUTO: 5.93 K/UL

## 2023-06-19 ENCOUNTER — APPOINTMENT (OUTPATIENT)
Dept: NUCLEAR MEDICINE | Facility: CLINIC | Age: 63
End: 2023-06-19
Payer: COMMERCIAL

## 2023-06-19 ENCOUNTER — OUTPATIENT (OUTPATIENT)
Dept: OUTPATIENT SERVICES | Facility: HOSPITAL | Age: 63
LOS: 1 days | End: 2023-06-19

## 2023-06-19 DIAGNOSIS — Z94.84 STEM CELLS TRANSPLANT STATUS: ICD-10-CM

## 2023-06-19 PROCEDURE — 78816 PET IMAGE W/CT FULL BODY: CPT | Mod: 26,PS

## 2023-07-13 ENCOUNTER — OUTPATIENT (OUTPATIENT)
Dept: OUTPATIENT SERVICES | Facility: HOSPITAL | Age: 63
LOS: 1 days | Discharge: HOME | End: 2023-07-13

## 2023-07-13 DIAGNOSIS — E85.9 AMYLOIDOSIS, UNSPECIFIED: ICD-10-CM

## 2023-07-18 ENCOUNTER — APPOINTMENT (OUTPATIENT)
Dept: HEMATOLOGY ONCOLOGY | Facility: CLINIC | Age: 63
End: 2023-07-18
Payer: COMMERCIAL

## 2023-07-18 ENCOUNTER — RESULT REVIEW (OUTPATIENT)
Age: 63
End: 2023-07-18

## 2023-07-18 VITALS
HEART RATE: 70 BPM | WEIGHT: 202.8 LBS | SYSTOLIC BLOOD PRESSURE: 133 MMHG | DIASTOLIC BLOOD PRESSURE: 78 MMHG | BODY MASS INDEX: 29.24 KG/M2 | TEMPERATURE: 96.5 F | OXYGEN SATURATION: 98 % | RESPIRATION RATE: 16 BRPM

## 2023-07-18 LAB
BASOPHILS # BLD AUTO: 0.03 K/UL — SIGNIFICANT CHANGE UP (ref 0–0.2)
BASOPHILS NFR BLD AUTO: 0.6 % — SIGNIFICANT CHANGE UP (ref 0–2)
EOSINOPHIL # BLD AUTO: 0.09 K/UL — SIGNIFICANT CHANGE UP (ref 0–0.5)
EOSINOPHIL NFR BLD AUTO: 1.7 % — SIGNIFICANT CHANGE UP (ref 0–6)
HCT VFR BLD CALC: 34.1 % — LOW (ref 39–50)
HGB BLD-MCNC: 11 G/DL — LOW (ref 13–17)
IMM GRANULOCYTES NFR BLD AUTO: 0.2 % — SIGNIFICANT CHANGE UP (ref 0–0.9)
LYMPHOCYTES # BLD AUTO: 1.89 K/UL — SIGNIFICANT CHANGE UP (ref 1–3.3)
LYMPHOCYTES # BLD AUTO: 36.5 % — SIGNIFICANT CHANGE UP (ref 13–44)
MCHC RBC-ENTMCNC: 23.8 PG — LOW (ref 27–34)
MCHC RBC-ENTMCNC: 32.3 G/DL — SIGNIFICANT CHANGE UP (ref 32–36)
MCV RBC AUTO: 73.8 FL — LOW (ref 80–100)
MONOCYTES # BLD AUTO: 0.49 K/UL — SIGNIFICANT CHANGE UP (ref 0–0.9)
MONOCYTES NFR BLD AUTO: 9.5 % — SIGNIFICANT CHANGE UP (ref 2–14)
NEUTROPHILS # BLD AUTO: 2.67 K/UL — SIGNIFICANT CHANGE UP (ref 1.8–7.4)
NEUTROPHILS NFR BLD AUTO: 51.5 % — SIGNIFICANT CHANGE UP (ref 43–77)
NRBC # BLD: 0 /100 WBCS — SIGNIFICANT CHANGE UP (ref 0–0)
PLATELET # BLD AUTO: 142 K/UL — LOW (ref 150–400)
RBC # BLD: 4.62 M/UL — SIGNIFICANT CHANGE UP (ref 4.2–5.8)
RBC # FLD: 15.9 % — HIGH (ref 10.3–14.5)
WBC # BLD: 5.18 K/UL — SIGNIFICANT CHANGE UP (ref 3.8–10.5)
WBC # FLD AUTO: 5.18 K/UL — SIGNIFICANT CHANGE UP (ref 3.8–10.5)

## 2023-07-18 PROCEDURE — 99213 OFFICE O/P EST LOW 20 MIN: CPT

## 2023-07-19 LAB
ALBUMIN MFR SERPL ELPH: 62.1 %
ALBUMIN SERPL ELPH-MCNC: 4.4 G/DL
ALBUMIN SERPL-MCNC: 4 G/DL
ALBUMIN/GLOB SERPL: 1.7 RATIO
ALP BLD-CCNC: 65 U/L
ALPHA1 GLOB MFR SERPL ELPH: 4.6 %
ALPHA1 GLOB SERPL ELPH-MCNC: 0.3 G/DL
ALPHA2 GLOB MFR SERPL ELPH: 12.2 %
ALPHA2 GLOB SERPL ELPH-MCNC: 0.8 G/DL
ALT SERPL-CCNC: 12 U/L
ANION GAP SERPL CALC-SCNC: 11 MMOL/L
AST SERPL-CCNC: 16 U/L
B-GLOBULIN MFR SERPL ELPH: 9.9 %
B-GLOBULIN SERPL ELPH-MCNC: 0.6 G/DL
B2 MICROGLOB SERPL-MCNC: 1.9 MG/L
BILIRUB SERPL-MCNC: 0.5 MG/DL
BUN SERPL-MCNC: 20 MG/DL
CALCIUM SERPL-MCNC: 9.3 MG/DL
CHLORIDE SERPL-SCNC: 105 MMOL/L
CO2 SERPL-SCNC: 25 MMOL/L
CREAT SERPL-MCNC: 0.93 MG/DL
DEPRECATED KAPPA LC FREE/LAMBDA SER: 9.67 RATIO
EGFR: 92 ML/MIN/1.73M2
GAMMA GLOB FLD ELPH-MCNC: 0.7 G/DL
GAMMA GLOB MFR SERPL ELPH: 11.2 %
GLUCOSE SERPL-MCNC: 88 MG/DL
IGA SER QL IEP: 84 MG/DL
IGG SER QL IEP: 730 MG/DL
IGM SER QL IEP: 71 MG/DL
INTERPRETATION SERPL IEP-IMP: NORMAL
KAPPA LC CSF-MCNC: 0.67 MG/DL
KAPPA LC SERPL-MCNC: 6.48 MG/DL
LDH SERPL-CCNC: 179 U/L
M PROTEIN SPEC IFE-MCNC: NORMAL
MAGNESIUM SERPL-MCNC: 2 MG/DL
POTASSIUM SERPL-SCNC: 4.6 MMOL/L
PROT SERPL-MCNC: 6.4 G/DL
SODIUM SERPL-SCNC: 141 MMOL/L

## 2023-08-02 NOTE — PHYSICAL EXAM
[Fully active, able to carry on all pre-disease performance without restriction] : Status 0 - Fully active, able to carry on all pre-disease performance without restriction [Normal] : affect appropriate [de-identified] : no edema

## 2023-08-02 NOTE — HISTORY OF PRESENT ILLNESS
[de-identified] : 61 y/o male with a hx of Amyloidosis,  Hepatitis C,  dyspepsia, hematuria, rectal bleeding, and Raynaud's phenomenon, cutaneous mastocytosis, presents for f/u s/p Auto PSCT  He is referred by Dr. Joel Wells. \par \par He initially experienced joint pain in 2014 and managed sx with Advil. In 2016, LFT was elevated and was dx with Hepatitis C which was treated with Harvoni. Patient has a hx of hematuria and bloody stool. Biopsies of the colon revealed chronic colitis and erosion. He was pleased on steroids and Mesalamine and started Humira in Mid-August. After first dose of Humira, left knee swelled up ---alleviated by Advil. After second dose of Humira, ankles swelled up and steroids were prescribed. Patient was also placed on Famotidine for early satiety and dyspepsia. His renal function is now normal and no longer experiences bloody stools........Progression in dyspepsia led to an upper endoscopy with Dr Parks on 10/30/19. Biopsy of the second portion of the duodenum revealed amyloidosis confirmed with [place] red statin. IgM immunostain is positive in areas of amyloid deposition, with kappa LC predominance. The stomach was noted to have impaired distensibility on endoscopy.Renal function has been normal, with BUN 9/creatinine 0.9.Cystoscopy was performed which showed prominent blood vessels. \par \par On 06/29/20 Mr. Saba was admitted to [facility] for an for an autologous peripheral blood stem cell transplant with high dose Melphalan prep regimen for treatment of Amyloidosis. He had no complaint at the time of his admission. \par \par Upon admission, a TLC was placed in IR. Mr. Saba received IV hydration, pain management, anxiolysis, antiemetics, nutritional support, and antibacterial / antiviral / antifungal / GI / PCP and VOD (SOS) prophylaxis. When his ANC dropped below 1000, he was started on prophylactic Ciprofloxacin. Labs were monitored on a daily basis, and he received electrolyte repletion and transfusional support as needed. Initially, his platelets were kelp at a goal of 40K for his history of amyloidosis, however post engraftment the platelets goal was kept above 15K. As of note Mr. Saba was started on Acyclovir prior \par to his admission, hence Acyclovir was continued. \par \par On 07/02/20, after pre-medication Mr. Saba received 316 mL of: Autologous, mobilized, plasma reduced, pooled, thawed, washed, HCP apheresis over \par approximately 1 hr. Cell counts as follows \par Total MNC( x10^8/kg)=8.32 \par CD34+cells ( x10^6 kg)=9.96 \par Cell Viability (%)=65 \par Mr. Saba tolerated the infusion well with no adverse resections noted. \par \par While admitted, he had pancytopenia related to high dose chemotherapy prep regiment. He also developed neutropenic fevers. When he became febrile, blood and urine cultures were sent and CXR completed. Ciprofloxacin was changed to cefepime for a broadened anti-microbial coverage. Infectious work up was negative with, negative blood/urine cultures and CXR showing clear lungs. \par \par He also had a drug induced rash secondary to Kepivance which resolved with no intervention. His hospital stay was also complicated by chemotherapy induced grade 1 intestinal mucositis with diarrhea, c. diff sample was sent off and resulted negative, his symptoms resolved with Imodium. Additionally, he experienced chemotherapy induced grade 1 GI mucositis with nausea, he was treated with anti-emetics as well as prn Carafate and Maalox, his symptoms then resolved. \par \par Early engraftment was noticed on 7/15, Zarxio was increased from 480 micrograms to 600 micrograms SQ daily. Cefepime was continued until post engraftment. On 7/18, given count recovery no fevers an negative infectious work up, negative blood/urine cultures and CXR with clear lungs, Cefepime was discontinued. \par Zarxio was discontinued on 7/19. Nasopharyngeal  Surveillance COVID swabs were preformed weekly and all resulted negative, with the most recent collected on 7/20. \par \par Currently, Mr. Saba is ready and stable for discharge with a follow up appointments at [facility].  [de-identified] :  Overall continues to feel well after returning to work ...No complaints. Compliant with post transplant meds and covid  restrictions. Denies fever, chills, headache, dizziness, blurred vision, mucositis/odynophagia, chest pain, SOB, cough, nausea/vomiting, diarrhea/constipation, abdominal pain, dysuria, LE edema, rash, bleeding, pain received pfizer covid vaccine 12/21/20 1/11/21 9/16/21, 3/15/22.  On 1/18/23 , presents for a follow up visit. Overall well with no acute concerns. Denies fever, chills, nausea, vomiting, diarrhea, rash, mouth sores, dysuria or any signs of active bleeding. Denies SOB, chest pain or B/L LE edema.  Completed  24 month vaccines. States  developed left arm swelling after receiving vaccines which has resolved. Currently taking a multivitamin daily. and no other meds...  On 7/18/23, patient presents for a follow up visit. Overall well with no acute concerns.  Denies fever, chills, nausea, vomiting, diarrhea, rash, mouth sores, dysuria or any signs of active bleeding. Denies SOB, chest pain or B/L LE edema.

## 2023-08-02 NOTE — ASSESSMENT
[FreeTextEntry1] : Mr. Saba is a 63 year old male with a medical history of amyloidosis, hepatitis C, dyspepsia, hematuria, colitis, Raynaud's phenomenon, and cutaneous mastocytosis. Status post autologous peripheral blood stem cell transplant on 7/2/20. Doing very well.  1) Amyloidosis Cardiac, GI, and ?renal involvement of amyloid  S/p AUTO PBSCT on 7/2/20 with excellent clinical response 8/10/20 ECHO: LV global wall motion is normal, LV ejection fraction is normal (65%). Aortic arch is normal in size. There is no significant valvular disease. The right atrial pressure is normal. There is no pulmonary hypertension. There is no pericardial effusion. Left pleural effusion is present. 10/6/20 BNP = 357 (prev 409 on 4/27/20) follows with GI and cardiology consider f/u egd / colonoscopy  arrange for early January 2023 PET CT completed January 1/5/21 F/u myeloma markers sent today...free light chains slightly elevated   2) Heme Thalassemia trait Counts stable, no indication for transfusion Continue MV  Monitor counts   3) ID Currently not on ppx   Post transplant re-vaccination to start 1yr post transplant pt asks for hep A vaccine...and shingrix - 12 months post transplant vaccines - completed July 2021. Received Hepatitis A vaccine.  - 14 months post transplant vaccines - completed September 2021 - 24 months post transplant vaccines - Received july 2022  Received Hepatitis A vaccine.  Received Pfizer second booster on 3/15/22 -Recommended Shingrix vaccine  4) GI Currently not on ppx  5) Plan Pt educated regarding plan of care, all questions/concerns addressed..Maintenance discussed. 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

## 2023-08-31 NOTE — PROGRESS NOTE ADULT - PROBLEM/PLAN-1
Writer spoke to patient to convey Vitamin B12 lab result and NP's recommendations to continue monthly vitamin b12 injections and repeat vitamin b12 level prior to injection in 6 months.  Patient verbalized understanding.  
DISPLAY PLAN FREE TEXT

## 2024-01-12 RX ORDER — AMLODIPINE BESYLATE 10 MG/1
10 TABLET ORAL
Qty: 90 | Refills: 3 | Status: ACTIVE | COMMUNITY
Start: 2023-04-21 | End: 1900-01-01

## 2024-02-02 ENCOUNTER — OUTPATIENT (OUTPATIENT)
Dept: OUTPATIENT SERVICES | Facility: HOSPITAL | Age: 64
LOS: 1 days | Discharge: HOME | End: 2024-02-02

## 2024-02-02 DIAGNOSIS — E85.9 AMYLOIDOSIS, UNSPECIFIED: ICD-10-CM

## 2024-02-08 ENCOUNTER — RESULT REVIEW (OUTPATIENT)
Age: 64
End: 2024-02-08

## 2024-02-08 ENCOUNTER — APPOINTMENT (OUTPATIENT)
Dept: HEMATOLOGY ONCOLOGY | Facility: CLINIC | Age: 64
End: 2024-02-08

## 2024-02-08 ENCOUNTER — APPOINTMENT (OUTPATIENT)
Dept: HEMATOLOGY ONCOLOGY | Facility: CLINIC | Age: 64
End: 2024-02-08
Payer: COMMERCIAL

## 2024-02-08 VITALS
OXYGEN SATURATION: 97 % | HEART RATE: 67 BPM | RESPIRATION RATE: 16 BRPM | BODY MASS INDEX: 29.17 KG/M2 | DIASTOLIC BLOOD PRESSURE: 79 MMHG | WEIGHT: 202.38 LBS | TEMPERATURE: 97.9 F | SYSTOLIC BLOOD PRESSURE: 119 MMHG

## 2024-02-08 DIAGNOSIS — D47.01 CUTANEOUS MASTOCYTOSIS: ICD-10-CM

## 2024-02-08 LAB
BASOPHILS # BLD AUTO: 0.02 K/UL — SIGNIFICANT CHANGE UP (ref 0–0.2)
BASOPHILS NFR BLD AUTO: 0.4 % — SIGNIFICANT CHANGE UP (ref 0–2)
EOSINOPHIL # BLD AUTO: 0.1 K/UL — SIGNIFICANT CHANGE UP (ref 0–0.5)
EOSINOPHIL NFR BLD AUTO: 1.8 % — SIGNIFICANT CHANGE UP (ref 0–6)
HCT VFR BLD CALC: 38.6 % — LOW (ref 39–50)
HGB BLD-MCNC: 12.3 G/DL — LOW (ref 13–17)
IMM GRANULOCYTES NFR BLD AUTO: 0.2 % — SIGNIFICANT CHANGE UP (ref 0–0.9)
LYMPHOCYTES # BLD AUTO: 2.79 K/UL — SIGNIFICANT CHANGE UP (ref 1–3.3)
LYMPHOCYTES # BLD AUTO: 49 % — HIGH (ref 13–44)
MCHC RBC-ENTMCNC: 23.4 PG — LOW (ref 27–34)
MCHC RBC-ENTMCNC: 31.9 G/DL — LOW (ref 32–36)
MCV RBC AUTO: 73.5 FL — LOW (ref 80–100)
MONOCYTES # BLD AUTO: 0.54 K/UL — SIGNIFICANT CHANGE UP (ref 0–0.9)
MONOCYTES NFR BLD AUTO: 9.5 % — SIGNIFICANT CHANGE UP (ref 2–14)
NEUTROPHILS # BLD AUTO: 2.23 K/UL — SIGNIFICANT CHANGE UP (ref 1.8–7.4)
NEUTROPHILS NFR BLD AUTO: 39.1 % — LOW (ref 43–77)
NRBC # BLD: 0 /100 WBCS — SIGNIFICANT CHANGE UP (ref 0–0)
PLATELET # BLD AUTO: 169 K/UL — SIGNIFICANT CHANGE UP (ref 150–400)
RBC # BLD: 5.25 M/UL — SIGNIFICANT CHANGE UP (ref 4.2–5.8)
RBC # FLD: 15.8 % — HIGH (ref 10.3–14.5)
WBC # BLD: 5.69 K/UL — SIGNIFICANT CHANGE UP (ref 3.8–10.5)
WBC # FLD AUTO: 5.69 K/UL — SIGNIFICANT CHANGE UP (ref 3.8–10.5)

## 2024-02-08 PROCEDURE — 99214 OFFICE O/P EST MOD 30 MIN: CPT

## 2024-02-09 LAB
ALBUMIN SERPL ELPH-MCNC: 4.7 G/DL
ALP BLD-CCNC: 62 U/L
ALT SERPL-CCNC: 13 U/L
ANION GAP SERPL CALC-SCNC: 12 MMOL/L
AST SERPL-CCNC: 12 U/L
B2 MICROGLOB SERPL-MCNC: 2 MG/L
BILIRUB SERPL-MCNC: 0.3 MG/DL
BUN SERPL-MCNC: 18 MG/DL
CALCIUM SERPL-MCNC: 9.6 MG/DL
CHLORIDE SERPL-SCNC: 105 MMOL/L
CO2 SERPL-SCNC: 26 MMOL/L
CREAT SERPL-MCNC: 0.88 MG/DL
EGFR: 96 ML/MIN/1.73M2
GLUCOSE SERPL-MCNC: 83 MG/DL
LDH SERPL-CCNC: 177 U/L
MAGNESIUM SERPL-MCNC: 2.2 MG/DL
POTASSIUM SERPL-SCNC: 5.1 MMOL/L
PROT SERPL-MCNC: 7 G/DL
SODIUM SERPL-SCNC: 143 MMOL/L

## 2024-02-09 NOTE — ADDENDUM
[FreeTextEntry1] :  Documented by Manuela Carter acting as a scribe for Dr. Lila Roman on 2/8/24. All medical record entries made by the Scribe were at my, Dr. Lila Roman, direction and personally dictated by me on 2/8/24. I have reviewed the chart and agree that the record accurately reflects my personal performance of the history, physical exam, assessment and plan. I have also personally directed, reviewed, and agree with the discharge instructions.

## 2024-02-09 NOTE — HISTORY OF PRESENT ILLNESS
[de-identified] : 61 y/o male with a hx of Amyloidosis,  Hepatitis C,  dyspepsia, hematuria, rectal bleeding, and Raynaud's phenomenon, cutaneous mastocytosis, presents for f/u s/p Auto PSCT  He is referred by Dr. Joel Wells. \par  \par  He initially experienced joint pain in 2014 and managed sx with Advil. In 2016, LFT was elevated and was dx with Hepatitis C which was treated with Harvoni. Patient has a hx of hematuria and bloody stool. Biopsies of the colon revealed chronic colitis and erosion. He was pleased on steroids and Mesalamine and started Humira in Mid-August. After first dose of Humira, left knee swelled up ---alleviated by Advil. After second dose of Humira, ankles swelled up and steroids were prescribed. Patient was also placed on Famotidine for early satiety and dyspepsia. His renal function is now normal and no longer experiences bloody stools........Progression in dyspepsia led to an upper endoscopy with Dr Parks on 10/30/19. Biopsy of the second portion of the duodenum revealed amyloidosis confirmed with [place] red statin. IgM immunostain is positive in areas of amyloid deposition, with kappa LC predominance. The stomach was noted to have impaired distensibility on endoscopy.Renal function has been normal, with BUN 9/creatinine 0.9.Cystoscopy was performed which showed prominent blood vessels. \par  \par  On 06/29/20 Mr. Saba was admitted to [facility] for an for an autologous peripheral blood stem cell transplant with high dose Melphalan prep regimen for treatment of Amyloidosis. He had no complaint at the time of his admission. \par  \par  Upon admission, a TLC was placed in IR. Mr. Saba received IV hydration, pain management, anxiolysis, antiemetics, nutritional support, and antibacterial / antiviral / antifungal / GI / PCP and VOD (SOS) prophylaxis. When his ANC dropped below 1000, he was started on prophylactic Ciprofloxacin. Labs were monitored on a daily basis, and he received electrolyte repletion and transfusional support as needed. Initially, his platelets were kelp at a goal of 40K for his history of amyloidosis, however post engraftment the platelets goal was kept above 15K. As of note Mr. Saba was started on Acyclovir prior \par  to his admission, hence Acyclovir was continued. \par  \par  On 07/02/20, after pre-medication Mr. Saba received 316 mL of: Autologous, mobilized, plasma reduced, pooled, thawed, washed, HCP apheresis over \par  approximately 1 hr. Cell counts as follows \par  Total MNC( x10^8/kg)=8.32 \par  CD34+cells ( x10^6 kg)=9.96 \par  Cell Viability (%)=65 \par  Mr. Saba tolerated the infusion well with no adverse resections noted. \par  \par  While admitted, he had pancytopenia related to high dose chemotherapy prep regiment. He also developed neutropenic fevers. When he became febrile, blood and urine cultures were sent and CXR completed. Ciprofloxacin was changed to cefepime for a broadened anti-microbial coverage. Infectious work up was negative with, negative blood/urine cultures and CXR showing clear lungs. \par  \par  He also had a drug induced rash secondary to Kepivance which resolved with no intervention. His hospital stay was also complicated by chemotherapy induced grade 1 intestinal mucositis with diarrhea, c. diff sample was sent off and resulted negative, his symptoms resolved with Imodium. Additionally, he experienced chemotherapy induced grade 1 GI mucositis with nausea, he was treated with anti-emetics as well as prn Carafate and Maalox, his symptoms then resolved. \par  \par  Early engraftment was noticed on 7/15, Zarxio was increased from 480 micrograms to 600 micrograms SQ daily. Cefepime was continued until post engraftment. On 7/18, given count recovery no fevers an negative infectious work up, negative blood/urine cultures and CXR with clear lungs, Cefepime was discontinued. \par  Zarxio was discontinued on 7/19. Nasopharyngeal  Surveillance COVID swabs were preformed weekly and all resulted negative, with the most recent collected on 7/20. \par  \par  Currently, Mr. Saba is ready and stable for discharge with a follow up appointments at [facility].  [de-identified] :  Overall continues to feel well after returning to work ...No complaints. Compliant with post transplant meds and covid  restrictions. Denies fever, chills, headache, dizziness, blurred vision, mucositis/odynophagia, chest pain, SOB, cough, nausea/vomiting, diarrhea/constipation, abdominal pain, dysuria, LE edema, rash, bleeding, pain received pfizer covid vaccine 12/21/20 1/11/21 9/16/21, 3/15/22.  On 1/18/23 , presents for a follow up visit. Overall well with no acute concerns. Denies fever, chills, nausea, vomiting, diarrhea, rash, mouth sores, dysuria or any signs of active bleeding. Denies SOB, chest pain or B/L LE edema.  Completed  24 month vaccines. States  developed left arm swelling after receiving vaccines which has resolved. Currently taking a multivitamin daily. and no other meds...  On 7/18/23, patient presents for a follow up visit. Overall well with no acute concerns.  Denies fever, chills, nausea, vomiting, diarrhea, rash, mouth sores, dysuria or any signs of active bleeding. Denies SOB, chest pain or B/L LE edema.  2/8/24:  Patient is here for a follow up visit. Denies fever, chills, nausea, vomiting, diarrhea, rash, mouth sores or any signs of active bleeding. Denies SOB, chest pain or B/L LE edema. Takes amlodipine for hypertension.

## 2024-02-09 NOTE — ASSESSMENT
[FreeTextEntry1] : Mr. Saba is a 63 year old male with a medical history of amyloidosis, hepatitis C, dyspepsia, hematuria, colitis, Raynaud's phenomenon, and cutaneous mastocytosis. Status post autologous peripheral blood stem cell transplant on 7/2/20. Doing very well.  1) Amyloidosis Cardiac, GI, and ?renal involvement of amyloid  S/p AUTO PBSCT on 7/2/20 with excellent clinical response 8/10/20 ECHO: LV global wall motion is normal, LV ejection fraction is normal (65%). Aortic arch is normal in size. There is no significant valvular disease. The right atrial pressure is normal. There is no pulmonary hypertension. There is no pericardial effusion. Left pleural effusion is present. 10/6/20 BNP = 357 (prev 409 on 4/27/20) follows with GI and cardiology consider f/u egd / colonoscopy  arrange for early January 2023 PET CT completed January 1/5/21 , 6/2023 nikkie F/u myeloma markers sent today...free light chains slightly elevated 24 hr urine due next visit  2) Heme Thalassemia trait Counts stable, no indication for transfusion Continue MV  Monitor counts   3) ID Currently not on ppx   Post transplant re-vaccination to start 1yr post transplant pt asks for hep A vaccine...and shingrix - 12 months post transplant vaccines - completed July 2021. Received Hepatitis A vaccine.  - 14 months post transplant vaccines - completed September 2021 - 24 months post transplant vaccines - Received july 2022  Received Hepatitis A vaccine.  Received Pfizer second booster on 3/15/22 -Recommended Shingrix vaccine  4) GI Currently not on ppx  5) Plan Pt educated regarding plan of care, all questions/concerns addressed..Maintenance discussed. 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

## 2024-02-14 LAB
ALBUMIN MFR SERPL ELPH: 62.6 %
ALBUMIN SERPL-MCNC: 4.4 G/DL
ALBUMIN/GLOB SERPL: 1.7 RATIO
ALPHA1 GLOB MFR SERPL ELPH: 4 %
ALPHA1 GLOB SERPL ELPH-MCNC: 0.3 G/DL
ALPHA2 GLOB MFR SERPL ELPH: 11.8 %
ALPHA2 GLOB SERPL ELPH-MCNC: 0.8 G/DL
B-GLOBULIN MFR SERPL ELPH: 10.4 %
B-GLOBULIN SERPL ELPH-MCNC: 0.7 G/DL
DEPRECATED KAPPA LC FREE/LAMBDA SER: 13.1 RATIO
GAMMA GLOB FLD ELPH-MCNC: 0.8 G/DL
GAMMA GLOB MFR SERPL ELPH: 11.2 %
IGA SER QL IEP: 120 MG/DL
IGG SER QL IEP: 908 MG/DL
IGM SER QL IEP: 91 MG/DL
INTERPRETATION SERPL IEP-IMP: NORMAL
KAPPA LC CSF-MCNC: 0.73 MG/DL
KAPPA LC SERPL-MCNC: 9.56 MG/DL
M PROTEIN SPEC IFE-MCNC: NORMAL
PROT SERPL-MCNC: 7 G/DL
PROT SERPL-MCNC: 7 G/DL

## 2024-03-12 NOTE — PROGRESS NOTE ADULT - ATTENDING COMMENTS
Please call patient to make an appointment and centroset message sent,thank you.     59 year old male with history of amyloidosis treated with CyBorD, now admitted for autologous pbsct with Melphalan preparative regimen   s/p HPC transplant on 7/2/20, now day + 5  Continue Zarxio daily until engraftment with WBC recovery  Kepivance on days 0, +1, +2 for prevention of mucositis-completed  Strict I&O, daily weight, Lasix to keep O > I  VOD prophylaxis- Actigall, low dose heparin gtt & glutamine supplementation  ID- continue Fluconazole / Acyclovir prophylaxis; begin Cipro for neutropenia on 7/6. If febrile, culture and start Cefepime.  Antiemetics  Mouth care, skin care  OOB/ambulate

## 2024-03-22 DIAGNOSIS — Z94.84 STEM CELLS TRANSPLANT STATUS: ICD-10-CM

## 2024-03-22 DIAGNOSIS — E85.9 AMYLOIDOSIS, UNSPECIFIED: ICD-10-CM

## 2024-03-25 ENCOUNTER — RESULT REVIEW (OUTPATIENT)
Age: 64
End: 2024-03-25

## 2024-03-25 ENCOUNTER — APPOINTMENT (OUTPATIENT)
Dept: HEMATOLOGY ONCOLOGY | Facility: CLINIC | Age: 64
End: 2024-03-25

## 2024-03-25 LAB
BASOPHILS # BLD AUTO: 0.03 K/UL — SIGNIFICANT CHANGE UP (ref 0–0.2)
BASOPHILS NFR BLD AUTO: 0.6 % — SIGNIFICANT CHANGE UP (ref 0–2)
EOSINOPHIL # BLD AUTO: 0.06 K/UL — SIGNIFICANT CHANGE UP (ref 0–0.5)
EOSINOPHIL NFR BLD AUTO: 1.2 % — SIGNIFICANT CHANGE UP (ref 0–6)
HCT VFR BLD CALC: 35.5 % — LOW (ref 39–50)
HGB BLD-MCNC: 11.6 G/DL — LOW (ref 13–17)
IMM GRANULOCYTES NFR BLD AUTO: 0.2 % — SIGNIFICANT CHANGE UP (ref 0–0.9)
LYMPHOCYTES # BLD AUTO: 2.11 K/UL — SIGNIFICANT CHANGE UP (ref 1–3.3)
LYMPHOCYTES # BLD AUTO: 41.8 % — SIGNIFICANT CHANGE UP (ref 13–44)
MCHC RBC-ENTMCNC: 23.6 PG — LOW (ref 27–34)
MCHC RBC-ENTMCNC: 32.7 G/DL — SIGNIFICANT CHANGE UP (ref 32–36)
MCV RBC AUTO: 72.3 FL — LOW (ref 80–100)
MONOCYTES # BLD AUTO: 0.42 K/UL — SIGNIFICANT CHANGE UP (ref 0–0.9)
MONOCYTES NFR BLD AUTO: 8.3 % — SIGNIFICANT CHANGE UP (ref 2–14)
NEUTROPHILS # BLD AUTO: 2.42 K/UL — SIGNIFICANT CHANGE UP (ref 1.8–7.4)
NEUTROPHILS NFR BLD AUTO: 47.9 % — SIGNIFICANT CHANGE UP (ref 43–77)
NRBC # BLD: 0 /100 WBCS — SIGNIFICANT CHANGE UP (ref 0–0)
PLATELET # BLD AUTO: 156 K/UL — SIGNIFICANT CHANGE UP (ref 150–400)
RBC # BLD: 4.91 M/UL — SIGNIFICANT CHANGE UP (ref 4.2–5.8)
RBC # FLD: 15.6 % — HIGH (ref 10.3–14.5)
WBC # BLD: 5.05 K/UL — SIGNIFICANT CHANGE UP (ref 3.8–10.5)
WBC # FLD AUTO: 5.05 K/UL — SIGNIFICANT CHANGE UP (ref 3.8–10.5)

## 2024-03-26 LAB
ALBUMIN SERPL ELPH-MCNC: 4.5 G/DL
ALBUPE: 14.2 %
ALP BLD-CCNC: 57 U/L
ALPHA1UPE: 35 %
ALPHA2UPE: 23.7 %
ALT SERPL-CCNC: 12 U/L
ANION GAP SERPL CALC-SCNC: 11 MMOL/L
AST SERPL-CCNC: 14 U/L
B2 MICROGLOB SERPL-MCNC: 2 MG/L
BENCE JONES EXCRETION: 0 MG/24HR
BETAUPE: 20.4 %
BILIRUB SERPL-MCNC: 0.4 MG/DL
BUN SERPL-MCNC: 14 MG/DL
CALCIUM SERPL-MCNC: 9 MG/DL
CHLORIDE SERPL-SCNC: 105 MMOL/L
CO2 SERPL-SCNC: 25 MMOL/L
CREAT 24H UR-MCNC: 1.3 G/24 H
CREAT ?TM UR-MCNC: 42 MG/DL
CREAT ?TM UR-MCNC: 42 MG/DL
CREAT SERPL-MCNC: 0.97 MG/DL
CREATININE UR (MAYO): 42 MG/DL
EGFR: 87 ML/MIN/1.73M2
GAMMAUPE: 6.7 %
GLUCOSE SERPL-MCNC: 98 MG/DL
IGA 24H UR QL IFE: NORMAL
KAPPA LC 24H UR QL: 0 MG/DL
LDH SERPL-CCNC: 167 U/L
M PROTEIN 24H MFR UR ELPH: 0 %
MAGNESIUM SERPL-MCNC: 2 MG/DL
NT-PROBNP SERPL-MCNC: 179 PG/ML
POTASSIUM SERPL-SCNC: 4.2 MMOL/L
PROT 24H UR-MRATE: 9 MG/DL
PROT ?TM UR-MCNC: 24 HR
PROT PATTERN 24H UR ELPH-IMP: NORMAL
PROT SERPL-MCNC: 6.6 G/DL
PROT UR-MCNC: 270 MG/24 H
PROT UR-MCNC: 5 MG/DL
PROT UR-MCNC: 5 MG/DL
SODIUM SERPL-SCNC: 142 MMOL/L
SPECIMEN VOL 24H UR: 3000 ML
TROPONIN I SERPL-MCNC: <0.01 NG/ML
U PROTEIN QNT CALCULATION: 150 MG/24 H

## 2024-03-27 LAB
ALBUMIN MFR SERPL ELPH: 62.6 %
ALBUMIN SERPL-MCNC: 4.1 G/DL
ALBUMIN/GLOB SERPL: 1.6 RATIO
ALPHA1 GLOB MFR SERPL ELPH: 4.4 %
ALPHA1 GLOB SERPL ELPH-MCNC: 0.3 G/DL
ALPHA2 GLOB MFR SERPL ELPH: 11.6 %
ALPHA2 GLOB SERPL ELPH-MCNC: 0.8 G/DL
B-GLOBULIN MFR SERPL ELPH: 10.1 %
B-GLOBULIN SERPL ELPH-MCNC: 0.7 G/DL
DEPRECATED KAPPA LC FREE/LAMBDA SER: 13.71 RATIO
GAMMA GLOB FLD ELPH-MCNC: 0.7 G/DL
GAMMA GLOB MFR SERPL ELPH: 11.3 %
IGA SER QL IEP: 117 MG/DL
IGG SER QL IEP: 770 MG/DL
IGM SER QL IEP: 79 MG/DL
INTERPRETATION SERPL IEP-IMP: NORMAL
KAPPA LC CSF-MCNC: 0.69 MG/DL
KAPPA LC SERPL-MCNC: 9.46 MG/DL
M PROTEIN SPEC IFE-MCNC: NORMAL
PROT SERPL-MCNC: 6.6 G/DL
PROT SERPL-MCNC: 6.6 G/DL

## 2024-05-21 ENCOUNTER — APPOINTMENT (OUTPATIENT)
Dept: FAMILY MEDICINE | Facility: CLINIC | Age: 64
End: 2024-05-21
Payer: COMMERCIAL

## 2024-05-21 VITALS
DIASTOLIC BLOOD PRESSURE: 78 MMHG | WEIGHT: 195 LBS | HEIGHT: 69 IN | SYSTOLIC BLOOD PRESSURE: 122 MMHG | OXYGEN SATURATION: 98 % | HEART RATE: 70 BPM | RESPIRATION RATE: 14 BRPM | BODY MASS INDEX: 28.88 KG/M2

## 2024-05-21 DIAGNOSIS — M54.10 RADICULOPATHY, SITE UNSPECIFIED: ICD-10-CM

## 2024-05-21 DIAGNOSIS — D56.3 THALASSEMIA MINOR: ICD-10-CM

## 2024-05-21 DIAGNOSIS — D69.6 THROMBOCYTOPENIA, UNSPECIFIED: ICD-10-CM

## 2024-05-21 DIAGNOSIS — D64.9 ANEMIA, UNSPECIFIED: ICD-10-CM

## 2024-05-21 DIAGNOSIS — N02.0: ICD-10-CM

## 2024-05-21 DIAGNOSIS — Z00.00 ENCOUNTER FOR GENERAL ADULT MEDICAL EXAMINATION W/OUT ABNORMAL FINDINGS: ICD-10-CM

## 2024-05-21 DIAGNOSIS — B19.20 UNSPECIFIED VIRAL HEPATITIS C W/OUT HEPATIC COMA: ICD-10-CM

## 2024-05-21 DIAGNOSIS — I10 ESSENTIAL (PRIMARY) HYPERTENSION: ICD-10-CM

## 2024-05-21 DIAGNOSIS — Z12.11 ENCOUNTER FOR SCREENING FOR MALIGNANT NEOPLASM OF COLON: ICD-10-CM

## 2024-05-21 PROCEDURE — 99396 PREV VISIT EST AGE 40-64: CPT

## 2024-05-21 PROCEDURE — 36415 COLL VENOUS BLD VENIPUNCTURE: CPT

## 2024-05-21 NOTE — HISTORY OF PRESENT ILLNESS
[FreeTextEntry1] : 64 y/o M with PMHx of anemia, amyloidosis, chronic hepatitis C, polyradiculopathy and thalassemia trait presents to the office for his annual exam. Denies fever, chills, sweats, abdominal pain, N/V/D/C, cough, SOB, dizziness and chest pain. Followed by Dr Wells in 02/24 stable also has been followed prior for Colitis all stable. Has stressful Job works as vascular surgeon.   [de-identified] : .

## 2024-05-21 NOTE — HEALTH RISK ASSESSMENT
[Good] : ~his/her~  mood as  good [Yes] : Yes [2 - 4 times a month (2 pts)] : 2-4 times a month (2 points) [1 or 2 (0 pts)] : 1 or 2 (0 points) [Never (0 pts)] : Never (0 points) [No] : In the past 12 months have you used drugs other than those required for medical reasons? No [No falls in past year] : Patient reported no falls in the past year [0] : 2) Feeling down, depressed, or hopeless: Not at all (0) [PHQ-2 Negative - No further assessment needed] : PHQ-2 Negative - No further assessment needed [Never] : Never [Patient declined Low Dose CT Scan] : Patient declined Low Dose CT Scan [No Retinopathy] : No retinopathy [Patient reported colonoscopy was abnormal] : Patient reported colonoscopy was abnormal [HIV test declined] : HIV test declined [Hepatitis C test declined] : Hepatitis C test declined [None] : None [With Family] : lives with family [Employed] : employed [Graduate School] : graduate school [] :  [# Of Children ___] : has [unfilled] children [Sexually Active] : sexually active [Feels Safe at Home] : Feels safe at home [Fully functional (bathing, dressing, toileting, transferring, walking, feeding)] : Fully functional (bathing, dressing, toileting, transferring, walking, feeding) [Fully functional (using the telephone, shopping, preparing meals, housekeeping, doing laundry, using] : Fully functional and needs no help or supervision to perform IADLs (using the telephone, shopping, preparing meals, housekeeping, doing laundry, using transportation, managing medications and managing finances) [Reports normal functional visual acuity (ie: able to read med bottle)] : Reports normal functional visual acuity [Smoke Detector] : smoke detector [Carbon Monoxide Detector] : carbon monoxide detector [Seat Belt] :  uses seat belt [Sunscreen] : uses sunscreen [With Patient/Caregiver] : , with patient/caregiver [I will adhere to the patient's wishes.] : I will adhere to the patient's wishes. [Time Spent: ___ minutes] : Time Spent: [unfilled] minutes [Audit-CScore] : 2 [de-identified] : Exercises 5 days a week [de-identified] : Regular diet [[facility]] : 9 [[id number]] : 0 [LowDoseCTScan] : 06/22 [EyeExamDate] : 06/23 [Change in mental status noted] : No change in mental status noted [Language] : denies difficulty with language [Behavior] : denies difficulty with behavior [Learning/Retaining New Information] : denies difficulty learning/retaining new information [Handling Complex Tasks] : denies difficulty handling complex tasks [Reasoning] : denies difficulty with reasoning [Spatial Ability and Orientation] : denies difficulty with spatial ability and orientation [High Risk Behavior] : no high risk behavior [Reports changes in hearing] : Reports no changes in hearing [Reports changes in vision] : Reports no changes in vision [Reports changes in dental health] : Reports no changes in dental health [Guns at Home] : no guns at home [Safety elements used in home] : no safety elements used in home [Travel to Developing Areas] : does not  travel to developing areas [TB Exposure] : is not being exposed to tuberculosis [Caregiver Concerns] : does not have caregiver concerns [ColonoscopyDate] : 8/2019 [ColonoscopyComments] : Hemorrhagic Colitis  [HIVDate] : 06/22 [HepatitisCDate] : 06/22 [AdvancecareDate] : 05/24

## 2024-05-21 NOTE — ASSESSMENT
[FreeTextEntry1] : 64 y/o M with PMHx of anemia, amyloidosis, chronic hepatitis C, polyradiculopathy and thalassemia trait presents to the office for his annual exam. Denies fever, chills, sweats, abdominal pain, N/V/D/C, cough, SOB, dizziness and chest pain. Followed by Dr Wells in 02/24 stable also has been followed prior for Colitis all stable. Has stressful Job works as vascular surgeon.    Pt care plan reviewed Medications reviewed Labs ordered   RTC in 3 months

## 2024-06-03 ENCOUNTER — NON-APPOINTMENT (OUTPATIENT)
Age: 64
End: 2024-06-03

## 2024-09-05 ENCOUNTER — OUTPATIENT (OUTPATIENT)
Dept: OUTPATIENT SERVICES | Facility: HOSPITAL | Age: 64
LOS: 1 days | Discharge: HOME | End: 2024-09-05

## 2024-09-05 DIAGNOSIS — E85.9 AMYLOIDOSIS, UNSPECIFIED: ICD-10-CM

## 2024-09-11 ENCOUNTER — RESULT REVIEW (OUTPATIENT)
Age: 64
End: 2024-09-11

## 2024-09-11 ENCOUNTER — APPOINTMENT (OUTPATIENT)
Dept: HEMATOLOGY ONCOLOGY | Facility: CLINIC | Age: 64
End: 2024-09-11
Payer: COMMERCIAL

## 2024-09-11 ENCOUNTER — APPOINTMENT (OUTPATIENT)
Dept: HEMATOLOGY ONCOLOGY | Facility: CLINIC | Age: 64
End: 2024-09-11

## 2024-09-11 VITALS
BODY MASS INDEX: 29.56 KG/M2 | RESPIRATION RATE: 16 BRPM | TEMPERATURE: 98.2 F | OXYGEN SATURATION: 99 % | HEART RATE: 73 BPM | SYSTOLIC BLOOD PRESSURE: 111 MMHG | WEIGHT: 200.18 LBS | DIASTOLIC BLOOD PRESSURE: 77 MMHG

## 2024-09-11 DIAGNOSIS — N02.0: ICD-10-CM

## 2024-09-11 DIAGNOSIS — I73.00 RAYNAUD'S SYNDROME W/OUT GANGRENE: ICD-10-CM

## 2024-09-11 DIAGNOSIS — E85.9 AMYLOIDOSIS, UNSPECIFIED: ICD-10-CM

## 2024-09-11 DIAGNOSIS — D56.3 THALASSEMIA MINOR: ICD-10-CM

## 2024-09-11 DIAGNOSIS — Z94.84 STEM CELLS TRANSPLANT STATUS: ICD-10-CM

## 2024-09-11 DIAGNOSIS — D47.01 CUTANEOUS MASTOCYTOSIS: ICD-10-CM

## 2024-09-11 LAB
BASOPHILS # BLD AUTO: 0.03 K/UL — SIGNIFICANT CHANGE UP (ref 0–0.2)
BASOPHILS NFR BLD AUTO: 0.6 % — SIGNIFICANT CHANGE UP (ref 0–2)
EOSINOPHIL # BLD AUTO: 0.07 K/UL — SIGNIFICANT CHANGE UP (ref 0–0.5)
EOSINOPHIL NFR BLD AUTO: 1.4 % — SIGNIFICANT CHANGE UP (ref 0–6)
HCT VFR BLD CALC: 35.8 % — LOW (ref 39–50)
HGB BLD-MCNC: 11.5 G/DL — LOW (ref 13–17)
IMM GRANULOCYTES NFR BLD AUTO: 0.4 % — SIGNIFICANT CHANGE UP (ref 0–0.9)
LYMPHOCYTES # BLD AUTO: 2.12 K/UL — SIGNIFICANT CHANGE UP (ref 1–3.3)
LYMPHOCYTES # BLD AUTO: 42.8 % — SIGNIFICANT CHANGE UP (ref 13–44)
MCHC RBC-ENTMCNC: 23.7 PG — LOW (ref 27–34)
MCHC RBC-ENTMCNC: 32.1 G/DL — SIGNIFICANT CHANGE UP (ref 32–36)
MCV RBC AUTO: 73.8 FL — LOW (ref 80–100)
MONOCYTES # BLD AUTO: 0.4 K/UL — SIGNIFICANT CHANGE UP (ref 0–0.9)
MONOCYTES NFR BLD AUTO: 8.1 % — SIGNIFICANT CHANGE UP (ref 2–14)
NEUTROPHILS # BLD AUTO: 2.31 K/UL — SIGNIFICANT CHANGE UP (ref 1.8–7.4)
NEUTROPHILS NFR BLD AUTO: 46.7 % — SIGNIFICANT CHANGE UP (ref 43–77)
NRBC # BLD: 0 /100 WBCS — SIGNIFICANT CHANGE UP (ref 0–0)
NRBC BLD-RTO: 0 /100 WBCS — SIGNIFICANT CHANGE UP (ref 0–0)
PLATELET # BLD AUTO: 150 K/UL — SIGNIFICANT CHANGE UP (ref 150–400)
RBC # BLD: 4.85 M/UL — SIGNIFICANT CHANGE UP (ref 4.2–5.8)
RBC # FLD: 15.7 % — HIGH (ref 10.3–14.5)
WBC # BLD: 4.95 K/UL — SIGNIFICANT CHANGE UP (ref 3.8–10.5)
WBC # FLD AUTO: 4.95 K/UL — SIGNIFICANT CHANGE UP (ref 3.8–10.5)

## 2024-09-11 PROCEDURE — 99214 OFFICE O/P EST MOD 30 MIN: CPT

## 2024-09-12 LAB
ALBUMIN SERPL ELPH-MCNC: 4.3 G/DL
ALP BLD-CCNC: 65 U/L
ALT SERPL-CCNC: 9 U/L
ANION GAP SERPL CALC-SCNC: 13 MMOL/L
AST SERPL-CCNC: 12 U/L
B2 MICROGLOB SERPL-MCNC: 2 MG/L
BILIRUB SERPL-MCNC: 0.3 MG/DL
BUN SERPL-MCNC: 18 MG/DL
CALCIUM SERPL-MCNC: 9.3 MG/DL
CHLORIDE SERPL-SCNC: 104 MMOL/L
CO2 SERPL-SCNC: 24 MMOL/L
CREAT SERPL-MCNC: 0.88 MG/DL
EGFR: 95 ML/MIN/1.73M2
GLUCOSE SERPL-MCNC: 102 MG/DL
LDH SERPL-CCNC: 163 U/L
MAGNESIUM SERPL-MCNC: 2.1 MG/DL
POTASSIUM SERPL-SCNC: 4.8 MMOL/L
PROT SERPL-MCNC: 6.3 G/DL
SODIUM SERPL-SCNC: 141 MMOL/L

## 2024-09-13 NOTE — PHYSICAL EXAM
[Fully active, able to carry on all pre-disease performance without restriction] : Status 0 - Fully active, able to carry on all pre-disease performance without restriction [Normal] : normoactive bowel sounds, soft and nontender, no hepatosplenomegaly or masses appreciated [de-identified] : no edema

## 2024-09-13 NOTE — PHYSICAL EXAM
[Fully active, able to carry on all pre-disease performance without restriction] : Status 0 - Fully active, able to carry on all pre-disease performance without restriction [Normal] : normoactive bowel sounds, soft and nontender, no hepatosplenomegaly or masses appreciated [de-identified] : no edema

## 2024-09-13 NOTE — ASSESSMENT
[FreeTextEntry1] : Mr. Saba is a 64 year old male with a medical history of amyloidosis, hepatitis C, dyspepsia, hematuria, colitis, Raynaud's phenomenon, and cutaneous mastocytosis. Status post autologous peripheral blood stem cell transplant on 7/2/20. Doing very well...s/p severe anaphylaxis from a bee sting  1) Amyloidosis Cardiac, GI, and ?renal involvement of amyloid  S/p AUTO PBSCT on 7/2/20 with excellent clinical response 8/10/20 ECHO: LV global wall motion is normal, LV ejection fraction is normal (65%). Aortic arch is normal in size. There is no significant valvular disease. The right atrial pressure is normal. There is no pulmonary hypertension. There is no pericardial effusion. Left pleural effusion is present. 10/6/20 BNP = 357 (prev 409 on 4/27/20) follows with GI and cardiology consider f/u egd / colonoscopy PET CT completed January 1/5/21 , 6/2023 nikkie F/u myeloma markers sent today...free light chains slightly elevated 24 hr urine due next visit  2) Heme Thalassemia trait Counts stable, no indication for transfusion Continue MV  Monitor counts   3) ID Currently not on ppx   Post transplant re-vaccination to start 1yr post transplant pt asks for hep A vaccine...and shingrix - 12 months post transplant vaccines - completed July 2021. Received Hepatitis A vaccine.  - 14 months post transplant vaccines - completed September 2021 - 24 months post transplant vaccines - Received july 2022  Received Hepatitis A vaccine.  Received Pfizer second booster on 3/15/22 -Recommended Shingrix vaccine, flu, covid  4) GI Currently not on ppx   5) mastocytosis, cutaneous....episode of anaphylaxis.....following with allergist...discussed new oral option for systemic mastocytosis...to consider treatment with antibody against IgE... Pt educated regarding plan of care, all questions/concerns addressed..Maintenance discussed. 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

## 2024-09-13 NOTE — ADDENDUM
[FreeTextEntry1] : Documented by Tigre Rodriguez acting as a scribe for Dr. Lila Roman on 9/11/24. All medical record entries made by the Scribe were at my, Dr. Lila Roman, direction and personally dictated by me on 9/11/24. I have reviewed the chart and agree that the record accurately reflects my personal performance of the history, physical exam, assessment and plan. I have also personally directed, reviewed, and agree with the discharge instructions.

## 2024-09-13 NOTE — HISTORY OF PRESENT ILLNESS
[de-identified] : 61 y/o male with a hx of Amyloidosis,  Hepatitis C,  dyspepsia, hematuria, rectal bleeding, and Raynaud's phenomenon, cutaneous mastocytosis, presents for f/u s/p Auto PSCT  He is referred by Dr. Joel Wells. \par  \par  He initially experienced joint pain in 2014 and managed sx with Advil. In 2016, LFT was elevated and was dx with Hepatitis C which was treated with Harvoni. Patient has a hx of hematuria and bloody stool. Biopsies of the colon revealed chronic colitis and erosion. He was pleased on steroids and Mesalamine and started Humira in Mid-August. After first dose of Humira, left knee swelled up ---alleviated by Advil. After second dose of Humira, ankles swelled up and steroids were prescribed. Patient was also placed on Famotidine for early satiety and dyspepsia. His renal function is now normal and no longer experiences bloody stools........Progression in dyspepsia led to an upper endoscopy with Dr Parks on 10/30/19. Biopsy of the second portion of the duodenum revealed amyloidosis confirmed with [place] red statin. IgM immunostain is positive in areas of amyloid deposition, with kappa LC predominance. The stomach was noted to have impaired distensibility on endoscopy.Renal function has been normal, with BUN 9/creatinine 0.9.Cystoscopy was performed which showed prominent blood vessels. \par  \par  On 06/29/20 Mr. Saba was admitted to [facility] for an for an autologous peripheral blood stem cell transplant with high dose Melphalan prep regimen for treatment of Amyloidosis. He had no complaint at the time of his admission. \par  \par  Upon admission, a TLC was placed in IR. Mr. Saba received IV hydration, pain management, anxiolysis, antiemetics, nutritional support, and antibacterial / antiviral / antifungal / GI / PCP and VOD (SOS) prophylaxis. When his ANC dropped below 1000, he was started on prophylactic Ciprofloxacin. Labs were monitored on a daily basis, and he received electrolyte repletion and transfusional support as needed. Initially, his platelets were kelp at a goal of 40K for his history of amyloidosis, however post engraftment the platelets goal was kept above 15K. As of note Mr. Saba was started on Acyclovir prior \par  to his admission, hence Acyclovir was continued. \par  \par  On 07/02/20, after pre-medication Mr. Saba received 316 mL of: Autologous, mobilized, plasma reduced, pooled, thawed, washed, HCP apheresis over \par  approximately 1 hr. Cell counts as follows \par  Total MNC( x10^8/kg)=8.32 \par  CD34+cells ( x10^6 kg)=9.96 \par  Cell Viability (%)=65 \par  Mr. Saba tolerated the infusion well with no adverse resections noted. \par  \par  While admitted, he had pancytopenia related to high dose chemotherapy prep regiment. He also developed neutropenic fevers. When he became febrile, blood and urine cultures were sent and CXR completed. Ciprofloxacin was changed to cefepime for a broadened anti-microbial coverage. Infectious work up was negative with, negative blood/urine cultures and CXR showing clear lungs. \par  \par  He also had a drug induced rash secondary to Kepivance which resolved with no intervention. His hospital stay was also complicated by chemotherapy induced grade 1 intestinal mucositis with diarrhea, c. diff sample was sent off and resulted negative, his symptoms resolved with Imodium. Additionally, he experienced chemotherapy induced grade 1 GI mucositis with nausea, he was treated with anti-emetics as well as prn Carafate and Maalox, his symptoms then resolved. \par  \par  Early engraftment was noticed on 7/15, Zarxio was increased from 480 micrograms to 600 micrograms SQ daily. Cefepime was continued until post engraftment. On 7/18, given count recovery no fevers an negative infectious work up, negative blood/urine cultures and CXR with clear lungs, Cefepime was discontinued. \par  Zarxio was discontinued on 7/19. Nasopharyngeal  Surveillance COVID swabs were preformed weekly and all resulted negative, with the most recent collected on 7/20. \par  \par  Currently, Mr. Saba is ready and stable for discharge with [facility]  [de-identified] :  Overall continues to feel well after returning to work ...No complaints. Compliant with post transplant meds and covid  restrictions. Denies fever, chills, headache, dizziness, blurred vision, mucositis/odynophagia, chest pain, SOB, cough, nausea/vomiting, diarrhea/constipation, abdominal pain, dysuria, LE edema, rash, bleeding, pain received pfizer covid vaccine 12/21/20 1/11/21 9/16/21, 3/15/22.  On 1/18/23 , presents for a follow up visit. Overall well with no acute concerns. Denies fever, chills, nausea, vomiting, diarrhea, rash, mouth sores, dysuria or any signs of active bleeding. Denies SOB, chest pain or B/L LE edema.  Completed  24 month vaccines. States  developed left arm swelling after receiving vaccines which has resolved. Currently taking a multivitamin daily. and no other meds...  On 7/18/23, patient presents for a follow up visit. Overall well with no acute concerns.  Denies fever, chills, nausea, vomiting, diarrhea, rash, mouth sores, dysuria or any signs of active bleeding. Denies SOB, chest pain or B/L LE edema.  2/8/24:  Patient is here for a follow up visit. Denies fever, chills, nausea, vomiting, diarrhea, rash, mouth sores or any signs of active bleeding. Denies SOB, chest pain or B/L LE edema. Takes amlodipine for hypertension.   9/11/24:  Patient is here for a follow up visit. Denies fever, chills, nausea, vomiting, diarrhea, rash, mouth sores or any signs of active bleeding. Denies SOB, chest pain or B/L LE edema. Pt had an allergic reaction about 6 weeks ago to a yellow jacket bee sting. The bee was in a shoe and when he put the shoe on, he got stung on the foot. He iced his foot but within 5 minutes he passed out. He states he's been stung by bees his whole life, and one time he was stung by 3 yellow jackets at the same time. He was taken to [facility] by ambulance and injected with epinephrine en route but it was ineffective and he went into respiratory distress. While at the hospital, he was given IV benadryl and epinephrine and felt better in an hour. The hospital released him with an epi pen. He saw an allergist and was told to go on allegra twice a day. He also completed an allergy panel w allergist. and was informed that he is allergic to a lot of trees and many foods including nuts and wheat, sesane, soy beans, shrimp scallops, that he has never had an allergic reactions to before. After seeing the allergist, he had peanut butter on a cracker w almond milk and had no reaction. Tryptase level is 35. Pt is worried about his tryptase levels breaking down light chains. F/u echo in Nov 2024. Pt states he does not want to do repeat BMB. xolair antibody against ige to lower ige levels. recommended Ayvakit to control mastocytosis.

## 2024-09-13 NOTE — HISTORY OF PRESENT ILLNESS
[de-identified] : 61 y/o male with a hx of Amyloidosis,  Hepatitis C,  dyspepsia, hematuria, rectal bleeding, and Raynaud's phenomenon, cutaneous mastocytosis, presents for f/u s/p Auto PSCT  He is referred by Dr. Joel Wells. \par  \par  He initially experienced joint pain in 2014 and managed sx with Advil. In 2016, LFT was elevated and was dx with Hepatitis C which was treated with Harvoni. Patient has a hx of hematuria and bloody stool. Biopsies of the colon revealed chronic colitis and erosion. He was pleased on steroids and Mesalamine and started Humira in Mid-August. After first dose of Humira, left knee swelled up ---alleviated by Advil. After second dose of Humira, ankles swelled up and steroids were prescribed. Patient was also placed on Famotidine for early satiety and dyspepsia. His renal function is now normal and no longer experiences bloody stools........Progression in dyspepsia led to an upper endoscopy with Dr Parks on 10/30/19. Biopsy of the second portion of the duodenum revealed amyloidosis confirmed with [place] red statin. IgM immunostain is positive in areas of amyloid deposition, with kappa LC predominance. The stomach was noted to have impaired distensibility on endoscopy.Renal function has been normal, with BUN 9/creatinine 0.9.Cystoscopy was performed which showed prominent blood vessels. \par  \par  On 06/29/20 Mr. Saba was admitted to [facility] for an for an autologous peripheral blood stem cell transplant with high dose Melphalan prep regimen for treatment of Amyloidosis. He had no complaint at the time of his admission. \par  \par  Upon admission, a TLC was placed in IR. Mr. Saba received IV hydration, pain management, anxiolysis, antiemetics, nutritional support, and antibacterial / antiviral / antifungal / GI / PCP and VOD (SOS) prophylaxis. When his ANC dropped below 1000, he was started on prophylactic Ciprofloxacin. Labs were monitored on a daily basis, and he received electrolyte repletion and transfusional support as needed. Initially, his platelets were kelp at a goal of 40K for his history of amyloidosis, however post engraftment the platelets goal was kept above 15K. As of note Mr. Saba was started on Acyclovir prior \par  to his admission, hence Acyclovir was continued. \par  \par  On 07/02/20, after pre-medication Mr. Saba received 316 mL of: Autologous, mobilized, plasma reduced, pooled, thawed, washed, HCP apheresis over \par  approximately 1 hr. Cell counts as follows \par  Total MNC( x10^8/kg)=8.32 \par  CD34+cells ( x10^6 kg)=9.96 \par  Cell Viability (%)=65 \par  Mr. Saba tolerated the infusion well with no adverse resections noted. \par  \par  While admitted, he had pancytopenia related to high dose chemotherapy prep regiment. He also developed neutropenic fevers. When he became febrile, blood and urine cultures were sent and CXR completed. Ciprofloxacin was changed to cefepime for a broadened anti-microbial coverage. Infectious work up was negative with, negative blood/urine cultures and CXR showing clear lungs. \par  \par  He also had a drug induced rash secondary to Kepivance which resolved with no intervention. His hospital stay was also complicated by chemotherapy induced grade 1 intestinal mucositis with diarrhea, c. diff sample was sent off and resulted negative, his symptoms resolved with Imodium. Additionally, he experienced chemotherapy induced grade 1 GI mucositis with nausea, he was treated with anti-emetics as well as prn Carafate and Maalox, his symptoms then resolved. \par  \par  Early engraftment was noticed on 7/15, Zarxio was increased from 480 micrograms to 600 micrograms SQ daily. Cefepime was continued until post engraftment. On 7/18, given count recovery no fevers an negative infectious work up, negative blood/urine cultures and CXR with clear lungs, Cefepime was discontinued. \par  Zarxio was discontinued on 7/19. Nasopharyngeal  Surveillance COVID swabs were preformed weekly and all resulted negative, with the most recent collected on 7/20. \par  \par  Currently, Mr. aSba is ready and stable for discharge with a follow up appointments at [facility].  [de-identified] :  Overall continues to feel well after returning to work ...No complaints. Compliant with post transplant meds and covid  restrictions. Denies fever, chills, headache, dizziness, blurred vision, mucositis/odynophagia, chest pain, SOB, cough, nausea/vomiting, diarrhea/constipation, abdominal pain, dysuria, LE edema, rash, bleeding, pain received pfizer covid vaccine 12/21/20 1/11/21 9/16/21, 3/15/22.  On 1/18/23 , presents for a follow up visit. Overall well with no acute concerns. Denies fever, chills, nausea, vomiting, diarrhea, rash, mouth sores, dysuria or any signs of active bleeding. Denies SOB, chest pain or B/L LE edema.  Completed  24 month vaccines. States  developed left arm swelling after receiving vaccines which has resolved. Currently taking a multivitamin daily. and no other meds...  On 7/18/23, patient presents for a follow up visit. Overall well with no acute concerns.  Denies fever, chills, nausea, vomiting, diarrhea, rash, mouth sores, dysuria or any signs of active bleeding. Denies SOB, chest pain or B/L LE edema.  2/8/24:  Patient is here for a follow up visit. Denies fever, chills, nausea, vomiting, diarrhea, rash, mouth sores or any signs of active bleeding. Denies SOB, chest pain or B/L LE edema. Takes amlodipine for hypertension.   9/11/24:  Patient is here for a follow up visit. Denies fever, chills, nausea, vomiting, diarrhea, rash, mouth sores or any signs of active bleeding. Denies SOB, chest pain or B/L LE edema. Pt had an allergic reaction about 6 weeks ago to a yellow jacket bee sting. The bee was in a shoe and when he put the shoe on, he got stung on the foot. He iced his foot but within 5 minutes he passed out. He states he's been stung by bees his whole life, and one time he was stung by 3 yellow jackets at the same time. He was taken to [facility] by ambulance and injected with epinephrine en route but it was ineffective and he went into respiratory distress. While at the hospital, he was given IV benadryl and epinephrine and felt better in an hour. The hospital released him with an epi pen. He saw an allergist and was told to go on allegra twice a day. He also completed an allergy panel w allergist. and was informed that he is allergic to a lot of trees and many foods including nuts and wheat, sesane, soy beans, shrimp scallops, that he has never had an allergic reactions to before. After seeing the allergist, he had peanut butter on a cracker w almond milk and had no reaction. Tryptase level is 35. Pt is worried about his tryptase levels breaking down light chains. F/u echo in Nov 2024. Pt states he does not want to do repeat BMB. xolair antibody against ige to lower ige levels. recommended Ayvakit to control mastocytosis.

## 2024-09-18 LAB
ALBUMIN MFR SERPL ELPH: 63.1 %
ALBUMIN SERPL-MCNC: 4 G/DL
ALBUMIN/GLOB SERPL: 1.7 RATIO
ALPHA1 GLOB MFR SERPL ELPH: 3.9 %
ALPHA1 GLOB SERPL ELPH-MCNC: 0.2 G/DL
ALPHA2 GLOB MFR SERPL ELPH: 11.6 %
ALPHA2 GLOB SERPL ELPH-MCNC: 0.7 G/DL
B-GLOBULIN MFR SERPL ELPH: 10.4 %
B-GLOBULIN SERPL ELPH-MCNC: 0.7 G/DL
DEPRECATED KAPPA LC FREE/LAMBDA SER: 13.07 RATIO
GAMMA GLOB FLD ELPH-MCNC: 0.7 G/DL
GAMMA GLOB MFR SERPL ELPH: 11 %
IGA SER QL IEP: 101 MG/DL
IGG SER QL IEP: 702 MG/DL
IGM SER QL IEP: 91 MG/DL
INTERPRETATION SERPL IEP-IMP: NORMAL
KAPPA LC CSF-MCNC: 0.68 MG/DL
KAPPA LC SERPL-MCNC: 8.89 MG/DL
M PROTEIN SPEC IFE-MCNC: NORMAL
PROT SERPL-MCNC: 6.3 G/DL
PROT SERPL-MCNC: 6.3 G/DL

## 2024-09-25 NOTE — REASON FOR VISIT
Anesthesia Pre Eval Note  Pt is a 61 y/o M presenting for LEFT TOTAL HIP ARTHROPLASTY, ANTERIOR APPROACH (Left: Hip)     PMH: anxiety, migraines, OA, fall on R hip c/b impingement   PSHX: TL    Meds: Acetaminophen , Albuterol, Erythromycin , Famotidine , Sumatriptan     BMI: 20.95    Allergies: Aspirin , Penicillin G     EKG:  No recent EKG    Labs(9/13/24): K  4.6 Cr 0.9 Hgb 13.2  Plt 231, PT/ INR 11.1/1    Med Clearance \"Patient is cleared for surgery\" - Nomi Thakkar MD 9/17/24       Cleared in PAT on 9/24/24    Ax Plan   CSE + MAC (requested on Epic)   Or   GA- ET  +/- Fascia Iliaca / Femoral Block         Anesthesia ROS/Med Hx        Anesthetic Complication History:    Patient does not have a history of anesthetic complications      Pulmonary Review:  Patient does not have a pulmonary history      Neuro/Psych Review:  Patient does not have a neuro/psych history         Cardiovascular Review:  Patient does not have a cardiovascular history       GI/HEPATIC/RENAL Review:  Patient does not have a GI/hepatic/renalhistory       End/Other Review:    Positive for arthritis      Relevant Problems   No relevant active problems       Physical Exam     Airway   Mallampati: II  TM Distance: >3 FB  Neck ROM: Full  Neck: Non-tender and Able to place in sniff position  TMJ Mobility: Good    Cardiovascular  Cardiovascular exam normal  Cardio Rhythm: Regular  Cardio Rate: Normal    Head Assessment  Head assessment: Normocephalic and Atraumatic    General Assessment  General Assessment: Alert and oriented and No acute distress    Dental Exam  Dental exam normal    Pulmonary Exam  Pulmonary exam normal  Breath sounds clear to auscultation:  Yes    Abdominal Exam  Abdominal exam normal      Anesthesia Plan:    ASA Status: 2  Anesthesia Type: General    Induction: Intravenous  Preferred Airway Type: ETT  Maintenance: Inhalational    Post-op Pain Management: Per Surgeon      Checklist  Reviewed: NPO Status, Allergies,  Medications, Problem list and Past Med History  Consent/Risks Discussed Statement:  The proposed anesthetic plan, including its risks and benefits, have been discussed with the Patient along with the risks and benefits of alternatives. Questions were encouraged and answered and the patient and/or representative understands and agrees to proceed.        I discussed with the patient (and/or patient's legal representative) the risks and benefits of the proposed anesthesia plan, General, which may include services performed by other anesthesia providers.    Alternative anesthesia plans, if available, were reviewed with the patient (and/or patient's legal representative). Discussion has been held with the patient (and/or patient's legal representative) regarding risks of anesthesia, which include Nausea, Vomiting and Dental Injury and emergent situations that may require change in anesthesia plan.    The patient (and/or patient's legal representative) has indicated understanding, his/her questions have been answered, and he/she wishes to proceed with the planned anesthetic.    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

## 2024-10-19 NOTE — DIETITIAN INITIAL EVALUATION ADULT. - EST PROTEIN NEEDS9
119.84
GENERAL: Not in acute distress, non-toxic appearing  HEAD: normocephalic, atraumatic  HEENT: EOMI, normal conjunctiva, oral mucosa moist, neck supple  CARDIAC: appears well perfused  PULM: normal work of breathing,   GI: abdomen nondistended, nontender, no significant flank tenderness  NEURO: alert and oriented x 3, normal speech, no focal motor or sensory deficits, gait normal, no gross neurologic deficit  MSK: No visible deformities, no peripheral edema,   SKIN: No visible rashes, dry, well-perfused  PSYCH: appropriate mood and affect

## 2025-03-05 DIAGNOSIS — Z94.84 STEM CELLS TRANSPLANT STATUS: ICD-10-CM

## 2025-03-05 DIAGNOSIS — E85.9 AMYLOIDOSIS, UNSPECIFIED: ICD-10-CM

## 2025-04-02 ENCOUNTER — OUTPATIENT (OUTPATIENT)
Dept: OUTPATIENT SERVICES | Facility: HOSPITAL | Age: 65
LOS: 1 days | Discharge: HOME | End: 2025-04-02

## 2025-04-02 DIAGNOSIS — E85.9 AMYLOIDOSIS, UNSPECIFIED: ICD-10-CM

## 2025-04-03 ENCOUNTER — APPOINTMENT (OUTPATIENT)
Dept: HEMATOLOGY ONCOLOGY | Facility: CLINIC | Age: 65
End: 2025-04-03
Payer: COMMERCIAL

## 2025-04-03 ENCOUNTER — RESULT REVIEW (OUTPATIENT)
Age: 65
End: 2025-04-03

## 2025-04-03 ENCOUNTER — NON-APPOINTMENT (OUTPATIENT)
Age: 65
End: 2025-04-03

## 2025-04-03 VITALS
OXYGEN SATURATION: 97 % | SYSTOLIC BLOOD PRESSURE: 116 MMHG | HEART RATE: 65 BPM | BODY MASS INDEX: 22.56 KG/M2 | WEIGHT: 195 LBS | HEIGHT: 78 IN | DIASTOLIC BLOOD PRESSURE: 76 MMHG | TEMPERATURE: 97.8 F | RESPIRATION RATE: 16 BRPM

## 2025-04-03 DIAGNOSIS — D56.3 THALASSEMIA MINOR: ICD-10-CM

## 2025-04-03 DIAGNOSIS — Z94.84 STEM CELLS TRANSPLANT STATUS: ICD-10-CM

## 2025-04-03 DIAGNOSIS — E85.9 AMYLOIDOSIS, UNSPECIFIED: ICD-10-CM

## 2025-04-03 DIAGNOSIS — I10 ESSENTIAL (PRIMARY) HYPERTENSION: ICD-10-CM

## 2025-04-03 LAB
BASOPHILS # BLD AUTO: 0.03 K/UL — SIGNIFICANT CHANGE UP (ref 0–0.2)
BASOPHILS NFR BLD AUTO: 0.6 % — SIGNIFICANT CHANGE UP (ref 0–2)
EOSINOPHIL # BLD AUTO: 0.09 K/UL — SIGNIFICANT CHANGE UP (ref 0–0.5)
EOSINOPHIL NFR BLD AUTO: 1.8 % — SIGNIFICANT CHANGE UP (ref 0–6)
HCT VFR BLD CALC: 35.6 % — LOW (ref 39–50)
HGB BLD-MCNC: 11.3 G/DL — LOW (ref 13–17)
IMM GRANULOCYTES NFR BLD AUTO: 0.2 % — SIGNIFICANT CHANGE UP (ref 0–0.9)
LYMPHOCYTES # BLD AUTO: 2.27 K/UL — SIGNIFICANT CHANGE UP (ref 1–3.3)
LYMPHOCYTES # BLD AUTO: 44.5 % — HIGH (ref 13–44)
MCHC RBC-ENTMCNC: 23.4 PG — LOW (ref 27–34)
MCHC RBC-ENTMCNC: 31.7 G/DL — LOW (ref 32–36)
MCV RBC AUTO: 73.7 FL — LOW (ref 80–100)
MONOCYTES # BLD AUTO: 0.53 K/UL — SIGNIFICANT CHANGE UP (ref 0–0.9)
MONOCYTES NFR BLD AUTO: 10.4 % — SIGNIFICANT CHANGE UP (ref 2–14)
NEUTROPHILS # BLD AUTO: 2.17 K/UL — SIGNIFICANT CHANGE UP (ref 1.8–7.4)
NEUTROPHILS NFR BLD AUTO: 42.5 % — LOW (ref 43–77)
NRBC BLD AUTO-RTO: 0 /100 WBCS — SIGNIFICANT CHANGE UP (ref 0–0)
PLATELET # BLD AUTO: 149 K/UL — LOW (ref 150–400)
RBC # BLD: 4.83 M/UL — SIGNIFICANT CHANGE UP (ref 4.2–5.8)
RBC # FLD: 15.9 % — HIGH (ref 10.3–14.5)
WBC # BLD: 5.1 K/UL — SIGNIFICANT CHANGE UP (ref 3.8–10.5)
WBC # FLD AUTO: 5.1 K/UL — SIGNIFICANT CHANGE UP (ref 3.8–10.5)

## 2025-04-03 PROCEDURE — 99213 OFFICE O/P EST LOW 20 MIN: CPT

## 2025-04-05 LAB — TRYPTASE: 34.4 UG/L

## 2025-06-09 ENCOUNTER — APPOINTMENT (OUTPATIENT)
Dept: FAMILY MEDICINE | Facility: CLINIC | Age: 65
End: 2025-06-09
Payer: COMMERCIAL

## 2025-06-09 VITALS
RESPIRATION RATE: 12 BRPM | HEART RATE: 73 BPM | DIASTOLIC BLOOD PRESSURE: 80 MMHG | BODY MASS INDEX: 23.72 KG/M2 | HEIGHT: 78 IN | WEIGHT: 205 LBS | OXYGEN SATURATION: 97 % | SYSTOLIC BLOOD PRESSURE: 110 MMHG

## 2025-06-09 PROBLEM — H53.9 VISUAL CHANGES: Status: ACTIVE | Noted: 2025-06-09

## 2025-06-09 PROCEDURE — 99397 PER PM REEVAL EST PAT 65+ YR: CPT

## 2025-06-09 PROCEDURE — 36415 COLL VENOUS BLD VENIPUNCTURE: CPT

## 2025-06-09 RX ORDER — OMALIZUMAB 150 MG/ML
150 INJECTION, SOLUTION SUBCUTANEOUS
Qty: 1 | Refills: 0 | Status: ACTIVE | COMMUNITY
Start: 2024-09-19

## 2025-06-09 RX ORDER — OMALIZUMAB 300 MG/2ML
300 INJECTION, SOLUTION SUBCUTANEOUS
Qty: 2 | Refills: 0 | Status: ACTIVE | COMMUNITY
Start: 2024-09-19